# Patient Record
Sex: FEMALE | Race: WHITE | NOT HISPANIC OR LATINO | Employment: FULL TIME | URBAN - METROPOLITAN AREA
[De-identification: names, ages, dates, MRNs, and addresses within clinical notes are randomized per-mention and may not be internally consistent; named-entity substitution may affect disease eponyms.]

---

## 2017-01-12 ENCOUNTER — OFFICE VISIT (OUTPATIENT)
Dept: URGENT CARE | Age: 37
End: 2017-01-12
Payer: COMMERCIAL

## 2017-01-12 PROCEDURE — 99203 OFFICE O/P NEW LOW 30 MIN: CPT | Performed by: FAMILY MEDICINE

## 2017-02-11 ENCOUNTER — HOSPITAL ENCOUNTER (OUTPATIENT)
Dept: RADIOLOGY | Age: 37
Discharge: HOME | End: 2017-02-11
Attending: PHYSICIAN ASSISTANT | Admitting: FAMILY MEDICINE
Payer: COMMERCIAL

## 2017-02-11 ENCOUNTER — TRANSCRIBE ORDERS (OUTPATIENT)
Dept: URGENT CARE | Age: 37
End: 2017-02-11

## 2017-02-11 ENCOUNTER — OFFICE VISIT (OUTPATIENT)
Dept: URGENT CARE | Age: 37
End: 2017-02-11
Payer: COMMERCIAL

## 2017-02-11 DIAGNOSIS — M79.671 PAIN OF RIGHT FOOT: ICD-10-CM

## 2017-02-11 PROCEDURE — 99213 OFFICE O/P EST LOW 20 MIN: CPT | Performed by: FAMILY MEDICINE

## 2017-02-11 PROCEDURE — 73630 X-RAY EXAM OF FOOT: CPT

## 2017-06-09 ENCOUNTER — ALLSCRIPTS OFFICE VISIT (OUTPATIENT)
Dept: OTHER | Facility: OTHER | Age: 37
End: 2017-06-09

## 2017-06-09 ENCOUNTER — LAB REQUISITION (OUTPATIENT)
Dept: LAB | Facility: HOSPITAL | Age: 37
End: 2017-06-09
Payer: COMMERCIAL

## 2017-06-09 DIAGNOSIS — Z01.419 ENCOUNTER FOR GYNECOLOGICAL EXAMINATION WITHOUT ABNORMAL FINDING: ICD-10-CM

## 2017-06-09 PROCEDURE — 87624 HPV HI-RISK TYP POOLED RSLT: CPT | Performed by: OBSTETRICS & GYNECOLOGY

## 2017-06-09 PROCEDURE — G0145 SCR C/V CYTO,THINLAYER,RESCR: HCPCS | Performed by: OBSTETRICS & GYNECOLOGY

## 2017-06-10 ENCOUNTER — TRANSCRIBE ORDERS (OUTPATIENT)
Dept: LAB | Facility: HOSPITAL | Age: 37
End: 2017-06-10

## 2017-06-10 ENCOUNTER — APPOINTMENT (OUTPATIENT)
Dept: LAB | Facility: HOSPITAL | Age: 37
End: 2017-06-10
Payer: COMMERCIAL

## 2017-06-10 DIAGNOSIS — Z00.8 HEALTH EXAMINATION IN POPULATION SURVEY: ICD-10-CM

## 2017-06-10 DIAGNOSIS — Z00.8 HEALTH EXAMINATION IN POPULATION SURVEY: Primary | ICD-10-CM

## 2017-06-10 LAB
CHOLEST SERPL-MCNC: 226 MG/DL (ref 50–200)
EST. AVERAGE GLUCOSE BLD GHB EST-MCNC: 94 MG/DL
HBA1C MFR BLD: 4.9 % (ref 4.2–6.3)
HDLC SERPL-MCNC: 65 MG/DL (ref 40–60)
LDLC SERPL CALC-MCNC: 128 MG/DL (ref 0–100)
TRIGL SERPL-MCNC: 167 MG/DL

## 2017-06-10 PROCEDURE — 80061 LIPID PANEL: CPT

## 2017-06-10 PROCEDURE — 36415 COLL VENOUS BLD VENIPUNCTURE: CPT

## 2017-06-10 PROCEDURE — 83036 HEMOGLOBIN GLYCOSYLATED A1C: CPT

## 2017-06-15 LAB — HPV RRNA GENITAL QL NAA+PROBE: ABNORMAL

## 2017-06-19 LAB
LAB AP GYN PRIMARY INTERPRETATION: NORMAL
LAB AP LMP: NORMAL
Lab: NORMAL
PATH INTERP SPEC-IMP: NORMAL

## 2017-12-21 ENCOUNTER — OFFICE VISIT (OUTPATIENT)
Dept: URGENT CARE | Age: 37
End: 2017-12-21
Payer: COMMERCIAL

## 2017-12-21 PROCEDURE — 99203 OFFICE O/P NEW LOW 30 MIN: CPT | Performed by: FAMILY MEDICINE

## 2017-12-21 PROCEDURE — S9088 SERVICES PROVIDED IN URGENT: HCPCS | Performed by: FAMILY MEDICINE

## 2017-12-22 NOTE — PROGRESS NOTES
Assessment   1  Acute sinusitis (461 9) (J01 90)   2  Acute upper respiratory infection (465 9) (J06 9)    Plan   Acute sinusitis    · Amoxicillin-Pot Clavulanate 875-125 MG Oral Tablet (Augmentin); TAKE 1 TABLET    TWICE DAILY UNTIL FINISHED    Discussion/Summary   Discussion Summary:    Augmentin twice a day until finished ( please take probiotics)  / sinus medication as needed  or Advil / Motrin as needed  follow-up with family physician  Medication Side Effects Reviewed: Possible side effects of new medications were reviewed with the patient/guardian today  Understands and agrees with treatment plan: The treatment plan was reviewed with the patient/guardian  The patient/guardian understands and agrees with the treatment plan    Counseling Documentation With Imm: The patient was counseled regarding  Chief Complaint   1  Cold Symptoms  Chief Complaint Free Text Note Form: Since Tuesday- nasal congestion with rhinorrhea and PND, b/l ear pressure, congested cough and chills  No chest tightness/wheeze  Taking Tylenol Cold and Flu  Flu vaccine      History of Present Illness   HPI: Chills, nasal congestion, ear pain, cough    Hospital Based Practices Required Assessment:      Pain Assessment      the patient states they have pain  The pain is located in the cough and nasal congeston  (on a scale of 0 to 10, the patient rates the pain at 5 )      Abuse And Domestic Violence Screen       Yes, the patient is safe at home  -- The patient states no one is hurting them  Depression And Suicide Screen  No, the patient has not had thoughts of hurting themself  No, the patient has not felt depressed in the past 7 days  Prefered Language is  [place]  Primary Language is  English  Review of Systems   Focused-Female:      Constitutional: chills, but-- as noted in HPI       ENT: earache-- and-- nasal discharge, but-- as noted in HPI        Cardiovascular: no complaints of slow or fast heart rate, no chest pain, no palpitations, no leg claudication or lower extremity edema  Respiratory: cough, but-- as noted in HPI  Breasts: no complaints of breast pain, breast lump or nipple discharge  Gastrointestinal: no complaints of abdominal pain, no constipation, no nausea or diarrhea, no vomiting, no bloody stools  Genitourinary: no complaints of dysuria, no incontinence, no pelvic pain, no dysmenorrhea, no vaginal discharge or abnormal vaginal bleeding  Musculoskeletal: no complaints of arthralgia, no myalgia, no joint swelling or stiffness, no limb pain or swelling  Integumentary: no complaints of skin rash or lesion, no itching or dry skin, no skin wounds  Neurological: no complaints of headache, no confusion, no numbness or tingling, no dizziness or fainting  ROS Reviewed:    ROS reviewed  Active Problems   1  Abnormal findings on diagnostic imaging of other specified body structures (793 99)     (R93 8)   2  Acute foot pain, right (729 5) (M79 671)   3  Ankle pain (719 47) (M25 579)   4  Anxiety (300 00) (F41 9)   5  Arthralgia of right hand (719 44) (M25 541)   6  Asthmatic bronchitis (493 90) (J45 909)   7  Baker's cyst, left (727 51) (M71 22)   8  Bilateral knee pain (719 46) (M25 561,M25 562)   9  Bilateral patellofemoral syndrome (719 46) (M22 2X1,M22 2X2)   10  Contraception (V25 9) (Z30 9)   11  Encounter for annual routine gynecological examination (V72 31) (Z01 419)   12  Fibromyalgia (729 1) (M79 7)   13  Fracture of great toe, right, closed (826 0) (S92 401A)   14  Hamstring tightness (728 9) (M62 89)   15  Hypercholesteremia (272 0) (E78 00)   16  It band syndrome, left (728 89) (M76 32)   17  Knee pain, left (719 46) (M25 562)   18  Obstructive sleep apnea (327 23) (G47 33)   19  Right ankle sprain (845 00) (S93 401A)   20  Tobacco use (305 1) (Z72 0)    Past Medical History   1  History of Exercise-induced asthma (493 81) (S18 556)   2   History of Fibromyalgia (729 1) (M79 7)   3  History of hypercholesterolemia (V12 29) (Z86 39)  Active Problems And Past Medical History Reviewed: The active problems and past medical history were reviewed and updated today  Family History   Family History    1  Family history of Cancer   2  Family history of Osteoarthritis (V17 7)  Family History Reviewed: The family history was reviewed and updated today  Social History    · Denied: Alcohol Use (History)   · Caffeine Use   · Current Smoker (305 1)   · Denied: History of Drug use   · Tobacco use (305 1) (Z72 0)  Social History Reviewed: The social history was reviewed and updated today  The social history was reviewed and is unchanged  Surgical History   1  History of Breast Surgery Reduction Procedure   2  History of Tonsillectomy  Surgical History Reviewed: The surgical history was reviewed and updated today  Current Meds    1  Atorvastatin Calcium 20 MG Oral Tablet Recorded   2  Breo Ellipta 100-25 MCG/INH Inhalation Aerosol Powder Breath Activated Recorded   3  Coconut Oil CAPS; Therapy: (Alana Gonzalez) to Recorded   4  Cymbalta 60 MG Oral Capsule Delayed Release Particles; Take 2 Capsules at Bedtime     Recorded   5  Echinacea CAPS; Therapy: (Alana Gonzalez) to Recorded   6  Multiple Vitamin TABS; Therapy: (AlanaWeVorcedy) to Recorded   7  Norgestim-Eth Estrad Triphasic 0 18/0 215/0 25 MG-35 MCG Oral Tablet; Take 1 tablet     daily; Therapy: [id number] to (Last Rx:09Jun2017)  Requested for: 52GHX9542 Ordered   8  Phentermine HCl - 37 5 MG Oral Tablet; Therapy: (Recorded:18Oct2016) to Recorded   9  ProAir  (90 Base) MCG/ACT Inhalation Aerosol Solution; INHALE 2 PUFFS     EVERY 4 HOURS AS NEEDED; Therapy: 52CFF6114 to (Last Rx:12Jan2017)  Requested for: 12Jan2017 Ordered   10  Topiramate 25 MG Oral Tablet; Therapy: (Recorded:18Oct2016) to Recorded   11  Vitamin C CAPS;       Therapy: (Wileya Frame) to Recorded   12  Vitamin D 1000 UNIT Oral Tablet; Therapy: (Donalda Frame) to Recorded   13  Vitamin E 400 UNIT Oral Tablet; Therapy: (Wileya Frame) to Recorded  Medication List Reviewed: The medication list was reviewed and updated today  Allergies   1  No Known Drug Allergies    Vitals   Signs   Recorded: 11Uxg6656 04:32PM   Temperature: 98 2 F, Oral  Heart Rate: 103  Pulse Quality: Regular  Respiration: 18  Systolic: 194, RUE, Sitting  Diastolic: 68, RUE, Sitting  Height: 5 ft 2 in  Weight: 186 lb 9 6 oz  BMI Calculated: 34 13  BSA Calculated: 1 86  O2 Saturation: 98  LMP: 26PON7683  Pain Scale: 5    Physical Exam        Constitutional patient appears ill  Ears, Nose, Mouth, and Throat      Otoscopic examination: Tympanic membranes translucent with normal light reflex  Canals patent without erythema  -- nasal congestion, tenderness over the sinuses with palpation  -- injection of the oropharynx  Pulmonary      Respiratory effort: No increased work of breathing or signs of respiratory distress  Auscultation of lungs: Clear to auscultation  Cardiovascular      Auscultation of heart: Normal rate and rhythm, normal S1 and S2, without murmurs  Lymphatic      Palpation of lymph nodes in neck: No lymphadenopathy  Skin good color and turgor  Neurologic grossly intact, no nuchal rigidity  Psychiatric      Orientation to person, place, and time: Normal        Mood and affect: Normal        Message   Return to work or school:         No work 12/22/2017           Signatures    Electronically signed by : Mahnaz Gordon DO; Dec 21 2017  4:56PM EST                       (Author)

## 2018-01-04 ENCOUNTER — HOSPITAL ENCOUNTER (EMERGENCY)
Facility: HOSPITAL | Age: 38
Discharge: HOME | End: 2018-01-04
Attending: PSYCHIATRY & NEUROLOGY | Admitting: EMERGENCY MEDICINE
Payer: COMMERCIAL

## 2018-01-04 ENCOUNTER — APPOINTMENT (EMERGENCY)
Dept: RADIOLOGY | Facility: HOSPITAL | Age: 38
End: 2018-01-04
Payer: COMMERCIAL

## 2018-01-04 VITALS
RESPIRATION RATE: 14 BRPM | DIASTOLIC BLOOD PRESSURE: 70 MMHG | SYSTOLIC BLOOD PRESSURE: 118 MMHG | HEIGHT: 62 IN | HEART RATE: 76 BPM | BODY MASS INDEX: 33.13 KG/M2 | OXYGEN SATURATION: 98 % | WEIGHT: 180 LBS | TEMPERATURE: 98.1 F

## 2018-01-04 DIAGNOSIS — K52.9 COLITIS: ICD-10-CM

## 2018-01-04 DIAGNOSIS — R91.1 LUNG NODULE: Primary | ICD-10-CM

## 2018-01-04 LAB
ALBUMIN SERPL BCP-MCNC: 2.7 G/DL (ref 3.5–5)
ALP SERPL-CCNC: 76 U/L (ref 46–116)
ALT SERPL W P-5'-P-CCNC: 14 U/L (ref 12–78)
ANION GAP SERPL CALCULATED.3IONS-SCNC: 6 MMOL/L (ref 4–13)
AST SERPL W P-5'-P-CCNC: 8 U/L (ref 5–45)
BACTERIA UR QL AUTO: ABNORMAL /HPF
BASOPHILS # BLD AUTO: 0.04 THOUSANDS/ΜL (ref 0–0.1)
BASOPHILS NFR BLD AUTO: 0 % (ref 0–1)
BILIRUB SERPL-MCNC: 0.39 MG/DL (ref 0.2–1)
BILIRUB UR QL STRIP: NEGATIVE
BUN SERPL-MCNC: 9 MG/DL (ref 5–25)
C DIFF TOX GENS STL QL NAA+PROBE: ABNORMAL
CALCIUM SERPL-MCNC: 8.2 MG/DL (ref 8.3–10.1)
CHLORIDE SERPL-SCNC: 111 MMOL/L (ref 100–108)
CLARITY UR: ABNORMAL
CO2 SERPL-SCNC: 24 MMOL/L (ref 21–32)
COLOR UR: YELLOW
CREAT SERPL-MCNC: 0.54 MG/DL (ref 0.6–1.3)
EOSINOPHIL # BLD AUTO: 0.19 THOUSAND/ΜL (ref 0–0.61)
EOSINOPHIL NFR BLD AUTO: 2 % (ref 0–6)
ERYTHROCYTE [DISTWIDTH] IN BLOOD BY AUTOMATED COUNT: 12.3 % (ref 11.6–15.1)
EXT PREG TEST URINE: NEGATIVE
GFR SERPL CREATININE-BSD FRML MDRD: 122 ML/MIN/1.73SQ M
GLUCOSE SERPL-MCNC: 82 MG/DL (ref 65–140)
GLUCOSE UR STRIP-MCNC: NEGATIVE MG/DL
HCT VFR BLD AUTO: 37.1 % (ref 34.8–46.1)
HGB BLD-MCNC: 12.9 G/DL (ref 11.5–15.4)
HGB UR QL STRIP.AUTO: NEGATIVE
HYALINE CASTS #/AREA URNS LPF: ABNORMAL /LPF
KETONES UR STRIP-MCNC: NEGATIVE MG/DL
LEUKOCYTE ESTERASE UR QL STRIP: ABNORMAL
LYMPHOCYTES # BLD AUTO: 1.59 THOUSANDS/ΜL (ref 0.6–4.47)
LYMPHOCYTES NFR BLD AUTO: 16 % (ref 14–44)
MCH RBC QN AUTO: 29.5 PG (ref 26.8–34.3)
MCHC RBC AUTO-ENTMCNC: 34.8 G/DL (ref 31.4–37.4)
MCV RBC AUTO: 85 FL (ref 82–98)
MONOCYTES # BLD AUTO: 0.65 THOUSAND/ΜL (ref 0.17–1.22)
MONOCYTES NFR BLD AUTO: 7 % (ref 4–12)
NEUTROPHILS # BLD AUTO: 7.54 THOUSANDS/ΜL (ref 1.85–7.62)
NEUTS SEG NFR BLD AUTO: 75 % (ref 43–75)
NITRITE UR QL STRIP: NEGATIVE
NON-SQ EPI CELLS URNS QL MICRO: ABNORMAL /HPF
NRBC BLD AUTO-RTO: 0 /100 WBCS
PH UR STRIP.AUTO: 6.5 [PH] (ref 4.5–8)
PLATELET # BLD AUTO: 204 THOUSANDS/UL (ref 149–390)
PMV BLD AUTO: 9.1 FL (ref 8.9–12.7)
POTASSIUM SERPL-SCNC: 3.9 MMOL/L (ref 3.5–5.3)
PROT SERPL-MCNC: 6 G/DL (ref 6.4–8.2)
PROT UR STRIP-MCNC: NEGATIVE MG/DL
RBC # BLD AUTO: 4.38 MILLION/UL (ref 3.81–5.12)
RBC #/AREA URNS AUTO: ABNORMAL /HPF
SODIUM SERPL-SCNC: 141 MMOL/L (ref 136–145)
SP GR UR STRIP.AUTO: 1.02 (ref 1–1.03)
UROBILINOGEN UR QL STRIP.AUTO: 0.2 E.U./DL
WBC # BLD AUTO: 10.04 THOUSAND/UL (ref 4.31–10.16)
WBC #/AREA URNS AUTO: ABNORMAL /HPF

## 2018-01-04 PROCEDURE — 96360 HYDRATION IV INFUSION INIT: CPT

## 2018-01-04 PROCEDURE — 96361 HYDRATE IV INFUSION ADD-ON: CPT

## 2018-01-04 PROCEDURE — 80053 COMPREHEN METABOLIC PANEL: CPT | Performed by: PHYSICIAN ASSISTANT

## 2018-01-04 PROCEDURE — 81025 URINE PREGNANCY TEST: CPT | Performed by: PHYSICIAN ASSISTANT

## 2018-01-04 PROCEDURE — 87493 C DIFF AMPLIFIED PROBE: CPT | Performed by: PHYSICIAN ASSISTANT

## 2018-01-04 PROCEDURE — 36415 COLL VENOUS BLD VENIPUNCTURE: CPT | Performed by: PHYSICIAN ASSISTANT

## 2018-01-04 PROCEDURE — 81001 URINALYSIS AUTO W/SCOPE: CPT | Performed by: PHYSICIAN ASSISTANT

## 2018-01-04 PROCEDURE — 85025 COMPLETE CBC W/AUTO DIFF WBC: CPT | Performed by: PHYSICIAN ASSISTANT

## 2018-01-04 PROCEDURE — 99284 EMERGENCY DEPT VISIT MOD MDM: CPT

## 2018-01-04 PROCEDURE — 74177 CT ABD & PELVIS W/CONTRAST: CPT

## 2018-01-04 RX ORDER — ATORVASTATIN CALCIUM 20 MG/1
TABLET, FILM COATED ORAL DAILY
COMMUNITY
End: 2020-12-22 | Stop reason: ALTCHOICE

## 2018-01-04 RX ORDER — TOPIRAMATE 25 MG/1
TABLET ORAL 2 TIMES DAILY
COMMUNITY
End: 2019-01-09 | Stop reason: ALTCHOICE

## 2018-01-04 RX ORDER — DULOXETIN HYDROCHLORIDE 60 MG/1
60 CAPSULE, DELAYED RELEASE ORAL DAILY
COMMUNITY
End: 2022-08-03 | Stop reason: SDUPTHER

## 2018-01-04 RX ORDER — FLUTICASONE FUROATE AND VILANTEROL 100; 25 UG/1; UG/1
POWDER RESPIRATORY (INHALATION)
COMMUNITY
End: 2018-06-19

## 2018-01-04 RX ORDER — NORGESTIMATE AND ETHINYL ESTRADIOL 7DAYSX3 28
1 KIT ORAL DAILY
COMMUNITY
Start: 2017-06-09 | End: 2019-08-29 | Stop reason: ALTCHOICE

## 2018-01-04 RX ORDER — PHENTERMINE HYDROCHLORIDE 37.5 MG/1
TABLET ORAL DAILY
COMMUNITY
End: 2019-01-09 | Stop reason: ALTCHOICE

## 2018-01-04 RX ORDER — ACETAMINOPHEN 325 MG/1
650 TABLET ORAL ONCE
Status: COMPLETED | OUTPATIENT
Start: 2018-01-04 | End: 2018-01-04

## 2018-01-04 RX ORDER — ALBUTEROL SULFATE 90 UG/1
2 AEROSOL, METERED RESPIRATORY (INHALATION) EVERY 4 HOURS PRN
COMMUNITY
Start: 2017-01-12 | End: 2018-07-09 | Stop reason: HOSPADM

## 2018-01-04 RX ADMIN — SODIUM CHLORIDE 1000 ML: 0.9 INJECTION, SOLUTION INTRAVENOUS at 09:25

## 2018-01-04 RX ADMIN — ACETAMINOPHEN 650 MG: 325 TABLET, FILM COATED ORAL at 09:28

## 2018-01-04 RX ADMIN — IOHEXOL 100 ML: 350 INJECTION, SOLUTION INTRAVENOUS at 11:49

## 2018-01-04 NOTE — ED ATTENDING ATTESTATION
Patient was evaluated and treated by the Advanced practitioner  I was immediately available for questions and discussions regarding patient care          Critical Care Time  CritCare Time    Procedures

## 2018-01-04 NOTE — ED PROVIDER NOTES
History  Chief Complaint   Patient presents with    Abdominal Pain     Pt c/o lower abdominal pain with diarrhea that started on Monday  Subsided then picked up again in the middle of night  70-year-old female with no significant past medical history, presents to emergency department for lower abdominal cramping and diarrhea  Patient states that she finished a course of Augmentin 5 days ago for sinusitis, and developed 8 episodes of nonbloody watery yellow diarrhea with lower abdominal cramping the following day  Took a dose of Imodium 3 days ago, which helped resolve the diarrhea  Had reoccurrence of the watery diarrhea this morning, with one episode of blood in the toilet  Denies fevers, chills  Denies nausea, vomiting  Denies urinary pain, frequency, urgency, hematuria  Denies vaginal bleeding or discharge  Denies chest pain or shortness of breath  Denies hx of hemorrhoids  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 Taking?    DULoxetine (CYMBALTA) 60 mg delayed release capsule 1/3/2018 at Unknown time  Yes Yes   Sig: Take 2 capsules by mouth   albuterol (PROAIR HFA) 90 mcg/act inhaler More than a month at Unknown time  Yes No   Sig: Inhale 2 puffs every 4 (four) hours as needed   atorvastatin (LIPITOR) 20 mg tablet 1/3/2018 at Unknown time  Yes Yes   Sig: Take by mouth   fluticasone furoate-vilanterol (BREO ELLIPTA) More than a month at Unknown time  Yes No   Sig: Inhale   norgestimate-ethinyl estradiol (ORTHO TRI-CYCLEN,TRINESSA) 0 18/0 215/0 25 MG-35 MCG per tablet 1/3/2018 at Unknown time  Yes Yes   Sig: Take 1 tablet by mouth daily   phentermine (ADIPEX-P) 37 5 MG tablet 1/3/2018 at Unknown time  Yes Yes   Sig: Take by mouth   topiramate (TOPAMAX) 25 mg tablet 1/3/2018 at Unknown time  Yes Yes   Sig: Take by mouth      Facility-Administered Medications: None       History reviewed  No pertinent past medical history  Past Surgical History:   Procedure Laterality Date    BREAST SURGERY      TONSILLECTOMY         History reviewed  No pertinent family history  I have reviewed and agree with the history as documented  Social History   Substance Use Topics    Smoking status: Current Every Day Smoker    Smokeless tobacco: Never Used    Alcohol use No        Review of Systems   Constitutional: Negative  Negative for chills and fever  Respiratory: Negative  Cardiovascular: Negative  Gastrointestinal: Positive for abdominal pain, blood in stool and diarrhea  Negative for constipation, nausea and vomiting  Genitourinary: Negative  Musculoskeletal: Negative  Neurological: Negative  All other systems reviewed and are negative  Physical Exam  ED Triage Vitals [01/04/18 0814]   Temperature Pulse Respirations Blood Pressure SpO2   98 1 °F (36 7 °C) 94 16 126/84 97 %      Temp Source Heart Rate Source Patient Position - Orthostatic VS BP Location FiO2 (%)   Oral Monitor Sitting Left arm --      Pain Score       No Pain           Orthostatic Vital Signs  Vitals:    01/04/18 0924 01/04/18 1100 01/04/18 1151 01/04/18 1302   BP: 108/59 144/77 110/55 118/70   Pulse: 78 62 63 76   Patient Position - Orthostatic VS: Lying  Lying Lying       Physical Exam   Constitutional: She is oriented to person, place, and time  She appears well-developed and well-nourished  Eyes: Conjunctivae and EOM are normal  Pupils are equal, round, and reactive to light  Neck: Normal range of motion  Neck supple     Cardiovascular: Normal rate, regular rhythm and intact distal pulses  Pulmonary/Chest: Effort normal and breath sounds normal  She has no wheezes  She has no rales  Abdominal: Soft  Bowel sounds are normal  She exhibits no distension  There is no tenderness  There is no rebound and no guarding  Guaiac negative  No chele blood in stool  + internal hemorrhoid palpated at the 9 o'clock position  No external hemorrhoids or fissures  Musculoskeletal: Normal range of motion  She exhibits no edema or tenderness  Neurological: She is alert and oriented to person, place, and time  No cranial nerve deficit or sensory deficit  She exhibits normal muscle tone  Coordination normal    Skin: Skin is warm and dry  Capillary refill takes less than 2 seconds  Psychiatric: She has a normal mood and affect  Her behavior is normal    Nursing note and vitals reviewed  ED Medications  Medications   sodium chloride 0 9 % bolus 1,000 mL (0 mL Intravenous Stopped 1/4/18 1108)   acetaminophen (TYLENOL) tablet 650 mg (650 mg Oral Given 1/4/18 0928)   iohexol (OMNIPAQUE) 350 MG/ML injection (MULTI-DOSE) 100 mL (100 mL Intravenous Given 1/4/18 1149)       Diagnostic Studies  Results Reviewed     Procedure Component Value Units Date/Time    Clostridium difficile toxin by PCR [[id number]]  (Abnormal) Collected:  01/04/18 1123    Lab Status:  Final result Specimen:  Stool from Rectum Updated:  01/05/18 0831     C difficile toxin by PCR POSITIVE for C difficle toxin by PCR   (A)    Urine Microscopic [[id number]]  (Abnormal) Collected:  01/04/18 1127    Lab Status:  Final result Specimen:  Urine from Urine, Clean Catch Updated:  01/04/18 1155     RBC, UA 2-4 (A) /hpf      WBC, UA 4-10 (A) /hpf      Epithelial Cells Innumerable (A) /hpf      Bacteria, UA Occasional /hpf      Hyaline Casts, UA 3-5 (A) /lpf     UA w Reflex to Microscopic w Reflex to Culture [[id number]]  (Abnormal) Collected:  01/04/18 1127    Lab Status:  Final result Specimen:  Urine from Urine, Clean Catch Updated:  01/04/18 1145     Color, UA Yellow     Clarity, UA Cloudy     Specific [place], UA 1 019     pH, UA 6 5     Leukocytes, UA Small (A)     Nitrite, UA Negative     Protein, UA Negative mg/dl      Glucose, UA Negative mg/dl      Ketones, UA Negative mg/dl      Urobilinogen, UA 0 2 E U /dl      Bilirubin, UA Negative     Blood, UA Negative    POCT pregnancy, urine [[id number]]  (Normal) Resulted:  01/04/18 1126    Lab Status:  Final result Updated:  01/04/18 1126     EXT PREG TEST UR (Ref: Negative) negative    Comprehensive metabolic panel [[id number]]  (Abnormal) Collected:  01/04/18 0923    Lab Status:  Final result Specimen:  Blood from Arm, Left Updated:  01/04/18 1006     Sodium 141 mmol/L      Potassium 3 9 mmol/L      Chloride 111 (H) mmol/L      CO2 24 mmol/L      Anion Gap 6 mmol/L      BUN 9 mg/dL      Creatinine 0 54 (L) mg/dL      Glucose 82 mg/dL      Calcium 8 2 (L) mg/dL      AST 8 U/L      ALT 14 U/L      Alkaline Phosphatase 76 U/L      Total Protein 6 0 (L) g/dL      Albumin 2 7 (L) g/dL      Total Bilirubin 0 39 mg/dL      eGFR 122 ml/min/1 73sq m     Narrative:         National Kidney Disease Education Program recommendations are as follows:  GFR calculation is accurate only with a steady state creatinine  Chronic Kidney disease less than 60 ml/min/1 73 sq  meters  Kidney failure less than 15 ml/min/1 73 sq  meters      CBC and differential [[id number]]  (Normal) Collected:  01/04/18 0923    Lab Status:  Final result Specimen:  Blood from Arm, Left Updated:  01/04/18 0939     WBC 10 04 Thousand/uL      RBC 4 38 Million/uL      Hemoglobin 12 9 g/dL      Hematocrit 37 1 %      MCV 85 fL      MCH 29 5 pg      MCHC 34 8 g/dL      RDW 12 3 %      MPV 9 1 fL      Platelets 970 Thousands/uL      nRBC 0 /100 WBCs      Neutrophils Relative 75 %      Lymphocytes Relative 16 %      Monocytes Relative 7 %      Eosinophils Relative 2 %      Basophils Relative 0 %      Neutrophils Absolute 7 54 Thousands/µL      Lymphocytes Absolute 1 59 Thousands/µL      Monocytes Absolute 0 65 Thousand/µL      Eosinophils Absolute 0 19 Thousand/µL      Basophils Absolute 0 04 Thousands/µL     Stool Enteric Bacterial Panel by PCR [[id number]]     Lab Status:  No result Specimen:  Stool from Rectum                  CT abdomen pelvis with contrast   Final Result by Costa Richards DO (01/04 1215)      Mild wall thickening within a relatively long segment of large bowel involving the distal transverse colon to the distal left colon suggesting infectious/inflammatory colitis  3 cm left adnexal cyst appears simple  4 mm pulmonary nodule within the left lower lobe  Based on current Fleischner Society 2017 Guidelines on incidental pulmonary nodule, no routine follow-up is needed if the patient is considered low risk for lung cancer  If the patient is    considered high risk for lung cancer, 12 month follow-up non-contrast chest CT is recommended  Workstation performed: [id number]                    Procedures  Procedures       Phone Contacts  ED Phone Contact    ED Course  ED Course as of Jan 05 1651   Thu Jan 04, 2018   1243 Patient feeling mildly improved  Declining further analgesia  CT and labs reviewed with patient  Wishes to be discharged home  MDM  Number of Diagnoses or Management Options  Colitis:   Lung nodule:   Diagnosis management comments: 64-year-old female with no significant past medical history, presents to emergency department for lower abdominal cramping and diarrhea  Differential Diagnosis includes but is not limited to: gastroenteritis, infectious diarrhea, c  Diff, colitis, diverticulitis, hemorrhoids, unlikely renal stone  CT abdomen/pelvis suggests infectious/inflammatory colitis  Given no leukocytosis, afebrile, and exposure to patients with viral gastroenteritis, this is likely viral/inflammatory in nature  C diff stool culture was sent    Patient was unable to give stool PCR sample  Patient instructed to follow up with primary care doctor next week for re-evaluation  She was also notified about the lung nodule, and given that she is a smoker she should be followed up for that  Urine negative for urinary tract infection  Pain is mildly improved with use of acetaminophen and IV fluids in the emergency department  She will be discharged home  Amount and/or Complexity of Data Reviewed  Clinical lab tests: ordered and reviewed  Tests in the radiology section of CPT®: ordered and reviewed      CritCare Time    Disposition  Final diagnoses:   Lung nodule   Colitis     Time reflects when diagnosis was documented in both MDM as applicable and the Disposition within this note     Time User Action Codes Description Comment    1/4/2018 12:49 PM Nery Kaye Add [R91 1] Lung nodule     1/4/2018 12:50 PM Nery Kaye Add [K52 9] Colitis       ED Disposition     ED Disposition Condition Comment    Discharge  Chantell Estrada discharge to home/self care      Condition at discharge: stable        Follow-up Information     Follow up With Specialties Details Why Contact Info    Isabella Zhao MD Internal Medicine In 3 days  [address] [place] [address]  [phone]          Discharge Medication List as of 1/4/2018 12:52 PM      CONTINUE these medications which have NOT CHANGED    Details   atorvastatin (LIPITOR) 20 mg tablet Take by mouth, Historical Med      DULoxetine (CYMBALTA) 60 mg delayed release capsule Take 2 capsules by mouth, Historical Med      norgestimate-ethinyl estradiol (ORTHO TRI-CYCLEN,TRINESSA) 0 18/0 215/0 25 MG-35 MCG per tablet Take 1 tablet by mouth daily, Starting Fri 6/9/2017, Historical Med      phentermine (ADIPEX-P) 37 5 MG tablet Take by mouth, Historical Med      topiramate (TOPAMAX) 25 mg tablet Take by mouth, Historical Med      albuterol (PROAIR HFA) 90 mcg/act inhaler Inhale 2 puffs every 4 (four) hours as needed, Starting Thu 1/12/2017, Historical Med      fluticasone furoate-vilanterol (BREO ELLIPTA) Inhale, Historical Med           No discharge procedures on file      ED Provider  Electronically Signed by           Sweetie Sanchez PA-C  01/04/18 9601 Cyndi Arambula PA-C  01/05/18 5476

## 2018-01-04 NOTE — DISCHARGE INSTRUCTIONS
Ibuprofen 600mg by mouth every 6 hours as needed for mild to moderate pain or fever  Acetaminophen 650mg by mouth every 8 hours as needed for mild to moderate pain or fever  You can take Ibuprofen and Acetaminophen together safely  Follow up with PMD in 3-5 days for reevaluation  Please speak with him about the lung nodule that was found incidentally on CT scan  Return to the emergency department for fevers, chills, worsening abdominal pain, blood in diarrhea, she unable to tolerate oral intake, or any other concerns  Colitis   WHAT YOU NEED TO KNOW:   Colitis is swelling and irritation of your colon  Colitis may be caused by ulcers or a problem with your immune system  Bacteria, a virus, or a parasite may also cause colitis  The cause may not be known  You may have diarrhea, abdominal pain, fever, or blood or mucus in your bowel movement  DISCHARGE INSTRUCTIONS:   Return to the emergency department if:   · You have sudden trouble breathing  · Your bowel movements are black or have blood in them  · You have blood in your vomit  · You have severe abdominal pain or your abdomen is swollen and feels hard  · You have any of the following signs of dehydration:     ¨ Dizziness or weakness    ¨ Dry mouth, cracked lips, or severe thirst    ¨ Fast heartbeat or breathing    ¨ Urinating very little or not at all  Contact your healthcare provider if:   · Your symptoms get worse or do not go away  · You have a fever, chills, cough, or feel weak and achy  · You suddenly lose weight without trying  · You have questions or concerns about your condition or care  Medicines:   · Medicines  may be given to decrease inflammation in your colon and treat diarrhea  · Take your medicine as directed  Contact your healthcare provider if you think your medicine is not helping or if you have side effects  Tell him of her if you are allergic to any medicine   Keep a list of the medicines, vitamins, and herbs you take  Include the amounts, and when and why you take them  Bring the list or the pill bottles to follow-up visits  Carry your medicine list with you in case of an emergency  Manage your symptoms:   · Drink liquids as directed  to help prevent dehydration  Good liquids to drink include water, juice, and broth  Ask how much liquid to drink each day  You may need to drink an oral rehydration solution (ORS)  An ORS contains a balance of water, salt, and sugar to replace body fluids lost during diarrhea  · Eat a variety of healthy foods  Healthy foods include fruits, vegetables, whole-grain breads, beans, low-fat dairy products, lean meats, and fish  You may need to eat several small meals throughout the day instead of large meals  Avoid spicy foods, caffeine, chocolate, and foods high in fat  · Talk to your healthcare provider before you take NSAIDs  NSAIDs can cause worsen your symptoms if ulcers are causing your colitis  · Start to exercise when you feel better  Regular exercise helps your bowels work normally  Ask about the best exercise plan for you  Follow up with your healthcare provider as directed: You may need to return for a colonoscopy or other tests  Write down how often you have a bowel movements and what they look like  Bring this to your follow-up visits  Write down your questions so you remember to ask them during your visits  © 2017 [employer] Information is for End User's use only and may not be sold, redistributed or otherwise used for commercial purposes  All illustrations and images included in CareNotes® are the copyrighted property of A D [employer] M [employer]  or Jasbir Fowler  The above information is an  only  It is not intended as medical advice for individual conditions or treatments  Talk to your doctor, nurse or pharmacist before following any medical regimen to see if it is safe and effective for you

## 2018-01-04 NOTE — ED NOTES
Pt aware we need stool and urine sample  Pt given hat to obtain when needed  Pt has call vi Hightower RN  93/86/06 [id number]

## 2018-01-05 NOTE — PROGRESS NOTES
Spoke to pt, called in rx for metronidazole 500mg tab 1 tab PO TID x 10 days disp #30 NR to [facility] pharmacy

## 2018-01-13 VITALS
DIASTOLIC BLOOD PRESSURE: 86 MMHG | WEIGHT: 184.38 LBS | BODY MASS INDEX: 33.93 KG/M2 | SYSTOLIC BLOOD PRESSURE: 130 MMHG | HEIGHT: 62 IN

## 2018-01-18 NOTE — PROGRESS NOTES
Assessment    1  Fracture of great toe, right, closed (826 0) (S92 401A)    Plan  Acute foot pain, right    · * XR FOOT 3+ VIEW RIGHT; Status:Active; Requested for:[id number];   Fracture of great toe, right, closed    · Vicodin 5-300 MG Oral Tablet; TAKE 1 TABLET Bedtime   · Apply an ice pack as needed for pain twice a day for 20 minutes ; Status:Complete;    Done: 11OOK2941   · Avoid bearing weight on your leg by using crutches ; Status:Complete;   Done:  52WBT8498   · Do not resume your normal level of activity until you have been rechecked ;  Status:Complete;   Done: 44KYK2461   · Keep the affected body part above the level of your heart to avoid swelling ;  Status:Complete;   Done: 58GDE2833   · Wear splint at all times ; Status:Complete;   Done: 39PPN7401   · Seek Immediate Medical Attention if: You notice arm or shoulder pain, or numbness in  your hands and arms after using crutches ; Status:Complete;   Done: 02CIR0475   · Fracture splint; Status:Complete - Retrospective By Protocol Authorization;   Done:  19ATS9708  Health Maintenance, Fracture of great toe, right, closed    · Crutches; Status:Active - Retrospective By Protocol Authorization; Requested  for:92Tjl9483;     Discussion/Summary  Discussion Summary:   Follow-up with podiatry in 3-5 days  Call Monday to schedule appointment  Medication Side Effects Reviewed: Possible side effects of new medications were reviewed with the patient/guardian today  Understands and agrees with treatment plan: The treatment plan was reviewed with the patient/guardian  The patient/guardian understands and agrees with the treatment plan   Counseling Documentation With Imm: The patient was counseled regarding diagnostic results, instructions for management, prognosis, patient and family education, impressions, risks and benefits of treatment options, importance of compliance with treatment  Follow Up Instructions:    Follow Up with your Primary Care Provider in 3-5 days    If your symptoms worsen, go to the nearest [place] [address] Emergency Department  Chief Complaint    1  Foot Problem  Chief Complaint Free Text Note Form: pt reports she injured her right foot yesterday and heard a "pop" in her great toe      History of Present Illness  HPI: Patient jammed her right foot this morning and heard a pop in her great toe  She has pain in the great toe  She denies any ankle pain or foot pain, or prior injury   Hospital Based Practices Required Assessment:   Pain Assessment   the patient states they have pain  The pain is located in the right foot  The pain radiates to the right great toe  (on a scale of 0 to 10, the patient rates the pain at 7 )   Abuse And Domestic Violence Screen    Yes, the patient is safe at home  The patient states no one is hurting them  Depression And Suicide Screen  No, the patient has not had thoughts of hurting themself  No, the patient has not felt depressed in the past 7 days  Prefered Language is  [place]  Primary Language is  English  Foot Problem:   Associated symptoms include pain with shoes and abnormal gait, but no ankle swelling, no leg pain and no back pain  Review of Systems  Focused-Female:   Constitutional: as noted in HPI  Musculoskeletal: as noted in HPI  Integumentary: as noted in HPI  Neurological: as noted in HPI  Active Problems    1  Abnormal findings on diagnostic imaging of other specified body structures (793 99)   (R93 8)   2  Ankle pain (719 47) (M25 579)   3  Arthralgia of right hand (719 44) (M25 541)   4  Asthmatic bronchitis (493 90) (J45 909)   5  Baker's cyst, left (727 51) (M71 22)   6  Bilateral knee pain (719 46) (M25 561,M25 562)   7  Bilateral patellofemoral syndrome (719 46) (M22 2X1,M22 2X2)   8  Hamstring tightness (728 9) (M62 89)   9  IT band syndrome, left (728 89) (M76 32)   10  Knee pain, left (719 46) (M25 562)   11  Obstructive sleep apnea (327 23) (G47 33)   12   Right ankle sprain (845 00) (S93 401A)   13  Tobacco use (305 1) (Z72 0)    Past Medical History    1  Acute upper respiratory infection (465 9) (J06 9)   2  History of Exercise-induced asthma (493 81) (J45 990)   3  History of Fibromyalgia (729 1) (M79 7)   4  History of hypercholesterolemia (V12 29) (Z86 39)  Active Problems And Past Medical History Reviewed: The active problems and past medical history were reviewed and updated today  Family History  Family History    1  Family history of Cancer   2  Family history of Osteoarthritis (V17 7)  Family History Reviewed: The family history was reviewed and updated today  Social History    · Denied: Alcohol Use (History)   · Caffeine Use   · Current Smoker (305 1)   · Tobacco use (305 1) (Z72 0)  Social History Reviewed: The social history was reviewed and updated today  Surgical History    1  History of Breast Surgery Reduction Procedure  Surgical History Reviewed: The surgical history was reviewed and updated today  Current Meds   1  Atorvastatin Calcium 20 MG Oral Tablet Recorded   2  Breo Ellipta 100-25 MCG/INH Inhalation Aerosol Powder Breath Activated Recorded   3  Cymbalta 60 MG Oral Capsule Delayed Release Particles; Take 2 Capsules at Bedtime   Recorded   4  Medrol 4 MG Oral Tablet Therapy Pack; Medrol Dosepak as directed; Therapy: 11XLV1762 to (Last Rx:12Jan2017)  Requested for: 12Jan2017 Ordered   5  Meloxicam 15 MG Oral Tablet; TAKE 1 TABLET DAILY AS NEEDED; Therapy: 27OEI0619 to (Evaluate:77Fmx8005); Last Rx:04Sep2016 Ordered   6  Ortho Tri-Cyclen Lo TABS; Take 1 tablet daily Recorded   7  Phentermine HCl - 37 5 MG Oral Tablet; Therapy: (Recorded:18Oct2016) to Recorded   8  ProAir  (90 Base) MCG/ACT Inhalation Aerosol Solution; INHALE 2 PUFFS   EVERY 4 HOURS AS NEEDED; Therapy: 13CXE5194 to (Last Rx:12Jan2017)  Requested for: 12Jan2017 Ordered   9  Topiramate 25 MG Oral Tablet;    Therapy: (Recorded:18Oct2016) to Recorded  Medication List Reviewed: The medication list was reviewed and updated today  Allergies    1  No Known Drug Allergies    Vitals  Signs   Recorded: 70LCG4273 12:09PM   Temperature: 98 6 F, Tympanic  Heart Rate: 68, R Radial  Pulse Quality: Regular, R Radial  Respiration Quality: Normal  Respiration: 16  Systolic: 832, RUE, Sitting  Diastolic: 70, RUE, Sitting  Height: 5 ft 2 in  Weight: 182 lb   BMI Calculated: 33 29  BSA Calculated: 1 84  O2 Saturation: 98, RA  LMP: 36HZA7205  Pain Scale: 7/10    Physical Exam    Constitutional   General appearance: No acute distress, well appearing and well nourished  Musculoskeletal Patient with slight limp  Range of motion of the right ankle intact  Patient is tenderness and valgus deformity  Tenderness of the right great toe with focal soft tissue swelling  Neurologic   Sensation: No sensory loss  Psychiatric   Orientation to person, place, and time: Normal     Mood and affect: Normal        Results/Data  Diagnostic Studies Reviewed: I personally reviewed the films/images/results in the office today  My interpretation follows  X-ray Review Fracture of the right great toe distal aspect of the proximal phalange  Message  Return to work or school:   Yohan Castro is under my professional care  She was seen in my office on 2/11/2017     She is able to work with limitations (Sedentary Position until cleared by podiatry)  Weight Bearing Status: No Weight-Bearing           Signatures   Electronically signed by : TEOFILO Villalba; Feb 11 2017  1:12PM EST                       (Author)

## 2018-01-18 NOTE — MISCELLANEOUS
Message  Return to work or school:   Danilo Sin is under my professional care  She was seen in my office on 2/11/2017     She is able to work with limitations (Sedentary Position until cleared by podiatry)  Weight Bearing Status: No Weight-Bearing           Signatures   Electronically signed by : Tressa Zavaleta [facility]; Feb 11 2017  1:12PM EST                       (Author)    Electronically signed by : Tressa Zavaleta [facility]; Feb 11 2017  4:14PM EST

## 2018-01-23 VITALS
OXYGEN SATURATION: 98 % | HEIGHT: 62 IN | BODY MASS INDEX: 34.34 KG/M2 | WEIGHT: 186.6 LBS | DIASTOLIC BLOOD PRESSURE: 68 MMHG | TEMPERATURE: 98.2 F | SYSTOLIC BLOOD PRESSURE: 128 MMHG | HEART RATE: 103 BPM | RESPIRATION RATE: 18 BRPM

## 2018-01-24 NOTE — MISCELLANEOUS
Message  Return to work or school:        No work 12/22/2017          Signatures   Electronically signed by : Yonatan Hung DO; Dec 21 2017  4:56PM EST                       (Author)

## 2018-05-30 ENCOUNTER — ANNUAL EXAM (OUTPATIENT)
Dept: OBGYN CLINIC | Facility: CLINIC | Age: 38
End: 2018-05-30
Payer: COMMERCIAL

## 2018-05-30 VITALS
SYSTOLIC BLOOD PRESSURE: 126 MMHG | BODY MASS INDEX: 33.68 KG/M2 | WEIGHT: 183 LBS | HEIGHT: 62 IN | DIASTOLIC BLOOD PRESSURE: 78 MMHG

## 2018-05-30 DIAGNOSIS — Z01.419 ENCOUNTER FOR ANNUAL ROUTINE GYNECOLOGICAL EXAMINATION: Primary | ICD-10-CM

## 2018-05-30 DIAGNOSIS — Z87.42 HISTORY OF ABNORMAL CERVICAL PAP SMEAR: ICD-10-CM

## 2018-05-30 PROBLEM — E66.9 OBESITY: Status: ACTIVE | Noted: 2018-05-30

## 2018-05-30 PROBLEM — E78.00 HYPERCHOLESTEROLEMIA: Status: ACTIVE | Noted: 2018-05-30

## 2018-05-30 PROBLEM — F32.A DEPRESSION: Status: ACTIVE | Noted: 2018-05-30

## 2018-05-30 PROBLEM — Z72.0 TOBACCO USE: Status: ACTIVE | Noted: 2018-05-30

## 2018-05-30 PROBLEM — J45.909 ASTHMA: Status: ACTIVE | Noted: 2018-05-30

## 2018-05-30 PROCEDURE — 87624 HPV HI-RISK TYP POOLED RSLT: CPT | Performed by: OBSTETRICS & GYNECOLOGY

## 2018-05-30 PROCEDURE — G0145 SCR C/V CYTO,THINLAYER,RESCR: HCPCS | Performed by: OBSTETRICS & GYNECOLOGY

## 2018-05-30 PROCEDURE — 99395 PREV VISIT EST AGE 18-39: CPT | Performed by: OBSTETRICS & GYNECOLOGY

## 2018-05-30 RX ORDER — CYANOCOBALAMIN (VITAMIN B-12) 500 MCG
LOZENGE ORAL DAILY
COMMUNITY
End: 2020-12-22 | Stop reason: ALTCHOICE

## 2018-05-30 RX ORDER — PERPHENAZINE/AMITRIPTYLINE HCL 4MG-10MG
TABLET ORAL DAILY
COMMUNITY
End: 2020-12-22 | Stop reason: ALTCHOICE

## 2018-05-30 RX ORDER — MULTIVITAMIN
TABLET ORAL DAILY
COMMUNITY
End: 2020-12-22 | Stop reason: ALTCHOICE

## 2018-05-30 RX ORDER — METHYLDOPA/HYDROCHLOROTHIAZIDE 250MG-25MG
TABLET ORAL DAILY
COMMUNITY
End: 2020-12-22 | Stop reason: ALTCHOICE

## 2018-05-30 RX ORDER — MULTIVIT-MIN/IRON/FOLIC ACID/K 18-600-40
CAPSULE ORAL DAILY
COMMUNITY
End: 2020-12-22 | Stop reason: ALTCHOICE

## 2018-05-30 NOTE — PROGRESS NOTES
Assessment/Plan:    Pap smear done as well as annual   Patient will call for Pap results in 1 week  She is aware that if this is abnormal I would then recommend colposcopy  She did have a colposcopy in 2005 which had been positive for HPV  Encouraged self-breast examination as well as calcium supplementation  She will continue to follow-up with her weight loss doctor as well as her primary care physician  Return to office in 1 year provided the above is normal     No problem-specific Assessment & Plan notes found for this encounter  Diagnoses and all orders for this visit:    Encounter for annual routine gynecological examination  -     Liquid-based pap, screening    History of abnormal cervical Pap smear    Other orders  -     Coconut Oil 1000 MG CAPS; Take by mouth  -     Echinacea 125 MG CAPS; Take by mouth  -     Multiple Vitamin tablet; Take by mouth  -     Ascorbic Acid (VITAMIN C) 500 MG CAPS; Take by mouth  -     cholecalciferol (VITAMIN D3) 1,000 units tablet; Take by mouth  -     Vitamin E 400 units TABS; Take by mouth          Subjective:      Patient ID: Stephanie Magdaleno is a 40 y o  female  HPI     This is a 59-year-old female G0 who presents for annual well gyn exam   Her last gyn exam was approximately 1 year ago with Dr Himanshu Conley  She states that he would not refill her birth control pills because of her tobacco use, approximately half a pack per day  She understands the risk associated with weight, age, estrogen, and tobacco use increases thromboembolic event  He did discussed alternative forms of birth control including intrauterine device  Patient does prefer OCPs for cycle control  She has been getting her birth control pills through her primary care physician  She states her cycles are very regular every 4 weeks lasting 3-4 days with no breakthrough bleeding  She denies any changes in bowel or bladder function    She continues to follow-up with her weight lost physician for close to 2 years now where she has lost approximately 60 lb  She does follow up with him every 3 months  Patient is sexually active and has been in a monogamous relationship for the last 4 months  She does not use condoms  The following portions of the patient's history were reviewed and updated as appropriate: allergies, current medications, past family history, past medical history, past social history, past surgical history and problem list     Review of Systems   Constitutional: Negative for fatigue, fever and unexpected weight change  Respiratory: Negative for cough, chest tightness, shortness of breath and wheezing  Cardiovascular: Negative  Negative for chest pain and palpitations  Gastrointestinal: Negative  Negative for abdominal distention, abdominal pain, blood in stool, constipation, diarrhea, nausea and vomiting  Genitourinary: Negative  Negative for difficulty urinating, dyspareunia, dysuria, flank pain, frequency, genital sores, hematuria, pelvic pain, urgency, vaginal bleeding, vaginal discharge and vaginal pain  Skin: Negative for rash  Objective:      /78   Ht 5' 2" (1 575 m)   Wt 83 kg (183 lb)   LMP 05/19/2018 (Exact Date)   Breastfeeding? No   BMI 33 47 kg/m²          Physical Exam   Constitutional: She appears well-developed and well-nourished  Cardiovascular: Normal rate and regular rhythm  Pulmonary/Chest: Effort normal and breath sounds normal  Right breast exhibits no inverted nipple, no mass, no nipple discharge, no skin change and no tenderness  Left breast exhibits no inverted nipple, no mass, no nipple discharge, no skin change and no tenderness  Abdominal: Soft  Bowel sounds are normal  She exhibits no distension  There is no tenderness  There is no rebound and no guarding  Genitourinary: Vagina normal and uterus normal  Cervix exhibits no discharge and no friability  Right adnexum displays no mass, no tenderness and no fullness   Left adnexum displays no mass, no tenderness and no fullness  No vaginal discharge found

## 2018-06-01 LAB
HPV RRNA GENITAL QL NAA+PROBE: ABNORMAL
LAB AP GYN PRIMARY INTERPRETATION: NORMAL
LAB AP LMP: NORMAL
Lab: NORMAL

## 2018-06-04 ENCOUNTER — TELEPHONE (OUTPATIENT)
Dept: OBGYN CLINIC | Facility: CLINIC | Age: 38
End: 2018-06-04

## 2018-06-04 NOTE — TELEPHONE ENCOUNTER
----- Message from Markus Linder DO sent at 6/3/2018  2:53 PM EDT -----  Inform pt pap abnormal with +HPV, rec colpo, nsaids prior to visit, pt had colpo in 2005

## 2018-06-04 NOTE — TELEPHONE ENCOUNTER
Pt informed of pap results - wnl but (+) HPV type 18   recom colposcopy - pre-instr given - appt scheduled

## 2018-06-19 ENCOUNTER — HOSPITAL ENCOUNTER (EMERGENCY)
Facility: HOSPITAL | Age: 38
Discharge: HOME | End: 2018-06-19
Attending: EMERGENCY MEDICINE | Admitting: EMERGENCY MEDICINE
Payer: COMMERCIAL

## 2018-06-19 VITALS
HEART RATE: 89 BPM | DIASTOLIC BLOOD PRESSURE: 64 MMHG | HEIGHT: 62 IN | RESPIRATION RATE: 18 BRPM | TEMPERATURE: 99.9 F | OXYGEN SATURATION: 97 % | WEIGHT: 182.98 LBS | SYSTOLIC BLOOD PRESSURE: 113 MMHG | BODY MASS INDEX: 33.67 KG/M2

## 2018-06-19 DIAGNOSIS — R10.9 ABDOMINAL CRAMPING: ICD-10-CM

## 2018-06-19 DIAGNOSIS — R19.7 DIARRHEA: Primary | ICD-10-CM

## 2018-06-19 LAB
ALBUMIN SERPL BCP-MCNC: 3 G/DL (ref 3.5–5)
ALP SERPL-CCNC: 91 U/L (ref 46–116)
ALT SERPL W P-5'-P-CCNC: 16 U/L (ref 12–78)
ANION GAP SERPL CALCULATED.3IONS-SCNC: 9 MMOL/L (ref 4–13)
AST SERPL W P-5'-P-CCNC: 11 U/L (ref 5–45)
BASOPHILS # BLD AUTO: 0.03 THOUSANDS/ΜL (ref 0–0.1)
BASOPHILS NFR BLD AUTO: 0 % (ref 0–1)
BILIRUB SERPL-MCNC: 0.47 MG/DL (ref 0.2–1)
BUN SERPL-MCNC: 10 MG/DL (ref 5–25)
CALCIUM SERPL-MCNC: 8.3 MG/DL (ref 8.3–10.1)
CHLORIDE SERPL-SCNC: 109 MMOL/L (ref 100–108)
CO2 SERPL-SCNC: 20 MMOL/L (ref 21–32)
CREAT SERPL-MCNC: 0.72 MG/DL (ref 0.6–1.3)
EOSINOPHIL # BLD AUTO: 0.13 THOUSAND/ΜL (ref 0–0.61)
EOSINOPHIL NFR BLD AUTO: 1 % (ref 0–6)
ERYTHROCYTE [DISTWIDTH] IN BLOOD BY AUTOMATED COUNT: 12.1 % (ref 11.6–15.1)
GFR SERPL CREATININE-BSD FRML MDRD: 108 ML/MIN/1.73SQ M
GLUCOSE SERPL-MCNC: 83 MG/DL (ref 65–140)
HCT VFR BLD AUTO: 42.5 % (ref 34.8–46.1)
HGB BLD-MCNC: 14.4 G/DL (ref 11.5–15.4)
IMM GRANULOCYTES # BLD AUTO: 0.04 THOUSAND/UL (ref 0–0.2)
IMM GRANULOCYTES NFR BLD AUTO: 0 % (ref 0–2)
LIPASE SERPL-CCNC: 93 U/L (ref 73–393)
LYMPHOCYTES # BLD AUTO: 1.81 THOUSANDS/ΜL (ref 0.6–4.47)
LYMPHOCYTES NFR BLD AUTO: 19 % (ref 14–44)
MCH RBC QN AUTO: 28.9 PG (ref 26.8–34.3)
MCHC RBC AUTO-ENTMCNC: 33.9 G/DL (ref 31.4–37.4)
MCV RBC AUTO: 85 FL (ref 82–98)
MONOCYTES # BLD AUTO: 0.58 THOUSAND/ΜL (ref 0.17–1.22)
MONOCYTES NFR BLD AUTO: 6 % (ref 4–12)
NEUTROPHILS # BLD AUTO: 6.91 THOUSANDS/ΜL (ref 1.85–7.62)
NEUTS SEG NFR BLD AUTO: 74 % (ref 43–75)
NRBC BLD AUTO-RTO: 0 /100 WBCS
PLATELET # BLD AUTO: 245 THOUSANDS/UL (ref 149–390)
PMV BLD AUTO: 9 FL (ref 8.9–12.7)
POTASSIUM SERPL-SCNC: 3.6 MMOL/L (ref 3.5–5.3)
PROT SERPL-MCNC: 6.9 G/DL (ref 6.4–8.2)
RBC # BLD AUTO: 4.99 MILLION/UL (ref 3.81–5.12)
SODIUM SERPL-SCNC: 138 MMOL/L (ref 136–145)
WBC # BLD AUTO: 9.5 THOUSAND/UL (ref 4.31–10.16)

## 2018-06-19 PROCEDURE — 85025 COMPLETE CBC W/AUTO DIFF WBC: CPT | Performed by: EMERGENCY MEDICINE

## 2018-06-19 PROCEDURE — 36415 COLL VENOUS BLD VENIPUNCTURE: CPT | Performed by: EMERGENCY MEDICINE

## 2018-06-19 PROCEDURE — 96361 HYDRATE IV INFUSION ADD-ON: CPT

## 2018-06-19 PROCEDURE — 80053 COMPREHEN METABOLIC PANEL: CPT | Performed by: EMERGENCY MEDICINE

## 2018-06-19 PROCEDURE — 99284 EMERGENCY DEPT VISIT MOD MDM: CPT

## 2018-06-19 PROCEDURE — 83690 ASSAY OF LIPASE: CPT | Performed by: EMERGENCY MEDICINE

## 2018-06-19 PROCEDURE — 87505 NFCT AGENT DETECTION GI: CPT | Performed by: EMERGENCY MEDICINE

## 2018-06-19 PROCEDURE — 87493 C DIFF AMPLIFIED PROBE: CPT | Performed by: EMERGENCY MEDICINE

## 2018-06-19 PROCEDURE — 96360 HYDRATION IV INFUSION INIT: CPT

## 2018-06-19 RX ORDER — DICYCLOMINE HCL 20 MG
20 TABLET ORAL 2 TIMES DAILY
Qty: 20 TABLET | Refills: 0 | Status: SHIPPED | OUTPATIENT
Start: 2018-06-19 | End: 2018-07-09 | Stop reason: HOSPADM

## 2018-06-19 RX ORDER — METRONIDAZOLE 500 MG/1
500 TABLET ORAL EVERY 8 HOURS SCHEDULED
Qty: 42 TABLET | Refills: 0 | Status: SHIPPED | OUTPATIENT
Start: 2018-06-19 | End: 2018-07-03

## 2018-06-19 RX ORDER — METRONIDAZOLE 500 MG/1
500 TABLET ORAL ONCE
Status: COMPLETED | OUTPATIENT
Start: 2018-06-19 | End: 2018-06-19

## 2018-06-19 RX ADMIN — METRONIDAZOLE 500 MG: 500 TABLET ORAL at 14:28

## 2018-06-19 RX ADMIN — SODIUM CHLORIDE 1000 ML: 0.9 INJECTION, SOLUTION INTRAVENOUS at 13:51

## 2018-06-19 NOTE — DISCHARGE INSTRUCTIONS
Abdominal Pain   WHAT YOU NEED TO KNOW:   Abdominal pain can be dull, achy, or sharp  You may have pain in one area of your abdomen, or in your entire abdomen  Your pain may be caused by a condition such as constipation, food sensitivity or poisoning, infection, or a blockage  Abdominal pain can also be from a hernia, appendicitis, or an ulcer  Liver, gallbladder, or kidney conditions can also cause abdominal pain  The cause of your abdominal pain may be unknown  DISCHARGE INSTRUCTIONS:   Return to the emergency department if:   · You have new chest pain or shortness of breath  · You have pulsing pain in your upper abdomen or lower back that suddenly becomes constant  · Your pain is in the right lower abdominal area and worsens with movement  · You have a fever over 100 4°F (38°C) or shaking chills  · You are vomiting and cannot keep food or liquids down  · Your pain does not improve or gets worse over the next 8 to 12 hours  · You see blood in your vomit or bowel movements, or they look black and tarry  · Your skin or the whites of your eyes turn yellow  · You are a woman and have a large amount of vaginal bleeding that is not your monthly period  Contact your healthcare provider if:   · You have pain in your lower back  · You are a man and have pain in your testicles  · You have pain when you urinate  · You have questions or concerns about your condition or care  Follow up with your healthcare provider within 24 hours or as directed:  Write down your questions so you remember to ask them during your visits  Medicines:   · Medicines  may be given to calm your stomach and prevent vomiting or to decrease pain  Ask how to take pain medicine safely  · Take your medicine as directed  Contact your healthcare provider if you think your medicine is not helping or if you have side effects  Tell him of her if you are allergic to any medicine   Keep a list of the medicines, vitamins, and herbs you take  Include the amounts, and when and why you take them  Bring the list or the pill bottles to follow-up visits  Carry your medicine list with you in case of an emergency  © 2017 [employer]  Information is for End User's use only and may not be sold, redistributed or otherwise used for commercial purposes  All illustrations and images included in CareNotes® are the copyrighted property of [employer] D [employer] M [employer]  or [address]  The above information is an  only  It is not intended as medical advice for individual conditions or treatments  Talk to your doctor, nurse or pharmacist before following any medical regimen to see if it is safe and effective for you  Acute Diarrhea   WHAT YOU NEED TO KNOW:   Acute diarrhea starts quickly and lasts a short time, usually 1 to 3 days  It can last up to 2 weeks  You may not be able to control your diarrhea  Acute diarrhea usually stops on its own  DISCHARGE INSTRUCTIONS:   Return to the emergency department if:   · You feel confused  · Your heartbeat is faster than normal      · Your eyes look deeply sunken, or you have no tears when you cry  · You urinate less than usual, or your urine is dark yellow  · You have blood or mucus in your stools  · You have severe abdominal pain  · You are unable to drink any liquids  Contact your healthcare provider if:   · Your symptoms do not get better with treatment  · You have a fever higher than 101 3°F (38 5°C)  · You have trouble eating and drinking because you are vomiting  · You are thirsty or have a dry mouth  · Your diarrhea does not get better in 7 days  · You have questions or concerns about your condition or care  Follow up with your healthcare provider as directed:  Write down your questions so you remember to ask them during your visits     Medicines:  · Diarrhea medicine  is an over-the-counter medicine that helps slow or stop your diarrhea  If you take other medicines, talk to your healthcare provider before you take diarrhea medicine  · Antibiotics  may be given to help treat an infection caused by bacteria  · Antiparasitics  may be given to treat an infection caused by parasites  · Take your medicine as directed  Contact your healthcare provider if you think your medicine is not helping or if you have side effects  Tell him of her if you are allergic to any medicine  Keep a list of the medicines, vitamins, and herbs you take  Include the amounts, and when and why you take them  Bring the list or the pill bottles to follow-up visits  Carry your medicine list with you in case of an emergency  Self-care:   · Drink liquids as directed  Liquids will help prevent dehydration caused by diarrhea  Ask your healthcare provider how much liquid to drink each day and which liquids are best for you  You may need to drink an oral rehydration solution (ORS)  An ORS has the right amounts of water, salts, and sugar you need to replace body fluids  You can buy an ORS at most grocery stores and pharmacies  · Eat foods that are easy to digest   Examples include rice, lentils, cereal, bananas, potatoes, and bread  It also includes some fruits (bananas, melon), well-cooked vegetables, and lean meats  Avoid foods high in fiber, fat, and sugar  Also avoid caffeine, alcohol, dairy, and red meat until your diarrhea is gone  Prevent acute diarrhea:   · Wash your hands often  Use soap and water  Wash your hands before you eat or prepare food  Also wash your hands after you use the bathroom  Use an alcohol-based hand gel when soap and water are not available  · Keep bathroom surfaces clean  This helps prevent the spread of germs that cause acute diarrhea  · Wash fruits and vegetables well before you eat them  This can help remove germs that cause diarrhea   If possible, remove the skin from fruits and vegetables, or cook them well before you eat them  · Cook meat as directed  ¨ Cook ground meat  to 160°F      ¨ Cook ground poultry, whole poultry, or cuts of poultry  to at least 165°F  Remove the meat from heat  Let it stand for 3 minutes before you eat it  ¨ Cook whole cuts of meat other than poultry  to at least 145°F  Remove the meat from heat  Let it stand for 3 minutes before you eat it  · Wash dishes that have touched raw meat with hot water and soap  This includes cutting boards, utensils, dishes, and serving containers  · Place raw or cooked meat in the refrigerator as soon as possible  Bacteria can grow in meat that is left at room temperature too long  · Do not eat raw or undercooked oysters, clams, or mussels  These foods may be contaminated and cause infection  · Drink filtered or treated water only when you travel  Do not put ice in your drinks  Drink bottled water whenever possible  © 2017 [employer] [employer] Information is for End User's use only and may not be sold, redistributed or otherwise used for commercial purposes  All illustrations and images included in CareNotes® are the copyrighted property of A D A M , Inc  or J[employer]  The above information is an  only  It is not intended as medical advice for individual conditions or treatments  Talk to your doctor, nurse or pharmacist before following any medical regimen to see if it is safe and effective for you

## 2018-06-19 NOTE — ED ATTENDING ATTESTATION
Jane Dillon MD, saw and evaluated the patient  I have discussed the patient with the resident/non-physician practitioner and agree with the resident's/non-physician practitioner's findings, Plan of Care, and MDM as documented in the resident's/non-physician practitioner's note, except where noted  All available labs and Radiology studies were reviewed  At this point I agree with the current assessment done in the Emergency Department  I have conducted an independent evaluation of this patient a history and physical is as follows:    Patient reports that she has been on antibiotics for the last 6 days for sinus infection and yesterday developed abdominal cramps and diarrhea  The patient stop taking the antibiotic at that point time  The patient states she has continued to have moderate abdominal cramps associated with diarrhea  The patient reports she has had at least 10 loose bowel movements since yesterday  There has been no melena or hematochezia  The patient denies any fever, nausea, vomiting, or rash  The patient denies systemic symptoms  The patient continues to be able to drink normally  Patient reports her symptoms are similar to a prior episode of C diff enterocolitis that she was treated for in the past  Physical exam demonstrates a pleasant alert nontoxic female in no acute distress  HEENT exam is normal   There are mucosa was moist   Neck was supple nontender  Lungs are clear with equal breath sounds  The heart had a regular rate rhythm  Abdomen is soft with minimal diffuse tenderness  There is no rebound or guarding  No masses were appreciated  The back was nontender  Skin had no rash      Critical Care Time  CritCare Time    Procedures

## 2018-06-19 NOTE — ED PROVIDER NOTES
History  Chief Complaint   Patient presents with    Diarrhea     Diarrhea and abdominal cramping since yesterday  Reports she was on day 6 of antibiotics yesterday  States 1/2018 she took antibiotics and had same symptoms and was tested positive for C-Diff     This is a 81-year-old female with a history of hyperlipidemia who presents with diarrhea  Yesterday, the patient began to complain of diffuse abdominal cramping described as severe, intermittent, nonradiating, associated with at least 10 episodes of nonbloody, nonmelanotic bouts of foul-smelling/watery diarrhea  Abdominal pain is resolved with bowel movements  States that she just finished a course of antibiotics for a sinus infection  She states that in January, she was diagnosed with C diff in treated with Flagyl  That episode of C diff was incited by antibiotic use as well  States that this abdominal pain feels the same as her previous bout of C diff  Denies any exacerbating factors  No association with p o  intake  No history of abdominal surgeries  No history of gallstones or alcohol abuse  No history of kidney stones  The patient is currently on her period  Denies fever/chills, nausea/vomiting, lightheadedness/dizziness, numbness/weakness, headache, change in vision, URI symptoms, neck pain, chest pain, palpitations, shortness of breath, cough, back pain, flank pain, hematochezia, melena, dysuria, hematuria, abnormal vaginal discharge/bleeding  Prior to Admission Medications   Prescriptions Last Dose Informant Patient Reported? Taking?    Ascorbic Acid (VITAMIN C) 500 MG CAPS 6/18/2018 at Unknown time  Yes Yes   Sig: Take by mouth   Coconut Oil 1000 MG CAPS 6/18/2018 at Unknown time  Yes Yes   Sig: Take by mouth   DULoxetine (CYMBALTA) 60 mg delayed release capsule 6/19/2018 at Unknown time  Yes Yes   Sig: Take 60 mg by mouth daily     Echinacea 125 MG CAPS 6/18/2018 at Unknown time  Yes Yes   Sig: Take by mouth   Multiple Vitamin tablet 6/18/2018 at Unknown time  Yes Yes   Sig: Take by mouth   Vitamin E 400 units TABS 6/18/2018 at Unknown time  Yes Yes   Sig: Take by mouth   albuterol (PROAIR HFA) 90 mcg/act inhaler   Yes No   Sig: Inhale 2 puffs every 4 (four) hours as needed   atorvastatin (LIPITOR) 20 mg tablet 6/18/2018 at Unknown time  Yes Yes   Sig: Take by mouth   cholecalciferol (VITAMIN D3) 1,000 units tablet 6/18/2018 at Unknown time  Yes Yes   Sig: Take by mouth   norgestimate-ethinyl estradiol (ORTHO TRI-CYCLEN,TRINESSA) 0 18/0 215/0 25 MG-35 MCG per tablet Past Week at Unknown time  Yes Yes   Sig: Take 1 tablet by mouth daily   phentermine (ADIPEX-P) 37 5 MG tablet 6/18/2018 at Unknown time  Yes Yes   Sig: Take by mouth daily     topiramate (TOPAMAX) 25 mg tablet 6/18/2018 at Unknown time  Yes Yes   Sig: Take by mouth      Facility-Administered Medications: None       History reviewed  No pertinent past medical history  Past Surgical History:   Procedure Laterality Date    REDUCTION MAMMAPLASTY  1999    TONSILLECTOMY         Family History   Problem Relation Age of Onset    Lung cancer Father     Liver cancer Paternal Grandfather      I have reviewed and agree with the history as documented  Social History   Substance Use Topics    Smoking status: Current Every Day Smoker    Smokeless tobacco: Never Used    Alcohol use No        Review of Systems   Constitutional: Negative for chills and fever  HENT: Negative for rhinorrhea, sore throat and trouble swallowing  Eyes: Negative for photophobia and visual disturbance  Respiratory: Negative for cough, chest tightness and shortness of breath  Cardiovascular: Negative for chest pain, palpitations and leg swelling  Gastrointestinal: Positive for abdominal pain and diarrhea  Negative for blood in stool, nausea and vomiting  Endocrine: Negative for polyuria  Genitourinary: Negative for dysuria, flank pain, hematuria, vaginal bleeding and vaginal discharge  Musculoskeletal: Negative for back pain and neck pain  Skin: Negative for color change and rash  Allergic/Immunologic: Negative for immunocompromised state  Neurological: Negative for dizziness, weakness, light-headedness, numbness and headaches  All other systems reviewed and are negative  Physical Exam  ED Triage Vitals [06/19/18 1303]   Temperature Pulse Respirations Blood Pressure SpO2   99 9 °F (37 7 °C) 101 18 130/79 100 %      Temp Source Heart Rate Source Patient Position - Orthostatic VS BP Location FiO2 (%)   Tympanic Monitor Sitting Left arm --      Pain Score       9           Orthostatic Vital Signs  Vitals:    06/19/18 1303 06/19/18 1526   BP: 130/79 113/64   Pulse: 101 89   Patient Position - Orthostatic VS: Sitting Sitting       Physical Exam   Constitutional: Vital signs are normal  She appears well-developed  She is cooperative  No distress  HENT:   Mouth/Throat: Uvula is midline, oropharynx is clear and moist and mucous membranes are normal    Eyes: Conjunctivae and EOM are normal  Pupils are equal, round, and reactive to light  Neck: Trachea normal  No thyroid mass and no thyromegaly present  Cardiovascular: Normal rate, regular rhythm, normal heart sounds, intact distal pulses and normal pulses  No murmur heard  Pulmonary/Chest: Effort normal and breath sounds normal    Abdominal: Soft  Normal appearance and bowel sounds are normal  There is generalized tenderness  There is no rebound, no guarding and no CVA tenderness  Neurological: She is alert  Skin: Skin is warm, dry and intact  Psychiatric: She has a normal mood and affect   Her speech is normal and behavior is normal  Thought content normal        ED Medications  Medications   sodium chloride 0 9 % bolus 1,000 mL (0 mL Intravenous Stopped 6/19/18 4817)   metroNIDAZOLE (FLAGYL) tablet 500 mg (500 mg Oral Given 6/19/18 1428)       Diagnostic Studies  Results Reviewed     Procedure Component Value Units Date/Time Clostridium difficile toxin by PCR [[id number]] Collected:  06/19/18 1549    Lab Status: In process Specimen:  Stool from Per Rectum Updated:  06/19/18 1608    Stool Enteric Bacterial Panel by PCR [[id number]] Collected:  06/19/18 1549    Lab Status: In process Specimen:  Stool from Per Rectum Updated:  06/19/18 1608    Comprehensive metabolic panel [[id number]]  (Abnormal) Collected:  06/19/18 1350    Lab Status:  Final result Specimen:  Blood from Arm, Left Updated:  06/19/18 1427     Sodium 138 mmol/L      Potassium 3 6 mmol/L      Chloride 109 (H) mmol/L      CO2 20 (L) mmol/L      Anion Gap 9 mmol/L      BUN 10 mg/dL      Creatinine 0 72 mg/dL      Glucose 83 mg/dL      Calcium 8 3 mg/dL      AST 11 U/L      ALT 16 U/L      Alkaline Phosphatase 91 U/L      Total Protein 6 9 g/dL      Albumin 3 0 (L) g/dL      Total Bilirubin 0 47 mg/dL      eGFR 108 ml/min/1 73sq m     Narrative:         National Kidney Disease Education Program recommendations are as follows:  GFR calculation is accurate only with a steady state creatinine  Chronic Kidney disease less than 60 ml/min/1 73 sq  meters  Kidney failure less than 15 ml/min/1 73 sq  meters      Lipase [[id number]]  (Normal) Collected:  06/19/18 1350    Lab Status:  Final result Specimen:  Blood from Arm, Left Updated:  06/19/18 1427     Lipase 93 u/L     CBC and differential [[id number]] Collected:  06/19/18 1350    Lab Status:  Final result Specimen:  Blood from Arm, Left Updated:  06/19/18 1407     WBC 9 50 Thousand/uL      RBC 4 99 Million/uL      Hemoglobin 14 4 g/dL      Hematocrit 42 5 %      MCV 85 fL      MCH 28 9 pg      MCHC 33 9 g/dL      RDW 12 1 %      MPV 9 0 fL      Platelets 629 Thousands/uL      nRBC 0 /100 WBCs      Neutrophils Relative 74 %      Immat GRANS % 0 %      Lymphocytes Relative 19 %      Monocytes Relative 6 %      Eosinophils Relative 1 %      Basophils Relative 0 %      Neutrophils Absolute 6 91 Thousands/µL      Immature Grans Absolute 0 04 Thousand/uL      Lymphocytes Absolute 1 81 Thousands/µL      Monocytes Absolute 0 58 Thousand/µL      Eosinophils Absolute 0 13 Thousand/µL      Basophils Absolute 0 03 Thousands/µL                  No orders to display         Procedures  Procedures      Phone Consults  ED Phone Contact    ED Course  ED Course as of Jun 19 1620   Tue Jun 19, 2018   1413 WBC: 9 50   1413 Hemoglobin: 14 4   1413 Platelets: 567                               MDM  Number of Diagnoses or Management Options  Diagnosis management comments: Labs, IV fluids  Stool culture if patient is able to have a bowel movement  Will treat for C diff based on lab work  Will ultimately discharge the patient  CritCare Time    Disposition  Final diagnoses:   Diarrhea   Abdominal cramping     Time reflects when diagnosis was documented in both MDM as applicable and the Disposition within this note     Time User Action Codes Description Comment    6/19/2018  3:59 PM Moriah Opal Add [R19 7] Diarrhea     6/19/2018  3:59 PM Moriah Opal Add [R10 9] Abdominal cramping       ED Disposition     ED Disposition Condition Comment    Discharge  Debo Gillis discharge to home/self care      Condition at discharge: Stable        Follow-up Information     Follow up With Specialties Details Why Contact Info Additional Information    Yessi Tomlinson MD Internal Medicine Call in 1 day for follow up [address]  [phone]       [facility] [address] Emergency Department Emergency Medicine Go to If symptoms worsen [address]  [phone]  ED, [address] [place] [address], [id number]          Patient's Medications   Discharge Prescriptions    DICYCLOMINE (BENTYL) 20 MG TABLET    Take 1 tablet (20 mg total) by mouth 2 (two) times a day       Start Date: 6/19/2018 End Date: --       Order Dose: 20 mg       Quantity: 20 tablet    Refills: 0    METRONIDAZOLE (FLAGYL) 500 MG TABLET Take 1 tablet (500 mg total) by mouth every 8 (eight) hours for 14 days       Start Date: 6/19/2018 End Date: 7/3/2018       Order Dose: 500 mg       Quantity: 42 tablet    Refills: 0     No discharge procedures on file  ED Provider  Attending physically available and evaluated Debo Gillis I managed the patient along with the ED Attending      Electronically Signed by         Akilah Ba MD  06/19/18 2651

## 2018-06-20 LAB
C DIFF TOX GENS STL QL NAA+PROBE: ABNORMAL
CAMPYLOBACTER DNA SPEC NAA+PROBE: NORMAL
SALMONELLA DNA SPEC QL NAA+PROBE: NORMAL
SHIGA TOXIN STX GENE SPEC NAA+PROBE: NORMAL
SHIGELLA DNA SPEC QL NAA+PROBE: NORMAL

## 2018-06-20 NOTE — PROGRESS NOTES
Called and spoke with patient made her aware of positive stool culture again on flagyl   Gave number to infectious disease and told to follow up with pcp

## 2018-06-25 ENCOUNTER — PROCEDURE VISIT (OUTPATIENT)
Dept: OBGYN CLINIC | Facility: CLINIC | Age: 38
End: 2018-06-25
Payer: COMMERCIAL

## 2018-06-25 VITALS
SYSTOLIC BLOOD PRESSURE: 126 MMHG | HEIGHT: 62 IN | DIASTOLIC BLOOD PRESSURE: 88 MMHG | WEIGHT: 186 LBS | BODY MASS INDEX: 34.23 KG/M2

## 2018-06-25 DIAGNOSIS — B37.3 YEAST VAGINITIS: ICD-10-CM

## 2018-06-25 DIAGNOSIS — R87.618 ABNORMAL PAPANICOLAOU SMEAR OF CERVIX WITH POSITIVE HUMAN PAPILLOMA VIRUS (HPV) TEST: Primary | ICD-10-CM

## 2018-06-25 PROCEDURE — 57454 BX/CURETT OF CERVIX W/SCOPE: CPT | Performed by: OBSTETRICS & GYNECOLOGY

## 2018-06-25 PROCEDURE — 88344 IMHCHEM/IMCYTCHM EA MLT ANTB: CPT | Performed by: PATHOLOGY

## 2018-06-25 PROCEDURE — 88305 TISSUE EXAM BY PATHOLOGIST: CPT | Performed by: PATHOLOGY

## 2018-06-25 RX ORDER — FLUCONAZOLE 150 MG/1
150 TABLET ORAL ONCE
Qty: 1 TABLET | Refills: 0 | Status: SHIPPED | OUTPATIENT
Start: 2018-06-25 | End: 2018-06-25

## 2018-06-25 RX ORDER — ALPRAZOLAM 0.25 MG/1
TABLET ORAL
COMMUNITY

## 2018-06-25 NOTE — PROGRESS NOTES
Procedures    Pap WNL, HPV+18    Patient was consented for colposcopy  Cervix was then visualized cleansed with ascetic acid  The squamocolumnar junction was visualized  She had some aceto whitening changes at 6 o'clock  ECC was performed first followed by biopsy at 6 o'clock  Hemostasis was maintained using Monsel's solution  Patient tolerated the procedure well    Patient will call for results in 1 week  Provided these are stable she will return to office in 1 year for Pap smear repeat  She is also instructed use Diflucan x1 dose as she is complaining of yeast infection, mild evident on examination today    Of note patient is recovering from her second episode of C diff after using antibiotic  She does have a consultation with Infectious Disease next month

## 2018-07-02 ENCOUNTER — TELEPHONE (OUTPATIENT)
Dept: OBGYN CLINIC | Facility: CLINIC | Age: 38
End: 2018-07-02

## 2018-07-02 NOTE — TELEPHONE ENCOUNTER
Pt would like her biopsy results pt states she would like results left on voicemail due to not being able to answer while at work pt gives us permission to leave detailed  message

## 2018-07-02 NOTE — TELEPHONE ENCOUNTER
Lm on machine at 12:00 discussing path CIN1/2, offering monitor and re colpo 6 months vs LEEP, please call back for further discussion

## 2018-07-03 NOTE — TELEPHONE ENCOUNTER
Pt called back - options reviewed - pt would like to schedule appt to discuss with JOHNSON ORNELAS aware  Recalled pt & lm pt's as to schedule appt

## 2018-07-09 ENCOUNTER — OFFICE VISIT (OUTPATIENT)
Dept: OBGYN CLINIC | Facility: CLINIC | Age: 38
End: 2018-07-09
Payer: COMMERCIAL

## 2018-07-09 VITALS
SYSTOLIC BLOOD PRESSURE: 112 MMHG | HEIGHT: 62 IN | DIASTOLIC BLOOD PRESSURE: 78 MMHG | WEIGHT: 184.4 LBS | BODY MASS INDEX: 33.93 KG/M2

## 2018-07-09 DIAGNOSIS — N87.9 CERVICAL DYSPLASIA: Primary | ICD-10-CM

## 2018-07-09 DIAGNOSIS — R87.618 ABNORMAL PAPANICOLAOU SMEAR OF CERVIX WITH POSITIVE HUMAN PAPILLOMA VIRUS (HPV) TEST: ICD-10-CM

## 2018-07-09 PROCEDURE — 99214 OFFICE O/P EST MOD 30 MIN: CPT | Performed by: OBSTETRICS & GYNECOLOGY

## 2018-07-09 NOTE — PROGRESS NOTES
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  Discussed treatment options including excisional versus conservative  Risks and benefits reviewed  Patient would like to proceed with surgical excision  Patient is sexually active and has been in a monogamous relationship for 4 months  She does not use condoms  She is on birth control pills  Future pregnancies are uncertain  Review of Systems   Constitutional: Negative for fatigue, fever and unexpected weight change  Respiratory: Negative for cough, chest tightness, shortness of breath and wheezing  Cardiovascular: Negative  Negative for chest pain and palpitations  Gastrointestinal: Negative  Negative for abdominal distention, abdominal pain, blood in stool, constipation, diarrhea, nausea and vomiting  Genitourinary: Negative  Negative for difficulty urinating, dyspareunia, dysuria, flank pain, frequency, genital sores, hematuria, pelvic pain, urgency, vaginal bleeding, vaginal discharge and vaginal pain  Skin: Negative for rash           Objective:     Physical Exam

## 2018-07-11 DIAGNOSIS — A49.8 CLOSTRIDIUM DIFFICILE INFECTION: Primary | ICD-10-CM

## 2018-07-12 ENCOUNTER — TELEPHONE (OUTPATIENT)
Dept: OBGYN CLINIC | Facility: CLINIC | Age: 38
End: 2018-07-12

## 2018-07-12 NOTE — TELEPHONE ENCOUNTER
Pt would like to schedule procedure 9-27-18 in PM due to work schedule, confirm if date is ok with Dr Izzy Sauceda

## 2018-07-18 ENCOUNTER — TRANSCRIBE ORDERS (OUTPATIENT)
Dept: LAB | Facility: HOSPITAL | Age: 38
End: 2018-07-18

## 2018-07-18 ENCOUNTER — APPOINTMENT (OUTPATIENT)
Dept: LAB | Facility: HOSPITAL | Age: 38
End: 2018-07-18
Payer: COMMERCIAL

## 2018-07-18 DIAGNOSIS — Z00.8 HEALTH EXAMINATION IN POPULATION SURVEY: Primary | ICD-10-CM

## 2018-07-18 DIAGNOSIS — Z00.8 HEALTH EXAMINATION IN POPULATION SURVEY: ICD-10-CM

## 2018-07-18 LAB
CHOLEST SERPL-MCNC: 265 MG/DL (ref 50–200)
EST. AVERAGE GLUCOSE BLD GHB EST-MCNC: 91 MG/DL
HBA1C MFR BLD: 4.8 % (ref 4.2–6.3)
HDLC SERPL-MCNC: 60 MG/DL (ref 40–60)
LDLC SERPL CALC-MCNC: 176 MG/DL (ref 0–100)
NONHDLC SERPL-MCNC: 205 MG/DL
TRIGL SERPL-MCNC: 145 MG/DL

## 2018-07-18 PROCEDURE — 80061 LIPID PANEL: CPT

## 2018-07-18 PROCEDURE — 83036 HEMOGLOBIN GLYCOSYLATED A1C: CPT

## 2018-07-18 PROCEDURE — 36415 COLL VENOUS BLD VENIPUNCTURE: CPT

## 2018-07-19 ENCOUNTER — TELEPHONE (OUTPATIENT)
Dept: OBGYN CLINIC | Facility: CLINIC | Age: 38
End: 2018-07-19

## 2018-07-19 PROBLEM — N87.0 DYSPLASIA OF CERVIX, LOW GRADE (CIN 1): Status: ACTIVE | Noted: 2018-07-19

## 2018-07-24 ENCOUNTER — OFFICE VISIT (OUTPATIENT)
Dept: INFECTIOUS DISEASES | Facility: CLINIC | Age: 38
End: 2018-07-24
Payer: COMMERCIAL

## 2018-07-24 VITALS
WEIGHT: 187.2 LBS | BODY MASS INDEX: 34.45 KG/M2 | HEIGHT: 62 IN | TEMPERATURE: 97.8 F | HEART RATE: 80 BPM | RESPIRATION RATE: 16 BRPM | DIASTOLIC BLOOD PRESSURE: 78 MMHG | SYSTOLIC BLOOD PRESSURE: 132 MMHG

## 2018-07-24 DIAGNOSIS — A49.8 CLOSTRIDIUM DIFFICILE INFECTION: Primary | ICD-10-CM

## 2018-07-24 DIAGNOSIS — J32.9 RECURRENT SINUSITIS: ICD-10-CM

## 2018-07-24 DIAGNOSIS — Z72.0 TOBACCO USE: ICD-10-CM

## 2018-07-24 PROCEDURE — 99204 OFFICE O/P NEW MOD 45 MIN: CPT | Performed by: INTERNAL MEDICINE

## 2018-07-24 NOTE — PROGRESS NOTES
Consultation - Infectious Disease   Jamal Bliss 40 y o  female MRN: [id number]  Unit/Bed#:  Encounter: [id number]      IMPRESSION & RECOMMENDATIONS:   Impression/Recommendations:  1  Recurrent C diff colitis  Patient has had 2 episodes of C diff colitis, in January and June 2018  Both episodes were precipitated by antibiotic use  Patient currently with no symptoms to suggest ongoing colitis  Denies abdominal pain, diarrhea  No indication for additional treatment at this time  She is at risk for development of more episodes in the future, especially in the setting of antibiotic use  For this reason, I recommend giving C diff prophylaxis if she does require the use of other antibiotics in the future  Options include oral vancomycin 125 mg q 12 hours if and when she may require another antibiotic  Another option could be oral metronidazole 500 mg q 8 hours  This should be given for the duration of her other antibiotics and a few days thereafter  For now, would monitor off any more treatment  2   Recurrent sinusitis  Currently no clinical evidence to suggest acute sinusitis  3   Tobacco abuse  Recommend smoking cessation  This could be contributing to recurrent episodes of acute sinusitis  Discussed above plan in detail with patient  Thank you for this consultation  Patient can follow up with us on an as needed basis  Please call our office with any new questions or concerns  HISTORY OF PRESENT ILLNESS:  Reason for Consult:  Recurrent C diff colitis    HPI: Jamal Bliss is a 40 y o  female with history of recurrent episodes of sinusitis, with development of C diff colitis on 2 occasions  Patient states she was given oral Augmentin sometime in late December for treatment of acute sinusitis  She subsequently developed severe abdominal cramping and watery diarrhea for which she went to the ER on 01/04/2018  A CT abdomen was indicative of mild colitis    Stool for C diff PCR was sent which was ultimately positive  Patient completed a 14 day course of oral Flagyl and her symptoms improved  In June 2018, patient again developed an episode of sinusitis which did not improve with over-the-counter medications  She was prescribed oral Ceftin and after about 6 days of taking this, she again developed severe diffuse abdominal pain and watery diarrhea  She went to the ER on 06/19 and again a stool for C diff PCR was positive  She again completed a 14 day course of oral Flagyl with complete resolution of her symptoms  She currently denies any watery diarrhea, abdominal pain  No fevers or chills  REVIEW OF SYSTEMS:  A complete system-based review of systems is otherwise negative  PAST MEDICAL HISTORY:  No past medical history on file  Past Surgical History:   Procedure Laterality Date    REDUCTION MAMMAPLASTY  1999    TONSILLECTOMY         FAMILY HISTORY:  Non-contributory    SOCIAL HISTORY:  History   Alcohol Use No     History   Drug Use No     History   Smoking Status    Current Every Day Smoker   Smokeless Tobacco    Never Used       ALLERGIES:  No Known Allergies    MEDICATIONS:  All current active medications have been reviewed  PHYSICAL EXAM:  Vitals:  [unfilled]  Bushra[employer] com: Temperature: 97 8 °F (36 6 °C)     Physical Exam:  General:  Well-nourished, well-developed, in no acute distress  Eyes:  Conjunctive clear with no hemorrhages or effusions  Oropharynx:  No ulcers, no lesions  Neck:  Supple, no lymphadenopathy  Lungs:  Clear to auscultation bilaterally, no accessory muscle use  Cardiac:  Regular rate and rhythm, no murmurs  Abdomen:  Soft, non-tender, non-distended  Extremities:  No peripheral cyanosis, clubbing, or edema  Skin:  No rashes, no ulcers  Neurological:  Moves all four extremities spontaneously, sensation grossly intact      LABS, IMAGING, & OTHER STUDIES:  Lab Results:  I have personally reviewed pertinent labs      Stool for C diff PCR from 01/19 in 6/19 were positive    Imaging Studies:   I have personally reviewed pertinent imaging study reports and images in PACS  Prior CT abdomen from January 2018 showed mild colitis    EKG, Pathology, and Other Studies:   I have personally reviewed pertinent reports

## 2018-08-28 ENCOUNTER — TRANSCRIBE ORDERS (OUTPATIENT)
Dept: LAB | Facility: HOSPITAL | Age: 38
End: 2018-08-28

## 2018-08-28 ENCOUNTER — APPOINTMENT (OUTPATIENT)
Dept: LAB | Facility: HOSPITAL | Age: 38
End: 2018-08-28
Payer: COMMERCIAL

## 2018-08-28 DIAGNOSIS — Z79.899 NEED FOR PROPHYLACTIC CHEMOTHERAPY: ICD-10-CM

## 2018-08-28 DIAGNOSIS — E55.9 AVITAMINOSIS D: ICD-10-CM

## 2018-08-28 DIAGNOSIS — M72.8 INFILTRATIVE FASCIITIS: ICD-10-CM

## 2018-08-28 DIAGNOSIS — Q79.60 EHLERS-DANLOS SYNDROME: ICD-10-CM

## 2018-08-28 DIAGNOSIS — M79.7 SCAPULOHUMERAL FIBROSITIS: ICD-10-CM

## 2018-08-28 DIAGNOSIS — I73.00 RAYNAUD'S DISEASE WITHOUT GANGRENE: ICD-10-CM

## 2018-08-28 DIAGNOSIS — I73.00 RAYNAUD'S DISEASE WITHOUT GANGRENE: Primary | ICD-10-CM

## 2018-08-28 LAB
25(OH)D3 SERPL-MCNC: 20.6 NG/ML (ref 30–100)
ALBUMIN SERPL BCP-MCNC: 3 G/DL (ref 3.5–5)
ALP SERPL-CCNC: 90 U/L (ref 46–116)
ALT SERPL W P-5'-P-CCNC: 18 U/L (ref 12–78)
ANION GAP SERPL CALCULATED.3IONS-SCNC: 5 MMOL/L (ref 4–13)
ASO AB TITR SER LA: NORMAL {TITER}
AST SERPL W P-5'-P-CCNC: 15 U/L (ref 5–45)
BACTERIA UR QL AUTO: ABNORMAL /HPF
BASOPHILS # BLD AUTO: 0.07 THOUSANDS/ΜL (ref 0–0.1)
BASOPHILS NFR BLD AUTO: 1 % (ref 0–1)
BILIRUB SERPL-MCNC: 0.38 MG/DL (ref 0.2–1)
BILIRUB UR QL STRIP: NEGATIVE
BUN SERPL-MCNC: 13 MG/DL (ref 5–25)
C3 SERPL-MCNC: 119 MG/DL (ref 90–180)
C4 SERPL-MCNC: 20 MG/DL (ref 10–40)
CALCIUM SERPL-MCNC: 8.3 MG/DL (ref 8.3–10.1)
CHLORIDE SERPL-SCNC: 107 MMOL/L (ref 100–108)
CK SERPL-CCNC: 76 U/L (ref 26–192)
CLARITY UR: ABNORMAL
CO2 SERPL-SCNC: 24 MMOL/L (ref 21–32)
COLOR UR: ABNORMAL
CREAT SERPL-MCNC: 0.83 MG/DL (ref 0.6–1.3)
CRP SERPL QL: 7 MG/L
EOSINOPHIL # BLD AUTO: 0.23 THOUSAND/ΜL (ref 0–0.61)
EOSINOPHIL NFR BLD AUTO: 3 % (ref 0–6)
ERYTHROCYTE [DISTWIDTH] IN BLOOD BY AUTOMATED COUNT: 12.5 % (ref 11.6–15.1)
ERYTHROCYTE [SEDIMENTATION RATE] IN BLOOD: 4 MM/HOUR (ref 0–20)
GFR SERPL CREATININE-BSD FRML MDRD: 91 ML/MIN/1.73SQ M
GLUCOSE SERPL-MCNC: 83 MG/DL (ref 65–140)
GLUCOSE UR STRIP-MCNC: NEGATIVE MG/DL
HBV SURFACE AG SER QL: NORMAL
HCT VFR BLD AUTO: 40.6 % (ref 34.8–46.1)
HGB BLD-MCNC: 13.2 G/DL (ref 11.5–15.4)
HGB UR QL STRIP.AUTO: NEGATIVE
HYALINE CASTS #/AREA URNS LPF: ABNORMAL /LPF
IMM GRANULOCYTES # BLD AUTO: 0.03 THOUSAND/UL (ref 0–0.2)
IMM GRANULOCYTES NFR BLD AUTO: 0 % (ref 0–2)
KETONES UR STRIP-MCNC: NEGATIVE MG/DL
LEUKOCYTE ESTERASE UR QL STRIP: NEGATIVE
LYMPHOCYTES # BLD AUTO: 2.28 THOUSANDS/ΜL (ref 0.6–4.47)
LYMPHOCYTES NFR BLD AUTO: 29 % (ref 14–44)
MCH RBC QN AUTO: 29 PG (ref 26.8–34.3)
MCHC RBC AUTO-ENTMCNC: 32.5 G/DL (ref 31.4–37.4)
MCV RBC AUTO: 89 FL (ref 82–98)
MONOCYTES # BLD AUTO: 0.47 THOUSAND/ΜL (ref 0.17–1.22)
MONOCYTES NFR BLD AUTO: 6 % (ref 4–12)
NEUTROPHILS # BLD AUTO: 4.88 THOUSANDS/ΜL (ref 1.85–7.62)
NEUTS SEG NFR BLD AUTO: 61 % (ref 43–75)
NITRITE UR QL STRIP: NEGATIVE
NON-SQ EPI CELLS URNS QL MICRO: ABNORMAL /HPF
NRBC BLD AUTO-RTO: 0 /100 WBCS
PH UR STRIP.AUTO: 8 [PH] (ref 4.5–8)
PLATELET # BLD AUTO: 259 THOUSANDS/UL (ref 149–390)
PMV BLD AUTO: 9.5 FL (ref 8.9–12.7)
POTASSIUM SERPL-SCNC: 4.2 MMOL/L (ref 3.5–5.3)
PROT SERPL-MCNC: 6.8 G/DL (ref 6.4–8.2)
PROT UR STRIP-MCNC: NEGATIVE MG/DL
RBC # BLD AUTO: 4.55 MILLION/UL (ref 3.81–5.12)
RBC #/AREA URNS AUTO: ABNORMAL /HPF
SODIUM SERPL-SCNC: 136 MMOL/L (ref 136–145)
SP GR UR STRIP.AUTO: 1.02 (ref 1–1.03)
TSH SERPL DL<=0.05 MIU/L-ACNC: 1.49 UIU/ML (ref 0.36–3.74)
UROBILINOGEN UR QL STRIP.AUTO: 1 E.U./DL
WBC # BLD AUTO: 7.96 THOUSAND/UL (ref 4.31–10.16)
WBC #/AREA URNS AUTO: ABNORMAL /HPF

## 2018-08-28 PROCEDURE — 84165 PROTEIN E-PHORESIS SERUM: CPT

## 2018-08-28 PROCEDURE — 86235 NUCLEAR ANTIGEN ANTIBODY: CPT

## 2018-08-28 PROCEDURE — 86665 EPSTEIN-BARR CAPSID VCA: CPT

## 2018-08-28 PROCEDURE — 86618 LYME DISEASE ANTIBODY: CPT

## 2018-08-28 PROCEDURE — 80053 COMPREHEN METABOLIC PANEL: CPT

## 2018-08-28 PROCEDURE — 86160 COMPLEMENT ANTIGEN: CPT

## 2018-08-28 PROCEDURE — 81001 URINALYSIS AUTO W/SCOPE: CPT

## 2018-08-28 PROCEDURE — 86225 DNA ANTIBODY NATIVE: CPT

## 2018-08-28 PROCEDURE — 86664 EPSTEIN-BARR NUCLEAR ANTIGEN: CPT

## 2018-08-28 PROCEDURE — 86140 C-REACTIVE PROTEIN: CPT

## 2018-08-28 PROCEDURE — 36415 COLL VENOUS BLD VENIPUNCTURE: CPT

## 2018-08-28 PROCEDURE — 86200 CCP ANTIBODY: CPT

## 2018-08-28 PROCEDURE — 86430 RHEUMATOID FACTOR TEST QUAL: CPT

## 2018-08-28 PROCEDURE — 86663 EPSTEIN-BARR ANTIBODY: CPT

## 2018-08-28 PROCEDURE — 83520 IMMUNOASSAY QUANT NOS NONAB: CPT

## 2018-08-28 PROCEDURE — 87340 HEPATITIS B SURFACE AG IA: CPT

## 2018-08-28 PROCEDURE — 84165 PROTEIN E-PHORESIS SERUM: CPT | Performed by: PATHOLOGY

## 2018-08-28 PROCEDURE — 83516 IMMUNOASSAY NONANTIBODY: CPT

## 2018-08-28 PROCEDURE — 84443 ASSAY THYROID STIM HORMONE: CPT

## 2018-08-28 PROCEDURE — 86611 BARTONELLA ANTIBODY: CPT

## 2018-08-28 PROCEDURE — 86666 EHRLICHIA ANTIBODY: CPT

## 2018-08-28 PROCEDURE — 82595 ASSAY OF CRYOGLOBULIN: CPT

## 2018-08-28 PROCEDURE — 86753 PROTOZOA ANTIBODY NOS: CPT

## 2018-08-28 PROCEDURE — 86063 ANTISTREPTOLYSIN O SCREEN: CPT

## 2018-08-28 PROCEDURE — 86039 ANTINUCLEAR ANTIBODIES (ANA): CPT

## 2018-08-28 PROCEDURE — 85652 RBC SED RATE AUTOMATED: CPT

## 2018-08-28 PROCEDURE — 82550 ASSAY OF CK (CPK): CPT

## 2018-08-28 PROCEDURE — 85025 COMPLETE CBC W/AUTO DIFF WBC: CPT

## 2018-08-28 PROCEDURE — 86038 ANTINUCLEAR ANTIBODIES: CPT

## 2018-08-28 PROCEDURE — 82306 VITAMIN D 25 HYDROXY: CPT

## 2018-08-28 PROCEDURE — 86255 FLUORESCENT ANTIBODY SCREEN: CPT

## 2018-08-29 LAB
ACTIN IGG SERPL-ACNC: 13 UNITS (ref 0–19)
ALBUMIN SERPL ELPH-MCNC: 3.88 G/DL (ref 3.5–5)
ALBUMIN SERPL ELPH-MCNC: 57.9 % (ref 52–65)
ALPHA1 GLOB SERPL ELPH-MCNC: 0.36 G/DL (ref 0.1–0.4)
ALPHA1 GLOB SERPL ELPH-MCNC: 5.3 % (ref 2.5–5)
ALPHA2 GLOB SERPL ELPH-MCNC: 0.72 G/DL (ref 0.4–1.2)
ALPHA2 GLOB SERPL ELPH-MCNC: 10.7 % (ref 7–13)
B BURGDOR IGG SER IA-ACNC: 0.1
B BURGDOR IGM SER IA-ACNC: 0.27
BETA GLOB ABNORMAL SERPL ELPH-MCNC: 0.5 G/DL (ref 0.4–0.8)
BETA1 GLOB SERPL ELPH-MCNC: 7.5 % (ref 5–13)
BETA2 GLOB SERPL ELPH-MCNC: 5.1 % (ref 2–8)
BETA2+GAMMA GLOB SERPL ELPH-MCNC: 0.34 G/DL (ref 0.2–0.5)
CENTROMERE B AB SER-ACNC: <0.2 AI (ref 0–0.9)
DSDNA AB SER-ACNC: 2 IU/ML (ref 0–9)
EBV EA IGG SER-ACNC: >150 U/ML (ref 0–8.9)
EBV NA IGG SER IA-ACNC: 334 U/ML (ref 0–17.9)
EBV PATRN SPEC IB-IMP: ABNORMAL
EBV VCA IGG SER IA-ACNC: >600 U/ML (ref 0–17.9)
EBV VCA IGM SER IA-ACNC: <36 U/ML (ref 0–35.9)
ENA RNP AB SER-ACNC: <0.2 AI (ref 0–0.9)
ENA SCL70 AB SER-ACNC: <0.2 AI (ref 0–0.9)
ENA SM AB SER-ACNC: <0.2 AI (ref 0–0.9)
ENA SS-A AB SER-ACNC: <0.2 AI (ref 0–0.9)
ENA SS-B AB SER-ACNC: <0.2 AI (ref 0–0.9)
GAMMA GLOB ABNORMAL SERPL ELPH-MCNC: 0.9 G/DL (ref 0.5–1.6)
GAMMA GLOB SERPL ELPH-MCNC: 13.5 % (ref 12–22)
IGG/ALB SER: 1.38 {RATIO} (ref 1.1–1.8)
PROT PATTERN SERPL ELPH-IMP: ABNORMAL
PROT SERPL-MCNC: 6.7 G/DL (ref 6.4–8.2)
RHEUMATOID FACT SER QL LA: NEGATIVE
TTG IGA SER-ACNC: <2 U/ML (ref 0–3)
TTG IGG SER-ACNC: <2 U/ML (ref 0–5)

## 2018-08-29 NOTE — TELEPHONE ENCOUNTER
Pt is scheduled for pre-op appt, had recent blood work done through primary care MD and questions if she needs any additional PAT blood work or if this is sufficient for her surgery

## 2018-08-30 LAB
A PHAGOCYTOPH IGG TITR SER IF: NEGATIVE {TITER}
A PHAGOCYTOPH IGM TITR SER IF: NEGATIVE {TITER}
B HENSELAE IGG TITR SER IF: NEGATIVE TITER
B HENSELAE IGM TITR SER IF: NEGATIVE TITER
B MICROTI IGG TITR SER: NORMAL {TITER}
B MICROTI IGM TITR SER: NORMAL {TITER}
B QUINTANA IGG TITR SER IF: NEGATIVE TITER
B QUINTANA IGM TITR SER IF: NEGATIVE TITER
C-ANCA TITR SER IF: NORMAL TITER
CCP IGA+IGG SERPL IA-ACNC: 11 UNITS (ref 0–19)
E CHAFFEENSIS IGG TITR SER IF: NEGATIVE {TITER}
E CHAFFEENSIS IGM TITR SER IF: NEGATIVE {TITER}
MYELOPEROXIDASE AB SER IA-ACNC: <9 U/ML (ref 0–9)
P-ANCA ATYPICAL TITR SER IF: NORMAL TITER
P-ANCA TITR SER IF: NORMAL TITER
PROTEINASE3 AB SER IA-ACNC: <3.5 U/ML (ref 0–3.5)
PROTEINASE3 AB SER IA-ACNC: <3.5 U/ML (ref 0–3.5)

## 2018-08-31 LAB
ANA HOMOGEN SER QL IF: NORMAL
ANA HOMOGEN TITR SER: NORMAL {TITER}
RYE IGE QN: POSITIVE

## 2018-09-01 LAB — CRYOGLOB SER QL 1D COLD INC: NORMAL

## 2018-09-19 ENCOUNTER — APPOINTMENT (OUTPATIENT)
Dept: LAB | Facility: HOSPITAL | Age: 38
End: 2018-09-19
Payer: COMMERCIAL

## 2018-09-19 ENCOUNTER — OFFICE VISIT (OUTPATIENT)
Dept: OBGYN CLINIC | Facility: CLINIC | Age: 38
End: 2018-09-19

## 2018-09-19 ENCOUNTER — TRANSCRIBE ORDERS (OUTPATIENT)
Dept: LAB | Facility: HOSPITAL | Age: 38
End: 2018-09-19

## 2018-09-19 VITALS
DIASTOLIC BLOOD PRESSURE: 82 MMHG | SYSTOLIC BLOOD PRESSURE: 124 MMHG | HEIGHT: 62 IN | WEIGHT: 193.4 LBS | BODY MASS INDEX: 35.59 KG/M2

## 2018-09-19 DIAGNOSIS — E55.9 AVITAMINOSIS D: ICD-10-CM

## 2018-09-19 DIAGNOSIS — Z79.899 NEED FOR PROPHYLACTIC CHEMOTHERAPY: ICD-10-CM

## 2018-09-19 DIAGNOSIS — I73.00 RAYNAUD'S DISEASE WITHOUT GANGRENE: Primary | ICD-10-CM

## 2018-09-19 DIAGNOSIS — Q79.60 EHLERS-DANLOS SYNDROME: ICD-10-CM

## 2018-09-19 DIAGNOSIS — I73.00 RAYNAUD'S DISEASE WITHOUT GANGRENE: ICD-10-CM

## 2018-09-19 DIAGNOSIS — M79.7 SCAPULOHUMERAL FIBROSITIS: ICD-10-CM

## 2018-09-19 DIAGNOSIS — Z01.818 PREOP EXAMINATION: Primary | ICD-10-CM

## 2018-09-19 DIAGNOSIS — M72.8 INFILTRATIVE FASCIITIS: ICD-10-CM

## 2018-09-19 PROCEDURE — PREOP: Performed by: OBSTETRICS & GYNECOLOGY

## 2018-09-19 PROCEDURE — 36415 COLL VENOUS BLD VENIPUNCTURE: CPT

## 2018-09-19 RX ORDER — ERGOCALCIFEROL 1.25 MG/1
CAPSULE ORAL WEEKLY
COMMUNITY
Start: 2018-09-14 | End: 2019-08-29 | Stop reason: ALTCHOICE

## 2018-09-19 NOTE — PROGRESS NOTES
Assessment/Plan:    Patient was consented for exam under anesthesia, LEEP procedure  She understands the risks and benefits and alternative treatment options and agrees to proceed with surgery  Risks including but not limited to infection, deep venous thrombosis, pulmonary embolism, wound healing problems, complications including blood loss, damage to other organs, leaving residual disease behind requiring further surgery, cervical incompetence  We will obtain pregnancy test the day of the procedure  She will also schedule her 2 to three-week postop check  No problem-specific Assessment & Plan notes found for this encounter  Diagnoses and all orders for this visit:    Preop examination    Other orders  -     ergocalciferol (VITAMIN D2) 50,000 units; Subjective:      Patient ID: Marita Ambriz is a 40 y o  female  HPI     This is a 60-year-old female G0 who presents for preoperative counseling  Patient has had a persistent history of abnormal Pap smears with positive HPV  More recently her Pap smear was positive HPV 18 with normal cytology  Colposcopic findings were consistent 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  Patient was counseled on options including proceeding with excisional treatment versus conservative monitoring with serial colposcopies  This point she would like to proceed with definitive surgery  Patient has a significant past medical history of hypercholesterolemia, asthma, sleep apnea, tobacco use, history of C diff on 2 separate occasions this year after antibiotic exposure  She has been evaluated by her primary care physician 06/2018 for routine checkup  More recently she is being evaluated by Rheumatology and infectious disease  She has also been referred to Dermatology due to multiple skin lesions, etiology unknown  Patient is sexually active and has been on birth control pills  She does get her OCPs through her primary care physician    She has been counseled in the past with increased risk of thromboembolic event in the face of tobacco use and estrogen products  All questions answered  The following portions of the patient's history were reviewed and updated as appropriate: allergies, current medications, past family history, past medical history, past social history, past surgical history and problem list     Review of Systems   Constitutional: Negative for fatigue, fever and unexpected weight change  Respiratory: Negative for cough, chest tightness, shortness of breath and wheezing  Cardiovascular: Negative  Negative for chest pain and palpitations  Gastrointestinal: Negative  Negative for abdominal distention, abdominal pain, blood in stool, constipation, diarrhea, nausea and vomiting  Genitourinary: Negative  Negative for difficulty urinating, dyspareunia, dysuria, flank pain, frequency, genital sores, hematuria, pelvic pain, urgency, vaginal bleeding, vaginal discharge and vaginal pain  Skin: Negative for rash  Objective:      /82   Ht 5' 2" (1 575 m)   Wt 87 7 kg (193 lb 6 4 oz)   LMP 09/16/2018 (Exact Date)   Breastfeeding? No   BMI 35 37 kg/m²          Physical Exam   Constitutional: She appears well-developed and well-nourished  Cardiovascular: Normal rate and regular rhythm  Pulmonary/Chest: Effort normal and breath sounds normal  Right breast exhibits no inverted nipple, no mass, no nipple discharge, no skin change and no tenderness  Left breast exhibits no inverted nipple, no mass, no nipple discharge, no skin change and no tenderness  Abdominal: Soft  Bowel sounds are normal  She exhibits no distension  There is no tenderness  There is no rebound and no guarding  Genitourinary: Vagina normal and uterus normal  There is no lesion on the right labia  There is no lesion on the left labia  Cervix exhibits no discharge and no friability  Right adnexum displays no mass, no tenderness and no fullness   Left adnexum displays no mass, no tenderness and no fullness  No vaginal discharge found

## 2018-09-25 NOTE — PRE-PROCEDURE INSTRUCTIONS
Pre-Surgery Instructions:   Medication Instructions    ALPRAZolam (XANAX) 0 25 mg tablet Patient was instructed by Physician and understands   Ascorbic Acid (VITAMIN C) 500 MG CAPS Patient was instructed by Physician and understands   atorvastatin (LIPITOR) 20 mg tablet Patient was instructed by Physician and understands   cholecalciferol (VITAMIN D3) 1,000 units tablet Patient was instructed by Physician and understands   Coconut Oil 1000 MG CAPS Patient was instructed by Physician and understands   DULoxetine (CYMBALTA) 60 mg delayed release capsule Patient was instructed by Physician and understands   Echinacea 125 MG CAPS Patient was instructed by Physician and understands   ergocalciferol (VITAMIN D2) 50,000 units Patient was instructed by Physician and understands   Iron Combinations (IRON COMPLEX PO) Patient was instructed by Physician and understands   Multiple Vitamin tablet Patient was instructed by Physician and understands   norgestimate-ethinyl estradiol (ORTHO TRI-CYCLEN,TRINESSA) 0 18/0 215/0 25 MG-35 MCG per tablet Patient was instructed by Physician and understands   phentermine (ADIPEX-P) 37 5 MG tablet Patient was instructed by Physician and understands   topiramate (TOPAMAX) 25 mg tablet Patient was instructed by Physician and understands   Vitamin E 400 units TABS Patient was instructed by Physician and understands

## 2018-09-25 NOTE — PRE-PROCEDURE INSTRUCTIONS
Pre-Surgery Instructions:   Medication Instructions    ALPRAZolam (XANAX) 0 25 mg tablet Patient was instructed by Physician and understands   Ascorbic Acid (VITAMIN C) 500 MG CAPS Patient was instructed by Physician and understands   atorvastatin (LIPITOR) 20 mg tablet Patient was instructed by Physician and understands   cholecalciferol (VITAMIN D3) 1,000 units tablet Patient was instructed by Physician and understands   Coconut Oil 1000 MG CAPS Patient was instructed by Physician and understands   DULoxetine (CYMBALTA) 60 mg delayed release capsule Patient was instructed by Physician and understands   Echinacea 125 MG CAPS Patient was instructed by Physician and understands   ergocalciferol (VITAMIN D2) 50,000 units Patient was instructed by Physician and understands   Iron Combinations (IRON COMPLEX PO) Patient was instructed by Physician and understands   Multiple Vitamin tablet Patient was instructed by Physician and understands   norgestimate-ethinyl estradiol (ORTHO TRI-CYCLEN,TRINESSA) 0 18/0 215/0 25 MG-35 MCG per tablet Patient was instructed by Physician and understands   phentermine (ADIPEX-P) 37 5 MG tablet Patient was instructed by Physician and understands   topiramate (TOPAMAX) 25 mg tablet Patient was instructed by Physician and understands   Vitamin E 400 units TABS Patient was instructed by Physician and understands  Per pt she was told by surgeon to NOT take any meds on day of surgery

## 2018-09-26 ENCOUNTER — ANESTHESIA EVENT (OUTPATIENT)
Dept: PERIOP | Facility: HOSPITAL | Age: 38
End: 2018-09-26
Payer: COMMERCIAL

## 2018-09-27 ENCOUNTER — ANESTHESIA (OUTPATIENT)
Dept: PERIOP | Facility: HOSPITAL | Age: 38
End: 2018-09-27
Payer: COMMERCIAL

## 2018-09-27 ENCOUNTER — HOSPITAL ENCOUNTER (OUTPATIENT)
Facility: HOSPITAL | Age: 38
Setting detail: OUTPATIENT SURGERY
Discharge: HOME | End: 2018-09-27
Attending: OBSTETRICS & GYNECOLOGY | Admitting: OBSTETRICS & GYNECOLOGY
Payer: COMMERCIAL

## 2018-09-27 VITALS
DIASTOLIC BLOOD PRESSURE: 77 MMHG | RESPIRATION RATE: 16 BRPM | OXYGEN SATURATION: 100 % | WEIGHT: 193 LBS | HEART RATE: 80 BPM | HEIGHT: 62 IN | TEMPERATURE: 99.2 F | BODY MASS INDEX: 35.51 KG/M2 | SYSTOLIC BLOOD PRESSURE: 136 MMHG

## 2018-09-27 DIAGNOSIS — N87.0 DYSPLASIA OF CERVIX, LOW GRADE (CIN 1): ICD-10-CM

## 2018-09-27 PROBLEM — Z98.890 STATUS POST LEEP (LOOP ELECTROSURGICAL EXCISION PROCEDURE) OF CERVIX: Status: ACTIVE | Noted: 2018-09-27

## 2018-09-27 LAB — EXT PREGNANCY TEST URINE: NEGATIVE

## 2018-09-27 PROCEDURE — 88344 IMHCHEM/IMCYTCHM EA MLT ANTB: CPT | Performed by: PATHOLOGY

## 2018-09-27 PROCEDURE — 81025 URINE PREGNANCY TEST: CPT | Performed by: OBSTETRICS & GYNECOLOGY

## 2018-09-27 PROCEDURE — 88307 TISSUE EXAM BY PATHOLOGIST: CPT | Performed by: PATHOLOGY

## 2018-09-27 PROCEDURE — 57522 CONIZATION OF CERVIX: CPT | Performed by: OBSTETRICS & GYNECOLOGY

## 2018-09-27 PROCEDURE — 88305 TISSUE EXAM BY PATHOLOGIST: CPT | Performed by: PATHOLOGY

## 2018-09-27 RX ORDER — FERRIC SUBSULFATE 0.21 G/G
LIQUID TOPICAL AS NEEDED
Status: DISCONTINUED | OUTPATIENT
Start: 2018-09-27 | End: 2018-09-27 | Stop reason: HOSPADM

## 2018-09-27 RX ORDER — SODIUM CHLORIDE, SODIUM LACTATE, POTASSIUM CHLORIDE, CALCIUM CHLORIDE 600; 310; 30; 20 MG/100ML; MG/100ML; MG/100ML; MG/100ML
125 INJECTION, SOLUTION INTRAVENOUS CONTINUOUS
Status: DISCONTINUED | OUTPATIENT
Start: 2018-09-27 | End: 2018-09-27 | Stop reason: HOSPADM

## 2018-09-27 RX ORDER — OXYCODONE HYDROCHLORIDE AND ACETAMINOPHEN 5; 325 MG/1; MG/1
1 TABLET ORAL EVERY 4 HOURS PRN
Status: DISCONTINUED | OUTPATIENT
Start: 2018-09-27 | End: 2018-09-27 | Stop reason: HOSPADM

## 2018-09-27 RX ORDER — FENTANYL CITRATE/PF 50 MCG/ML
25 SYRINGE (ML) INJECTION
Status: DISCONTINUED | OUTPATIENT
Start: 2018-09-27 | End: 2018-09-27 | Stop reason: HOSPADM

## 2018-09-27 RX ORDER — ACETAMINOPHEN 325 MG/1
650 TABLET ORAL EVERY 6 HOURS PRN
Status: DISCONTINUED | OUTPATIENT
Start: 2018-09-27 | End: 2018-09-27 | Stop reason: HOSPADM

## 2018-09-27 RX ORDER — ONDANSETRON 2 MG/ML
4 INJECTION INTRAMUSCULAR; INTRAVENOUS ONCE AS NEEDED
Status: DISCONTINUED | OUTPATIENT
Start: 2018-09-27 | End: 2018-09-27 | Stop reason: HOSPADM

## 2018-09-27 RX ORDER — FENTANYL CITRATE 50 UG/ML
INJECTION, SOLUTION INTRAMUSCULAR; INTRAVENOUS AS NEEDED
Status: DISCONTINUED | OUTPATIENT
Start: 2018-09-27 | End: 2018-09-27 | Stop reason: SURG

## 2018-09-27 RX ORDER — OXYCODONE HYDROCHLORIDE AND ACETAMINOPHEN 5; 325 MG/1; MG/1
2 TABLET ORAL EVERY 4 HOURS PRN
Status: DISCONTINUED | OUTPATIENT
Start: 2018-09-27 | End: 2018-09-27 | Stop reason: HOSPADM

## 2018-09-27 RX ORDER — SODIUM CHLORIDE, SODIUM LACTATE, POTASSIUM CHLORIDE, CALCIUM CHLORIDE 600; 310; 30; 20 MG/100ML; MG/100ML; MG/100ML; MG/100ML
100 INJECTION, SOLUTION INTRAVENOUS CONTINUOUS
Status: DISCONTINUED | OUTPATIENT
Start: 2018-09-27 | End: 2018-09-27 | Stop reason: HOSPADM

## 2018-09-27 RX ORDER — ONDANSETRON 2 MG/ML
INJECTION INTRAMUSCULAR; INTRAVENOUS AS NEEDED
Status: DISCONTINUED | OUTPATIENT
Start: 2018-09-27 | End: 2018-09-27 | Stop reason: SURG

## 2018-09-27 RX ORDER — IODINE SOLUTION STRONG 5% (LUGOL'S) 5 %
SOLUTION ORAL AS NEEDED
Status: DISCONTINUED | OUTPATIENT
Start: 2018-09-27 | End: 2018-09-27 | Stop reason: HOSPADM

## 2018-09-27 RX ORDER — ACETIC ACID 5 %
LIQUID (ML) MISCELLANEOUS AS NEEDED
Status: DISCONTINUED | OUTPATIENT
Start: 2018-09-27 | End: 2018-09-27 | Stop reason: HOSPADM

## 2018-09-27 RX ORDER — PROPOFOL 10 MG/ML
INJECTION, EMULSION INTRAVENOUS AS NEEDED
Status: DISCONTINUED | OUTPATIENT
Start: 2018-09-27 | End: 2018-09-27 | Stop reason: SURG

## 2018-09-27 RX ORDER — LIDOCAINE HYDROCHLORIDE 10 MG/ML
INJECTION, SOLUTION INFILTRATION; PERINEURAL AS NEEDED
Status: DISCONTINUED | OUTPATIENT
Start: 2018-09-27 | End: 2018-09-27 | Stop reason: SURG

## 2018-09-27 RX ORDER — KETOROLAC TROMETHAMINE 30 MG/ML
INJECTION, SOLUTION INTRAMUSCULAR; INTRAVENOUS AS NEEDED
Status: DISCONTINUED | OUTPATIENT
Start: 2018-09-27 | End: 2018-09-27 | Stop reason: SURG

## 2018-09-27 RX ADMIN — PROPOFOL 200 MG: 10 INJECTION, EMULSION INTRAVENOUS at 14:11

## 2018-09-27 RX ADMIN — ONDANSETRON 4 MG: 2 INJECTION INTRAMUSCULAR; INTRAVENOUS at 14:15

## 2018-09-27 RX ADMIN — SODIUM CHLORIDE, SODIUM LACTATE, POTASSIUM CHLORIDE, AND CALCIUM CHLORIDE 125 ML/HR: .6; .31; .03; .02 INJECTION, SOLUTION INTRAVENOUS at 11:41

## 2018-09-27 RX ADMIN — FENTANYL CITRATE 50 MCG: 50 INJECTION, SOLUTION INTRAMUSCULAR; INTRAVENOUS at 14:21

## 2018-09-27 RX ADMIN — DEXAMETHASONE SODIUM PHOSPHATE 10 MG: 10 INJECTION INTRAMUSCULAR; INTRAVENOUS at 14:15

## 2018-09-27 RX ADMIN — KETOROLAC TROMETHAMINE 30 MG: 30 INJECTION, SOLUTION INTRAMUSCULAR at 14:21

## 2018-09-27 RX ADMIN — LIDOCAINE HYDROCHLORIDE 50 MG: 10 INJECTION, SOLUTION INFILTRATION; PERINEURAL at 14:10

## 2018-09-27 NOTE — ANESTHESIA POSTPROCEDURE EVALUATION
Post-Op Assessment Note      CV Status:  Stable    Mental Status:  Alert and awake    Hydration Status:  Euvolemic    PONV Controlled:  Controlled    Airway Patency:  Patent    Post Op Vitals Reviewed: Yes          Staff: Anesthesiologist, CRNA           BP   127/78   Temp   97 0   Pulse  70   Resp   14   SpO2   100

## 2018-09-27 NOTE — ANESTHESIA PREPROCEDURE EVALUATION
Review of Systems/Medical History  Patient summary reviewed  Chart reviewed  No history of anesthetic complications     Cardiovascular  Exercise tolerance (METS): >4,  Hyperlipidemia,    Pulmonary  Asthma , well controlled/ stable Last rescue: > 1 year ago , Sleep apnea (Non compliant with CPAP) ,        GI/Hepatic  Negative GI/hepatic ROS          Negative  ROS        Endo/Other  Negative endo/other ROS      GYN      Comment: Abnormal PAP smear     Hematology  Negative hematology ROS      Musculoskeletal  Negative musculoskeletal ROS        Neurology  Negative neurology ROS      Psychology   Anxiety, Depression , being treated for depression,              Physical Exam    Airway    Mallampati score: II  TM Distance: >3 FB  Neck ROM: full     Dental   No notable dental hx     Cardiovascular  Rhythm: regular, Rate: normal,     Pulmonary  Pulmonary exam normal Breath sounds clear to auscultation,     Other Findings        Anesthesia Plan  ASA Score- 2     Anesthesia Type- general with ASA Monitors  Additional Monitors:   Airway Plan: LMA  Plan Factors-    Induction- intravenous  Postoperative Plan-     Informed Consent- Anesthetic plan and risks discussed with patient  I personally reviewed this patient with the CRNA  Discussed and agreed on the Anesthesia Plan with the CRNA  Francisco Raygoza

## 2018-09-27 NOTE — DISCHARGE INSTRUCTIONS
Resume regular diet    Loop Electrosurgical Excision Procedure   WHAT YOU NEED TO KNOW:   A loop electrosurgical excision procedure (LEEP) is used to remove abnormal tissue from your cervix or vagina  Your cervix is the opening of your uterus  Your healthcare provider will use a small wire loop that is heated by an electrical current to remove the tissue  DISCHARGE INSTRUCTIONS:   Medicines: You may need any of the following:  · Acetaminophen  decreases pain  It is available without a doctor's order  Ask how much to take and how often to take it  Follow directions  Acetaminophen can cause liver damage if not taken correctly  · NSAIDs  decrease pain and swelling  This medicine is available without a doctor's order  This medicine can cause stomach bleeding or kidney problems  If you take blood thinner medicine, always ask your healthcare provider if NSAIDs are safe for you  Always read the medicine label and follow the directions on it before you use this medicine  · Take your medicine as directed  Contact your healthcare provider if you think your medicine is not helping or if you have side effects  Tell him if you are allergic to any medicine  Keep a list of the medicines, vitamins, and herbs you take  Include the amounts, and when and why you take them  Bring the list or the pill bottles to follow-up visits  Carry your medicine list with you in case of an emergency  Follow up with your healthcare provider or gynecologist as directed:  Write down your questions so you remember to ask them during your visits  Rest:  Rest when you feel it is needed  Slowly start to do more each day  Return to your daily activities as directed  Vaginal care: It is normal to have mild cramping, spotting, or discharge for several days after your procedure  You may also have a thin, watery discharge for up to 4 weeks after your procedure  Use a clean sanitary pad as needed   Do not  use tampons, douche, or have sex until your healthcare provider or gynecologist says that it is okay  Bathing:  Do not take a bath or use a hot tub for 2 weeks after your procedure, or as directed by your healthcare provider or gynecologist  Pedro Pablo Dumont may shower during this time  Contact your healthcare provider or gynecologist if:   · You have a fever or chills  · You have nausea or are vomiting  · You have blood in your urine  · Your pad becomes soaked with blood  · You have foul-smelling drainage from your vagina  · You have pain when you urinate or have sex  · You have questions or concerns about your condition or care  Seek care immediately or call 911 if:   · You have heavy bleeding from your vagina  · You have severe abdominal or vaginal pain that does not go away, even after you take pain medicine  · You are urinating less than before your procedure  © 2016 [employer] is for End User's use only and may not be sold, redistributed or otherwise used for commercial purposes  All illustrations and images included in CareNotes® are the copyrighted property of [employer]  or Jasbir Fowler  The above information is an  only  It is not intended as medical advice for individual conditions or treatments  Talk to your doctor, nurse or pharmacist before following any medical regimen to see if it is safe and effective for you

## 2018-09-27 NOTE — H&P (VIEW-ONLY)
H&P Exam - Gynecology   Bruce Silverio 40 y o  female MRN: [id number]  Unit/Bed#:  Encounter: [id number]    Assessment/Plan     Assessment:    Moderate cervical dysplasia    Plan:    Patient was consented for exam under anesthesia, LEEP procedure  She understands the risks and benefits and alternative treatment options and agrees to proceed with surgery  Risks including but not limited to infection, deep venous thrombosis, pulmonary embolism, wound healing problems, complications including blood loss, damage to other organs, leaving residual disease behind requiring further surgery, cervical incompetence  We will obtain pregnancy test the day of the procedure  She will also schedule her 2 to three-week postop check  History of Present Illness     HPI:  Bruce Silverio is a 40 y o  female G0 who presents for LEEP procedure  Patient has had a persistent history of abnormal Pap smears with positive HPV  More recently her Pap smear was positive HPV 18 with normal cytology  Colposcopic findings were consistent 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  Patient was counseled on options including proceeding with excisional treatment versus conservative monitoring with serial colposcopies  This point she would like to proceed with definitive surgery  Patient has a significant past medical history of hypercholesterolemia, asthma, sleep apnea, tobacco use, history of C diff on 2 separate occasions this year after antibiotic exposure  She has been evaluated by her primary care physician 06/2018 for routine checkup  More recently she is being evaluated by Rheumatology and infectious disease  She has also been referred to Dermatology due to multiple skin lesions, etiology unknown  Patient is sexually active and has been on birth control pills  She does get her OCPs through her primary care physician  She has been counseled in the past with increased risk of thromboembolic event in the face of tobacco use and estrogen products    All questions answered  Review of Systems   Constitutional: Negative for fatigue, fever and unexpected weight change  Respiratory: Negative for cough, chest tightness, shortness of breath and wheezing  Cardiovascular: Negative  Negative for chest pain and palpitations  Gastrointestinal: Negative  Negative for abdominal distention, abdominal pain, blood in stool, constipation, diarrhea, nausea and vomiting  Genitourinary: Negative  Negative for difficulty urinating, dyspareunia, dysuria, flank pain, frequency, genital sores, hematuria, pelvic pain, urgency, vaginal bleeding, vaginal discharge and vaginal pain  Skin: Negative for rash  Historical Information   Past Medical History:   Diagnosis Date    Anxiety     History of Clostridium difficile colitis 2018    x 2 episodes, after antibiotics    Hypercholesterolemia      Past Surgical History:   Procedure Laterality Date    REDUCTION MAMMAPLASTY  1999    TONSILLECTOMY       OB/GYN History:   As above  Family History   Problem Relation Age of Onset    Lung cancer Father     Liver cancer Paternal Grandfather      Social History   History   Alcohol Use No     History   Drug Use No     History   Smoking Status    Current Every Day Smoker   Smokeless Tobacco    Never Used       Meds/Allergies   all current active meds have been reviewed  No Known Allergies    Objective   Vitals: Last menstrual period 09/16/2018, not currently breastfeeding  [unfilled]    Invasive Devices: Invasive Devices          No matching active lines, drains, or airways          Physical Exam   Constitutional: She appears well-developed and well-nourished  Cardiovascular: Normal rate and regular rhythm  Pulmonary/Chest: Effort normal and breath sounds normal  Right breast exhibits no inverted nipple, no mass, no nipple discharge, no skin change and no tenderness  Left breast exhibits no inverted nipple, no mass, no nipple discharge, no skin change and no tenderness  Abdominal: Soft  Bowel sounds are normal  She exhibits no distension  There is no tenderness  There is no rebound and no guarding  Genitourinary: Vagina normal and uterus normal  There is no lesion on the right labia  There is no lesion on the left labia  Cervix exhibits no discharge and no friability  Right adnexum displays no mass, no tenderness and no fullness  Left adnexum displays no mass, no tenderness and no fullness  No vaginal discharge found  Lab Results: I have personally reviewed pertinent reports  Imaging: I have personally reviewed pertinent reports  EKG, Pathology, and Other Studies: I have personally reviewed pertinent reports  Code Status: [unfilled]  Advance Directive and Living Will:      Power of :    POLST:      Counseling / Coordination of Care  Total floor / unit time spent today 30 minutes  Greater than 50% of total time was spent with the patient and / or family counseling and / or coordination of care

## 2018-09-27 NOTE — OP NOTE
OPERATIVE REPORT  PATIENT NAME: Katherine Woods    :  1980  MRN: [id number]  Pt Location:  OR ROOM 06    SURGERY DATE: 2018    Surgeon(s) and Role:     * Danilo Mcnair DO - Primary     * Miguelina Arguello MD - Assisting    Preop Diagnosis:  Dysplasia of cervix, low grade (CORNELIUS 1) [N87 0]    Post-Op Diagnosis Codes: * Dysplasia of cervix, low grade (CORNELIUS 1) [N87 0]    Procedure(s) (LRB):  BIOPSY LEEP CERVIX (N/A)    Specimen(s):  ID Type Source Tests Collected by Time Destination   1 : Cervix tissue Tissue Cervix TISSUE EXAM Danilo Mcnair,  2018 1426    2 : endocervical currettings Tissue Cervix, Endocervical TISSUE EXAM Danilo Mcnair,  2018 1428        Estimated Blood Loss:   Minimal     Anesthesia Type:   General    Operative Indications:  Dysplasia of cervix, low grade (CORNELIUS 1) [N87 0]    Operative Findings:  Decreased lugol uptake at the 6oclock position on the posterior lip  Complications:   None    Procedure and Technique:  Brief History    Patient has had persistent CIN1/CIN2 dysplasia and wanted definitive treatment  Patient decided to proceed with LEEP  All risks, benefits, and alternatives to the procedure were discussed with the patient and she had the opportunity to ask questions  Informed consent was obtained  Description of Procedure    Patient was taken to the operating room were a time out was performed to confirm correct patient and correct procedure  General LMA anesthesia (LMA) was administered and the patient was positioned on the OR table in the dorsal lithotomy position  The patient was draped in the usual sterile fashion  Operative Technique     A coated speculum was inserted into the vagina and used to visualize the cervix  Cervix was grasped with a coated single toothed tenaculum  The transformation zone was identified  Acetic acid was applied initially to the cervix followed by Lugol's solution   A lesion was identified on the posterior lip at the 6'oclock position  A medium loop electrode was selected and used to excise the lesion using 70/40 cut/cautery setting  Cervical specimen was tagged at the 12'oclock position and sent for pathology  Sharp curetting was performed in the endocervical canal, starting at the 12'oclock position and rotating a total of 360 degrees to cover all surfaces  Endocervical curettage was obtained and sent for pathology  The cervical bed was cauterized using a ball tip Bovie electrocautery, taking care to avoid the cervical canal  Monsel's solution was applied  Good hemostasis was confirmed at the conclusion of the procedure  Single toothed tenaculum was removed from the anterior lip of the cervix  Good hemostasis was confirmed at the tenaculum puncture sites  Coated speculum was removed from vagina  At the conclusion of the procedure, all needle, sponge, and instrument counts were noted to be correct x2  Patient tolerated the procedure well and was transferred to PACU in stable condition prior to discharge with follow up in 1-2 weeks  Dr Leonor Ruelas was present and participated in all key portions of the case      Patient Disposition:  PACU     SIGNATURE: Aline Perez MD  DATE: September 27, 2018  TIME: 2:42 PM

## 2018-10-08 ENCOUNTER — LAB REQUISITION (OUTPATIENT)
Dept: LAB | Facility: HOSPITAL | Age: 38
End: 2018-10-08
Payer: COMMERCIAL

## 2018-10-08 DIAGNOSIS — B95.8 UNSPECIFIED STAPHYLOCOCCUS AS THE CAUSE OF DISEASES CLASSIFIED ELSEWHERE: ICD-10-CM

## 2018-10-08 PROCEDURE — 87147 CULTURE TYPE IMMUNOLOGIC: CPT | Performed by: PHYSICIAN ASSISTANT

## 2018-10-08 PROCEDURE — 87070 CULTURE OTHR SPECIMN AEROBIC: CPT | Performed by: PHYSICIAN ASSISTANT

## 2018-10-08 PROCEDURE — 87186 SC STD MICRODIL/AGAR DIL: CPT | Performed by: PHYSICIAN ASSISTANT

## 2018-10-08 PROCEDURE — 87205 SMEAR GRAM STAIN: CPT | Performed by: PHYSICIAN ASSISTANT

## 2018-10-10 LAB
BACTERIA WND AEROBE CULT: ABNORMAL
GRAM STN SPEC: ABNORMAL
GRAM STN SPEC: ABNORMAL

## 2018-10-11 ENCOUNTER — OFFICE VISIT (OUTPATIENT)
Dept: OBGYN CLINIC | Facility: CLINIC | Age: 38
End: 2018-10-11

## 2018-10-11 VITALS
HEIGHT: 62 IN | WEIGHT: 195 LBS | DIASTOLIC BLOOD PRESSURE: 100 MMHG | SYSTOLIC BLOOD PRESSURE: 160 MMHG | BODY MASS INDEX: 35.88 KG/M2

## 2018-10-11 DIAGNOSIS — Z09 POSTOP CHECK: Primary | ICD-10-CM

## 2018-10-11 PROCEDURE — 99024 POSTOP FOLLOW-UP VISIT: CPT | Performed by: OBSTETRICS & GYNECOLOGY

## 2018-10-11 RX ORDER — CLOBETASOL PROPIONATE 0.5 MG/G
OINTMENT TOPICAL
COMMUNITY
Start: 2018-10-08 | End: 2020-12-22 | Stop reason: ALTCHOICE

## 2018-10-11 NOTE — PROGRESS NOTES
Assessment/Plan:     Reviewed pathology consistent with CIN1 negative margins with negative ECC  All restrictions lifted  Patient will return to office in May 2018 for annual well visit and repeat Pap smear  Diagnoses and all orders for this visit:    Postop check    Other orders  -     clobetasol (TEMOVATE) 0 05 % ointment;   -     mupirocin (BACTROBAN) 2 % ointment;           Subjective:     Patient ID: Sue Rausch is a 40 y o  female  HPI     This is a 81-year-old female G0 status post LEEP procedure now 2 weeks post op  She denies any vaginal discharge or pelvic pain  She has had a long history of abnormal Pap smears with positive HPV, CORNELIUS 1/2  LEEP specimen had revealed CORNELIUS 1 with negative margins  Review of Systems   Constitutional: Negative for fatigue, fever and unexpected weight change  Respiratory: Negative for cough, chest tightness, shortness of breath and wheezing  Cardiovascular: Negative  Negative for chest pain and palpitations  Gastrointestinal: Negative  Negative for abdominal distention, abdominal pain, blood in stool, constipation, diarrhea, nausea and vomiting  Genitourinary: Negative  Negative for difficulty urinating, dyspareunia, dysuria, flank pain, frequency, genital sores, hematuria, pelvic pain, urgency, vaginal bleeding, vaginal discharge and vaginal pain  Skin: Negative for rash  Objective:     Physical Exam      External genitalia is within normal limits  The vagina is well estrogenized  Cervix is small well-healed  No signs of infection

## 2018-11-13 ENCOUNTER — LAB REQUISITION (OUTPATIENT)
Dept: LAB | Facility: HOSPITAL | Age: 38
End: 2018-11-13
Payer: COMMERCIAL

## 2018-11-13 DIAGNOSIS — L30.9 DERMATITIS: ICD-10-CM

## 2018-11-13 DIAGNOSIS — A41.01 SEPSIS DUE TO METHICILLIN SUSCEPTIBLE STAPHYLOCOCCUS AUREUS (HCC): ICD-10-CM

## 2018-11-13 PROCEDURE — 87205 SMEAR GRAM STAIN: CPT | Performed by: PHYSICIAN ASSISTANT

## 2018-11-13 PROCEDURE — 87070 CULTURE OTHR SPECIMN AEROBIC: CPT | Performed by: PHYSICIAN ASSISTANT

## 2018-11-13 PROCEDURE — 87147 CULTURE TYPE IMMUNOLOGIC: CPT | Performed by: PHYSICIAN ASSISTANT

## 2018-11-13 PROCEDURE — 87186 SC STD MICRODIL/AGAR DIL: CPT | Performed by: PHYSICIAN ASSISTANT

## 2018-11-13 PROCEDURE — 88305 TISSUE EXAM BY PATHOLOGIST: CPT | Performed by: PATHOLOGY

## 2018-11-13 PROCEDURE — 88312 SPECIAL STAINS GROUP 1: CPT | Performed by: PATHOLOGY

## 2018-11-15 LAB
BACTERIA WND AEROBE CULT: ABNORMAL
GRAM STN SPEC: ABNORMAL

## 2018-12-06 ENCOUNTER — OFFICE VISIT (OUTPATIENT)
Dept: INFECTIOUS DISEASES | Facility: CLINIC | Age: 38
End: 2018-12-06
Payer: COMMERCIAL

## 2018-12-06 VITALS
DIASTOLIC BLOOD PRESSURE: 78 MMHG | HEART RATE: 104 BPM | SYSTOLIC BLOOD PRESSURE: 138 MMHG | TEMPERATURE: 97.2 F | HEIGHT: 62 IN | BODY MASS INDEX: 38.35 KG/M2 | WEIGHT: 208.4 LBS | RESPIRATION RATE: 20 BRPM

## 2018-12-06 DIAGNOSIS — A04.71 RECURRENT CLOSTRIDIUM DIFFICILE DIARRHEA: ICD-10-CM

## 2018-12-06 DIAGNOSIS — L02.92 FURUNCULOSIS: Primary | ICD-10-CM

## 2018-12-06 PROCEDURE — 99214 OFFICE O/P EST MOD 30 MIN: CPT | Performed by: INTERNAL MEDICINE

## 2018-12-06 NOTE — PROGRESS NOTES
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  Fortunately these lesions have been relatively small but continued to be quite and nuisance  She has not been systemically ill and there have not been lesions that have required significant incision and drainage   -continue nasal mupirocin  -chlorhexidine washes with shower daily for 2 weeks  -if this is ineffective, recommend dilute bleach bath 3 times weekly  -instructed patient to make sure she is using a malignant cream to avoid over drying her skin  -will attempt to avoid systemic antibiotics with her recurrent C difficile colitis, however if need to use an antibiotic will choose doxycycline    2  Recurrent C difficile colitis-no recurrence since her last visit   -avoid systemic antibiotics if able    Patient will follow up with me if the above measures are not effective  Antibiotics:  None    Subjective:  Patient here for follow-up in the office with a new problem  She has a history of recurrent C difficile colitis in the past for which she had seen Dr Alex Luna  The patient more recently has developed recurrent small boils and skin lesions that have been most prominent on the posterior scalp region  She has also developed some lesions on her extremities which are painful and tender scab over time  She was seen by Dermatology who did a biopsy and sent for culture which revealed methicillin sensitive Staph aureus  She has been receiving topical mupirocin to the lesions as well as mupirocin to the nares  She does continue to have some small lesions developing on her extremities  Patient has no fever, chills, sweats; no nausea, vomiting, diarrhea; no cough, shortness of breath; no pain  No new symptoms      ROS: A complete 12 point ROS is negative other than that noted in the HPI    Followup portions patient history reviewed and updated as: Allergies, current medications, past medical history, past social history, past surgical history, and the problem list    Objective:  Vitals:  Vitals:    12/06/18 1447   BP: 138/78   Pulse: 104   Resp: 20   Temp: (!) 97 2 °F (36 2 °C)   TempSrc: Tympanic   Weight: 94 5 kg (208 lb 6 4 oz)   Height: 5' 2" (1 575 m)       Physical Exam:   General Appearance:  Alert, interactive, appearing well, nontoxic, no acute distress  Throat: Oropharynx moist without lesions  Lungs:   Clear to auscultation bilaterally; no audible wheezes, rhonchi or rales; respirations unlabored   Heart:  RRR; no murmur, rub or gallop   Abdomen:   Soft, non-tender, non-distended, positive bowel sounds  Extremities: No clubbing, cyanosis or edema   Skin: No new rashes or lesions  No new draining wounds         Labs, Imaging, & Other studies:   All pertinent labs and imaging studies were personally reviewed    Lab Results   Component Value Date     09/15/2015    K 4 2 08/28/2018     08/28/2018    CO2 24 08/28/2018    ANIONGAP 7 09/15/2015    BUN 13 08/28/2018    CREATININE 0 83 08/28/2018    GLUCOSE 90 09/15/2015    GLUF 90 08/13/2014    CALCIUM 8 3 08/28/2018    AST 15 08/28/2018    ALT 18 08/28/2018    ALKPHOS 90 08/28/2018    PROT 6 4 09/15/2015    BILITOT 0 18 (L) 09/15/2015    EGFR 91 08/28/2018     Lab Results   Component Value Date    WBC 7 96 08/28/2018    HGB 13 2 08/28/2018    HCT 40 6 08/28/2018    MCV 89 08/28/2018     08/28/2018

## 2018-12-29 ENCOUNTER — APPOINTMENT (EMERGENCY)
Dept: RADIOLOGY | Facility: HOSPITAL | Age: 38
DRG: 202 | End: 2018-12-29
Payer: COMMERCIAL

## 2018-12-29 ENCOUNTER — HOSPITAL ENCOUNTER (INPATIENT)
Facility: HOSPITAL | Age: 38
LOS: 6 days | Discharge: HOME | DRG: 202 | End: 2019-01-04
Attending: EMERGENCY MEDICINE | Admitting: INTERNAL MEDICINE
Payer: COMMERCIAL

## 2018-12-29 DIAGNOSIS — J45.22 MILD INTERMITTENT ASTHMA WITH STATUS ASTHMATICUS: ICD-10-CM

## 2018-12-29 DIAGNOSIS — J45.902 STATUS ASTHMATICUS: Primary | ICD-10-CM

## 2018-12-29 PROBLEM — Z86.19 HISTORY OF CLOSTRIDIUM DIFFICILE COLITIS: Status: ACTIVE | Noted: 2018-01-01

## 2018-12-29 PROBLEM — J96.02 ACUTE RESPIRATORY FAILURE WITH HYPOXIA AND HYPERCAPNIA (HCC): Status: ACTIVE | Noted: 2018-12-29

## 2018-12-29 PROBLEM — J96.91 RESPIRATORY FAILURE WITH HYPOXIA AND HYPERCAPNIA (HCC): Status: ACTIVE | Noted: 2018-12-29

## 2018-12-29 PROBLEM — D72.829 LEUKOCYTOSIS: Status: ACTIVE | Noted: 2018-12-29

## 2018-12-29 PROBLEM — J96.92 RESPIRATORY FAILURE WITH HYPOXIA AND HYPERCAPNIA (HCC): Status: ACTIVE | Noted: 2018-12-29

## 2018-12-29 PROBLEM — J96.01 ACUTE RESPIRATORY FAILURE WITH HYPOXIA AND HYPERCAPNIA (HCC): Status: ACTIVE | Noted: 2018-12-29

## 2018-12-29 PROBLEM — F41.8 DEPRESSION WITH ANXIETY: Status: ACTIVE | Noted: 2018-05-30

## 2018-12-29 LAB
ANION GAP BLD CALC-SCNC: 15 MMOL/L (ref 4–13)
ANION GAP SERPL CALCULATED.3IONS-SCNC: 8 MMOL/L (ref 4–13)
ARTERIAL PATENCY WRIST A: YES
ATRIAL RATE: 132 BPM
BASE EXCESS BLDA CALC-SCNC: -3.6 MMOL/L
BASE EXCESS BLDA CALC-SCNC: -4 MMOL/L (ref -2–3)
BASOPHILS # BLD AUTO: 0.04 THOUSANDS/ΜL (ref 0–0.1)
BASOPHILS NFR BLD AUTO: 0 % (ref 0–1)
BUN BLD-MCNC: 8 MG/DL (ref 5–25)
BUN SERPL-MCNC: 10 MG/DL (ref 5–25)
CA-I BLD-SCNC: 1.1 MMOL/L (ref 1.12–1.32)
CA-I BLD-SCNC: 1.19 MMOL/L (ref 1.12–1.32)
CALCIUM SERPL-MCNC: 8.1 MG/DL (ref 8.3–10.1)
CHLORIDE BLD-SCNC: 104 MMOL/L (ref 100–108)
CHLORIDE SERPL-SCNC: 107 MMOL/L (ref 100–108)
CO2 SERPL-SCNC: 24 MMOL/L (ref 21–32)
CREAT BLD-MCNC: 0.5 MG/DL (ref 0.6–1.3)
CREAT SERPL-MCNC: 0.62 MG/DL (ref 0.6–1.3)
DEPRECATED D DIMER PPP: 281 NG/ML (FEU)
EOSINOPHIL # BLD AUTO: 0.06 THOUSAND/ΜL (ref 0–0.61)
EOSINOPHIL NFR BLD AUTO: 0 % (ref 0–6)
ERYTHROCYTE [DISTWIDTH] IN BLOOD BY AUTOMATED COUNT: 12.3 % (ref 11.6–15.1)
FIO2 GAS DIL.REBREATH: 40 L
GFR SERPL CREATININE-BSD FRML MDRD: 116 ML/MIN/1.73SQ M
GFR SERPL CREATININE-BSD FRML MDRD: 124 ML/MIN/1.73SQ M
GLUCOSE SERPL-MCNC: 148 MG/DL (ref 65–140)
GLUCOSE SERPL-MCNC: 159 MG/DL (ref 65–140)
GLUCOSE SERPL-MCNC: 164 MG/DL (ref 65–140)
GLUCOSE SERPL-MCNC: 88 MG/DL (ref 65–140)
HCO3 BLDA-SCNC: 20.3 MMOL/L (ref 22–28)
HCO3 BLDA-SCNC: 22 MMOL/L (ref 22–28)
HCT VFR BLD AUTO: 41.8 % (ref 34.8–46.1)
HCT VFR BLD CALC: 40 % (ref 34.8–46.1)
HCT VFR BLD CALC: 43 % (ref 34.8–46.1)
HGB BLD-MCNC: 14 G/DL (ref 11.5–15.4)
HGB BLDA-MCNC: 13.6 G/DL (ref 11.5–15.4)
HGB BLDA-MCNC: 14.6 G/DL (ref 11.5–15.4)
IMM GRANULOCYTES # BLD AUTO: 0.05 THOUSAND/UL (ref 0–0.2)
IMM GRANULOCYTES NFR BLD AUTO: 0 % (ref 0–2)
IPAP: 14
LYMPHOCYTES # BLD AUTO: 1.02 THOUSANDS/ΜL (ref 0.6–4.47)
LYMPHOCYTES NFR BLD AUTO: 7 % (ref 14–44)
MCH RBC QN AUTO: 29.4 PG (ref 26.8–34.3)
MCHC RBC AUTO-ENTMCNC: 33.5 G/DL (ref 31.4–37.4)
MCV RBC AUTO: 88 FL (ref 82–98)
MONOCYTES # BLD AUTO: 0.62 THOUSAND/ΜL (ref 0.17–1.22)
MONOCYTES NFR BLD AUTO: 4 % (ref 4–12)
NEUTROPHILS # BLD AUTO: 13.78 THOUSANDS/ΜL (ref 1.85–7.62)
NEUTS SEG NFR BLD AUTO: 89 % (ref 43–75)
NON VENT- BIPAP: ABNORMAL
NRBC BLD AUTO-RTO: 0 /100 WBCS
O2 CT BLDA-SCNC: 18.8 ML/DL (ref 16–23)
OXYHGB MFR BLDA: 93.3 % (ref 94–97)
P AXIS: 63 DEGREES
PCO2 BLD: 21 MMOL/L (ref 21–32)
PCO2 BLD: 23 MMOL/L (ref 21–32)
PCO2 BLD: 33.3 MM HG (ref 36–44)
PCO2 BLDA: 41.5 MM HG (ref 36–44)
PEEP MAX SETTING VENT: 4 CM[H2O]
PH BLD: 7.39 [PH] (ref 7.35–7.45)
PH BLDA: 7.34 [PH] (ref 7.35–7.45)
PLATELET # BLD AUTO: 221 THOUSANDS/UL (ref 149–390)
PMV BLD AUTO: 9.2 FL (ref 8.9–12.7)
PO2 BLD: 70 MM HG (ref 75–129)
PO2 BLDA: 72.3 MM HG (ref 75–129)
POTASSIUM BLD-SCNC: 3.6 MMOL/L (ref 3.5–5.3)
POTASSIUM BLD-SCNC: 3.7 MMOL/L (ref 3.5–5.3)
POTASSIUM SERPL-SCNC: 3.8 MMOL/L (ref 3.5–5.3)
PR INTERVAL: 142 MS
PROCALCITONIN SERPL-MCNC: <0.05 NG/ML
QRS AXIS: 18 DEGREES
QRSD INTERVAL: 96 MS
QT INTERVAL: 279 MS
QTC INTERVAL: 414 MS
RBC # BLD AUTO: 4.77 MILLION/UL (ref 3.81–5.12)
SAO2 % BLD FROM PO2: 94 % (ref 95–98)
SODIUM BLD-SCNC: 135 MMOL/L (ref 136–145)
SODIUM BLD-SCNC: 138 MMOL/L (ref 136–145)
SODIUM SERPL-SCNC: 139 MMOL/L (ref 136–145)
SPECIMEN SOURCE: ABNORMAL
T WAVE AXIS: 31 DEGREES
VENT BIPAP FIO2: 40 %
VENTRICULAR RATE: 132 BPM
WBC # BLD AUTO: 15.57 THOUSAND/UL (ref 4.31–10.16)

## 2018-12-29 PROCEDURE — 93005 ELECTROCARDIOGRAM TRACING: CPT

## 2018-12-29 PROCEDURE — 85025 COMPLETE CBC W/AUTO DIFF WBC: CPT | Performed by: EMERGENCY MEDICINE

## 2018-12-29 PROCEDURE — 82948 REAGENT STRIP/BLOOD GLUCOSE: CPT

## 2018-12-29 PROCEDURE — 96372 THER/PROPH/DIAG INJ SC/IM: CPT

## 2018-12-29 PROCEDURE — 36600 WITHDRAWAL OF ARTERIAL BLOOD: CPT

## 2018-12-29 PROCEDURE — 84295 ASSAY OF SERUM SODIUM: CPT

## 2018-12-29 PROCEDURE — 82330 ASSAY OF CALCIUM: CPT

## 2018-12-29 PROCEDURE — 85379 FIBRIN DEGRADATION QUANT: CPT | Performed by: EMERGENCY MEDICINE

## 2018-12-29 PROCEDURE — 87040 BLOOD CULTURE FOR BACTERIA: CPT | Performed by: NURSE PRACTITIONER

## 2018-12-29 PROCEDURE — 82803 BLOOD GASES ANY COMBINATION: CPT

## 2018-12-29 PROCEDURE — 93010 ELECTROCARDIOGRAM REPORT: CPT | Performed by: INTERNAL MEDICINE

## 2018-12-29 PROCEDURE — 85014 HEMATOCRIT: CPT

## 2018-12-29 PROCEDURE — 36415 COLL VENOUS BLD VENIPUNCTURE: CPT | Performed by: EMERGENCY MEDICINE

## 2018-12-29 PROCEDURE — 82947 ASSAY GLUCOSE BLOOD QUANT: CPT

## 2018-12-29 PROCEDURE — 80047 BASIC METABLC PNL IONIZED CA: CPT

## 2018-12-29 PROCEDURE — 71045 X-RAY EXAM CHEST 1 VIEW: CPT

## 2018-12-29 PROCEDURE — 84132 ASSAY OF SERUM POTASSIUM: CPT

## 2018-12-29 PROCEDURE — 94660 CPAP INITIATION&MGMT: CPT

## 2018-12-29 PROCEDURE — 94760 N-INVAS EAR/PLS OXIMETRY 1: CPT

## 2018-12-29 PROCEDURE — 96365 THER/PROPH/DIAG IV INF INIT: CPT

## 2018-12-29 PROCEDURE — 99285 EMERGENCY DEPT VISIT HI MDM: CPT

## 2018-12-29 PROCEDURE — 94640 AIRWAY INHALATION TREATMENT: CPT

## 2018-12-29 PROCEDURE — 96375 TX/PRO/DX INJ NEW DRUG ADDON: CPT

## 2018-12-29 PROCEDURE — 94644 CONT INHLJ TX 1ST HOUR: CPT

## 2018-12-29 PROCEDURE — 82805 BLOOD GASES W/O2 SATURATION: CPT | Performed by: EMERGENCY MEDICINE

## 2018-12-29 PROCEDURE — 87631 RESP VIRUS 3-5 TARGETS: CPT | Performed by: EMERGENCY MEDICINE

## 2018-12-29 PROCEDURE — 80048 BASIC METABOLIC PNL TOTAL CA: CPT | Performed by: EMERGENCY MEDICINE

## 2018-12-29 PROCEDURE — 99291 CRITICAL CARE FIRST HOUR: CPT | Performed by: INTERNAL MEDICINE

## 2018-12-29 PROCEDURE — 84145 PROCALCITONIN (PCT): CPT | Performed by: NURSE PRACTITIONER

## 2018-12-29 RX ORDER — ASCORBIC ACID 500 MG
500 TABLET ORAL DAILY
Status: DISCONTINUED | OUTPATIENT
Start: 2018-12-30 | End: 2019-01-04 | Stop reason: HOSPADM

## 2018-12-29 RX ORDER — METHYLPREDNISOLONE SODIUM SUCCINATE 125 MG/2ML
125 INJECTION, POWDER, LYOPHILIZED, FOR SOLUTION INTRAMUSCULAR; INTRAVENOUS ONCE
Status: COMPLETED | OUTPATIENT
Start: 2018-12-29 | End: 2018-12-29

## 2018-12-29 RX ORDER — MELATONIN
1000 DAILY
Status: DISCONTINUED | OUTPATIENT
Start: 2018-12-30 | End: 2019-01-04 | Stop reason: HOSPADM

## 2018-12-29 RX ORDER — ATORVASTATIN CALCIUM 20 MG/1
20 TABLET, FILM COATED ORAL
Status: DISCONTINUED | OUTPATIENT
Start: 2018-12-30 | End: 2019-01-04 | Stop reason: HOSPADM

## 2018-12-29 RX ORDER — METHYLPREDNISOLONE SODIUM SUCCINATE 40 MG/ML
40 INJECTION, POWDER, LYOPHILIZED, FOR SOLUTION INTRAMUSCULAR; INTRAVENOUS EVERY 6 HOURS SCHEDULED
Status: DISCONTINUED | OUTPATIENT
Start: 2018-12-29 | End: 2018-12-31

## 2018-12-29 RX ORDER — LORAZEPAM 2 MG/ML
0.5 INJECTION INTRAMUSCULAR ONCE
Status: COMPLETED | OUTPATIENT
Start: 2018-12-29 | End: 2018-12-29

## 2018-12-29 RX ORDER — LEVALBUTEROL 1.25 MG/.5ML
1.25 SOLUTION, CONCENTRATE RESPIRATORY (INHALATION) EVERY 6 HOURS
Status: DISCONTINUED | OUTPATIENT
Start: 2018-12-30 | End: 2019-01-04 | Stop reason: HOSPADM

## 2018-12-29 RX ORDER — ATORVASTATIN CALCIUM 20 MG/1
20 TABLET, FILM COATED ORAL
Status: DISCONTINUED | OUTPATIENT
Start: 2018-12-29 | End: 2018-12-29

## 2018-12-29 RX ORDER — ONDANSETRON 2 MG/ML
4 INJECTION INTRAMUSCULAR; INTRAVENOUS EVERY 4 HOURS PRN
Status: DISCONTINUED | OUTPATIENT
Start: 2018-12-29 | End: 2019-01-04 | Stop reason: HOSPADM

## 2018-12-29 RX ORDER — DOXYCYCLINE HYCLATE 100 MG/1
100 CAPSULE ORAL EVERY 12 HOURS SCHEDULED
Status: DISCONTINUED | OUTPATIENT
Start: 2018-12-29 | End: 2018-12-29

## 2018-12-29 RX ORDER — KETAMINE HCL IN NACL, ISO-OSM 100MG/10ML
0.5 SYRINGE (ML) INJECTION ONCE
Status: COMPLETED | OUTPATIENT
Start: 2018-12-29 | End: 2018-12-29

## 2018-12-29 RX ORDER — LEVALBUTEROL 1.25 MG/.5ML
1.25 SOLUTION, CONCENTRATE RESPIRATORY (INHALATION) EVERY 6 HOURS
Status: DISCONTINUED | OUTPATIENT
Start: 2018-12-29 | End: 2018-12-29

## 2018-12-29 RX ORDER — FUROSEMIDE 10 MG/ML
20 INJECTION INTRAMUSCULAR; INTRAVENOUS ONCE
Status: COMPLETED | OUTPATIENT
Start: 2018-12-29 | End: 2018-12-29

## 2018-12-29 RX ORDER — EPINEPHRINE 1 MG/ML
0.3 INJECTION, SOLUTION, CONCENTRATE INTRAVENOUS ONCE
Status: COMPLETED | OUTPATIENT
Start: 2018-12-29 | End: 2018-12-29

## 2018-12-29 RX ORDER — ASCORBIC ACID 500 MG
500 TABLET ORAL DAILY
Status: DISCONTINUED | OUTPATIENT
Start: 2018-12-29 | End: 2018-12-29

## 2018-12-29 RX ORDER — MELATONIN
1000 DAILY
Status: DISCONTINUED | OUTPATIENT
Start: 2018-12-29 | End: 2018-12-29

## 2018-12-29 RX ORDER — MAGNESIUM SULFATE HEPTAHYDRATE 40 MG/ML
2 INJECTION, SOLUTION INTRAVENOUS ONCE
Status: COMPLETED | OUTPATIENT
Start: 2018-12-29 | End: 2018-12-29

## 2018-12-29 RX ORDER — SODIUM CHLORIDE, SODIUM GLUCONATE, SODIUM ACETATE, POTASSIUM CHLORIDE, MAGNESIUM CHLORIDE, SODIUM PHOSPHATE, DIBASIC, AND POTASSIUM PHOSPHATE .53; .5; .37; .037; .03; .012; .00082 G/100ML; G/100ML; G/100ML; G/100ML; G/100ML; G/100ML; G/100ML
500 INJECTION, SOLUTION INTRAVENOUS ONCE
Status: COMPLETED | OUTPATIENT
Start: 2018-12-29 | End: 2018-12-29

## 2018-12-29 RX ORDER — METHYLPREDNISOLONE SODIUM SUCCINATE 40 MG/ML
40 INJECTION, POWDER, LYOPHILIZED, FOR SOLUTION INTRAMUSCULAR; INTRAVENOUS EVERY 8 HOURS SCHEDULED
Status: DISCONTINUED | OUTPATIENT
Start: 2018-12-29 | End: 2018-12-29

## 2018-12-29 RX ORDER — TOPIRAMATE 25 MG/1
25 TABLET ORAL 2 TIMES DAILY
Status: DISCONTINUED | OUTPATIENT
Start: 2018-12-29 | End: 2019-01-04 | Stop reason: HOSPADM

## 2018-12-29 RX ORDER — AMOXICILLIN 250 MG
1 CAPSULE ORAL 2 TIMES DAILY
Status: DISCONTINUED | OUTPATIENT
Start: 2018-12-30 | End: 2019-01-04 | Stop reason: HOSPADM

## 2018-12-29 RX ORDER — SODIUM CHLORIDE, SODIUM GLUCONATE, SODIUM ACETATE, POTASSIUM CHLORIDE, MAGNESIUM CHLORIDE, SODIUM PHOSPHATE, DIBASIC, AND POTASSIUM PHOSPHATE .53; .5; .37; .037; .03; .012; .00082 G/100ML; G/100ML; G/100ML; G/100ML; G/100ML; G/100ML; G/100ML
100 INJECTION, SOLUTION INTRAVENOUS CONTINUOUS
Status: DISCONTINUED | OUTPATIENT
Start: 2018-12-29 | End: 2018-12-30

## 2018-12-29 RX ORDER — SODIUM CHLORIDE FOR INHALATION 0.9 %
3 VIAL, NEBULIZER (ML) INHALATION ONCE
Status: COMPLETED | OUTPATIENT
Start: 2018-12-29 | End: 2018-12-29

## 2018-12-29 RX ORDER — DULOXETIN HYDROCHLORIDE 60 MG/1
60 CAPSULE, DELAYED RELEASE ORAL DAILY
Status: DISCONTINUED | OUTPATIENT
Start: 2018-12-30 | End: 2019-01-04 | Stop reason: HOSPADM

## 2018-12-29 RX ORDER — ACETAMINOPHEN 325 MG/1
650 TABLET ORAL EVERY 6 HOURS PRN
Status: DISCONTINUED | OUTPATIENT
Start: 2018-12-29 | End: 2019-01-04 | Stop reason: HOSPADM

## 2018-12-29 RX ADMIN — MAGNESIUM SULFATE IN WATER 2 G: 40 INJECTION, SOLUTION INTRAVENOUS at 15:47

## 2018-12-29 RX ADMIN — Medication 48 MG: at 15:47

## 2018-12-29 RX ADMIN — SODIUM CHLORIDE 0.5 MG/KG/HR: 0.9 INJECTION, SOLUTION INTRAVENOUS at 15:47

## 2018-12-29 RX ADMIN — MAGNESIUM SULFATE IN WATER 2 G: 40 INJECTION, SOLUTION INTRAVENOUS at 12:13

## 2018-12-29 RX ADMIN — ALBUTEROL SULFATE 10 MG: 2.5 SOLUTION RESPIRATORY (INHALATION) at 12:08

## 2018-12-29 RX ADMIN — LORAZEPAM 0.5 MG: 2 INJECTION, SOLUTION INTRAMUSCULAR; INTRAVENOUS at 13:20

## 2018-12-29 RX ADMIN — IPRATROPIUM BROMIDE 1 MG: 0.5 SOLUTION RESPIRATORY (INHALATION) at 12:09

## 2018-12-29 RX ADMIN — SODIUM CHLORIDE, SODIUM GLUCONATE, SODIUM ACETATE, POTASSIUM CHLORIDE, MAGNESIUM CHLORIDE, SODIUM PHOSPHATE, DIBASIC, AND POTASSIUM PHOSPHATE 100 ML/HR: .53; .5; .37; .037; .03; .012; .00082 INJECTION, SOLUTION INTRAVENOUS at 20:53

## 2018-12-29 RX ADMIN — SODIUM CHLORIDE, SODIUM GLUCONATE, SODIUM ACETATE, POTASSIUM CHLORIDE, MAGNESIUM CHLORIDE, SODIUM PHOSPHATE, DIBASIC, AND POTASSIUM PHOSPHATE 100 ML/HR: .53; .5; .37; .037; .03; .012; .00082 INJECTION, SOLUTION INTRAVENOUS at 15:47

## 2018-12-29 RX ADMIN — INSULIN LISPRO 1 UNITS: 100 INJECTION, SOLUTION INTRAVENOUS; SUBCUTANEOUS at 17:43

## 2018-12-29 RX ADMIN — ISODIUM CHLORIDE 3 ML: 0.03 SOLUTION RESPIRATORY (INHALATION) at 12:09

## 2018-12-29 RX ADMIN — FUROSEMIDE 20 MG: 10 INJECTION, SOLUTION INTRAMUSCULAR; INTRAVENOUS at 23:48

## 2018-12-29 RX ADMIN — ALBUTEROL SULFATE 10 MG: 2.5 SOLUTION RESPIRATORY (INHALATION) at 15:16

## 2018-12-29 RX ADMIN — METHYLPREDNISOLONE SODIUM SUCCINATE 40 MG: 40 INJECTION, POWDER, FOR SOLUTION INTRAMUSCULAR; INTRAVENOUS at 17:43

## 2018-12-29 RX ADMIN — SODIUM CHLORIDE 0.5 MG/KG/HR: 0.9 INJECTION, SOLUTION INTRAVENOUS at 20:43

## 2018-12-29 RX ADMIN — IPRATROPIUM BROMIDE 0.5 MG: 0.5 SOLUTION RESPIRATORY (INHALATION) at 20:22

## 2018-12-29 RX ADMIN — LORAZEPAM 0.5 MG: 2 INJECTION, SOLUTION INTRAMUSCULAR; INTRAVENOUS at 16:39

## 2018-12-29 RX ADMIN — NICOTINE 7 MG: 7 PATCH TRANSDERMAL at 15:46

## 2018-12-29 RX ADMIN — METHYLPREDNISOLONE SODIUM SUCCINATE 125 MG: 125 INJECTION, POWDER, FOR SOLUTION INTRAMUSCULAR; INTRAVENOUS at 12:13

## 2018-12-29 RX ADMIN — ENOXAPARIN SODIUM 40 MG: 40 INJECTION SUBCUTANEOUS at 15:46

## 2018-12-29 RX ADMIN — LEVALBUTEROL 1.25 MG: 1.25 SOLUTION, CONCENTRATE RESPIRATORY (INHALATION) at 20:22

## 2018-12-29 RX ADMIN — EPINEPHRINE 0.3 MG: 1 INJECTION, SOLUTION, CONCENTRATE INTRAVENOUS at 13:04

## 2018-12-29 RX ADMIN — SODIUM CHLORIDE, SODIUM GLUCONATE, SODIUM ACETATE, POTASSIUM CHLORIDE, MAGNESIUM CHLORIDE, SODIUM PHOSPHATE, DIBASIC, AND POTASSIUM PHOSPHATE 500 ML: .53; .5; .37; .037; .03; .012; .00082 INJECTION, SOLUTION INTRAVENOUS at 15:17

## 2018-12-29 NOTE — ED ATTENDING ATTESTATION
Bo Hayward MD, saw and evaluated the patient  I have discussed the patient with the resident/non-physician practitioner and agree with the resident's/non-physician practitioner's findings, Plan of Care, and MDM as documented in the resident's/non-physician practitioner's note, except where noted  All available labs and Radiology studies were reviewed  At this point I agree with the current assessment done in the Emergency Department  I have conducted an independent evaluation of this patient a history and physical is as follows: This is a 27-year-old woman who presents to the emergency department with shortness of breath  Patient has a history of asthma, but has never been intubated  Has had URI symptoms which are her typical trigger  Has had progressive worsening dyspnea unresponsive to her inhaled therapies  Patient was last admitted to the hospital for her asthma 1/2 years ago  She is an ongoing smoker  The patient has not had fevers  She does not have chest pain  She has not had lateralizing limb edema  Her review of systems is somewhat limited by dyspnea, but 12 systems were reviewed and negative  On exam the patient is ill-appearing  She is tachycardic with a heart rate of 115  She is tachypneic with a respiratory rate of 25  She has an oxygen saturation of 90% on room air  The patient has retractions and 2 word dyspnea  The patient's pupils are round reactive to light  Her conjunctivae are pink  Oropharynx is clear with moist mucous membranes  Her neck is supple and nontender with no JVD  The patient's heart is tachycardic without murmurs, rubs, or gallops  Her lungs are decreased with poor air movement throughout prolonged exhalation all phase  Abdomen is soft and nontender with no masses, rebound, or guarding  Her extremities are warm and well perfused with good pulses and no lateralizing edema  She has no CVA tenderness    She is neurologically intact with normal back in skin exams  Impression:  Hypoxic respiratory failure, likely asthma exacerbation  IV access established, patient placed on heart neb, magnesium given, steroids, patient feeling slightly better, still with increased work of breathing    Will plan to admit to the hospital     [address] [place] [address] Time    Procedures

## 2018-12-29 NOTE — RESPIRATORY THERAPY NOTE
RT Protocol Note  Cathleen Gonzalez 41 y o  female MRN: [id number]  Unit/Bed#: MICU 02 Encounter: [id number]    Assessment    Principal Problem:    Acute respiratory failure with hypoxia and hypercapnia (HCC)  Active Problems:    Hypercholesterolemia    Mild intermittent asthma with status asthmaticus    Obesity    Depression with anxiety    Tobacco use    Leukocytosis    History of Clostridium difficile colitis      Home Pulmonary Medications:  none       Past Medical History:   Diagnosis Date    Anxiety     Asthma     History of Clostridium difficile colitis 2018    x 2 episodes, after antibiotics    Hypercholesterolemia     Sleep apnea      Social History     Social History    Marital status: Single     Spouse name: N/A    Number of children: N/A    Years of education: N/A     Occupational History    radiology, [employer]      Social History Main Topics    Smoking status: Current Every Day Smoker     Packs/day: 0 50    Smokeless tobacco: Never Used    Alcohol use Yes      Comment: Rarely    Drug use: No    Sexual activity: Not Asked     Other Topics Concern    None     Social History Narrative    None       Subjective         Objective    Physical Exam:   Assessment Type: Assess only  General Appearance: Awake  Respiratory Pattern: Dyspnea at rest, Accessory muscle use, Symmetrical, Tachypneic, Shallow  Chest Assessment: Chest expansion symmetrical  Bilateral Breath Sounds: Expiratory wheezes, Crackles, Coarse    Vitals:  Blood pressure 141/75, pulse (!) 120, temperature 100 2 °F (37 9 °C), temperature source Axillary, resp  rate (!) 39, height 5' 3" (1 6 m), weight 96 kg (211 lb 10 3 oz), SpO2 95 %, not currently breastfeeding  Imaging and other studies: I have personally reviewed pertinent reports  Plan    Respiratory Plan: Vent/NIV/HFNC        Resp Comments: (P) 41 y o  hx of asthma presents to the ED for SOB and cough since yesterday   Pt states she has hx of asthma but does not currently take any medications for it  Pt was placed on bipap and started on heliox in ED  Pt than transfered to MICU where she remains on BIPAP and heliox at 6lpm through the bipap machine  Pt currently sating 94%  Will continue to monitor  Pt also ordered on Q6 breathing txs of atrovent and xopenex

## 2018-12-29 NOTE — LETTER
[address] [facility] [address]  [address] [facility] [address]  Dept: [phone]    January 4, 2019     Patient: Salma Edwards   YOB: 1980   Date of Visit: 12/29/2018       To Whom it May Concern:    Salma Edwards is under my professional care  She was seen in the hospital from 12/29/2018   to 01/04/19  She may return to work on 1/10/2019  If you have any questions or concerns, please don't hesitate to call    [phone]         Thank you      Tiny Redo, DO

## 2018-12-29 NOTE — H&P
H&P Exam - Critical Care   Sofía Arrieta 41 y o  female MRN: [id number]  Unit/Bed#: PABLO Encounter: [id number]      Chief Complaint: "All the sudden, I couldn't breathe"    History of Present Illness   HX and PE limited by: Patient short of breath      Sofía Arrieta is a 41 y o  female with past medical history significant for mild intermittent asthma, tobacco abuse (0 5 -1 pack per day), obesity, and depression with anxiety  She presents today to the emergency department for acute shortness of breath  Patient states that she was otherwise in her normal state of health yesterday  She works at [employer], and a co-worker who is by her side confirms that yesterday she was active, well, and without any complaints  She states that she began feeling somewhat unwell yesterday evening with a cough, which progressed to significant shortness of breath overnight  She states that she has a cough with sputum production, however she does not know which her sputum looks like  She believes she had a fever at home, however she did not take her temperature  She denies sick contacts, illicit drug use, inhalation of any strong chemicals or dust, or allergies  She denies receiving the flu shot this year  She states that she does not have any problems with her asthma on a daily basis  She does not use any daily medications and does not even own a PRN rescue inhaler  She states that she has only been hospitalized once for her asthma  At that time, she said that she had a URI which, "set her off " Given her ongoing BiPAP requirements, she will be admitted to the MICU       History obtained from chart review, the patient and friend   -------------------------------------------------------------------------------------------------------------  Assessment and Plan  Principal Problem:    Acute respiratory failure with hypoxia and hypercapnia (HCC)  Active Problems:    Mild intermittent asthma with status asthmaticus    Tobacco use    Leukocytosis    Hypercholesterolemia    Obesity    Depression with anxiety    History of Clostridium difficile colitis  Resolved Problems:    * No resolved hospital problems  *      Neuro:   · History anxiety and depression - will resume patient's home Cymbalta 60 mg daily, Topamax 25 mg BID as able baseline respiratory status to take PO  Routine neuro checks  Sleep/wake cycle regulation  CAM-ICU daily  This time, will give patient 0 5 mg/kg IV ketamine followed by a ketamine drip at 0 5 mg/kg hour to help with anxiety as well as bronchodilation  CV:   · Sinus tachycardia - multifactorial secondary to anxiety, hypoxia, beta adrenergic agents  Check baseline EKG, continue close telemetry monitoring  Maintain goal MAP > 65  · Hyperlipidemia - atorvastatin 20mg daily        Pulm:  · Acute hypoxic hypercapnic respiratory failure secondary to asthma with acute exacerbation - continue BiPAP at this time  Decrease EPAP an inspiratory time  Will initiate Heliox 70/30  Patient received albuterol 10 mg, will repeat 10 mg now, then continue Xopenex/Atrovent q6hr  Re-dose magnesium 2 mg now  Patient received methylprednisolone 125 mg x1, she will follow with 40 mg q6hr  Maintain SpO2 > 88%  · Tobacco abuse - initiate nicotine replacement patch  Provide cessation education      GI:   · No acute issues  Will start gentle bowel regimen  · Stress ulcer prophylaxis - no indication for GI prophylaxis at this time       :   · No acute issues  No villar  Strict q4h I/O monitoring  Continue to follow renal function tests  · Continue patient's home oral contraceptive daily      F/E/N:   · Given the patient is NPO we will place patient on isolate at 100 mL/hr  Bolus patient with 500 mL at this time  · Replete electrolytes with as needed to maintain K >4 0, Mag >2 0, Phos >3 0  · Diet:  NPO secondary to respiratory distress at this time    Heme/Onc:   · No acute issues    Patient's baseline hemoglobin is 12 9-14 4  Current hemoglobin 14 0  Continue to trend hemoglobin and platelets  · VTE prophylaxis -  Lovenox 40 mg Q 24 hours, SCDs to bilateral lower extremities    Endo:   · No history of diabetes  Last hemoglobin A1c 4 8% on 7/18/18  Given the patient will be on steroids will start low-dose sliding scale insulin to be adjusted as needed to maintain goal -180  ID:   · Leukocytosis - the commode reaction vs infection  Patient states that she did not have her flu shot this year, will check flu PCR and positive initiate Tamiflu  Given sputum production, will start patient on doxycycline as this is lower risk of causing C diff which she has had twice in the past  Continue to monitor fever and WBC curve       MSK/Skin:   · Reposition q2h, eliminate pressure points while in bed  · Close skin surveillance       Dispo: Critical Care      Code Status: Level 1 - Full Code  --------------------------------------------------------------------------------------------------------------    Historical Information   Past Medical History:   Diagnosis Date    Anxiety     Asthma     History of Clostridium difficile colitis 2018    x 2 episodes, after antibiotics    Hypercholesterolemia     Sleep apnea      Past Surgical History:   Procedure Laterality Date    VT CONIZATION CERVIX,LOOP ELECTRD N/A 9/27/2018    Procedure: BIOPSY LEEP CERVIX;  Surgeon: Laron Cihsholm DO;  Location: [facility];  Service: Gynecology    REDUCTION MAMMAPLASTY  1999    TONSILLECTOMY       Social History   History   Alcohol Use    Yes     Comment: Rarely     History   Drug Use No     History   Smoking Status    Current Every Day Smoker    Packs/day: 0 50   Smokeless Tobacco    Never Used     Exercise History: Independent ADL's    Family History:   Family History   Problem Relation Age of Onset    Lung cancer Father     Liver cancer Paternal Grandfather        Vitals:   Vitals:    12/29/18 1259 12/29/18 1300 12/29/18 1330 12/29/18 1400   BP:  140/74 133/60 137/64   BP Location:       Pulse:  (!) 128 (!) 118 (!) 118   Resp:  (!) 58 (!) 46 (!) 43   Temp:       TempSrc:       SpO2: 94% 93% 100% 100%   Weight:       Height:         Temp  Min: 98 1 °F (36 7 °C)  Max: 98 1 °F (36 7 °C)  IBW: 52 4 kg  Height: 5' 3" (160 cm)  Body mass index is 37 2 kg/m²  Physical Exam   Constitutional: She is oriented to person, place, and time  She appears well-developed and well-nourished  She is cooperative  She appears ill  She appears distressed  Face mask in place  HENT:   Head: Normocephalic and atraumatic  Mouth/Throat: Mucous membranes are dry  Eyes: Pupils are equal, round, and reactive to light  No scleral icterus  Neck: Neck supple  No JVD present  Cardiovascular: Regular rhythm and normal heart sounds  No extrasystoles are present  Tachycardia present  Pulses:       Radial pulses are 2+ on the right side, and 2+ on the left side  Dorsalis pedis pulses are 2+ on the right side, and 2+ on the left side  No LE edema    Pulmonary/Chest: Accessory muscle usage present  Tachypnea noted  She is in respiratory distress  She has decreased breath sounds in the right lower field and the left lower field  She has wheezes  She has rhonchi    + cough   Abdominal: Soft  She exhibits no distension  Bowel sounds are decreased  There is no tenderness  Neurological: She is alert and oriented to person, place, and time  GCS eye subscore is 4  GCS verbal subscore is 5  GCS motor subscore is 6  Skin: Skin is intact  Capillary refill takes less than 2 seconds  She is diaphoretic  Medications:  No current facility-administered medications for this encounter  Home medications:  Prior to Admission Medications   Prescriptions Last Dose Informant Patient Reported? Taking?    ALPRAZolam (XANAX) 0 25 mg tablet  Self Yes No   Sig: as needed   Ascorbic Acid (VITAMIN C) 500 MG CAPS   Yes No   Sig: Take by mouth daily     Coconut Oil 1000 MG CAPS   Yes No   Sig: Take by mouth daily     DULoxetine (CYMBALTA) 60 mg delayed release capsule   Yes No   Sig: Take 60 mg by mouth daily     Echinacea 125 MG CAPS   Yes No   Sig: Take by mouth daily     Iron Combinations (IRON COMPLEX PO)   Yes No   Sig: iron,carbonyl 65 mg-vitamin C 125 mg tablet,delayed release   Multiple Vitamin tablet   Yes No   Sig: Take by mouth daily     Vitamin E 400 units TABS   Yes No   Sig: Take by mouth daily     atorvastatin (LIPITOR) 20 mg tablet   Yes No   Sig: Take by mouth daily     cholecalciferol (VITAMIN D3) 1,000 units tablet   Yes No   Sig: Take by mouth daily     clobetasol (TEMOVATE) 0 05 % ointment   Yes No   ergocalciferol (VITAMIN D2) 50,000 units   Yes No   Sig: once a week     mupirocin (BACTROBAN) 2 % ointment   Yes No   norgestimate-ethinyl estradiol (ORTHO TRI-CYCLEN,TRINESSA) 0 18/0 215/0 25 MG-35 MCG per tablet   Yes No   Sig: Take 1 tablet by mouth daily   phentermine (ADIPEX-P) 37 5 MG tablet   Yes No   Sig: Take by mouth daily     topiramate (TOPAMAX) 25 mg tablet   Yes No   Sig: Take by mouth 2 (two) times a day        Facility-Administered Medications: None     Allergies:  No Known Allergies    Review of Systems   Constitutional: Positive for fatigue and fever  Feels febrile  Did not check temperature at home   HENT: Positive for sore throat  Negative for postnasal drip, rhinorrhea, sinus pain and voice change  Eyes: Negative  Respiratory: Positive for cough, chest tightness, shortness of breath and wheezing  Cardiovascular: Negative  Negative for chest pain, palpitations and leg swelling  Gastrointestinal: Negative  Negative for diarrhea, nausea and vomiting  Endocrine: Negative  Genitourinary: Negative  Musculoskeletal: Negative  Skin: Negative  Neurological: Positive for headaches  Negative for light-headedness  Hematological: Negative  Psychiatric/Behavioral: The patient is nervous/anxious          Laboratory and Diagnostics:    Results from last 7 days  Lab Units 18  1459 18  1356 18  1219   WBC Thousand/uL  --   --  15 57*   HEMOGLOBIN g/dL  --   --  14 0   I STAT HEMOGLOBIN g/dl 13 6 14 6  --    HEMATOCRIT %  --   --  41 8   HEMATOCRIT, ISTAT % 40 43  --    PLATELETS Thousands/uL  --   --  221   NEUTROS PCT %  --   --  89*   MONOS PCT %  --   --  4       Results from last 7 days  Lab Units 18  1459 18  1356 18  1219   SODIUM mmol/L  --   --  139   POTASSIUM mmol/L  --   --  3 8   CHLORIDE mmol/L  --   --  107   CO2 mmol/L  --   --  24   CO2, I-STAT mmol/L 21 23  --    AGAP mmol/L  --  15*  --    ANION GAP mmol/L  --   --  8   BUN mg/dL  --   --  10   CREATININE mg/dL  --   --  0 62   CALCIUM mg/dL  --   --  8 1*         /33 3/70 0/20 3/-4 0    Micro:   Flu PCR: PENDING   BCXx2: PENDING      EKG: Sinus tachycardia   Imaging: I have personally reviewed pertinent reports  and I have personally reviewed pertinent films in PACS   18 CXR: No acute cardiopulmonary disease     ------------------------------------------------------------------------------------------------------------    Anticipated Length of Stay is > 2 midnights    Counseling / Coordination of Care  Total time spent today 50 minutes  Greater than 50% of total time was spent with the patient and / or family counseling and / or coordination of care  A description of the counseling / coordination of care: Raul Gresham

## 2018-12-29 NOTE — ED PROVIDER NOTES
History  Chief Complaint   Patient presents with    Shortness of Breath     SOB for about 12 hrs, "being treated for a staph infection currently"  associated URI sx's     40 y/o female, hx of asthma, presents to the ED for shortness of breath and cough since yesterday  Patient states she developed a productive cough yesterday and progressively worsening shortness of breath  She does states that she has a history of asthma but is not currently on any medications for it  She states that she has been hospitalized multiple times in the past for it and that the most recent hospitalization was about a year and half ago  She states that she has needed epinephrine and BiPAP in the past but has not needed any intubations  She denies any fevers / chills  Does states that she has had congestion over the last few days  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  She states that she has not had her flu shot  She denies any chest pain, abdominal pain, leg swelling, or urinary symptoms  She states that she has never had a history of PE/DVT, no recent prolonged immobilization, no recent surgeries, no family history of blood clotting disorders  However, patient is on birth control pills  She also reports that she has an ongoing smoker  History provided by:  Patient  Shortness of Breath   Severity:  Severe  Onset quality:  Sudden  Duration:  1 day  Timing:  Constant  Progression:  Worsening  Chronicity:  New  Context: smoke exposure and URI    Relieved by:  None tried  Worsened by:  Nothing  Ineffective treatments:  None tried  Associated symptoms: cough, sputum production and wheezing    Associated symptoms: no abdominal pain, no chest pain, no ear pain, no fever, no headaches, no neck pain, no rash, no sore throat and no vomiting    Risk factors: tobacco use            Prior to Admission Medications   Prescriptions Last Dose Informant Patient Reported? Taking?    ALPRAZolam Astrid Shields) 0 25 mg tablet  Self Yes No   Sig: as needed   Ascorbic Acid (VITAMIN C) 500 MG CAPS   Yes No   Sig: Take by mouth daily     Coconut Oil 1000 MG CAPS   Yes No   Sig: Take by mouth daily     DULoxetine (CYMBALTA) 60 mg delayed release capsule   Yes No   Sig: Take 60 mg by mouth daily     Echinacea 125 MG CAPS   Yes No   Sig: Take by mouth daily     Iron Combinations (IRON COMPLEX PO)   Yes No   Sig: iron,carbonyl 65 mg-vitamin C 125 mg tablet,delayed release   Multiple Vitamin tablet   Yes No   Sig: Take by mouth daily     Vitamin E 400 units TABS   Yes No   Sig: Take by mouth daily     atorvastatin (LIPITOR) 20 mg tablet   Yes No   Sig: Take by mouth daily     cholecalciferol (VITAMIN D3) 1,000 units tablet   Yes No   Sig: Take by mouth daily     clobetasol (TEMOVATE) 0 05 % ointment   Yes No   ergocalciferol (VITAMIN D2) 50,000 units   Yes No   Sig: once a week     mupirocin (BACTROBAN) 2 % ointment   Yes No   norgestimate-ethinyl estradiol (ORTHO TRI-CYCLEN,TRINESSA) 0 18/0 215/0 25 MG-35 MCG per tablet   Yes No   Sig: Take 1 tablet by mouth daily   phentermine (ADIPEX-P) 37 5 MG tablet   Yes No   Sig: Take by mouth daily     topiramate (TOPAMAX) 25 mg tablet   Yes No   Sig: Take by mouth 2 (two) times a day        Facility-Administered Medications: None       Past Medical History:   Diagnosis Date    Anxiety     Asthma     History of Clostridium difficile colitis 2018    x 2 episodes, after antibiotics    Hypercholesterolemia     Sleep apnea        Past Surgical History:   Procedure Laterality Date    [place] CONIZATION CERVIX,LOOP ELECTRD N/A 9/27/2018    Procedure: BIOPSY LEEP CERVIX;  Surgeon: Natalya Thomason DO;  Location: [facility];  Service: Gynecology    REDUCTION MAMMAPLASTY  1999    TONSILLECTOMY         Family History   Problem Relation Age of Onset    Lung cancer Father     Liver cancer Paternal Grandfather      I have reviewed and agree with the history as documented      Social History Substance Use Topics    Smoking status: Current Every Day Smoker     Packs/day: 0 50    Smokeless tobacco: Never Used    Alcohol use Yes      Comment: Rarely        Review of Systems   Constitutional: Negative for chills and fever  HENT: Negative for congestion, ear pain and sore throat  Eyes: Negative for pain and visual disturbance  Respiratory: Positive for cough, sputum production, shortness of breath and wheezing  Cardiovascular: Negative for chest pain and leg swelling  Gastrointestinal: Negative for abdominal pain, diarrhea, nausea and vomiting  Genitourinary: Negative for dysuria, frequency, hematuria and urgency  Musculoskeletal: Negative for neck pain and neck stiffness  Skin: Negative for rash and wound  Neurological: Negative for weakness, numbness and headaches  Psychiatric/Behavioral: Negative for agitation and confusion  All other systems reviewed and are negative  Physical Exam  ED Triage Vitals   Temperature Pulse Respirations Blood Pressure SpO2   12/29/18 1147 12/29/18 1147 12/29/18 1147 12/29/18 1147 12/29/18 1147   98 1 °F (36 7 °C) (!) 114 22 138/75 91 %      Temp Source Heart Rate Source Patient Position - Orthostatic VS BP Location FiO2 (%)   12/29/18 1147 12/29/18 1147 12/29/18 1147 12/29/18 1147 --   Oral Monitor Sitting Left arm       Pain Score       12/29/18 1145       8           Orthostatic Vital Signs  Vitals:    12/29/18 1245 12/29/18 1300 12/29/18 1330 12/29/18 1400   BP:  140/74 133/60 137/64   Pulse: (!) 120 (!) 128 (!) 118 (!) 118   Patient Position - Orthostatic VS:           Physical Exam   Constitutional: She is oriented to person, place, and time  She appears well-developed and well-nourished  HENT:   Head: Normocephalic and atraumatic  Mouth/Throat: Oropharynx is clear and moist    Eyes: Pupils are equal, round, and reactive to light  EOM are normal    Neck: Normal range of motion  Neck supple  Cardiovascular: Regular rhythm  Tachycardia present  Pulmonary/Chest: Accessory muscle usage present  Tachypnea noted  She is in respiratory distress  She has decreased breath sounds in the right upper field, the right lower field, the left upper field and the left lower field  She has wheezes in the right upper field, the right lower field, the left upper field and the left lower field  Abdominal: Soft  Bowel sounds are normal  She exhibits no distension  There is no tenderness  Musculoskeletal: Normal range of motion  She exhibits no edema  Neurological: She is alert and oriented to person, place, and time  No focal deficits   Skin: Skin is warm and dry  Nursing note and vitals reviewed        ED Medications  Medications   DULoxetine (CYMBALTA) delayed release capsule 60 mg (not administered)   topiramate (TOPAMAX) tablet 25 mg (not administered)   multi-electrolyte (ISOLYTE-S PH 7 4 equivalent) IV solution (100 mL/hr Intravenous New Bag 12/29/18 1547)   enoxaparin (LOVENOX) subcutaneous injection 40 mg (40 mg Subcutaneous Given 12/29/18 1546)   ketamine 250 mg (STANDARD CONCENTRATION) IV in sodium chloride 0 9% 250 mL (0 5 mg/kg/hr × 95 3 kg Intravenous New Bag 12/29/18 1547)   levalbuterol (XOPENEX) inhalation solution 1 25 mg ( Nebulization Canceled Entry 12/29/18 1605)   acetaminophen (TYLENOL) tablet 650 mg (not administered)   ipratropium (ATROVENT) 0 02 % inhalation solution 0 5 mg ( Nebulization Canceled Entry 12/29/18 1600)   ondansetron (ZOFRAN) injection 4 mg (not administered)   magnesium sulfate 2 g/50 mL IVPB (premix) 2 g (2 g Intravenous New Bag 12/29/18 1547)   nicotine (NICODERM CQ) 7 mg/24hr TD 24 hr patch 7 mg (7 mg Transdermal Medication Applied 12/29/18 1546)   methylPREDNISolone sodium succinate (Solu-MEDROL) injection 40 mg (not administered)   doxycycline hyclate (VIBRAMYCIN) capsule 100 mg (not administered)   multi-electrolyte (ISOLYTE-S PH 7 4) bolus 500 mL (500 mL Intravenous New Bag 12/29/18 4467) ascorbic acid (VITAMIN C) tablet 500 mg (not administered)   atorvastatin (LIPITOR) tablet 20 mg (not administered)   cholecalciferol (VITAMIN D3) tablet 1,000 Units (not administered)   senna-docusate sodium (SENOKOT S) 8 6-50 mg per tablet 1 tablet (not administered)   norgestrel-ethinyl estradiol (LO/OVRAL) 0 3 mg-30 mcg per tablet 1 tablet (not administered)   insulin lispro (HumaLOG) 100 units/mL subcutaneous injection 1-5 Units (not administered)   albuterol inhalation solution 10 mg (10 mg Nebulization Given 12/29/18 1208)     And   ipratropium (ATROVENT) 0 02 % inhalation solution 1 mg (1 mg Nebulization Given 12/29/18 1209)     And   sodium chloride 0 9 % inhalation solution 3 mL (3 mL Nebulization Given 12/29/18 1209)   methylPREDNISolone sodium succinate (Solu-MEDROL) injection 125 mg (125 mg Intravenous Given 12/29/18 1213)   magnesium sulfate 2 g/50 mL IVPB (premix) 2 g (0 g Intravenous Stopped 12/29/18 1403)   EPINEPHrine PF (ADRENALIN) 1 mg/mL injection 0 3 mg (0 3 mg Subcutaneous Given 12/29/18 1304)   LORazepam (ATIVAN) 2 mg/mL injection 0 5 mg (0 5 mg Intravenous Given 12/29/18 1320)   Ketamine HCl 48 mg (48 mg Intravenous Given 12/29/18 1547)   albuterol inhalation solution 10 mg (10 mg Nebulization Given 12/29/18 1516)       Diagnostic Studies  Results Reviewed     Procedure Component Value Units Date/Time    Influenza A/B and RSV by PCR [[id number]] Collected:  12/29/18 1403    Lab Status:   In process Specimen:  Nasopharyngeal from Nasopharyngeal Swab Updated:  12/29/18 1406    POCT Chem 8+ [[id number]]  (Abnormal) Collected:  12/29/18 1356    Lab Status:  Final result Updated:  12/29/18 1400     SODIUM, I-STAT 138 mmol/l      Potassium, i-STAT 3 6 mmol/L      Chloride, istat 104 mmol/L      CO2, i-STAT 23 mmol/L      Anion Gap, i-STAT 15 (H) mmol/L      Calcium, Ionized i-STAT 1 10 (L) mmol/L      BUN, I-STAT 8 mg/dl      Creatinine, i-STAT 0 5 (L) mg/dl      eGFR 124 ml/min/1 73sq m Glucose, i-STAT 148 (H) mg/dl      Hct, i-STAT 43 %      Hgb, i-STAT 14 6 g/dl      Specimen Type VENOUS    Blood gas, arterial [[id number]]     Lab Status:  No result Specimen:  Blood, Arterial     D-dimer, quantitative [[id number]]  (Normal) Collected:  12/29/18 1219    Lab Status:  Final result Specimen:  Blood from Arm, Left Updated:  12/29/18 1244     D-Dimer, Quant 281 ng/ml (FEU)     Basic metabolic panel [[id number]]  (Abnormal) Collected:  12/29/18 1219    Lab Status:  Final result Specimen:  Blood from Arm, Left Updated:  12/29/18 1237     Sodium 139 mmol/L      Potassium 3 8 mmol/L      Chloride 107 mmol/L      CO2 24 mmol/L      ANION GAP 8 mmol/L      BUN 10 mg/dL      Creatinine 0 62 mg/dL      Glucose 88 mg/dL      Calcium 8 1 (L) mg/dL      eGFR 116 ml/min/1 73sq m     Narrative:         National Kidney Disease Education Program recommendations are as follows:  GFR calculation is accurate only with a steady state creatinine  Chronic Kidney disease less than 60 ml/min/1 73 sq  meters  Kidney failure less than 15 ml/min/1 73 sq  meters      CBC and differential [[id number]]  (Abnormal) Collected:  12/29/18 1219    Lab Status:  Final result Specimen:  Blood from Arm, Left Updated:  12/29/18 1228     WBC 15 57 (H) Thousand/uL      RBC 4 77 Million/uL      Hemoglobin 14 0 g/dL      Hematocrit 41 8 %      MCV 88 fL      MCH 29 4 pg      MCHC 33 5 g/dL      RDW 12 3 %      MPV 9 2 fL      Platelets 785 Thousands/uL      nRBC 0 /100 WBCs      Neutrophils Relative 89 (H) %      Immat GRANS % 0 %      Lymphocytes Relative 7 (L) %      Monocytes Relative 4 %      Eosinophils Relative 0 %      Basophils Relative 0 %      Neutrophils Absolute 13 78 (H) Thousands/µL      Immature Grans Absolute 0 05 Thousand/uL      Lymphocytes Absolute 1 02 Thousands/µL      Monocytes Absolute 0 62 Thousand/µL      Eosinophils Absolute 0 06 Thousand/µL      Basophils Absolute 0 04 Thousands/µL                  XR chest portable   Final Result by Ayan Rg MD (12/29 1223)      No acute cardiopulmonary disease  Workstation performed: [id number]               Procedures  Procedures      Phone Consults  ED Phone Contact    ED Course  ED Course as of Dec 29 1615   Sat Dec 29, 2018   1232 WBC: (!) 15 57   1310 D-DIMER QUANTITATIVE: 281   1311 Patient now with worsening respiratory status- will place on BiPAP and give epi- will reassess                                MDM  Number of Diagnoses or Management Options  Status asthmaticus: new and requires workup  Diagnosis management comments: Patient with sob, cough, tachypnea- likely 2/2 asthma exacerbation- will get cxr to r/o pneumonia  Will also get ddimer to r/o PE  Will give mag, garcia neb, steroids, and reassess  Patient reevaluated and continues to be tachypnic- placed on bipap and given epi- will continue to assess  Will also admit to the ICU  Patient updated on results of tests and plan of care including admission to hospital for further evaluation of presenting complaint  Patient demonstrates verbal understanding and agrees with plan  Report to Dr Lalo Rubi with ICU for continuation of patient care          Amount and/or Complexity of Data Reviewed  Clinical lab tests: ordered and reviewed  Tests in the radiology section of CPT®: ordered and reviewed  Tests in the medicine section of CPT®: ordered and reviewed  Discussion of test results with the performing providers: yes  Decide to obtain previous medical records or to obtain history from someone other than the patient: yes  Obtain history from someone other than the patient: yes  Review and summarize past medical records: yes  Discuss the patient with other providers: yes  Independent visualization of images, tracings, or specimens: yes    Patient Progress  Patient progress: stable    The patient presented with a condition in which there was a high probability of imminent or life-threatening deterioration, and critical care services (excluding separately billable procedures) totalled 30-74 minutes  Disposition  Final diagnoses:   Status asthmaticus     Time reflects when diagnosis was documented in both MDM as applicable and the Disposition within this note     Time User Action Codes Description Comment    12/29/2018  1:37 PM Neva SANDOVAL Add [J45 901] Asthma exacerbation     12/29/2018  2:03 PM Mina Karimi Remove [J45 [id number]] Asthma exacerbation     12/29/2018  2:03 PM Mina Presley Add [J45 902] Status asthmaticus       ED Disposition     ED Disposition Condition Comment    Admit  Case was discussed with Critical Care and the patient's admission status was agreed to be Admission Status: inpatient status to the service of Dr Gwen Willis           Follow-up Information    None         Current Discharge Medication List      CONTINUE these medications which have NOT CHANGED    Details   ALPRAZolam (XANAX) 0 25 mg tablet as needed      Ascorbic Acid (VITAMIN C) 500 MG CAPS Take by mouth daily        atorvastatin (LIPITOR) 20 mg tablet Take by mouth daily        cholecalciferol (VITAMIN D3) 1,000 units tablet Take by mouth daily        clobetasol (TEMOVATE) 0 05 % ointment       Coconut Oil 1000 MG CAPS Take by mouth daily        DULoxetine (CYMBALTA) 60 mg delayed release capsule Take 60 mg by mouth daily        Echinacea 125 MG CAPS Take by mouth daily        ergocalciferol (VITAMIN D2) 50,000 units once a week        Iron Combinations (IRON COMPLEX PO) iron,carbonyl 65 mg-vitamin C 125 mg tablet,delayed release      Multiple Vitamin tablet Take by mouth daily        mupirocin (BACTROBAN) 2 % ointment       norgestimate-ethinyl estradiol (ORTHO TRI-CYCLEN,TRINESSA) 0 18/0 215/0 25 MG-35 MCG per tablet Take 1 tablet by mouth daily      phentermine (ADIPEX-P) 37 5 MG tablet Take by mouth daily        topiramate (TOPAMAX) 25 mg tablet Take by mouth 2 (two) times a day        Vitamin E 400 units TABS Take by mouth daily             No discharge procedures on file  ED Provider  Attending physically available and evaluated Bedford Lombard I managed the patient along with the ED Attending      Electronically Signed by         Geraldo Borden DO  12/29/18 0655

## 2018-12-30 LAB
ALBUMIN SERPL BCP-MCNC: 2.6 G/DL (ref 3.5–5)
ALP SERPL-CCNC: 69 U/L (ref 46–116)
ALT SERPL W P-5'-P-CCNC: 15 U/L (ref 12–78)
AMPHETAMINES SERPL QL SCN: NEGATIVE
ANION GAP SERPL CALCULATED.3IONS-SCNC: 5 MMOL/L (ref 4–13)
ARTERIAL PATENCY WRIST A: YES
AST SERPL W P-5'-P-CCNC: 10 U/L (ref 5–45)
BARBITURATES UR QL: NEGATIVE
BASE EXCESS BLDA CALC-SCNC: -0.6 MMOL/L
BASOPHILS # BLD AUTO: 0.01 THOUSANDS/ΜL (ref 0–0.1)
BASOPHILS NFR BLD AUTO: 0 % (ref 0–1)
BENZODIAZ UR QL: NEGATIVE
BILIRUB SERPL-MCNC: 0.26 MG/DL (ref 0.2–1)
BUN SERPL-MCNC: 11 MG/DL (ref 5–25)
CA-I BLD-SCNC: 1.04 MMOL/L (ref 1.12–1.32)
CALCIUM SERPL-MCNC: 7.5 MG/DL (ref 8.3–10.1)
CHLORIDE SERPL-SCNC: 107 MMOL/L (ref 100–108)
CO2 SERPL-SCNC: 25 MMOL/L (ref 21–32)
COCAINE UR QL: NEGATIVE
CREAT SERPL-MCNC: 0.6 MG/DL (ref 0.6–1.3)
EOSINOPHIL # BLD AUTO: 0 THOUSAND/ΜL (ref 0–0.61)
EOSINOPHIL NFR BLD AUTO: 0 % (ref 0–6)
ERYTHROCYTE [DISTWIDTH] IN BLOOD BY AUTOMATED COUNT: 12.5 % (ref 11.6–15.1)
FLUAV AG SPEC QL: NORMAL
FLUBV AG SPEC QL: NORMAL
GFR SERPL CREATININE-BSD FRML MDRD: 117 ML/MIN/1.73SQ M
GLUCOSE SERPL-MCNC: 102 MG/DL (ref 65–140)
GLUCOSE SERPL-MCNC: 119 MG/DL (ref 65–140)
GLUCOSE SERPL-MCNC: 122 MG/DL (ref 65–140)
GLUCOSE SERPL-MCNC: 128 MG/DL (ref 65–140)
GLUCOSE SERPL-MCNC: 130 MG/DL (ref 65–140)
GLUCOSE SERPL-MCNC: 136 MG/DL (ref 65–140)
HCO3 BLDA-SCNC: 24.7 MMOL/L (ref 22–28)
HCT VFR BLD AUTO: 36.1 % (ref 34.8–46.1)
HGB BLD-MCNC: 12.2 G/DL (ref 11.5–15.4)
IMM GRANULOCYTES # BLD AUTO: 0.09 THOUSAND/UL (ref 0–0.2)
IMM GRANULOCYTES NFR BLD AUTO: 1 % (ref 0–2)
IPAP: 14
L PNEUMO1 AG UR QL IA.RAPID: NEGATIVE
LYMPHOCYTES # BLD AUTO: 0.59 THOUSANDS/ΜL (ref 0.6–4.47)
LYMPHOCYTES NFR BLD AUTO: 4 % (ref 14–44)
MAGNESIUM SERPL-MCNC: 2.9 MG/DL (ref 1.6–2.6)
MCH RBC QN AUTO: 29.9 PG (ref 26.8–34.3)
MCHC RBC AUTO-ENTMCNC: 33.8 G/DL (ref 31.4–37.4)
MCV RBC AUTO: 89 FL (ref 82–98)
METHADONE UR QL: NEGATIVE
MONOCYTES # BLD AUTO: 0.27 THOUSAND/ΜL (ref 0.17–1.22)
MONOCYTES NFR BLD AUTO: 2 % (ref 4–12)
NEUTROPHILS # BLD AUTO: 13.8 THOUSANDS/ΜL (ref 1.85–7.62)
NEUTS SEG NFR BLD AUTO: 93 % (ref 43–75)
NON VENT- BIPAP: NORMAL
NRBC BLD AUTO-RTO: 0 /100 WBCS
O2 CT BLDA-SCNC: 17.7 ML/DL (ref 16–23)
OPIATES UR QL SCN: NEGATIVE
OXYHGB MFR BLDA: 95.5 % (ref 94–97)
PCO2 BLDA: 43.1 MM HG (ref 36–44)
PCP UR QL: NEGATIVE
PEEP MAX SETTING VENT: 5 CM[H2O]
PH BLDA: 7.38 [PH] (ref 7.35–7.45)
PHOSPHATE SERPL-MCNC: 3.1 MG/DL (ref 2.7–4.5)
PLATELET # BLD AUTO: 221 THOUSANDS/UL (ref 149–390)
PMV BLD AUTO: 9.4 FL (ref 8.9–12.7)
PO2 BLDA: 84.2 MM HG (ref 75–129)
POTASSIUM SERPL-SCNC: 3.9 MMOL/L (ref 3.5–5.3)
PROCALCITONIN SERPL-MCNC: <0.05 NG/ML
PROT SERPL-MCNC: 6.3 G/DL (ref 6.4–8.2)
RBC # BLD AUTO: 4.08 MILLION/UL (ref 3.81–5.12)
RSV B RNA SPEC QL NAA+PROBE: NORMAL
S PNEUM AG UR QL: NEGATIVE
SODIUM SERPL-SCNC: 137 MMOL/L (ref 136–145)
SPECIMEN SOURCE: NORMAL
THC UR QL: NEGATIVE
VENT BIPAP FIO2: 40 %
WBC # BLD AUTO: 14.76 THOUSAND/UL (ref 4.31–10.16)

## 2018-12-30 PROCEDURE — 82330 ASSAY OF CALCIUM: CPT | Performed by: NURSE PRACTITIONER

## 2018-12-30 PROCEDURE — 87449 NOS EACH ORGANISM AG IA: CPT | Performed by: NURSE PRACTITIONER

## 2018-12-30 PROCEDURE — 84145 PROCALCITONIN (PCT): CPT | Performed by: NURSE PRACTITIONER

## 2018-12-30 PROCEDURE — 36600 WITHDRAWAL OF ARTERIAL BLOOD: CPT

## 2018-12-30 PROCEDURE — 94640 AIRWAY INHALATION TREATMENT: CPT

## 2018-12-30 PROCEDURE — 82948 REAGENT STRIP/BLOOD GLUCOSE: CPT

## 2018-12-30 PROCEDURE — 85025 COMPLETE CBC W/AUTO DIFF WBC: CPT | Performed by: NURSE PRACTITIONER

## 2018-12-30 PROCEDURE — 80307 DRUG TEST PRSMV CHEM ANLYZR: CPT | Performed by: NURSE PRACTITIONER

## 2018-12-30 PROCEDURE — 84100 ASSAY OF PHOSPHORUS: CPT | Performed by: NURSE PRACTITIONER

## 2018-12-30 PROCEDURE — 83735 ASSAY OF MAGNESIUM: CPT | Performed by: NURSE PRACTITIONER

## 2018-12-30 PROCEDURE — 94760 N-INVAS EAR/PLS OXIMETRY 1: CPT

## 2018-12-30 PROCEDURE — 94660 CPAP INITIATION&MGMT: CPT

## 2018-12-30 PROCEDURE — 99233 SBSQ HOSP IP/OBS HIGH 50: CPT | Performed by: INTERNAL MEDICINE

## 2018-12-30 PROCEDURE — 87633 RESP VIRUS 12-25 TARGETS: CPT | Performed by: INTERNAL MEDICINE

## 2018-12-30 PROCEDURE — 82805 BLOOD GASES W/O2 SATURATION: CPT | Performed by: EMERGENCY MEDICINE

## 2018-12-30 PROCEDURE — 80053 COMPREHEN METABOLIC PANEL: CPT | Performed by: NURSE PRACTITIONER

## 2018-12-30 RX ORDER — POTASSIUM CHLORIDE 20 MEQ/1
20 TABLET, EXTENDED RELEASE ORAL ONCE
Status: COMPLETED | OUTPATIENT
Start: 2018-12-30 | End: 2018-12-30

## 2018-12-30 RX ORDER — ALPRAZOLAM 0.25 MG/1
0.25 TABLET ORAL 2 TIMES DAILY PRN
Status: DISCONTINUED | OUTPATIENT
Start: 2018-12-30 | End: 2019-01-04 | Stop reason: HOSPADM

## 2018-12-30 RX ADMIN — OXYCODONE HYDROCHLORIDE AND ACETAMINOPHEN 500 MG: 500 TABLET ORAL at 10:00

## 2018-12-30 RX ADMIN — SODIUM CHLORIDE 0.5 MG/KG/HR: 0.9 INJECTION, SOLUTION INTRAVENOUS at 02:06

## 2018-12-30 RX ADMIN — NORGESTREL AND ETHINYL ESTRADIOL 1 TABLET: KIT at 10:00

## 2018-12-30 RX ADMIN — METHYLPREDNISOLONE SODIUM SUCCINATE 40 MG: 40 INJECTION, POWDER, FOR SOLUTION INTRAMUSCULAR; INTRAVENOUS at 12:02

## 2018-12-30 RX ADMIN — LEVALBUTEROL 1.25 MG: 1.25 SOLUTION, CONCENTRATE RESPIRATORY (INHALATION) at 13:33

## 2018-12-30 RX ADMIN — DULOXETINE HYDROCHLORIDE 60 MG: 60 CAPSULE, DELAYED RELEASE ORAL at 09:59

## 2018-12-30 RX ADMIN — METHYLPREDNISOLONE SODIUM SUCCINATE 40 MG: 40 INJECTION, POWDER, FOR SOLUTION INTRAMUSCULAR; INTRAVENOUS at 00:01

## 2018-12-30 RX ADMIN — IPRATROPIUM BROMIDE 0.5 MG: 0.5 SOLUTION RESPIRATORY (INHALATION) at 07:03

## 2018-12-30 RX ADMIN — NICOTINE 7 MG: 7 PATCH TRANSDERMAL at 09:59

## 2018-12-30 RX ADMIN — IPRATROPIUM BROMIDE 0.5 MG: 0.5 SOLUTION RESPIRATORY (INHALATION) at 13:33

## 2018-12-30 RX ADMIN — ATORVASTATIN CALCIUM 20 MG: 20 TABLET, FILM COATED ORAL at 16:53

## 2018-12-30 RX ADMIN — VITAMIN D, TAB 1000IU (100/BT) 1000 UNITS: 25 TAB at 09:59

## 2018-12-30 RX ADMIN — IPRATROPIUM BROMIDE 0.5 MG: 0.5 SOLUTION RESPIRATORY (INHALATION) at 19:18

## 2018-12-30 RX ADMIN — IPRATROPIUM BROMIDE 0.5 MG: 0.5 SOLUTION RESPIRATORY (INHALATION) at 01:05

## 2018-12-30 RX ADMIN — LEVALBUTEROL 1.25 MG: 1.25 SOLUTION, CONCENTRATE RESPIRATORY (INHALATION) at 19:18

## 2018-12-30 RX ADMIN — SENNOSIDES AND DOCUSATE SODIUM 1 TABLET: 8.6; 5 TABLET ORAL at 09:59

## 2018-12-30 RX ADMIN — TOPIRAMATE 25 MG: 25 TABLET, FILM COATED ORAL at 10:00

## 2018-12-30 RX ADMIN — LEVALBUTEROL 1.25 MG: 1.25 SOLUTION, CONCENTRATE RESPIRATORY (INHALATION) at 07:03

## 2018-12-30 RX ADMIN — POTASSIUM CHLORIDE 20 MEQ: 1500 TABLET, EXTENDED RELEASE ORAL at 07:58

## 2018-12-30 RX ADMIN — LEVALBUTEROL 1.25 MG: 1.25 SOLUTION, CONCENTRATE RESPIRATORY (INHALATION) at 01:05

## 2018-12-30 RX ADMIN — ENOXAPARIN SODIUM 40 MG: 40 INJECTION SUBCUTANEOUS at 16:53

## 2018-12-30 RX ADMIN — METHYLPREDNISOLONE SODIUM SUCCINATE 40 MG: 40 INJECTION, POWDER, FOR SOLUTION INTRAMUSCULAR; INTRAVENOUS at 06:12

## 2018-12-30 RX ADMIN — DOXYCYCLINE 100 MG: 100 INJECTION, POWDER, LYOPHILIZED, FOR SOLUTION INTRAVENOUS at 00:31

## 2018-12-30 RX ADMIN — SENNOSIDES AND DOCUSATE SODIUM 1 TABLET: 8.6; 5 TABLET ORAL at 16:54

## 2018-12-30 RX ADMIN — CALCIUM GLUCONATE 1 G: 98 INJECTION, SOLUTION INTRAVENOUS at 07:53

## 2018-12-30 RX ADMIN — TOPIRAMATE 25 MG: 25 TABLET, FILM COATED ORAL at 16:54

## 2018-12-30 RX ADMIN — METHYLPREDNISOLONE SODIUM SUCCINATE 40 MG: 40 INJECTION, POWDER, FOR SOLUTION INTRAMUSCULAR; INTRAVENOUS at 16:54

## 2018-12-30 NOTE — UTILIZATION REVIEW
[address]  Utilization Review Department  Phone: [phone]; Fax [phone]  [email or web address]  ATTENTION: Please call with any questions or concerns to [phone]  and carefully listen to the prompts so that you are directed to the right person  Send all requests for admission clinical reviews, approved or denied determinations and any other requests to fax [phone]  All voicemails are confidential       Initial Clinical Review    Admission: Date/Time/Statement: 12/29/18 @ 1338     Orders Placed This Encounter   Procedures    Inpatient Admission     Standing Status:   Standing     Number of Occurrences:   1     Order Specific Question:   Admitting Physician     Answer:   Britt Navarrete     Order Specific Question:   Level of Care     Answer:   Critical Care [15]     Order Specific Question:   Estimated length of stay     Answer:   More than 2 Midnights     Order Specific Question:   Certification     Answer:   I certify that inpatient services are medically necessary for this patient for a duration of greater than two midnights  See H&P and MD Progress Notes for additional information about the patient's course of treatment  ED: Date/Time/Mode of Arrival:   ED Arrival Information     Expected Arrival Acuity Means of Arrival Escorted By Service Admission Type    - 12/29/2018 11:41 Emergent Walk-In Self Critical Care/ICU Emergency    Arrival Complaint    Shortness of Breath          Chief Complaint:   Chief Complaint   Patient presents with    Shortness of Breath     SOB for about 12 hrs, "being treated for a staph infection currently"  associated URI sx's       History of Illness: 41 y o  female with past medical history significant for mild intermittent asthma, tobacco abuse (0 5 -1 pack per day), obesity, and depression with anxiety  She presents today to the emergency department for acute shortness of breath    Patient states that she was otherwise in her normal state of health yesterday  She works at [employer], and a co-worker who is by her side confirms that yesterday she was active, well, and without any complaints  She states that she began feeling somewhat unwell yesterday evening with a cough, which progressed to significant shortness of breath overnight  She states that she has a cough with sputum production, however she does not know what her sputum looks like  She believes she had a fever at home, however she did not take her temperature  She states that she does not have any problems with her asthma on a daily basis  She does not use any daily medications and does not even own a PRN rescue inhaler  She states that she has only been hospitalized once for her asthma  At that time, she said that she had a URI which, "set her off " Given her ongoing BiPAP requirements, she will be admitted to the MICU       ED Vital Signs:   ED Triage Vitals   Temperature Pulse Respirations Blood Pressure SpO2   12/29/18 1147 12/29/18 1147 12/29/18 1147 12/29/18 1147 12/29/18 1147   98 1 °F (36 7 °C) (!) 114 22 138/75 91 %      Temp Source Heart Rate Source Patient Position - Orthostatic VS BP Location FiO2 (%)   12/29/18 1147 12/29/18 1147 12/29/18 1147 12/29/18 1147 --   Oral Monitor Sitting Left arm       Pain Score       12/29/18 1145       8        Wt Readings from Last 1 Encounters:   12/29/18 96 kg (211 lb 10 3 oz)       Vital Signs:   12/29 0701  12/30 0700 12/30 0701  12/30 1040    Most Recent    Temperature (°F) 98 1-100 2 98 4 98 4 (36 9)    Pulse    78    Respirations 22-58   28    Blood Pressure 105//75   112/63    SpO2 (%)  97 97        Abnormal Labs/Diagnostic Test Results: WBC 15 57, Ca 8 1    , Arterial 7 350 - 7 450 7 342     pCO2, Arterial 36 0 - 44 0 mm Hg 41 5    pO2, Arterial 75 0 - 129 0 mm Hg 72 3     HCO3, Arterial 22 0 - 28 0 mmol/L 22 0    Base Excess, Arterial mmol/L -3 6    O2 Content, Arterial 16 0 - 23 0 mL/dL 18 8    O2 HGB,Arterial  94 0 - 97 0 % 93 3     SOURCE  Radial, Right    ALICIA TEST  Yes    Non Vent type BIPAP  BIPAP        EKG -- Sinus tach  CXR -- no acute abnl    ED Treatment:   Medication Administration from 12/29/2018 1140 to 12/29/2018 1444       Date/Time Order Dose Route Action     12/29/2018 1208 albuterol inhalation solution 10 mg 10 mg Nebulization Given     12/29/2018 1209 ipratropium (ATROVENT) 0 02 % inhalation solution 1 mg 1 mg Nebulization Given     12/29/2018 1209 sodium chloride 0 9 % inhalation solution 3 mL 3 mL Nebulization Given     12/29/2018 1213 methylPREDNISolone sodium succinate (Solu-MEDROL) injection 125 mg 125 mg Intravenous Given     12/29/2018 1213 magnesium sulfate 2 g/50 mL IVPB (premix) 2 g 2 g Intravenous New Bag     12/29/2018 1304 EPINEPHrine PF (ADRENALIN) 1 mg/mL injection 0 3 mg 0 3 mg Subcutaneous Given     12/29/2018 1320 LORazepam (ATIVAN) 2 mg/mL injection 0 5 mg 0 5 mg Intravenous Given       Past Medical/Surgical History:    Active Ambulatory Problems     Diagnosis Date Noted    Hypercholesterolemia 05/30/2018    Mild intermittent asthma with status asthmaticus 05/30/2018    Obesity 05/30/2018    Depression with anxiety 05/30/2018    Tobacco use 05/30/2018    Dysplasia of cervix, low grade (CORNELIUS 1) 07/19/2018    Status post LEEP (loop electrosurgical excision procedure) of cervix 09/27/2018    Furunculosis 12/06/2018    Recurrent Clostridium difficile diarrhea 12/06/2018     Past Medical History:   Diagnosis Date    Anxiety     Asthma     History of Clostridium difficile colitis 2018    Hypercholesterolemia     Sleep apnea        Admitting Diagnosis: Shortness of breath [R06 02]  Status asthmaticus [J45 902]    Age/Sex: 41 y o  female    Assessment/Plan:   Principal Problem:    Acute respiratory failure with hypoxia and hypercapnia (HCC)  Active Problems:    Mild intermittent asthma with status asthmaticus    Tobacco use Leukocytosis    Plam:  · Acute hypoxic hypercapnic respiratory failure secondary to asthma with acute exacerbation - continue BiPAP at this time  Decrease EPAP an inspiratory time  Will initiate Heliox 70/30  Patient received albuterol 10 mg, will repeat 10 mg now, then continue Xopenex/Atrovent q6hr  Re-dose magnesium 2 mg now  Patient received methylprednisolone 125 mg x1, she will follow with 40 mg q6hr  Maintain SpO2 > 88%  · Tobacco abuse - initiate nicotine replacement patch  Provide cessation education   · Given the patient is NPO we will place patient on isolate at 100 mL/hr  Bolus patient with 500 mL at this time    · Replete electrolytes with as needed to maintain K >4 0, Mag >2 0, Phos >3 0  · Diet:  NPO secondary to respiratory distress at this time       Admission Orders:  Scheduled Meds:   Current Facility-Administered Medications:  acetaminophen 650 mg Oral Q6H PRN   ALPRAZolam 0 25 mg Oral BID PRN   ascorbic acid 500 mg Oral Daily   atorvastatin 20 mg Oral Daily With Dinner   cholecalciferol 1,000 Units Oral Daily   DULoxetine 60 mg Oral Daily   enoxaparin 40 mg Subcutaneous Daily   insulin lispro 1-5 Units Subcutaneous Q6H [address]   ipratropium 0 5 mg Nebulization Q6H   levalbuterol 1 25 mg Nebulization Q6H   methylPREDNISolone sodium succinate 40 mg Intravenous Q6H [address]   multi-electrolyte 100 mL/hr Intravenous Continuous   nicotine 7 mg Transdermal Daily   norgestrel-ethinyl estradiol 1 tablet Oral Daily   ondansetron 4 mg Intravenous Q4H PRN   senna-docusate sodium 1 tablet Oral BID   topiramate 25 mg Oral BID     Admit to MICU  Telem  Bipap 14/5, FiO2 40%   Cont pox  Neuro checks q 4h  IS q 1h  Up with assist  Daily wts  Nursing dysphagia assessment  Reg diet  I/O  Fingerstick glucose q 6h

## 2018-12-30 NOTE — PROGRESS NOTES
Pt arrived to unit from MICU, in bed resting comfortably  Bed alarm on, bed locked  Will continue to monitor

## 2018-12-30 NOTE — ASSESSMENT & PLAN NOTE
· Continue Xopenex/Atrovent q 6 hours  · Continue methylprednisolone 40 mg q 6 hours for today with possible wean tomorrow if clinically appropriate  · Needs improved outpatient follow up

## 2018-12-30 NOTE — PROGRESS NOTES
Transfer Note - Jeannette Ferro 1980, 41 y o  female MRN: [id number]    Unit/Bed#: MICU 02 Encounter: [id number]    Primary Care Provider: Nick Lang MD   Date and time admitted to hospital: 12/29/2018 11:49 AM        * Acute respiratory failure with hypoxia and hypercapnia (HCC)   Assessment & Plan    · Weaned off BiPAP and now on NC at 4L     · Continue pulmonary hygiene with incentive spirometry and deep breathing today  · Maintain SpO2 > 88%  Mild intermittent asthma with status asthmaticus   Assessment & Plan    · Continue Xopenex/Atrovent q 6 hours  · Continue methylprednisolone 40 mg q 6 hours for today with possible wean tomorrow if clinically appropriate  · Needs improved outpatient follow up     Tobacco use   Assessment & Plan    · Continue nicotine replacement patch  ·  Provide cessation education     Leukocytosis   Assessment & Plan    · Leukemoid reaction vs infection  · WBC slightly down today  · Flu/RSV negative  · Procalcitonin was <0 05 x2, and will therefore stop antibiotics  · Will give flu shot before discharge      Hypercholesterolemia   Assessment & Plan    · Atorvastatin 20mg daily     Depression with anxiety   Assessment & Plan    ·  Continue patient's home Cymbalta 60 mg daily, Topamax 25 mg BID  · Resume home xanax PRN     History of Clostridium difficile colitis   Assessment & Plan    · Avoid antibiotics as able  · If needed, prefer doxycycline   If other antibiotics cannot be avoided, start PO vancomycin 125mg q12 as prophylaxis         Code Status: Level 1 - Full Code    Reason for ICU admission:   Status asthmaticus     Active problems:   Principal Problem:    Acute respiratory failure with hypoxia and hypercapnia (HCC)  Active Problems:    Mild intermittent asthma with status asthmaticus    Tobacco use    Leukocytosis    Hypercholesterolemia    Depression with anxiety    Obesity    History of Clostridium difficile colitis  Resolved Problems:    * No resolved hospital problems  *      Consultants:   None     History of Present Illness:   Bhavani Werner is a 41 y o  female with past medical history significant for mild intermittent asthma, tobacco abuse (0 5 -1 pack per day), obesity, and depression with anxiety  She presents today to the emergency department for acute shortness of breath  Patient states that she was otherwise in her normal state of health yesterday  She works at [employer], and a co-worker who is by her side confirms that yesterday she was active, well, and without any complaints  She states that she began feeling somewhat unwell yesterday evening with a cough, which progressed to significant shortness of breath overnight  She states that she has a cough with sputum production, however she does not know which her sputum looks like  She believes she had a fever at home, however she did not take her temperature  She denies sick contacts, illicit drug use, inhalation of any strong chemicals or dust, or allergies  She denies receiving the flu shot this year  She states that she does not have any problems with her asthma on a daily basis  She does not use any daily medications and does not even own a PRN rescue inhaler  She states that she has only been hospitalized once for her asthma  At that time, she said that she had a URI which, "set her off " Given her ongoing BiPAP requirements, she will be admitted to the MICU  Summary of clinical course:   Patient was admitted to the MICU on BiPAP  She was given a total of 20mg albuterol, 4g mag, and started on heliox  She was still tachypnic and anxious, and was then started on ketamine IV  Overnight, her BiPAP requirements decreased  Her respiratory rate improved  She was taken off BiPAP this AM to high flow nasal cannula, and the ketamine was discontinued  She was then able to be weaned down to regular nasal cannula  Her HR and BP are WNL  She remains short of breath, but has not been hypoxic   She tolerated a PO diet and was placed back on her home medications  Due to some persistent wheezing and ronchi, she was left on IV steroids  Antibiotics were stopped for two consecutive procalcitonins  Her Flu PCR was negative  Recent or scheduled procedures:   None     Outstanding/pending diagnostics:   12/29/18 CXR: No acute cardiopulmonary disease    Cultures:   12/29 BCXx2: PENDING   12/29/18 Flu PCR: Negative        Mobilization Plan: Activity as tolerated     Nutrition Plan:   Regular diet     VTE Pharmacologic Prophylaxis: Enoxaparin (Lovenox)  VTE Mechanical Prophylaxis: sequential compression device    Discharge Plan:   Patient should be ready for discharge to home after patient is down to baseline oxygen and able to tolerate PO steroids     Home medications that are not reordered and reason why:   Coconut Oil - non formulary  Echinacea - non formulary   Adipex - stimulant diet med       Spoke with Dr Thierry Lopez  regarding transfer  Please call [phone] with any questions or concerns  Portions of the record may have been created with voice recognition software  Occasional wrong word or "sound a like" substitutions may have occurred due to the inherent limitations of voice recognition software    Read the chart carefully and

## 2018-12-30 NOTE — PROGRESS NOTES
Progress Note - Critical Care   Bruce Silverio 41 y o  female MRN: [id number]  Unit/Bed#: MICU 02 Encounter: [id number]    ------------------------------------------------------------------------------------------------  Chief Complaint: "I feel a little bit better"     HPI/24hr events: No acute overnight events  Patient's tachypnea and tachycardia slowly improved throughout the course of the night  Displayed maintenance IV fluids and he 500 mL fluid bolus, patient had no urine output is approximately 2300  She was given 20 mg IV Lasix with 900 mL urine output   ----------------------------------------------------------------------------------------------  Assessment and Plan  Principal Problem:    Acute respiratory failure with hypoxia and hypercapnia (HCC)  Active Problems:    Mild intermittent asthma with status asthmaticus    Tobacco use    Leukocytosis    Hypercholesterolemia    Obesity    Depression with anxiety    History of Clostridium difficile colitis  Resolved Problems:    * No resolved hospital problems  *      Neuro:   · History anxiety and depression - Continue patient's home Cymbalta 60 mg daily, Topamax 25 mg BID as able based on respiratory status to take PO  Routine neuro checks  Sleep/wake cycle regulation  CAM-ICU daily  Decrease patient's ketamine to 0 25mg/kg/hr and then wean off today  CV:   · Sinus tachycardia - resolved  Was likely multifactorial secondary to anxiety, hypoxia, beta adrenergic agents  Continue close telemetry monitoring  Maintain goal MAP > 65  · Hyperlipidemia - atorvastatin 20mg daily         Pulm:  · Acute hypoxic hypercapnic respiratory failure secondary to asthma with acute exacerbation - patient remains on BiPAP at this time  Heliox 70/30 discontinue overnight  Can attempt to wean patient from BiPAP to high-flow nasal cannula verses music this morning  Continue Xopenex/Atrovent q 6 hours  Continue methylprednisolone 40 mg q 6 hours for today    Continue pulmonary hygiene with incentive spirometry and deep breathing today  Maintain SpO2 > 80%  · Tobacco abuse - continue nicotine replacement patch  Provide cessation education      GI:   · Continue bowel regimen  · Stress ulcer prophylaxis - no indication for GI prophylaxis at this time       :   · Low urine output noted overnight  Possibly secondary to some retention from anticholinergic agents  He is to continue to closely monitor  No villar  Strict q4h I/O monitoring  Patient's baseline creatinine 0 72 - 0 83, down to 0 60 this AM  Continue to follow renal function tests  · Continue patient's home oral contraceptive daily      F/E/N:   · Continue Isolyte at 100ml/hr while patient is unable to take PO  · Replete electrolytes with as needed to maintain K >4 0, Mag >2 0, Phos >3 0  · Diet:  NPO at this time  Will start regular diet as tolerated     Heme/Onc:   · No acute issues  Patient's baseline hemoglobin is 12 9-14 4  Current hemoglobin 12 2  Continue to trend hemoglobin and platelets  · VTE prophylaxis -  Lovenox 40 mg Q 24 hours, SCDs to bilateral lower extremities     Endo:   · No history of diabetes  Last hemoglobin A1c 4 8% on 7/18/18  Continue low-dose sliding sclae regimen while on steroids to maintain goal -180  ID:   · Leukocytosis - leukemoid reaction vs infection  WBC slightly down today  Patient states that she did not have her flu shot this year, follow up Flu PCR and start Tamiflu if positive  Patient was given one dose of doxycycline for possible bacterial pneumonia, but procalcitonin was <0 05 x2, and will therefore stop antibiotics  · History of c diff x2 - avoid antibiotics as able  If needed, prefer doxycycline   If other antibiotics cannot be avoided, start PO vancomycin 125mg q12 as prophylaxis       MSK/Skin:   · Reposition q2h, eliminate pressure points while in bed  · Close skin surveillance   · OOB today      Dispo: Downgrade to step down level 2    Code Status: Level 1 - Full Code  ---------------------------------------------------------------------------------------  Physical Exam   Constitutional: She appears well-developed and well-nourished  She appears lethargic  She is sleeping  No distress  Face mask in place  HENT:   Head: Normocephalic and atraumatic  Mouth/Throat: Mucous membranes are dry  Eyes: Pupils are equal, round, and reactive to light  No scleral icterus  Neck: Neck supple  No JVD present  Cardiovascular: Normal rate, regular rhythm and normal heart sounds  Pulses:       Radial pulses are 2+ on the right side, and 2+ on the left side  Dorsalis pedis pulses are 2+ on the right side, and 2+ on the left side  Pulmonary/Chest: Tachypnea noted  She has decreased breath sounds in the right lower field and the left lower field  She has rhonchi  She has rales  Abdominal: Soft  Bowel sounds are decreased  There is no tenderness  Neurological: She has normal strength  She appears lethargic  GCS eye subscore is 3  GCS verbal subscore is 5  GCS motor subscore is 6  Skin: Skin is warm and dry  Capillary refill takes less than 2 seconds  She is not diaphoretic      ------------------------------------------------------------------------------------------  Review of Systems   Constitutional: Positive for fatigue  HENT: Positive for sore throat  Respiratory: Positive for cough and shortness of breath  Gastrointestinal: Negative for nausea     Psychiatric/Behavioral: The patient is not nervous/anxious       ------------------------------------------------------------------------------------------    Vitals   Vitals:    18 2310 18 2346 18 0325 18 0452   BP: 114/71   113/66   BP Location:       Pulse: 92   84   Resp: (!) 36   (!) 37   Temp:       TempSrc:       SpO2: 95% 95% 96% 95%   Weight:       Height:         Temp (24hrs), Av 3 °F (37 4 °C), Min:98 1 °F (36 7 °C), Max:100 2 °F (37 9 °C)  Current: Temperature: 99 °F (37 2 °C)  HR: 78 - 128  BP: 105/64 - 141/75  MAP: 72 - 99  RR: 22 - 58  SpO2: 91 - 100%    BiPAP Settings  Non-Invasive Ventilation Mode: BiPAP  IPAP (cm): 14 cm  EPAP (cm): 5 cm  FiO2 (%): 40  Rate (Set): 10  Leak (lpm): 1  Total Rate: 27  Spontaneous Vt (mL): 461      Height and Weights   Height: 5' 3" (160 cm)  IBW: 52 4 kg  Body mass index is 37 49 kg/m²  Weight (last 2 days)     Date/Time   Weight    12/29/18 1450  96 (211 64)    12/29/18 1145  95 3 (210)              Intake and Output  I/O       12/28 0701 - 12/29 0700 12/29 0701 - 12/30 0700    I V  (mL/kg)  1799 (18 7)    IV Piggyback  200    Total Intake(mL/kg)  1999 (20 8)    Urine (mL/kg/hr)  900    Total Output   900    Net   +1099                 Nutrition       Diet Orders            Start     Ordered    12/29/18 1450  Diet NPO; Sips with meds  Diet effective now     Question Answer Comment   Diet Type NPO    NPO Except: Sips with meds    RD to adjust diet per protocol?  Yes        12/29/18 1449          Laboratory and Diagnostics:    Results from last 7 days  Lab Units 12/30/18  0450 12/29/18  1459 12/29/18  1356 12/29/18  1219   WBC Thousand/uL 14 76*  --   --  15 57*   HEMOGLOBIN g/dL 12 2  --   --  14 0   I STAT HEMOGLOBIN g/dl  --  13 6 14 6  --    HEMATOCRIT % 36 1  --   --  41 8   HEMATOCRIT, ISTAT %  --  40 43  --    PLATELETS Thousands/uL 221  --   --  221   NEUTROS PCT %  --   --   --  89*   MONOS PCT %  --   --   --  4       Results from last 7 days  Lab Units 12/30/18  0450 12/29/18  1459 12/29/18  1356 12/29/18  1219   SODIUM mmol/L 137  --   --  139   POTASSIUM mmol/L 3 9  --   --  3 8   CHLORIDE mmol/L 107  --   --  107   CO2 mmol/L 25  --   --  24   CO2, I-STAT mmol/L  --  21 23  --    AGAP mmol/L  --   --  15*  --    ANION GAP mmol/L 5  --   --  8   BUN mg/dL 11  --   --  10   CREATININE mg/dL 0 60  --   --  0 62   CALCIUM mg/dL 7 5*  --   --  8 1*   ALT U/L 15  --   --   --    AST U/L 10  --   --   --    ALK PHOS U/L 69  --   --   --    ALBUMIN g/dL 2 6*  --   --   --    TOTAL BILIRUBIN mg/dL 0 26  --   --   --        Results from last 7 days  Lab Units 12/30/18  0450   MAGNESIUM mg/dL 2 9*   PHOSPHORUS mg/dL 3 1          ABG:  Lab Results   Component Value Date    PHART 7 376 12/30/2018    CRP7PYE 43 1 12/30/2018    PO2ART 84 2 12/30/2018    [id number] 24 7 12/30/2018    BEART -0 6 12/30/2018    SOURCE Radial, Right 12/30/2018       Results from last 7 days  Lab Units 12/30/18  0450 12/29/18  1531   PROCALCITONIN ng/ml <0 05 <0 05          EKG: Sinus tachycardia   Imaging: I have personally reviewed pertinent reports     and I have personally reviewed pertinent films in PACS    Micro  12/29 Flu PCR: PENDING   12/29 BCXx2: PENDING     Allergies   No Known Allergies    Active Medications  Scheduled Meds:  Current Facility-Administered Medications:  acetaminophen 650 mg Oral Q6H PRN MABEL Kee    ascorbic acid 500 mg Oral Daily MABEL Bradshaw    atorvastatin 20 mg Oral Daily With [employer], MABEL    cholecalciferol 1,000 Units Oral Daily MABEL Bradshaw    DULoxetine 60 mg Oral Daily MABEL Nichols    enoxaparin 40 mg Subcutaneous Daily MABEL Nichols    insulin lispro 1-5 Units Subcutaneous Q6H [address] MABEL Best    ipratropium 0 5 mg Nebulization Q6H Janie Cage DO    ketamine 0 5 mg/kg/hr Intravenous Continuous MABEL Kee Last Rate: 0 5 mg/kg/hr (12/30/18 0206)   levalbuterol 1 25 mg Nebulization Q6H Janie DO Niles    methylPREDNISolone sodium succinate 40 mg Intravenous Q6H [address] MABEL Best    multi-electrolyte 100 mL/hr Intravenous Continuous MABEL Kee Last Rate: 100 mL/hr (12/29/18 2053)   nicotine 7 mg Transdermal Daily MABEL Bradshaw    norgestrel-ethinyl estradiol 1 tablet Oral Daily MABEL Bradshaw    ondansetron 4 mg Intravenous Q4H PRN Neeru Ruffin CRNP    senna-docusate sodium 1 tablet Oral BID MABEL De León    topiramate 25 mg Oral BID MABEL Ramos      Continuous Infusions:    ketamine 0 5 mg/kg/hr Last Rate: 0 5 mg/kg/hr (12/30/18 0206)   multi-electrolyte 100 mL/hr Last Rate: 100 mL/hr (12/29/18 2053)     PRN Meds:     acetaminophen 650 mg Q6H PRN   ondansetron 4 mg Q4H PRN       VTE Pharmacologic Prophylaxis: Enoxaparin (Lovenox)  VTE Mechanical Prophylaxis: sequential compression device    Invasive  Devices  Invasive Devices     Peripheral Intravenous Line            Peripheral IV 12/29/18 Left Antecubital 1 day    Peripheral IV 12/29/18 Left Forearm less than 1 day              Counseling / Coordination of Care  Total time spent today 40 minutes  Greater than 50% of total time was spent with the patient and / or family counseling and / or coordination of care  A description of the counseling / coordination of care: MABEL Baird      Portions of the record may have been created with voice recognition software  Occasional wrong word or "sound a like" substitutions may have occurred due to the inherent limitations of voice recognition software    Read the chart carefully and recognize, using context, where substitutions have occurred

## 2018-12-30 NOTE — RESPIRATORY THERAPY NOTE
resp care      12/29/18 1900   Respiratory Assessment   Resp Comments Per Dr Catalina Almonte, decrease FiO2 to 21% via bipap and increase flow to 10 lpm

## 2018-12-30 NOTE — ASSESSMENT & PLAN NOTE
· Avoid antibiotics as able  · If needed, prefer doxycycline   If other antibiotics cannot be avoided, start PO vancomycin 125mg q12 as prophylaxis

## 2018-12-30 NOTE — ASSESSMENT & PLAN NOTE
· Weaned off BiPAP and now on NC at 4L     · Continue pulmonary hygiene with incentive spirometry and deep breathing today  · Maintain SpO2 > 88%

## 2018-12-30 NOTE — PLAN OF CARE
DISCHARGE PLANNING     Discharge to home or other facility with appropriate resources Progressing        INFECTION - ADULT     Absence or prevention of progression during hospitalization Progressing     Absence of fever/infection during neutropenic period Progressing        PAIN - ADULT     Verbalizes/displays adequate comfort level or baseline comfort level Progressing        Potential for Falls     Patient will remain free of falls Progressing        Prexisting or High Potential for Compromised Skin Integrity     Skin integrity is maintained or improved Progressing        RESPIRATORY - ADULT     Achieves optimal ventilation and oxygenation Progressing        SAFETY ADULT     Maintain or return to baseline ADL function Progressing     Maintain or return mobility status to optimal level Progressing

## 2018-12-30 NOTE — ASSESSMENT & PLAN NOTE
· Leukemoid reaction vs infection  · WBC slightly down today  · Flu/RSV negative  · Procalcitonin was <0 05 x2, and will therefore stop antibiotics     · Will give flu shot before discharge

## 2018-12-31 DIAGNOSIS — Z71.89 COMPLEX CARE COORDINATION: Primary | ICD-10-CM

## 2018-12-31 LAB
ADENOVIRUS: NOT DETECTED
ANION GAP SERPL CALCULATED.3IONS-SCNC: 6 MMOL/L (ref 4–13)
BASOPHILS # BLD AUTO: 0.01 THOUSANDS/ΜL (ref 0–0.1)
BASOPHILS NFR BLD AUTO: 0 % (ref 0–1)
BUN SERPL-MCNC: 14 MG/DL (ref 5–25)
C PNEUM DNA SPEC QL NAA+PROBE: NOT DETECTED
CALCIUM SERPL-MCNC: 8.1 MG/DL (ref 8.3–10.1)
CHLORIDE SERPL-SCNC: 110 MMOL/L (ref 100–108)
CO2 SERPL-SCNC: 23 MMOL/L (ref 21–32)
CREAT SERPL-MCNC: 0.66 MG/DL (ref 0.6–1.3)
EOSINOPHIL # BLD AUTO: 0 THOUSAND/ΜL (ref 0–0.61)
EOSINOPHIL NFR BLD AUTO: 0 % (ref 0–6)
ERYTHROCYTE [DISTWIDTH] IN BLOOD BY AUTOMATED COUNT: 12.7 % (ref 11.6–15.1)
FLUAV H1 RNA SPEC QL NAA+PROBE: NOT DETECTED
FLUAV H3 RNA SPEC QL NAA+PROBE: NOT DETECTED
FLUAV RNA SPEC QL NAA+PROBE: NOT DETECTED
FLUBV RNA SPEC QL NAA+PROBE: NOT DETECTED
GFR SERPL CREATININE-BSD FRML MDRD: 113 ML/MIN/1.73SQ M
GLUCOSE SERPL-MCNC: 111 MG/DL (ref 65–140)
GLUCOSE SERPL-MCNC: 119 MG/DL (ref 65–140)
GLUCOSE SERPL-MCNC: 120 MG/DL (ref 65–140)
GLUCOSE SERPL-MCNC: 123 MG/DL (ref 65–140)
GLUCOSE SERPL-MCNC: 131 MG/DL (ref 65–140)
HBOV DNA SPEC QL NAA+PROBE: NOT DETECTED
HCOV 229E RNA SPEC QL NAA+PROBE: NOT DETECTED
HCOV HKU1 RNA SPEC QL NAA+PROBE: NOT DETECTED
HCOV NL63 RNA SPEC QL NAA+PROBE: NOT DETECTED
HCOV OC43 RNA SPEC QL NAA+PROBE: NOT DETECTED
HCT VFR BLD AUTO: 34.7 % (ref 34.8–46.1)
HGB BLD-MCNC: 11.3 G/DL (ref 11.5–15.4)
HPIV1 RNA SPEC QL NAA+PROBE: NOT DETECTED
HPIV2 RNA SPEC QL NAA+PROBE: NOT DETECTED
HPIV3 RNA SPEC QL NAA+PROBE: NOT DETECTED
HPIV4 RNA SPEC QL NAA+PROBE: NOT DETECTED
IMM GRANULOCYTES # BLD AUTO: 0.08 THOUSAND/UL (ref 0–0.2)
IMM GRANULOCYTES NFR BLD AUTO: 1 % (ref 0–2)
LYMPHOCYTES # BLD AUTO: 0.85 THOUSANDS/ΜL (ref 0.6–4.47)
LYMPHOCYTES NFR BLD AUTO: 6 % (ref 14–44)
M PNEUMO DNA SPEC QL NAA+PROBE: NOT DETECTED
MCH RBC QN AUTO: 29.5 PG (ref 26.8–34.3)
MCHC RBC AUTO-ENTMCNC: 32.6 G/DL (ref 31.4–37.4)
MCV RBC AUTO: 91 FL (ref 82–98)
METAPNEUMOVIRUS: NOT DETECTED
MONOCYTES # BLD AUTO: 0.52 THOUSAND/ΜL (ref 0.17–1.22)
MONOCYTES NFR BLD AUTO: 4 % (ref 4–12)
NEUTROPHILS # BLD AUTO: 12.74 THOUSANDS/ΜL (ref 1.85–7.62)
NEUTS SEG NFR BLD AUTO: 89 % (ref 43–75)
NRBC BLD AUTO-RTO: 0 /100 WBCS
PLATELET # BLD AUTO: 180 THOUSANDS/UL (ref 149–390)
PMV BLD AUTO: 9.4 FL (ref 8.9–12.7)
POTASSIUM SERPL-SCNC: 4.1 MMOL/L (ref 3.5–5.3)
RBC # BLD AUTO: 3.83 MILLION/UL (ref 3.81–5.12)
RHINOVIRUS RNA SPEC QL NAA+PROBE: DETECTED
RSV A RNA SPEC QL NAA+PROBE: NOT DETECTED
RSV B RNA SPEC QL NAA+PROBE: NOT DETECTED
SODIUM SERPL-SCNC: 139 MMOL/L (ref 136–145)
WBC # BLD AUTO: 14.2 THOUSAND/UL (ref 4.31–10.16)

## 2018-12-31 PROCEDURE — 94760 N-INVAS EAR/PLS OXIMETRY 1: CPT

## 2018-12-31 PROCEDURE — 99232 SBSQ HOSP IP/OBS MODERATE 35: CPT | Performed by: PHYSICIAN ASSISTANT

## 2018-12-31 PROCEDURE — 80048 BASIC METABOLIC PNL TOTAL CA: CPT | Performed by: NURSE PRACTITIONER

## 2018-12-31 PROCEDURE — 82948 REAGENT STRIP/BLOOD GLUCOSE: CPT

## 2018-12-31 PROCEDURE — 94640 AIRWAY INHALATION TREATMENT: CPT

## 2018-12-31 PROCEDURE — 85025 COMPLETE CBC W/AUTO DIFF WBC: CPT | Performed by: NURSE PRACTITIONER

## 2018-12-31 RX ORDER — METHYLPREDNISOLONE SODIUM SUCCINATE 40 MG/ML
40 INJECTION, POWDER, LYOPHILIZED, FOR SOLUTION INTRAMUSCULAR; INTRAVENOUS EVERY 8 HOURS SCHEDULED
Status: DISCONTINUED | OUTPATIENT
Start: 2018-12-31 | End: 2019-01-03

## 2018-12-31 RX ADMIN — NICOTINE 7 MG: 7 PATCH TRANSDERMAL at 08:41

## 2018-12-31 RX ADMIN — METHYLPREDNISOLONE SODIUM SUCCINATE 40 MG: 40 INJECTION, POWDER, FOR SOLUTION INTRAMUSCULAR; INTRAVENOUS at 21:56

## 2018-12-31 RX ADMIN — ATORVASTATIN CALCIUM 20 MG: 20 TABLET, FILM COATED ORAL at 17:22

## 2018-12-31 RX ADMIN — METHYLPREDNISOLONE SODIUM SUCCINATE 40 MG: 40 INJECTION, POWDER, FOR SOLUTION INTRAMUSCULAR; INTRAVENOUS at 13:16

## 2018-12-31 RX ADMIN — LEVALBUTEROL 1.25 MG: 1.25 SOLUTION, CONCENTRATE RESPIRATORY (INHALATION) at 01:34

## 2018-12-31 RX ADMIN — SENNOSIDES AND DOCUSATE SODIUM 1 TABLET: 8.6; 5 TABLET ORAL at 17:22

## 2018-12-31 RX ADMIN — LEVALBUTEROL 1.25 MG: 1.25 SOLUTION, CONCENTRATE RESPIRATORY (INHALATION) at 19:12

## 2018-12-31 RX ADMIN — SENNOSIDES AND DOCUSATE SODIUM 1 TABLET: 8.6; 5 TABLET ORAL at 08:40

## 2018-12-31 RX ADMIN — ENOXAPARIN SODIUM 40 MG: 40 INJECTION SUBCUTANEOUS at 17:22

## 2018-12-31 RX ADMIN — DULOXETINE HYDROCHLORIDE 60 MG: 60 CAPSULE, DELAYED RELEASE ORAL at 08:40

## 2018-12-31 RX ADMIN — TOPIRAMATE 25 MG: 25 TABLET, FILM COATED ORAL at 17:22

## 2018-12-31 RX ADMIN — IPRATROPIUM BROMIDE 0.5 MG: 0.5 SOLUTION RESPIRATORY (INHALATION) at 07:29

## 2018-12-31 RX ADMIN — IPRATROPIUM BROMIDE 0.5 MG: 0.5 SOLUTION RESPIRATORY (INHALATION) at 13:47

## 2018-12-31 RX ADMIN — LEVALBUTEROL 1.25 MG: 1.25 SOLUTION, CONCENTRATE RESPIRATORY (INHALATION) at 13:47

## 2018-12-31 RX ADMIN — LEVALBUTEROL 1.25 MG: 1.25 SOLUTION, CONCENTRATE RESPIRATORY (INHALATION) at 07:29

## 2018-12-31 RX ADMIN — TOPIRAMATE 25 MG: 25 TABLET, FILM COATED ORAL at 08:40

## 2018-12-31 RX ADMIN — METHYLPREDNISOLONE SODIUM SUCCINATE 40 MG: 40 INJECTION, POWDER, FOR SOLUTION INTRAMUSCULAR; INTRAVENOUS at 06:02

## 2018-12-31 RX ADMIN — IPRATROPIUM BROMIDE 0.5 MG: 0.5 SOLUTION RESPIRATORY (INHALATION) at 01:34

## 2018-12-31 RX ADMIN — IPRATROPIUM BROMIDE 0.5 MG: 0.5 SOLUTION RESPIRATORY (INHALATION) at 19:12

## 2018-12-31 RX ADMIN — OXYCODONE HYDROCHLORIDE AND ACETAMINOPHEN 500 MG: 500 TABLET ORAL at 08:40

## 2018-12-31 RX ADMIN — METHYLPREDNISOLONE SODIUM SUCCINATE 40 MG: 40 INJECTION, POWDER, FOR SOLUTION INTRAMUSCULAR; INTRAVENOUS at 00:13

## 2018-12-31 RX ADMIN — NORGESTREL AND ETHINYL ESTRADIOL 1 TABLET: KIT at 11:09

## 2018-12-31 RX ADMIN — VITAMIN D, TAB 1000IU (100/BT) 1000 UNITS: 25 TAB at 08:40

## 2018-12-31 NOTE — ASSESSMENT & PLAN NOTE
· Mild improvement today, WBCs 14 20   · Consider elevation in response to high dose steroids   · Will continue to trend   · Respiratory pathogen profile in progress, negative influenza, negative blood cultures, negative strep pneumo and legionella antigen

## 2018-12-31 NOTE — RESTORATIVE TECHNICIAN NOTE
Restorative Specialist Mobility Note       Activity: Ambulate in hill, Ambulate in room, Bathroom privileges, Dangle, Stand at bedside (Educated/encouraged pt to ambulate with assistance 3-4 x's/day   Pt callbell, phone/tray within reach )     Assistive Device: None (NC O2 on 3L)       Anton STERLING, Restorative Technician, [employer] [facility]

## 2018-12-31 NOTE — SOCIAL WORK
Met with pt and explained Cm role  Pt lives alone in a 3 story house with 8 CHRISTINE  Pt was independent PTA and was able to drive  Pt's PCP is Dr Parker Second  Pt has a nebulizer at home  No hx of HHC or rehab  No hx of mental health issues or drug or alcohol use  Pt has a prescription plan and uses [address] for her prescriptions  Primary contact is pt's brother Brooke Archer, contact # [phone]  Pt's friends or family will provide pt with transportation  Home upon discharge  Pt does not have a POA  CM reviewed d/c planning process including the following: identifying help at home, patient preference for d/c planning needs, Discharge Lounge, [facility] Meds to Bed program, availability of treatment team to discuss questions or concerns patient and/or family may have regarding understanding medications and recognizing signs and symptoms once discharged  CM also encouraged patient to follow up with all recommended appointments after discharge  Patient advised of importance for patient and family to participate in managing patients medical well being  Patient/caregiver received discharge checklist  Content reviewed  Patient/caregiver encouraged to participate in discharge plan of care prior to discharge home

## 2018-12-31 NOTE — PROGRESS NOTES
Progress Note - Pulmonary   Xi Waddell 41 y o  female MRN: [id number]  Unit/Bed#: [facility] 706-01 Encounter: [id number]      Assessment/Plan:  1  Mild intermittent asthma with status asthmaticus        *  Slowly improving        *  Decrease solumedrol to 40mg Q8hrs        *  Continue Xopenex/Atrovent Nebs Q6hrs        *  Respiratory pathogen profile pending  2  Acute hypoxic respiratory failure        *  Slowly improving --> now on 3LNC        *  Titrate as able to keep saturations greater than or equal to 88%        *  Incentive spirometry Q1hr, OOB as able, increase activity as able  3  Ongoing tobacco abuse        *  Smoking cessation        *  Continue nicotine patch    -Outpatient pulmonary follow up as per D/C instructions    Subjective: We have been asked to follow the patient out of the medical intensive care unit  She was originally admitted to their service on December 29th with acute shortness of breath and status asthmaticus  She was initially placed on BiPAP in the emergency room and was given ketamine, Humberto ox, heart neb x2, steroids, magnesium and subcu epi  She did not require intubation and began to slowly improve  She was transferred out of the intensive care after 24 hr of monitoring  She has a history of mild intermittent asthma and persistent tobacco abuse as well as depression and anxiety  She does not routinely follow with a pulmonologist and does not even have a rescue inhaler at home  Currently, she remains on 3 L of supplemental oxygen and is feeling much better from a pulmonary standpoint  She was actually out the halls ambulating but became winded toward the end of her walk  Of note, she has only been hospitalized once for her asthma and never required intubation  She has dry nonproductive cough  She has been afebrile  She denies chest pain, resting shortness of breath or fevers  She is agreeable to outpatient pulmonary follow-up in our [place] office      Objective: Vitals: Blood pressure 122/73, pulse 78, temperature 98 3 °F (36 8 °C), temperature source Oral, resp  rate 20, height 5' 3" (1 6 m), weight 96 3 kg (212 lb 4 9 oz), SpO2 98 %, not currently breastfeeding  , 3LNC, Body mass index is 37 61 kg/m²  Intake/Output Summary (Last 24 hours) at 12/31/18 0945  Last data filed at 12/31/18 0800   Gross per 24 hour   Intake            896 1 ml   Output             1825 ml   Net           -928 9 ml         Physical Exam  Gen: Awake, alert, oriented x 3, no acute distress  HEENT: Mucous membranes moist, no oral lesions, no thrush  NECK: No accessory muscle use, JVP not elevated  Cardiac: Regular, single S1, single S2, no murmurs, no rubs, no gallops  Lungs:  Decreased breath sounds at the bases bilaterally otherwise clear to auscultation without wheezes, rhonchi or rales  Abdomen:  Obese, normoactive bowel sounds, soft nontender, nondistended, no rebound or rigidity, no guarding  Extremities: no cyanosis, no clubbing, no edema    Labs: I have personally reviewed pertinent lab results  , ABG: No results found for: PHART, SCS9IYO, PO2ART, QSU8UFM, [id number], BEART, SOURCE, BNP: No results found for: BNP, CBC:   Lab Results   Component Value Date    WBC 14 20 (H) 12/31/2018    HGB 11 3 (L) 12/31/2018    HCT 34 7 (L) 12/31/2018    MCV 91 12/31/2018     12/31/2018    MCH 29 5 12/31/2018    MCHC 32 6 12/31/2018    RDW 12 7 12/31/2018    MPV 9 4 12/31/2018    NRBC 0 12/31/2018   , CMP:   Lab Results   Component Value Date    SODIUM 139 12/31/2018    K 4 1 12/31/2018     (H) 12/31/2018    CO2 23 12/31/2018    BUN 14 12/31/2018    CREATININE 0 66 12/31/2018    CALCIUM 8 1 (L) 12/31/2018    EGFR 113 12/31/2018   , PT/INR: No results found for: PT, INR, Troponin: No results found for: TROPONINI     Respiratory pathogen profile pending    Influenza A/B, RSV - NEGATIVE    Imaging and other studies: I have personally reviewed pertinent films in PACS    [facility] 12/29/18  No acute cardiopulmonary disease  No consolidations or effusions noted      Lorri Barros PA-C

## 2018-12-31 NOTE — ASSESSMENT & PLAN NOTE
· Patient admitted for status asthmaticus and placed on continuous BiPap in MICU, transfer to floor last evening   · Weaned off bipap and now on NC 3 L, will continue to monitor and increase as needed for SpO2>88%   · Continue incentive spirometry and deep breathing   · Continue Xopenex/Atrovent Q6H

## 2018-12-31 NOTE — ASSESSMENT & PLAN NOTE
· Continue Xopenex/Atrovent Q6H   · Decrease Methylprednisolone to 40 mg q8h  · Needs outpatient follow up   · Up and out of bed as tolerated

## 2018-12-31 NOTE — PROGRESS NOTES
[address] Internal Medicine  Progress Note - Leobardo Suarez 1980, 41 y o  female MRN: [id number]  [facility] Encounter: [id number]  Primary Care Provider: Parris Ornelas MD   Date and time admitted to hospital: 12/29/2018 11:49 AM    * Acute respiratory failure with hypoxia and hypercapnia ([address] )   Assessment & Plan    · Patient admitted for status asthmaticus and placed on continuous BiPap in MICU, transfer to floor last evening   · Weaned off bipap and now on NC 3 L, will continue to monitor and increase as needed for SpO2>88%   · Continue incentive spirometry and deep breathing   · Continue Xopenex/Atrovent Q6H     Leukocytosis   Assessment & Plan    · Mild improvement today, WBCs 14 20   · Consider elevation in response to high dose steroids   · Will continue to trend   · Respiratory pathogen profile in progress, negative influenza, negative blood cultures, negative strep pneumo and legionella antigen      Mild intermittent asthma with status asthmaticus   Assessment & Plan    · Continue Xopenex/Atrovent Q6H   · Decrease Methylprednisolone to 40 mg q8h  · Needs outpatient follow up   · Up and out of bed as tolerated      History of Clostridium difficile colitis   Assessment & Plan    · Avoid antibiotics if possible   · Consider prophylaxis with PO vanco if antibiotics are needed      Tobacco use   Assessment & Plan    · Nicotine replacement patch ordered   · Encourage cessation      Depression with anxiety   Assessment & Plan    · Continue Cymbalta, Topamax and PRN Xanax      Obesity   Assessment & Plan    · Lifestyle and dietary changes   · Follow up with PCP      Hypercholesterolemia   Assessment & Plan    · Continue statin        VTE Pharmacologic Prophylaxis:   Pharmacologic: Enoxaparin (Lovenox)  Mechanical VTE Prophylaxis in Place: Yes    Patient Centered Rounds: I have performed bedside rounds with nursing staff today      Discussions with Specialists or Other Care Team Provider: none    Education and Discussions with Family / Patient: patient    Time Spent for Care: 20 minutes  More than 50% of total time spent on counseling and coordination of care as described above  Current Length of Stay: 2 day(s)    Current Patient Status: Inpatient   Certification Statement: The patient will continue to require additional inpatient hospital stay due to continued respiratory monitoring     Discharge Plan: not medically stable    Code Status: Level 1 - Full Code      Subjective:   Patient reports she continues to feel tired and short of breath this morning  Patient denies any worsening shortness of breath and reports she is able to take a deep breath  Patient does report an occasional cough, but denies fever or chills  Patient reports she still feels like she is wheezing but reports it is helped by her nebulizers  Objective:     Vitals:   Temp (24hrs), Av 4 °F (36 9 °C), Min:98 3 °F (36 8 °C), Max:98 4 °F (36 9 °C)    Temp:  [98 3 °F (36 8 °C)-98 4 °F (36 9 °C)] 98 3 °F (36 8 °C)  HR:  [68-90] 78  Resp:  [20-33] 20  BP: (100-122)/(44-73) 122/73  SpO2:  [96 %-99 %] 98 %  Body mass index is 37 61 kg/m²  Input and Output Summary (last 24 hours): Intake/Output Summary (Last 24 hours) at 18 1102  Last data filed at 18 0800   Gross per 24 hour   Intake              500 ml   Output              475 ml   Net               25 ml       Physical Exam:     Physical Exam   Constitutional: She is oriented to person, place, and time  She appears well-developed and well-nourished  No distress  HENT:   Head: Normocephalic and atraumatic  Mouth/Throat: Oropharynx is clear and moist    Eyes: Conjunctivae and EOM are normal  No scleral icterus  Neck: Normal range of motion  Neck supple  No JVD present  Cardiovascular: Normal rate, regular rhythm and normal heart sounds  No murmur heard  Pulmonary/Chest: Effort normal  No tachypnea  No respiratory distress   She has wheezes (wheezing in all lung fields )  Abdominal: Soft  Bowel sounds are normal  She exhibits no distension  There is no tenderness  There is no rebound  Musculoskeletal: Normal range of motion  She exhibits no edema  Neurological: She is alert and oriented to person, place, and time  Skin: Skin is warm and dry  Psychiatric: She has a normal mood and affect  Her behavior is normal  Thought content normal    Vitals reviewed  Additional Data:     Labs:      Results from last 7 days  Lab Units 12/31/18  0623   WBC Thousand/uL 14 20*   HEMOGLOBIN g/dL 11 3*   HEMATOCRIT % 34 7*   PLATELETS Thousands/uL 180   NEUTROS PCT % 89*   LYMPHS PCT % 6*   MONOS PCT % 4   EOS PCT % 0       Results from last 7 days  Lab Units 12/31/18  0623 12/30/18  0450   SODIUM mmol/L 139 137   POTASSIUM mmol/L 4 1 3 9   CHLORIDE mmol/L 110* 107   CO2 mmol/L 23 25   BUN mg/dL 14 11   CREATININE mg/dL 0 66 0 60   ANION GAP mmol/L 6 5   CALCIUM mg/dL 8 1* 7 5*   ALBUMIN g/dL  --  2 6*   TOTAL BILIRUBIN mg/dL  --  0 26   ALK PHOS U/L  --  69   ALT U/L  --  15   AST U/L  --  10   GLUCOSE RANDOM mg/dL 111 122           Results from last 7 days  Lab Units 12/31/18  1042 12/31/18  0626 12/30/18  2126 12/30/18  1610 12/30/18  1201 12/30/18  0522 12/29/18  2340 12/29/18  1735   POC GLUCOSE mg/dl 131 120 136 128 130 102 119 159*           Results from last 7 days  Lab Units 12/30/18  0450 12/29/18  1531   PROCALCITONIN ng/ml <0 05 <0 05           * I Have Reviewed All Lab Data Listed Above  * Additional Pertinent Lab Tests Reviewed: All Labs Within Last 24 Hours Reviewed    Imaging:    Imaging Reports Reviewed Today Include: chest XR  Imaging Personally Reviewed by Myself Includes:  none    Recent Cultures (last 7 days):       Results from last 7 days  Lab Units 12/30/18  0043 12/29/18  1532 12/29/18  1403   BLOOD CULTURE   --  No Growth at 24 hrs    No Growth at 24 hrs   --    INFLUENZA B PCR   --   --  None Detected   RSV PCR   --   --  None Detected   LEGIONELLA URINARY ANTIGEN  Negative  --   --        Last 24 Hours Medication List:     Current Facility-Administered Medications:  acetaminophen 650 mg Oral Q6H PRN MABEL Tovar   ALPRAZolam 0 25 mg Oral BID PRN , CRNP   ascorbic acid 500 mg Oral Daily , CRNP   atorvastatin 20 mg Oral Daily With Dinner , CRNP   cholecalciferol 1,000 Units Oral Daily , CRNP   DULoxetine 60 mg Oral Daily Christinamario Raphael, MABEL   enoxaparin 40 mg Subcutaneous Daily MABEL Tovar   influenza vaccine 0 5 mL Intramuscular Prior to discharge , CRNP   insulin lispro 1-5 Units Subcutaneous TID AC Elizabeth Middleton, MABEL   insulin lispro 1-5 Units Subcutaneous HS MABEL Best   ipratropium 0 5 mg Nebulization Q6H Janie Bratis, DO   levalbuterol 1 25 mg Nebulization Q6H Janie Bratis, DO   methylPREDNISolone sodium succinate 40 mg Intravenous Q8H [address] Mariahge ERASMO Sotelo   nicotine 7 mg Transdermal Daily , CRNP   norgestrel-ethinyl estradiol 1 tablet Oral Daily , MABEL   ondansetron 4 mg Intravenous Q4H PRN , MABEL   senna-docusate sodium 1 tablet Oral BID , MABEL   topiramate 25 mg Oral BID MABEL Tovar        Today, Patient Was Seen By: Dasia Morales PA-C    ** Please Note: Dictation voice to text software may have been used in the creation of this document   **

## 2019-01-01 LAB
GLUCOSE SERPL-MCNC: 106 MG/DL (ref 65–140)
GLUCOSE SERPL-MCNC: 111 MG/DL (ref 65–140)
GLUCOSE SERPL-MCNC: 115 MG/DL (ref 65–140)
GLUCOSE SERPL-MCNC: 93 MG/DL (ref 65–140)

## 2019-01-01 PROCEDURE — 99232 SBSQ HOSP IP/OBS MODERATE 35: CPT | Performed by: INTERNAL MEDICINE

## 2019-01-01 PROCEDURE — 99232 SBSQ HOSP IP/OBS MODERATE 35: CPT | Performed by: NURSE PRACTITIONER

## 2019-01-01 PROCEDURE — 82948 REAGENT STRIP/BLOOD GLUCOSE: CPT

## 2019-01-01 PROCEDURE — 94760 N-INVAS EAR/PLS OXIMETRY 1: CPT

## 2019-01-01 PROCEDURE — 94640 AIRWAY INHALATION TREATMENT: CPT

## 2019-01-01 RX ORDER — GUAIFENESIN 600 MG
1200 TABLET, EXTENDED RELEASE 12 HR ORAL EVERY 12 HOURS SCHEDULED
Status: DISCONTINUED | OUTPATIENT
Start: 2019-01-01 | End: 2019-01-04 | Stop reason: HOSPADM

## 2019-01-01 RX ADMIN — LEVALBUTEROL 1.25 MG: 1.25 SOLUTION, CONCENTRATE RESPIRATORY (INHALATION) at 19:07

## 2019-01-01 RX ADMIN — METHYLPREDNISOLONE SODIUM SUCCINATE 40 MG: 40 INJECTION, POWDER, FOR SOLUTION INTRAMUSCULAR; INTRAVENOUS at 14:48

## 2019-01-01 RX ADMIN — IPRATROPIUM BROMIDE 0.5 MG: 0.5 SOLUTION RESPIRATORY (INHALATION) at 07:27

## 2019-01-01 RX ADMIN — TOPIRAMATE 25 MG: 25 TABLET, FILM COATED ORAL at 09:44

## 2019-01-01 RX ADMIN — IPRATROPIUM BROMIDE 0.5 MG: 0.5 SOLUTION RESPIRATORY (INHALATION) at 19:08

## 2019-01-01 RX ADMIN — SENNOSIDES AND DOCUSATE SODIUM 1 TABLET: 8.6; 5 TABLET ORAL at 09:44

## 2019-01-01 RX ADMIN — VITAMIN D, TAB 1000IU (100/BT) 1000 UNITS: 25 TAB at 09:44

## 2019-01-01 RX ADMIN — GUAIFENESIN 1200 MG: 600 TABLET, EXTENDED RELEASE ORAL at 09:44

## 2019-01-01 RX ADMIN — LEVALBUTEROL 1.25 MG: 1.25 SOLUTION, CONCENTRATE RESPIRATORY (INHALATION) at 07:27

## 2019-01-01 RX ADMIN — METHYLPREDNISOLONE SODIUM SUCCINATE 40 MG: 40 INJECTION, POWDER, FOR SOLUTION INTRAMUSCULAR; INTRAVENOUS at 21:13

## 2019-01-01 RX ADMIN — GUAIFENESIN 1200 MG: 600 TABLET, EXTENDED RELEASE ORAL at 21:12

## 2019-01-01 RX ADMIN — METHYLPREDNISOLONE SODIUM SUCCINATE 40 MG: 40 INJECTION, POWDER, FOR SOLUTION INTRAMUSCULAR; INTRAVENOUS at 06:31

## 2019-01-01 RX ADMIN — ATORVASTATIN CALCIUM 20 MG: 20 TABLET, FILM COATED ORAL at 17:06

## 2019-01-01 RX ADMIN — NICOTINE 7 MG: 7 PATCH TRANSDERMAL at 09:44

## 2019-01-01 RX ADMIN — NORGESTREL AND ETHINYL ESTRADIOL 1 TABLET: KIT at 09:45

## 2019-01-01 RX ADMIN — LEVALBUTEROL 1.25 MG: 1.25 SOLUTION, CONCENTRATE RESPIRATORY (INHALATION) at 13:33

## 2019-01-01 RX ADMIN — DULOXETINE HYDROCHLORIDE 60 MG: 60 CAPSULE, DELAYED RELEASE ORAL at 09:44

## 2019-01-01 RX ADMIN — TOPIRAMATE 25 MG: 25 TABLET, FILM COATED ORAL at 17:06

## 2019-01-01 RX ADMIN — IPRATROPIUM BROMIDE 0.5 MG: 0.5 SOLUTION RESPIRATORY (INHALATION) at 01:23

## 2019-01-01 RX ADMIN — SENNOSIDES AND DOCUSATE SODIUM 1 TABLET: 8.6; 5 TABLET ORAL at 17:07

## 2019-01-01 RX ADMIN — LEVALBUTEROL 1.25 MG: 1.25 SOLUTION, CONCENTRATE RESPIRATORY (INHALATION) at 01:23

## 2019-01-01 RX ADMIN — OXYCODONE HYDROCHLORIDE AND ACETAMINOPHEN 500 MG: 500 TABLET ORAL at 09:44

## 2019-01-01 RX ADMIN — IPRATROPIUM BROMIDE 0.5 MG: 0.5 SOLUTION RESPIRATORY (INHALATION) at 13:33

## 2019-01-01 RX ADMIN — ENOXAPARIN SODIUM 40 MG: 40 INJECTION SUBCUTANEOUS at 17:00

## 2019-01-01 NOTE — PROGRESS NOTES
Progress Note - Pulmonary   Corinne Paci 41 y o  female MRN: [id number]  Unit/Bed#: [facility] 706-01 Encounter: [id number]      Assessment/Plan:  1  Mild intermittent asthma with status asthmaticus  1  Personal improvement today from day prior  2  Continue Solu-Medrol 40 mg q 8 hours  3  Continue Xopenex/Atrovent q 6 hours  4  Add budesonide b i d    5  Flu negative, a respiratory pathogen profile positive rhinovirus-supportive care  2  Acute hypoxic respiratory failure  1  Continue titrate oxygen maintain SpO2 greater than or equal to 90%  2  Pulmonary toilet:  Septum tree, out of bed with increasing ambulation as tolerated  3  Tobacco abuse  1  Nicotine replacement therapy in complete cessation encouraged      * outpatient Pulmonary follow-up per discharge instructions  Subjective:     Manuel Dawson was seen lying flat in bed upon entering  Denies acute overnight events, but does report reports her symptoms have not improved since day prior  She continues to experience significant chest tightness, wheezing and shortness of Breath  She has not ambulated in the halls today, although she was able to ambulate yesterday  Denies:  Fevers, chills, night sweats, chest pain, or hemoptysis    Objective:         Vitals: Blood pressure 140/71, pulse 76, temperature (!) 33 8 °F (1 °C), resp  rate 20, height 5' 3" (1 6 m), weight 96 kg (211 lb 10 3 oz), SpO2 95 %, not currently breastfeeding  , 2LNC, Body mass index is 37 49 kg/m²        Intake/Output Summary (Last 24 hours) at 01/01/19 1336  Last data filed at 01/01/19 1200   Gross per 24 hour   Intake              840 ml   Output             2225 ml   Net            -1385 ml         Physical Exam  Gen: Awake, alert, oriented x 3, no acute distress  HEENT: Mucous membranes moist, no oral lesions, no thrush  NECK: No accessory muscle use, JVP not elevated  Cardiac: Regular, single S1, single S2, no murmurs, no rubs, no gallops  Lungs:  Diffuse expiratory wheezes with decreased aeration noted, no rhonchi or rales  Abdomen:  Obesity, normoactive bowel sounds, soft nontender, nondistended, no rebound or rigidity, no guarding  Extremities: no cyanosis, no clubbing, no edema    Labs: None  Imaging and other studies: None      MABEL Ramirez

## 2019-01-01 NOTE — PROGRESS NOTES
Rounded with Dr Julianne Armas from [facility]  See progress notes and provider orders for updates  Will continue to monitor

## 2019-01-01 NOTE — PLAN OF CARE
DISCHARGE PLANNING     Discharge to home or other facility with appropriate resources Progressing        DISCHARGE PLANNING - CARE MANAGEMENT     Discharge to post-acute care or home with appropriate resources Progressing        INFECTION - ADULT     Absence or prevention of progression during hospitalization Progressing     Absence of fever/infection during neutropenic period Progressing        PAIN - ADULT     Verbalizes/displays adequate comfort level or baseline comfort level Progressing        Potential for Falls     Patient will remain free of falls Progressing        Prexisting or High Potential for Compromised Skin Integrity     Skin integrity is maintained or improved Progressing        RESPIRATORY - ADULT     Achieves optimal ventilation and oxygenation Progressing        SAFETY ADULT     Maintain or return to baseline ADL function Progressing     Maintain or return mobility status to optimal level Progressing

## 2019-01-02 LAB
GLUCOSE SERPL-MCNC: 103 MG/DL (ref 65–140)
GLUCOSE SERPL-MCNC: 105 MG/DL (ref 65–140)
GLUCOSE SERPL-MCNC: 121 MG/DL (ref 65–140)
GLUCOSE SERPL-MCNC: 94 MG/DL (ref 65–140)

## 2019-01-02 PROCEDURE — 99232 SBSQ HOSP IP/OBS MODERATE 35: CPT | Performed by: INTERNAL MEDICINE

## 2019-01-02 PROCEDURE — 94760 N-INVAS EAR/PLS OXIMETRY 1: CPT

## 2019-01-02 PROCEDURE — 94640 AIRWAY INHALATION TREATMENT: CPT

## 2019-01-02 PROCEDURE — 82948 REAGENT STRIP/BLOOD GLUCOSE: CPT

## 2019-01-02 RX ORDER — BUDESONIDE 0.5 MG/2ML
0.5 INHALANT ORAL
Status: DISCONTINUED | OUTPATIENT
Start: 2019-01-02 | End: 2019-01-04 | Stop reason: HOSPADM

## 2019-01-02 RX ADMIN — METHYLPREDNISOLONE SODIUM SUCCINATE 40 MG: 40 INJECTION, POWDER, FOR SOLUTION INTRAMUSCULAR; INTRAVENOUS at 05:34

## 2019-01-02 RX ADMIN — GUAIFENESIN 1200 MG: 600 TABLET, EXTENDED RELEASE ORAL at 21:06

## 2019-01-02 RX ADMIN — LEVALBUTEROL 1.25 MG: 1.25 SOLUTION, CONCENTRATE RESPIRATORY (INHALATION) at 07:46

## 2019-01-02 RX ADMIN — IPRATROPIUM BROMIDE 0.5 MG: 0.5 SOLUTION RESPIRATORY (INHALATION) at 00:59

## 2019-01-02 RX ADMIN — SENNOSIDES AND DOCUSATE SODIUM 1 TABLET: 8.6; 5 TABLET ORAL at 08:34

## 2019-01-02 RX ADMIN — SENNOSIDES AND DOCUSATE SODIUM 1 TABLET: 8.6; 5 TABLET ORAL at 17:24

## 2019-01-02 RX ADMIN — LEVALBUTEROL 1.25 MG: 1.25 SOLUTION, CONCENTRATE RESPIRATORY (INHALATION) at 13:17

## 2019-01-02 RX ADMIN — ENOXAPARIN SODIUM 40 MG: 40 INJECTION SUBCUTANEOUS at 17:24

## 2019-01-02 RX ADMIN — LEVALBUTEROL 1.25 MG: 1.25 SOLUTION, CONCENTRATE RESPIRATORY (INHALATION) at 00:59

## 2019-01-02 RX ADMIN — BUDESONIDE 0.5 MG: 0.5 INHALANT RESPIRATORY (INHALATION) at 19:34

## 2019-01-02 RX ADMIN — NORGESTREL AND ETHINYL ESTRADIOL 1 TABLET: KIT at 08:37

## 2019-01-02 RX ADMIN — GUAIFENESIN 1200 MG: 600 TABLET, EXTENDED RELEASE ORAL at 08:35

## 2019-01-02 RX ADMIN — TOPIRAMATE 25 MG: 25 TABLET, FILM COATED ORAL at 08:35

## 2019-01-02 RX ADMIN — NICOTINE 7 MG: 7 PATCH TRANSDERMAL at 08:35

## 2019-01-02 RX ADMIN — METHYLPREDNISOLONE SODIUM SUCCINATE 40 MG: 40 INJECTION, POWDER, FOR SOLUTION INTRAMUSCULAR; INTRAVENOUS at 13:47

## 2019-01-02 RX ADMIN — DULOXETINE HYDROCHLORIDE 60 MG: 60 CAPSULE, DELAYED RELEASE ORAL at 08:34

## 2019-01-02 RX ADMIN — IPRATROPIUM BROMIDE 0.5 MG: 0.5 SOLUTION RESPIRATORY (INHALATION) at 13:17

## 2019-01-02 RX ADMIN — OXYCODONE HYDROCHLORIDE AND ACETAMINOPHEN 500 MG: 500 TABLET ORAL at 08:34

## 2019-01-02 RX ADMIN — LEVALBUTEROL 1.25 MG: 1.25 SOLUTION, CONCENTRATE RESPIRATORY (INHALATION) at 19:34

## 2019-01-02 RX ADMIN — VITAMIN D, TAB 1000IU (100/BT) 1000 UNITS: 25 TAB at 08:34

## 2019-01-02 RX ADMIN — TOPIRAMATE 25 MG: 25 TABLET, FILM COATED ORAL at 17:24

## 2019-01-02 RX ADMIN — IPRATROPIUM BROMIDE 0.5 MG: 0.5 SOLUTION RESPIRATORY (INHALATION) at 19:34

## 2019-01-02 RX ADMIN — METHYLPREDNISOLONE SODIUM SUCCINATE 40 MG: 40 INJECTION, POWDER, FOR SOLUTION INTRAMUSCULAR; INTRAVENOUS at 21:06

## 2019-01-02 RX ADMIN — ATORVASTATIN CALCIUM 20 MG: 20 TABLET, FILM COATED ORAL at 17:23

## 2019-01-02 RX ADMIN — IPRATROPIUM BROMIDE 0.5 MG: 0.5 SOLUTION RESPIRATORY (INHALATION) at 07:46

## 2019-01-02 NOTE — RESTORATIVE TECHNICIAN NOTE
Restorative Specialist Mobility Note       Activity: Ambulate in hill, Ambulate in room, Bathroom privileges, Dangle, Stand at bedside (Educated/encouraged pt to ambulate with assistance 3-4 x's/day   Pt callbell, phone/tray within reach )     Assistive Device: None (NC O2 on 3L with face mask on)    Luis Alberto STERLING, Restorative Technician, [employer]

## 2019-01-02 NOTE — PROGRESS NOTES
Progress Note - Pulmonary   Shannon Tishomingo 41 y o  female MRN: [id number]  Unit/Bed#: [facility] 706-01 Encounter: [id number]      Assessment/Plan:  1  Mild intermittent asthma with status asthmaticus likely secondary to rhinovirus        *  Slowly improving        *  Continue solumedrol 40mg Q8hrs & hope to decrease in next 24hrs        *  Continue Xopenex/Atrovent Nebs Q6hrs  2  Acute hypoxic respiratory failure        *  Slowly improving --> currently on 3LNC        *  Titrate as able to keep saturations greater than or equal to 88%        *  Incentive spirometry Q1hr, OOB as able, increase activity as able  3  Ongoing tobacco abuse        *  Smoking cessation        *  Continue nicotine patch     -Outpatient pulmonary follow up as per D/C instructions     Subjective:   Ms Devin Melgoza is seen lying in bed this morning  Overall, she is feeling a bit better today and feels that her breathing is slowly improving  She remains on 3 L of supplemental oxygen which she was not using at home  Chest tightness is also better as well as bronchospasm  She is able to ambulate more in her room in his been out of bed more  She denies chest pain, resting shortness of breath, or fever  She has occasional cough without significant sputum production  Objective:     Vitals: Blood pressure 153/86, pulse 63, temperature 98 2 °F (36 8 °C), temperature source Oral, resp  rate 18, height 5' 3" (1 6 m), weight 94 1 kg (207 lb 7 3 oz), SpO2 98 %, not currently breastfeeding  , 3LNC, Body mass index is 36 75 kg/m²        Intake/Output Summary (Last 24 hours) at 01/02/19 1031  Last data filed at 01/02/19 0749   Gross per 24 hour   Intake              960 ml   Output             3050 ml   Net            -2090 ml         Physical Exam  Gen: Awake, alert, oriented x 3, no acute distress  HEENT: Mucous membranes moist, no oral lesions, no thrush, wearing O2 via nasal cannula  NECK: No accessory muscle use, JVP not elevated  Cardiac: Regular, single S1, single S2, no murmurs, no rubs, no gallops  Lungs:  Decreased breath sounds throughout bilaterally with scattered inspiratory and expiratory wheezes bilaterally  No rales or rhonchi noted  Abdomen:  Obese, normoactive bowel sounds, soft nontender, nondistended, no rebound or rigidity, no guarding  Extremities: no cyanosis, no clubbing, no edema    Labs: I have personally reviewed pertinent lab results  , ABG: No results found for: PHART, IPO9GRW, PO2ART, EBI5GSQ, L0RCUZDA, BEART, SOURCE, BNP: No results found for: BNP, CBC: No results found for: WBC, HGB, HCT, MCV, PLT, ADJUSTEDWBC, MCH, MCHC, RDW, MPV, NRBC, CMP: No results found for: SODIUM, K, CL, CO2, ANIONGAP, BUN, CREATININE, GLUCOSE, CALCIUM, AST, ALT, ALKPHOS, PROT, BILITOT, EGFR, PT/INR: No results found for: PT, INR, Troponin: No results found for: TROPONINI     Respiratory pathogen profile positive for rhino virus    Imaging and other studies: I have personally reviewed pertinent films in PACS    No new pulmonary imaging since December 29, 2018    Geena Keys, [place]

## 2019-01-02 NOTE — PROGRESS NOTES
[address] Internal Medicine Progress Note  Patient: Chetna Campbell 41 y o  female   MRN: [id number]  PCP: Shy Wooten MD  Unit/Bed#: [facility] 152-72 Encounter: [id number]  Date Of Visit: 01/02/19    Assessment:    Principal Problem:    Acute respiratory failure with hypoxia and hypercapnia (HCC)  Active Problems:    Hypercholesterolemia    Mild intermittent asthma with status asthmaticus    Obesity    Depression with anxiety    Tobacco use    Leukocytosis    History of Clostridium difficile colitis      Plan:    1  Mild intermittent asthma with status asthmaticus, likely secondary to viral tracheobronchitis, slowly improving on IV Solu-Medrol and bronchodilators, negative chest x-ray,  respiratory pathogen profile positive for rhinovirus, continue Mucinex and incentive spirometry, pulmonology is following, out of bed and ambulation  2  Acute hypoxic respiratory failure, secondary to above, improving, continue to wean oxygen  3  Tobacco smoking, continue nicotine patch   4  Anxiety and depression, continue Cymbalta and Topamax  5  Hyperlipidemia, continue statin  6  Leukocytosis, likely steroid effect, monitor  7  History of C diff colitis       VTE Pharmacologic Prophylaxis:   Pharmacologic: Enoxaparin (Lovenox)  Mechanical VTE Prophylaxis in Place: Yes    Patient Centered Rounds: I have performed bedside rounds with nursing staff today  Discussions with Specialists or Other Care Team Provider:     Education and Discussions with Family / Patient:  Patient    Time Spent for Care: 30 minutes  More than 50% of total time spent on counseling and coordination of care as described above      Current Length of Stay: 4 day(s)    Current Patient Status: Inpatient   Certification Statement: The patient will continue to require additional inpatient hospital stay due to Management of asthma exacerbation    Discharge Plan / Estimated Discharge Date: not ready yet    Code Status: Level 1 - Full Code      Subjective:     Patient seen and examined  Continue to have cough, chest congestion and dyspnea on exertion  Clinically with mild improvement  No nausea vomiting or diarrhea      Objective:     Vitals:   Temp (24hrs), Av 4 °F (36 9 °C), Min:98 2 °F (36 8 °C), Max:98 6 °F (37 °C)    Temp:  [98 2 °F (36 8 °C)-98 6 °F (37 °C)] 98 2 °F (36 8 °C)  HR:  [63-79] 63  Resp:  [18] 18  BP: (117-153)/(60-86) 153/86  SpO2:  [95 %-100 %] 98 %  Body mass index is 36 75 kg/m²  Input and Output Summary (last 24 hours): Intake/Output Summary (Last 24 hours) at 19 0834  Last data filed at 19 0749   Gross per 24 hour   Intake              960 ml   Output             3050 ml   Net            -2090 ml       Physical Exam:     Physical Exam  Patient is awake alert oriented no acute distress  Lungs with improving diffuse expiratory wheezing and chest rhonchi  Heart positive S1-S2 no murmur  Abdomen soft nontender positive bowel sounds  Lower extremities no edema    Additional Data:     Labs:      Results from last 7 days  Lab Units 18  0623   WBC Thousand/uL 14 20*   HEMOGLOBIN g/dL 11 3*   HEMATOCRIT % 34 7*   PLATELETS Thousands/uL 180   NEUTROS PCT % 89*   LYMPHS PCT % 6*   MONOS PCT % 4   EOS PCT % 0       Results from last 7 days  Lab Units 18  0623 18  0450 18  1459   POTASSIUM mmol/L 4 1 3 9  --    CHLORIDE mmol/L 110* 107  --    CO2 mmol/L 23 25  --    CO2, I-STAT mmol/L  --   --  21   BUN mg/dL 14 11  --    CREATININE mg/dL 0 66 0 60  --    CALCIUM mg/dL 8 1* 7 5*  --    ALK PHOS U/L  --  69  --    ALT U/L  --  15  --    AST U/L  --  10  --    GLUCOSE, ISTAT mg/dl  --   --  164*           * I Have Reviewed All Lab Data Listed Above  * Additional Pertinent Lab Tests Reviewed:  [address] Admission Reviewed    Imaging:    Imaging Reports Reviewed Today Include:   Imaging Personally Reviewed by Myself Includes:      Recent Cultures (last 7 days):       Results from last 7 days  Lab Units 12/30/18  0043 12/29/18  1532 12/29/18  1403   BLOOD CULTURE   --  No Growth at 72 hrs  No Growth at 72 hrs   --    INFLUENZA B PCR   --   --  None Detected   RSV PCR   --   --  None Detected   LEGIONELLA URINARY ANTIGEN  Negative  --   --        Last 24 Hours Medication List:     Current Facility-Administered Medications:  acetaminophen 650 mg Oral Q6H PRN MABEL Kee   ALPRAZolam 0 25 mg Oral BID PRN MABEL Bradshaw   ascorbic acid 500 mg Oral Daily MABEL Bradshaw   atorvastatin 20 mg Oral Daily With [facility], MABEL   cholecalciferol 1,000 Units Oral Daily MABEL Bradshaw   DULoxetine 60 mg Oral Daily Christina Raphael, MABEL   enoxaparin 40 mg Subcutaneous Daily Christina Raphael, MABEL   guaiFENesin 1,200 mg Oral Q12H [facility] Wisam Puentes DO   influenza vaccine 0 5 mL Intramuscular Prior to discharge MABEL Bradshaw   insulin lispro 1-5 Units Subcutaneous TID AC MABEL Best   ipratropium 0 5 mg Nebulization Q6H Janie Niles,    levalbuterol 1 25 mg Nebulization Q6H Janie Cage,    methylPREDNISolone sodium succinate 40 mg Intravenous Q8H [facility] Alber Torre PA-C   nicotine 7 mg Transdermal Daily MABEL Bradshaw   norgestrel-ethinyl estradiol 1 tablet Oral Daily MABEL Best   ondansetron 4 mg Intravenous Q4H PRN MABEL Bradshaw   senna-docusate sodium 1 tablet Oral BID MABEL Bradshaw   topiramate 25 mg Oral BID MABEL Kee        Today, Patient Was Seen By: Wisam Puentes DO    ** Please Note: This note has been constructed using a voice recognition system   **

## 2019-01-03 LAB
ANION GAP SERPL CALCULATED.3IONS-SCNC: 8 MMOL/L (ref 4–13)
BACTERIA BLD CULT: NORMAL
BACTERIA BLD CULT: NORMAL
BASOPHILS # BLD AUTO: 0.02 THOUSANDS/ΜL (ref 0–0.1)
BASOPHILS NFR BLD AUTO: 0 % (ref 0–1)
BUN SERPL-MCNC: 15 MG/DL (ref 5–25)
CALCIUM SERPL-MCNC: 8.6 MG/DL (ref 8.3–10.1)
CHLORIDE SERPL-SCNC: 105 MMOL/L (ref 100–108)
CO2 SERPL-SCNC: 24 MMOL/L (ref 21–32)
CREAT SERPL-MCNC: 0.69 MG/DL (ref 0.6–1.3)
EOSINOPHIL # BLD AUTO: 0 THOUSAND/ΜL (ref 0–0.61)
EOSINOPHIL NFR BLD AUTO: 0 % (ref 0–6)
ERYTHROCYTE [DISTWIDTH] IN BLOOD BY AUTOMATED COUNT: 12 % (ref 11.6–15.1)
GFR SERPL CREATININE-BSD FRML MDRD: 112 ML/MIN/1.73SQ M
GLUCOSE SERPL-MCNC: 100 MG/DL (ref 65–140)
GLUCOSE SERPL-MCNC: 103 MG/DL (ref 65–140)
GLUCOSE SERPL-MCNC: 117 MG/DL (ref 65–140)
GLUCOSE SERPL-MCNC: 126 MG/DL (ref 65–140)
GLUCOSE SERPL-MCNC: 90 MG/DL (ref 65–140)
HCT VFR BLD AUTO: 41.3 % (ref 34.8–46.1)
HGB BLD-MCNC: 13.3 G/DL (ref 11.5–15.4)
IMM GRANULOCYTES # BLD AUTO: 0.21 THOUSAND/UL (ref 0–0.2)
IMM GRANULOCYTES NFR BLD AUTO: 2 % (ref 0–2)
LYMPHOCYTES # BLD AUTO: 1.33 THOUSANDS/ΜL (ref 0.6–4.47)
LYMPHOCYTES NFR BLD AUTO: 12 % (ref 14–44)
MAGNESIUM SERPL-MCNC: 2.4 MG/DL (ref 1.6–2.6)
MCH RBC QN AUTO: 29 PG (ref 26.8–34.3)
MCHC RBC AUTO-ENTMCNC: 32.2 G/DL (ref 31.4–37.4)
MCV RBC AUTO: 90 FL (ref 82–98)
MONOCYTES # BLD AUTO: 0.49 THOUSAND/ΜL (ref 0.17–1.22)
MONOCYTES NFR BLD AUTO: 4 % (ref 4–12)
NEUTROPHILS # BLD AUTO: 9.29 THOUSANDS/ΜL (ref 1.85–7.62)
NEUTS SEG NFR BLD AUTO: 82 % (ref 43–75)
NRBC BLD AUTO-RTO: 0 /100 WBCS
PHOSPHATE SERPL-MCNC: 4.1 MG/DL (ref 2.7–4.5)
PLATELET # BLD AUTO: 182 THOUSANDS/UL (ref 149–390)
PMV BLD AUTO: 9.9 FL (ref 8.9–12.7)
POTASSIUM SERPL-SCNC: 4.3 MMOL/L (ref 3.5–5.3)
RBC # BLD AUTO: 4.59 MILLION/UL (ref 3.81–5.12)
SODIUM SERPL-SCNC: 137 MMOL/L (ref 136–145)
WBC # BLD AUTO: 11.34 THOUSAND/UL (ref 4.31–10.16)

## 2019-01-03 PROCEDURE — 94760 N-INVAS EAR/PLS OXIMETRY 1: CPT

## 2019-01-03 PROCEDURE — 99232 SBSQ HOSP IP/OBS MODERATE 35: CPT | Performed by: INTERNAL MEDICINE

## 2019-01-03 PROCEDURE — 94640 AIRWAY INHALATION TREATMENT: CPT

## 2019-01-03 PROCEDURE — 85025 COMPLETE CBC W/AUTO DIFF WBC: CPT | Performed by: INTERNAL MEDICINE

## 2019-01-03 PROCEDURE — 83735 ASSAY OF MAGNESIUM: CPT | Performed by: INTERNAL MEDICINE

## 2019-01-03 PROCEDURE — 80048 BASIC METABOLIC PNL TOTAL CA: CPT | Performed by: INTERNAL MEDICINE

## 2019-01-03 PROCEDURE — 82948 REAGENT STRIP/BLOOD GLUCOSE: CPT

## 2019-01-03 PROCEDURE — 84100 ASSAY OF PHOSPHORUS: CPT | Performed by: INTERNAL MEDICINE

## 2019-01-03 RX ORDER — METHYLPREDNISOLONE SODIUM SUCCINATE 40 MG/ML
40 INJECTION, POWDER, LYOPHILIZED, FOR SOLUTION INTRAMUSCULAR; INTRAVENOUS EVERY 12 HOURS SCHEDULED
Status: DISCONTINUED | OUTPATIENT
Start: 2019-01-03 | End: 2019-01-04

## 2019-01-03 RX ADMIN — TOPIRAMATE 25 MG: 25 TABLET, FILM COATED ORAL at 09:12

## 2019-01-03 RX ADMIN — GUAIFENESIN 1200 MG: 600 TABLET, EXTENDED RELEASE ORAL at 22:13

## 2019-01-03 RX ADMIN — IPRATROPIUM BROMIDE 0.5 MG: 0.5 SOLUTION RESPIRATORY (INHALATION) at 13:15

## 2019-01-03 RX ADMIN — SENNOSIDES AND DOCUSATE SODIUM 1 TABLET: 8.6; 5 TABLET ORAL at 18:01

## 2019-01-03 RX ADMIN — NICOTINE 7 MG: 7 PATCH TRANSDERMAL at 09:13

## 2019-01-03 RX ADMIN — ENOXAPARIN SODIUM 40 MG: 40 INJECTION SUBCUTANEOUS at 18:01

## 2019-01-03 RX ADMIN — VITAMIN D, TAB 1000IU (100/BT) 1000 UNITS: 25 TAB at 09:11

## 2019-01-03 RX ADMIN — IPRATROPIUM BROMIDE 0.5 MG: 0.5 SOLUTION RESPIRATORY (INHALATION) at 19:36

## 2019-01-03 RX ADMIN — IPRATROPIUM BROMIDE 0.5 MG: 0.5 SOLUTION RESPIRATORY (INHALATION) at 01:04

## 2019-01-03 RX ADMIN — IPRATROPIUM BROMIDE 0.5 MG: 0.5 SOLUTION RESPIRATORY (INHALATION) at 07:04

## 2019-01-03 RX ADMIN — METHYLPREDNISOLONE SODIUM SUCCINATE 40 MG: 40 INJECTION, POWDER, FOR SOLUTION INTRAMUSCULAR; INTRAVENOUS at 06:04

## 2019-01-03 RX ADMIN — TOPIRAMATE 25 MG: 25 TABLET, FILM COATED ORAL at 18:01

## 2019-01-03 RX ADMIN — DULOXETINE HYDROCHLORIDE 60 MG: 60 CAPSULE, DELAYED RELEASE ORAL at 09:12

## 2019-01-03 RX ADMIN — BUDESONIDE 0.5 MG: 0.5 INHALANT RESPIRATORY (INHALATION) at 19:36

## 2019-01-03 RX ADMIN — LEVALBUTEROL 1.25 MG: 1.25 SOLUTION, CONCENTRATE RESPIRATORY (INHALATION) at 01:04

## 2019-01-03 RX ADMIN — LEVALBUTEROL 1.25 MG: 1.25 SOLUTION, CONCENTRATE RESPIRATORY (INHALATION) at 19:36

## 2019-01-03 RX ADMIN — LEVALBUTEROL 1.25 MG: 1.25 SOLUTION, CONCENTRATE RESPIRATORY (INHALATION) at 07:04

## 2019-01-03 RX ADMIN — NORGESTREL AND ETHINYL ESTRADIOL 1 TABLET: KIT at 09:13

## 2019-01-03 RX ADMIN — SENNOSIDES AND DOCUSATE SODIUM 1 TABLET: 8.6; 5 TABLET ORAL at 09:12

## 2019-01-03 RX ADMIN — METHYLPREDNISOLONE SODIUM SUCCINATE 40 MG: 40 INJECTION, POWDER, FOR SOLUTION INTRAMUSCULAR; INTRAVENOUS at 18:02

## 2019-01-03 RX ADMIN — OXYCODONE HYDROCHLORIDE AND ACETAMINOPHEN 500 MG: 500 TABLET ORAL at 09:12

## 2019-01-03 RX ADMIN — ATORVASTATIN CALCIUM 20 MG: 20 TABLET, FILM COATED ORAL at 18:01

## 2019-01-03 RX ADMIN — LEVALBUTEROL 1.25 MG: 1.25 SOLUTION, CONCENTRATE RESPIRATORY (INHALATION) at 13:15

## 2019-01-03 RX ADMIN — GUAIFENESIN 1200 MG: 600 TABLET, EXTENDED RELEASE ORAL at 09:12

## 2019-01-03 RX ADMIN — BUDESONIDE 0.5 MG: 0.5 INHALANT RESPIRATORY (INHALATION) at 07:04

## 2019-01-03 NOTE — PROGRESS NOTES
[facility] Progress Note  Patient: Corinne Paci 41 y o  female   MRN: [id number]  PCP: Ming Bravo MD  Unit/Bed#: [facility] 911-36 Encounter: [id number]  Date Of Visit: 01/03/19    Assessment:    Principal Problem:    Acute respiratory failure with hypoxia and hypercapnia (HCC)  Active Problems:    Hypercholesterolemia    Mild intermittent asthma with status asthmaticus    Obesity    Depression with anxiety    Tobacco use    Leukocytosis    History of Clostridium difficile colitis      Plan:    1  Mild intermittent asthma with status asthmaticus, likely secondary to viral tracheobronchitis /rhinovirus, slowly improving on IV Solu-Medrol and bronchodilators, decrease Solu-Medrol to b i d  continue Mucinex and incentive spirometry, pulmonology is following, out of bed and ambulation  2  Acute hypoxic respiratory failure, secondary to above, improving, continue to wean oxygen  3  Tobacco smoking, continue nicotine patch   4  Anxiety and depression, continue Cymbalta and Topamax  5  Hyperlipidemia, continue statin  6  Leukocytosis, likely steroid effect, improving,  monitor  7  History of C diff colitis       VTE Pharmacologic Prophylaxis:   Pharmacologic: Enoxaparin (Lovenox)  Mechanical VTE Prophylaxis in Place: Yes    Patient Centered Rounds: I have performed bedside rounds with nursing staff today  Discussions with Specialists or Other Care Team Provider:     Education and Discussions with Family / Patient:  Patient    Time Spent for Care: 30 minutes  More than 50% of total time spent on counseling and coordination of care as described above      Current Length of Stay: 5 day(s)    Current Patient Status: Inpatient   Certification Statement: The patient will continue to require additional inpatient hospital stay due to Management of asthma exacerbation    Discharge Plan / Estimated Discharge Date: not ready yet    Code Status: Level 1 - Full Code      Subjective:     Patient seen and examined  Clinically improving  She feels better today    Objective:     Vitals:   Temp (24hrs), Av 3 °F (36 8 °C), Min:98 2 °F (36 8 °C), Max:98 4 °F (36 9 °C)    Temp:  [98 2 °F (36 8 °C)-98 4 °F (36 9 °C)] 98 4 °F (36 9 °C)  HR:  [70-82] 77  Resp:  [18] 18  BP: (135-149)/(65-77) 135/65  SpO2:  [94 %-100 %] 100 %  Body mass index is 36 2 kg/m²  Input and Output Summary (last 24 hours): Intake/Output Summary (Last 24 hours) at 19 1153  Last data filed at 19 0748   Gross per 24 hour   Intake              840 ml   Output             1800 ml   Net             -960 ml       Physical Exam:     Physical Exam  Patient is awake alert oriented no acute distress  Lungs intermittent rhonchi no wheezing  Heart positive S1-S2 no murmur  Abdomen soft nontender positive bowel sounds  Lower extremities no edema    Additional Data:     Labs:      Results from last 7 days  Lab Units 19  0525   WBC Thousand/uL 11 34*   HEMOGLOBIN g/dL 13 3   HEMATOCRIT % 41 3   PLATELETS Thousands/uL 182   NEUTROS PCT % 82*   LYMPHS PCT % 12*   MONOS PCT % 4   EOS PCT % 0       Results from last 7 days  Lab Units 19  0525  18  0450 18  1459   POTASSIUM mmol/L 4 3  < > 3 9  --    CHLORIDE mmol/L 105  < > 107  --    CO2 mmol/L 24  < > 25  --    CO2, I-STAT mmol/L  --   --   --  21   BUN mg/dL 15  < > 11  --    CREATININE mg/dL 0 69  < > 0 60  --    CALCIUM mg/dL 8 6  < > 7 5*  --    ALK PHOS U/L  --   --  69  --    ALT U/L  --   --  15  --    AST U/L  --   --  10  --    GLUCOSE, ISTAT mg/dl  --   --   --  164*   < > = values in this interval not displayed  * I Have Reviewed All Lab Data Listed Above  * Additional Pertinent Lab Tests Reviewed:  [facility] Admission Reviewed    Imaging:    Imaging Reports Reviewed Today Include:   Imaging Personally Reviewed by Myself Includes:      Recent Cultures (last 7 days):       Results from last 7 days  Lab Units 18  0044 12/29/18  1532 12/29/18  1403   BLOOD CULTURE   --  No Growth After 4 Days  No Growth After 4 Days  --    INFLUENZA B PCR   --   --  None Detected   RSV PCR   --   --  None Detected   LEGIONELLA URINARY ANTIGEN  Negative  --   --        Last 24 Hours Medication List:     Current Facility-Administered Medications:  acetaminophen 650 mg Oral Q6H PRN Willsiam Krishan, CRNP   ALPRAZolam 0 25 mg Oral BID PRN Ronna Ready, CRNP   ascorbic acid 500 mg Oral Daily Ronna Ready, CRNP   atorvastatin 20 mg Oral Daily With [facility], CRNP   budesonide 0 5 mg Nebulization Q12H Keiko Ruiz MD   cholecalciferol 1,000 Units Oral Daily Ronna Ready, CRNP   DULoxetine 60 mg Oral Daily Willaim Blasia, CRNP   enoxaparin 40 mg Subcutaneous Daily Willsiam Blasia, CRNP   guaiFENesin 1,200 mg Oral Q12H [address] Tana Gomez DO   influenza vaccine 0 5 mL Intramuscular Prior to discharge Ronna Ready, MABEL   insulin lispro 1-5 Units Subcutaneous TID AC Elizabeth Middleton, MABEL   ipratropium 0 5 mg Nebulization Q6H Janie Brafely, DO   levalbuterol 1 25 mg Nebulization Q6H Janie Brafely, DO   methylPREDNISolone sodium succinate 40 mg Intravenous Q8H [address] Luis A Andrew PA-C   nicotine 7 mg Transdermal Daily Ronna Ready, CRDELL   norgestrel-ethinyl estradiol 1 tablet Oral Daily Elizabeth Middleton, MABEL   ondansetron 4 mg Intravenous Q4H PRN Ronna Ready, MABEL   senna-docusate sodium 1 tablet Oral BID Ronna Ready, CRNP   topiramate 25 mg Oral BID Dale Nickerson, MABEL        Today, Patient Was Seen By: Tana Gomez DO    ** Please Note: This note has been constructed using a voice recognition system   **

## 2019-01-03 NOTE — PROGRESS NOTES
Pulmonary follow up   Sandra White 41 y o  female MRN: [id number]  Unit/Bed#: [facility] 706-01 Encounter: [id number]      Subjective  The patient improved from the breathing standpoint compared to yesterday  She took a shower today without oxygen and felt okay  Objective  Vitals: Blood pressure 135/65, pulse 77, temperature 98 4 °F (36 9 °C), temperature source Oral, resp  rate 18, height 5' 3" (1 6 m), weight 92 7 kg (204 lb 5 9 oz), SpO2 100 %, not currently breastfeeding , Body mass index is 36 2 kg/m²  Physical Exam   Constitutional: She is oriented to person, place, and time  She appears well-developed and well-nourished  No distress  HENT:   Head: Normocephalic  Eyes: Pupils are equal, round, and reactive to light  Neck: Normal range of motion  No JVD present  Cardiovascular: Normal rate, regular rhythm and normal heart sounds  Exam reveals no friction rub  No murmur heard  Pulmonary/Chest: Effort normal  No respiratory distress  She has wheezes  She has no rales  Abdominal: Soft  Musculoskeletal: Normal range of motion  She exhibits no edema  Neurological: She is alert and oriented to person, place, and time  Skin: Skin is warm  She is not diaphoretic  Psychiatric: She has a normal mood and affect  Assessment  1  Asthma flare  Improving  Still needs oxygen  Mild wheezing compared to yesterday  2   Morbid obesity  Plan  1  Continue current treatment with high-dose steroids and nebulizers  2   Plan to decrease dose of steroids to prednisone 40 once daily tomorrow  3   If the patient does not need oxygen by tomorrow she can be discharged home  Will see her again tomorrow for evaluation and for follow-up management  I have personally seen and examined the patient       Aleah Loera MD/PhD  [facility]

## 2019-01-04 VITALS
RESPIRATION RATE: 16 BRPM | WEIGHT: 203.5 LBS | TEMPERATURE: 98 F | SYSTOLIC BLOOD PRESSURE: 119 MMHG | HEIGHT: 63 IN | HEART RATE: 112 BPM | OXYGEN SATURATION: 95 % | DIASTOLIC BLOOD PRESSURE: 71 MMHG | BODY MASS INDEX: 36.06 KG/M2

## 2019-01-04 PROBLEM — J96.02 ACUTE RESPIRATORY FAILURE WITH HYPOXIA AND HYPERCAPNIA (HCC): Status: RESOLVED | Noted: 2018-12-29 | Resolved: 2019-01-04

## 2019-01-04 PROBLEM — J96.01 ACUTE RESPIRATORY FAILURE WITH HYPOXIA AND HYPERCAPNIA (HCC): Status: RESOLVED | Noted: 2018-12-29 | Resolved: 2019-01-04

## 2019-01-04 LAB
GLUCOSE SERPL-MCNC: 101 MG/DL (ref 65–140)
GLUCOSE SERPL-MCNC: 72 MG/DL (ref 65–140)

## 2019-01-04 PROCEDURE — 82948 REAGENT STRIP/BLOOD GLUCOSE: CPT

## 2019-01-04 PROCEDURE — 94640 AIRWAY INHALATION TREATMENT: CPT

## 2019-01-04 PROCEDURE — 99239 HOSP IP/OBS DSCHRG MGMT >30: CPT | Performed by: INTERNAL MEDICINE

## 2019-01-04 PROCEDURE — 99232 SBSQ HOSP IP/OBS MODERATE 35: CPT | Performed by: INTERNAL MEDICINE

## 2019-01-04 PROCEDURE — 94760 N-INVAS EAR/PLS OXIMETRY 1: CPT

## 2019-01-04 RX ORDER — PREDNISONE 20 MG/1
40 TABLET ORAL DAILY
Status: DISCONTINUED | OUTPATIENT
Start: 2019-01-04 | End: 2019-01-04 | Stop reason: HOSPADM

## 2019-01-04 RX ORDER — BUDESONIDE AND FORMOTEROL FUMARATE DIHYDRATE 160; 4.5 UG/1; UG/1
2 AEROSOL RESPIRATORY (INHALATION) 2 TIMES DAILY
Qty: 1 INHALER | Refills: 0 | Status: SHIPPED | OUTPATIENT
Start: 2019-01-04 | End: 2019-01-04 | Stop reason: HOSPADM

## 2019-01-04 RX ORDER — LEVALBUTEROL 1.25 MG/.5ML
1.25 SOLUTION, CONCENTRATE RESPIRATORY (INHALATION) EVERY 6 HOURS PRN
Qty: 30 VIAL | Refills: 0 | Status: SHIPPED | OUTPATIENT
Start: 2019-01-04 | End: 2021-07-22

## 2019-01-04 RX ORDER — PREDNISONE 20 MG/1
40 TABLET ORAL DAILY
Qty: 4 TABLET | Refills: 0 | Status: SHIPPED | OUTPATIENT
Start: 2019-01-05 | End: 2019-01-09 | Stop reason: ALTCHOICE

## 2019-01-04 RX ORDER — GUAIFENESIN 1200 MG/1
1200 TABLET, EXTENDED RELEASE ORAL EVERY 12 HOURS SCHEDULED
Qty: 20 TABLET | Refills: 0 | Status: SHIPPED | OUTPATIENT
Start: 2019-01-04 | End: 2019-01-09 | Stop reason: ALTCHOICE

## 2019-01-04 RX ADMIN — GUAIFENESIN 1200 MG: 600 TABLET, EXTENDED RELEASE ORAL at 08:49

## 2019-01-04 RX ADMIN — TOPIRAMATE 25 MG: 25 TABLET, FILM COATED ORAL at 08:49

## 2019-01-04 RX ADMIN — PREDNISONE 40 MG: 20 TABLET ORAL at 08:49

## 2019-01-04 RX ADMIN — OXYCODONE HYDROCHLORIDE AND ACETAMINOPHEN 500 MG: 500 TABLET ORAL at 08:49

## 2019-01-04 RX ADMIN — DULOXETINE HYDROCHLORIDE 60 MG: 60 CAPSULE, DELAYED RELEASE ORAL at 08:49

## 2019-01-04 RX ADMIN — METHYLPREDNISOLONE SODIUM SUCCINATE 40 MG: 40 INJECTION, POWDER, FOR SOLUTION INTRAMUSCULAR; INTRAVENOUS at 06:17

## 2019-01-04 RX ADMIN — SENNOSIDES AND DOCUSATE SODIUM 1 TABLET: 8.6; 5 TABLET ORAL at 08:49

## 2019-01-04 RX ADMIN — LEVALBUTEROL 1.25 MG: 1.25 SOLUTION, CONCENTRATE RESPIRATORY (INHALATION) at 01:37

## 2019-01-04 RX ADMIN — IPRATROPIUM BROMIDE 0.5 MG: 0.5 SOLUTION RESPIRATORY (INHALATION) at 01:37

## 2019-01-04 RX ADMIN — LEVALBUTEROL 1.25 MG: 1.25 SOLUTION, CONCENTRATE RESPIRATORY (INHALATION) at 06:55

## 2019-01-04 RX ADMIN — NORGESTREL AND ETHINYL ESTRADIOL 1 TABLET: KIT at 08:50

## 2019-01-04 RX ADMIN — BUDESONIDE 0.5 MG: 0.5 INHALANT RESPIRATORY (INHALATION) at 06:54

## 2019-01-04 RX ADMIN — VITAMIN D, TAB 1000IU (100/BT) 1000 UNITS: 25 TAB at 08:48

## 2019-01-04 RX ADMIN — IPRATROPIUM BROMIDE 0.5 MG: 0.5 SOLUTION RESPIRATORY (INHALATION) at 06:55

## 2019-01-04 NOTE — RESTORATIVE TECHNICIAN NOTE
Restorative Specialist Mobility Note       Activity: Ambulate in hill, Ambulate in room, Bathroom privileges, Stand at bedside, Dangle (Educated/encouraged pt to ambulate with assistance 3-4 x's/day   Pt callbell, phone/tray within reach )     Assistive Device: None (Face mask on for ambulation)       Arlyn STERLING, Restorative Technician, [employer]

## 2019-01-04 NOTE — PROGRESS NOTES
Pulmonary follow up   Bruce Silverio 41 y o  female MRN: [id number]  Unit/Bed#: [facility] 706-01 Encounter: [id number]      Subjective  Improved significantly  Does not use oxygen  Ambulating on room air  Objective  Vitals: Blood pressure 119/71, pulse (!) 112, temperature 98 °F (36 7 °C), temperature source Oral, resp  rate 16, height 5' 3" (1 6 m), weight 92 3 kg (203 lb 8 oz), SpO2 95 %, not currently breastfeeding , Body mass index is 36 05 kg/m²  Physical Exam   Constitutional: She is oriented to person, place, and time  She appears well-developed and well-nourished  No distress  HENT:   Head: Normocephalic  Eyes: Pupils are equal, round, and reactive to light  Neck: Normal range of motion  Neck supple  No JVD present  Cardiovascular: Normal rate, regular rhythm and normal heart sounds  Exam reveals no gallop and no friction rub  No murmur heard  Pulmonary/Chest: Effort normal and breath sounds normal  No respiratory distress  She has no wheezes  She has no rales  Abdominal: Soft  Musculoskeletal: Normal range of motion  She exhibits no edema or deformity  Neurological: She is alert and oriented to person, place, and time  Skin: Skin is warm  She is not diaphoretic  Psychiatric: She has a normal mood and affect  Assessment  Asthma flare resolved  Secondary to rhinovirus most probably  The patient was still heavy smoker before she was hospitalized  Plan  1  The patient can be discharged home with on steroids for another 5 days 40 mg per day and then stop  2   I will see here in the clinic on January 9   3   Please prescribed ics/Laba such as Breo or Symbicort to use on a daily basis  I have personally seen and examined the patient       Shereen Davila MD/PhD  [facility]

## 2019-01-04 NOTE — DISCHARGE SUMMARY
Discharge Summary - [facility] Internal Medicine    Patient Information: Shannon Ibanez 41 y o  female MRN: [id number]  [facility] Encounter: [id number]    Discharging Physician / Practitioner: Carmen Sky DO  PCP: Kandis Naidu MD  Admission Date: 12/29/2018  Discharge Date: 01/04/19    Disposition:     Home    Reason for Admission:   Status asthmaticus secondary to viral tracheobronchitis    Discharge Diagnoses:     Principal Problem (Resolved):    Acute respiratory failure with hypoxia and hypercapnia (HCC)  Active Problems:    Hypercholesterolemia    Mild intermittent asthma with status asthmaticus    Obesity    Depression with anxiety    Tobacco use    Leukocytosis    History of Clostridium difficile colitis      Consultations During Hospital Stay:  · Pulmonology    Procedures Performed:   Chest x-ray no acute abnormality  ·     Significant Findings / Test Results:     · As above    Incidental Findings:   · none    Test Results Pending at Discharge (will require follow up):   · none     Outpatient Tests Requested:  · Outpatient pulmonology follow-up    Complications:  none    Hospital Course:     Shannon Ibanez is a 41 y o  female patient who originally presented to the hospital on 12/29/2018 due to acute shortness of breath and productive cough   Patient with underlying mild intermittent asthma and tobacco abuse  She  was found to have status asthmaticus with acute hypoxic respiratory failure,  she was admitted to ICU and treated with BiPAP, IV steroids, antibiotic and bronchodilators, then she was weaned off BiPAP and transferred to general medical floor  She was tested negative for the flu and positive for rhinovirus  Pulmonology was following, she responded to IV steroids and bronchodilators, she completed 5 days Zithromax   Currently she is in stable condition, cleared by pulmonology to be discharged home on 4 more days of prednisone, Pulmicort and nebulizer treatment    Patient instructed to quit tobacco smoking and to follow with family doctor in 1 week  She will follow with pulmonology on 1/9/2019    Condition at Discharge: stable     Discharge Day Visit / Exam:     Subjective:    Patient seen and examined  She feels better  Clinically improving  With less cough and shortness of breath  Anxious to go home  Vitals: Blood Pressure: 119/71 (01/04/19 0743)  Pulse: (!) 112 (01/04/19 1039)  Temperature: 98 °F (36 7 °C) (01/04/19 0743)  Temp Source: Oral (01/04/19 0743)  Respirations: 16 (01/04/19 0743)  Height: 5' 3" (160 cm) (12/29/18 1145)  Weight - Scale: 92 3 kg (203 lb 8 oz) (01/04/19 0600)  SpO2: 95 % (01/04/19 1039)  Exam:   Physical Exam  Patient is awake alert oriented no acute distress  Lungs  clear to auscultation bilateral  Heart positive S1-S2 no murmur  Abdomen soft nontender positive bowel sounds  Lower extremities no edema  Discussion with Family: no    Discharge instructions/Information to patient and family:   See after visit summary for information provided to patient and family  Provisions for Follow-Up Care:  See after visit summary for information related to follow-up care and any pertinent home health orders  Planned Readmission: no     Discharge Statement:  I spent 38  minutes discharging the patient  This time was spent on the day of discharge  I had direct contact with the patient on the day of discharge  Greater than 50% of the total time was spent examining patient, answering all patient questions, arranging and discussing plan of care with patient as well as directly providing post-discharge instructions  Additional time then spent on discharge activities  Discharge Medications:  See after visit summary for reconciled discharge medications provided to patient and family        ** Please Note: This note has been constructed using a voice recognition system **

## 2019-01-07 RX ORDER — MUPIROCIN CALCIUM 20 MG/G
CREAM TOPICAL
COMMUNITY
End: 2020-12-22 | Stop reason: ALTCHOICE

## 2019-01-09 ENCOUNTER — OFFICE VISIT (OUTPATIENT)
Dept: PULMONOLOGY | Facility: CLINIC | Age: 39
End: 2019-01-09
Payer: COMMERCIAL

## 2019-01-09 VITALS
RESPIRATION RATE: 14 BRPM | HEART RATE: 96 BPM | WEIGHT: 200 LBS | OXYGEN SATURATION: 95 % | TEMPERATURE: 99 F | HEIGHT: 62 IN | DIASTOLIC BLOOD PRESSURE: 70 MMHG | SYSTOLIC BLOOD PRESSURE: 118 MMHG | BODY MASS INDEX: 36.8 KG/M2

## 2019-01-09 DIAGNOSIS — J45.40 MODERATE PERSISTENT ASTHMA, UNSPECIFIED WHETHER COMPLICATED: Primary | ICD-10-CM

## 2019-01-09 PROCEDURE — 99214 OFFICE O/P EST MOD 30 MIN: CPT | Performed by: INTERNAL MEDICINE

## 2019-01-09 RX ORDER — ALBUTEROL SULFATE 90 UG/1
2 AEROSOL, METERED RESPIRATORY (INHALATION) EVERY 6 HOURS PRN
Qty: 18 G | Refills: 0 | Status: SHIPPED | OUTPATIENT
Start: 2019-01-09 | End: 2022-08-03

## 2019-01-09 RX ORDER — FLUTICASONE FUROATE AND VILANTEROL 100; 25 UG/1; UG/1
1 POWDER RESPIRATORY (INHALATION) DAILY
Qty: 2 INHALER | Refills: 5 | Status: SHIPPED | OUTPATIENT
Start: 2019-01-09 | End: 2019-10-08 | Stop reason: SDUPTHER

## 2019-01-09 NOTE — PROGRESS NOTES
Pulmonary outpatient note   Abbey Metz 41 y o  female MRN: [id number]  1/9/2019      Assessment and plan:  Abbey Metz has asthma with recent hospitalization for 6 days for acute severe flare requiring oxygen  Not requiring ICU  She was not taking any controller for this flare  She does not have known allergies  I gave her Symbicort, showed and reviewed her technique which was good  Referred her to spirometry pre and post   Also referred to [facility] allergy blood test and CBC  Follow-up in 3 months  Saul Brown MD/PhD,  [facility]      Return in about 3 months (around 4/9/2019)  History of Present Illness  This is 41y o  year old female with previous medical history of asthma which is not active in the last few years  She was hospitalized recently in [facility] due to severe asthma flare that required hospitalization for 6 days with oxygen requirements  She was not taking any medications before this failure  She does not have any known allergies  She has cats  She was smoking half pack to 1 pack a day for the last 18 years  With the intravenous steroid she felt better and was discharged home  Today she is doing well, still has some symptoms, and taking Pulmicort twice daily without systemic steroids  Social history:  Smoked for 18 years half pack to 1 pack a day, 15 pack years  Drinks alcohol socially P    Occupational history:  Worked as a technician in [employer]  Review of Systems   Constitutional: Negative for appetite change and fever  HENT: Positive for sneezing  Negative for ear pain, postnasal drip, rhinorrhea, sore throat and trouble swallowing  Respiratory: Positive for cough and shortness of breath  Cardiovascular: Negative for chest pain  Musculoskeletal: Negative for myalgias  Neurological: Positive for headaches         Historical Information   Past Medical History:   Diagnosis Date    Anxiety     Asthma  History of Clostridium difficile colitis 2018    x 2 episodes, after antibiotics    Hypercholesterolemia     Sleep apnea      Past Surgical History:   Procedure Laterality Date    TN CONIZATION CERVIX,LOOP ELECTRD N/A 9/27/2018    Procedure: BIOPSY LEEP CERVIX;  Surgeon: Jeri Mcginnis DO;  [facility]: [facility];  Service: Gynecology    REDUCTION MAMMAPLASTY  1999    TONSILLECTOMY       Family History   Problem Relation Age of Onset    Lung cancer Father     Liver cancer Paternal Grandfather        Meds/Allergies     Current Outpatient Prescriptions:     ALPRAZolam (XANAX) 0 25 mg tablet, as needed, Disp: , Rfl:     Ascorbic Acid (VITAMIN C) 500 MG CAPS, Take by mouth daily  , Disp: , Rfl:     atorvastatin (LIPITOR) 20 mg tablet, Take by mouth daily  , Disp: , Rfl:     budesonide (PULMICORT) 180 MCG/ACT inhaler, Inhale 2 puffs 2 (two) times a day, Disp: , Rfl: 0    cholecalciferol (VITAMIN D3) 1,000 units tablet, Take by mouth daily  , Disp: , Rfl:     clobetasol (TEMOVATE) 0 05 % ointment, , Disp: , Rfl:     Coconut Oil 1000 MG CAPS, Take by mouth daily  , Disp: , Rfl:     DULoxetine (CYMBALTA) 60 mg delayed release capsule, Take 60 mg by mouth daily  , Disp: , Rfl:     Echinacea 125 MG CAPS, Take by mouth daily  , Disp: , Rfl:     ergocalciferol (VITAMIN D2) 50,000 units, once a week  , Disp: , Rfl:     influenza vaccine, quadrivalent (FLULAVAL) 0 5 ML ELIO, Inject 0 5 mL into a muscle prior to discharge (Vaccination) for up to 1 dose, Disp: , Rfl: 0    Iron Combinations (IRON COMPLEX PO), iron,carbonyl 65 mg-vitamin C 125 mg tablet,delayed release, Disp: , Rfl:     levalbuterol (XOPENEX) 1 25 mg/0 5 mL nebulizer solution, Take 0 5 mL (1 25 mg total) by nebulization every 6 (six) hours as needed for wheezing, Disp: 30 vial, Rfl: 0    Multiple Vitamin tablet, Take by mouth daily  , Disp: , Rfl:     mupirocin (BACTROBAN) 2 % cream, mupirocin 2 % topical cream, Disp: , Rfl:    norgestimate-ethinyl estradiol (ORTHO TRI-CYCLEN,TRINESSA) 0 18/0 215/0 25 MG-35 MCG per tablet, Take 1 tablet by mouth daily, Disp: , Rfl:     Vitamin E 400 units TABS, Take by mouth daily  , Disp: , Rfl:     albuterol (VENTOLIN HFA) 90 mcg/act inhaler, Inhale 2 puffs every 6 (six) hours as needed for wheezing, Disp: 18 g, Rfl: 0  No Known Allergies    Vitals: Blood pressure 118/70, pulse 96, temperature 99 °F (37 2 °C), resp  rate 14, height 5' 2" (1 575 m), weight 90 7 kg (200 lb), SpO2 95 %, not currently breastfeeding  Body mass index is 36 58 kg/m²  Oxygen Therapy  SpO2: 95 %    Physical Exam  Physical Exam   Constitutional: She is oriented to person, place, and time  She appears well-developed and well-nourished  No distress  Obese   HENT:   Head: Normocephalic and atraumatic  Eyes: Pupils are equal, round, and reactive to light  EOM are normal    Neck: Normal range of motion  Neck supple  No JVD present  Cardiovascular: Normal rate, regular rhythm and normal heart sounds  Exam reveals no friction rub  No murmur heard  Pulmonary/Chest: Effort normal and breath sounds normal  No respiratory distress  She has no wheezes  She has no rales  Abdominal: Soft  She exhibits no distension  Musculoskeletal: Normal range of motion  She exhibits no edema or deformity  Neurological: She is alert and oriented to person, place, and time  Skin: Skin is warm  Psychiatric: She has a normal mood and affect  Labs: I have personally reviewed pertinent lab results    Lab Results   Component Value Date    WBC 11 34 (H) 01/03/2019    HGB 13 3 01/03/2019    HCT 41 3 01/03/2019    MCV 90 01/03/2019     01/03/2019     Lab Results   Component Value Date    GLUCOSE 164 (H) 12/29/2018    CALCIUM 8 6 01/03/2019     09/15/2015    K 4 3 01/03/2019    CO2 24 01/03/2019     01/03/2019    BUN 15 01/03/2019    CREATININE 0 69 01/03/2019     No results found for: IGE  Lab Results   Component Value Date    ALT 15 12/30/2018    AST 10 12/30/2018    ALKPHOS 69 12/30/2018    BILITOT 0 18 (L) 09/15/2015       Imaging and other studies:   I have personally reviewed pertinent imaging studies in PACS  The patient had chest x-ray on 12/29/2018 that was normal     Pulmonary function testing:   Not available      Susan Bowers MD/PhD,  [facility]

## 2019-01-18 ENCOUNTER — HOSPITAL ENCOUNTER (OUTPATIENT)
Dept: PULMONOLOGY | Facility: HOSPITAL | Age: 39
Discharge: HOME | End: 2019-01-18
Attending: INTERNAL MEDICINE
Payer: COMMERCIAL

## 2019-01-18 ENCOUNTER — APPOINTMENT (OUTPATIENT)
Dept: LAB | Facility: HOSPITAL | Age: 39
End: 2019-01-18
Attending: INTERNAL MEDICINE
Payer: COMMERCIAL

## 2019-01-18 DIAGNOSIS — J45.40 MODERATE PERSISTENT ASTHMA, UNSPECIFIED WHETHER COMPLICATED: ICD-10-CM

## 2019-01-18 LAB
BASOPHILS # BLD AUTO: 0.04 THOUSANDS/ΜL (ref 0–0.1)
BASOPHILS NFR BLD AUTO: 1 % (ref 0–1)
EOSINOPHIL # BLD AUTO: 0.23 THOUSAND/ΜL (ref 0–0.61)
EOSINOPHIL NFR BLD AUTO: 3 % (ref 0–6)
ERYTHROCYTE [DISTWIDTH] IN BLOOD BY AUTOMATED COUNT: 12.3 % (ref 11.6–15.1)
HCT VFR BLD AUTO: 41.3 % (ref 34.8–46.1)
HGB BLD-MCNC: 13.6 G/DL (ref 11.5–15.4)
IMM GRANULOCYTES # BLD AUTO: 0.03 THOUSAND/UL (ref 0–0.2)
IMM GRANULOCYTES NFR BLD AUTO: 0 % (ref 0–2)
LYMPHOCYTES # BLD AUTO: 2.23 THOUSANDS/ΜL (ref 0.6–4.47)
LYMPHOCYTES NFR BLD AUTO: 31 % (ref 14–44)
MCH RBC QN AUTO: 29.1 PG (ref 26.8–34.3)
MCHC RBC AUTO-ENTMCNC: 32.9 G/DL (ref 31.4–37.4)
MCV RBC AUTO: 88 FL (ref 82–98)
MONOCYTES # BLD AUTO: 0.39 THOUSAND/ΜL (ref 0.17–1.22)
MONOCYTES NFR BLD AUTO: 5 % (ref 4–12)
NEUTROPHILS # BLD AUTO: 4.3 THOUSANDS/ΜL (ref 1.85–7.62)
NEUTS SEG NFR BLD AUTO: 60 % (ref 43–75)
NRBC BLD AUTO-RTO: 0 /100 WBCS
PLATELET # BLD AUTO: 203 THOUSANDS/UL (ref 149–390)
PMV BLD AUTO: 8.9 FL (ref 8.9–12.7)
RBC # BLD AUTO: 4.67 MILLION/UL (ref 3.81–5.12)
WBC # BLD AUTO: 7.22 THOUSAND/UL (ref 4.31–10.16)

## 2019-01-18 PROCEDURE — 94760 N-INVAS EAR/PLS OXIMETRY 1: CPT

## 2019-01-18 PROCEDURE — 82785 ASSAY OF IGE: CPT

## 2019-01-18 PROCEDURE — 94060 EVALUATION OF WHEEZING: CPT

## 2019-01-18 PROCEDURE — 36415 COLL VENOUS BLD VENIPUNCTURE: CPT

## 2019-01-18 PROCEDURE — 94060 EVALUATION OF WHEEZING: CPT | Performed by: INTERNAL MEDICINE

## 2019-01-18 PROCEDURE — 86003 ALLG SPEC IGE CRUDE XTRC EA: CPT

## 2019-01-18 PROCEDURE — 85025 COMPLETE CBC W/AUTO DIFF WBC: CPT

## 2019-01-18 RX ORDER — ALBUTEROL SULFATE 2.5 MG/3ML
2.5 SOLUTION RESPIRATORY (INHALATION) ONCE
Status: COMPLETED | OUTPATIENT
Start: 2019-01-18 | End: 2019-01-18

## 2019-01-18 RX ADMIN — ALBUTEROL SULFATE 2.5 MG: 2.5 SOLUTION RESPIRATORY (INHALATION) at 09:04

## 2019-01-23 LAB
A ALTERNATA IGE QN: <0.1 KUA/I
A FUMIGATUS IGE QN: <0.1 KUA/I
ALLERGEN COMMENT: ABNORMAL
BERMUDA GRASS IGE QN: <0.1 KUA/I
BOXELDER IGE QN: <0.1 KUA/I
C HERBARUM IGE QN: <0.1 KUA/I
CAT DANDER IGE QN: 2.5 KUA/I
CMN PIGWEED IGE QN: <0.1 KUA/I
COMMON RAGWEED IGE QN: <0.1 KUA/I
COTTONWOOD IGE QN: <0.1 KUA/I
D FARINAE IGE QN: <0.1 KUA/I
D PTERONYSS IGE QN: <0.1 KUA/I
DOG DANDER IGE QN: 0.66 KUA/I
LONDON PLANE IGE QN: <0.1 KUA/I
MOUSE URINE PROT IGE QN: <0.1 KUA/I
MT JUNIPER IGE QN: <0.1 KUA/I
MUGWORT IGE QN: <0.1 KUA/I
P NOTATUM IGE QN: <0.1 KUA/I
ROACH IGE QN: <0.1 KUA/I
SHEEP SORREL IGE QN: <0.1 KUA/I
SILVER BIRCH IGE QN: <0.1 KUA/I
TIMOTHY IGE QN: <0.1 KUA/I
TOTAL IGE SMQN RAST: 56.9 KU/L (ref 0–113)
WALNUT IGE QN: <0.1 KUA/I
WHITE ASH IGE QN: <0.1 KUA/I
WHITE ELM IGE QN: <0.1 KUA/I
WHITE MULBERRY IGE QN: <0.1 KUA/I
WHITE OAK IGE QN: <0.1 KUA/I

## 2019-03-18 ENCOUNTER — LAB REQUISITION (OUTPATIENT)
Dept: LAB | Facility: HOSPITAL | Age: 39
End: 2019-03-18
Payer: COMMERCIAL

## 2019-03-18 DIAGNOSIS — B95.8 UNSPECIFIED STAPHYLOCOCCUS AS THE CAUSE OF DISEASES CLASSIFIED ELSEWHERE: ICD-10-CM

## 2019-03-18 PROCEDURE — 87205 SMEAR GRAM STAIN: CPT | Performed by: PHYSICIAN ASSISTANT

## 2019-03-18 PROCEDURE — 87070 CULTURE OTHR SPECIMN AEROBIC: CPT | Performed by: PHYSICIAN ASSISTANT

## 2019-03-20 LAB
BACTERIA WND AEROBE CULT: ABNORMAL
GRAM STN SPEC: ABNORMAL
GRAM STN SPEC: ABNORMAL

## 2019-04-01 ENCOUNTER — TRANSCRIBE ORDERS (OUTPATIENT)
Dept: LAB | Facility: CLINIC | Age: 39
End: 2019-04-01

## 2019-04-01 ENCOUNTER — APPOINTMENT (OUTPATIENT)
Dept: LAB | Facility: CLINIC | Age: 39
End: 2019-04-01
Payer: COMMERCIAL

## 2019-04-01 DIAGNOSIS — R63.5 WEIGHT GAIN: Primary | ICD-10-CM

## 2019-04-01 DIAGNOSIS — R63.5 WEIGHT GAIN: ICD-10-CM

## 2019-04-01 LAB
T3 SERPL-MCNC: 1.1 NG/ML (ref 0.6–1.8)
T3FREE SERPL-MCNC: 2.3 PG/ML (ref 2.3–4.2)
TSH SERPL DL<=0.05 MIU/L-ACNC: 1.25 UIU/ML (ref 0.36–3.74)

## 2019-04-01 PROCEDURE — 84432 ASSAY OF THYROGLOBULIN: CPT

## 2019-04-01 PROCEDURE — 36415 COLL VENOUS BLD VENIPUNCTURE: CPT

## 2019-04-01 PROCEDURE — 84439 ASSAY OF FREE THYROXINE: CPT

## 2019-04-01 PROCEDURE — 86376 MICROSOMAL ANTIBODY EACH: CPT

## 2019-04-01 PROCEDURE — 86800 THYROGLOBULIN ANTIBODY: CPT

## 2019-04-01 PROCEDURE — 84481 FREE ASSAY (FT-3): CPT

## 2019-04-01 PROCEDURE — 84480 ASSAY TRIIODOTHYRONINE (T3): CPT

## 2019-04-01 PROCEDURE — 84443 ASSAY THYROID STIM HORMONE: CPT

## 2019-04-02 LAB
THYROGLOB AB SERPL-ACNC: <1 IU/ML (ref 0–0.9)
THYROGLOB SERPL-MCNC: 3.4 NG/ML (ref 1.5–38.5)
THYROPEROXIDASE AB SERPL-ACNC: 13 IU/ML (ref 0–34)

## 2019-04-05 LAB — T4 FREE SERPL-MCNC: 0.79 NG/DL (ref 0.76–1.46)

## 2019-04-24 ENCOUNTER — OFFICE VISIT (OUTPATIENT)
Dept: PULMONOLOGY | Facility: CLINIC | Age: 39
End: 2019-04-24
Payer: COMMERCIAL

## 2019-04-24 VITALS
OXYGEN SATURATION: 100 % | HEART RATE: 94 BPM | DIASTOLIC BLOOD PRESSURE: 92 MMHG | HEIGHT: 62 IN | SYSTOLIC BLOOD PRESSURE: 158 MMHG | WEIGHT: 230.8 LBS | TEMPERATURE: 98.6 F | BODY MASS INDEX: 42.47 KG/M2

## 2019-04-24 DIAGNOSIS — G47.33 OBSTRUCTIVE SLEEP APNEA SYNDROME: Primary | ICD-10-CM

## 2019-04-24 PROCEDURE — 99215 OFFICE O/P EST HI 40 MIN: CPT | Performed by: INTERNAL MEDICINE

## 2019-05-09 ENCOUNTER — TELEPHONE (OUTPATIENT)
Dept: SLEEP CENTER | Facility: CLINIC | Age: 39
End: 2019-05-09

## 2019-06-12 ENCOUNTER — APPOINTMENT (OUTPATIENT)
Dept: LAB | Facility: HOSPITAL | Age: 39
End: 2019-06-12
Payer: COMMERCIAL

## 2019-06-12 ENCOUNTER — TRANSCRIBE ORDERS (OUTPATIENT)
Dept: LAB | Facility: HOSPITAL | Age: 39
End: 2019-06-12

## 2019-06-12 DIAGNOSIS — Z79.899 ENCOUNTER FOR LONG-TERM (CURRENT) USE OF OTHER MEDICATIONS: ICD-10-CM

## 2019-06-12 DIAGNOSIS — E55.9 AVITAMINOSIS D: ICD-10-CM

## 2019-06-12 DIAGNOSIS — E78.5 HYPERLIPIDEMIA, UNSPECIFIED HYPERLIPIDEMIA TYPE: ICD-10-CM

## 2019-06-12 DIAGNOSIS — E78.5 HYPERLIPIDEMIA, UNSPECIFIED HYPERLIPIDEMIA TYPE: Primary | ICD-10-CM

## 2019-06-12 LAB
25(OH)D3 SERPL-MCNC: 18.2 NG/ML (ref 30–100)
ALBUMIN SERPL BCP-MCNC: 3.1 G/DL (ref 3.5–5)
ALP SERPL-CCNC: 91 U/L (ref 46–116)
ALT SERPL W P-5'-P-CCNC: 17 U/L (ref 12–78)
ANION GAP SERPL CALCULATED.3IONS-SCNC: 3 MMOL/L (ref 4–13)
AST SERPL W P-5'-P-CCNC: 12 U/L (ref 5–45)
BACTERIA UR QL AUTO: ABNORMAL /HPF
BASOPHILS # BLD AUTO: 0.06 THOUSANDS/ΜL (ref 0–0.1)
BASOPHILS NFR BLD AUTO: 1 % (ref 0–1)
BILIRUB SERPL-MCNC: 0.31 MG/DL (ref 0.2–1)
BILIRUB UR QL STRIP: NEGATIVE
BUN SERPL-MCNC: 14 MG/DL (ref 5–25)
CALCIUM SERPL-MCNC: 8.4 MG/DL (ref 8.3–10.1)
CHLORIDE SERPL-SCNC: 106 MMOL/L (ref 100–108)
CHOLEST SERPL-MCNC: 242 MG/DL (ref 50–200)
CLARITY UR: ABNORMAL
CO2 SERPL-SCNC: 28 MMOL/L (ref 21–32)
COLOR UR: YELLOW
CREAT SERPL-MCNC: 0.73 MG/DL (ref 0.6–1.3)
EOSINOPHIL # BLD AUTO: 0.37 THOUSAND/ΜL (ref 0–0.61)
EOSINOPHIL NFR BLD AUTO: 4 % (ref 0–6)
ERYTHROCYTE [DISTWIDTH] IN BLOOD BY AUTOMATED COUNT: 12.6 % (ref 11.6–15.1)
GFR SERPL CREATININE-BSD FRML MDRD: 106 ML/MIN/1.73SQ M
GLUCOSE P FAST SERPL-MCNC: 76 MG/DL (ref 65–99)
GLUCOSE UR STRIP-MCNC: NEGATIVE MG/DL
HCT VFR BLD AUTO: 41.6 % (ref 34.8–46.1)
HDLC SERPL-MCNC: 52 MG/DL (ref 40–60)
HGB BLD-MCNC: 14.1 G/DL (ref 11.5–15.4)
HGB UR QL STRIP.AUTO: NEGATIVE
IMM GRANULOCYTES # BLD AUTO: 0.04 THOUSAND/UL (ref 0–0.2)
IMM GRANULOCYTES NFR BLD AUTO: 0 % (ref 0–2)
KETONES UR STRIP-MCNC: NEGATIVE MG/DL
LDLC SERPL CALC-MCNC: 142 MG/DL (ref 0–100)
LDLC SERPL DIRECT ASSAY-MCNC: 153 MG/DL (ref 0–100)
LEUKOCYTE ESTERASE UR QL STRIP: NEGATIVE
LYMPHOCYTES # BLD AUTO: 2.77 THOUSANDS/ΜL (ref 0.6–4.47)
LYMPHOCYTES NFR BLD AUTO: 29 % (ref 14–44)
MCH RBC QN AUTO: 29 PG (ref 26.8–34.3)
MCHC RBC AUTO-ENTMCNC: 33.9 G/DL (ref 31.4–37.4)
MCV RBC AUTO: 85 FL (ref 82–98)
MONOCYTES # BLD AUTO: 0.59 THOUSAND/ΜL (ref 0.17–1.22)
MONOCYTES NFR BLD AUTO: 6 % (ref 4–12)
NEUTROPHILS # BLD AUTO: 5.77 THOUSANDS/ΜL (ref 1.85–7.62)
NEUTS SEG NFR BLD AUTO: 60 % (ref 43–75)
NITRITE UR QL STRIP: NEGATIVE
NON-SQ EPI CELLS URNS QL MICRO: ABNORMAL /HPF
NONHDLC SERPL-MCNC: 190 MG/DL
NRBC BLD AUTO-RTO: 0 /100 WBCS
PH UR STRIP.AUTO: 7.5 [PH]
PLATELET # BLD AUTO: 238 THOUSANDS/UL (ref 149–390)
PMV BLD AUTO: 9.4 FL (ref 8.9–12.7)
POTASSIUM SERPL-SCNC: 4.2 MMOL/L (ref 3.5–5.3)
PROT SERPL-MCNC: 6.6 G/DL (ref 6.4–8.2)
PROT UR STRIP-MCNC: NEGATIVE MG/DL
RBC # BLD AUTO: 4.87 MILLION/UL (ref 3.81–5.12)
RBC #/AREA URNS AUTO: ABNORMAL /HPF
SODIUM SERPL-SCNC: 137 MMOL/L (ref 136–145)
SP GR UR STRIP.AUTO: 1.02 (ref 1–1.03)
TRIGL SERPL-MCNC: 242 MG/DL
UROBILINOGEN UR QL STRIP.AUTO: 0.2 E.U./DL
WBC # BLD AUTO: 9.6 THOUSAND/UL (ref 4.31–10.16)
WBC #/AREA URNS AUTO: ABNORMAL /HPF

## 2019-06-12 PROCEDURE — 36415 COLL VENOUS BLD VENIPUNCTURE: CPT

## 2019-06-12 PROCEDURE — 80061 LIPID PANEL: CPT

## 2019-06-12 PROCEDURE — 83721 ASSAY OF BLOOD LIPOPROTEIN: CPT

## 2019-06-12 PROCEDURE — 85025 COMPLETE CBC W/AUTO DIFF WBC: CPT

## 2019-06-12 PROCEDURE — 81001 URINALYSIS AUTO W/SCOPE: CPT

## 2019-06-12 PROCEDURE — 80053 COMPREHEN METABOLIC PANEL: CPT

## 2019-06-12 PROCEDURE — 82306 VITAMIN D 25 HYDROXY: CPT

## 2019-06-13 ENCOUNTER — ANNUAL EXAM (OUTPATIENT)
Dept: OBGYN CLINIC | Facility: CLINIC | Age: 39
End: 2019-06-13
Payer: COMMERCIAL

## 2019-06-13 VITALS
WEIGHT: 242 LBS | SYSTOLIC BLOOD PRESSURE: 122 MMHG | HEIGHT: 62 IN | DIASTOLIC BLOOD PRESSURE: 84 MMHG | BODY MASS INDEX: 44.53 KG/M2

## 2019-06-13 DIAGNOSIS — Z11.3 SCREEN FOR STD (SEXUALLY TRANSMITTED DISEASE): ICD-10-CM

## 2019-06-13 DIAGNOSIS — N87.9 CERVICAL DYSPLASIA: ICD-10-CM

## 2019-06-13 DIAGNOSIS — Z01.419 ENCOUNTER FOR ANNUAL ROUTINE GYNECOLOGICAL EXAMINATION: Primary | ICD-10-CM

## 2019-06-13 PROCEDURE — 87591 N.GONORRHOEAE DNA AMP PROB: CPT | Performed by: OBSTETRICS & GYNECOLOGY

## 2019-06-13 PROCEDURE — 87491 CHLMYD TRACH DNA AMP PROBE: CPT | Performed by: OBSTETRICS & GYNECOLOGY

## 2019-06-13 PROCEDURE — 87624 HPV HI-RISK TYP POOLED RSLT: CPT | Performed by: OBSTETRICS & GYNECOLOGY

## 2019-06-13 PROCEDURE — 99395 PREV VISIT EST AGE 18-39: CPT | Performed by: OBSTETRICS & GYNECOLOGY

## 2019-06-13 PROCEDURE — G0145 SCR C/V CYTO,THINLAYER,RESCR: HCPCS | Performed by: OBSTETRICS & GYNECOLOGY

## 2019-06-15 LAB
C TRACH DNA SPEC QL NAA+PROBE: NEGATIVE
N GONORRHOEA DNA SPEC QL NAA+PROBE: NEGATIVE

## 2019-06-20 LAB
LAB AP GYN PRIMARY INTERPRETATION: NORMAL
LAB AP LMP: NORMAL
Lab: NORMAL

## 2019-06-25 ENCOUNTER — CLINICAL SUPPORT (OUTPATIENT)
Dept: BARIATRICS | Facility: CLINIC | Age: 39
End: 2019-06-25

## 2019-06-25 VITALS
HEART RATE: 87 BPM | SYSTOLIC BLOOD PRESSURE: 118 MMHG | HEIGHT: 63 IN | TEMPERATURE: 98.9 F | DIASTOLIC BLOOD PRESSURE: 70 MMHG | WEIGHT: 238 LBS | RESPIRATION RATE: 16 BRPM | BODY MASS INDEX: 42.17 KG/M2

## 2019-06-25 DIAGNOSIS — E66.01 CLASS 3 SEVERE OBESITY DUE TO EXCESS CALORIES WITH SERIOUS COMORBIDITY AND BODY MASS INDEX (BMI) OF 40.0 TO 44.9 IN ADULT (HCC): Primary | ICD-10-CM

## 2019-06-25 DIAGNOSIS — E66.01 MORBID OBESITY (HCC): Primary | ICD-10-CM

## 2019-06-25 PROCEDURE — RECHECK: Performed by: DIETITIAN, REGISTERED

## 2019-06-26 ENCOUNTER — TRANSCRIBE ORDERS (OUTPATIENT)
Dept: RADIOLOGY | Facility: HOSPITAL | Age: 39
End: 2019-06-26

## 2019-06-26 ENCOUNTER — HOSPITAL ENCOUNTER (OUTPATIENT)
Dept: RADIOLOGY | Facility: HOSPITAL | Age: 39
Discharge: HOME | End: 2019-06-26
Attending: PODIATRIST
Payer: COMMERCIAL

## 2019-06-26 DIAGNOSIS — M79.674 PAIN IN TOE OF RIGHT FOOT: ICD-10-CM

## 2019-06-26 DIAGNOSIS — M79.674 PAIN IN TOE OF RIGHT FOOT: Primary | ICD-10-CM

## 2019-06-26 PROCEDURE — 73630 X-RAY EXAM OF FOOT: CPT

## 2019-07-03 LAB
HPV HR 12 DNA CVX QL NAA+PROBE: NEGATIVE
HPV16 DNA CVX QL NAA+PROBE: NEGATIVE
HPV18 DNA CVX QL NAA+PROBE: NEGATIVE

## 2019-07-08 ENCOUNTER — HOSPITAL ENCOUNTER (OUTPATIENT)
Dept: SLEEP CENTER | Facility: CLINIC | Age: 39
Discharge: HOME | End: 2019-07-08
Payer: COMMERCIAL

## 2019-07-08 DIAGNOSIS — G47.33 OBSTRUCTIVE SLEEP APNEA SYNDROME: ICD-10-CM

## 2019-07-08 PROCEDURE — 95810 POLYSOM 6/> YRS 4/> PARAM: CPT | Performed by: INTERNAL MEDICINE

## 2019-07-08 PROCEDURE — 95810 POLYSOM 6/> YRS 4/> PARAM: CPT

## 2019-07-09 NOTE — PROGRESS NOTES
Sleep Study Documentation    Pre-Sleep Study       Sleep testing procedure explained to patient:YES    Patient napped prior to study:NO    Caffeine:Dayshift worker after 12PM   Caffeine use:YES- coffee  6 to 18 ounces and chocolate milk  8 ounces    Alcohol:Dayshift workers after 5PM: Alcohol use:NO    Typical day for patient:YES       Study Documentation    Sleep Study Indications:     Sleep Study: Diagnostic   Snore: Moderate  Supplemental O2: no    O2 flow rate (L/min) range 0  O2 flow rate (L/min) final 0  Minimum SaO2 86  Baseline SaO2 96            EKG abnormalities: no     EEG abnormalities: no    Sleep Study Recorded < 2 hours: N/A    Sleep Study Recorded > 2 hours but incomplete study: N/A    Sleep Study Recorded 6 hours but no sleep obtained: NO    Patient classification: employed       Post-Sleep Study    Medication used at bedtime or during sleep study:NO    Patient reports time it took to fall asleep:less than 20 minutes    Patient reports waking up during study:Denied    Patient reports sleeping 2 to 4 hours without dreaming  Patient reports sleep during study:better than usual    Patient rated sleepiness: Not sleepy or tired    PAP treatment:no

## 2019-07-12 ENCOUNTER — OFFICE VISIT (OUTPATIENT)
Dept: BARIATRICS | Facility: CLINIC | Age: 39
End: 2019-07-12
Payer: COMMERCIAL

## 2019-07-12 VITALS
WEIGHT: 236.5 LBS | BODY MASS INDEX: 41.9 KG/M2 | DIASTOLIC BLOOD PRESSURE: 80 MMHG | HEART RATE: 100 BPM | TEMPERATURE: 98.7 F | SYSTOLIC BLOOD PRESSURE: 122 MMHG | HEIGHT: 63 IN

## 2019-07-12 DIAGNOSIS — E78.5 HYPERLIPIDEMIA, UNSPECIFIED HYPERLIPIDEMIA TYPE: ICD-10-CM

## 2019-07-12 DIAGNOSIS — E66.01 OBESITY, CLASS III, BMI 40-49.9 (MORBID OBESITY) (HCC): Primary | ICD-10-CM

## 2019-07-12 PROCEDURE — 99203 OFFICE O/P NEW LOW 30 MIN: CPT | Performed by: SURGERY

## 2019-07-12 NOTE — PROGRESS NOTES
BARIATRIC CONSULT-INITIAL - BARIATRIC SURGERY  Marita Ambriz 41 y o  female MRN: [id number]  Unit/Bed#:  Encounter: [id number]      HPI:  Marita Ambriz is a 41 y o  female who presents with morbid obesity to discuss weight loss options  Review of Systems   Constitutional: Negative  HENT: Negative  Eyes: Negative  Respiratory: Negative  Cardiovascular: Negative  Gastrointestinal: Negative  Endocrine: Negative  Genitourinary: Negative  Musculoskeletal: Negative  Skin: Negative  Neurological: Negative  Hematological: Negative  Psychiatric/Behavioral: Negative          Historical Information   Past Medical History:   Diagnosis Date    Anxiety     Asthma     Depression     History of Clostridium difficile colitis 2018    x 2 episodes, after antibiotics    Hypercholesterolemia     Hyperlipemia     Morbid obesity ([address] )     Sleep apnea     c pap     Past Surgical History:   Procedure Laterality Date    SD CONIZATION CERVIX,LOOP ELECTRD N/A 9/27/2018    Procedure: BIOPSY LEEP CERVIX;  Surgeon: Mu Cook DO;  Location: [facility];  Service: Gynecology   [address] [place] [address]    TONSILLECTOMY       Social History   Social History     Substance and Sexual Activity   Alcohol Use Yes    Comment: rare     Social History     Substance and Sexual Activity   Drug Use No     Social History     Tobacco Use   Smoking Status Current Every Day Smoker    Packs/day: 0 50    Years: 18 00    Pack years: 9 00    Types: Cigarettes   Smokeless Tobacco Never Used     Family History: non-contributory    Meds/Allergies   all medications and allergies reviewed  No Known Allergies    Objective       Current Vitals:   Blood Pressure: 122/80 (07/12/19 1039)  Pulse: 100 (07/12/19 1039)  Temperature: 98 7 °F (37 1 °C) (07/12/19 1039)  Temp Source: Tympanic (07/12/19 1039)  Height: 5' 2 5" (158 8 cm) (07/12/19 1039)  Weight - Scale: 107 kg (236 lb 8 oz) (07/12/19 1039)  Body mass index is 42 57 kg/m²  Invasive Devices     None                 Physical Exam   Constitutional: She appears well-developed and well-nourished  Lab Results: I have personally reviewed pertinent lab results  Imaging: I have personally reviewed pertinent reports  EKG, Pathology, and Other Studies: I have personally reviewed pertinent reports  Code Status: [unfilled]  Advance Directive and Living Will:      Power of :    POLST:      Assessment/PLAN:            Patient has a long history of morbid obesity and is presenting to discuss the surgical weight loss options  Despite the patient best efforts patient was unable to lose any meaningful or sustainable weight using nonsurgical means  We had a long discussion regarding all the surgical weight-loss options at our disposal at this point and reviewed the risks and benefits of each procedure in details as it relates to her age, BMI and medical conditions  Patient elected to undergo a gastric bypass    Risks and benefits were explained to the patient  We also discussed the importance and need of a preoperative workup to make sure that the patient can undergo the procedure safely  Preoperative workup includes sleep apnea screening, cardiac evaluation, nutrition/psych and preoperative EGD  Risks and benefits of all the preoperative diagnostic tests were discussed with the patient including but not limited to the upper endoscopy  Alternatives to surgery and alternative forms of surgery were also explained  Postsurgical commitment and aftercare programs were discussed and explained to the patient in details       In terms of comorbidities patient suffers mostly of   Past Medical History:   Diagnosis Date    Anxiety     Asthma     Depression     History of Clostridium difficile colitis 2018    x 2 episodes, after antibiotics    Hypercholesterolemia     Hyperlipemia     Morbid obesity ([address] )     Sleep apnea     c pap       I informed the patient that the rate of resolution of comorbid conditions following weight loss surgery is between 60 and 90% depending on the severity of the specific medical condition  I discussed and educated the patient regarding the different components of our multidisciplinary program and the importance of compliance and follow-up in the postoperative period  All questions answered  Patient understands risks and benefits  A videotape of the procedure was also shown to the patient  After showing the video we discussed all the technical aspects of the procedure and also the potential complications including but not limited to gastrointestinal perforation, leak, obstruction, stricture and hemorrhage  I spent 30 min with the patient more than 50% of the time was spent educating the patient and coordinating care

## 2019-07-16 ENCOUNTER — TELEPHONE (OUTPATIENT)
Dept: BARIATRICS | Facility: CLINIC | Age: 39
End: 2019-07-16

## 2019-07-16 ENCOUNTER — TELEPHONE (OUTPATIENT)
Dept: PULMONOLOGY | Facility: CLINIC | Age: 39
End: 2019-07-16

## 2019-07-16 DIAGNOSIS — G47.33 OBSTRUCTIVE SLEEP APNEA SYNDROME: Primary | ICD-10-CM

## 2019-07-16 NOTE — PROGRESS NOTES
Patient would like to try Auto CPAP again  Order has been placed and faxed to [address]  on 7/16/19 at 1:30pm to fax number [phone]

## 2019-07-16 NOTE — TELEPHONE ENCOUNTER
I called patient and discussed the results of her sleep study with her  I informed her that she has Mild PRECIOUS and that there  are a few recommendations for treatment including retrying CPAP, weight loss, and a oral appliance  Patient would like to try CPAP again  I have explained that as soon as the script is placed I will fax everything to [facility] and they will contact her to get her set up with a CPAP  Patient will call me if she does not hear anything from [facility] within the next two weeks  Patient is scheduled for a follow up in October with Dr Vaibhav Youssef and compliance will be addressed at that time  All of patient's questions were answered, and she has our office number should she have any further questions or concerns

## 2019-07-22 DIAGNOSIS — Z72.0 TOBACCO USE: Primary | ICD-10-CM

## 2019-08-13 ENCOUNTER — TELEPHONE (OUTPATIENT)
Dept: BARIATRICS | Facility: CLINIC | Age: 39
End: 2019-08-13

## 2019-08-21 ENCOUNTER — HOSPITAL ENCOUNTER (OUTPATIENT)
Dept: GASTROENTEROLOGY | Facility: HOSPITAL | Age: 39
Setting detail: OUTPATIENT SURGERY
Discharge: HOME | End: 2019-08-21
Attending: SURGERY
Payer: COMMERCIAL

## 2019-08-21 ENCOUNTER — ANESTHESIA (OUTPATIENT)
Dept: GASTROENTEROLOGY | Facility: HOSPITAL | Age: 39
End: 2019-08-21

## 2019-08-21 ENCOUNTER — ANESTHESIA EVENT (OUTPATIENT)
Dept: GASTROENTEROLOGY | Facility: HOSPITAL | Age: 39
End: 2019-08-21

## 2019-08-21 VITALS
SYSTOLIC BLOOD PRESSURE: 116 MMHG | OXYGEN SATURATION: 99 % | DIASTOLIC BLOOD PRESSURE: 75 MMHG | HEIGHT: 63 IN | HEART RATE: 70 BPM | TEMPERATURE: 97.5 F | WEIGHT: 235.89 LBS | BODY MASS INDEX: 41.8 KG/M2 | RESPIRATION RATE: 20 BRPM

## 2019-08-21 DIAGNOSIS — E66.01 MORBID OBESITY (HCC): ICD-10-CM

## 2019-08-21 LAB
EXT PREGNANCY TEST URINE: NEGATIVE
EXT. CONTROL: NORMAL

## 2019-08-21 PROCEDURE — 81025 URINE PREGNANCY TEST: CPT | Performed by: ANESTHESIOLOGY

## 2019-08-21 PROCEDURE — 88305 TISSUE EXAM BY PATHOLOGIST: CPT | Performed by: PATHOLOGY

## 2019-08-21 PROCEDURE — 43239 EGD BIOPSY SINGLE/MULTIPLE: CPT | Performed by: SURGERY

## 2019-08-21 RX ORDER — PROPOFOL 10 MG/ML
INJECTION, EMULSION INTRAVENOUS AS NEEDED
Status: DISCONTINUED | OUTPATIENT
Start: 2019-08-21 | End: 2019-08-21 | Stop reason: SURG

## 2019-08-21 RX ORDER — SODIUM CHLORIDE 9 MG/ML
125 INJECTION, SOLUTION INTRAVENOUS CONTINUOUS
Status: DISCONTINUED | OUTPATIENT
Start: 2019-08-21 | End: 2019-08-25 | Stop reason: HOSPADM

## 2019-08-21 RX ADMIN — SODIUM CHLORIDE 125 ML/HR: 0.9 INJECTION, SOLUTION INTRAVENOUS at 07:40

## 2019-08-21 RX ADMIN — PROPOFOL 200 MG: 10 INJECTION, EMULSION INTRAVENOUS at 08:04

## 2019-08-21 NOTE — H&P
H&P EXAM - Outpatient Endoscopy  [facility] [address] [place] [address] GI LAB INTRA   Savannah Dao 41 y o  female MRN: [id number]  Unit/Bed#:  Encounter: [id number]        Impression: Morbid obesity    Plan:Upper endoscopy and a biopsy to rule out H  Pylori    Chief Complaint: Morbid obesity and preoperative endoscopy    Physical Exam: Normal not in acute distress   Chest: Clear to auscultation   Heart: Normal S1 and S2

## 2019-08-21 NOTE — DISCHARGE INSTRUCTIONS
Upper Endoscopy   WHAT YOU NEED TO KNOW:   An upper endoscopy is also called an upper gastrointestinal (GI) endoscopy, or an esophagogastroduodenoscopy (EGD)  You may feel bloated, gassy, or have some abdominal discomfort after your procedure  Your throat may be sore for 24 to 36 hours  You may burp or pass gas from air that is still inside your body  DISCHARGE INSTRUCTIONS:   Call 911 for any of the following:   · You have sudden chest pain or trouble breathing  Seek care immediately if:   · You feel dizzy or faint  · You have trouble swallowing  · Your bowel movements are very dark or black  · Your abdomen is hard and firm and you have severe pain  · You vomit blood  Contact your healthcare provider if:   · You feel full or bloated and cannot burp or pass gas  · You have not had a bowel movement for 3 days after your procedure  · You have neck pain  · You have a fever or chills  · You have nausea or are vomiting  · You have a rash or hives  · You have questions or concerns about your endoscopy  Relieve a sore throat:  Suck on throat lozenges or crushed ice  Gargle with a small amount of warm salt water  Mix 1 teaspoon of salt and 1 cup of warm water to make salt water  Relieve gas and discomfort from bloating:  Lie on your right side with a heating pad on your abdomen  Take short walks to help pass gas  Eat small meals until bloating is relieved  Rest after your procedure: You have been given medicine to relax you  Do not  drive or make important decisions until the day after your procedure  Return to your normal activity as directed  You can usually return to work the day after your procedure  Follow up with your healthcare provider as directed:  Write down your questions so you remember to ask them during your visits     © 2017 [address] is for End User's use only and may not be sold, redistributed or otherwise used for commercial purposes  All illustrations and images included in CareNotes® are the copyrighted property of [employer]  or Jasbir Fowler  The above information is an  only  It is not intended as medical advice for individual conditions or treatments  Talk to your doctor, nurse or pharmacist before following any medical regimen to see if it is safe and effective for you  Cigarette Smoking and Your Health   WHAT YOU NEED TO KNOW:   What are the risks to my health if I smoke tobacco?  Nicotine and other chemicals found in tobacco damage every cell in your body  Even if you are a light smoker, you have an increased risk for cancer, heart disease, and lung disease  If you are pregnant or have diabetes, smoking increases your risk for complications  What are the benefits to my health if I stop smoking? · You decrease respiratory symptoms such as coughing, wheezing, and shortness of breath  · You reduce your risk for cancers of the lung, mouth, throat, kidney, bladder, pancreas, stomach, and cervix  If you already have cancer, you increase the benefits of chemotherapy  You also reduce your risk for cancer returning or a second cancer from developing  · You reduce your risk for heart disease, blood clots, heart attack, and stroke  · You reduce your risk for lung infections, and diseases such as pneumonia, asthma, chronic bronchitis, and emphysema  · Your circulation improves  More oxygen can be delivered to your body  If you have diabetes, you lower your risk for complications, such as kidney, artery, and eye diseases  You also lower your risk for nerve damage  Nerve damage can lead to amputations, poor vision, and blindness  · You improve your body's ability to heal and to fight infections  What are the health benefits to others if I stop smoking? Tobacco is harmful to nonsmokers who breathe in your secondhand smoke   The following are ways the health of others around you may improve when you stop smoking:  · You lower the risks for lung cancer and heart disease in nonsmoking adults  · If you are pregnant, you lower the risk for miscarriage, early delivery, low birth weight, and stillbirth  You also lower your baby's risk for SIDS, obesity, developmental delay, and neurobehavioral problems, such as ADHD  · If you have children, you lower their risk for ear infections, colds, pneumonia, bronchitis, and asthma  Where can I find more information and support to stop smoking? · [employer]  Phone: 7- 537 - 788-4809  Web Address: www [employer] (vitals.com)  CARE AGREEMENT:   You have the right to help plan your care  Learn about your health condition and how it may be treated  Discuss treatment options with your caregivers to decide what care you want to receive  You always have the right to refuse treatment  The above information is an  only  It is not intended as medical advice for individual conditions or treatments  Talk to your doctor, nurse or pharmacist before following any medical regimen to see if it is safe and effective for you  © 2017 [address]  Information is for End User's use only and may not be sold, redistributed or otherwise used for commercial purposes  All illustrations and images included in CareNotes® are the copyrighted property of [employer]  or Jasbir Fowler  How to Stop Smoking   AMBULATORY CARE:   You will improve your health and the health of others around you  if you stop smoking  Your risk for heart and lung disease, cancer, stroke, heart attack, and vision problems will also decrease  You can benefit from quitting no matter how long you have smoked  Prepare to stop smoking:  Nicotine is a highly addictive drug found in cigarettes  Withdrawal symptoms can happen when you stop smoking and make it hard to quit   These include anxiety, depression, irritability, trouble sleeping, and increased appetite  You increase your chances of success if you prepare to quit  · Set a quit date  [place] a date that is within the next 2 weeks  Do not pick a day that you think may be stressful or busy  Write down the day or [place] it on your calender  · Tell friends and family that you plan to quit  Explain that you may have withdrawal symptoms when you try to quit  Ask them to support you  They may be able to encourage you and help reduce your stress to make it easier for you to quit  · Make a list of your reasons for quitting  Put the list somewhere you will see it every day, such as your refrigerator  You can look at the list when you have a craving  · Remove all tobacco and nicotine products from your home, car, and workplace  Also, remove anything else that will tempt you to smoke, such as lighters, matches, or ashtrays  Clean your car, home, and places at work that smell like smoke  The smell of smoke can trigger a craving  · Identify triggers that make you want to smoke  This may include activities, feelings, or people  Also write down 1 way you can deal with each of your triggers  For example, if you want to smoke as soon as you wake up, plan another activity during this time, such as exercise  · Make a plan for how you will quit  Learn about the tools that can help you quit, such as medicine, counseling, or nicotine replacement therapy  Choose at least 2 options to help you quit  Tools to help you stop smoking:   · Counseling  from a trained healthcare provider can provide you with support and skills to quit smoking  The provider will also teach you to manage your withdrawal symptoms and cravings  You may receive counseling from one counselor, in group therapy, or through phone therapy called a quit line  · Nicotine replacement therapy (NRT)  such as nicotine patches, gum, or lozenges may help reduce your nicotine cravings  You may get these without a doctor's order   Do not use e-cigarettes or smokeless tobacco in place of cigarettes or to help you quit  They still contain nicotine  · Prescription medicines  such as nasal sprays or nicotine inhalers may help reduce your withdrawal symptoms  Other medicines may also be used to reduce your urge to smoke  Ask your healthcare provider about these medicines  You may need to start certain medicines 2 weeks before your quit date for them to work well  · Hypnosis  is a practice that helps guide you through thoughts and feelings  Hypnosis may help decrease your cravings and make you more willing to quit  · Acupuncture therapy  uses very thin needles to balance energy channels in the body  This is thought to help decrease cravings and symptoms of nicotine withdrawal      · Support groups  let you talk to others who are trying to quit or have already quit  It may be helpful to speak with others about how they quit  Manage your cravings:   · Avoid situations, people, and places that tempt you to smoke  Go to nonsmoking places, such as libraries or restaurants  Understand what tempts you and try to avoid these things  · Keep your hands busy  Hold things such as a stress ball or pen  · Put candy or toothpicks in your mouth  Keep lollipops, sugarless gum, or toothpicks with you at all times  · Do not have alcohol or caffeine  These drinks may tempt you to smoke  Drink healthy liquids such as water or juice instead  · Reward yourself when you resist your cravings  Rewards will motivate you and help you stay positive  · Do an activity that distracts you from your craving  Examples include going for a walk, exercising, or cleaning  Prevent weight gain after you quit:  You may gain a few pounds after you quit smoking  It is healthier for you to gain a few pounds than to continue to smoke  The following can help you prevent weight gain:  · Eat healthy foods    These include fruits, vegetables, whole-grain breads, low-fat dairy products, beans, lean meats, and fish  Eat healthy snacks, such as low-fat yogurt, if you get hungry between meals  · Drink water before, during, and between meals  This will make your stomach feel full and help prevent you from overeating  Ask your healthcare provider how much liquid to drink each day and which liquids are best for you  · Exercise  Take a walk or do some kind of exercise every day  Ask your healthcare provider what exercise is right for you  This may help reduce your cravings and reduce stress  For more support and information:   · [employer]  Phone: 8- 830 - 004-6564  Web Address: www smokefree  gov  © 2017 [address]  Information is for End User's use only and may not be sold, redistributed or otherwise used for commercial purposes  All illustrations and images included in CareNotes® are the copyrighted property of A D A M , Inc  or Jasbir Fowler  The above information is an  only  It is not intended as medical advice for individual conditions or treatments  Talk to your doctor, nurse or pharmacist before following any medical regimen to see if it is safe and effective for you  How to Stop Smoking   WHAT YOU NEED TO KNOW:   Why should I stop smoking? You will improve your health and the health of others around you if you stop smoking  Your risk for heart and lung disease, cancer, stroke, heart attack, and vision problems will also decrease  You can benefit from quitting no matter how long you have smoked  How can I prepare to stop smoking? Nicotine is a highly addictive drug found in cigarettes  Withdrawal symptoms can happen when you stop smoking and make it hard to quit  These include anxiety, depression, irritability, trouble sleeping, and increased appetite  You increase your chances of success if you prepare to quit  · Set a quit date  [place] a date that is within the next 2 weeks   Do not pick a day that you think may be stressful or busy  Write down the day or [employer] it on your calender  · Tell friends and family that you plan to quit  Explain that you may have withdrawal symptoms when you try to quit  Ask them to support you  They may be able to encourage you and help reduce your stress to make it easier for you to quit  · Make a list of your reasons for quitting  Put the list somewhere you will see it every day, such as your refrigerator  You can look at the list when you have a craving  · Remove all tobacco and nicotine products from your home, car, and workplace  Also, remove anything else that will tempt you to smoke, such as lighters, matches, or ashtrays  Clean your car, home, and places at work that smell like smoke  The smell of smoke can trigger a craving  · Identify triggers that make you want to smoke  This may include activities, feelings, or people  Also write down 1 way you can deal with each of your triggers  For example, if you want to smoke as soon as you wake up, plan another activity during this time, such as exercise  · Make a plan for how you will quit  Learn about the tools that can help you quit, such as medicine, counseling, or nicotine replacement therapy  Choose at least 2 options to help you quit  What are some tools to help me stop smoking? · Counseling  from a trained healthcare provider can provide you with support and skills to quit smoking  The provider will also teach you to manage your withdrawal symptoms and cravings  You may receive counseling from one counselor, in group therapy, or through phone therapy called a quit line  · Nicotine replacement therapy (NRT)  such as nicotine patches, gum, or lozenges may help reduce your nicotine cravings  You may get these without a doctor's order  Do not use e-cigarettes or smokeless tobacco in place of cigarettes or to help you quit  They still contain nicotine      · Prescription medicines  such as nasal sprays or nicotine inhalers may help reduce your withdrawal symptoms  Other medicines may also be used to reduce your urge to smoke  Ask your healthcare provider about these medicines  You may need to start certain medicines 2 weeks before your quit date for them to work well  · Hypnosis  is a practice that helps guide you through thoughts and feelings  Hypnosis may help decrease your cravings and make you more willing to quit  · Acupuncture therapy  uses very thin needles to balance energy channels in the body  This is thought to help decrease cravings and symptoms of nicotine withdrawal      · Support groups  let you talk to others who are trying to quit or have already quit  It may be helpful to speak with others about how they quit  How can I manage my cravings? · Avoid situations, people, and places that tempt you to smoke  Go to nonsmoking places, such as libraries or restaurants  Understand what tempts you and try to avoid these things  · Keep your hands busy  Hold things such as a stress ball or pen  · Put candy or toothpicks in your mouth  Keep lollipops, sugarless gum, or toothpicks with you at all times  · Do not have alcohol or caffeine  These drinks may tempt you to smoke  Drink healthy liquids such as water or juice instead  · Reward yourself when you resist your cravings  Rewards will motivate you and help you stay positive  · Do an activity that distracts you from your craving  Examples include going for a walk, exercising, or cleaning  What should I know about weight gain after I quit? You may gain a few pounds after you quit smoking  It is healthier for you to gain a few pounds than to continue to smoke  The following can help you prevent weight gain:  · Eat healthy foods  These include fruits, vegetables, whole-grain breads, low-fat dairy products, beans, lean meats, and fish  Eat healthy snacks, such as low-fat yogurt, if you get hungry between meals       · Drink water before, during, and between meals  This will make your stomach feel full and help prevent you from overeating  Ask your healthcare provider how much liquid to drink each day and which liquids are best for you  · Exercise  Take a walk or do some kind of exercise every day  Ask your healthcare provider what exercise is right for you  This may help reduce your cravings and reduce stress  Where can I find support and more information? · [employer]  Phone: 3- 796 - [phone]  Web Address: www [employer]  CARE AGREEMENT:   You have the right to help plan your care  Learn about your health condition and how it may be treated  Discuss treatment options with your caregivers to decide what care you want to receive  You always have the right to refuse treatment  The above information is an  only  It is not intended as medical advice for individual conditions or treatments  Talk to your doctor, nurse or pharmacist before following any medical regimen to see if it is safe and effective for you  © 2017 [address] Information is for End User's use only and may not be sold, redistributed or otherwise used for commercial purposes  All illustrations and images included in CareNotes® are the copyrighted property of [employer]  or Jasbir Fowler  How to Stop Smoking, Ambulatory Care   GENERAL INFORMATION:   Why you should stop smoking: You will improve your health and the health of others around you if you stop smoking  Your risk of heart and lung disease, cancer, stroke, heart attack, and vision problems will also decrease  You can benefit from quitting no matter how long you have smoked  Quitting may even prolong your life  Prepare to stop smoking:  Nicotine is a highly addictive drug found in cigarettes  Withdrawal symptoms can happen when you stop smoking and make it hard to quit   These include anxiety, depression, irritability, trouble sleeping, and increased appetite  You increase your chances of success if you prepare to quit  · Set a quit date  This will help confirm your decision to stop smoking  · Tell friends and family that you plan to quit  Explain that you may have withdrawal symptoms when you try to quit  Ask them to support you  They may be able to encourage you and help reduce your stress to make it easier for you to quit  · Expect it to be hard to quit, but know you can do it  Smoking is a daily habit that becomes part of your life  Know the triggers that tempt you to smoke, so you can break this habit  Write down a list of these challenges and have a plan to avoid them  · Remove all tobacco and nicotine products from your home, car, and workplace  Also, remove anything else that will tempt you to smoke, such as lighters, matches, or bernice trays  Tools to help you stop smoking: You may be able to quit on your own, or you may need to try one or more of the following:  · Counseling  from trained caregivers will help teach you skills to quit smoking  They will also teach you to manage your withdrawal symptoms and cravings  You may receive counseling from one counselor, in group therapy, or through phone therapy called a quit line  · Nicotine replacement therapy (NRT)  such as nicotine patches, gum, or lozenges may help reduce your nicotine cravings and other withdrawal symptoms  You may get these without a doctor's order  · Prescription medicines  such as nasal sprays or nicotine inhalers may help reduce your withdrawal symptoms  Other medicines may also be used to reduce your urge to smoke  Ask your primary healthcare provider about these medicines  You may need to start certain medicines 2 weeks before your quit date for them to work well  Manage your cravings:   · Avoid situations, people, and places that tempt you to smoke  Go to nonsmoking places, such as libraries or restaurants   Understand what tempts you and try to avoid these things  · Keep your hands busy  Hold things such as a stress ball or pen  Keep lollipops, gum, or toothpicks in your mouth to distract you from your cravings  · Avoid alcohol and caffeine  These drinks may tempt you to smoke  Drink healthy liquids such as water or juice instead  · Reward yourself when you resist your cravings  Rewards will motivate you and help you stay positive  For more support and information:   · [employer]  Phone: 8- 150 - 327-6215  Web Address: www [employer]  CARE AGREEMENT:   You have the right to help plan your care  Learn about your health condition and how it may be treated  Discuss treatment options with your caregivers to decide what care you want to receive  You always have the right to refuse treatment  The above information is an  only  It is not intended as medical advice for individual conditions or treatments  Talk to your doctor, nurse or pharmacist before following any medical regimen to see if it is safe and effective for you  © 2014 [address] is for End User's use only and may not be sold, redistributed or otherwise used for commercial purposes  All illustrations and images included in CareNotes® are the copyrighted property of [employer]  or Jasbir Fowler

## 2019-08-21 NOTE — ANESTHESIA PREPROCEDURE EVALUATION
Review of Systems/Medical History  Patient summary reviewed  Chart reviewed  No history of anesthetic complications     Cardiovascular  Hyperlipidemia,    Pulmonary  Smoker cigarette smoker  , Tobacco cessation counseling given , Asthma Asthma type of rescue: inhaled steroids, Sleep apnea CPAP,        GI/Hepatic      Comment: Hx of recurrent C  Diff     Negative  ROS        Endo/Other    Obesity  morbid obesity   GYN  Negative gynecology ROS          Hematology  Negative hematology ROS      Musculoskeletal  Negative musculoskeletal ROS        Neurology  Negative neurology ROS      Psychology   Anxiety, Depression , being treated for depression,              Physical Exam    Airway    Mallampati score: II  TM Distance: >3 FB  Neck ROM: full     Dental   No notable dental hx     Cardiovascular  Rhythm: regular, Rate: normal, Cardiovascular exam normal    Pulmonary  Pulmonary exam normal Breath sounds clear to auscultation,     Other Findings        Anesthesia Plan  ASA Score- 3     Anesthesia Type- IV sedation with anesthesia with ASA Monitors  Additional Monitors:   Airway Plan:         Plan Factors-Patient not instructed to abstain from smoking on day of procedure  Patient smoked on day of surgery  Induction- intravenous  Postoperative Plan-     Informed Consent- Anesthetic plan and risks discussed with patient  I personally reviewed this patient with the CRNA  Discussed and agreed on the Anesthesia Plan with the CRNA  Yanira Chew

## 2019-08-29 ENCOUNTER — OFFICE VISIT (OUTPATIENT)
Dept: CARDIOLOGY CLINIC | Facility: CLINIC | Age: 39
End: 2019-08-29
Payer: COMMERCIAL

## 2019-08-29 VITALS
BODY MASS INDEX: 42.81 KG/M2 | HEIGHT: 63 IN | DIASTOLIC BLOOD PRESSURE: 78 MMHG | HEART RATE: 89 BPM | SYSTOLIC BLOOD PRESSURE: 114 MMHG | WEIGHT: 241.6 LBS

## 2019-08-29 DIAGNOSIS — E66.01 MORBID OBESITY (HCC): ICD-10-CM

## 2019-08-29 DIAGNOSIS — Z72.0 TOBACCO USE: ICD-10-CM

## 2019-08-29 DIAGNOSIS — Z01.810 PRE-OPERATIVE CARDIOVASCULAR EXAMINATION: Primary | ICD-10-CM

## 2019-08-29 PROCEDURE — 93000 ELECTROCARDIOGRAM COMPLETE: CPT | Performed by: INTERNAL MEDICINE

## 2019-08-29 PROCEDURE — 99244 OFF/OP CNSLTJ NEW/EST MOD 40: CPT | Performed by: INTERNAL MEDICINE

## 2019-08-29 RX ORDER — NICOTINE 21 MG/24HR
1 PATCH, TRANSDERMAL 24 HOURS TRANSDERMAL EVERY 24 HOURS
Qty: 14 PATCH | Refills: 0 | Status: SHIPPED | OUTPATIENT
Start: 2019-10-10 | End: 2020-12-22 | Stop reason: ALTCHOICE

## 2019-08-29 RX ORDER — NICOTINE 21 MG/24HR
1 PATCH, TRANSDERMAL 24 HOURS TRANSDERMAL EVERY 24 HOURS
Qty: 42 PATCH | Refills: 0 | Status: SHIPPED | OUTPATIENT
Start: 2019-08-29 | End: 2019-10-10

## 2019-08-29 NOTE — PROGRESS NOTES
Consultation - Cardiology     Edgar Flanagan 41 y o  female MRN: [id number]    Assessment/Plan:    1  Preop CV  EKG is within normal limits  No current exertional CP or SOB to suggest underlying CAD  I do not believe she needs any further cardiac testing at this time, she may proceed with bariatric surgery in near future once she quits smoking  She is at low to moderate risk given comorbidities  2  PRECIOUS  Using CPAP since 7/19, has been diagnosed since around 2002  Had initially used CPAP, but then stopped after 4 years, not back to using  3  HL  Lipid panel 6/19 showed total cholesterol 242, , HDL 52,   On Atorvastatin 20, which she reports she has been taking when lipid panel done in 6/19  Likely will improve with weight loss, but if not, consider switching Atorvastatin to Rosuvastatin 40      4  Tobacco use  Smoking 1/2 - 1 ppd  Has never tried quitting  Smoking since age 17 or so  Having difficulty quitting on her own without smoking cessation aids  Will prescribe nicotine patch with taper (starting at 21 mg/day for 6 weeks, then 14 mg/day for 2 weeks, then 7 mg/day for 2 weeks), to help her quit/wean off cigarettes  If this does not help, consider Wellbutrin or Chantix instead  1  Pre-operative cardiovascular examination  POCT ECG   2  Morbid obesity ([address] )  Ambulatory referral to Cardiology   3  Tobacco use  nicotine (NICODERM CQ) 21 mg/24 hr TD 24 hr patch    nicotine (NICODERM CQ) 14 mg/24hr TD 24 hr patch    nicotine (NICODERM CQ) 7 mg/24hr TD 24 hr patch       HPI: 41 y o  female with a history of tobacco use, PRECIOUS, HL, obesity with BMI 43, who is referred by Dr Carmen Asencio for preoperative cardiac evaluation prior to bariatric surgery  She denies any CP or SOB  Walks up to 30 minutes without exertional CP or SOB  She has chronic LE edema, wears compression stockings at work to help with this  Works in [employer] department at [employer]  No orthopnea, uses 1 pillow to sleep  No PND  Using CPAP currently  Gets Raynaud's in her feet when exposed to cold  No dizziness, lightheadedness, palpitations  No family history of CAD  Father has HL  Review of Systems:    Review of Systems   Constitution: Negative for chills, decreased appetite, diaphoresis, fever, malaise/fatigue, night sweats, weight gain and weight loss  HENT: Negative for ear pain, hearing loss, hoarse voice, nosebleeds, sore throat and tinnitus  Eyes: Negative for blurred vision and pain  Cardiovascular: Positive for leg swelling  Negative for chest pain, claudication, cyanosis, dyspnea on exertion, irregular heartbeat, near-syncope, orthopnea, palpitations, paroxysmal nocturnal dyspnea and syncope  Respiratory: Positive for snoring  Negative for cough, hemoptysis, shortness of breath, sleep disturbances due to breathing, sputum production and wheezing  Hematologic/Lymphatic: Negative for adenopathy and bleeding problem  Does not bruise/bleed easily  Skin: Negative for color change, dry skin, flushing, itching, poor wound healing and rash  Musculoskeletal: Negative for arthritis, back pain, falls, joint pain, muscle cramps, muscle weakness, myalgias and neck pain  Gastrointestinal: Negative for abdominal pain, constipation, diarrhea, dysphagia, heartburn, hematemesis, hematochezia, melena, nausea and vomiting  Genitourinary: Negative for dysuria, frequency, hematuria, hesitancy, non-menstrual bleeding and urgency  Neurological: Negative for excessive daytime sleepiness, dizziness, focal weakness, headaches, light-headedness, loss of balance, numbness, paresthesias, tremors, vertigo and weakness  Psychiatric/Behavioral: Positive for depression  Negative for altered mental status and memory loss  The patient is nervous/anxious  The patient does not have insomnia  Allergic/Immunologic: Positive for environmental allergies  Negative for persistent infections         Active Problems:     Patient Active Problem List   Diagnosis    Hypercholesterolemia    Mild intermittent asthma with status asthmaticus    Obesity    Depression with anxiety    Tobacco use    Dysplasia of cervix, low grade (CORNELIUS 1)    Status post LEEP (loop electrosurgical excision procedure) of cervix    Furunculosis    Recurrent Clostridium difficile diarrhea    Leukocytosis    History of Clostridium difficile colitis    Obstructive sleep apnea syndrome       Historical Information   Past Medical History:   Diagnosis Date    Anxiety     Asthma     Depression     History of Clostridium difficile colitis 2018    x 2 episodes, after antibiotics    Hypercholesterolemia     Hyperlipemia     Morbid obesity ([address] )     Sleep apnea     c pap     Past Surgical History:   Procedure Laterality Date    LA CONIZATION CERVIX,LOOP ELECTRD N/A 2018    Procedure: BIOPSY LEEP CERVIX;  Surgeon: Beka Colunga DO;  [facility];  Service: Gynecology    REDUCTION MAMMAPLASTY      TONSILLECTOMY       Social History     Substance and Sexual Activity   Alcohol Use Yes    Comment: rare     Social History     Substance and Sexual Activity   Drug Use No     Social History     Tobacco Use   Smoking Status Current Every Day Smoker    Packs/day: 0 50    Years: 18 00    Pack years: 9 00    Types: Cigarettes   Smokeless Tobacco Never Used       Family History:   Family History   Problem Relation Age of Onset    Lung cancer Father     Lung disease Father     Cancer Father         Lung/bone ca-     Hyperlipidemia Father     Liver cancer Paternal Grandfather     Cancer Paternal Grandfather         Liver ca-    Asthma Mother     Obesity Mother     Arthritis Maternal Grandmother     COPD Maternal Grandmother     Cancer Maternal Grandfather         Lung ca-       No Known Allergies      Current Outpatient Medications:     albuterol (VENTOLIN HFA) 90 mcg/act inhaler, Inhale 2 puffs every 6 (six) hours as needed for wheezing, Disp: 18 g, Rfl: 0    ALPRAZolam (XANAX) 0 25 mg tablet, as needed, Disp: , Rfl:     Ascorbic Acid (VITAMIN C) 500 MG CAPS, Take by mouth daily  , Disp: , Rfl:     atorvastatin (LIPITOR) 20 mg tablet, Take by mouth daily  , Disp: , Rfl:     cholecalciferol (VITAMIN D3) 1,000 units tablet, Take by mouth daily  , Disp: , Rfl:     clobetasol (TEMOVATE) 0 05 % ointment, , Disp: , Rfl:     Coconut Oil 1000 MG CAPS, Take by mouth daily  , Disp: , Rfl:     DULoxetine (CYMBALTA) 60 mg delayed release capsule, Take 60 mg by mouth daily  , Disp: , Rfl:     Echinacea 125 MG CAPS, Take by mouth daily  , Disp: , Rfl:     fluticasone-vilanterol (BREO ELLIPTA) 100-25 mcg/inh inhaler, Inhale 1 puff daily Rinse mouth after use , Disp: 2 Inhaler, Rfl: 5    Iron Combinations (IRON COMPLEX PO), iron,carbonyl 65 mg-vitamin C 125 mg tablet,delayed release, Disp: , Rfl:     levalbuterol (XOPENEX) 1 25 mg/0 5 mL nebulizer solution, Take 0 5 mL (1 25 mg total) by nebulization every 6 (six) hours as needed for wheezing, Disp: 30 vial, Rfl: 0    Multiple Vitamin tablet, Take by mouth daily  , Disp: , Rfl:     mupirocin (BACTROBAN) 2 % cream, mupirocin 2 % topical cream, Disp: , Rfl:     Vitamin E 400 units TABS, Take by mouth daily  , Disp: , Rfl:     budesonide (PULMICORT) 180 MCG/ACT inhaler, Inhale 2 puffs 2 (two) times a day (Patient not taking: Reported on 4/24/2019), Disp: , Rfl: 0    [START ON 10/10/2019] nicotine (NICODERM CQ) 14 mg/24hr TD 24 hr patch, Place 1 patch on the skin every 24 hours, Disp: 14 patch, Rfl: 0    nicotine (NICODERM CQ) 21 mg/24 hr TD 24 hr patch, Place 1 patch on the skin every 24 hours for 42 days, Disp: 42 patch, Rfl: 0    [START ON 10/11/2019] nicotine (NICODERM CQ) 7 mg/24hr TD 24 hr patch, Place 1 patch on the skin every 24 hours, Disp: 14 patch, Rfl: 0    Objective   Vitals:   Vitals:    08/29/19 0836   BP: 114/78   BP Location: Right arm   Patient Position: Sitting Cuff Size: Large   Pulse: 89   Weight: 110 kg (241 lb 9 6 oz)   Height: 5' 2 5" (1 588 m)          Physical Exam:     GEN: Alert and oriented x 3, in no acute distress  Well appearing and well nourished  HEENT: Sclera anicteric, conjunctivae pink, mucous membranes moist  Oropharynx clear  NECK: Supple, no carotid bruits, no significant JVD  Trachea midline, no thyromegaly  HEART: Regular rhythm, normal S1 and S2, no murmurs, clicks, gallops or rubs  PMI nondisplaced, no thrills  LUNGS: Clear to auscultation bilaterally; no wheezes, rales, or rhonchi  No increased work of breathing or signs of respiratory distress  ABDOMEN: Obese, soft, nontender, nondistended, normoactive bowel sounds  EXTREMITIES: Skin warm and well perfused, no clubbing, cyanosis, trace to 1+ pretibial edema  NEURO: No focal findings  Normal gait  Normal speech  Mood and affect normal    SKIN: Normal without suspicious lesions on exposed skin      Lab Results:     Lab Results   Component Value Date    HGBA1C 4 8 07/18/2018    HGBA1C 4 9 06/10/2017    HGBA1C 5 0 10/15/2016     Lab Results   Component Value Date    CHOL 220 06/27/2015    CHOL 214 03/03/2015    CHOL 250 08/13/2014     Lab Results   Component Value Date    HDL 52 06/12/2019    HDL 60 07/18/2018    HDL 65 (H) 06/10/2017     Lab Results   Component Value Date    LDLCALC 142 (H) 06/12/2019    LDLCALC 176 (H) 07/18/2018    LDLCALC 128 (H) 06/10/2017     Lab Results   Component Value Date    TRIG 242 (H) 06/12/2019    TRIG 145 07/18/2018    TRIG 167 (H) 06/10/2017     No results found for: CHOLHDL

## 2019-08-29 NOTE — LETTER
Cardiology Pre Operative Clearance      PRE OPERATIVE CARDIAC RISK ASSESSMENT    08/29/19    Isaac Quevedo  1980  [id number]    Date of Surgery: TBD    Type of Surgery: Bariatric surgery    Surgeon: Jacinto Oneill    No Cardiac Contraindication for Planned Surgical Procedures    Anticoagulation: no    Physician Comment: Low to moderate risk of perioperative cardiac complications    Electronically Signed: Michoacano Mckinley

## 2019-09-30 ENCOUNTER — TELEPHONE (OUTPATIENT)
Dept: BARIATRICS | Facility: CLINIC | Age: 39
End: 2019-09-30

## 2019-09-30 NOTE — TELEPHONE ENCOUNTER
PC to patient; follow up to see how she is doing with quitting smoking  VM left asking her to please call me to provide update

## 2019-09-30 NOTE — TELEPHONE ENCOUNTER
PC from patient  Patient is using nicotine patches to quit smoking; completed first step; has two more steps that are two weeks each, so quit by end of October and nicotine free by end of Nov to have nicotine test done  Patient down from pack a day to pack lasting 3-4 days  Encouraged patient to continue to work towards no nicotine  Will follow up again  no

## 2019-10-08 ENCOUNTER — OFFICE VISIT (OUTPATIENT)
Dept: PULMONOLOGY | Facility: CLINIC | Age: 39
End: 2019-10-08
Payer: COMMERCIAL

## 2019-10-08 VITALS
BODY MASS INDEX: 42.88 KG/M2 | SYSTOLIC BLOOD PRESSURE: 110 MMHG | OXYGEN SATURATION: 98 % | DIASTOLIC BLOOD PRESSURE: 60 MMHG | WEIGHT: 242 LBS | TEMPERATURE: 97.9 F | HEIGHT: 63 IN | HEART RATE: 95 BPM | RESPIRATION RATE: 14 BRPM

## 2019-10-08 DIAGNOSIS — J45.40 MODERATE PERSISTENT ASTHMA, UNSPECIFIED WHETHER COMPLICATED: ICD-10-CM

## 2019-10-08 DIAGNOSIS — Z23 NEED FOR INFLUENZA VACCINATION: Primary | ICD-10-CM

## 2019-10-08 PROCEDURE — 90471 IMMUNIZATION ADMIN: CPT

## 2019-10-08 PROCEDURE — 90682 RIV4 VACC RECOMBINANT DNA IM: CPT

## 2019-10-08 PROCEDURE — 99214 OFFICE O/P EST MOD 30 MIN: CPT | Performed by: INTERNAL MEDICINE

## 2019-10-08 RX ORDER — FLUTICASONE FUROATE AND VILANTEROL 100; 25 UG/1; UG/1
1 POWDER RESPIRATORY (INHALATION) DAILY
Qty: 2 INHALER | Refills: 11 | Status: SHIPPED | OUTPATIENT
Start: 2019-10-08 | End: 2022-08-03

## 2019-10-08 NOTE — PROGRESS NOTES
Pulmonary outpatient note   Shirin Alfred 41 y o  female MRN: [id number]  10/8/2019      Assessment and plan:  Shirin Alfred has the following medical problems:  1  Asthma  Well controlled on Breo  Not using albuterol  I told her to continue taking the [facility] on a daily basis  She is mildly allergic to cats  She owns 3 cats which is not intending to give away  2   Sleep apnea  Sleep study in April 2019 showed AHI 5 4  She is using CPAP every night  I reviewed her compliance report that showed use 87% of the night for an average of 7 hours with an AHI down to 0 3  Highly effective and very high compliance  3   Smoking  Heavy smoking history  Now trying to stop smoking for a bariatric surgery  Currently smoking 4 cigarettes per day  4   Obesity  Morbid obesity with BMI 43 5  She is planning bariatric surgery  No schedule yet  I told the patient that once she know what the date let me know and I can approve her after reviewing her sleep CPAP compliance data  We gave the patient flu shot today  Follow-up in 6 months or earlier if needed  Kenneth Deluca MD/PhD,  [facility]      No follow-ups on file  History of Present Illness  This is 41y o  year old female with previous medical history of asthma which was not active in the last few years  She was hospitalized in December 2018 in [place] [address] due to severe asthma flare that required hospitalization for 6 days with oxygen requirements  She was not taking any medications before this failure  She has cats  She was smoking half pack to 1 pack a day for the last 18 years  She is taking Breo on a daily basis and feeling very good  No acute flares  No shortness of breath  No need to take albuterol  She is currently smoking 4 cigarettes per day  She is trying to stop smoking for a parotid surgery for which she has to be nicotine free     She did a sleep study in April 2019 that showed mild obstructive sleep apnea  Currently she is using CPAP every night                          Social history:  Smoked for 18 years half pack to 1 pack a day, 15 pack years  Currently smokes 4 cigarettes per day  Drinks alcohol socially      Occupational history:  Works as a technician in [employer]  Review of Systems   Constitutional: Negative  HENT: Negative  Eyes: Negative  Respiratory: Negative  Cardiovascular: Negative  Gastrointestinal: Negative  Endocrine: Negative  Genitourinary: Negative  Musculoskeletal: Negative  Skin: Negative  Allergic/Immunologic: Negative  Neurological: Negative  Hematological: Negative  Psychiatric/Behavioral: Negative          Historical Information   Past Medical History:   Diagnosis Date    Anxiety     Asthma     Depression     History of Clostridium difficile colitis 2018    x 2 episodes, after antibiotics    Hypercholesterolemia     Hyperlipemia     Morbid obesity ([address] )     Sleep apnea     c pap     Past Surgical History:   Procedure Laterality Date    [place] CONIZATION CERVIX,LOOP ELECTRD N/A 2018    Procedure: BIOPSY LEEP CERVIX;  Surgeon: Carlos Bender DO;  [facility];  Service: Gynecology    REDUCTION MAMMAPLASTY      TONSILLECTOMY       Family History   Problem Relation Age of Onset    Lung cancer Father     Lung disease Father     Cancer Father         Lung/bone ca-     Hyperlipidemia Father     Liver cancer Paternal Grandfather     Cancer Paternal Grandfather         Liver ca-    Asthma Mother     Obesity Mother     Arthritis Maternal Grandmother     COPD Maternal Grandmother     Cancer Maternal Grandfather         Lung ca-       Meds/Allergies     Current Outpatient Medications:     albuterol (VENTOLIN HFA) 90 mcg/act inhaler, Inhale 2 puffs every 6 (six) hours as needed for wheezing, Disp: 18 g, Rfl: 0    ALPRAZolam (XANAX) 0 25 mg tablet, as needed, Disp: , Rfl:     Ascorbic Acid (VITAMIN C) 500 MG CAPS, Take by mouth daily  , Disp: , Rfl:     atorvastatin (LIPITOR) 20 mg tablet, Take by mouth daily  , Disp: , Rfl:     cholecalciferol (VITAMIN D3) 1,000 units tablet, Take by mouth daily  , Disp: , Rfl:     clobetasol (TEMOVATE) 0 05 % ointment, , Disp: , Rfl:     Coconut Oil 1000 MG CAPS, Take by mouth daily  , Disp: , Rfl:     DULoxetine (CYMBALTA) 60 mg delayed release capsule, Take 60 mg by mouth daily  , Disp: , Rfl:     Echinacea 125 MG CAPS, Take by mouth daily  , Disp: , Rfl:     fluticasone-vilanterol (BREO ELLIPTA) 100-25 mcg/inh inhaler, Inhale 1 puff daily Rinse mouth after use , Disp: 2 Inhaler, Rfl: 5    Iron Combinations (IRON COMPLEX PO), iron,carbonyl 65 mg-vitamin C 125 mg tablet,delayed release, Disp: , Rfl:     levalbuterol (XOPENEX) 1 25 mg/0 5 mL nebulizer solution, Take 0 5 mL (1 25 mg total) by nebulization every 6 (six) hours as needed for wheezing, Disp: 30 vial, Rfl: 0    Multiple Vitamin tablet, Take by mouth daily  , Disp: , Rfl:     mupirocin (BACTROBAN) 2 % cream, mupirocin 2 % topical cream, Disp: , Rfl:     nicotine (NICODERM CQ) 21 mg/24 hr TD 24 hr patch, Place 1 patch on the skin every 24 hours for 42 days, Disp: 42 patch, Rfl: 0    [START ON 10/11/2019] nicotine (NICODERM CQ) 7 mg/24hr TD 24 hr patch, Place 1 patch on the skin every 24 hours, Disp: 14 patch, Rfl: 0    PREVIDENT 5000 SENSITIVE 1 1-5 % PSTE, , Disp: , Rfl: 3    Vitamin E 400 units TABS, Take by mouth daily  , Disp: , Rfl:     [START ON 10/10/2019] nicotine (NICODERM CQ) 14 mg/24hr TD 24 hr patch, Place 1 patch on the skin every 24 hours (Patient not taking: Reported on 10/8/2019), Disp: 14 patch, Rfl: 0  No Known Allergies    Vitals: Blood pressure 110/60, pulse 95, temperature 97 9 °F (36 6 °C), resp  rate 14, height 5' 2 5" (1 588 m), weight 110 kg (242 lb), SpO2 98 %, not currently breastfeeding  Body mass index is 43 56 kg/m²   Oxygen Therapy  SpO2: 98 %    Physical Exam  Physical Exam   Constitutional: She is oriented to person, place, and time  She appears well-developed and well-nourished  No distress  Morbid obesity   HENT:   Head: Normocephalic and atraumatic  Eyes: Pupils are equal, round, and reactive to light  EOM are normal    Neck: Normal range of motion  Neck supple  No JVD present  Cardiovascular: Normal rate, regular rhythm and normal heart sounds  Exam reveals no friction rub  No murmur heard  Pulmonary/Chest: Effort normal and breath sounds normal  No stridor  No respiratory distress  She has no wheezes  She has no rales  Abdominal: Soft  She exhibits no distension  Musculoskeletal: Normal range of motion  She exhibits no edema or deformity  Neurological: She is alert and oriented to person, place, and time  Skin: Skin is warm  She is not diaphoretic  Psychiatric: She has a normal mood and affect  Labs: I have personally reviewed pertinent lab results  Lab Results   Component Value Date    WBC 9 60 06/12/2019    HGB 14 1 06/12/2019    HCT 41 6 06/12/2019    MCV 85 06/12/2019     06/12/2019     Lab Results   Component Value Date    GLUCOSE 164 (H) 12/29/2018    CALCIUM 8 4 06/12/2019     09/15/2015    K 4 2 06/12/2019    CO2 28 06/12/2019     06/12/2019    BUN 14 06/12/2019    CREATININE 0 73 06/12/2019     Lab Results   Component Value Date    IGE 56 9 01/18/2019     Lab Results   Component Value Date    ALT 17 06/12/2019    AST 12 06/12/2019    ALKPHOS 91 06/12/2019    BILITOT 0 18 (L) 09/15/2015     [facility], Adult   Order: [id number]   Status:  Final result   Visible to patient:  Yes (MyChart) Next appt:  06/04/2019 at 08:00 AM in Obstetrics and Gynecology [facility] Dx: Moderate persistent asthma, unspecifi    Ref Range & Units 1/18/19  9:22 AM   A  ALTERNATA 0 00 - 0 09 kUA/I <0 10    A  FUMIGATUS 0 00 - 0 09 kUA/I <0 10    [place] Grass 0 00 - 0 09 kUA/I <0 10    Box Elder  0 00 - 0 09 kUA/I <0 10    Cat Epithellium-Dander 0 00 - 0 09 kUA/I 2  50High     C HERBARUM 0 00 - 0 09 kUA/I <0 10    Cockroach 0 00 - 0 09 kUA/I <0 10    Common Silver Birch 0 00 - 0 09 kUA/I <0 10    Amelia 0 00 - 0 09 kUA/I <0 10    D  farinae 0 00 - 0 09 kUA/I <0 10    D  pteronyssinus 0 00 - 0 09 kUA/I <0 10    Dog Dander 0 00 - 0 09 kUA/I 0 66High     Elm IgE 0 00 - 0 09 kUA/I <0 10    [place] Tree 0 00 - 0 09 kUA/I <0 10    Mugwort 0 00 - 0 09 kUA/I <0 10    [place] Tree 0 00 - 0 09 kUA/I <0 10    Oak 0 00 - 0 09 kUA/I <0 10    P CHRYSOGENUM 0 00 - 0 09 kUA/I <0 10    Rough Pigweed  IgE 0 00 - 0 09 kUA/I <0 10    Common Ragweed 0 00 - 0 09 kUA/I <0 10    Sheep [place] IgE 0 00 - 0 09 kUA/I <0 10    [place] Tree 0 00 - 0 09 kUA/I <0 10    [place] Grass 0 00 - 0 09 kUA/I <0 10    [place] Tree 0 00 - 0 09 kUA/I <0 10    [place] Tree 0 00 - 0 09 kUA/I <0 10    IgE 0 - 113 kU/l 56 9              Imaging and other studies:   I have personally reviewed pertinent imaging studies in PACS  The patient had chest x-ray on December 2018 which was normal     Pulmonary function testing:   PFTs 1/2019  Spirometry:  FEV1/FVC Ratio is 79 %  FEV1 is 1 87 L/64 % of predicted  FVC is 2 35 L/67 % of predicted  There is significant bronchodilator response      Hany Adame MD/PhD,  [facility]

## 2019-11-05 ENCOUNTER — APPOINTMENT (OUTPATIENT)
Dept: LAB | Facility: HOSPITAL | Age: 39
End: 2019-11-05
Payer: COMMERCIAL

## 2019-11-05 ENCOUNTER — TRANSCRIBE ORDERS (OUTPATIENT)
Dept: LAB | Facility: HOSPITAL | Age: 39
End: 2019-11-05

## 2019-11-05 DIAGNOSIS — J45.50 SEVERE PERSISTENT ASTHMA WITHOUT COMPLICATION: Primary | ICD-10-CM

## 2019-11-05 DIAGNOSIS — J45.50 SEVERE PERSISTENT ASTHMA WITHOUT COMPLICATION: ICD-10-CM

## 2019-11-05 LAB
BASOPHILS # BLD AUTO: 0.06 THOUSANDS/ΜL (ref 0–0.1)
BASOPHILS NFR BLD AUTO: 1 % (ref 0–1)
EOSINOPHIL # BLD AUTO: 0.28 THOUSAND/ΜL (ref 0–0.61)
EOSINOPHIL NFR BLD AUTO: 4 % (ref 0–6)
ERYTHROCYTE [DISTWIDTH] IN BLOOD BY AUTOMATED COUNT: 12.3 % (ref 11.6–15.1)
HCT VFR BLD AUTO: 40.1 % (ref 34.8–46.1)
HGB BLD-MCNC: 13.4 G/DL (ref 11.5–15.4)
IMM GRANULOCYTES # BLD AUTO: 0.02 THOUSAND/UL (ref 0–0.2)
IMM GRANULOCYTES NFR BLD AUTO: 0 % (ref 0–2)
LYMPHOCYTES # BLD AUTO: 2.26 THOUSANDS/ΜL (ref 0.6–4.47)
LYMPHOCYTES NFR BLD AUTO: 28 % (ref 14–44)
MCH RBC QN AUTO: 28.8 PG (ref 26.8–34.3)
MCHC RBC AUTO-ENTMCNC: 33.4 G/DL (ref 31.4–37.4)
MCV RBC AUTO: 86 FL (ref 82–98)
MONOCYTES # BLD AUTO: 0.39 THOUSAND/ΜL (ref 0.17–1.22)
MONOCYTES NFR BLD AUTO: 5 % (ref 4–12)
NEUTROPHILS # BLD AUTO: 5.01 THOUSANDS/ΜL (ref 1.85–7.62)
NEUTS SEG NFR BLD AUTO: 62 % (ref 43–75)
NRBC BLD AUTO-RTO: 0 /100 WBCS
PLATELET # BLD AUTO: 251 THOUSANDS/UL (ref 149–390)
PMV BLD AUTO: 9.4 FL (ref 8.9–12.7)
RBC # BLD AUTO: 4.65 MILLION/UL (ref 3.81–5.12)
WBC # BLD AUTO: 8.02 THOUSAND/UL (ref 4.31–10.16)

## 2019-11-05 PROCEDURE — 82103 ALPHA-1-ANTITRYPSIN TOTAL: CPT

## 2019-11-05 PROCEDURE — 85025 COMPLETE CBC W/AUTO DIFF WBC: CPT

## 2019-11-05 PROCEDURE — 36415 COLL VENOUS BLD VENIPUNCTURE: CPT

## 2019-11-05 PROCEDURE — 82785 ASSAY OF IGE: CPT

## 2019-11-05 PROCEDURE — 86255 FLUORESCENT ANTIBODY SCREEN: CPT

## 2019-11-06 LAB
A1AT SERPL-MCNC: 134 MG/DL (ref 100–188)
TOTAL IGE SMQN RAST: 58.4 KU/L (ref 0–113)

## 2019-11-07 LAB
C-ANCA TITR SER IF: NORMAL TITER
MYELOPEROXIDASE AB SER IA-ACNC: <9 U/ML (ref 0–9)
P-ANCA ATYPICAL TITR SER IF: NORMAL TITER
P-ANCA TITR SER IF: NORMAL TITER
PROTEINASE3 AB SER IA-ACNC: <3.5 U/ML (ref 0–3.5)

## 2019-11-11 ENCOUNTER — TELEPHONE (OUTPATIENT)
Dept: BARIATRICS | Facility: CLINIC | Age: 39
End: 2019-11-11

## 2019-11-11 NOTE — TELEPHONE ENCOUNTER
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

## 2019-11-12 ENCOUNTER — TELEPHONE (OUTPATIENT)
Dept: BARIATRICS | Facility: CLINIC | Age: 39
End: 2019-11-12

## 2019-11-12 NOTE — TELEPHONE ENCOUNTER
Left message returning patients call again specifying what is still remaining on her workflow since [address] insurance is changing as of 1/1/20 I will need her new information to submit for surgery next year

## 2019-12-02 ENCOUNTER — TELEPHONE (OUTPATIENT)
Dept: BARIATRICS | Facility: CLINIC | Age: 39
End: 2019-12-02

## 2019-12-02 NOTE — TELEPHONE ENCOUNTER
Phone call to patient  Checking in about nicotine testing  Patient was to be off nicotine by end of October so testing could be done end of Nov  Order is in the chart for patient to get testing  Also, asked that she schedule appointment to come in for weight check  Contact info left as well

## 2019-12-06 ENCOUNTER — TELEPHONE (OUTPATIENT)
Dept: BARIATRICS | Facility: CLINIC | Age: 39
End: 2019-12-06

## 2019-12-06 NOTE — TELEPHONE ENCOUNTER
Phone call from patient  Continues to work on quitting smoking which is a lot harder than patient thought it would be  Patient reports she is back on the patch and smoking about 4 cigarettes a day  Reports it's the habit of it, and how it's a part of her morning routine  Encouraged patient to keep working at it; she is making progress down to 4 a day from a pack a day  Patient also spoke with PCP  He will put in referral after the New Year due to change in insurance  Patient scheduled for weight check at end of the month

## 2019-12-27 ENCOUNTER — OFFICE VISIT (OUTPATIENT)
Dept: BARIATRICS | Facility: CLINIC | Age: 39
End: 2019-12-27

## 2019-12-27 VITALS — WEIGHT: 245.9 LBS | BODY MASS INDEX: 44.26 KG/M2

## 2019-12-27 DIAGNOSIS — E66.01 CLASS 3 SEVERE OBESITY DUE TO EXCESS CALORIES WITH SERIOUS COMORBIDITY AND BODY MASS INDEX (BMI) OF 40.0 TO 44.9 IN ADULT (HCC): Primary | ICD-10-CM

## 2019-12-27 PROCEDURE — RECHECK: Performed by: DIETITIAN, REGISTERED

## 2019-12-27 NOTE — PROGRESS NOTES
Bariatric Follow Up Nutrition Note    Preop: No monthly weight checks  Preop Program    Type of surgery  Preop  Surgery Date: TBD- Tentative February 2020  Surgeon: Dr Braden Roblero  46 y o   female  Wt 112 kg (245 lb 14 4 oz)   BMI 44 26 kg/m²    8lb wt gain since starting pre-op program 6 months ago  Pt now needs at least 8lb wt loss to schedule surgery and 19 8lb by surgery  [facility] Equation:    2071 kcal/day=weight maintenance, 1071 kcal/day= 2 lb/wk wt loss  Weight on Day of Weight Loss Surgery: 5% wt loss=11 9#= day of surgery goal of 226 1#   Under initial weight of 238lbs to schedule surgery    Wt with BMI of 25: 139 3#  Pre-Op Excess Wt: 98 7#    Review of History and Medications   Past Medical History:   Diagnosis Date    Anxiety     Asthma     Depression     History of Clostridium difficile colitis 2018    x 2 episodes, after antibiotics    Hypercholesterolemia     Hyperlipemia     Morbid obesity ([address] )     Sleep apnea     c pap     Past Surgical History:   Procedure Laterality Date    IN CONIZATION CERVIX,LOOP ELECTRD N/A 9/27/2018    Procedure: BIOPSY LEEP CERVIX;  Surgeon: Ar Melendez DO;  Location:  MAIN OR;  Service: Gynecology    REDUCTION MAMMAPLASTY  1999    TONSILLECTOMY       Social History     Socioeconomic History    Marital status: Single     Spouse name: Not on file    Number of children: Not on file    Years of education: Not on file    Highest education level: Not on file   Occupational History    Occupation: radiology, IR tech   Social Needs    Financial resource strain: Not on file    Food insecurity:     Worry: Not on file     Inability: Not on file    Transportation needs:     Medical: Not on file     Non-medical: Not on file   Tobacco Use    Smoking status: Current Every Day Smoker     Packs/day: 0 25     Years: 18 00     Pack years: 4 50     Types: Cigarettes    Smokeless tobacco: Never Used    Tobacco comment: is on the patch    Substance and Sexual Activity    Alcohol use: Yes     Frequency: Monthly or less     Drinks per session: 1 or 2     Comment: rare    Drug use: No    Sexual activity: Not Currently     Partners: Male     Birth control/protection: Abstinence, OCP   Lifestyle    Physical activity:     Days per week: Not on file     Minutes per session: Not on file    Stress: Not on file   Relationships    Social connections:     Talks on phone: Not on file     Gets together: Not on file     Attends Orthodoxy service: Not on file     Active member of club or organization: Not on file     Attends meetings of clubs or organizations: Not on file     Relationship status: Not on file    Intimate partner violence:     Fear of current or ex partner: Not on file     Emotionally abused: Not on file     Physically abused: Not on file     Forced sexual activity: Not on file   Other Topics Concern    Not on file   Social History Narrative    Not on file       Current Outpatient Medications:     albuterol (VENTOLIN HFA) 90 mcg/act inhaler, Inhale 2 puffs every 6 (six) hours as needed for wheezing, Disp: 18 g, Rfl: 0    ALPRAZolam (XANAX) 0 25 mg tablet, as needed, Disp: , Rfl:     Ascorbic Acid (VITAMIN C) 500 MG CAPS, Take by mouth daily  , Disp: , Rfl:     atorvastatin (LIPITOR) 20 mg tablet, Take by mouth daily  , Disp: , Rfl:     cholecalciferol (VITAMIN D3) 1,000 units tablet, Take by mouth daily  , Disp: , Rfl:     clobetasol (TEMOVATE) 0 05 % ointment, , Disp: , Rfl:     Coconut Oil 1000 MG CAPS, Take by mouth daily  , Disp: , Rfl:     DULoxetine (CYMBALTA) 60 mg delayed release capsule, Take 60 mg by mouth daily  , Disp: , Rfl:     Echinacea 125 MG CAPS, Take by mouth daily  , Disp: , Rfl:     fluticasone-vilanterol (BREO ELLIPTA) 100-25 mcg/inh inhaler, Inhale 1 puff daily Rinse mouth after use , Disp: 2 Inhaler, Rfl: 11    Iron Combinations (IRON COMPLEX PO), iron,carbonyl 65 mg-vitamin C 125 mg tablet,delayed release, Disp: , Rfl:     levalbuterol (XOPENEX) 1 25 mg/0 5 mL nebulizer solution, Take 0 5 mL (1 25 mg total) by nebulization every 6 (six) hours as needed for wheezing, Disp: 30 vial, Rfl: 0    Multiple Vitamin tablet, Take by mouth daily  , Disp: , Rfl:     mupirocin (BACTROBAN) 2 % cream, mupirocin 2 % topical cream, Disp: , Rfl:     nicotine (NICODERM CQ) 14 mg/24hr TD 24 hr patch, Place 1 patch on the skin every 24 hours (Patient not taking: Reported on 10/8/2019), Disp: 14 patch, Rfl: 0    nicotine (NICODERM CQ) 21 mg/24 hr TD 24 hr patch, Place 1 patch on the skin every 24 hours for 42 days, Disp: 42 patch, Rfl: 0    nicotine (NICODERM CQ) 7 mg/24hr TD 24 hr patch, Place 1 patch on the skin every 24 hours, Disp: 14 patch, Rfl: 0    PREVIDENT 5000 SENSITIVE 1 1-5 % PSTE, , Disp: , Rfl: 3    Vitamin E 400 units TABS, Take by mouth daily  , Disp: , Rfl:     Food Intake and Lifestyle Assessment   Food Intake Assessment completed via food log brought by patient:  Pt has been using [employer] orlin, but not 100% consistently  Some days are approx 7853-0485 kcals, other days 600-700kcals    Breakfast: Premier Protein drink, sometimes two hardboiled eggs, one cup coffee  Snack: none   Lunch: from cafeteria at work:  [employer] with grilled chicken, 2-3 hardboiled eggs, shredded cheddar cheese and ranch dressing  Snack: none  Dinner: [employer] cheese tortellini soup OR three chicken tenders  Snack: none  Beverage intake: water and coffee  Diet texture/stage: regular  Protein supplement: Premier Protein once daily for breakfast  Estimated protein intake per day: 60-100g/day  Estimated fluid intake per day: 64oz water, 8-12oz coffee  Meals eaten away from home: lunches in work cafeteria  Typical meal pattern: 3 meals per day and 0 snacks per day  Eating Behaviors: Appropriate diet advancement, Appropriate portion sizes and Consumption of high calorie/ high fat foods    Food allergies or intolerances: NKFA  Pt reports she is a very picky eater and does not eat a large variety of foods  Dislikes seafood, shellfish, turkey, beans, peas, red meats, pork, wild game, organ meats, etc  Cultural or Mandaeism considerations: none noted    Physical Assessment  Nutrition Related Findings    Physical Activity  Types of exercise: Walking:  Pt walks 3-4 blocks (5 minutes) to and from work daily and averages over 10,000 steps per day on work days, but no planned/structed exercise yet  Current physical limitations: none    Psychosocial Assessment   Support systems: sibling(s) friend(s)  Socioeconomic factors: lives alone    Nutrition Diagnosis  Diagnosis: Overweight / Obesity (NC-3 3) and Excessive energy intake (NI-1 5)  Related to: Physical inactivity and Excessive energy intake  As Evidenced by: BMI >25 and Excessive energy intake     Interventions and Teaching   Patient educated on post-op nutrition guidelines  Patient educated and handouts provided  Provided pt with updated bariatric manual   Capacity of post-surgery stomach  Diet progression  Adequate hydration  Sugar and fat restriction to decrease "dumping syndrome"  Expected weight loss  Weight loss plateaus/ possibility of weight regain  Exercise  Suggestions for pre-op diet  Nutrition considerations after surgery  Protein supplements  Meal planning and preparation  Appropriate carbohydrate, protein, and fat intake, and food/fluid choices to maximize safe weight loss, nutrient intake, and tolerance:  Follow 1200 calorie macronutrient goals as outlined:  135g CHO, 75g pro, 40 g fats  Dietary and lifestyle changes  Possible problems with poor eating habits  Techniques for self monitoring and keeping daily food journal  Potential for food intolerance after surgery, and ways to deal with them including: lactose intolerance, nausea, reflux, vomiting, diarrhea, food intolerance, appetite changes, gas    Education provided to: patient    Barriers to learning: No barriers identified  Pt was tearful/frustrated during session over difficulties/struggle losing weight  Readiness to change: action    Comprehension: needs reinforcement and verbalizes understanding     Expected Compliance: fair    Evaluation/Monitoring   Eating pattern as discussed Tolerance of nutrition prescription Body weight Lab values Physical activity    Goals  Food journal, Exercise 30 minutes 5 times per week, Complete lession plans 1-6 and Eat 3 meals per day    Session goals:  1)  Follow 1200 calorie macronutrient goals as outlined:  135g CHO, 75g pro, 40 g fats  2)  Continue to food log in [employer]  3)  Measure portions  4)  Read new manual    Remaining Workflow:   PCP Letter: Pt states her PCP will put letter in after the first of the year   Bloodwork: TSH drawn 4/1/19  CBC, CMP, Lipids drawn 6/12/19  Needs updated   Cardiac Risk Assessment: Cardiac clearance good till 2/29 then has to redo   Nicotine:  Pt reports she has been nicotine-free for two days now and will get nicotine test done late January   Weight Loss: 8lb wt gain since starting pre-op program 6 months ago  Pt now needs at least 8lb wt loss to schedule surgery and 19 8lb by surgery  Time Spent:   45 Minutes  Follow-up scheduled for 1/15/20

## 2020-01-09 ENCOUNTER — TELEPHONE (OUTPATIENT)
Dept: BARIATRICS | Facility: CLINIC | Age: 40
End: 2020-01-09

## 2020-01-09 NOTE — TELEPHONE ENCOUNTER
Out reach phone call  VM left for patient to check and see how she is doing with quitting smoking  Patient also reminded to get referral from PCP  Contact info left for patient to return call

## 2020-01-14 NOTE — PROGRESS NOTES
Bariatric Follow Up Nutrition Note    Preop: No monthly weight checks  Preop Program    Type of surgery  Preop  Surgery Date: TBD- Tentative February 2020  Surgeon: Dr Amol Vidales  46 y o   female  Wt 108 kg (239 lb)   BMI 43 02 kg/m²    6 9lb wt loss from last office visit  Net 1 1lb wt gain since starting pre-op program 6 months ago  Pt now needs at least 1 1lb wt loss to schedule surgery and 12 9lb by surgery  [facility] Equation:    2071 kcal/day=weight maintenance, 1071 kcal/day= 2 lb/wk wt loss  Weight on Day of Weight Loss Surgery: 5% wt loss=11 9#= day of surgery goal of 226 1#   Under initial weight of 238lbs to schedule surgery    Wt with BMI of 25: 139 3#  Pre-Op Excess Wt: 98 7#    Review of History and Medications   Past Medical History:   Diagnosis Date    Anxiety     Asthma     Depression     History of Clostridium difficile colitis 2018    x 2 episodes, after antibiotics    Hypercholesterolemia     Hyperlipemia     Morbid obesity ([address] )     Sleep apnea     c pap     Past Surgical History:   Procedure Laterality Date    NE CONIZATION CERVIX,LOOP ELECTRD N/A 9/27/2018    Procedure: BIOPSY LEEP CERVIX;  Surgeon: Leah Luong DO;  Location: [facility];  Service: Gynecology    REDUCTION MAMMAPLASTY  1999    TONSILLECTOMY       Social History     Socioeconomic History    Marital status: Single     Spouse name: Not on file    Number of children: Not on file    Years of education: Not on file    Highest education level: Not on file   Occupational History    Occupation: radiology, IR tech   Social Needs    Financial resource strain: Not on file    Food insecurity:     Worry: Not on file     Inability: Not on file    Transportation needs:     Medical: Not on file     Non-medical: Not on file   Tobacco Use    Smoking status: Current Every Day Smoker     Packs/day: 0 25     Years: 18 00     Pack years: 4 50     Types: Cigarettes  Smokeless tobacco: Never Used    Tobacco comment: is on the patch    Substance and Sexual Activity    Alcohol use: Yes     Frequency: Monthly or less     Drinks per session: 1 or 2     Comment: rare    Drug use: No    Sexual activity: Not Currently     Partners: Male     Birth control/protection: Abstinence, OCP   Lifestyle    Physical activity:     Days per week: Not on file     Minutes per session: Not on file    Stress: Not on file   Relationships    Social connections:     Talks on phone: Not on file     Gets together: Not on file     Attends Protestant service: Not on file     Active member of club or organization: Not on file     Attends meetings of clubs or organizations: Not on file     Relationship status: Not on file    Intimate partner violence:     Fear of current or ex partner: Not on file     Emotionally abused: Not on file     Physically abused: Not on file     Forced sexual activity: Not on file   Other Topics Concern    Not on file   Social History Narrative    Not on file       Current Outpatient Medications:     albuterol (VENTOLIN HFA) 90 mcg/act inhaler, Inhale 2 puffs every 6 (six) hours as needed for wheezing, Disp: 18 g, Rfl: 0    ALPRAZolam (XANAX) 0 25 mg tablet, as needed, Disp: , Rfl:     Ascorbic Acid (VITAMIN C) 500 MG CAPS, Take by mouth daily  , Disp: , Rfl:     atorvastatin (LIPITOR) 20 mg tablet, Take by mouth daily  , Disp: , Rfl:     cholecalciferol (VITAMIN D3) 1,000 units tablet, Take by mouth daily  , Disp: , Rfl:     clobetasol (TEMOVATE) 0 05 % ointment, , Disp: , Rfl:     Coconut Oil 1000 MG CAPS, Take by mouth daily  , Disp: , Rfl:     DULoxetine (CYMBALTA) 60 mg delayed release capsule, Take 60 mg by mouth daily  , Disp: , Rfl:     Echinacea 125 MG CAPS, Take by mouth daily  , Disp: , Rfl:     fluticasone-vilanterol (BREO ELLIPTA) 100-25 mcg/inh inhaler, Inhale 1 puff daily Rinse mouth after use , Disp: 2 Inhaler, Rfl: 11    Iron Combinations (IRON COMPLEX PO), iron,carbonyl 65 mg-vitamin C 125 mg tablet,delayed release, Disp: , Rfl:     levalbuterol (XOPENEX) 1 25 mg/0 5 mL nebulizer solution, Take 0 5 mL (1 25 mg total) by nebulization every 6 (six) hours as needed for wheezing, Disp: 30 vial, Rfl: 0    Multiple Vitamin tablet, Take by mouth daily  , Disp: , Rfl:     mupirocin (BACTROBAN) 2 % cream, mupirocin 2 % topical cream, Disp: , Rfl:     nicotine (NICODERM CQ) 14 mg/24hr TD 24 hr patch, Place 1 patch on the skin every 24 hours (Patient not taking: Reported on 10/8/2019), Disp: 14 patch, Rfl: 0    nicotine (NICODERM CQ) 21 mg/24 hr TD 24 hr patch, Place 1 patch on the skin every 24 hours for 42 days, Disp: 42 patch, Rfl: 0    nicotine (NICODERM CQ) 7 mg/24hr TD 24 hr patch, Place 1 patch on the skin every 24 hours, Disp: 14 patch, Rfl: 0    PREVIDENT 5000 SENSITIVE 1 1-5 % PSTE, , Disp: , Rfl: 3    Vitamin E 400 units TABS, Take by mouth daily  , Disp: , Rfl:     Food Intake and Lifestyle Assessment   Food Intake Assessment completed via food log brought by patient:  Pt has been using [employer] orlin, but not 100% consistently  Some days are approx 3079-9879 kcals, other days 600-700kcals    Breakfast: Premier Protein drink, sometimes two hardboiled eggs, one cup coffee  Snack: none   Lunch: from cafeteria at work:  [employer] with grilled chicken, 2-3 hardboiled eggs, shredded cheddar cheese and ranch dressing  Snack: none  Dinner: [employer] cheese tortellini soup OR three chicken tenders  Snack: none  Beverage intake: water and coffee  Diet texture/stage: regular  Protein supplement: Premier Protein once daily for breakfast  Estimated protein intake per day: 60-100g/day  Estimated fluid intake per day: 64oz water, 8-12oz coffee  Meals eaten away from home: lunches in work cafeteria  Typical meal pattern: 3 meals per day and 0 snacks per day  Eating Behaviors: Appropriate diet advancement, Appropriate portion sizes and Consumption of high calorie/ high fat foods    Food allergies or intolerances: NKFA  Pt reports she is a very picky eater and does not eat a large variety of foods  Dislikes seafood, shellfish, turkey, beans, peas, red meats, pork, wild game, organ meats, etc  Cultural or Taoism considerations: none noted    Physical Assessment  Nutrition Related Findings    Physical Activity  Types of exercise: Walking:  Pt walks 3-4 blocks (5 minutes) to and from work daily and averages over 10,000 steps per day on work days, but no planned/structed exercise yet  Current physical limitations: none    Psychosocial Assessment   Support systems: sibling(s) friend(s)  Socioeconomic factors: lives alone    Nutrition Diagnosis  Diagnosis: Overweight / Obesity (NC-3 3) and Excessive energy intake (NI-1 5)  Related to: Physical inactivity and Excessive energy intake  As Evidenced by: BMI >25 and Excessive energy intake     Interventions and Teaching   Patient educated on post-op nutrition guidelines  Patient educated and handouts provided  Previously povided pt with updated bariatric manual   Pt did not bring it with her today and pt admits she has not opened it yet  Capacity of post-surgery stomach  Diet progression  Adequate hydration  Sugar and fat restriction to decrease "dumping syndrome"  Expected weight loss  Weight loss plateaus/ possibility of weight regain  Exercise:  Emphasized importance of exercise and exercise goals for weight maintenance long-term  Suggestions for pre-op diet  Nutrition considerations after surgery  Protein supplements  Meal planning and preparation  Appropriate carbohydrate, protein, and fat intake, and food/fluid choices to maximize safe weight loss, nutrient intake, and tolerance: Previously recommended pt follow 1200 calorie macronutrient goals as outlined:  135g CHO, 75g pro, 40 g fats  Reviewed again with pt    Dietary and lifestyle changes  Possible problems with poor eating habits  Techniques for self monitoring and keeping daily food journal:  Pt will continue [employer]  Potential for food intolerance after surgery, and ways to deal with them including: lactose intolerance, nausea, reflux, vomiting, diarrhea, food intolerance, appetite changes, gas    Education provided to: patient    Barriers to learning: No barriers identified  Readiness to change: action    Comprehension: needs reinforcement and verbalizes understanding     Expected Compliance: fair    Evaluation/Monitoring   Eating pattern as discussed Tolerance of nutrition prescription Body weight Lab values Physical activity    Goals  Food journal, Exercise 30 minutes 5 times per week, Complete lession plans 1-6 and Eat 3 meals per day    Session goals:  1)  Follow 1200 calorie macronutrient goals as outlined:  135g CHO, 75g pro, 40 g fats: No  2)  Continue to food log in [employer]: yes  3)  Measure portions: no  4)  Read new manual: no    Remaining Workflow:   PCP Letter: Pt states her PCP will put letter in after the first of the year   Bloodwork: TSH drawn 4/1/19  CBC, CMP, Lipids drawn 6/12/19  Needs updated   Cardiac Risk Assessment: Cardiac clearance good till 2/29 then has to redo   Nicotine:  Pt reports she has started smoking again and is currently smoking a half a pack per day  Encouraged pt to reach out to PCP to discuss options for help   Weight Loss:     Time Spent:   45 Minutes  Follow-up scheduled for 1/30/2020 with BRUNO Demarco to provide support with smoking cessation  Recommended pt come every two weeks for added support

## 2020-01-15 ENCOUNTER — OFFICE VISIT (OUTPATIENT)
Dept: BARIATRICS | Facility: CLINIC | Age: 40
End: 2020-01-15

## 2020-01-15 VITALS — BODY MASS INDEX: 43.02 KG/M2 | WEIGHT: 239 LBS

## 2020-01-15 DIAGNOSIS — E66.01 MORBID (SEVERE) OBESITY DUE TO EXCESS CALORIES (HCC): Primary | ICD-10-CM

## 2020-01-15 PROCEDURE — RECHECK: Performed by: DIETITIAN, REGISTERED

## 2020-01-30 ENCOUNTER — OFFICE VISIT (OUTPATIENT)
Dept: BARIATRICS | Facility: CLINIC | Age: 40
End: 2020-01-30

## 2020-01-30 DIAGNOSIS — E66.01 CLASS 3 SEVERE OBESITY DUE TO EXCESS CALORIES WITH SERIOUS COMORBIDITY AND BODY MASS INDEX (BMI) OF 40.0 TO 44.9 IN ADULT (HCC): Primary | ICD-10-CM

## 2020-01-30 PROCEDURE — RECHECK

## 2020-01-31 VITALS — WEIGHT: 239.9 LBS | BODY MASS INDEX: 43.18 KG/M2

## 2020-01-31 NOTE — PROGRESS NOTES
[facility]/Support visit  Patient is very frustrated with lack of weight loss; Felt defeated in Dec and started smoking again  Patient reports going to gym 5 x's a week and most days is getting 10K with work and gym  Patient encouraged to add strength training to cardio  Patient expressed concerns about weight gain with strength training; explained it will increase calorie burn  Reviewed food log with patient  Patient is not consistent with getting her calories and fluids in every day  Emphasized importance of getting enough calories so body doesn't think it's in "starvation mode" and hanging on to calories  Discussed smoking  Patient recommended to talk with PCP about Chantix  Patient was using patches  Patient concerned about quiting and eating more; explained patient must quit smoking to get ready for surgery  Patient also describes herself as a "picky" eater which limits her to what she is willing to eat  Patient to follow guidelines of program with choices she makes  Goals for this month: 1  Talk to PCP about Chantix to help quit smoking    2  Be consistent with calories and fluids  3  Add strength training  4  Get PCP referral  Patient scheduled to see RD in 3 weeks

## 2020-02-21 ENCOUNTER — OFFICE VISIT (OUTPATIENT)
Dept: BARIATRICS | Facility: CLINIC | Age: 40
End: 2020-02-21

## 2020-02-21 VITALS — WEIGHT: 234.9 LBS | BODY MASS INDEX: 42.28 KG/M2

## 2020-02-21 DIAGNOSIS — E66.01 MORBID (SEVERE) OBESITY DUE TO EXCESS CALORIES (HCC): Primary | ICD-10-CM

## 2020-02-21 PROCEDURE — RECHECK: Performed by: DIETITIAN, REGISTERED

## 2020-02-21 NOTE — PROGRESS NOTES
Bariatric Follow Up Nutrition Note    Preop: No monthly weight checks  Preop Program    Type of surgery  Preop  Surgery Date: TBD- Tentative March 2020  Surgeon: Dr Miles Cagle  46 y o   female  Wt 107 kg (234 lb 14 4 oz)   BMI 42 28 kg/m²    5lb wt loss from last office visit  Net 3 1lb wt loss since starting pre-op program  Pt needs additional 8 8lb wt loss by surgery  [facility] Equation:    2071 kcal/day=weight maintenance, 1071 kcal/day= 2 lb/wk wt loss  Weight on Day of Weight Loss Surgery: 5% wt loss=11 9#= day of surgery goal of 226 1#   Under initial weight of 238lbs to schedule surgery    Wt with BMI of 25: 139 3#  Pre-Op Excess Wt: 98 7#    Review of History and Medications   Past Medical History:   Diagnosis Date    Anxiety     Asthma     Depression     History of Clostridium difficile colitis 2018    x 2 episodes, after antibiotics    Hypercholesterolemia     Hyperlipemia     Morbid obesity ([address] )     Sleep apnea     c pap     Past Surgical History:   Procedure Laterality Date    MA CONIZATION CERVIX,LOOP ELECTRD N/A 9/27/2018    Procedure: BIOPSY LEEP CERVIX;  Surgeon: Jamie Etienne DO;  Location: [facility];  Service: Gynecology    REDUCTION MAMMAPLASTY  1999    TONSILLECTOMY       Social History     Socioeconomic History    Marital status: Single     Spouse name: Not on file    Number of children: Not on file    Years of education: Not on file    Highest education level: Not on file   Occupational History    Occupation: radiology, IR tech   Social Needs    Financial resource strain: Not on file    Food insecurity:     Worry: Not on file     Inability: Not on file    Transportation needs:     Medical: Not on file     Non-medical: Not on file   Tobacco Use    Smoking status: Current Every Day Smoker     Packs/day: 0 25     Years: 18 00     Pack years: 4 50     Types: Cigarettes    Smokeless tobacco: Never Used    Tobacco comment: is on the patch    Substance and Sexual Activity    Alcohol use: Yes     Frequency: Monthly or less     Drinks per session: 1 or 2     Comment: rare    Drug use: No    Sexual activity: Not Currently     Partners: Male     Birth control/protection: Abstinence, OCP   Lifestyle    Physical activity:     Days per week: Not on file     Minutes per session: Not on file    Stress: Not on file   Relationships    Social connections:     Talks on phone: Not on file     Gets together: Not on file     Attends Nondenominational service: Not on file     Active member of club or organization: Not on file     Attends meetings of clubs or organizations: Not on file     Relationship status: Not on file    Intimate partner violence:     Fear of current or ex partner: Not on file     Emotionally abused: Not on file     Physically abused: Not on file     Forced sexual activity: Not on file   Other Topics Concern    Not on file   Social History Narrative    Not on file       Current Outpatient Medications:     albuterol (VENTOLIN HFA) 90 mcg/act inhaler, Inhale 2 puffs every 6 (six) hours as needed for wheezing, Disp: 18 g, Rfl: 0    ALPRAZolam (XANAX) 0 25 mg tablet, as needed, Disp: , Rfl:     Ascorbic Acid (VITAMIN C) 500 MG CAPS, Take by mouth daily  , Disp: , Rfl:     atorvastatin (LIPITOR) 20 mg tablet, Take by mouth daily  , Disp: , Rfl:     cholecalciferol (VITAMIN D3) 1,000 units tablet, Take by mouth daily  , Disp: , Rfl:     clobetasol (TEMOVATE) 0 05 % ointment, , Disp: , Rfl:     Coconut Oil 1000 MG CAPS, Take by mouth daily  , Disp: , Rfl:     DULoxetine (CYMBALTA) 60 mg delayed release capsule, Take 60 mg by mouth daily  , Disp: , Rfl:     Echinacea 125 MG CAPS, Take by mouth daily  , Disp: , Rfl:     fluticasone-vilanterol (BREO ELLIPTA) 100-25 mcg/inh inhaler, Inhale 1 puff daily Rinse mouth after use , Disp: 2 Inhaler, Rfl: 11    Iron Combinations (IRON COMPLEX PO), iron,carbonyl 65 mg-vitamin C 125 mg tablet,delayed release, Disp: , Rfl:     levalbuterol (XOPENEX) 1 25 mg/0 5 mL nebulizer solution, Take 0 5 mL (1 25 mg total) by nebulization every 6 (six) hours as needed for wheezing, Disp: 30 vial, Rfl: 0    Multiple Vitamin tablet, Take by mouth daily  , Disp: , Rfl:     mupirocin (BACTROBAN) 2 % cream, mupirocin 2 % topical cream, Disp: , Rfl:     nicotine (NICODERM CQ) 14 mg/24hr TD 24 hr patch, Place 1 patch on the skin every 24 hours (Patient not taking: Reported on 10/8/2019), Disp: 14 patch, Rfl: 0    nicotine (NICODERM CQ) 21 mg/24 hr TD 24 hr patch, Place 1 patch on the skin every 24 hours for 42 days, Disp: 42 patch, Rfl: 0    nicotine (NICODERM CQ) 7 mg/24hr TD 24 hr patch, Place 1 patch on the skin every 24 hours, Disp: 14 patch, Rfl: 0    PREVIDENT 5000 SENSITIVE 1 1-5 % PSTE, , Disp: , Rfl: 3    Vitamin E 400 units TABS, Take by mouth daily  , Disp: , Rfl:     Food Intake and Lifestyle Assessment   Food Intake Assessment completed via food log brought by patient:  Pt has been consistently using Tealium orlin  Pt has increased her caloric intake to between 7828-1379 kcal/day  Breakfast:two scrambled eggs, 1/2 to 1 cup oatmeal   Snack: none   Lunch: One cheese steak wrap, 6 potato wedges OR 2 slice pizza  Snack: none  Dinner: chicken caesar salad OR 2 cup vegetable soup  Snack: none  Beverage intake: water and coffee  Diet texture/stage: regular  Protein supplement: Premier Protein once daily for breakfast  Estimated protein intake per day: 60-100g/day  Estimated fluid intake per day: 64oz water, 8-12oz coffee  Meals eaten away from home: lunches in work cafeteria  Typical meal pattern: 3 meals per day and 0 snacks per day  Eating Behaviors: Appropriate diet advancement, Appropriate portion sizes and Consumption of high calorie/ high fat foods    Food allergies or intolerances: NKFA  Pt reports she is a very picky eater and does not eat a large variety of foods    Dislikes seafood, shellfish, turkey, beans, peas, red meats, pork, wild game, organ meats, etc  Cultural or Scientologist considerations: none noted    Physical Assessment  Nutrition Related Findings    Physical Activity  Types of exercise: Walking:  Pt walks 3-4 blocks (5 minutes) to and from work daily and averages over 10,000 steps per day on work days  Pt has now been going to the gym several days per week and doing treadmill walking and weight machines  Current physical limitations: none    Psychosocial Assessment   Support systems: sibling(s) friend(s)  Socioeconomic factors: lives alone    Nutrition Diagnosis-Improving  Diagnosis: Overweight / Obesity (NC-3 3) and Excessive energy intake (NI-1 5)  Related to: Physical inactivity and Excessive energy intake  As Evidenced by: BMI >25 and Excessive energy intake     Interventions and Teaching   Patient educated on post-op nutrition guidelines  Patient educated and handouts provided  Previously povided pt with updated bariatric manual   Pt reports she has now read through at least the firs three chapters  Capacity of post-surgery stomach  Diet progression  Adequate hydration  Sugar and fat restriction to decrease "dumping syndrome"  Expected weight loss  Weight loss plateaus/ possibility of weight regain  Exercise:  Emphasized importance of exercise and exercise goals for weight maintenance long-term  Suggestions for pre-op diet  Nutrition considerations after surgery  Protein supplements  Meal planning and preparation  Appropriate carbohydrate, protein, and fat intake, and food/fluid choices to maximize safe weight loss, nutrient intake, and tolerance: Previously recommended pt follow 1200 calorie macronutrient goals as outlined:  135g CHO, 75g pro, 40 g fats  Reviewed how to decrease fats and increase healthy carbohydrates    Dietary and lifestyle changes  Possible problems with poor eating habits  Techniques for self monitoring and keeping daily food journal:  Pt will continue Baritastic  Potential for food intolerance after surgery, and ways to deal with them including: lactose intolerance, nausea, reflux, vomiting, diarrhea, food intolerance, appetite changes, gas    Education provided to: patient    Barriers to learning: No barriers identified  Readiness to change: action    Comprehension: needs reinforcement and verbalizes understanding     Expected Compliance: fair    Evaluation/Monitoring   Eating pattern as discussed Tolerance of nutrition prescription Body weight Lab values Physical activity    Goals  Food journal, Exercise 30 minutes 5 times per week, Complete lession plans 1-6 and Eat 3 meals per day    Session goals:  1)  Follow 1200 calorie macronutrient goals as outlined:  135g CHO, 75g pro, 40 g fats: in progress  2)  Continue to food log in 4th aspect 80: yes  3)  Measure portions: no  4)  Read new manual: Yes    Remaining Workflow:   PCP Letter: referral in from 2/12/2020   Bloodwork: TSH drawn 4/1/19  CBC, CMP, Lipids drawn 6/12/19  Needs updated  Will order for pt once pt is ready to also get nicotine test    Cardiac Risk Assessment: Cardiac clearance good till 2/29 then has to redo   Nicotine:  Pt got Rx for Chantix and plans to start this weekend   Weight Loss: Pt is down enough in weight to schedule surgery and needs additional 8 8# wt loss by day of surgery  Time Spent:   30 Minutes  Follow-up scheduled for 3/11/2020 with BRUNO Demarco to provide support with smoking cessation  Pt will schedule next visit with PAR[facility]

## 2020-03-11 ENCOUNTER — OFFICE VISIT (OUTPATIENT)
Dept: BARIATRICS | Facility: CLINIC | Age: 40
End: 2020-03-11

## 2020-03-11 DIAGNOSIS — E66.01 CLASS 3 SEVERE OBESITY DUE TO EXCESS CALORIES WITH SERIOUS COMORBIDITY AND BODY MASS INDEX (BMI) OF 40.0 TO 44.9 IN ADULT (HCC): Primary | ICD-10-CM

## 2020-03-11 PROCEDURE — RECHECK

## 2020-03-12 VITALS — BODY MASS INDEX: 42.26 KG/M2 | WEIGHT: 234.8 LBS

## 2020-03-12 NOTE — PROGRESS NOTES
[facility]/Support visit  Patient maintained her weight loss from last month  Patient continues to be frustrated by lack of weight loss as she reports she is following the plan  1200 calorie program, however, having difficulty getting all her carbs in  Patient active at work getting 10K steps a day; working out at gym on weekends; 1 1/2 hours, strength training; hour on treadmill  Patient reports measuring her food and/or using pre portioned, pre measured foods  Discussed at length patient quitting smoking  Patient was still holding off on using the chantix as she is afraid she will be overwhelmed working to do both, lose weight and quit smoking, afraid she will be eating more with trying to quit  Encouraged patient to start chantix to quit smoking for the next month and see how it goes  Patient insists she wants to have the surgery; validated her feelings and provided emotional support  Patient scheduled for next month  Followed up with RD after visit  Plan for patient to work on quitting smoking and maintain weight loss she has already achieved  Will follow up with patient and reinforce plan to quit smoking

## 2020-03-18 ENCOUNTER — TRANSCRIBE ORDERS (OUTPATIENT)
Dept: LAB | Facility: CLINIC | Age: 40
End: 2020-03-18

## 2020-03-18 ENCOUNTER — APPOINTMENT (OUTPATIENT)
Dept: LAB | Facility: CLINIC | Age: 40
End: 2020-03-18

## 2020-03-18 DIAGNOSIS — Z00.8 HEALTH EXAMINATION IN POPULATION SURVEY: Primary | ICD-10-CM

## 2020-03-18 DIAGNOSIS — Z00.8 HEALTH EXAMINATION IN POPULATION SURVEY: ICD-10-CM

## 2020-03-18 LAB
CHOLEST SERPL-MCNC: 205 MG/DL (ref 50–200)
EST. AVERAGE GLUCOSE BLD GHB EST-MCNC: 91 MG/DL
HBA1C MFR BLD: 4.8 %
HDLC SERPL-MCNC: 37 MG/DL
NONHDLC SERPL-MCNC: 168 MG/DL
TRIGL SERPL-MCNC: 435 MG/DL

## 2020-03-18 PROCEDURE — 80061 LIPID PANEL: CPT

## 2020-03-18 PROCEDURE — 36415 COLL VENOUS BLD VENIPUNCTURE: CPT

## 2020-03-18 PROCEDURE — 83036 HEMOGLOBIN GLYCOSYLATED A1C: CPT

## 2020-04-06 ENCOUNTER — TELEPHONE (OUTPATIENT)
Dept: BARIATRICS | Facility: CLINIC | Age: 40
End: 2020-04-06

## 2020-04-15 ENCOUNTER — OFFICE VISIT (OUTPATIENT)
Dept: BARIATRICS | Facility: CLINIC | Age: 40
End: 2020-04-15

## 2020-04-15 DIAGNOSIS — E66.01 CLASS 3 SEVERE OBESITY DUE TO EXCESS CALORIES WITH SERIOUS COMORBIDITY AND BODY MASS INDEX (BMI) OF 40.0 TO 44.9 IN ADULT (HCC): Primary | ICD-10-CM

## 2020-04-15 PROCEDURE — RECHECK: Performed by: DIETITIAN, REGISTERED

## 2020-04-22 LAB — MISCELLANEOUS LAB TEST RESULT: NORMAL

## 2020-04-28 ENCOUNTER — TELEPHONE (OUTPATIENT)
Dept: BARIATRICS | Facility: CLINIC | Age: 40
End: 2020-04-28

## 2020-05-01 ENCOUNTER — OFFICE VISIT (OUTPATIENT)
Dept: BARIATRICS | Facility: CLINIC | Age: 40
End: 2020-05-01

## 2020-05-01 DIAGNOSIS — E66.01 CLASS 3 SEVERE OBESITY DUE TO EXCESS CALORIES WITH SERIOUS COMORBIDITY AND BODY MASS INDEX (BMI) OF 40.0 TO 44.9 IN ADULT (HCC): Primary | ICD-10-CM

## 2020-05-01 PROCEDURE — RECHECK

## 2020-06-16 ENCOUNTER — ANNUAL EXAM (OUTPATIENT)
Dept: OBGYN CLINIC | Facility: CLINIC | Age: 40
End: 2020-06-16
Payer: COMMERCIAL

## 2020-06-16 VITALS
WEIGHT: 237 LBS | SYSTOLIC BLOOD PRESSURE: 122 MMHG | BODY MASS INDEX: 43.61 KG/M2 | DIASTOLIC BLOOD PRESSURE: 76 MMHG | TEMPERATURE: 99.3 F | HEIGHT: 62 IN

## 2020-06-16 DIAGNOSIS — Z01.419 ENCOUNTER FOR ANNUAL ROUTINE GYNECOLOGICAL EXAMINATION: Primary | ICD-10-CM

## 2020-06-16 DIAGNOSIS — Z12.39 BREAST CANCER SCREENING: ICD-10-CM

## 2020-06-16 PROCEDURE — 99395 PREV VISIT EST AGE 18-39: CPT | Performed by: OBSTETRICS & GYNECOLOGY

## 2020-06-16 PROCEDURE — G0145 SCR C/V CYTO,THINLAYER,RESCR: HCPCS | Performed by: OBSTETRICS & GYNECOLOGY

## 2020-06-16 RX ORDER — TIOTROPIUM BROMIDE INHALATION SPRAY 1.56 UG/1
SPRAY, METERED RESPIRATORY (INHALATION)
COMMUNITY
Start: 2020-04-21 | End: 2022-08-03

## 2020-06-16 RX ORDER — ROSUVASTATIN CALCIUM 20 MG/1
TABLET, COATED ORAL
COMMUNITY
Start: 2020-04-28 | End: 2022-08-03 | Stop reason: SDUPTHER

## 2020-06-24 LAB
LAB AP GYN PRIMARY INTERPRETATION: NORMAL
LAB AP LMP: NORMAL
Lab: NORMAL

## 2020-07-07 ENCOUNTER — EVALUATION (OUTPATIENT)
Dept: PHYSICAL THERAPY | Facility: REHABILITATION | Age: 40
End: 2020-07-07
Payer: COMMERCIAL

## 2020-07-07 DIAGNOSIS — M72.2 BILATERAL PLANTAR FASCIITIS: Primary | ICD-10-CM

## 2020-07-07 PROCEDURE — 97110 THERAPEUTIC EXERCISES: CPT | Performed by: PHYSICAL THERAPIST

## 2020-07-07 PROCEDURE — 97161 PT EVAL LOW COMPLEX 20 MIN: CPT | Performed by: PHYSICAL THERAPIST

## 2020-07-07 NOTE — PROGRESS NOTES
PT Evaluation     Today's date: 2020  Patient name: Valerie Wilkinson  : 1980  MRN: [id number]  Referring provider: Mayito Kahn DPM  Dx:   Encounter Diagnosis     ICD-10-CM    1  Bilateral plantar fasciitis M72 2                   Assessment  Assessment details: Patient presents complaining of B/L foot pain, which has been ongoing for about a year, when she began having pain in her R foot, then a few months later she began having pain on her L foot  She had a L CSI completed a week or two ago, which has taken away all of her pain  She reports  She has most of her pain on her medial arch on her L more so than R, she does also report some pain into her heel  She reports no pain in her R side prior to the injection  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, she was recommended to get new sneakers, which she has been wearing  Patient educated in using proper footwear, as well as use of ice at home  No increase in activity or trauma prior to the pain beginning  Patient presents with some calf and plantar fascia tightness, with myofascial restrictions, consistent with PF (L>R)  Patient would benefit from skilled physical therapy and was given a HEP  Patient made aware of condition as well as the proposed treatment plan, including risks, benefits and alternatives  Impairments: abnormal or restricted ROM, activity intolerance, impaired physical strength, lacks appropriate home exercise program and pain with function     Prognosis: good    Goals  ST- demonstrate compliancy with HEP in 1 week     Continue to report no increase in pain, to improve QoL, within 3 weeks     LT- Improve FOTO score to specified value to improve patients perceived benefit of therapy, in 8 weeks   Demonstrate decreased limitations in restrictions on plantar fascia, within 8 weeks     Plan  Plan details: Physical therapy with focus on there ex and manual therapy to improve ability to complete tasks around the house and complete functional activities, use of modalities as needed  Patient would benefit from: skilled physical therapy  Referral necessary: No  Planned modality interventions: cryotherapy, TENS and thermotherapy: hydrocollator packs  Planned therapy interventions: ADL training, balance, balance/weight bearing training, gait training, manual therapy, joint mobilization, neuromuscular re-education, strengthening, stretching, therapeutic activities and therapeutic exercise  Frequency: 1x week  Duration in weeks: 8  Plan of Care beginning date: 2020  Plan of Care expiration date: 2020  Treatment plan discussed with: patient        Subjective Evaluation    History of Present Illness  Date of onset: 2019  Mechanism of injury: Chronic onset   Pain  Current pain ratin  At best pain ratin  At worst pain rating: 10  Location: L foot   Quality: burning, dull ache, sharp and knife-like  Relieving factors: ice  Aggravating factors: standing, walking and stair climbing    Treatments  Current treatment: injection treatment  Patient Goals  Patient goals for therapy: decreased pain and independence with ADLs/IADLs          Objective     General Comments:       Ankle/Foot Comments   No tenderness noted today s/p CSI on L, but reports previously having pain on medial arch and medial heel, no tenderness noted in achilles or lateral arch     rom  Ankle DF L=5 R=8  Ankle PF L=45 R=47  Ankle inversion L=25 R=38   Ankle eversion L=25 R=22    mmt  Ankle DF L=5 R=5  Ankle PF L=5 R=5  Ankle inversion L=5 R=5  Ankle eversion L=5 R=5     Joint play   Anterior unremarkable    Posterior unremarkable   Midfoot normal mobility with no complaints              Precautions: asthma, depression, anxiety       Manuals             IASTM L medial arch, heel  CW 8'             L ankle DF stretch                                        Neuro Re-Ed Ther Ex             Bike              Standing prostretch              Standing gastroc/soleus stretch              Towel squeezes/high arches              Towel PF stretch                                                     Ther Activity                                       Gait Training                                       Modalities

## 2020-07-14 ENCOUNTER — OFFICE VISIT (OUTPATIENT)
Dept: PHYSICAL THERAPY | Facility: REHABILITATION | Age: 40
End: 2020-07-14
Payer: COMMERCIAL

## 2020-07-14 DIAGNOSIS — M72.2 PLANTAR FASCIITIS, LEFT: Primary | ICD-10-CM

## 2020-07-14 PROCEDURE — 97140 MANUAL THERAPY 1/> REGIONS: CPT | Performed by: PHYSICAL THERAPIST

## 2020-07-14 PROCEDURE — 97110 THERAPEUTIC EXERCISES: CPT | Performed by: PHYSICAL THERAPIST

## 2020-07-14 NOTE — PROGRESS NOTES
Daily Note     Today's date: 2020  Patient name: Chante Stoner  : 1980  MRN: [id number]  Referring provider: Saeid Gregory DPM  Dx:   Encounter Diagnosis     ICD-10-CM    1  Bilateral plantar fasciitis M72 2                   Subjective: Reported feeling increased soreness following IASTM last visit, which faded away after a couple days       Objective: See treatment diary below      Assessment: Tolerated treatment well  Patient would benefit from continued PT, demonstrated good form with HEP  Less restrictions noted with IASTM compared to IE         Plan: Continue per POC     Precautions: asthma, depression, anxiety       Manuals             IASTM L medial arch, heel  CW 8'  CW 8'            L ankle DF stretch   CW 4'                                      Neuro Re-Ed                                                                                                        Ther Ex             Bike   8'            Standing prostretch   5x30" L           Standing gastroc/soleus stretch   3x30" ea L            Towel squeezes/high arches   nv           Towel PF stretch   5x30"                                                   Ther Activity                                       Gait Training                                       Modalities

## 2020-07-27 ENCOUNTER — OFFICE VISIT (OUTPATIENT)
Dept: PHYSICAL THERAPY | Facility: REHABILITATION | Age: 40
End: 2020-07-27
Payer: COMMERCIAL

## 2020-07-27 DIAGNOSIS — M72.2 BILATERAL PLANTAR FASCIITIS: ICD-10-CM

## 2020-07-27 DIAGNOSIS — M72.2 PLANTAR FASCIITIS, LEFT: Primary | ICD-10-CM

## 2020-07-27 PROCEDURE — 97140 MANUAL THERAPY 1/> REGIONS: CPT | Performed by: PHYSICAL THERAPIST

## 2020-07-27 PROCEDURE — 97110 THERAPEUTIC EXERCISES: CPT | Performed by: PHYSICAL THERAPIST

## 2020-07-27 NOTE — PROGRESS NOTES
Daily Note     Today's date: 2020  Patient name: Yung Rodney  : 1980  MRN: [id number]  Referring provider: Alisson Cutler DPM  Dx:   Encounter Diagnosis     ICD-10-CM    1  Plantar fasciitis, left M72 2    2  Bilateral plantar fasciitis M72 2                   Subjective: Reports feeling some increased pain from when       Objective: See treatment diary below      Assessment: Tolerated treatment well  Patient would benefit from continued PT, reports relief following session today  Plan: Continue per plan of care        Precautions: asthma, depression, anxiety       Manuals            IASTM L medial arch, heel  CW 8'  CW 8'  CW 8'           L ankle DF stretch   CW 4'  CW 4'                                     Neuro Re-Ed                                                                                                        Ther Ex             Bike   8'  10'           Standing prostretch   5x30" L np          Standing gastroc/soleus stretch   3x30" ea L  np          Towel squeezes/high arches   nv 2' ea           Towel PF stretch   5x30"  5x30"                                                  Ther Activity                                       Gait Training                                       Modalities

## 2020-08-03 ENCOUNTER — OFFICE VISIT (OUTPATIENT)
Dept: PHYSICAL THERAPY | Facility: REHABILITATION | Age: 40
End: 2020-08-03
Payer: COMMERCIAL

## 2020-08-03 DIAGNOSIS — M72.2 PLANTAR FASCIITIS, LEFT: Primary | ICD-10-CM

## 2020-08-03 PROCEDURE — 97140 MANUAL THERAPY 1/> REGIONS: CPT | Performed by: PHYSICAL THERAPIST

## 2020-08-03 PROCEDURE — 97110 THERAPEUTIC EXERCISES: CPT | Performed by: PHYSICAL THERAPIST

## 2020-08-03 NOTE — PROGRESS NOTES
Daily Note     Today's date: 8/3/2020  Patient name: Candida Mcdowell  : 1980  MRN: [id number]  Referring provider: Dorothy Jones DPM  Dx:   Encounter Diagnosis     ICD-10-CM    1  Plantar fasciitis, left  M72 2                   Subjective: Reports feeling like she has a constant bruise on her foot  Objective: See treatment diary below      Assessment: Tolerated treatment well  Patient would benefit from continued PT, patient will be ordered orthotic this week, and then will be seeing her referring physician later this week  Plan: Continue per plan of care        Precautions: asthma, depression, anxiety       Manuals 7/7 7/14  7/27  8/3          IASTM L medial arch, heel  CW 8'  CW 8'  CW 8'  CW 8'          L ankle DF stretch   CW 4'  CW 4'  CW 4'                                    Neuro Re-Ed                                                                                                        Ther Ex             Bike   8'  10'  10'          Standing prostretch   5x30" L np np         Standing gastroc/soleus stretch   3x30" ea L  np np          Towel squeezes/high arches   nv 2' ea  2' ea          Towel PF stretch   5x30"  5x30"  5x30"                                                 Ther Activity                                       Gait Training                                       Modalities

## 2020-08-07 ENCOUNTER — TRANSCRIBE ORDERS (OUTPATIENT)
Dept: LAB | Facility: HOSPITAL | Age: 40
End: 2020-08-07

## 2020-08-07 ENCOUNTER — APPOINTMENT (OUTPATIENT)
Dept: LAB | Facility: HOSPITAL | Age: 40
End: 2020-08-07
Payer: COMMERCIAL

## 2020-08-07 DIAGNOSIS — Z79.899 ENCOUNTER FOR LONG-TERM (CURRENT) USE OF OTHER MEDICATIONS: ICD-10-CM

## 2020-08-07 DIAGNOSIS — I10 ESSENTIAL HYPERTENSION, MALIGNANT: ICD-10-CM

## 2020-08-07 DIAGNOSIS — E78.5 HYPERLIPIDEMIA, UNSPECIFIED HYPERLIPIDEMIA TYPE: Primary | ICD-10-CM

## 2020-08-07 DIAGNOSIS — E55.9 AVITAMINOSIS D: ICD-10-CM

## 2020-08-07 DIAGNOSIS — E78.5 HYPERLIPIDEMIA, UNSPECIFIED HYPERLIPIDEMIA TYPE: ICD-10-CM

## 2020-08-07 LAB
25(OH)D3 SERPL-MCNC: 19.8 NG/ML (ref 30–100)
ALBUMIN SERPL BCP-MCNC: 3.5 G/DL (ref 3.5–5)
ALP SERPL-CCNC: 108 U/L (ref 46–116)
ALT SERPL W P-5'-P-CCNC: 29 U/L (ref 12–78)
ANION GAP SERPL CALCULATED.3IONS-SCNC: 5 MMOL/L (ref 4–13)
AST SERPL W P-5'-P-CCNC: 13 U/L (ref 5–45)
BACTERIA UR QL AUTO: ABNORMAL /HPF
BASOPHILS # BLD AUTO: 0.06 THOUSANDS/ΜL (ref 0–0.1)
BASOPHILS NFR BLD AUTO: 1 % (ref 0–1)
BILIRUB SERPL-MCNC: 0.34 MG/DL (ref 0.2–1)
BILIRUB UR QL STRIP: ABNORMAL
BUN SERPL-MCNC: 10 MG/DL (ref 5–25)
CALCIUM SERPL-MCNC: 9.3 MG/DL (ref 8.3–10.1)
CHLORIDE SERPL-SCNC: 109 MMOL/L (ref 100–108)
CHOLEST SERPL-MCNC: 206 MG/DL (ref 50–200)
CLARITY UR: ABNORMAL
CO2 SERPL-SCNC: 24 MMOL/L (ref 21–32)
COARSE GRAN CASTS URNS QL MICRO: ABNORMAL /LPF
COLOR UR: ABNORMAL
CREAT SERPL-MCNC: 0.67 MG/DL (ref 0.6–1.3)
EOSINOPHIL # BLD AUTO: 0.04 THOUSAND/ΜL (ref 0–0.61)
EOSINOPHIL NFR BLD AUTO: 0 % (ref 0–6)
ERYTHROCYTE [DISTWIDTH] IN BLOOD BY AUTOMATED COUNT: 12.4 % (ref 11.6–15.1)
FINE GRAN CASTS URNS QL MICRO: ABNORMAL /LPF
GFR SERPL CREATININE-BSD FRML MDRD: 112 ML/MIN/1.73SQ M
GLUCOSE P FAST SERPL-MCNC: 85 MG/DL (ref 65–99)
GLUCOSE UR STRIP-MCNC: NEGATIVE MG/DL
HCT VFR BLD AUTO: 42.7 % (ref 34.8–46.1)
HDLC SERPL-MCNC: 57 MG/DL
HGB BLD-MCNC: 14.6 G/DL (ref 11.5–15.4)
HGB UR QL STRIP.AUTO: NEGATIVE
IMM GRANULOCYTES # BLD AUTO: 0.06 THOUSAND/UL (ref 0–0.2)
IMM GRANULOCYTES NFR BLD AUTO: 1 % (ref 0–2)
KETONES UR STRIP-MCNC: NEGATIVE MG/DL
LDLC SERPL CALC-MCNC: 112 MG/DL (ref 0–100)
LDLC SERPL DIRECT ASSAY-MCNC: 114 MG/DL (ref 0–100)
LEUKOCYTE ESTERASE UR QL STRIP: NEGATIVE
LYMPHOCYTES # BLD AUTO: 2.12 THOUSANDS/ΜL (ref 0.6–4.47)
LYMPHOCYTES NFR BLD AUTO: 17 % (ref 14–44)
MCH RBC QN AUTO: 28.9 PG (ref 26.8–34.3)
MCHC RBC AUTO-ENTMCNC: 34.2 G/DL (ref 31.4–37.4)
MCV RBC AUTO: 84 FL (ref 82–98)
MONOCYTES # BLD AUTO: 0.56 THOUSAND/ΜL (ref 0.17–1.22)
MONOCYTES NFR BLD AUTO: 5 % (ref 4–12)
NEUTROPHILS # BLD AUTO: 9.74 THOUSANDS/ΜL (ref 1.85–7.62)
NEUTS SEG NFR BLD AUTO: 76 % (ref 43–75)
NITRITE UR QL STRIP: NEGATIVE
NON-SQ EPI CELLS URNS QL MICRO: ABNORMAL /HPF
NONHDLC SERPL-MCNC: 149 MG/DL
NRBC BLD AUTO-RTO: 0 /100 WBCS
PH UR STRIP.AUTO: 5 [PH]
PLATELET # BLD AUTO: 290 THOUSANDS/UL (ref 149–390)
PMV BLD AUTO: 9.3 FL (ref 8.9–12.7)
POTASSIUM SERPL-SCNC: 4 MMOL/L (ref 3.5–5.3)
PROT SERPL-MCNC: 7.2 G/DL (ref 6.4–8.2)
PROT UR STRIP-MCNC: NEGATIVE MG/DL
RBC # BLD AUTO: 5.06 MILLION/UL (ref 3.81–5.12)
RBC #/AREA URNS AUTO: ABNORMAL /HPF
SODIUM SERPL-SCNC: 138 MMOL/L (ref 136–145)
SP GR UR STRIP.AUTO: 1.03 (ref 1–1.03)
TRIGL SERPL-MCNC: 183 MG/DL
UROBILINOGEN UR QL STRIP.AUTO: 0.2 E.U./DL
WBC # BLD AUTO: 12.58 THOUSAND/UL (ref 4.31–10.16)
WBC #/AREA URNS AUTO: ABNORMAL /HPF

## 2020-08-07 PROCEDURE — 83721 ASSAY OF BLOOD LIPOPROTEIN: CPT

## 2020-08-07 PROCEDURE — 36415 COLL VENOUS BLD VENIPUNCTURE: CPT

## 2020-08-07 PROCEDURE — 81001 URINALYSIS AUTO W/SCOPE: CPT

## 2020-08-07 PROCEDURE — 85025 COMPLETE CBC W/AUTO DIFF WBC: CPT

## 2020-08-07 PROCEDURE — 82306 VITAMIN D 25 HYDROXY: CPT

## 2020-08-07 PROCEDURE — 80061 LIPID PANEL: CPT

## 2020-08-07 PROCEDURE — 80053 COMPREHEN METABOLIC PANEL: CPT

## 2020-08-11 ENCOUNTER — OFFICE VISIT (OUTPATIENT)
Dept: PHYSICAL THERAPY | Facility: REHABILITATION | Age: 40
End: 2020-08-11
Payer: COMMERCIAL

## 2020-08-11 DIAGNOSIS — M72.2 PLANTAR FASCIITIS, LEFT: Primary | ICD-10-CM

## 2020-08-11 DIAGNOSIS — M72.2 BILATERAL PLANTAR FASCIITIS: ICD-10-CM

## 2020-08-11 PROCEDURE — 97140 MANUAL THERAPY 1/> REGIONS: CPT | Performed by: PHYSICAL THERAPIST

## 2020-08-11 PROCEDURE — 97110 THERAPEUTIC EXERCISES: CPT | Performed by: PHYSICAL THERAPIST

## 2020-08-11 NOTE — PROGRESS NOTES
Daily Note     Today's date: 2020  Patient name: Leobardo Suarez  : 1980  MRN: [id number]  Referring provider: Niall Jon DPM  Dx:   Encounter Diagnosis     ICD-10-CM    1  Plantar fasciitis, left  M72 2    2  Bilateral plantar fasciitis  M72 2                   Subjective: Reports continued discomfort in her L heel, had an injection done with no relief  Objective: See treatment diary below      Assessment: Tolerated treatment well  Patient will contact if she needs further physical therapy  Plan: Continue per plan of care        Precautions: asthma, depression, anxiety       Manuals 7/7 7/14  7/27  8/3  8/11         IASTM L medial arch, heel  CW 8'  CW 8'  CW 8'  CW 8'  CW 12'         L ankle DF stretch   CW 4'  CW 4'  CW 4'  CW 6'                                   Neuro Re-Ed                                                                                                        Ther Ex             Bike   8'  10'  10'  10'         Standing prostretch   5x30" L np np np        Standing gastroc/soleus stretch   3x30" ea L  np np  np        Towel squeezes/high arches   nv 2' ea  2' ea  np        Towel PF stretch   5x30"  5x30"  5x30"  5x30"                                                Ther Activity                                       Gait Training                                       Modalities

## 2020-09-18 ENCOUNTER — TRANSCRIBE ORDERS (OUTPATIENT)
Dept: ADMINISTRATIVE | Facility: HOSPITAL | Age: 40
End: 2020-09-18

## 2020-09-18 DIAGNOSIS — M79.672 PAIN IN LEFT FOOT: Primary | ICD-10-CM

## 2020-09-21 ENCOUNTER — HOSPITAL ENCOUNTER (OUTPATIENT)
Dept: RADIOLOGY | Facility: HOSPITAL | Age: 40
Discharge: HOME | End: 2020-09-21
Attending: PODIATRIST
Payer: COMMERCIAL

## 2020-09-21 DIAGNOSIS — M79.672 PAIN IN LEFT FOOT: ICD-10-CM

## 2020-09-21 PROCEDURE — 73718 MRI LOWER EXTREMITY W/O DYE: CPT

## 2020-09-21 PROCEDURE — G1004 CDSM NDSC: HCPCS

## 2020-11-19 ENCOUNTER — OFFICE VISIT (OUTPATIENT)
Dept: OBGYN CLINIC | Facility: CLINIC | Age: 40
End: 2020-11-19
Payer: COMMERCIAL

## 2020-11-19 ENCOUNTER — APPOINTMENT (OUTPATIENT)
Dept: RADIOLOGY | Facility: CLINIC | Age: 40
End: 2020-11-19
Payer: COMMERCIAL

## 2020-11-19 VITALS
DIASTOLIC BLOOD PRESSURE: 83 MMHG | HEIGHT: 63 IN | TEMPERATURE: 99.3 F | SYSTOLIC BLOOD PRESSURE: 120 MMHG | BODY MASS INDEX: 41.98 KG/M2

## 2020-11-19 DIAGNOSIS — M79.672 PAIN IN LEFT FOOT: ICD-10-CM

## 2020-11-19 DIAGNOSIS — M72.2 PLANTAR FASCIITIS OF LEFT FOOT: Primary | ICD-10-CM

## 2020-11-19 PROCEDURE — 99243 OFF/OP CNSLTJ NEW/EST LOW 30: CPT | Performed by: ORTHOPAEDIC SURGERY

## 2020-11-19 PROCEDURE — 73630 X-RAY EXAM OF FOOT: CPT

## 2020-12-22 ENCOUNTER — OFFICE VISIT (OUTPATIENT)
Dept: INTERNAL MEDICINE CLINIC | Facility: CLINIC | Age: 40
End: 2020-12-22
Payer: COMMERCIAL

## 2020-12-22 VITALS
TEMPERATURE: 98.2 F | WEIGHT: 246.2 LBS | DIASTOLIC BLOOD PRESSURE: 80 MMHG | SYSTOLIC BLOOD PRESSURE: 130 MMHG | HEART RATE: 93 BPM | OXYGEN SATURATION: 99 % | BODY MASS INDEX: 43.62 KG/M2 | HEIGHT: 63 IN

## 2020-12-22 DIAGNOSIS — R60.0 EDEMA OF EXTREMITIES: Primary | ICD-10-CM

## 2020-12-22 DIAGNOSIS — E78.00 HYPERCHOLESTEROLEMIA: ICD-10-CM

## 2020-12-22 DIAGNOSIS — E55.9 VITAMIN D DEFICIENCY: ICD-10-CM

## 2020-12-22 DIAGNOSIS — G47.33 OBSTRUCTIVE SLEEP APNEA SYNDROME: ICD-10-CM

## 2020-12-22 DIAGNOSIS — J45.22 MILD INTERMITTENT ASTHMA WITH STATUS ASTHMATICUS: ICD-10-CM

## 2020-12-22 PROCEDURE — 99204 OFFICE O/P NEW MOD 45 MIN: CPT | Performed by: INTERNAL MEDICINE

## 2021-01-24 ENCOUNTER — LAB (OUTPATIENT)
Dept: LAB | Facility: HOSPITAL | Age: 41
End: 2021-01-24
Payer: COMMERCIAL

## 2021-01-24 DIAGNOSIS — E55.9 VITAMIN D DEFICIENCY: ICD-10-CM

## 2021-01-24 DIAGNOSIS — R60.0 EDEMA OF EXTREMITIES: ICD-10-CM

## 2021-01-24 LAB
25(OH)D3 SERPL-MCNC: 41.5 NG/ML (ref 30–100)
ALBUMIN SERPL BCP-MCNC: 3.4 G/DL (ref 3.5–5)
ALP SERPL-CCNC: 116 U/L (ref 46–116)
ALT SERPL W P-5'-P-CCNC: 19 U/L (ref 12–78)
ANION GAP SERPL CALCULATED.3IONS-SCNC: 6 MMOL/L (ref 4–13)
AST SERPL W P-5'-P-CCNC: 11 U/L (ref 5–45)
BASOPHILS # BLD AUTO: 0.06 THOUSANDS/ΜL (ref 0–0.1)
BASOPHILS NFR BLD AUTO: 1 % (ref 0–1)
BILIRUB SERPL-MCNC: 0.38 MG/DL (ref 0.2–1)
BILIRUB UR QL STRIP: NEGATIVE
BUN SERPL-MCNC: 10 MG/DL (ref 5–25)
CALCIUM ALBUM COR SERPL-MCNC: 9.3 MG/DL (ref 8.3–10.1)
CALCIUM SERPL-MCNC: 8.8 MG/DL (ref 8.3–10.1)
CHLORIDE SERPL-SCNC: 109 MMOL/L (ref 100–108)
CHOLEST SERPL-MCNC: 160 MG/DL (ref 50–200)
CLARITY UR: CLEAR
CO2 SERPL-SCNC: 25 MMOL/L (ref 21–32)
COLOR UR: YELLOW
CREAT SERPL-MCNC: 0.65 MG/DL (ref 0.6–1.3)
EOSINOPHIL # BLD AUTO: 0.31 THOUSAND/ΜL (ref 0–0.61)
EOSINOPHIL NFR BLD AUTO: 4 % (ref 0–6)
ERYTHROCYTE [DISTWIDTH] IN BLOOD BY AUTOMATED COUNT: 13.4 % (ref 11.6–15.1)
GFR SERPL CREATININE-BSD FRML MDRD: 112 ML/MIN/1.73SQ M
GLUCOSE P FAST SERPL-MCNC: 82 MG/DL (ref 65–99)
GLUCOSE UR STRIP-MCNC: NEGATIVE MG/DL
HCT VFR BLD AUTO: 36.8 % (ref 34.8–46.1)
HDLC SERPL-MCNC: 50 MG/DL
HGB BLD-MCNC: 11.7 G/DL (ref 11.5–15.4)
HGB UR QL STRIP.AUTO: NEGATIVE
IMM GRANULOCYTES # BLD AUTO: 0.03 THOUSAND/UL (ref 0–0.2)
IMM GRANULOCYTES NFR BLD AUTO: 0 % (ref 0–2)
KETONES UR STRIP-MCNC: NEGATIVE MG/DL
LDLC SERPL CALC-MCNC: 70 MG/DL (ref 0–100)
LEUKOCYTE ESTERASE UR QL STRIP: NEGATIVE
LYMPHOCYTES # BLD AUTO: 2.55 THOUSANDS/ΜL (ref 0.6–4.47)
LYMPHOCYTES NFR BLD AUTO: 31 % (ref 14–44)
MCH RBC QN AUTO: 26.1 PG (ref 26.8–34.3)
MCHC RBC AUTO-ENTMCNC: 31.8 G/DL (ref 31.4–37.4)
MCV RBC AUTO: 82 FL (ref 82–98)
MONOCYTES # BLD AUTO: 0.51 THOUSAND/ΜL (ref 0.17–1.22)
MONOCYTES NFR BLD AUTO: 6 % (ref 4–12)
NEUTROPHILS # BLD AUTO: 4.82 THOUSANDS/ΜL (ref 1.85–7.62)
NEUTS SEG NFR BLD AUTO: 58 % (ref 43–75)
NITRITE UR QL STRIP: NEGATIVE
NRBC BLD AUTO-RTO: 0 /100 WBCS
PH UR STRIP.AUTO: 6.5 [PH]
PLATELET # BLD AUTO: 285 THOUSANDS/UL (ref 149–390)
PMV BLD AUTO: 9.7 FL (ref 8.9–12.7)
POTASSIUM SERPL-SCNC: 4.1 MMOL/L (ref 3.5–5.3)
PROT SERPL-MCNC: 6.8 G/DL (ref 6.4–8.2)
PROT UR STRIP-MCNC: NEGATIVE MG/DL
RBC # BLD AUTO: 4.49 MILLION/UL (ref 3.81–5.12)
SODIUM SERPL-SCNC: 140 MMOL/L (ref 136–145)
SP GR UR STRIP.AUTO: 1 (ref 1–1.03)
TRIGL SERPL-MCNC: 200 MG/DL
TSH SERPL DL<=0.05 MIU/L-ACNC: 1.4 UIU/ML (ref 0.36–3.74)
UROBILINOGEN UR QL STRIP.AUTO: 0.2 E.U./DL
WBC # BLD AUTO: 8.28 THOUSAND/UL (ref 4.31–10.16)

## 2021-01-24 PROCEDURE — 82306 VITAMIN D 25 HYDROXY: CPT

## 2021-01-24 PROCEDURE — 80053 COMPREHEN METABOLIC PANEL: CPT

## 2021-01-24 PROCEDURE — 81003 URINALYSIS AUTO W/O SCOPE: CPT | Performed by: INTERNAL MEDICINE

## 2021-01-24 PROCEDURE — 84443 ASSAY THYROID STIM HORMONE: CPT

## 2021-01-24 PROCEDURE — 80061 LIPID PANEL: CPT

## 2021-01-24 PROCEDURE — 36415 COLL VENOUS BLD VENIPUNCTURE: CPT

## 2021-01-24 PROCEDURE — 85025 COMPLETE CBC W/AUTO DIFF WBC: CPT

## 2021-03-25 ENCOUNTER — IMMUNIZATIONS (OUTPATIENT)
Dept: FAMILY MEDICINE CLINIC | Facility: HOSPITAL | Age: 41
End: 2021-03-25

## 2021-03-25 DIAGNOSIS — Z23 ENCOUNTER FOR IMMUNIZATION: Primary | ICD-10-CM

## 2021-03-25 PROCEDURE — 0001A SARS-COV-2 / COVID-19 MRNA VACCINE (PFIZER-BIONTECH) 30 MCG: CPT

## 2021-03-25 PROCEDURE — 91300 SARS-COV-2 / COVID-19 MRNA VACCINE (PFIZER-BIONTECH) 30 MCG: CPT

## 2021-04-16 ENCOUNTER — IMMUNIZATIONS (OUTPATIENT)
Dept: FAMILY MEDICINE CLINIC | Facility: HOSPITAL | Age: 41
End: 2021-04-16

## 2021-04-16 DIAGNOSIS — Z23 ENCOUNTER FOR IMMUNIZATION: Primary | ICD-10-CM

## 2021-04-16 PROCEDURE — 0002A SARS-COV-2 / COVID-19 MRNA VACCINE (PFIZER-BIONTECH) 30 MCG: CPT

## 2021-04-16 PROCEDURE — 91300 SARS-COV-2 / COVID-19 MRNA VACCINE (PFIZER-BIONTECH) 30 MCG: CPT

## 2021-04-27 ENCOUNTER — OFFICE VISIT (OUTPATIENT)
Dept: INTERNAL MEDICINE CLINIC | Facility: CLINIC | Age: 41
End: 2021-04-27
Payer: COMMERCIAL

## 2021-04-27 VITALS
TEMPERATURE: 98 F | HEART RATE: 113 BPM | OXYGEN SATURATION: 98 % | SYSTOLIC BLOOD PRESSURE: 122 MMHG | DIASTOLIC BLOOD PRESSURE: 76 MMHG | HEIGHT: 63 IN | BODY MASS INDEX: 44.3 KG/M2 | WEIGHT: 250 LBS

## 2021-04-27 DIAGNOSIS — I10 ESSENTIAL HYPERTENSION: ICD-10-CM

## 2021-04-27 DIAGNOSIS — E78.00 HYPERCHOLESTEROLEMIA: ICD-10-CM

## 2021-04-27 DIAGNOSIS — Z11.4 SCREENING FOR HIV (HUMAN IMMUNODEFICIENCY VIRUS): ICD-10-CM

## 2021-04-27 DIAGNOSIS — R60.0 EDEMA OF EXTREMITIES: Primary | ICD-10-CM

## 2021-04-27 DIAGNOSIS — Z23 ENCOUNTER FOR IMMUNIZATION: ICD-10-CM

## 2021-04-27 PROCEDURE — 99214 OFFICE O/P EST MOD 30 MIN: CPT | Performed by: INTERNAL MEDICINE

## 2021-04-27 RX ORDER — VALSARTAN AND HYDROCHLOROTHIAZIDE 80; 12.5 MG/1; MG/1
TABLET, FILM COATED ORAL
COMMUNITY
End: 2022-08-03 | Stop reason: SDUPTHER

## 2021-04-27 NOTE — PATIENT INSTRUCTIONS
Problem List Items Addressed This Visit        Cardiovascular and Mediastinum    Essential hypertension      Controlled, continue current medication along with healthy diet and exercise         Relevant Medications    valsartan-hydrochlorothiazide (DIOVAN-HCT) 80-12 5 MG per tablet       Other    Hypercholesterolemia      Continue rosuvastatin along with healthy diet and exercise         Edema of extremities - Primary     Reviewed with patient that her renal function was good, her thyroid function test was normal, there is no proteinuria, meaning no protein in the urine that could cause swelling  The swelling has not been asymmetric to suggest blood clot  Patient has denied significant salt intake  Patient has no cardiopulmonary complaints  Discussed with patient's could be from some chronic venous insufficiency, and the best treatment would be elevating 3 times a day for 30 minutes, she could try compression stockings, and continue the low-dose diuretic she is on with the hydrochlorothiazide portion of blood pressure pill     Discussed that further workup could include cardiac echo, but she had no cardiopulmonary complaints, and I do not see indication to order that at this time           Other Visit Diagnoses     Encounter for immunization        Screening for HIV (human immunodeficiency virus)

## 2021-04-27 NOTE — ASSESSMENT & PLAN NOTE
Reviewed with patient that her renal function was good, her thyroid function test was normal, there is no proteinuria, meaning no protein in the urine that could cause swelling  The swelling has not been asymmetric to suggest blood clot  Patient has denied significant salt intake  Patient has no cardiopulmonary complaints  Discussed with patient's could be from some chronic venous insufficiency, and the best treatment would be elevating 3 times a day for 30 minutes, she could try compression stockings, and continue the low-dose diuretic she is on with the hydrochlorothiazide portion of blood pressure pill     Discussed that further workup could include cardiac echo, but she had no cardiopulmonary complaints, and I do not see indication to order that at this time

## 2021-04-27 NOTE — PROGRESS NOTES
Assessment/Plan:    Edema of extremities  Reviewed with patient that her renal function was good, her thyroid function test was normal, there is no proteinuria, meaning no protein in the urine that could cause swelling  The swelling has not been asymmetric to suggest blood clot  Patient has denied significant salt intake  Patient has no cardiopulmonary complaints  Discussed with patient's could be from some chronic venous insufficiency, and the best treatment would be elevating 3 times a day for 30 minutes, she could try compression stockings, and continue the low-dose diuretic she is on with the hydrochlorothiazide portion of blood pressure pill  Discussed that further workup could include cardiac echo, but she had no cardiopulmonary complaints, and I do not see indication to order that at this time    Essential hypertension   Controlled, continue current medication along with healthy diet and exercise    Hypercholesterolemia   Continue rosuvastatin along with healthy diet and exercise       Diagnoses and all orders for this visit:    Edema of extremities    Encounter for immunization    Screening for HIV (human immunodeficiency virus)    Essential hypertension    Hypercholesterolemia    Other orders  -     Cancel: PNEUMOCOCCAL POLYSACCHARIDE VACCINE 23-VALENT =>1YO SQ IM  -     Cancel: HIV 1/2 Antigen/Antibody (4th Generation) w Reflex [facility]; Future  -     Cancel: TDAP VACCINE GREATER THAN OR EQUAL TO 8YO IM  -     valsartan-hydrochlorothiazide (DIOVAN-HCT) 80-12 5 MG per tablet; valsartan 80 mg-hydrochlorothiazide 12 5 mg tablet   Take 1 tablet every day by oral route in the morning  BMI Counseling: Body mass index is 44 29 kg/m²  The BMI is above normal  Nutrition recommendations include encouraging healthy choices of fruits and vegetables and moderation in carbohydrate intake  Exercise recommendations include exercising 3-5 times per week           Subjective:      Patient ID: Candida martin 45 y o  female  Pt made appt with me for follow up of edema in lower legs  The following portions of the patient's history were reviewed and updated as appropriate: allergies, current medications, past family history, past medical history, past social history, past surgical history and problem list     Review of Systems   Constitutional: Negative for chills, fatigue and fever  HENT: Negative for congestion, nosebleeds, postnasal drip, sore throat and trouble swallowing  Eyes: Negative for pain  Respiratory: Negative for cough, chest tightness, shortness of breath and wheezing  Cardiovascular: Positive for leg swelling  Negative for chest pain and palpitations  Gastrointestinal: Negative for abdominal pain, constipation, diarrhea, nausea and vomiting  Endocrine: Negative for polydipsia and polyuria  Genitourinary: Negative for dysuria, flank pain and hematuria  Musculoskeletal: Negative for arthralgias  Skin: Negative for rash  Neurological: Negative for dizziness, tremors, light-headedness and headaches  Hematological: Does not bruise/bleed easily  Psychiatric/Behavioral: Negative for confusion and dysphoric mood  The patient is not nervous/anxious  Objective:      /76   Pulse (!) 113   Temp 98 °F (36 7 °C) (Temporal)   Ht 5' 3" (1 6 m)   Wt 113 kg (250 lb)   SpO2 98%   BMI 44 29 kg/m²          Physical Exam  Vitals signs reviewed  Constitutional:       General: She is not in acute distress  Appearance: Normal appearance  She is well-developed  HENT:      Head: Normocephalic and atraumatic  Right Ear: External ear normal       Left Ear: External ear normal    Eyes:      General: No scleral icterus  Conjunctiva/sclera: Conjunctivae normal    Neck:      Musculoskeletal: Normal range of motion and neck supple  Thyroid: No thyromegaly  Trachea: No tracheal deviation  Cardiovascular:      Rate and Rhythm: Normal rate and regular rhythm  Heart sounds: Normal heart sounds  No murmur  Pulmonary:      Effort: Pulmonary effort is normal  No respiratory distress  Breath sounds: Normal breath sounds  No wheezing or rales  Abdominal:      General: Bowel sounds are normal       Palpations: Abdomen is soft  Tenderness: There is no abdominal tenderness  There is no guarding or rebound  Musculoskeletal:      Right lower leg: No edema  Left lower leg: No edema  Lymphadenopathy:      Cervical: No cervical adenopathy  Neurological:      General: No focal deficit present  Mental Status: She is alert and oriented to person, place, and time  Psychiatric:         Mood and Affect: Mood normal          Behavior: Behavior normal          Thought Content:  Thought content normal          Judgment: Judgment normal

## 2021-04-29 ENCOUNTER — TRANSCRIBE ORDERS (OUTPATIENT)
Dept: LAB | Facility: HOSPITAL | Age: 41
End: 2021-04-29

## 2021-04-29 ENCOUNTER — APPOINTMENT (OUTPATIENT)
Dept: LAB | Facility: HOSPITAL | Age: 41
End: 2021-04-29

## 2021-04-29 DIAGNOSIS — Z00.8 ENCOUNTER FOR OTHER GENERAL EXAMINATION: Primary | ICD-10-CM

## 2021-04-29 DIAGNOSIS — Z00.8 ENCOUNTER FOR OTHER GENERAL EXAMINATION: ICD-10-CM

## 2021-04-29 LAB
CHOLEST SERPL-MCNC: 175 MG/DL (ref 50–200)
EST. AVERAGE GLUCOSE BLD GHB EST-MCNC: 91 MG/DL
HBA1C MFR BLD: 4.8 %
HDLC SERPL-MCNC: 55 MG/DL
LDLC SERPL CALC-MCNC: 59 MG/DL (ref 0–100)
NONHDLC SERPL-MCNC: 120 MG/DL
TRIGL SERPL-MCNC: 307 MG/DL

## 2021-04-29 PROCEDURE — 80061 LIPID PANEL: CPT

## 2021-04-29 PROCEDURE — 36415 COLL VENOUS BLD VENIPUNCTURE: CPT

## 2021-04-29 PROCEDURE — 83036 HEMOGLOBIN GLYCOSYLATED A1C: CPT

## 2021-07-22 ENCOUNTER — ANNUAL EXAM (OUTPATIENT)
Dept: OBGYN CLINIC | Facility: CLINIC | Age: 41
End: 2021-07-22
Payer: COMMERCIAL

## 2021-07-22 VITALS
BODY MASS INDEX: 45.18 KG/M2 | SYSTOLIC BLOOD PRESSURE: 124 MMHG | HEIGHT: 63 IN | DIASTOLIC BLOOD PRESSURE: 76 MMHG | WEIGHT: 255 LBS

## 2021-07-22 DIAGNOSIS — Z01.419 ENCOUNTER FOR ANNUAL ROUTINE GYNECOLOGICAL EXAMINATION: Primary | ICD-10-CM

## 2021-07-22 DIAGNOSIS — Z12.31 ENCOUNTER FOR MAMMOGRAM TO ESTABLISH BASELINE MAMMOGRAM: ICD-10-CM

## 2021-07-22 DIAGNOSIS — Z11.3 SCREENING EXAMINATION FOR STD (SEXUALLY TRANSMITTED DISEASE): ICD-10-CM

## 2021-07-22 PROCEDURE — G0145 SCR C/V CYTO,THINLAYER,RESCR: HCPCS | Performed by: OBSTETRICS & GYNECOLOGY

## 2021-07-22 PROCEDURE — 99395 PREV VISIT EST AGE 18-39: CPT | Performed by: OBSTETRICS & GYNECOLOGY

## 2021-07-22 PROCEDURE — 87491 CHLMYD TRACH DNA AMP PROBE: CPT | Performed by: OBSTETRICS & GYNECOLOGY

## 2021-07-22 PROCEDURE — 87591 N.GONORRHOEAE DNA AMP PROB: CPT | Performed by: OBSTETRICS & GYNECOLOGY

## 2021-07-22 RX ORDER — VITAMIN E 268 MG
CAPSULE ORAL
COMMUNITY
End: 2022-08-03

## 2021-07-22 RX ORDER — SODIUM FLUORIDE 5 MG/G
GEL, DENTIFRICE DENTAL
COMMUNITY

## 2021-07-22 NOTE — PROGRESS NOTES
Assessment/Plan:  Pap smear done as well as annual  Cultures obtained for GC, chlamydia, Trichomonas  Encouraged self-breast examination as well as calcium supplementation  Reviewed breast cancer screening starting with mammographies E age 37  Reviewed menstrual diary  She will continue to monitor her cycles call if less than 21 day cycle, greater than 36 day cycle  She will keep her appointment with endocrinology as scheduled  Return to office in 1 year or p r n  No problem-specific Assessment & Plan notes found for this encounter  Diagnoses and all orders for this visit:    Encounter for annual routine gynecological examination  -     Liquid-based pap, screening    Encounter for mammogram to establish baseline mammogram  -     Mammo screening bilateral w 3d & cad; Future    Screening examination for STD (sexually transmitted disease)  -     Chlamydia/GC amplified DNA by PCR    Other orders  -     vitamin E, tocopherol, 400 units capsule; vitamin E 400 unit capsule  -     SODIUM FLUORIDE, DENTAL GEL, 1 1 % GEL; SF 5000 Plus 1 1 % dental cream          Subjective:      Patient ID: Rubi Woodard is a 45 y o  female  HPI      this is a pleasant 71-year-old female G0 who presents for annual gyn exam   Her menstrual cycles are regular up until 01/2021 and then subsequently 1/21, 2/26, 3/21, 5/8, 6/2, 6/26, cycles usually last 3-4 days  She denies any pelvic pain  There has been no changes in bowel or bladder function  She has had 1 sexual partner in the course of the year  She uses condoms intermittently  She has noticed significant weight gain in the course of the year  Her family physician has referred her to endocrinology for further workup  She continues to smoke approximately pack of cigarettes per day  She continues to follow-up with her family physician for hypercholesterolemia hypertriglyceridemia  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[place],  Radiology, 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      The following portions of the patient's history were reviewed and updated as appropriate: allergies, current medications, past family history, past medical history, past social history, past surgical history and problem list     Review of Systems   Constitutional: Negative for fatigue, fever and unexpected weight change  Respiratory: Negative for cough, chest tightness, shortness of breath and wheezing  Cardiovascular: Negative  Negative for chest pain and palpitations  Gastrointestinal: Negative  Negative for abdominal distention, abdominal pain, blood in stool, constipation, diarrhea, nausea and vomiting  Genitourinary: Negative  Negative for difficulty urinating, dyspareunia, dysuria, flank pain, frequency, genital sores, hematuria, pelvic pain, urgency, vaginal bleeding, vaginal discharge and vaginal pain  Skin: Negative for rash  Objective:      /76   Ht 5' 3" (1 6 m)   Wt 116 kg (255 lb)   LMP 06/26/2021   BMI 45 17 kg/m²          Physical Exam  Constitutional:       Appearance: Normal appearance  She is well-developed  Cardiovascular:      Rate and Rhythm: Normal rate and regular rhythm  Pulmonary:      Effort: Pulmonary effort is normal       Breath sounds: Normal breath sounds  Chest:      Breasts:         Right: No inverted nipple, mass, nipple discharge, skin change or tenderness  Left: No inverted nipple, mass, nipple discharge, skin change or tenderness  Abdominal:      General: Bowel sounds are normal  There is no distension  Palpations: Abdomen is soft  Tenderness: There is no abdominal tenderness  There is no guarding or rebound  Genitourinary:     Labia:         Right: No rash, tenderness or lesion  Left: No rash, tenderness or lesion  Vagina: Normal  No signs of injury  No vaginal discharge or tenderness  Cervix: No cervical motion tenderness, discharge, friability, lesion, erythema or cervical bleeding  Uterus: Not enlarged and not tender  Adnexa:         Right: No mass, tenderness or fullness  Left: No mass, tenderness or fullness  Skin:     General: Skin is dry  Neurological:      Mental Status: She is alert and oriented to person, place, and time     Psychiatric:         Behavior: Behavior normal

## 2021-07-24 LAB
C TRACH DNA SPEC QL NAA+PROBE: NEGATIVE
N GONORRHOEA DNA SPEC QL NAA+PROBE: NEGATIVE

## 2021-07-28 LAB
LAB AP GYN PRIMARY INTERPRETATION: NORMAL
LAB AP LMP: NORMAL
Lab: NORMAL

## 2021-08-02 ENCOUNTER — OFFICE VISIT (OUTPATIENT)
Dept: ENDOCRINOLOGY | Facility: CLINIC | Age: 41
End: 2021-08-02
Payer: COMMERCIAL

## 2021-08-02 VITALS
BODY MASS INDEX: 44.72 KG/M2 | WEIGHT: 252.38 LBS | HEART RATE: 86 BPM | SYSTOLIC BLOOD PRESSURE: 104 MMHG | HEIGHT: 63 IN | DIASTOLIC BLOOD PRESSURE: 82 MMHG

## 2021-08-02 DIAGNOSIS — Z78.9 ADVISED ABOUT MANAGEMENT OF WEIGHT: Primary | ICD-10-CM

## 2021-08-02 PROCEDURE — 99203 OFFICE O/P NEW LOW 30 MIN: CPT | Performed by: INTERNAL MEDICINE

## 2021-08-02 NOTE — LETTER
August 2, 2021     [address]  [address] [facility] [address]    Patient: Henrique Olivares   YOB: 1980   Date of Visit: 8/2/2021       Dear Dr Jaycee Meek:    Thank you for referring Henrique Olivares to me for evaluation  Below are my notes for this consultation  If you have questions, please do not hesitate to call me  I look forward to following your patient along with you  Sincerely,        Lino Mullins MD        CC: James Cortes MD  8/2/2021  5:14 PM  Sign when Signing Visit   Henrique Olivares 45 y o  female MRN: [id number]    Encounter: [id number]      Impression:  -Obesity  -Sleep Apnea  Assessment: This is a 45y o -year-old female with pmhx of Obesity, Sleep apnea, asthma , Depression who presents to the clinic for weight management  Plan:    -Explained to the pt that we are not certified in obesity medicine  - Will refer patient to Dr Katie Vargas ( weight loss specialist)   -Recommended her to continue eating healthy and to exercise    -Encouraged pt to use her Cpap machine at home for sleep apnea  CC: Weight gain, obesity  referral provider: Dr Jaycee Meek       HPI:    This is a 44 yo F with pmhx of Obesity, Sleep apnea, Asthma, Depression who presents to the clinic for a consult of weight management  pt reports that She has been trying to lose weight and despite all the efforts including a healthy diet and exercise, has been unable to loose weight, pt reports that has been gaining weight compared to previous years  Pt denies any symptoms related with DM, Hypothyroidism or Cushing disease, such as polydipsia, polyuria, Polyphagia, bruising in skin,   darkening skin, abnormal pad of fat between shoulders, muscle weakness, cold intolerance or any other related symptoms       As per documentation pt was following last year with Bariatric/dietitian , pt reports that had a discussion about possibility of bariatric surgery however pt was not a candidate that time because She needed to loose more weight  Pt states that 3 years ago She was following with a weight loss specialist who prescribed her Phentamine and Topiramate and that helped her and lost 60 pounds  Review of Systems   Constitutional: Negative for activity change, appetite change, diaphoresis and fatigue  HENT: Negative for congestion and facial swelling  Eyes: Negative for photophobia and visual disturbance  Respiratory: Negative for cough, choking and shortness of breath  Cardiovascular: Negative for chest pain and palpitations  Gastrointestinal: Negative for abdominal distention and abdominal pain  Endocrine: Negative for cold intolerance, heat intolerance, polydipsia, polyphagia and polyuria  Skin: Negative for color change and rash  Neurological: Negative for dizziness, tremors and weakness  Psychiatric/Behavioral: Negative for agitation and behavioral problems  All other systems reviewed and are negative        Historical Information   Past Medical History:   Diagnosis Date    Anxiety     Asthma     Depression     History of Clostridium difficile colitis 2018    x 2 episodes, after antibiotics    Hypercholesterolemia     Hyperlipemia     Morbid obesity ([facility] [address] )     Sleep apnea     c pap     Past Surgical History:   Procedure Laterality Date    AK CONIZATION CERVIX,LOOP ELECTRD N/A 9/27/2018    Procedure: BIOPSY LEEP CERVIX;  Surgeon: Johnetta Gowers, DO;  [facility];  Service: Gynecology   [address] [place] [address]    TONSILLECTOMY       Social History   Social History     Substance and Sexual Activity   Alcohol Use Yes    Alcohol/week: 0 0 standard drinks    Comment: occasional drink here and there     Social History     Substance and Sexual Activity   Drug Use No     Social History     Tobacco Use   Smoking Status Current Every Day Smoker    Packs/day: 0 50    Years: 18 00    Pack years: 9 00    Types: Cigarettes   Smokeless Tobacco Never Used   Tobacco Comment    is on the patch      Family History:   Family History   Problem Relation Age of Onset    Asthma Mother     Obesity Mother     Lung cancer Father     Hyperlipidemia Father     Bone cancer Father     Arthritis Maternal Grandmother     COPD Maternal Grandmother     Cancer Maternal Grandfather         Lung ca-    Liver cancer Paternal Grandfather     Cancer Paternal Grandfather         Liver ca-       Meds/Allergies   Current Outpatient Medications   Medication Sig Dispense Refill    albuterol (VENTOLIN HFA) 90 mcg/act inhaler Inhale 2 puffs every 6 (six) hours as needed for wheezing 18 g 0    ALPRAZolam (XANAX) 0 25 mg tablet as needed      cholecalciferol (VITAMIN D3) 1,000 units tablet Take by mouth daily        DULoxetine (CYMBALTA) 60 mg delayed release capsule Take 60 mg by mouth daily        fluticasone-vilanterol (BREO ELLIPTA) 100-25 mcg/inh inhaler Inhale 1 puff daily Rinse mouth after use  2 Inhaler 11    PREVIDENT 5000 SENSITIVE 1 1-5 % PSTE   3    rosuvastatin (CRESTOR) 20 MG tablet       SODIUM FLUORIDE, DENTAL GEL, 1 1 % GEL SF 5000 Plus 1 1 % dental cream      SPIRIVA RESPIMAT 1 25 MCG/ACT AERS inhaler       valsartan-hydrochlorothiazide (DIOVAN-HCT) 80-12 5 MG per tablet valsartan 80 mg-hydrochlorothiazide 12 5 mg tablet   Take 1 tablet every day by oral route in the morning   vitamin E, tocopherol, 400 units capsule vitamin E 400 unit capsule       No current facility-administered medications for this visit  No Known Allergies    Objective   Vitals: Blood pressure 104/82, pulse 86, height 5' 3" (1 6 m), weight 114 kg (252 lb 6 oz), not currently breastfeeding  Physical Exam  Constitutional:       General: She is not in acute distress  Appearance: She is obese  She is not ill-appearing  HENT:      Head: Normocephalic and atraumatic        Mouth/Throat:      Mouth: Mucous membranes are moist       Pharynx: No oropharyngeal exudate or posterior oropharyngeal erythema  Eyes:      General: No scleral icterus  Extraocular Movements: Extraocular movements intact  Conjunctiva/sclera: Conjunctivae normal       Pupils: Pupils are equal, round, and reactive to light  Cardiovascular:      Rate and Rhythm: Normal rate  Pulses: Normal pulses  Heart sounds: No murmur heard  Pulmonary:      Breath sounds: No wheezing, rhonchi or rales  Abdominal:      General: There is no distension  Tenderness: There is no abdominal tenderness  Musculoskeletal:         General: No swelling or tenderness  Normal range of motion  Cervical back: No rigidity or tenderness  Skin:     General: Skin is warm  Coloration: Skin is not jaundiced  Findings: No bruising or erythema  Neurological:      Mental Status: She is alert and oriented to person, place, and time  Mental status is at baseline  Cranial Nerves: No cranial nerve deficit  Motor: No weakness  Psychiatric:         Mood and Affect: Mood normal  Mood is not depressed  Affect is not labile           Behavior: Behavior normal            Lab Results:   Lab Results   Component Value Date/Time    Hemoglobin A1C 4 8 04/29/2021 02:06 PM    WBC 8 28 01/24/2021 10:55 AM    WBC 12 58 (H) 08/07/2020 09:11 AM    Hemoglobin 11 7 01/24/2021 10:55 AM    Hemoglobin 14 6 08/07/2020 09:11 AM    Hematocrit 36 8 01/24/2021 10:55 AM    Hematocrit 42 7 08/07/2020 09:11 AM    MCV 82 01/24/2021 10:55 AM    MCV 84 08/07/2020 09:11 AM    Platelets 815 65/96/1450 10:55 AM    Platelets 969 22/22/4715 09:11 AM    BUN 10 01/24/2021 10:55 AM    BUN 10 08/07/2020 09:11 AM    Potassium 4 1 01/24/2021 10:55 AM    Potassium 4 0 08/07/2020 09:11 AM    Chloride 109 (H) 01/24/2021 10:55 AM    Chloride 109 (H) 08/07/2020 09:11 AM    CO2 25 01/24/2021 10:55 AM    CO2 24 08/07/2020 09:11 AM    Creatinine 0 65 01/24/2021 10:55 AM    Creatinine 0 67 08/07/2020 09:11 AM    AST 11 01/24/2021 10:55 AM    AST 13 08/07/2020 09:11 AM    ALT 19 01/24/2021 10:55 AM    ALT 29 08/07/2020 09:11 AM    Albumin 3 4 (L) 01/24/2021 10:55 AM    Albumin 3 5 08/07/2020 09:11 AM    HDL, Direct 55 04/29/2021 02:06 PM    HDL, Direct 50 01/24/2021 10:55 AM    HDL, Direct 57 08/07/2020 09:11 AM    Triglycerides 307 (H) 04/29/2021 02:06 PM    Triglycerides 200 (H) 01/24/2021 10:55 AM    Triglycerides 183 (H) 08/07/2020 09:11 AM           Portions of the record may have been created with voice recognition software  Occasional wrong word or "sound a like" substitutions may have occurred due to the inherent limitations of voice recognition software  Read the chart carefully and recognize, using context, where substitutions have occurred

## 2021-08-02 NOTE — PROGRESS NOTES
Jie Vega 45 y o  female MRN: [id number]    Encounter: [id number]      Impression:  -Obesity  -Sleep Apnea  Assessment: This is a 45y o -year-old female with pmhx of Obesity, Sleep apnea, asthma , Depression who presents to the clinic for weight management  Plan:    -Explained to the pt that we are not certified in obesity medicine  - Will refer patient to Dr Brianna Martin ( weight loss specialist)   -Recommended her to continue eating healthy and to exercise    -Encouraged pt to use her Cpap machine at home for sleep apnea  CC: Weight gain, obesity  referral provider: Dr Natalia Lantigua       HPI:    This is a 44 yo F with pmhx of Obesity, Sleep apnea, Asthma, Depression who presents to the clinic for a consult of weight management  pt reports that She has been trying to lose weight and despite all the efforts including a healthy diet and exercise, has been unable to loose weight, pt reports that has been gaining weight compared to previous years  Pt denies any symptoms related with DM, Hypothyroidism or Cushing disease, such as polydipsia, polyuria, Polyphagia, bruising in skin,   darkening skin, abnormal pad of fat between shoulders, muscle weakness, cold intolerance or any other related symptoms       As per documentation pt was following last year with Bariatric/dietitian , pt reports that had a discussion about possibility of bariatric surgery however pt was not a candidate that time because She needed to loose more weight  Pt states that 3 years ago She was following with a weight loss specialist who prescribed her Phentamine and Topiramate and that helped her and lost 60 pounds  Review of Systems   Constitutional: Negative for activity change, appetite change, diaphoresis and fatigue  HENT: Negative for congestion and facial swelling  Eyes: Negative for photophobia and visual disturbance  Respiratory: Negative for cough, choking and shortness of breath      Cardiovascular: Negative for chest pain and palpitations  Gastrointestinal: Negative for abdominal distention and abdominal pain  Endocrine: Negative for cold intolerance, heat intolerance, polydipsia, polyphagia and polyuria  Skin: Negative for color change and rash  Neurological: Negative for dizziness, tremors and weakness  Psychiatric/Behavioral: Negative for agitation and behavioral problems  All other systems reviewed and are negative        Historical Information   Past Medical History:   Diagnosis Date    Anxiety     Asthma     Depression     History of Clostridium difficile colitis 2018    x 2 episodes, after antibiotics    Hypercholesterolemia     Hyperlipemia     Morbid obesity ([address] )     Sleep apnea     c pap     Past Surgical History:   Procedure Laterality Date    SC CONIZATION CERVIX,LOOP ELECTRD N/A 2018    Procedure: BIOPSY LEEP CERVIX;  Surgeon: Moriah Machado, ;  [facility];  Service: Gynecology    REDUCTION MAMMAPLASTY      TONSILLECTOMY       Social History   Social History     Substance and Sexual Activity   Alcohol Use Yes    Alcohol/week: 0 0 standard drinks    Comment: occasional drink here and there     Social History     Substance and Sexual Activity   Drug Use No     Social History     Tobacco Use   Smoking Status Current Every Day Smoker    Packs/day: 0 50    Years: 18 00    Pack years: 9 00    Types: Cigarettes   Smokeless Tobacco Never Used   Tobacco Comment    is on the patch      Family History:   Family History   Problem Relation Age of Onset    Asthma Mother     Obesity Mother     Lung cancer Father     Hyperlipidemia Father     Bone cancer Father     Arthritis Maternal Grandmother     COPD Maternal Grandmother     Cancer Maternal Grandfather         Lung ca-    Liver cancer Paternal Grandfather     Cancer Paternal Grandfather         Liver ca-       Meds/Allergies   Current Outpatient Medications   Medication Sig Dispense Refill  albuterol (VENTOLIN HFA) 90 mcg/act inhaler Inhale 2 puffs every 6 (six) hours as needed for wheezing 18 g 0    ALPRAZolam (XANAX) 0 25 mg tablet as needed      cholecalciferol (VITAMIN D3) 1,000 units tablet Take by mouth daily        DULoxetine (CYMBALTA) 60 mg delayed release capsule Take 60 mg by mouth daily        fluticasone-vilanterol (BREO ELLIPTA) 100-25 mcg/inh inhaler Inhale 1 puff daily Rinse mouth after use  2 Inhaler 11    PREVIDENT 5000 SENSITIVE 1 1-5 % PSTE   3    rosuvastatin (CRESTOR) 20 MG tablet       SODIUM FLUORIDE, DENTAL GEL, 1 1 % GEL SF 5000 Plus 1 1 % dental cream      SPIRIVA RESPIMAT 1 25 MCG/ACT AERS inhaler       valsartan-hydrochlorothiazide (DIOVAN-HCT) 80-12 5 MG per tablet valsartan 80 mg-hydrochlorothiazide 12 5 mg tablet   Take 1 tablet every day by oral route in the morning   vitamin E, tocopherol, 400 units capsule vitamin E 400 unit capsule       No current facility-administered medications for this visit  No Known Allergies    Objective   Vitals: Blood pressure 104/82, pulse 86, height 5' 3" (1 6 m), weight 114 kg (252 lb 6 oz), not currently breastfeeding  Physical Exam  Constitutional:       General: She is not in acute distress  Appearance: She is obese  She is not ill-appearing  HENT:      Head: Normocephalic and atraumatic  Mouth/Throat:      Mouth: Mucous membranes are moist       Pharynx: No oropharyngeal exudate or posterior oropharyngeal erythema  Eyes:      General: No scleral icterus  Extraocular Movements: Extraocular movements intact  Conjunctiva/sclera: Conjunctivae normal       Pupils: Pupils are equal, round, and reactive to light  Cardiovascular:      Rate and Rhythm: Normal rate  Pulses: Normal pulses  Heart sounds: No murmur heard  Pulmonary:      Breath sounds: No wheezing, rhonchi or rales  Abdominal:      General: There is no distension  Tenderness: There is no abdominal tenderness  Musculoskeletal:         General: No swelling or tenderness  Normal range of motion  Cervical back: No rigidity or tenderness  Skin:     General: Skin is warm  Coloration: Skin is not jaundiced  Findings: No bruising or erythema  Neurological:      Mental Status: She is alert and oriented to person, place, and time  Mental status is at baseline  Cranial Nerves: No cranial nerve deficit  Motor: No weakness  Psychiatric:         Mood and Affect: Mood normal  Mood is not depressed  Affect is not labile  Behavior: Behavior normal            Lab Results:   Lab Results   Component Value Date/Time    Hemoglobin A1C 4 8 04/29/2021 02:06 PM    WBC 8 28 01/24/2021 10:55 AM    WBC 12 58 (H) 08/07/2020 09:11 AM    Hemoglobin 11 7 01/24/2021 10:55 AM    Hemoglobin 14 6 08/07/2020 09:11 AM    Hematocrit 36 8 01/24/2021 10:55 AM    Hematocrit 42 7 08/07/2020 09:11 AM    MCV 82 01/24/2021 10:55 AM    MCV 84 08/07/2020 09:11 AM    Platelets 746 28/02/2453 10:55 AM    Platelets 935 54/14/4034 09:11 AM    BUN 10 01/24/2021 10:55 AM    BUN 10 08/07/2020 09:11 AM    Potassium 4 1 01/24/2021 10:55 AM    Potassium 4 0 08/07/2020 09:11 AM    Chloride 109 (H) 01/24/2021 10:55 AM    Chloride 109 (H) 08/07/2020 09:11 AM    CO2 25 01/24/2021 10:55 AM    CO2 24 08/07/2020 09:11 AM    Creatinine 0 65 01/24/2021 10:55 AM    Creatinine 0 67 08/07/2020 09:11 AM    AST 11 01/24/2021 10:55 AM    AST 13 08/07/2020 09:11 AM    ALT 19 01/24/2021 10:55 AM    ALT 29 08/07/2020 09:11 AM    Albumin 3 4 (L) 01/24/2021 10:55 AM    Albumin 3 5 08/07/2020 09:11 AM    HDL, Direct 55 04/29/2021 02:06 PM    HDL, Direct 50 01/24/2021 10:55 AM    HDL, Direct 57 08/07/2020 09:11 AM    Triglycerides 307 (H) 04/29/2021 02:06 PM    Triglycerides 200 (H) 01/24/2021 10:55 AM    Triglycerides 183 (H) 08/07/2020 09:11 AM           Portions of the record may have been created with voice recognition software   Occasional wrong word or "sound a like" substitutions may have occurred due to the inherent limitations of voice recognition software  Read the chart carefully and recognize, using context, where substitutions have occurred

## 2021-08-03 ENCOUNTER — HOSPITAL ENCOUNTER (OUTPATIENT)
Dept: RADIOLOGY | Facility: HOSPITAL | Age: 41
Discharge: HOME | End: 2021-08-03
Payer: COMMERCIAL

## 2021-08-03 ENCOUNTER — OFFICE VISIT (OUTPATIENT)
Dept: OBGYN CLINIC | Facility: HOSPITAL | Age: 41
End: 2021-08-03
Payer: COMMERCIAL

## 2021-08-03 VITALS
HEART RATE: 93 BPM | BODY MASS INDEX: 46.26 KG/M2 | HEIGHT: 62 IN | SYSTOLIC BLOOD PRESSURE: 128 MMHG | DIASTOLIC BLOOD PRESSURE: 93 MMHG | WEIGHT: 251.4 LBS

## 2021-08-03 DIAGNOSIS — M25.572 PAIN, JOINT, ANKLE AND FOOT, LEFT: Primary | ICD-10-CM

## 2021-08-03 DIAGNOSIS — M25.472 LEFT ANKLE SWELLING: ICD-10-CM

## 2021-08-03 DIAGNOSIS — M25.572 PAIN, JOINT, ANKLE AND FOOT, LEFT: ICD-10-CM

## 2021-08-03 PROCEDURE — 99213 OFFICE O/P EST LOW 20 MIN: CPT | Performed by: PHYSICIAN ASSISTANT

## 2021-08-03 PROCEDURE — 73610 X-RAY EXAM OF ANKLE: CPT

## 2021-08-03 NOTE — PROGRESS NOTES
Assessment/Plan   Diagnoses and all orders for this visit:    Pain, joint, ankle and foot, left    Left ankle swelling  - MRI ankle/heel left  wo contrast; attn peroneal tendons  - Ice as needed  - Will hold off on DME until after MRI  - Follow up with Dr Bryce Gaston or [facility] after MRI          Subjective   Patient ID: Magaly Peres is a 45 y o  female  Vitals:    08/03/21 1325   BP: 128/93   Pulse: 80     39yo female comes in for an evaluation of her left ankle  She is a patient of Dr Bryce Gaston for left plantar fasciitis, but had a subsequent injury since seeing him last   She was injured in January when she tripped, fell, and heard her ankle pop  It is now 8 months later and she is still having pain with lateral ankle swelling  She has tried ice, compression stockings, NSAIDs, and PT  The pain is dull in character, mild in severity, pain does not radiate and is not associated with numbness          The following portions of the patient's history were reviewed and updated as appropriate: allergies, current medications, past family history, past medical history, past social history, past surgical history and problem list     Review of Systems  Ortho Exam  Past Medical History:   Diagnosis Date    Anxiety     Asthma     Depression     History of Clostridium difficile colitis 2018    x 2 episodes, after antibiotics    Hypercholesterolemia     Hyperlipemia     Morbid obesity ([address] )     Sleep apnea     c pap     Past Surgical History:   Procedure Laterality Date    HI CONIZATION CERVIX,LOOP ELECTRD N/A 9/27/2018    Procedure: BIOPSY LEEP CERVIX;  Surgeon: Amber Camarena DO;  [facility];  Service: Gynecology    REDUCTION MAMMAPLASTY  1999    TONSILLECTOMY       Family History   Problem Relation Age of Onset    Asthma Mother     Obesity Mother     Lung cancer Father     Hyperlipidemia Father     Bone cancer Father     Arthritis Maternal Grandmother     COPD Maternal Grandmother     Cancer Maternal Grandfather         Lung ca-    Liver cancer Paternal Grandfather     Cancer Paternal Grandfather         Liver ca-     Social History     Occupational History    Occupation: [employer], IR tech   Tobacco Use    Smoking status: Current Every Day Smoker     Packs/day: 0 50     Years: 18 00     Pack years: 9 00     Types: Cigarettes    Smokeless tobacco: Never Used    Tobacco comment: is on the patch    Vaping Use    Vaping Use: Never used   Substance and Sexual Activity    Alcohol use: Yes     Alcohol/week: 0 0 standard drinks     Comment: occasional drink here and there    Drug use: No    Sexual activity: Not Currently     Partners: Male     Birth control/protection: Abstinence       Review of Systems   Constitutional: Negative  HENT: Negative  Eyes: Negative  Respiratory: Negative  Cardiovascular: Negative  Gastrointestinal: Negative  Endocrine: Negative  Genitourinary: Negative  Musculoskeletal: As below      Allergic/Immunologic: Negative  Neurological: Negative  Hematological: Negative  Psychiatric/Behavioral: Negative  Objective   Physical Exam        I have personally reviewed pertinent films in PACS and my interpretation is no acute displaced fractue        · Constitutional: Awake, Alert, Oriented  · Eyes: EOMI  · Psych: Mood and affect appropriate  · Heart: regular rate and rhythm  · Lungs: No audible wheezing  · Abdomen: soft  · Lymph: no lymphedema             left ankle:  - Appearance   Swelling: moderate laterally, no discoloration, no deformity, no ecchymosis and no erythema  - Palpation   + tenderness over the peroneal tendons and Otherwise, no tenderness about the foot or ankle   - ROM   Full, pain-free, active ROM  - Motor   normal 5/5 in all planes  - NVI distally

## 2021-08-12 ENCOUNTER — HOSPITAL ENCOUNTER (OUTPATIENT)
Dept: RADIOLOGY | Facility: HOSPITAL | Age: 41
Discharge: HOME | End: 2021-08-12
Payer: COMMERCIAL

## 2021-08-12 DIAGNOSIS — M25.472 LEFT ANKLE SWELLING: ICD-10-CM

## 2021-08-12 DIAGNOSIS — M25.572 PAIN, JOINT, ANKLE AND FOOT, LEFT: ICD-10-CM

## 2021-08-12 PROCEDURE — G1004 CDSM NDSC: HCPCS

## 2021-08-12 PROCEDURE — 73721 MRI JNT OF LWR EXTRE W/O DYE: CPT

## 2021-08-19 ENCOUNTER — OFFICE VISIT (OUTPATIENT)
Dept: OBGYN CLINIC | Facility: CLINIC | Age: 41
End: 2021-08-19
Payer: COMMERCIAL

## 2021-08-19 VITALS
WEIGHT: 251 LBS | DIASTOLIC BLOOD PRESSURE: 80 MMHG | SYSTOLIC BLOOD PRESSURE: 125 MMHG | HEIGHT: 63 IN | BODY MASS INDEX: 44.47 KG/M2

## 2021-08-19 DIAGNOSIS — M76.72 PERONEAL TENDINITIS OF LEFT LOWER EXTREMITY: Primary | ICD-10-CM

## 2021-08-19 PROCEDURE — 99213 OFFICE O/P EST LOW 20 MIN: CPT | Performed by: ORTHOPAEDIC SURGERY

## 2021-08-19 NOTE — PROGRESS NOTES
BRANDI Mercado  Attending, Orthopaedic Surgery  [facility] [address] [facility]      ORTHOPAEDIC FOOT AND ANKLE CLINIC VISIT     Assessment:     Encounter Diagnosis   Name Primary?  Peroneal tendinitis of left lower extremity Yes            Plan:   · The patient verbalized understanding of exam findings and treatment plan  We engaged in the shared decision-making process and treatment options were discussed at length with the patient  Surgical and conservative management discussed today along with risks and benefits  · PT prescribed for peroneal tendonitis  · Normal MRI  · Compression stocking for swelling control  · Discussed importance of supportive shoe wear  · Follow-up PRN      History of Present Illness:   Chief Complaint:   Chief Complaint   Patient presents with    Left Ankle - Follow-up     Ginny Eng is a 45 y o  female who is being seen in follow-up for left ankle pain  Patient reports she had an inversion injury in January 2021 which was untreated  She has had persistent intermittent pain along the peroneal tendons with associated swelling  We previously saw her for plantar fasciitis, which she reports has improved  Pain/symptom timing:  Worse during the day when active  Pain/symptom context:  Worse with activites and work  Pain/symptom modifying factors:  Rest makes better, activities make worse  Pain/symptom associated signs/symptoms: none    Prior treatment   · NSAIDsYes   · Injections No   · Bracing/Orthotics No    · Physical Therapy No     Orthopedic Surgical History:   See below    Past Medical, Surgical and Social History:  Past Medical History:  has a past medical history of Anxiety, Asthma, Depression, History of Clostridium difficile colitis (2018), Hypercholesterolemia, Hyperlipemia, Morbid obesity ([address] ), and Sleep apnea  Problem List: does not have any pertinent problems on file    Past Surgical History:  has a past surgical history that includes Tonsillectomy; Reduction mammaplasty (1999); and pr conization cervix,loop electrd (N/A, 9/27/2018)  Family History: family history includes Arthritis in her maternal grandmother; Asthma in her mother; Bone cancer in her father; COPD in her maternal grandmother; Cancer in her maternal grandfather and paternal grandfather; Hyperlipidemia in her father; Liver cancer in her paternal grandfather; Lung cancer in her father; Obesity in her mother  Social History:  reports that she has been smoking cigarettes  She has a 9 00 pack-year smoking history  She has never used smokeless tobacco  She reports current alcohol use  She reports that she does not use drugs  Current Medications: has a current medication list which includes the following prescription(s): albuterol, alprazolam, cholecalciferol, duloxetine, fluticasone-vilanterol, prevident 5000 sensitive, rosuvastatin, sodium fluoride (dental gel), spiriva respimat, valsartan-hydrochlorothiazide, and vitamin e (tocopherol)  Allergies: has No Known Allergies  Review of Systems:  General- denies fever/chills  HEENT- denies hearing loss or sore throat  Eyes- denies eye pain or visual disturbances, denies red eyes  Respiratory- denies cough or SOB  Cardio- denies chest pain or palpitations  GI- denies abdominal pain  Endocrine- denies urinary frequency  Urinary- denies pain with urination  Musculoskeletal- Negative except noted above  Skin- denies rashes or wounds  Neurological- denies dizziness or headache  Psychiatric- denies anxiety or difficulty concentrating    Physical Exam:   /80   Ht 5' 3" (1 6 m)   Wt 114 kg (251 lb)   BMI 44 46 kg/m²   General/Constitutional: No apparent distress: well-nourished and well developed    Eyes: normal ocular motion  Lymphatic: No appreciable lymphadenopathy  Respiratory: Non-labored breathing  Vascular: No edema, swelling or tenderness, except as noted in detailed exam   Integumentary: No impressive skin lesions present, except as noted in detailed exam   Neuro: No ataxia or tremors noted  Psych: Normal mood and affect, oriented to person, place and time  Appropriate affect  Musculoskeletal: Normal, except as noted in detailed exam and in HPI  Examination    Left    Gait Normal   Musculoskeletal Tender to palpation at peroneal tendons    Skin Normal       Nails Normal    Range of Motion  20 degrees dorsiflexion, 40 degrees plantarflexion  Subtalar motion: normal    Stability Stable    Muscle Strength 5/5 tibialis anterior  5/5 gastrocnemius-soleus  5/5 posterior tibialis  5/5 peroneal/eversion strength  5/5 EHL  5/5 FHL    Neurologic Normal    Sensation  Intact to light touch throughout sural, saphenous, superficial peroneal, deep peroneal and medial/lateral plantar nerve distributions  [place] 5 07 filament (10g) testing  deferred  Cardiovascular Brisk capillary refill < 2 seconds,intact DP and PT pulses    Special Tests None      Imaging Studies:   MRI available for review of left ankle which demonstrates fluid in the peroneal sheath, consistent with peroneal tendinitis  No evidence of peroneal tendon tear noted Reviewed by me personally  Scribe Attestation    I,:  Misty Mahoney PA-C am acting as a scribe while in the presence of the attending physician :       I,:  Mark Craig MD personally performed the services described in this documentation    as scribed in my presence :             Laverda Curb Lachman, MD  Foot & Ankle Surgery   Department [address]      I personally performed the service  Laverda Curb Lachman, MD

## 2021-08-19 NOTE — PATIENT INSTRUCTIONS
Begin PT at this time    Rest, ice, elevation, and compression stocking    [employer] [facility], [employer], [employer] are good brands but I recommend going to a dedicate shoe store (not [employer] or [employer] ) At these types of stores, they have experts that can fit you for shoes appropriate for your foot problem  [employer]  [address], RAC[facility]LL[facility] Center[facility]andra Morton [address], [address] [facility] [facility],[address] [place], [address] [facility]    [employer]   [address]   [facility] [address], [address] [facility] [place]  [address] [facility], [place], [facility]     [employer]  [address], Nannette Rizvi, [address]    [place] [address]    [employer]  [address], [place]

## 2021-09-07 ENCOUNTER — EVALUATION (OUTPATIENT)
Dept: PHYSICAL THERAPY | Facility: REHABILITATION | Age: 41
End: 2021-09-07
Payer: COMMERCIAL

## 2021-09-07 DIAGNOSIS — M25.572 ACUTE LEFT ANKLE PAIN: Primary | ICD-10-CM

## 2021-09-07 DIAGNOSIS — M76.72 PERONEAL TENDINITIS OF LEFT LOWER EXTREMITY: ICD-10-CM

## 2021-09-07 PROCEDURE — 97112 NEUROMUSCULAR REEDUCATION: CPT | Performed by: PHYSICAL THERAPIST

## 2021-09-07 PROCEDURE — 97161 PT EVAL LOW COMPLEX 20 MIN: CPT | Performed by: PHYSICAL THERAPIST

## 2021-09-07 NOTE — PROGRESS NOTES
PT Evaluation     Today's date: 2021  Patient name: Jamal Bliss  : 1980  MRN: [id number]  Referring provider: Stephenie Niño  Dx:   Encounter Diagnosis     ICD-10-CM    1  Acute left ankle pain  M25 572    2  Peroneal tendinitis of left lower extremity  M76 72 Ambulatory referral to Physical Therapy       Start Time: 1700  Stop Time: 1737  Total time in clinic (min): 37 minutes    Assessment  Assessment details: Jamal Bliss is a 45 y o  female presenting with chronic L ankle and heel pain consistent with lateral ankle sprain and chronic insertional plantar fasciopathy   Primary impairments include edema, weakness, and intermittent pain  Will benefit from skilled PT interventions for improved ROM, MMT, and balance  Impairments: abnormal coordination, abnormal muscle firing, abnormal or restricted ROM, abnormal movement, activity intolerance, impaired balance, impaired physical strength, lacks appropriate home exercise program, pain with function, poor posture  and poor body mechanics  Functional limitations: walking, standing  Symptom irritability: lowUnderstanding of Dx/Px/POC: excellent  Goals    Short Term Goals: In 2 weeks, the patient will:  1  5/5 ankle mmt  2  Supervision with HEP for self care    Long Term Goals: In 4 weeks, the patient will:  1  SLS to 30"  (B)  2  FOTO to greater than predicted value  3   Independent with HEP for selfcare      Plan  Patient would benefit from: skilled physical therapy  Planned therapy interventions: joint mobilization, manual therapy, massage, [place] taping, neuromuscular re-education, patient education, postural training, self care, strengthening, stretching, therapeutic activities, therapeutic exercise, therapeutic training, graded exercise, graded activity, home exercise program, flexibility, functional ROM exercises, balance and activity modification  Frequency: 1x week  Duration in weeks: 4  Treatment plan discussed with: patient        Subjective  Pain Location: lateral ankle  Pain Intensity: 0-5/10  DOI/SYDNEY: Jan 2021, twisted ankle, heard pop/tear,   Provoked by: work, standing, walking, lifting  Eased Positions: RICE  Constitutional S/S: denies   Goals: reduce swelling, improve function  PLOF: intermittent plantar fasciitis    Objective    Standing Posture & Lower Extremity Alignment:  Structure/Joint         Rearfoot x Valgus  Neutral  Varus   Forefoot x Abducted  Neutral     Arch x Pes Planus  Neutral  Pes Cavus     Range of Motion: Goniometric revealed the following findings (in degrees)  Joint Motion Right: 9/7/2021 Left: 9/7/2021   Ankle EV WNL WNl   Ankle IV WNL WNL   Ankle Dorsiflexion WNL WNL   Ankle Plantarflexion WNL WNL     Strength: MMT revealed the following findings    Joint Motion Right: 9/7/2021 Left: 9/7/2021   Ankle EV 4/5 5/5   Ankle IV 4/5 5/5   Ankle Plantarflexion 4/5 5/5   Ankle Dorsiflexion 4/5 5/5     Additional Assessments:  Palpation: TTP calc tub  Observation: pleasant overweight female  Gait Pattern: WFL  Balance: SLS on R   Joint Mobility: hypermobile midfoot    Special Tests:  Test (Structure evaluated) Date: 9/7/2021  ( P / N )   Anteior Drawer P   Talar tilt N          Precautions: none      Pertinent Findings and Outcome Measures:                                                                                                                                                                         Test / Measure  9/7/2021      FOTO 84      Ankle mmt  4/5      SLS on L 5"          Manuals 9/7                                                                Neuro Re-Ed             Nose lean 5" x15            HR  2x15            Navicular strap 5'            Deficit HR 2x15            4 way ankle                                       Ther Ex                                                                                                                     Ther Activity Gait Training                                       Modalities

## 2021-09-16 ENCOUNTER — OFFICE VISIT (OUTPATIENT)
Dept: PHYSICAL THERAPY | Facility: REHABILITATION | Age: 41
End: 2021-09-16
Payer: COMMERCIAL

## 2021-09-16 DIAGNOSIS — M76.72 PERONEAL TENDINITIS OF LEFT LOWER EXTREMITY: Primary | ICD-10-CM

## 2021-09-16 DIAGNOSIS — M25.572 ACUTE LEFT ANKLE PAIN: ICD-10-CM

## 2021-09-16 PROCEDURE — 97112 NEUROMUSCULAR REEDUCATION: CPT

## 2021-09-16 PROCEDURE — 97110 THERAPEUTIC EXERCISES: CPT

## 2021-09-16 NOTE — PROGRESS NOTES
Daily Note     Today's date: 2021  Patient name: Shea Pena  : 1980  MRN: [id number]  Referring provider: Gloria Zhong  Dx:   Encounter Diagnosis     ICD-10-CM    1  Peroneal tendinitis of left lower extremity  M76 72    2  Acute left ankle pain  M25 572                   Subjective: Pt reports no pain, but states she gets random and notable swelling on L side  Objective: See treatment diary below      Assessment: Tolerated treatment well  Patient would benefit from continued PT   Pt  able to complete all exercises with no increase in pain during or after session  Pt able to progress exercises this session without complaint  Pt notes some relief with taping  Pt  1:1 with PTA for entirety  Plan: Continue per plan of care        Precautions: none      Pertinent Findings and Outcome Measures:                                                                                                                                                                         Test / Measure  2021      FOTO 84      Ankle mmt  4/5      SLS on L 5"          Manuals                                                                Neuro Re-Ed             Nose lean 5" x15            HR  2x15            Navicular strap 5'            Deficit HR 2x15            4 way ankle  20x ea gtb           Ecc HR  20x 4" step           Rocker board  20x ea a/p s/s           Tandem Airex  5 laps           Sidestepping Airex  5 laps           Ther Ex             RB/NS  10' RB                                                                                                      Ther Activity                                       Gait Training                                       Modalities

## 2021-09-30 ENCOUNTER — OFFICE VISIT (OUTPATIENT)
Dept: PHYSICAL THERAPY | Facility: REHABILITATION | Age: 41
End: 2021-09-30
Payer: COMMERCIAL

## 2021-09-30 DIAGNOSIS — M25.572 ACUTE LEFT ANKLE PAIN: ICD-10-CM

## 2021-09-30 DIAGNOSIS — M76.72 PERONEAL TENDINITIS OF LEFT LOWER EXTREMITY: Primary | ICD-10-CM

## 2021-09-30 PROCEDURE — 97112 NEUROMUSCULAR REEDUCATION: CPT | Performed by: PHYSICAL THERAPIST

## 2021-09-30 NOTE — PROGRESS NOTES
Daily Note     Today's date: 2021  Patient name: Sis Baron  : 1980  MRN: [id number]  Referring provider: Steve Barksdale PA-C  Dx: No diagnosis found  Subjective: Pt reports decreased pain, improved fucntion, and reduced swelling  Objective: See treatment diary below      Assessment: Tolerated treatment well  Patient would benefit from continued PT No swelling post standing ~5 hours without any swelling  Pt has met all goals and will d/c to HEP  Plan: D/C to Hep        Precautions: none      Pertinent Findings and Outcome Measures:                                                                                                                                                                         Test / Measure  2021   FOTO 84 99   Ankle mmt  4/5 5/5   SLS on L 5" 30"       Manuals                        Neuro Re-Ed             Nose lean 5" x15  5" x15          HR  2x15            Navicular strap 5'            Deficit HR 2x15  2x15          4 way ankle  20x ea gtb 20x ea gtb          Ecc HR  20x 4" step 20x 4" step          Rocker board  20x ea a/p s/s           Tandem Airex  5 laps 5 laps          Sidestepping Airex  5 laps 5 laps          SLS Foam   5x30"          SLS Foam EC   5x30"                       Ther Ex             RB/NS  10' RB                                                                                                      Ther Activity                                       Gait Training                                       Modalities

## 2021-10-12 ENCOUNTER — HOSPITAL ENCOUNTER (EMERGENCY)
Facility: HOSPITAL | Age: 41
Discharge: HOME | End: 2021-10-12
Attending: EMERGENCY MEDICINE
Payer: OTHER MISCELLANEOUS

## 2021-10-12 VITALS
HEART RATE: 77 BPM | TEMPERATURE: 97.6 F | OXYGEN SATURATION: 99 % | RESPIRATION RATE: 18 BRPM | SYSTOLIC BLOOD PRESSURE: 146 MMHG | DIASTOLIC BLOOD PRESSURE: 96 MMHG

## 2021-10-12 DIAGNOSIS — Z77.21 EXPOSURE TO BLOOD OR BODY FLUID: Primary | ICD-10-CM

## 2021-10-12 LAB
ALT SERPL W P-5'-P-CCNC: 19 U/L (ref 12–78)
HBV SURFACE AB SER-ACNC: >1000 MIU/ML
HBV SURFACE AG SER QL: NORMAL
HCV AB SER QL: NORMAL

## 2021-10-12 PROCEDURE — 87389 HIV-1 AG W/HIV-1&-2 AB AG IA: CPT | Performed by: PHYSICIAN ASSISTANT

## 2021-10-12 PROCEDURE — 99282 EMERGENCY DEPT VISIT SF MDM: CPT | Performed by: PHYSICIAN ASSISTANT

## 2021-10-12 PROCEDURE — 84460 ALANINE AMINO (ALT) (SGPT): CPT | Performed by: PHYSICIAN ASSISTANT

## 2021-10-12 PROCEDURE — 87340 HEPATITIS B SURFACE AG IA: CPT | Performed by: PHYSICIAN ASSISTANT

## 2021-10-12 PROCEDURE — 86706 HEP B SURFACE ANTIBODY: CPT | Performed by: PHYSICIAN ASSISTANT

## 2021-10-12 PROCEDURE — 86803 HEPATITIS C AB TEST: CPT | Performed by: PHYSICIAN ASSISTANT

## 2021-10-12 PROCEDURE — 99283 EMERGENCY DEPT VISIT LOW MDM: CPT

## 2021-10-12 PROCEDURE — 36415 COLL VENOUS BLD VENIPUNCTURE: CPT | Performed by: PHYSICIAN ASSISTANT

## 2021-10-13 LAB — HIV 1+2 AB+HIV1 P24 AG SERPL QL IA: NORMAL

## 2021-12-16 ENCOUNTER — IMMUNIZATIONS (OUTPATIENT)
Dept: FAMILY MEDICINE CLINIC | Facility: HOSPITAL | Age: 41
End: 2021-12-16

## 2021-12-16 DIAGNOSIS — Z23 ENCOUNTER FOR IMMUNIZATION: Primary | ICD-10-CM

## 2021-12-16 PROCEDURE — 0001A COVID-19 PFIZER VACC 0.3 ML: CPT

## 2021-12-16 PROCEDURE — 91300 COVID-19 PFIZER VACC 0.3 ML: CPT

## 2022-01-28 ENCOUNTER — TELEPHONE (OUTPATIENT)
Dept: DERMATOLOGY | Facility: CLINIC | Age: 42
End: 2022-01-28

## 2022-01-28 NOTE — TELEPHONE ENCOUNTER
Pt called in regards of scheduling a np appt  She did request to be placed on our wait list, advised pt to give us a call back in 2 weeks or so to schedule

## 2022-02-01 ENCOUNTER — HOSPITAL ENCOUNTER (OUTPATIENT)
Dept: RADIOLOGY | Facility: HOSPITAL | Age: 42
Discharge: HOME | End: 2022-02-01
Attending: PODIATRIST
Payer: COMMERCIAL

## 2022-02-01 ENCOUNTER — APPOINTMENT (OUTPATIENT)
Dept: LAB | Facility: HOSPITAL | Age: 42
End: 2022-02-01
Payer: COMMERCIAL

## 2022-02-01 DIAGNOSIS — E55.9 VITAMIN D DEFICIENCY: ICD-10-CM

## 2022-02-01 DIAGNOSIS — Z00.00 ENCOUNTER FOR GENERAL ADULT MEDICAL EXAMINATION WITHOUT ABNORMAL FINDINGS: ICD-10-CM

## 2022-02-01 DIAGNOSIS — M25.571 PAIN AND SWELLING OF RIGHT ANKLE: ICD-10-CM

## 2022-02-01 DIAGNOSIS — Z79.899 HISTORY OF ONGOING TREATMENT WITH HIGH-RISK MEDICATION: ICD-10-CM

## 2022-02-01 DIAGNOSIS — M25.571 PAIN AND SWELLING OF RIGHT ANKLE: Primary | ICD-10-CM

## 2022-02-01 DIAGNOSIS — E78.5 HYPERLIPIDEMIA, UNSPECIFIED HYPERLIPIDEMIA TYPE: ICD-10-CM

## 2022-02-01 DIAGNOSIS — M25.471 PAIN AND SWELLING OF RIGHT ANKLE: ICD-10-CM

## 2022-02-01 DIAGNOSIS — M25.471 PAIN AND SWELLING OF RIGHT ANKLE: Primary | ICD-10-CM

## 2022-02-01 LAB
25(OH)D3 SERPL-MCNC: 60.2 NG/ML (ref 30–100)
ALBUMIN SERPL BCP-MCNC: 3.4 G/DL (ref 3.5–5)
ALP SERPL-CCNC: 101 U/L (ref 46–116)
ALT SERPL W P-5'-P-CCNC: 19 U/L (ref 12–78)
ANION GAP SERPL CALCULATED.3IONS-SCNC: 4 MMOL/L (ref 4–13)
AST SERPL W P-5'-P-CCNC: 13 U/L (ref 5–45)
BASOPHILS # BLD AUTO: 0.05 THOUSANDS/ΜL (ref 0–0.1)
BASOPHILS NFR BLD AUTO: 1 % (ref 0–1)
BILIRUB SERPL-MCNC: 0.36 MG/DL (ref 0.2–1)
BUN SERPL-MCNC: 13 MG/DL (ref 5–25)
CALCIUM ALBUM COR SERPL-MCNC: 9.6 MG/DL (ref 8.3–10.1)
CALCIUM SERPL-MCNC: 9.1 MG/DL (ref 8.3–10.1)
CHLORIDE SERPL-SCNC: 106 MMOL/L (ref 100–108)
CHOLEST SERPL-MCNC: 172 MG/DL
CO2 SERPL-SCNC: 26 MMOL/L (ref 21–32)
CREAT SERPL-MCNC: 0.77 MG/DL (ref 0.6–1.3)
EOSINOPHIL # BLD AUTO: 0.26 THOUSAND/ΜL (ref 0–0.61)
EOSINOPHIL NFR BLD AUTO: 4 % (ref 0–6)
ERYTHROCYTE [DISTWIDTH] IN BLOOD BY AUTOMATED COUNT: 15.5 % (ref 11.6–15.1)
GFR SERPL CREATININE-BSD FRML MDRD: 96 ML/MIN/1.73SQ M
GLUCOSE P FAST SERPL-MCNC: 94 MG/DL (ref 65–99)
HCT VFR BLD AUTO: 40.3 % (ref 34.8–46.1)
HDLC SERPL-MCNC: 43 MG/DL
HGB BLD-MCNC: 12.9 G/DL (ref 11.5–15.4)
IMM GRANULOCYTES # BLD AUTO: 0.02 THOUSAND/UL (ref 0–0.2)
IMM GRANULOCYTES NFR BLD AUTO: 0 % (ref 0–2)
LDLC SERPL CALC-MCNC: 86 MG/DL (ref 0–100)
LDLC SERPL DIRECT ASSAY-MCNC: 81 MG/DL (ref 0–100)
LYMPHOCYTES # BLD AUTO: 2.12 THOUSANDS/ΜL (ref 0.6–4.47)
LYMPHOCYTES NFR BLD AUTO: 31 % (ref 14–44)
MCH RBC QN AUTO: 24.8 PG (ref 26.8–34.3)
MCHC RBC AUTO-ENTMCNC: 32 G/DL (ref 31.4–37.4)
MCV RBC AUTO: 77 FL (ref 82–98)
MONOCYTES # BLD AUTO: 0.48 THOUSAND/ΜL (ref 0.17–1.22)
MONOCYTES NFR BLD AUTO: 7 % (ref 4–12)
NEUTROPHILS # BLD AUTO: 3.96 THOUSANDS/ΜL (ref 1.85–7.62)
NEUTS SEG NFR BLD AUTO: 57 % (ref 43–75)
NONHDLC SERPL-MCNC: 129 MG/DL
NRBC BLD AUTO-RTO: 0 /100 WBCS
PLATELET # BLD AUTO: 263 THOUSANDS/UL (ref 149–390)
PMV BLD AUTO: 9.6 FL (ref 8.9–12.7)
POTASSIUM SERPL-SCNC: 3.8 MMOL/L (ref 3.5–5.3)
PROT SERPL-MCNC: 7.1 G/DL (ref 6.4–8.2)
RBC # BLD AUTO: 5.21 MILLION/UL (ref 3.81–5.12)
SODIUM SERPL-SCNC: 136 MMOL/L (ref 136–145)
TRIGL SERPL-MCNC: 217 MG/DL
WBC # BLD AUTO: 6.89 THOUSAND/UL (ref 4.31–10.16)

## 2022-02-01 PROCEDURE — 73610 X-RAY EXAM OF ANKLE: CPT

## 2022-02-01 PROCEDURE — 80061 LIPID PANEL: CPT

## 2022-02-01 PROCEDURE — 83721 ASSAY OF BLOOD LIPOPROTEIN: CPT

## 2022-02-01 PROCEDURE — 85025 COMPLETE CBC W/AUTO DIFF WBC: CPT

## 2022-02-01 PROCEDURE — 82306 VITAMIN D 25 HYDROXY: CPT

## 2022-02-01 PROCEDURE — 36415 COLL VENOUS BLD VENIPUNCTURE: CPT

## 2022-02-01 PROCEDURE — 80053 COMPREHEN METABOLIC PANEL: CPT

## 2022-02-05 ENCOUNTER — HOSPITAL ENCOUNTER (OUTPATIENT)
Dept: RADIOLOGY | Facility: HOSPITAL | Age: 42
Discharge: HOME | End: 2022-02-05
Attending: PODIATRIST
Payer: COMMERCIAL

## 2022-02-05 DIAGNOSIS — M25.571 PAIN AND SWELLING OF RIGHT ANKLE: ICD-10-CM

## 2022-02-05 DIAGNOSIS — M25.471 PAIN AND SWELLING OF RIGHT ANKLE: ICD-10-CM

## 2022-02-05 PROCEDURE — A9585 GADOBUTROL INJECTION: HCPCS | Performed by: PODIATRIST

## 2022-02-05 PROCEDURE — G1004 CDSM NDSC: HCPCS

## 2022-02-05 PROCEDURE — 73723 MRI JOINT LWR EXTR W/O&W/DYE: CPT

## 2022-02-05 RX ADMIN — GADOBUTROL 10 ML: 604.72 INJECTION INTRAVENOUS at 11:32

## 2022-02-08 ENCOUNTER — HOSPITAL ENCOUNTER (OUTPATIENT)
Dept: RADIOLOGY | Age: 42
Discharge: HOME | End: 2022-02-08
Payer: COMMERCIAL

## 2022-02-08 VITALS — HEIGHT: 63 IN | BODY MASS INDEX: 40.75 KG/M2 | WEIGHT: 230 LBS

## 2022-02-08 DIAGNOSIS — Z12.31 ENCOUNTER FOR MAMMOGRAM TO ESTABLISH BASELINE MAMMOGRAM: ICD-10-CM

## 2022-02-08 PROCEDURE — 77063 BREAST TOMOSYNTHESIS BI: CPT

## 2022-02-08 PROCEDURE — 77067 SCR MAMMO BI INCL CAD: CPT

## 2022-02-24 ENCOUNTER — HOSPITAL ENCOUNTER (EMERGENCY)
Facility: HOSPITAL | Age: 42
Discharge: HOME | End: 2022-02-24
Attending: EMERGENCY MEDICINE | Admitting: EMERGENCY MEDICINE
Payer: OTHER MISCELLANEOUS

## 2022-02-24 VITALS
TEMPERATURE: 97.5 F | RESPIRATION RATE: 16 BRPM | DIASTOLIC BLOOD PRESSURE: 75 MMHG | HEART RATE: 80 BPM | SYSTOLIC BLOOD PRESSURE: 140 MMHG | OXYGEN SATURATION: 98 %

## 2022-02-24 DIAGNOSIS — Z77.21 EXPOSURE TO BLOOD OR BODY FLUID: Primary | ICD-10-CM

## 2022-02-24 LAB
ALT SERPL W P-5'-P-CCNC: 18 U/L (ref 12–78)
HBV SURFACE AB SER-ACNC: >1000 MIU/ML
HBV SURFACE AG SER QL: NORMAL
HCV AB SER QL: NORMAL
HIV 1+2 AB+HIV1 P24 AG SERPL QL IA: NORMAL

## 2022-02-24 PROCEDURE — 86803 HEPATITIS C AB TEST: CPT | Performed by: EMERGENCY MEDICINE

## 2022-02-24 PROCEDURE — 87389 HIV-1 AG W/HIV-1&-2 AB AG IA: CPT | Performed by: EMERGENCY MEDICINE

## 2022-02-24 PROCEDURE — 87340 HEPATITIS B SURFACE AG IA: CPT | Performed by: EMERGENCY MEDICINE

## 2022-02-24 PROCEDURE — 86706 HEP B SURFACE ANTIBODY: CPT | Performed by: EMERGENCY MEDICINE

## 2022-02-24 PROCEDURE — 84460 ALANINE AMINO (ALT) (SGPT): CPT | Performed by: EMERGENCY MEDICINE

## 2022-02-24 PROCEDURE — 99282 EMERGENCY DEPT VISIT SF MDM: CPT | Performed by: EMERGENCY MEDICINE

## 2022-02-24 PROCEDURE — 36415 COLL VENOUS BLD VENIPUNCTURE: CPT | Performed by: EMERGENCY MEDICINE

## 2022-02-24 PROCEDURE — 99282 EMERGENCY DEPT VISIT SF MDM: CPT

## 2022-02-24 NOTE — ED PROVIDER NOTES
History  Chief Complaint   Patient presents with    Body Fluid Exposure     23-year-old female, presenting to the emergency department for evaluation of body fluid exposure  Patient was in the operating room with neuro-Interventional Radiology  She was wearing glasses and a mask but not goggles  Patient was struck with bloody material from a used catheter to the left eye  Patient immediately used eye wash station  Unknown history on source patient  Source patient MR number [id number]  Prior to Admission Medications   Prescriptions Last Dose Informant Patient Reported? Taking? ALPRAZolam (XANAX) 0 25 mg tablet  Self Yes No   Sig: as needed   DULoxetine (CYMBALTA) 60 mg delayed release capsule  Self Yes No   Sig: Take 60 mg by mouth daily     PREVIDENT 5000 SENSITIVE 1 1-5 % PSTE  Self Yes No   SODIUM FLUORIDE, DENTAL GEL, 1 1 % GEL  Self Yes No   Sig: SF 5000 Plus 1 1 % dental cream   SPIRIVA RESPIMAT 1 25 MCG/ACT AERS inhaler  Self Yes No   albuterol (VENTOLIN HFA) 90 mcg/act inhaler  Self No No   Sig: Inhale 2 puffs every 6 (six) hours as needed for wheezing   cholecalciferol (VITAMIN D3) 1,000 units tablet  Self Yes No   Sig: Take by mouth daily     fluticasone-vilanterol (BREO ELLIPTA) 100-25 mcg/inh inhaler  Self No No   Sig: Inhale 1 puff daily Rinse mouth after use  rosuvastatin (CRESTOR) 20 MG tablet  Self Yes No   valsartan-hydrochlorothiazide (DIOVAN-HCT) 80-12 5 MG per tablet  Self Yes No   Sig: valsartan 80 mg-hydrochlorothiazide 12 5 mg tablet   Take 1 tablet every day by oral route in the morning     vitamin E, tocopherol, 400 units capsule  Self Yes No   Sig: vitamin E 400 unit capsule      Facility-Administered Medications: None       Past Medical History:   Diagnosis Date    Anxiety     Asthma     Depression     History of Clostridium difficile colitis 2018    x 2 episodes, after antibiotics    Hypercholesterolemia     Hyperlipemia     Morbid obesity ([address] )     Sleep apnea c pap       Past Surgical History:   Procedure Laterality Date    CT CONIZATION CERVIX,LOOP ELECTRD N/A 2018    Procedure: BIOPSY LEEP CERVIX;  Surgeon: Laron Chisholm DO;  [facility]: [facility];  Service: Gynecology    REDUCTION MAMMAPLASTY      TONSILLECTOMY         Family History   Problem Relation Age of Onset    Asthma Mother     Obesity Mother     Lung cancer Father 50    Hyperlipidemia Father     Bone cancer Father         mets from lung cancer    No Known Problems Sister     No Known Problems Brother     Arthritis Maternal Grandmother     COPD Maternal Grandmother     Cancer Maternal Grandfather         Lung ca-    Lung cancer Maternal Grandfather [de-identified]    Liver cancer Paternal Grandfather [de-identified]    Cancer Paternal Grandfather         Liver ca-    No Known Problems Paternal Grandmother     No Known Problems Maternal Aunt     No Known Problems Maternal Aunt     No Known Problems Maternal Aunt     No Known Problems Paternal Aunt      I have reviewed and agree with the history as documented  E-Cigarette/Vaping    E-Cigarette Use Never User      E-Cigarette/Vaping Substances    Nicotine No     THC No     CBD No     Flavoring No     Other No     Unknown No      Social History     Tobacco Use    Smoking status: Current Every Day Smoker     Packs/day: 0 50     Years: 18 00     Pack years: 9 00     Types: Cigarettes    Smokeless tobacco: Never Used    Tobacco comment: is on the patch    Vaping Use    Vaping Use: Never used   Substance Use Topics    Alcohol use: Yes     Alcohol/week: 0 0 standard drinks     Comment: occasional drink here and there    Drug use: No       Review of Systems   Eyes: Negative for photophobia, pain and redness  Skin: Negative for wound  Physical Exam  Physical Exam  Vitals reviewed  Constitutional:       Appearance: Normal appearance  HENT:      Head: Normocephalic and atraumatic        Right Ear: External ear normal  Left Ear: External ear normal       Nose: Nose normal    Eyes:      General:         Right eye: No discharge  Left eye: No discharge  Extraocular Movements: Extraocular movements intact  Conjunctiva/sclera: Conjunctivae normal       Pupils: Pupils are equal, round, and reactive to light  Pulmonary:      Effort: Pulmonary effort is normal    Musculoskeletal:      Cervical back: Normal range of motion and neck supple  Neurological:      Mental Status: She is alert and oriented to person, place, and time  Mental status is at baseline  Vital Signs  ED Triage Vitals   Temperature Pulse Respirations Blood Pressure SpO2   02/24/22 0125 02/24/22 0126 02/24/22 0126 02/24/22 0126 02/24/22 0126   97 5 °F (36 4 °C) 80 16 140/75 98 %      Temp src Heart Rate Source Patient Position - Orthostatic VS BP Location FiO2 (%)   -- 02/24/22 0126 -- -- --    Monitor         Pain Score       --                  Vitals:    02/24/22 0126   BP: 140/75   Pulse: 80         Visual Acuity      ED Medications  Medications - No data to display    Diagnostic Studies  Results Reviewed     Procedure Component Value Units Date/Time    [facility][id number]] Collected: 02/24/22 0137    Lab Status: In process Specimen: Blood from Arm, Left Updated: 02/24/22 0150    Hepatitis B surface antigen [[id number]] Collected: 02/24/22 0136    Lab Status: In process Specimen: Blood from Arm, Left Updated: 02/24/22 0150    Hepatitis C antibody [[id number]] Collected: 02/24/22 0136    Lab Status: In process Specimen: Blood from Arm, Left Updated: 02/24/22 0150    Hepatitis B surface antibody [[id number]] Collected: 02/24/22 0136    Lab Status: In process Specimen: Blood from Arm, Left Updated: 02/24/22 0150    ALT [[id number]] Collected: 02/24/22 0136    Lab Status:  In process Specimen: Blood from Arm, Left Updated: 02/24/22 0150                 No orders to display Procedures  Procedures         ED Course                                             MDM  Number of Diagnoses or Management Options  Exposure to blood or body fluid  Diagnosis management comments: Low risk body fluid exposure with bloody fluid to left eye  ICU was contacted to order exposure panel for source patient  Disposition  Final diagnoses:   Exposure to blood or body fluid     Time reflects when diagnosis was documented in both MDM as applicable and the Disposition within this note     Time User Action Codes Description Comment    2/24/2022  1:56 AM Charmayne Perches Add [Z77 21] Exposure to blood or body fluid       ED Disposition     ED Disposition Condition Date/Time Comment    Discharge Stable u Feb 24, 2022  1:56 AM Cathleen Gonzalez discharge to home/self care  Follow-up Information     Follow up With Specialties Details [place] [address] [facility] [place]  [address]  [phone]          Patient's Medications   Discharge Prescriptions    No medications on file       No discharge procedures on file      PDMP Review     None          ED Provider  Electronically Signed by           Davin Smart MD  03/03/22 2041

## 2022-04-19 ENCOUNTER — APPOINTMENT (OUTPATIENT)
Dept: LAB | Facility: HOSPITAL | Age: 42
End: 2022-04-19

## 2022-04-19 DIAGNOSIS — Z00.8 HEALTH EXAMINATION IN POPULATION SURVEYS: ICD-10-CM

## 2022-04-19 LAB
CHOLEST SERPL-MCNC: 187 MG/DL
EST. AVERAGE GLUCOSE BLD GHB EST-MCNC: 100 MG/DL
HBA1C MFR BLD: 5.1 %
HDLC SERPL-MCNC: 43 MG/DL
LDLC SERPL CALC-MCNC: 72 MG/DL (ref 0–100)
NONHDLC SERPL-MCNC: 144 MG/DL
TRIGL SERPL-MCNC: 359 MG/DL

## 2022-04-19 PROCEDURE — 36415 COLL VENOUS BLD VENIPUNCTURE: CPT

## 2022-04-19 PROCEDURE — 80061 LIPID PANEL: CPT

## 2022-04-19 PROCEDURE — 83036 HEMOGLOBIN GLYCOSYLATED A1C: CPT

## 2022-04-28 ENCOUNTER — OFFICE VISIT (OUTPATIENT)
Dept: DERMATOLOGY | Age: 42
End: 2022-04-28
Payer: COMMERCIAL

## 2022-04-28 VITALS — TEMPERATURE: 99 F | WEIGHT: 241 LBS | BODY MASS INDEX: 42.7 KG/M2 | HEIGHT: 63 IN

## 2022-04-28 DIAGNOSIS — L40.8 SEBOPSORIASIS: Primary | ICD-10-CM

## 2022-04-28 PROCEDURE — 99203 OFFICE O/P NEW LOW 30 MIN: CPT | Performed by: DERMATOLOGY

## 2022-04-28 RX ORDER — KETOCONAZOLE 20 MG/ML
1 SHAMPOO TOPICAL DAILY
Qty: 120 ML | Refills: 2 | Status: SHIPPED | OUTPATIENT
Start: 2022-04-28 | End: 2022-07-11

## 2022-04-28 RX ORDER — CLOBETASOL PROPIONATE 0.46 MG/ML
SOLUTION TOPICAL 2 TIMES DAILY
Qty: 50 ML | Refills: 1 | Status: SHIPPED | OUTPATIENT
Start: 2022-04-28 | End: 2022-08-03

## 2022-04-28 NOTE — PROGRESS NOTES
[facility] Note     Patient Name: Km Saha  Encounter Date: 04/28/2022     Have you been cared for by a [facility] Dermatologist in the last 3 years and, if so, which one? No    · Have you traveled outside of the [address] [place] [address] in the past 3 months or outside of the [facility] area in the last 2 weeks? No     May we call your Preferred Phone number to discuss your specific medical information? Yes     May we leave a detailed message that includes your specific medical information? Yes      Today's Chief Concerns:   Concern #1:  Scaling and dryness    Concern #2:      Past Medical History:  Have you personally ever had or currently have any of the following? · Skin cancer (such as Melanoma, Basal Cell Carcinoma, Squamous Cell Carcinoma? (If Yes, please provide more detail)- No  · Eczema: No  · Psoriasis: YES  · HIV/AIDS: No  · Hepatitis B or C: No  · Tuberculosis: No  · Systemic Immunosuppression such as Diabetes, Biologic or Immunotherapy, Chemotherapy, Organ Transplantation, Bone Marrow Transplantation (If YES, please provide more detail): No  · Radiation Treatment (If YES, please provide more detail): No  · Any other major medical conditions/concerns? 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 N/a     Family History:  Have any of your "first degree relatives" (parent, brother, sister, or child) had any of the following       · Skin cancer such as Melanoma or Merkel Cell Carcinoma or Pancreatic Cancer? No  · Eczema, Asthma, Hay Fever or Seasonal Allergies: No  · Psoriasis or Psoriatic Arthritis: No  · Do any other medical conditions seem to run in your family? If Yes, what condition and which relatives?   No    Current Medications:   (please update all dermatological medications before printing patient's AVS!)      Current Outpatient Medications:    albuterol (VENTOLIN HFA) 90 mcg/act inhaler, Inhale 2 puffs every 6 (six) hours as needed for wheezing, Disp: 18 g, Rfl: 0    ALPRAZolam (XANAX) 0 25 mg tablet, as needed, Disp: , Rfl:     cholecalciferol (VITAMIN D3) 1,000 units tablet, Take by mouth daily  , Disp: , Rfl:     DULoxetine (CYMBALTA) 60 mg delayed release capsule, Take 60 mg by mouth daily  , Disp: , Rfl:     fluticasone-vilanterol (BREO ELLIPTA) 100-25 mcg/inh inhaler, Inhale 1 puff daily Rinse mouth after use , Disp: 2 Inhaler, Rfl: 11    PREVIDENT 5000 SENSITIVE 1 1-5 % PSTE, , Disp: , Rfl: 3    rosuvastatin (CRESTOR) 20 MG tablet, , Disp: , Rfl:     SODIUM FLUORIDE, DENTAL GEL, 1 1 % GEL, SF 5000 Plus 1 1 % dental cream, Disp: , Rfl:     SPIRIVA RESPIMAT 1 25 MCG/ACT AERS inhaler, , Disp: , Rfl:     valsartan-hydrochlorothiazide (DIOVAN-HCT) 80-12 5 MG per tablet, valsartan 80 mg-hydrochlorothiazide 12 5 mg tablet  Take 1 tablet every day by oral route in the morning , Disp: , Rfl:     vitamin E, tocopherol, 400 units capsule, vitamin E 400 unit capsule, Disp: , Rfl:       Review of Systems:  Have you recently had or currently have any of the following? If YES, what are you doing for the problem? · Fever, chills or unintended weight loss: No  · Sudden loss or change in your vision: No  · Nausea, vomiting or blood in your stool: No  · Painful or swollen joints: No  · Wheezing or cough: No  · Changing mole or non-healing wound: No  · Nosebleeds: No  · Excessive sweating: No  · Easy or prolonged bleeding? No  · Over the last 2 weeks, how often have you been bothered by the following problems? · Taking little interest or pleasure in doing things: 1 - Not at All  · Feeling down, depressed, or hopeless: 1 - Not at All  · Rapid heartbeat with epinephrine:  No    · FEMALES ONLY:    · Are you pregnant or planning to become pregnant? No  · Are you currently or planning to be nursing or breast feeding? No    · Any known allergies?       No Known Allergies      Physical Exam:     Was a chaperone (Derm Clinical Assistant) present throughout the entire Physical Exam? Yes  o     CONSTITUTIONAL:   Vitals:    04/28/22 0845   Temp: 99 °F (37 2 °C)   TempSrc: Temporal   Weight: 109 kg (241 lb)   Height: 5' 3" (1 6 m)       PSYCH: Normal mood and affect  EYES: Normal conjunctiva  ENT: Normal lips and oral mucosa  CARDIOVASCULAR: No edema  RESPIRATORY: Normal respirations  HEME/LYMPH/IMMUNO:  No regional lymphadenopathy except as noted below in "ASSESSMENT AND PLAN BY DIAGNOSIS"    SKIN:  FULL ORGAN SYSTEM EXAM   Hair, Scalp, Ears, Face Normal except as noted below in Assessment   Neck, Cervical Chain Nodes Normal except as noted below in Assessment   Right Arm/Hand/Fingers    Left Arm/Hand/Fingers    Chest/Breasts/Axillae    Abdomen, Umbilicus    Back/Spine    Groin/Genitalia/Buttocks    Right Leg, Foot, Toes    Left Leg, Foot, Toes         Assessment and Plan by Diagnosis:    History of Present Condition:     Duration:  How long has this been an issue for you? o  6-7 months    Location Affected:  Where on the body is this affecting you? o  scalp and eyebrows    Quality:  Is there any bleeding, pain, itch, burning/irritation, or redness associated with the skin lesion? o   scaly and red   Severity:  Describe any bleeding, pain, itch, burning/irritation, or redness on a scale of 1 to 10 (with 10 being the worst)  o     Timing:  Does this condition seem to be there pretty constantly or do you notice it more at specific times throughout the day?     o  constant    Context:  Have you ever noticed that this condition seems to be associated with specific activities you do?    o  denies    Modifying Factors:    o Anything that seems to make the condition worse?    -  denies   o What have you tried to do to make the condition better?    -  vinegar and water dilution, head and shoulders, selsun    Associated Signs and Symptoms:  Does this skin lesion seem to be associated with any of the following:  o  SL AMB DERM SIGNS AND SYMPTOMS: Redness     SEBOPSORIASIS    Physical Exam:   Anatomic Location Affected:  Frontal scalp and eyebrows    Morphological Description:  Pink Patches with scaling    Severity: mild   Pertinent Positives:   Pertinent Negatives: Additional History of Present Condition:  Tried applying selsun blue, vinegar soaks and head and shoulders  No improvement     Assessment and Plan:  Based on a thorough discussion of this condition and the management approach to it (including a comprehensive discussion of the known risks, side effects and potential benefits of treatment), the patient (family) agrees to implement the following specific plan:   Start Ketoconazole shampoo Daily for 2 weeks straight and then on "Mondays, Wednesdays and Fridays":  Lather into scalp and skin on face, neck, chest, and back; leave on for 5 minutes and then rinse off completely   Start clobetasol 0 05% solution once a day topically to scalp as needed   Start hydrocortisone 2 5% twice a day for 7 days topically to eyebrows    Mediterranean or Gluten free diet discussed    Tumeric supplements discussed   Follow up in 3 months ( if doing well okay to cancel)      SEBOPSORIASIS  is an overlap between seborrhea and psoriasis  Seborrhea is probably a reaction to a yeast that lives on the skin and psoriasis in an autoimmune condition where the immune system is sending signals to the skin to grow very rapidly and not to form properly  Sebopsoriasis affects primarily the scalp and the face  It causes a lot of redness and scaling and can be itchy  TREATMENT is aimed at reducing the yeast and treating the inflammation and scale with:   1  Antifungal agents - like antifungal shampoos, creams or washes   2  Topical steroids - solutions, creams, foams or shampoos   3   Keratolytics - creams or shampoos that help the skin to shed better   They contain salicylic acid, lactic acid or urea  Sometimes more aggressive treatment is needed and these will be discussed if there is a lack of response to traditional treatment      Scribe Attestation    I,:  Kirill Howard am acting as a scribe while in the presence of the attending physician :       I,:  Redd Tesfaye MD personally performed the services described in this documentation    as scribed in my presence :

## 2022-04-28 NOTE — PATIENT INSTRUCTIONS
SEBOPSORIASIS  Assessment and Plan:  Based on a thorough discussion of this condition and the management approach to it (including a comprehensive discussion of the known risks, side effects and potential benefits of treatment), the patient (family) agrees to implement the following specific plan:   Start Ketoconazole shampoo Daily for 2 weeks straight and then on "Mondays, Wednesdays and Fridays":  Lather into scalp and skin on face, neck, chest, and back; leave on for 5 minutes and then rinse off completely   Start clobetasol 0 05% solution once a day topically to scalp as needed   Start hydrocortisone 2 5% twice a day for 7 days topically to eyebrows    Mediterranean or Gluten free diet discussed    Tumeric supplements discussed   Follow up in 3 months ( if doing well okay to cancel)      SEBOPSORIASIS  is an overlap between seborrhea and psoriasis  Seborrhea is probably a reaction to a yeast that lives on the skin and psoriasis in an autoimmune condition where the immune system is sending signals to the skin to grow very rapidly and not to form properly  Sebopsoriasis affects primarily the scalp and the face  It causes a lot of redness and scaling and can be itchy  TREATMENT is aimed at reducing the yeast and treating the inflammation and scale with:   1  Antifungal agents - like antifungal shampoos, creams or washes   2  Topical steroids - solutions, creams, foams or shampoos   3  Keratolytics - creams or shampoos that help the skin to shed better   They contain salicylic acid, lactic acid or urea  Sometimes more aggressive treatment is needed and these will be discussed if there is a lack of response to traditional treatment

## 2022-07-11 ENCOUNTER — ANNUAL EXAM (OUTPATIENT)
Dept: OBGYN CLINIC | Facility: CLINIC | Age: 42
End: 2022-07-11
Payer: COMMERCIAL

## 2022-07-11 VITALS
DIASTOLIC BLOOD PRESSURE: 76 MMHG | BODY MASS INDEX: 40.93 KG/M2 | WEIGHT: 231 LBS | HEIGHT: 63 IN | SYSTOLIC BLOOD PRESSURE: 134 MMHG

## 2022-07-11 DIAGNOSIS — Z11.3 SCREEN FOR STD (SEXUALLY TRANSMITTED DISEASE): ICD-10-CM

## 2022-07-11 DIAGNOSIS — Z12.31 ENCOUNTER FOR SCREENING MAMMOGRAM FOR MALIGNANT NEOPLASM OF BREAST: ICD-10-CM

## 2022-07-11 DIAGNOSIS — Z01.419 ENCOUNTER FOR ANNUAL ROUTINE GYNECOLOGICAL EXAMINATION: Primary | ICD-10-CM

## 2022-07-11 PROCEDURE — 87491 CHLMYD TRACH DNA AMP PROBE: CPT | Performed by: OBSTETRICS & GYNECOLOGY

## 2022-07-11 PROCEDURE — S0612 ANNUAL GYNECOLOGICAL EXAMINA: HCPCS | Performed by: OBSTETRICS & GYNECOLOGY

## 2022-07-11 PROCEDURE — 87591 N.GONORRHOEAE DNA AMP PROB: CPT | Performed by: OBSTETRICS & GYNECOLOGY

## 2022-07-11 PROCEDURE — G0145 SCR C/V CYTO,THINLAYER,RESCR: HCPCS | Performed by: OBSTETRICS & GYNECOLOGY

## 2022-07-11 RX ORDER — FENOFIBRATE 160 MG/1
TABLET ORAL
COMMUNITY
End: 2022-08-03 | Stop reason: SDUPTHER

## 2022-07-11 RX ORDER — 1.1% SODIUM FLUORIDE PRESCRIPTION DENTAL CREAM 5 MG/G
CREAM DENTAL
COMMUNITY
Start: 2022-05-05

## 2022-07-11 NOTE — PROGRESS NOTES
Assessment/Plan:  Pap smear done for cytology reflex HPV  Encouraged self-breast examination as well as calcium supplementation  Cultures were obtained for GC and chlamydia  Continue annual mammogram   Reviewed safe sex practices  Recommend decreased tobacco use  Reviewed precautions regarding pregnancy  Discussed use of folic acid  Should she decide not to use any form of contraception, recommend discussing with primary care regarding medications and possible future pregnancies  All questions answered  Return to office in 1 year or p r n  No problem-specific Assessment & Plan notes found for this encounter  Diagnoses and all orders for this visit:    Encounter for annual routine gynecological examination    Encounter for screening mammogram for malignant neoplasm of breast  -     Mammo screening bilateral w 3d & cad; Future    Other orders  -     fenofibrate 160 MG tablet; fenofibrate 160 mg tablet  -     SF 5000 Plus 1 1 % CREA          Subjective:      Patient ID: Corinne Paci is a 37 y o  female  HPI     This is a pleasant 52-year-old female G0 who presents for gyn exam   She states her menstrual cycles are approximately every 23-25 days lasting 4-5 days with no breakthrough bleeding  She denies any changes in bowel or bladder function  She is sexually active  She has been in a monogamous relationship for approximately 1 month  They are not using any form of contraception  Discussed pregnancy prevention  All questions answered  LEE 9/2018-CORNELIUS 1    She follows up with her family physician for hypertriglyceridemia, hypertension  She continues to smoke half a pack of cigarettes per day  She has been dieting and exercising, 20 lb weight loss in the course of the year        The following portions of the patient's history were reviewed and updated as appropriate: allergies, current medications, past family history, past medical history, past social history, past surgical history and problem list     Review of Systems   Constitutional: Negative for fatigue, fever and unexpected weight change  Respiratory: Negative for cough, chest tightness, shortness of breath and wheezing  Cardiovascular: Negative  Negative for chest pain and palpitations  Gastrointestinal: Negative  Negative for abdominal distention, abdominal pain, blood in stool, constipation, diarrhea, nausea and vomiting  Genitourinary: Negative  Negative for difficulty urinating, dyspareunia, dysuria, flank pain, frequency, genital sores, hematuria, pelvic pain, urgency, vaginal bleeding, vaginal discharge and vaginal pain  Skin: Negative for rash  Objective:      /76   Ht 5' 3" (1 6 m)   Wt 105 kg (231 lb)   LMP 07/01/2022   BMI 40 92 kg/m²          Physical Exam  Constitutional:       Appearance: Normal appearance  She is well-developed  Cardiovascular:      Rate and Rhythm: Normal rate and regular rhythm  Pulmonary:      Effort: Pulmonary effort is normal       Breath sounds: Normal breath sounds  Chest:   Breasts:      Right: No inverted nipple, mass, nipple discharge, skin change or tenderness  Left: No inverted nipple, mass, nipple discharge, skin change or tenderness  Abdominal:      General: Bowel sounds are normal  There is no distension  Palpations: Abdomen is soft  Tenderness: There is no abdominal tenderness  There is no guarding or rebound  Genitourinary:     Labia:         Right: No rash, tenderness or lesion  Left: No rash, tenderness or lesion  Vagina: Normal  No signs of injury  No vaginal discharge or tenderness  Cervix: No cervical motion tenderness, discharge, friability, lesion, erythema or cervical bleeding  Uterus: Not enlarged and not tender  Adnexa:         Right: No mass, tenderness or fullness  Left: No mass, tenderness or fullness       Neurological:      Mental Status: She is alert and oriented to person, place, and time     Psychiatric:         Behavior: Behavior normal

## 2022-07-12 LAB
C TRACH DNA SPEC QL NAA+PROBE: NEGATIVE
N GONORRHOEA DNA SPEC QL NAA+PROBE: NEGATIVE

## 2022-07-15 LAB
LAB AP GYN PRIMARY INTERPRETATION: NORMAL
LAB AP LMP: NORMAL
Lab: NORMAL

## 2022-07-21 ENCOUNTER — HOSPITAL ENCOUNTER (OUTPATIENT)
Dept: RADIOLOGY | Facility: HOSPITAL | Age: 42
Discharge: HOME | End: 2022-07-21
Attending: PODIATRIST
Payer: COMMERCIAL

## 2022-07-21 ENCOUNTER — TRANSCRIBE ORDERS (OUTPATIENT)
Dept: RADIOLOGY | Facility: HOSPITAL | Age: 42
End: 2022-07-21

## 2022-07-21 DIAGNOSIS — M25.571 PAIN AND SWELLING OF RIGHT ANKLE: ICD-10-CM

## 2022-07-21 DIAGNOSIS — Z79.899 HIGH RISK MEDICATION USE: Primary | ICD-10-CM

## 2022-07-21 DIAGNOSIS — E78.5 HYPERLIPIDEMIA, UNSPECIFIED HYPERLIPIDEMIA TYPE: ICD-10-CM

## 2022-07-21 DIAGNOSIS — M25.571 PAIN AND SWELLING OF RIGHT ANKLE: Primary | ICD-10-CM

## 2022-07-21 DIAGNOSIS — M25.471 PAIN AND SWELLING OF RIGHT ANKLE: ICD-10-CM

## 2022-07-21 DIAGNOSIS — M25.471 PAIN AND SWELLING OF RIGHT ANKLE: Primary | ICD-10-CM

## 2022-07-21 PROCEDURE — 73600 X-RAY EXAM OF ANKLE: CPT

## 2022-08-02 PROBLEM — F17.200 TOBACCO DEPENDENCE SYNDROME: Status: ACTIVE | Noted: 2022-08-02

## 2022-08-02 PROBLEM — A04.71 RECURRENT CLOSTRIDIUM DIFFICILE DIARRHEA: Status: RESOLVED | Noted: 2018-12-06 | Resolved: 2022-08-02

## 2022-08-02 PROBLEM — Z86.19 HISTORY OF CLOSTRIDIUM DIFFICILE COLITIS: Status: RESOLVED | Noted: 2018-01-01 | Resolved: 2022-08-02

## 2022-08-02 PROBLEM — Z72.0 TOBACCO USE: Status: RESOLVED | Noted: 2018-05-30 | Resolved: 2022-08-02

## 2022-08-02 PROBLEM — E55.9 VITAMIN D DEFICIENCY: Status: ACTIVE | Noted: 2022-08-02

## 2022-08-02 PROBLEM — M79.7 FIBROMYALGIA: Status: ACTIVE | Noted: 2017-12-21

## 2022-08-03 ENCOUNTER — OFFICE VISIT (OUTPATIENT)
Dept: FAMILY MEDICINE CLINIC | Facility: CLINIC | Age: 42
End: 2022-08-03
Payer: COMMERCIAL

## 2022-08-03 VITALS
BODY MASS INDEX: 42.29 KG/M2 | SYSTOLIC BLOOD PRESSURE: 118 MMHG | WEIGHT: 229.8 LBS | RESPIRATION RATE: 18 BRPM | HEIGHT: 62 IN | TEMPERATURE: 98.6 F | DIASTOLIC BLOOD PRESSURE: 83 MMHG | HEART RATE: 89 BPM | OXYGEN SATURATION: 98 %

## 2022-08-03 DIAGNOSIS — F17.200 TOBACCO DEPENDENCE SYNDROME: ICD-10-CM

## 2022-08-03 DIAGNOSIS — G47.33 OBSTRUCTIVE SLEEP APNEA SYNDROME: ICD-10-CM

## 2022-08-03 DIAGNOSIS — I10 ESSENTIAL HYPERTENSION: Primary | ICD-10-CM

## 2022-08-03 DIAGNOSIS — F32.A DEPRESSION, UNSPECIFIED DEPRESSION TYPE: ICD-10-CM

## 2022-08-03 DIAGNOSIS — E78.00 HYPERCHOLESTEROLEMIA: ICD-10-CM

## 2022-08-03 DIAGNOSIS — J45.22 MILD INTERMITTENT ASTHMA WITH STATUS ASTHMATICUS: ICD-10-CM

## 2022-08-03 PROBLEM — L02.92 FURUNCULOSIS: Status: RESOLVED | Noted: 2018-12-06 | Resolved: 2022-08-03

## 2022-08-03 PROCEDURE — 99213 OFFICE O/P EST LOW 20 MIN: CPT

## 2022-08-03 RX ORDER — VALSARTAN AND HYDROCHLOROTHIAZIDE 80; 12.5 MG/1; MG/1
1 TABLET, FILM COATED ORAL DAILY
Qty: 90 TABLET | Refills: 0 | Status: SHIPPED | OUTPATIENT
Start: 2022-08-03

## 2022-08-03 RX ORDER — FENOFIBRATE 160 MG/1
160 TABLET ORAL DAILY
Qty: 90 TABLET | Refills: 0 | Status: SHIPPED | OUTPATIENT
Start: 2022-08-03

## 2022-08-03 RX ORDER — ROSUVASTATIN CALCIUM 20 MG/1
10 TABLET, COATED ORAL DAILY
Qty: 90 TABLET | Refills: 0 | Status: SHIPPED | OUTPATIENT
Start: 2022-08-03

## 2022-08-03 RX ORDER — DULOXETIN HYDROCHLORIDE 60 MG/1
60 CAPSULE, DELAYED RELEASE ORAL DAILY
Qty: 90 CAPSULE | Refills: 0 | Status: SHIPPED | OUTPATIENT
Start: 2022-08-03

## 2022-08-03 NOTE — ASSESSMENT & PLAN NOTE
Currently stable    Patient did not tolerate CPAP and not currently using it and she is doing fine without it

## 2022-08-03 NOTE — PROGRESS NOTES
Assessment/Plan:         Problem List Items Addressed This Visit    None           Subjective:      Patient ID: Toshia Baptiste is a 37 y o  female  Patient here for her chronic medical problems and review of the labs and imaging if it is applicable  Currently has no specific complaints other than mentioned in the review of systems  Denies chest pain, SOB, cough, abdominal pain, nausea, vomiting, fever, chills, lightheadedness, dizziness,headache, tingling or numbness  No bowel or bladder problem  The following portions of the patient's history were reviewed and updated as appropriate:   Past Medical History:  She has a past medical history of Anxiety, Asthma, Depression, History of Clostridium difficile colitis (2018), Hypercholesterolemia, Hyperlipemia, Morbid obesity ([address] ), Pulmonary nodule, and Sleep apnea ,  _______________________________________________________________________  Medical Problems:  does not have any pertinent problems on file ,  _______________________________________________________________________  Past Surgical History:   has a past surgical history that includes Tonsillectomy; Reduction mammaplasty (1999); and pr conization cervix,loop electrd (N/A, 9/27/2018)  ,  _______________________________________________________________________  Family History:  family history includes Arthritis in her maternal grandmother; Asthma in her mother; Bone cancer in her father; COPD in her maternal grandmother; Cancer in her maternal grandfather and paternal grandfather; Hyperlipidemia in her father; Liver cancer (age of onset: [de-identified]) in her paternal grandfather; Lung cancer (age of onset: 50) in her father; Lung cancer (age of onset: [de-identified]) in her maternal grandfather; No Known Problems in her brother, maternal aunt, maternal aunt, maternal aunt, paternal aunt, paternal grandmother, and sister; Obesity in her mother ,  _______________________________________________________________________  Social History:   reports that she has been smoking cigarettes  She has a 9 00 pack-year smoking history  She has never used smokeless tobacco  She reports current alcohol use  She reports that she does not use drugs  ,  _______________________________________________________________________  Allergies:  has No Known Allergies     _______________________________________________________________________  Current Outpatient Medications   Medication Sig Dispense Refill    ALPRAZolam (XANAX) 0 25 mg tablet       cholecalciferol (VITAMIN D3) 1,000 units tablet Take by mouth daily        DULoxetine (CYMBALTA) 60 mg delayed release capsule Take 60 mg by mouth daily        fenofibrate 160 MG tablet fenofibrate 160 mg tablet      PREVIDENT 5000 SENSITIVE 1 1-5 % PSTE   3    rosuvastatin (CRESTOR) 20 MG tablet       SF 5000 Plus 1 1 % CREA       SODIUM FLUORIDE, DENTAL GEL, 1 1 % GEL SF 5000 Plus 1 1 % dental cream      valsartan-hydrochlorothiazide (DIOVAN-HCT) 80-12 5 MG per tablet valsartan 80 mg-hydrochlorothiazide 12 5 mg tablet   Take 1 tablet every day by oral route in the morning   albuterol (VENTOLIN HFA) 90 mcg/act inhaler Inhale 2 puffs every 6 (six) hours as needed for wheezing 18 g 0    clobetasol (TEMOVATE) 0 05 % external solution Apply topically 2 (two) times a day Topically twice a day to scalp for 14 days then as needed 50 mL 1    fluticasone-vilanterol (BREO ELLIPTA) 100-25 mcg/inh inhaler Inhale 1 puff daily Rinse mouth after use  (Patient not taking: Reported on 7/11/2022) 2 Inhaler 11    hydrocortisone 2 5 % cream Apply topically 2 (two) times a day for 14 days Apply 1-2x a day topically to eyebrows for 14 days 30 g 0    ketoconazole (NIZORAL) 2 % shampoo Apply 1 application topically in the morning for 30 doses Daily for 2 weeks straight and then on "Mondays, Wednesdays and Fridays":  Lather into scalp and skin on face, neck, chest, and back; leave on for 5 minutes and then rinse off completely  120 mL 2    SPIRIVA RESPIMAT 1 25 MCG/ACT AERS inhaler  (Patient not taking: Reported on 7/11/2022)      vitamin E, tocopherol, 400 units capsule vitamin E 400 unit capsule       No current facility-administered medications for this visit      _______________________________________________________________________  Review of Systems   Constitutional: Negative for fatigue  HENT: Negative for congestion, ear pain and sneezing  Eyes: Negative for pain and redness  Respiratory: Negative for cough, chest tightness and shortness of breath  Cardiovascular: Negative for chest pain, palpitations and leg swelling  Gastrointestinal: Negative for abdominal pain, diarrhea, nausea and vomiting  Genitourinary: Negative for dysuria, frequency and hematuria  Musculoskeletal: Negative for arthralgias and back pain  Skin: Negative for color change and rash  Neurological: Negative for dizziness, weakness and headaches  Psychiatric/Behavioral: Negative for behavioral problems  Objective:  Vitals:    08/03/22 0902   BP: 118/83   BP Location: Left arm   Patient Position: Sitting   Cuff Size: Standard   Pulse: 89   Resp: 18   Temp: 98 6 °F (37 °C)   TempSrc: Tympanic   SpO2: 98%   Weight: 104 kg (229 lb 12 8 oz)   Height: 5' 2" (1 575 m)     Body mass index is 42 03 kg/m²  Physical Exam  Constitutional:       General: She is not in acute distress  Appearance: Normal appearance  She is obese  She is not ill-appearing  HENT:      Head: Normocephalic and atraumatic  Right Ear: Tympanic membrane normal       Left Ear: Tympanic membrane normal       Nose: Nose normal       Mouth/Throat:      Mouth: Mucous membranes are moist       Pharynx: Oropharynx is clear  Eyes:      Extraocular Movements: Extraocular movements intact  Conjunctiva/sclera: Conjunctivae normal       Pupils: Pupils are equal, round, and reactive to light     Cardiovascular:      Rate and Rhythm: Normal rate and regular rhythm  Pulses: Normal pulses  Heart sounds: Normal heart sounds  No murmur heard  No friction rub  No gallop  Pulmonary:      Effort: Pulmonary effort is normal  No respiratory distress  Breath sounds: Normal breath sounds  Abdominal:      General: Abdomen is flat  Bowel sounds are normal  There is no distension  Palpations: Abdomen is soft  Tenderness: There is no abdominal tenderness  Musculoskeletal:         General: Normal range of motion  Cervical back: Normal range of motion and neck supple  Skin:     General: Skin is warm  Capillary Refill: Capillary refill takes 2 to 3 seconds  Coloration: Skin is not jaundiced or pale  Neurological:      General: No focal deficit present  Mental Status: She is alert and oriented to person, place, and time  Mental status is at baseline  Psychiatric:         Mood and Affect: Mood normal        BMI Counseling: Body mass index is 42 03 kg/m²  The BMI is above normal  Nutrition recommendations include decreasing portion sizes, decreasing fast food intake, limiting drinks that contain sugar, moderation in carbohydrate intake, increasing intake of lean protein and reducing intake of saturated and trans fat  Exercise recommendations include vigorous physical activity 75 minutes/week  No pharmacotherapy was ordered  Rationale for BMI follow-up plan is due to patient being overweight or obese  Tobacco Cessation Counseling: Tobacco cessation counseling was provided  The patient is sincerely urged to quit consumption of tobacco  She is not ready to quit tobacco  Medication options and side effects of medication discussed  Patient refused medication

## 2022-08-06 ENCOUNTER — APPOINTMENT (OUTPATIENT)
Dept: LAB | Facility: HOSPITAL | Age: 42
End: 2022-08-06
Payer: COMMERCIAL

## 2022-08-06 DIAGNOSIS — Z79.899 HIGH RISK MEDICATION USE: ICD-10-CM

## 2022-08-06 DIAGNOSIS — E78.5 HYPERLIPIDEMIA, UNSPECIFIED HYPERLIPIDEMIA TYPE: ICD-10-CM

## 2022-08-06 LAB
ALT SERPL W P-5'-P-CCNC: 24 U/L (ref 12–78)
AST SERPL W P-5'-P-CCNC: 18 U/L (ref 5–45)
CHOLEST SERPL-MCNC: 153 MG/DL
HDLC SERPL-MCNC: 58 MG/DL
LDLC SERPL CALC-MCNC: 72 MG/DL (ref 0–100)
TRIGL SERPL-MCNC: 115 MG/DL

## 2022-08-06 PROCEDURE — 36415 COLL VENOUS BLD VENIPUNCTURE: CPT

## 2022-08-06 PROCEDURE — 80061 LIPID PANEL: CPT

## 2022-08-06 PROCEDURE — 84450 TRANSFERASE (AST) (SGOT): CPT

## 2022-08-06 PROCEDURE — 84460 ALANINE AMINO (ALT) (SGPT): CPT

## 2022-08-08 ENCOUNTER — TELEPHONE (OUTPATIENT)
Dept: FAMILY MEDICINE CLINIC | Facility: CLINIC | Age: 42
End: 2022-08-08

## 2022-10-24 ENCOUNTER — OFFICE VISIT (OUTPATIENT)
Dept: OBGYN CLINIC | Facility: CLINIC | Age: 42
End: 2022-10-24
Payer: COMMERCIAL

## 2022-10-24 ENCOUNTER — APPOINTMENT (OUTPATIENT)
Dept: RADIOLOGY | Facility: CLINIC | Age: 42
End: 2022-10-24
Payer: COMMERCIAL

## 2022-10-24 VITALS
WEIGHT: 228 LBS | BODY MASS INDEX: 41.96 KG/M2 | DIASTOLIC BLOOD PRESSURE: 62 MMHG | SYSTOLIC BLOOD PRESSURE: 108 MMHG | HEIGHT: 62 IN

## 2022-10-24 DIAGNOSIS — M79.642 LEFT HAND PAIN: ICD-10-CM

## 2022-10-24 DIAGNOSIS — M65.322 TRIGGER FINGER, LEFT INDEX FINGER: ICD-10-CM

## 2022-10-24 DIAGNOSIS — M79.642 LEFT HAND PAIN: Primary | ICD-10-CM

## 2022-10-24 PROCEDURE — 73130 X-RAY EXAM OF HAND: CPT

## 2022-10-24 PROCEDURE — 20550 NJX 1 TENDON SHEATH/LIGAMENT: CPT | Performed by: ORTHOPAEDIC SURGERY

## 2022-10-24 PROCEDURE — 99204 OFFICE O/P NEW MOD 45 MIN: CPT | Performed by: ORTHOPAEDIC SURGERY

## 2022-10-24 RX ORDER — LIDOCAINE HYDROCHLORIDE 10 MG/ML
1 INJECTION, SOLUTION INFILTRATION; PERINEURAL
Status: COMPLETED | OUTPATIENT
Start: 2022-10-24 | End: 2022-10-24

## 2022-10-24 RX ORDER — BETAMETHASONE SODIUM PHOSPHATE AND BETAMETHASONE ACETATE 3; 3 MG/ML; MG/ML
6 INJECTION, SUSPENSION INTRA-ARTICULAR; INTRALESIONAL; INTRAMUSCULAR; SOFT TISSUE
Status: COMPLETED | OUTPATIENT
Start: 2022-10-24 | End: 2022-10-24

## 2022-10-24 RX ADMIN — LIDOCAINE HYDROCHLORIDE 1 ML: 10 INJECTION, SOLUTION INFILTRATION; PERINEURAL at 16:39

## 2022-10-24 RX ADMIN — BETAMETHASONE SODIUM PHOSPHATE AND BETAMETHASONE ACETATE 6 MG: 3; 3 INJECTION, SUSPENSION INTRA-ARTICULAR; INTRALESIONAL; INTRAMUSCULAR; SOFT TISSUE at 16:39

## 2022-10-24 NOTE — PROGRESS NOTES
ASSESSMENT/PLAN:    Assessment:   Left index trigger finger without clicking and locking    Plan:   Discussed cortisone injection (#1) of which she accepted and tolerated well  Discussed heat and massage which was demonstrated in the office  We discussed we can provide another injection if it were to return  We only do two injections for this issue  She will follow up in 6-8 weeks for re-evaluation    Follow Up:  6-8 weeks    To Do Next Visit:  Re-evaluation    General Discussions:     Trigger FInger: The anatomy and physiology of trigger finger was discussed with the patient today in the office  Edema and increased contact pressure within the flexor tendons at the A1 pulley can cause pain, crepitation, and limitation of function  Treatment options include resting MP blocking splints to decrease edema, oral anti-inflammatory medications, home or formal therapy exercises, up to 2 steroid injections within the tendon sheath, or surgical release  While majority of patients do respond to conservative treatment, up to 20% may require surgical release        _____________________________________________________  CHIEF COMPLAINT:  Chief Complaint   Patient presents with   • Left Index Finger - Pain         SUBJECTIVE:  Mira Maguire is a 37 y o  female who presents left index finger pain  She denies any injuries or trauma to the area  She states it has been going on for several weeks  She states most the pain is over the palmar aspect of her hand and extends to the PIP joint  She denies any clicking or locking  She states that she has taken multiple over-the-counter medicines without relief of her symptoms  She denies any numbness or tingling        PAST MEDICAL HISTORY:  Past Medical History:   Diagnosis Date   • Anxiety    • Asthma    • Depression    • History of Clostridium difficile colitis 2018    x 2 episodes, after antibiotics   • Hypercholesterolemia    • Hyperlipemia    • Morbid obesity ([facility] [address] )    • Pulmonary nodule    • Sleep apnea     c pap       PAST SURGICAL HISTORY:  Past Surgical History:   Procedure Laterality Date   • HI CONIZATION CERVIX,LOOP ELECTRD N/A 2018    Procedure: BIOPSY LEEP CERVIX;  Surgeon: Haydee Delvalle DO;  Location: [facility];  Service: Gynecology   • REDUCTION MAMMAPLASTY     • TONSILLECTOMY         FAMILY HISTORY:  Family History   Problem Relation Age of Onset   • Asthma Mother    • Obesity Mother    • Lung cancer Father 50   • Hyperlipidemia Father    • Bone cancer Father         mets from lung cancer   • No Known Problems Sister    • No Known Problems Brother    • Arthritis Maternal Grandmother    • COPD Maternal Grandmother    • Cancer Maternal Grandfather         Lung ca-   • Lung cancer Maternal Grandfather [de-identified]   • Liver cancer Paternal Grandfather [de-identified]   • Cancer Paternal Grandfather         Liver ca-   • No Known Problems Paternal Grandmother    • No Known Problems Maternal Aunt    • No Known Problems Maternal Aunt    • No Known Problems Maternal Aunt    • No Known Problems Paternal Aunt        SOCIAL HISTORY:  Social History     Tobacco Use   • Smoking status: Current Every Day Smoker     Packs/day: 0 50     Years: 18 00     Pack years: 9 00     Types: Cigarettes   • Smokeless tobacco: Never Used   • Tobacco comment: is on the patch    Vaping Use   • Vaping Use: Never used   Substance Use Topics   • Alcohol use:  Yes     Alcohol/week: 0 0 standard drinks     Comment: occasional drink here and there   • Drug use: No       MEDICATIONS:    Current Outpatient Medications:   •  ALPRAZolam (XANAX) 0 25 mg tablet, , Disp: , Rfl:   •  cholecalciferol (VITAMIN D3) 1,000 units tablet, Take by mouth daily  , Disp: , Rfl:   •  DULoxetine (CYMBALTA) 60 mg delayed release capsule, Take 1 capsule (60 mg total) by mouth daily, Disp: 90 capsule, Rfl: 0  •  fenofibrate 160 MG tablet, Take 1 tablet (160 mg total) by mouth daily, Disp: 90 tablet, Rfl: 0  • hydrocortisone 2 5 % cream, Apply topically 2 (two) times a day for 14 days Apply 1-2x a day topically to eyebrows for 14 days, Disp: 30 g, Rfl: 0  •  ketoconazole (NIZORAL) 2 % shampoo, Apply 1 application topically in the morning for 30 doses Daily for 2 weeks straight and then on "Mondays, Wednesdays and Fridays":  Lather into scalp and skin on face, neck, chest, and back; leave on for 5 minutes and then rinse off completely  , Disp: 120 mL, Rfl: 2  •  PREVIDENT 5000 SENSITIVE 1 1-5 % PSTE, , Disp: , Rfl: 3  •  rosuvastatin (CRESTOR) 20 MG tablet, Take 0 5 tablets (10 mg total) by mouth daily, Disp: 90 tablet, Rfl: 0  •  SF 5000 Plus 1 1 % CREA, , Disp: , Rfl:   •  SODIUM FLUORIDE, DENTAL GEL, 1 1 % GEL, SF 5000 Plus 1 1 % dental cream, Disp: , Rfl:   •  valsartan-hydrochlorothiazide (DIOVAN-HCT) 80-12 5 MG per tablet, Take 1 tablet by mouth daily, Disp: 90 tablet, Rfl: 0    ALLERGIES:  No Known Allergies    REVIEW OF SYSTEMS:  Pertinent items are noted in HPI  A comprehensive review of systems was negative      LABS:  HgA1c:   Lab Results   Component Value Date    HGBA1C 5 1 04/19/2022     BMP:   Lab Results   Component Value Date    GLUCOSE 164 (H) 12/29/2018    CALCIUM 9 1 02/01/2022     09/15/2015    K 3 8 02/01/2022    CO2 26 02/01/2022     02/01/2022    BUN 13 02/01/2022    CREATININE 0 77 02/01/2022       _____________________________________________________  PHYSICAL EXAMINATION:  Vital signs: /62   Ht 5' 2" (1 575 m)   Wt 103 kg (228 lb)   BMI 41 70 kg/m²   General: well developed and well nourished, alert, oriented times 3 and appears comfortable  Psychiatric: Normal  HEENT: Trachea Midline, No torticollis  Cardiovascular: No discernable arrhythmia  Pulmonary: No wheezing or stridor  Abdomen: No rebound or guarding  Extremities: No peripheral edema  Skin: No masses, erythema, lacerations, fluctation, ulcerations  Neurovascular: Sensation Intact to the Median, Ulnar, Radial Nerve, Motor Intact to the Median, Ulnar, Radial Nerve and Pulses Intact    MUSCULOSKELETAL EXAMINATION:  LEFT SIDE:  Finger:  Triggering  index finger and Nodules  index finger tenderness to palpation over the A1 pulley  No clicking or locking appreciated on exam    _____________________________________________________  STUDIES REVIEWED:  Images were reviewed in PACS by Dr Kade Wolf and demonstrate:  No acute fractures or dislocations noted  PROCEDURES PERFORMED:  Hand/upper extremity injection: L index A1  Universal Protocol:  Consent: Verbal consent obtained  Risks and benefits: risks, benefits and alternatives were discussed  Consent given by: patient  Time out: Immediately prior to procedure a "time out" was called to verify the correct patient, procedure, equipment, support staff and site/side marked as required    Patient understanding: patient states understanding of the procedure being performed  Patient consent: the patient's understanding of the procedure matches consent given  Site marked: the operative site was marked  Patient identity confirmed: verbally with patient    Supporting Documentation  Indications: pain   Procedure Details  Condition:trigger finger Location: index finger - L index A1   Preparation: Patient was prepped and draped in the usual sterile fashion  Needle size: 25 G  Approach: volar  Medications administered: 1 mL lidocaine 1 %; 6 mg betamethasone acetate-betamethasone sodium phosphate 6 (3-3) mg/mL    Patient tolerance: patient tolerated the procedure well with no immediate complications  Dressing:  Sterile dressing applied               Scribe Attestation    I,:  Mihai Ramirez PA-C am acting as a scribe while in the presence of the attending physician :       I,:  Britt Jordan MD personally performed the services described in this documentation    as scribed in my presence :             Scribe Attestation    I,:  Mihai Ramirez PA-C am acting as a scribe while in the presence of the attending physician :       I,:  Isaias Pretty MD personally performed the services described in this documentation    as scribed in my presence :

## 2022-11-02 ENCOUNTER — OFFICE VISIT (OUTPATIENT)
Dept: FAMILY MEDICINE CLINIC | Facility: CLINIC | Age: 42
End: 2022-11-02

## 2022-11-02 VITALS
BODY MASS INDEX: 41.77 KG/M2 | HEART RATE: 84 BPM | TEMPERATURE: 98.6 F | RESPIRATION RATE: 18 BRPM | OXYGEN SATURATION: 98 % | DIASTOLIC BLOOD PRESSURE: 80 MMHG | WEIGHT: 227 LBS | HEIGHT: 62 IN | SYSTOLIC BLOOD PRESSURE: 130 MMHG

## 2022-11-02 DIAGNOSIS — F32.A DEPRESSION, UNSPECIFIED DEPRESSION TYPE: ICD-10-CM

## 2022-11-02 DIAGNOSIS — J45.22 MILD INTERMITTENT ASTHMA WITH STATUS ASTHMATICUS: ICD-10-CM

## 2022-11-02 DIAGNOSIS — E78.2 MIXED HYPERLIPIDEMIA: ICD-10-CM

## 2022-11-02 DIAGNOSIS — G47.33 OBSTRUCTIVE SLEEP APNEA SYNDROME: ICD-10-CM

## 2022-11-02 DIAGNOSIS — I10 ESSENTIAL HYPERTENSION: Primary | ICD-10-CM

## 2022-11-02 DIAGNOSIS — F17.200 TOBACCO DEPENDENCE SYNDROME: ICD-10-CM

## 2022-11-02 PROBLEM — E78.00 HYPERCHOLESTEROLEMIA: Status: RESOLVED | Noted: 2018-05-30 | Resolved: 2022-11-02

## 2022-11-02 PROBLEM — Z23 IMMUNIZATION DUE: Status: ACTIVE | Noted: 2022-11-02

## 2022-11-02 RX ORDER — VALSARTAN AND HYDROCHLOROTHIAZIDE 80; 12.5 MG/1; MG/1
1 TABLET, FILM COATED ORAL DAILY
Qty: 90 TABLET | Refills: 0 | Status: SHIPPED | OUTPATIENT
Start: 2022-11-02

## 2022-11-02 RX ORDER — ROSUVASTATIN CALCIUM 20 MG/1
20 TABLET, COATED ORAL DAILY
Qty: 90 TABLET | Refills: 0 | Status: SHIPPED | OUTPATIENT
Start: 2022-11-02

## 2022-11-02 RX ORDER — DULOXETIN HYDROCHLORIDE 60 MG/1
60 CAPSULE, DELAYED RELEASE ORAL DAILY
Qty: 90 CAPSULE | Refills: 0 | Status: SHIPPED | OUTPATIENT
Start: 2022-11-02

## 2022-11-02 RX ORDER — FENOFIBRATE 160 MG/1
160 TABLET ORAL DAILY
Qty: 90 TABLET | Refills: 0 | Status: SHIPPED | OUTPATIENT
Start: 2022-11-02

## 2022-11-02 RX ORDER — ROSUVASTATIN CALCIUM 20 MG/1
20 TABLET, COATED ORAL DAILY
Qty: 90 TABLET | Refills: 0 | Status: SHIPPED | OUTPATIENT
Start: 2022-11-02 | End: 2022-11-02

## 2022-11-02 NOTE — PROGRESS NOTES
Assessment/Plan:         Problem List Items Addressed This Visit        Respiratory    Mild intermittent asthma with status asthmaticus     Under control  Continue current medication  We will re-evaluate at next office visit  Obstructive sleep apnea syndrome     Under control with CPAP which patient uses every night and patient wakes up refreshed  Patient does not feel daytime sleepiness or fatigue  He will continue evaluate every office visit  Cardiovascular and Mediastinum    Essential hypertension - Primary     Under control  Continue current medication  We will re-evaluate at next office visit  Relevant Medications    valsartan-hydrochlorothiazide (DIOVAN-HCT) 80-12 5 MG per tablet       Other    Tobacco dependence syndrome     Quit smoking  Potential consequences of smoking discussed with patient           Mixed hyperlipidemia     Under control  Continue current medication  We will re-evaluate at next office visit  Relevant Medications    rosuvastatin (CRESTOR) 20 MG tablet    fenofibrate 160 MG tablet      Other Visit Diagnoses     Depression, unspecified depression type        Relevant Medications    DULoxetine (CYMBALTA) 60 mg delayed release capsule            Subjective:      Patient ID: Daniel Rivera is a 40 y o  female  Patient here for review of chronic medical problems and review of the labs and imaging if it is applicable  Currently has no specific complaints other than mentioned in the review of systems  Denies chest pain, SOB, cough, abdominal pain, nausea, vomiting, fever, chills, lightheadedness, dizziness,headache, tingling or numbness  No bowel or bladder problem          The following portions of the patient's history were reviewed and updated as appropriate:   Past Medical History:  She has a past medical history of Anxiety, Asthma, Depression, History of Clostridium difficile colitis (2018), Hypercholesterolemia, Hyperlipemia, Morbid obesity ([address] ), Pulmonary nodule, and Sleep apnea ,  _______________________________________________________________________  Medical Problems:  does not have any pertinent problems on file ,  _______________________________________________________________________  Past Surgical History:   has a past surgical history that includes Tonsillectomy; Reduction mammaplasty (1999); and pr conization cervix,loop electrd (N/A, 9/27/2018)  ,  _______________________________________________________________________  Family History:  family history includes Arthritis in her maternal grandmother; Asthma in her mother; Bone cancer in her father; COPD in her maternal grandmother; Cancer in her maternal grandfather and paternal grandfather; Hyperlipidemia in her father; Liver cancer (age of onset: [de-identified]) in her paternal grandfather; Lung cancer (age of onset: 50) in her father; Lung cancer (age of onset: [de-identified]) in her maternal grandfather; No Known Problems in her brother, maternal aunt, maternal aunt, maternal aunt, paternal aunt, paternal grandmother, and sister; Obesity in her mother ,  _______________________________________________________________________  Social History:   reports that she has been smoking cigarettes  She has a 9 00 pack-year smoking history  She has never used smokeless tobacco  She reports current alcohol use  She reports that she does not use drugs  ,  _______________________________________________________________________  Allergies:  has No Known Allergies     _______________________________________________________________________  Current Outpatient Medications   Medication Sig Dispense Refill   • ALPRAZolam (XANAX) 0 25 mg tablet      • cholecalciferol (VITAMIN D3) 1,000 units tablet Take by mouth daily       • DULoxetine (CYMBALTA) 60 mg delayed release capsule Take 1 capsule (60 mg total) by mouth daily 90 capsule 0   • fenofibrate 160 MG tablet Take 1 tablet (160 mg total) by mouth daily 90 tablet 0   • PREVIDENT 5000 SENSITIVE 1 1-5 % PSTE   3   • rosuvastatin (CRESTOR) 20 MG tablet Take 1 tablet (20 mg total) by mouth daily 90 tablet 0   • SF 5000 Plus 1 1 % CREA      • SODIUM FLUORIDE, DENTAL GEL, 1 1 % GEL SF 5000 Plus 1 1 % dental cream     • valsartan-hydrochlorothiazide (DIOVAN-HCT) 80-12 5 MG per tablet Take 1 tablet by mouth daily 90 tablet 0   • hydrocortisone 2 5 % cream Apply topically 2 (two) times a day for 14 days Apply 1-2x a day topically to eyebrows for 14 days 30 g 0   • ketoconazole (NIZORAL) 2 % shampoo Apply 1 application topically in the morning for 30 doses Daily for 2 weeks straight and then on "Mondays, Wednesdays and Fridays":  Lather into scalp and skin on face, neck, chest, and back; leave on for 5 minutes and then rinse off completely  120 mL 2     No current facility-administered medications for this visit      _______________________________________________________________________  Review of Systems   Constitutional: Negative for chills, fatigue and fever  HENT: Negative for congestion, ear pain, rhinorrhea, sneezing and sore throat  Eyes: Negative for redness, itching and visual disturbance  Respiratory: Negative for cough, chest tightness and shortness of breath  Cardiovascular: Negative for chest pain, palpitations and leg swelling  Gastrointestinal: Negative for abdominal pain, blood in stool, diarrhea, nausea and vomiting  Endocrine: Negative for cold intolerance and heat intolerance  Genitourinary: Negative for dysuria, frequency and urgency  Musculoskeletal: Negative for arthralgias, back pain and myalgias  Skin: Negative for color change and rash  Neurological: Negative for dizziness, weakness, light-headedness, numbness and headaches  Hematological: Does not bruise/bleed easily  Psychiatric/Behavioral: Negative for agitation, behavioral problems and confusion           Objective:  Vitals:    11/02/22 0943   BP: 130/80   BP Location: Left arm Patient Position: Sitting   Cuff Size: Large   Pulse: 84   Resp: 18   Temp: 98 6 °F (37 °C)   TempSrc: Tympanic   SpO2: 98%   Weight: 103 kg (227 lb)   Height: 5' 2" (1 575 m)     Body mass index is 41 52 kg/m²  Physical Exam  Vitals and nursing note reviewed  Constitutional:       General: She is not in acute distress  Appearance: Normal appearance  She is obese  She is not ill-appearing, toxic-appearing or diaphoretic  HENT:      Head: Normocephalic and atraumatic  Right Ear: Tympanic membrane normal       Left Ear: Tympanic membrane normal       Nose: Nose normal  No congestion  Mouth/Throat:      Mouth: Mucous membranes are moist    Eyes:      General: No scleral icterus  Right eye: No discharge  Left eye: No discharge  Extraocular Movements: Extraocular movements intact  Conjunctiva/sclera: Conjunctivae normal       Pupils: Pupils are equal, round, and reactive to light  Cardiovascular:      Rate and Rhythm: Normal rate and regular rhythm  Pulses: Normal pulses  Heart sounds: Normal heart sounds  No murmur heard  No gallop  Pulmonary:      Effort: Pulmonary effort is normal  No respiratory distress  Breath sounds: Normal breath sounds  No wheezing, rhonchi or rales  Abdominal:      General: Abdomen is flat  Bowel sounds are normal  There is no distension  Palpations: Abdomen is soft  Tenderness: There is no abdominal tenderness  There is no guarding  Musculoskeletal:         General: No swelling or tenderness  Normal range of motion  Cervical back: Normal range of motion and neck supple  No rigidity  Lymphadenopathy:      Cervical: No cervical adenopathy  Skin:     General: Skin is warm  Capillary Refill: Capillary refill takes 2 to 3 seconds  Coloration: Skin is not jaundiced  Findings: No bruising or rash  Neurological:      General: No focal deficit present        Mental Status: She is alert and oriented to person, place, and time  Mental status is at baseline        Gait: Gait normal    Psychiatric:         Mood and Affect: Mood normal

## 2022-11-02 NOTE — ASSESSMENT & PLAN NOTE
Under control with CPAP which patient uses every night and patient wakes up refreshed  Patient does not feel daytime sleepiness or fatigue  He will continue evaluate every office visit

## 2022-11-02 NOTE — PATIENT INSTRUCTIONS
Frequent home monitor of blood pressure  Diet high in fruit and vegetables and low in salt and cholesterol  Regular cardiovascular exercise  Take all medications regularly as prescribed    Quit smoking   Potential consequences of  smoking explained to patient

## 2022-12-06 ENCOUNTER — TELEPHONE (OUTPATIENT)
Dept: DERMATOLOGY | Facility: CLINIC | Age: 42
End: 2022-12-06

## 2022-12-19 ENCOUNTER — OFFICE VISIT (OUTPATIENT)
Dept: OBGYN CLINIC | Facility: CLINIC | Age: 42
End: 2022-12-19

## 2022-12-19 VITALS
DIASTOLIC BLOOD PRESSURE: 70 MMHG | SYSTOLIC BLOOD PRESSURE: 108 MMHG | BODY MASS INDEX: 43.98 KG/M2 | WEIGHT: 239 LBS | HEIGHT: 62 IN

## 2022-12-19 DIAGNOSIS — M65.322 TRIGGER FINGER, LEFT INDEX FINGER: Primary | ICD-10-CM

## 2022-12-19 NOTE — PROGRESS NOTES
ASSESSMENT/PLAN:    Assessment:   Left index finger trigger finger     Plan:   Patient was advised that she can use heat as well as massage over the area to help prevent the trigger finger from returning  She was advised that we can repeat the injection if it were to return  She will follow-up with us as needed    Follow Up:  PRN    To Do Next Visit:  Reevaluation    General Discussions:  Trigger FInger: The anatomy and physiology of trigger finger was discussed with the patient today in the office  Edema and increased contact pressure within the flexor tendons at the A1 pulley can cause pain, crepitation, and limitation of function  Treatment options include resting MP blocking splints to decrease edema, oral anti-inflammatory medications, home or formal therapy exercises, up to 2 steroid injections within the tendon sheath, or surgical release  While majority of patients do respond to conservative treatment, up to 20% may require surgical release        _____________________________________________________  CHIEF COMPLAINT:  Chief Complaint   Patient presents with   • Left Index Finger - Follow-up, Triggering     #1 injecton 10/24/22         SUBJECTIVE:  Alia Omalley is a 40 y o  female who presents for follow up regarding left index trigger finger  She has tried a cortisone injection of which she states provided significant relief  She has no clicking, no locking, no pain  She denies any numbness or tingling      PAST MEDICAL HISTORY:  Past Medical History:   Diagnosis Date   • Anxiety    • Asthma    • Depression    • History of Clostridium difficile colitis 2018    x 2 episodes, after antibiotics   • Hypercholesterolemia    • Hyperlipemia    • Morbid obesity ([address] )    • Pulmonary nodule    • Sleep apnea     c pap       PAST SURGICAL HISTORY:  Past Surgical History:   Procedure Laterality Date   • [place] CONIZATION CERVIX,LOOP ELECTRD N/A 9/27/2018    Procedure: BIOPSY LEEP CERVIX;  Surgeon: Mery Braun DO;  Location: [facility];  Service: Gynecology   • REDUCTION MAMMAPLASTY     • TONSILLECTOMY         FAMILY HISTORY:  Family History   Problem Relation Age of Onset   • Asthma Mother    • Obesity Mother    • Lung cancer Father 50   • Hyperlipidemia Father    • Bone cancer Father         mets from lung cancer   • No Known Problems Sister    • No Known Problems Brother    • Arthritis Maternal Grandmother    • COPD Maternal Grandmother    • Cancer Maternal Grandfather         Lung ca-   • Lung cancer Maternal Grandfather [de-identified]   • Liver cancer Paternal Grandfather [de-identified]   • Cancer Paternal Grandfather         Liver ca-   • No Known Problems Paternal Grandmother    • No Known Problems Maternal Aunt    • No Known Problems Maternal Aunt    • No Known Problems Maternal Aunt    • No Known Problems Paternal Aunt        SOCIAL HISTORY:  Social History     Tobacco Use   • Smoking status: Every Day     Packs/day: 0 50     Years: 18 00     Pack years: 9 00     Types: Cigarettes   • Smokeless tobacco: Never   • Tobacco comments:     is on the patch    Vaping Use   • Vaping Use: Never used   Substance Use Topics   • Alcohol use:  Yes     Alcohol/week: 0 0 standard drinks     Comment: occasional drink here and there   • Drug use: No       MEDICATIONS:    Current Outpatient Medications:   •  ALPRAZolam (XANAX) 0 25 mg tablet, , Disp: , Rfl:   •  cholecalciferol (VITAMIN D3) 1,000 units tablet, Take by mouth daily  , Disp: , Rfl:   •  DULoxetine (CYMBALTA) 60 mg delayed release capsule, Take 1 capsule (60 mg total) by mouth daily, Disp: 90 capsule, Rfl: 0  •  fenofibrate 160 MG tablet, Take 1 tablet (160 mg total) by mouth daily, Disp: 90 tablet, Rfl: 0  •  hydrocortisone 2 5 % cream, Apply topically 2 (two) times a day for 14 days Apply 1-2x a day topically to eyebrows for 14 days, Disp: 30 g, Rfl: 0  •  ketoconazole (NIZORAL) 2 % shampoo, Apply 1 application topically in the morning for 30 doses Daily for 2 weeks straight and then on "Mondays, Wednesdays and Fridays":  Lather into scalp and skin on face, neck, chest, and back; leave on for 5 minutes and then rinse off completely  , Disp: 120 mL, Rfl: 2  •  PREVIDENT 5000 SENSITIVE 1 1-5 % PSTE, , Disp: , Rfl: 3  •  rosuvastatin (CRESTOR) 20 MG tablet, Take 1 tablet (20 mg total) by mouth daily, Disp: 90 tablet, Rfl: 0  •  SF 5000 Plus 1 1 % CREA, , Disp: , Rfl:   •  SODIUM FLUORIDE, DENTAL GEL, 1 1 % GEL, SF 5000 Plus 1 1 % dental cream, Disp: , Rfl:   •  valsartan-hydrochlorothiazide (DIOVAN-HCT) 80-12 5 MG per tablet, Take 1 tablet by mouth daily, Disp: 90 tablet, Rfl: 0    ALLERGIES:  No Known Allergies    REVIEW OF SYSTEMS:  Pertinent items are noted in HPI  A comprehensive review of systems was negative  LABS:  HgA1c:   Lab Results   Component Value Date    HGBA1C 5 1 04/19/2022     BMP:   Lab Results   Component Value Date    GLUCOSE 164 (H) 12/29/2018    CALCIUM 9 1 02/01/2022     09/15/2015    K 3 8 02/01/2022    CO2 26 02/01/2022     02/01/2022    BUN 13 02/01/2022    CREATININE 0 77 02/01/2022           _____________________________________________________  PHYSICAL EXAMINATION:  Vital signs: /70   Ht 5' 2" (1 575 m)   Wt 108 kg (239 lb)   BMI 43 71 kg/m²   General: well developed and well nourished, alert, oriented times 3 and appears comfortable  Psychiatric: Normal  HEENT: Trachea Midline, No torticollis  Cardiovascular: No discernable arrhythmia  Pulmonary: No wheezing or stridor  Abdomen: No rebound or guarding  Extremities: No peripheral edema  Skin: No masses, erythema, lacerations, fluctation, ulcerations  Neurovascular: Sensation Intact to the Median, Ulnar, Radial Nerve, Motor Intact to the Median, Ulnar, Radial Nerve and Pulses Intact    MUSCULOSKELETAL EXAMINATION:  left index finger:  Positive palpable nodule over the A1 pulley  Negative tenderness to palpation over A1 pulley  Negative clicking  Negative catching  _____________________________________________________  STUDIES REVIEWED:  No Studies to review      PROCEDURES PERFORMED:  Procedures  No Procedures performed today

## 2023-01-06 ENCOUNTER — OFFICE VISIT (OUTPATIENT)
Dept: FAMILY MEDICINE CLINIC | Facility: CLINIC | Age: 43
End: 2023-01-06

## 2023-01-06 VITALS
HEIGHT: 62 IN | WEIGHT: 236.4 LBS | RESPIRATION RATE: 16 BRPM | BODY MASS INDEX: 43.5 KG/M2 | HEART RATE: 95 BPM | TEMPERATURE: 97.6 F | SYSTOLIC BLOOD PRESSURE: 110 MMHG | OXYGEN SATURATION: 99 % | DIASTOLIC BLOOD PRESSURE: 60 MMHG

## 2023-01-06 DIAGNOSIS — I88.9 AXILLARY LYMPHADENITIS: Primary | ICD-10-CM

## 2023-01-06 NOTE — PROGRESS NOTES
Assessment/Plan:         Problem List Items Addressed This Visit        Immune and Lymphatic    Axillary lymphadenitis - Primary     Moist heat packs 3-4 times a day  Patient deferred to take antibiotic as she had a history of C  difficile colitis in the past   May take Aleve 2 tablets twice a day with food for 1 week  If not improving or getting worse get back to us or go to the emergency room otherwise if it drains then she would potentially get better              Subjective:      Patient ID: Hector Martinez is a 40 y o  female  Patient for sick visit complaining of lump in the right armpit which is painful no drainage no fever or chills      The following portions of the patient's history were reviewed and updated as appropriate:   Past Medical History:  She has a past medical history of Anxiety, Asthma, Depression, History of Clostridium difficile colitis (2018), Hypercholesterolemia, Hyperlipemia, Morbid obesity ([address] ), Pulmonary nodule, and Sleep apnea ,  _______________________________________________________________________  Medical Problems:  does not have any pertinent problems on file ,  _______________________________________________________________________  Past Surgical History:   has a past surgical history that includes Tonsillectomy; Reduction mammaplasty (1999); and pr conization cervix w/wo d&c rpr eltrd exc (N/A, 9/27/2018)  ,  _______________________________________________________________________  Family History:  family history includes Arthritis in her maternal grandmother; Asthma in her mother; Bone cancer in her father; COPD in her maternal grandmother; Cancer in her maternal grandfather and paternal grandfather; Hyperlipidemia in her father; Liver cancer (age of onset: [de-identified]) in her paternal grandfather; Lung cancer (age of onset: 50) in her father; Lung cancer (age of onset: [de-identified]) in her maternal grandfather; No Known Problems in her brother, maternal aunt, maternal aunt, maternal aunt, paternal aunt, paternal grandmother, and sister; Obesity in her mother ,  _______________________________________________________________________  Social History:   reports that she has been smoking cigarettes  She has a 9 00 pack-year smoking history  She has been exposed to tobacco smoke  She has never used smokeless tobacco  She reports current alcohol use  She reports that she does not use drugs  ,  _______________________________________________________________________  Allergies:  has No Known Allergies     _______________________________________________________________________  Current Outpatient Medications   Medication Sig Dispense Refill   • ALPRAZolam (XANAX) 0 25 mg tablet      • cholecalciferol (VITAMIN D3) 1,000 units tablet Take by mouth daily       • DULoxetine (CYMBALTA) 60 mg delayed release capsule Take 1 capsule (60 mg total) by mouth daily 90 capsule 0   • fenofibrate 160 MG tablet Take 1 tablet (160 mg total) by mouth daily 90 tablet 0   • PREVIDENT 5000 SENSITIVE 1 1-5 % PSTE   3   • rosuvastatin (CRESTOR) 20 MG tablet Take 1 tablet (20 mg total) by mouth daily 90 tablet 0   • SF 5000 Plus 1 1 % CREA      • SODIUM FLUORIDE, DENTAL GEL, 1 1 % GEL SF 5000 Plus 1 1 % dental cream     • valsartan-hydrochlorothiazide (DIOVAN-HCT) 80-12 5 MG per tablet Take 1 tablet by mouth daily 90 tablet 0   • hydrocortisone 2 5 % cream Apply topically 2 (two) times a day for 14 days Apply 1-2x a day topically to eyebrows for 14 days 30 g 0   • ketoconazole (NIZORAL) 2 % shampoo Apply 1 application topically in the morning for 30 doses Daily for 2 weeks straight and then on "Mondays, Wednesdays and Fridays":  Lather into scalp and skin on face, neck, chest, and back; leave on for 5 minutes and then rinse off completely   120 mL 2     No current facility-administered medications for this visit      _______________________________________________________________________  Review of Systems   Constitutional: Negative for chills, fatigue and fever  HENT: Negative for congestion, ear pain, rhinorrhea, sneezing and sore throat  Eyes: Negative for redness, itching and visual disturbance  Respiratory: Negative for cough, chest tightness and shortness of breath  Cardiovascular: Negative for chest pain, palpitations and leg swelling  Gastrointestinal: Negative for abdominal pain, blood in stool, diarrhea, nausea and vomiting  Endocrine: Negative for cold intolerance and heat intolerance  Genitourinary: Negative for dysuria, frequency and urgency  Musculoskeletal: Negative for arthralgias, back pain and myalgias  Skin: Negative for color change and rash  Neurological: Negative for dizziness, weakness, light-headedness, numbness and headaches  Hematological: Does not bruise/bleed easily  Psychiatric/Behavioral: Negative for agitation, behavioral problems and confusion  Objective:  Vitals:    01/06/23 1211   BP: 110/60   BP Location: Left arm   Patient Position: Sitting   Cuff Size: Standard   Pulse: 95   Resp: 16   Temp: 97 6 °F (36 4 °C)   TempSrc: Tympanic   SpO2: 99%   Weight: 107 kg (236 lb 6 4 oz)   Height: 5' 2" (1 575 m)     Body mass index is 43 24 kg/m²  Physical Exam  Vitals and nursing note reviewed  Constitutional:       General: She is not in acute distress  Appearance: Normal appearance  She is obese  She is not ill-appearing, toxic-appearing or diaphoretic  HENT:      Head: Normocephalic and atraumatic  Right Ear: Tympanic membrane normal       Left Ear: Tympanic membrane normal       Nose: Nose normal  No congestion  Mouth/Throat:      Mouth: Mucous membranes are moist    Eyes:      General: No scleral icterus  Right eye: No discharge  Left eye: No discharge  Extraocular Movements: Extraocular movements intact  Conjunctiva/sclera: Conjunctivae normal       Pupils: Pupils are equal, round, and reactive to light  Neck:      Comments:  There is a small inflamed lymph node right axilla with slight erythema  Cardiovascular:      Rate and Rhythm: Normal rate and regular rhythm  Pulses: Normal pulses  Heart sounds: Normal heart sounds  No murmur heard  No gallop  Pulmonary:      Effort: Pulmonary effort is normal  No respiratory distress  Breath sounds: Normal breath sounds  No wheezing, rhonchi or rales  Abdominal:      General: Abdomen is flat  Bowel sounds are normal  There is no distension  Palpations: Abdomen is soft  Tenderness: There is no abdominal tenderness  There is no guarding  Musculoskeletal:         General: No swelling or tenderness  Normal range of motion  Cervical back: Normal range of motion and neck supple  No rigidity  Lymphadenopathy:      Cervical: No cervical adenopathy  Skin:     General: Skin is warm  Capillary Refill: Capillary refill takes 2 to 3 seconds  Coloration: Skin is not jaundiced  Findings: No bruising or rash  Neurological:      General: No focal deficit present  Mental Status: She is alert and oriented to person, place, and time  Mental status is at baseline        Gait: Gait normal    Psychiatric:         Mood and Affect: Mood normal

## 2023-01-06 NOTE — ASSESSMENT & PLAN NOTE
Moist heat packs 3-4 times a day  Patient deferred to take antibiotic as she had a history of C  difficile colitis in the past   May take Aleve 2 tablets twice a day with food for 1 week    If not improving or getting worse get back to us or go to the emergency room otherwise if it drains then she would potentially get better

## 2023-01-09 ENCOUNTER — HOSPITAL ENCOUNTER (OUTPATIENT)
Dept: RADIOLOGY | Facility: HOSPITAL | Age: 43
Discharge: HOME | End: 2023-01-09
Attending: RADIOLOGY

## 2023-01-09 ENCOUNTER — TELEPHONE (OUTPATIENT)
Dept: FAMILY MEDICINE CLINIC | Facility: CLINIC | Age: 43
End: 2023-01-09

## 2023-01-09 DIAGNOSIS — I88.9 LYMPHADENITIS: ICD-10-CM

## 2023-01-09 DIAGNOSIS — I88.9 AXILLARY LYMPHADENITIS: Primary | ICD-10-CM

## 2023-01-09 DIAGNOSIS — I88.9 LYMPHADENITIS: Primary | ICD-10-CM

## 2023-01-09 DIAGNOSIS — L02.419 AXILLARY ABSCESS: Primary | ICD-10-CM

## 2023-01-09 RX ORDER — CEFADROXIL 500 MG/1
500 CAPSULE ORAL EVERY 12 HOURS SCHEDULED
Qty: 14 CAPSULE | Refills: 0 | Status: SHIPPED | OUTPATIENT
Start: 2023-01-09 | End: 2023-01-12

## 2023-01-09 RX ORDER — LIDOCAINE HYDROCHLORIDE 10 MG/ML
INJECTION, SOLUTION EPIDURAL; INFILTRATION; INTRACAUDAL; PERINEURAL AS NEEDED
Status: COMPLETED | OUTPATIENT
Start: 2023-01-09 | End: 2023-01-09

## 2023-01-09 RX ADMIN — LIDOCAINE HYDROCHLORIDE 10 ML: 10 INJECTION, SOLUTION EPIDURAL; INFILTRATION; INTRACAUDAL; PERINEURAL at 17:02

## 2023-01-09 NOTE — SEDATION DOCUMENTATION
Right axillary abscess aspirated by Dr Rob Nassar without complications  Procedure tolerated well by patient

## 2023-01-09 NOTE — TELEPHONE ENCOUNTER
----- Message from Colt Yeung RN sent at 1/9/2023  9:23 AM EST -----  Regarding: Lump in armpit getting bigger  Contact: [phone]  Came in from [facility]      ----- Message -----  From: Malika Dolan  Sent: 1/9/2023   9:20 AM EST  To: [id number]  Subject: Lump in armpit getting bigger                    Hello! My lump is getting bigger and hurts more  Can you call in the antibiotics, or what should i do?  Thanks  Soraya Velarde

## 2023-01-09 NOTE — TELEPHONE ENCOUNTER
----- Message from Emily French RN sent at 1/9/2023  9:23 AM EST -----  Regarding: Lump in armpit getting bigger  Contact: [phone]  Came in from [facility]      ----- Message -----  From: Holley Hightower  Sent: 1/9/2023   9:20 AM EST  To: [id number]  Subject: Lump in armpit getting bigger                    Hello! My lump is getting bigger and hurts more  Can you call in the antibiotics, or what should i do?  Thanks  Soraya Velarde

## 2023-01-09 NOTE — TELEPHONE ENCOUNTER
The nurse called the pt and informed her that an antibiotic was sent to the pharmacy  Also if pt gets diarrhea she needs to be tested for C diff   Pt understood

## 2023-01-09 NOTE — BRIEF OP NOTE (RAD/CATH)
INTERVENTIONAL RADIOLOGY PROCEDURE NOTE    Date: 1/9/2023    Procedure:   Procedure Summary     Date: 01/09/23 Room / Location: [address] [place] [address] Interventional Radiology    Anesthesia Start:  Anesthesia Stop:     Procedure: IR ASPIRATION ONLY Diagnosis:       Lymphadenitis      (lymphadenitis)    Scheduled Providers: Brandon Holloway MD Responsible Provider:     Anesthesia Type: Not recorded ASA Status: Not recorded          Preoperative diagnosis:   1  Lymphadenitis         Postoperative diagnosis: Same  Surgeon: Brandon Holloway MD     Assistant: None  No qualified resident was available  Blood loss: 0    Specimens: culture     Findings: R axillary node  3cc thick gelatinous pus aspirated    Complications: None immediate      Anesthesia: local

## 2023-01-10 ENCOUNTER — HOSPITAL ENCOUNTER (INPATIENT)
Facility: HOSPITAL | Age: 43
LOS: 2 days | Discharge: HOME | End: 2023-01-12
Attending: EMERGENCY MEDICINE | Admitting: INTERNAL MEDICINE

## 2023-01-10 ENCOUNTER — APPOINTMENT (INPATIENT)
Dept: RADIOLOGY | Facility: HOSPITAL | Age: 43
End: 2023-01-10

## 2023-01-10 DIAGNOSIS — L03.119 CELLULITIS OF AXILLARY REGION: ICD-10-CM

## 2023-01-10 DIAGNOSIS — L02.91 ABSCESS: Primary | ICD-10-CM

## 2023-01-10 DIAGNOSIS — L03.90 CELLULITIS: ICD-10-CM

## 2023-01-10 DIAGNOSIS — I88.9 AXILLARY LYMPHADENITIS: ICD-10-CM

## 2023-01-10 PROBLEM — R65.10 SIRS (SYSTEMIC INFLAMMATORY RESPONSE SYNDROME) (HCC): Status: ACTIVE | Noted: 2023-01-10

## 2023-01-10 PROBLEM — A41.9 SEPSIS (HCC): Status: ACTIVE | Noted: 2023-01-10

## 2023-01-10 LAB
ALBUMIN SERPL BCP-MCNC: 3.6 G/DL (ref 3.5–5)
ALP SERPL-CCNC: 62 U/L (ref 46–116)
ALT SERPL W P-5'-P-CCNC: 27 U/L (ref 12–78)
ANION GAP SERPL CALCULATED.3IONS-SCNC: 6 MMOL/L (ref 4–13)
APTT PPP: 29 SECONDS (ref 23–37)
AST SERPL W P-5'-P-CCNC: 13 U/L (ref 5–45)
ATRIAL RATE: 97 BPM
BASOPHILS # BLD AUTO: 0.07 THOUSANDS/ÂΜL (ref 0–0.1)
BASOPHILS NFR BLD AUTO: 1 % (ref 0–1)
BILIRUB SERPL-MCNC: 0.38 MG/DL (ref 0.2–1)
BUN SERPL-MCNC: 15 MG/DL (ref 5–25)
CALCIUM SERPL-MCNC: 8.8 MG/DL (ref 8.3–10.1)
CHLORIDE SERPL-SCNC: 107 MMOL/L (ref 96–108)
CO2 SERPL-SCNC: 24 MMOL/L (ref 21–32)
CREAT SERPL-MCNC: 0.96 MG/DL (ref 0.6–1.3)
EOSINOPHIL # BLD AUTO: 0.29 THOUSAND/ÂΜL (ref 0–0.61)
EOSINOPHIL NFR BLD AUTO: 2 % (ref 0–6)
ERYTHROCYTE [DISTWIDTH] IN BLOOD BY AUTOMATED COUNT: 13.3 % (ref 11.6–15.1)
GFR SERPL CREATININE-BSD FRML MDRD: 73 ML/MIN/1.73SQ M
GLUCOSE SERPL-MCNC: 96 MG/DL (ref 65–140)
HCG SERPL QL: NEGATIVE
HCT VFR BLD AUTO: 37.6 % (ref 34.8–46.1)
HGB BLD-MCNC: 12 G/DL (ref 11.5–15.4)
IMM GRANULOCYTES # BLD AUTO: 0.06 THOUSAND/UL (ref 0–0.2)
IMM GRANULOCYTES NFR BLD AUTO: 1 % (ref 0–2)
INR PPP: 0.93 (ref 0.84–1.19)
LACTATE SERPL-SCNC: 1.2 MMOL/L (ref 0.5–2)
LYMPHOCYTES # BLD AUTO: 1.8 THOUSANDS/ÂΜL (ref 0.6–4.47)
LYMPHOCYTES NFR BLD AUTO: 14 % (ref 14–44)
MCH RBC QN AUTO: 25.3 PG (ref 26.8–34.3)
MCHC RBC AUTO-ENTMCNC: 31.9 G/DL (ref 31.4–37.4)
MCV RBC AUTO: 79 FL (ref 82–98)
MONOCYTES # BLD AUTO: 0.92 THOUSAND/ÂΜL (ref 0.17–1.22)
MONOCYTES NFR BLD AUTO: 7 % (ref 4–12)
NEUTROPHILS # BLD AUTO: 9.78 THOUSANDS/ÂΜL (ref 1.85–7.62)
NEUTS SEG NFR BLD AUTO: 75 % (ref 43–75)
NRBC BLD AUTO-RTO: 0 /100 WBCS
P AXIS: 44 DEGREES
PLATELET # BLD AUTO: 347 THOUSANDS/UL (ref 149–390)
PMV BLD AUTO: 9.1 FL (ref 8.9–12.7)
POTASSIUM SERPL-SCNC: 4.1 MMOL/L (ref 3.5–5.3)
PR INTERVAL: 142 MS
PROT SERPL-MCNC: 6.7 G/DL (ref 6.4–8.4)
PROTHROMBIN TIME: 12.7 SECONDS (ref 11.6–14.5)
QRS AXIS: 23 DEGREES
QRSD INTERVAL: 90 MS
QT INTERVAL: 316 MS
QTC INTERVAL: 401 MS
RBC # BLD AUTO: 4.74 MILLION/UL (ref 3.81–5.12)
SODIUM SERPL-SCNC: 137 MMOL/L (ref 135–147)
T WAVE AXIS: 37 DEGREES
VENTRICULAR RATE: 97 BPM
WBC # BLD AUTO: 12.92 THOUSAND/UL (ref 4.31–10.16)

## 2023-01-10 RX ORDER — DULOXETIN HYDROCHLORIDE 60 MG/1
60 CAPSULE, DELAYED RELEASE ORAL DAILY
Status: DISCONTINUED | OUTPATIENT
Start: 2023-01-10 | End: 2023-01-12 | Stop reason: HOSPADM

## 2023-01-10 RX ORDER — FENOFIBRATE 48 MG/1
160 TABLET, COATED ORAL DAILY
Status: DISCONTINUED | OUTPATIENT
Start: 2023-01-10 | End: 2023-01-12 | Stop reason: HOSPADM

## 2023-01-10 RX ORDER — ACETAMINOPHEN 325 MG/1
650 TABLET ORAL EVERY 6 HOURS PRN
Status: CANCELLED | OUTPATIENT
Start: 2023-01-10

## 2023-01-10 RX ORDER — MELATONIN
1000 DAILY
Status: DISCONTINUED | OUTPATIENT
Start: 2023-01-10 | End: 2023-01-12 | Stop reason: HOSPADM

## 2023-01-10 RX ORDER — KETOROLAC TROMETHAMINE 30 MG/ML
15 INJECTION, SOLUTION INTRAMUSCULAR; INTRAVENOUS EVERY 6 HOURS PRN
Status: CANCELLED | OUTPATIENT
Start: 2023-01-10 | End: 2023-01-13

## 2023-01-10 RX ORDER — KETOROLAC TROMETHAMINE 30 MG/ML
15 INJECTION, SOLUTION INTRAMUSCULAR; INTRAVENOUS EVERY 6 HOURS PRN
Status: DISCONTINUED | OUTPATIENT
Start: 2023-01-10 | End: 2023-01-12 | Stop reason: HOSPADM

## 2023-01-10 RX ORDER — ACETAMINOPHEN 325 MG/1
650 TABLET ORAL EVERY 6 HOURS PRN
Status: DISCONTINUED | OUTPATIENT
Start: 2023-01-10 | End: 2023-01-12 | Stop reason: HOSPADM

## 2023-01-10 RX ORDER — PRAVASTATIN SODIUM 40 MG
40 TABLET ORAL
Status: DISCONTINUED | OUTPATIENT
Start: 2023-01-10 | End: 2023-01-10

## 2023-01-10 RX ORDER — PRAVASTATIN SODIUM 80 MG/1
80 TABLET ORAL
Status: DISCONTINUED | OUTPATIENT
Start: 2023-01-10 | End: 2023-01-12 | Stop reason: HOSPADM

## 2023-01-10 RX ORDER — ENOXAPARIN SODIUM 100 MG/ML
40 INJECTION SUBCUTANEOUS DAILY
Status: DISCONTINUED | OUTPATIENT
Start: 2023-01-10 | End: 2023-01-12 | Stop reason: HOSPADM

## 2023-01-10 RX ADMIN — KETOROLAC TROMETHAMINE 15 MG: 30 INJECTION, SOLUTION INTRAMUSCULAR; INTRAVENOUS at 08:56

## 2023-01-10 RX ADMIN — PRAVASTATIN SODIUM 80 MG: 80 TABLET ORAL at 16:51

## 2023-01-10 RX ADMIN — ENOXAPARIN SODIUM 40 MG: 40 INJECTION SUBCUTANEOUS at 08:56

## 2023-01-10 RX ADMIN — SODIUM CHLORIDE 1000 ML: 0.9 INJECTION, SOLUTION INTRAVENOUS at 02:48

## 2023-01-10 RX ADMIN — VANCOMYCIN HYDROCHLORIDE 750 MG: 750 INJECTION, SOLUTION INTRAVENOUS at 16:57

## 2023-01-10 RX ADMIN — KETOROLAC TROMETHAMINE 15 MG: 30 INJECTION, SOLUTION INTRAMUSCULAR; INTRAVENOUS at 16:53

## 2023-01-10 RX ADMIN — DULOXETINE HYDROCHLORIDE 60 MG: 60 CAPSULE, DELAYED RELEASE ORAL at 08:56

## 2023-01-10 RX ADMIN — FENOFIBRATE 168 MG: 48 TABLET ORAL at 16:51

## 2023-01-10 RX ADMIN — VANCOMYCIN HYDROCHLORIDE 1750 MG: 10 INJECTION, POWDER, LYOPHILIZED, FOR SOLUTION INTRAVENOUS at 05:11

## 2023-01-10 RX ADMIN — CHOLECALCIFEROL TAB 25 MCG (1000 UNIT) 1000 UNITS: 25 TAB at 08:56

## 2023-01-10 NOTE — ED PROVIDER NOTES
History  Chief Complaint   Patient presents with   • Cyst     Pt had cyst under R armpit drained with cultures from drained fluid collected in IR around 1700 earlier today  Developed fever, pain, chills and lump at drainage site  Started on abx today      31-year-old female with history of abscess versus lymphadenitis that was drained by interventional radiology yesterday presenting to the emergency department due to sudden onset fevers, chills, malaise, myalgias, and nausea  Patient notes that in IR she had a small amount of fluid draining from it  They told her that there was also a deeper collection which they attempted to access  When she started to feel ill she took her temperature and it was only 101 5  She has taken 1 dose of cefadroxil postoperatively  She also endorses tenderness at the site with erythema around the area  She denies any other complaints at this time  Prior to Admission Medications   Prescriptions Last Dose Informant Patient Reported? Taking?    ALPRAZolam (XANAX) 0 25 mg tablet Not Taking  Yes No   Patient not taking: Reported on 1/10/2023   DULoxetine (CYMBALTA) 60 mg delayed release capsule 1/9/2023  No Yes   Sig: Take 1 capsule (60 mg total) by mouth daily   PREVIDENT 5000 SENSITIVE 1 1-5 % PSTE 1/9/2023  Yes Yes   SF 5000 Plus 1 1 % CREA Not Taking  Yes No   Patient not taking: Reported on 1/10/2023   SODIUM FLUORIDE, DENTAL GEL, 1 1 % GEL 1/9/2023  Yes Yes   Sig: SF 5000 Plus 1 1 % dental cream   cefadroxil (DURICEF) 500 mg capsule 1/9/2023  No Yes   Sig: Take 1 capsule (500 mg total) by mouth every 12 (twelve) hours for 7 days   cholecalciferol (VITAMIN D3) 1,000 units tablet 1/9/2023  Yes Yes   Sig: Take by mouth daily     fenofibrate 160 MG tablet 1/9/2023  No Yes   Sig: Take 1 tablet (160 mg total) by mouth daily   hydrocortisone 2 5 % cream   No No   Sig: Apply topically 2 (two) times a day for 14 days Apply 1-2x a day topically to eyebrows for 14 days   ketoconazole (NIZORAL) 2 % shampoo   No No   Sig: Apply 1 application topically in the morning for 30 doses Daily for 2 weeks straight and then on ",  and ":  Lather into scalp and skin on face, neck, chest, and back; leave on for 5 minutes and then rinse off completely     rosuvastatin (CRESTOR) 20 MG tablet 2023  No Yes   Sig: Take 1 tablet (20 mg total) by mouth daily   valsartan-hydrochlorothiazide (DIOVAN-HCT) 80-12 5 MG per tablet 2023  No Yes   Sig: Take 1 tablet by mouth daily      Facility-Administered Medications: None       Past Medical History:   Diagnosis Date   • Anxiety    • Asthma    • Depression    • History of Clostridium difficile colitis 2018    x 2 episodes, after antibiotics   • Hypercholesterolemia    • Hyperlipemia    • Morbid obesity ([address] )    • Pulmonary nodule    • Sleep apnea     c pap       Past Surgical History:   Procedure Laterality Date   • IR ASPIRATION ONLY  2023   • MT CONIZATION CERVIX W/WO D&C RPR ELTRD EXC N/A 2018    Procedure: BIOPSY LEEP CERVIX;  Surgeon: Freeman Pires DO;  Location: [facility];  Service: Gynecology   • REDUCTION MAMMAPLASTY     • TONSILLECTOMY         Family History   Problem Relation Age of Onset   • Asthma Mother    • Obesity Mother    • Lung cancer Father 50   • Hyperlipidemia Father    • Bone cancer Father         mets from lung cancer   • No Known Problems Sister    • No Known Problems Brother    • Arthritis Maternal Grandmother    • COPD Maternal Grandmother    • Cancer Maternal Grandfather         Lung ca-   • Lung cancer Maternal Grandfather [de-identified]   • Liver cancer Paternal Grandfather [de-identified]   • Cancer Paternal Grandfather         Liver ca-   • No Known Problems Paternal Grandmother    • No Known Problems Maternal Aunt    • No Known Problems Maternal Aunt    • No Known Problems Maternal Aunt    • No Known Problems Paternal Aunt      I have reviewed and agree with the history as documented  E-Cigarette/Vaping   • E-Cigarette Use Never User      E-Cigarette/Vaping Substances   • Nicotine No    • THC No    • CBD No    • Flavoring No    • Other No    • Unknown No      Social History     Tobacco Use   • Smoking status: Every Day     Packs/day: 0 50     Years: 18 00     Pack years: 9 00     Types: Cigarettes     Passive exposure: Past   • Smokeless tobacco: Never   • Tobacco comments:     is on the patch    Vaping Use   • Vaping Use: Never used   Substance Use Topics   • Alcohol use: Yes     Alcohol/week: 0 0 standard drinks     Comment: occasional drink here and there   • Drug use: No        Review of Systems   Constitutional: Positive for chills and fever  HENT: Negative for ear pain and sore throat  Eyes: Negative for pain and visual disturbance  Respiratory: Negative for cough and shortness of breath  Cardiovascular: Negative for chest pain and palpitations  Gastrointestinal: Positive for nausea  Negative for abdominal pain and vomiting  Genitourinary: Negative for dysuria and hematuria  Musculoskeletal: Negative for arthralgias and back pain  Skin: Negative for color change and rash  Neurological: Negative for seizures and syncope  All other systems reviewed and are negative  Physical Exam  ED Triage Vitals [01/10/23 0031]   Temperature Pulse Respirations Blood Pressure SpO2   99 8 °F (37 7 °C) (!) 121 18 139/84 99 %      Temp Source Heart Rate Source Patient Position - Orthostatic VS BP Location FiO2 (%)   Tympanic Monitor Sitting Right arm --      Pain Score       8             Orthostatic Vital Signs  Vitals:    01/10/23 2229 01/11/23 0751 01/11/23 1521 01/12/23 1354   BP: 115/64 113/65 130/76 124/74   Pulse:    68   Patient Position - Orthostatic VS:    Lying       Physical Exam  Vitals and nursing note reviewed  Constitutional:       General: She is not in acute distress  Appearance: She is well-developed     HENT:      Head: Normocephalic and atraumatic  Eyes:      Conjunctiva/sclera: Conjunctivae normal    Cardiovascular:      Rate and Rhythm: Normal rate and regular rhythm  Heart sounds: No murmur heard  Pulmonary:      Effort: Pulmonary effort is normal  No respiratory distress  Breath sounds: Normal breath sounds  Abdominal:      Palpations: Abdomen is soft  Tenderness: There is no abdominal tenderness  Musculoskeletal:         General: Swelling ( Right axilla) and tenderness present  Cervical back: Neck supple  Skin:     General: Skin is warm and dry  Capillary Refill: Capillary refill takes less than 2 seconds  Comments: 2 x 2 centimeter area of erythema in right axilla   Neurological:      Mental Status: She is alert  Psychiatric:         Mood and Affect: Mood normal          ED Medications  Medications   sodium chloride 0 9 % bolus 1,000 mL (0 mL Intravenous Stopped 1/10/23 0455)   vancomycin (VANCOCIN) 1,750 mg in sodium chloride 0 9 % 500 mL IVPB (0 mg Intravenous Stopped 1/10/23 0720)   lidocaine (PF) (XYLOCAINE-MPF) 1 % injection 5 mL (5 mL Infiltration Given 1/11/23 1025)   vancomycin (VANCOCIN) IVPB (premix in dextrose) 750 mg 150 mL (750 mg Intravenous New Bag 1/11/23 1746)       Diagnostic Studies  Results Reviewed     Procedure Component Value Units Date/Time    Blood culture #1 [[id number]] Collected: 01/10/23 0246    Lab Status: Preliminary result Specimen: Blood from Arm, Left Updated: 01/13/23 0701     Blood Culture No Growth at 72 hrs  Blood culture #2 [[id number]] Collected: 01/10/23 0246    Lab Status: Preliminary result Specimen: Blood from Arm, Right Updated: 01/13/23 0701     Blood Culture No Growth at 72 hrs      CBC and differential [[id number]]  (Abnormal) Collected: 01/11/23 0445    Lab Status: Final result Specimen: Blood from Arm, Left Updated: 01/11/23 0701     WBC 8 62 Thousand/uL      RBC 4 03 Million/uL      Hemoglobin 10 1 g/dL      Hematocrit 32 8 %      MCV 81 fL      MCH 25 1 pg      MCHC 30 8 g/dL      RDW 13 5 %      MPV 9 2 fL      Platelets 417 Thousands/uL      nRBC 0 /100 WBCs      Neutrophils Relative 60 %      Immat GRANS % 1 %      Lymphocytes Relative 25 %      Monocytes Relative 9 %      Eosinophils Relative 4 %      Basophils Relative 1 %      Neutrophils Absolute 5 30 Thousands/µL      Immature Grans Absolute 0 04 Thousand/uL      Lymphocytes Absolute 2 12 Thousands/µL      Monocytes Absolute 0 74 Thousand/µL      Eosinophils Absolute 0 36 Thousand/µL      Basophils Absolute 0 06 Thousands/µL     Basic metabolic panel [[id number]]  (Abnormal) Collected: 01/11/23 0445    Lab Status: Final result Specimen: Blood from Arm, Left Updated: 01/11/23 2441     Sodium 141 mmol/L      Potassium 4 0 mmol/L      Chloride 112 mmol/L      CO2 25 mmol/L      ANION GAP 4 mmol/L      BUN 14 mg/dL      Creatinine 0 96 mg/dL      Glucose 82 mg/dL      Calcium 8 3 mg/dL      eGFR 73 ml/min/1 73sq m     Narrative:      [place] guidelines for Chronic Kidney Disease (CKD):   •  Stage 1 with normal or high GFR (GFR > 90 mL/min/1 73 square meters)  •  Stage 2 Mild CKD (GFR = 60-89 mL/min/1 73 square meters)  •  Stage 3A Moderate CKD (GFR = 45-59 mL/min/1 73 square meters)  •  Stage 3B Moderate CKD (GFR = 30-44 mL/min/1 73 square meters)  •  Stage 4 Severe CKD (GFR = 15-29 mL/min/1 73 square meters)  •  Stage 5 End Stage CKD (GFR <15 mL/min/1 73 square meters)  Note: GFR calculation is accurate only with a steady state creatinine    hCG, serum, qualitative [[id number]]  (Normal) Collected: 01/10/23 0246    Lab Status: Final result Specimen: Blood from Arm, Left Updated: 01/10/23 1044     Preg, Serum Negative    Protime-INR [[id number]]  (Normal) Collected: 01/10/23 0246    Lab Status: Final result Specimen: Blood from Arm, Left Updated: 01/10/23 0347     Protime 12 7 seconds      INR 0 93    APTT [[id number]]  (Normal) Collected: 01/10/23 0246    Lab Status: Final result Specimen: Blood from Arm, Left Updated: 01/10/23 0347     PTT 29 seconds     Lactic acid [[id number]]  (Normal) Collected: 01/10/23 0246    Lab Status: Final result Specimen: Blood from Arm, Left Updated: 01/10/23 0335     LACTIC ACID 1 2 mmol/L     Narrative:      Result may be elevated if tourniquet was used during collection      Comprehensive metabolic panel [[id number]] Collected: 01/10/23 0246    Lab Status: Final result Specimen: Blood from Arm, Left Updated: 01/10/23 0334     Sodium 137 mmol/L      Potassium 4 1 mmol/L      Chloride 107 mmol/L      CO2 24 mmol/L      ANION GAP 6 mmol/L      BUN 15 mg/dL      Creatinine 0 96 mg/dL      Glucose 96 mg/dL      Calcium 8 8 mg/dL      AST 13 U/L      ALT 27 U/L      Alkaline Phosphatase 62 U/L      Total Protein 6 7 g/dL      Albumin 3 6 g/dL      Total Bilirubin 0 38 mg/dL      eGFR 73 ml/min/1 73sq m     Narrative:      National Kid[employer] Foundation guidelines for Chronic Kidney Disease (CKD):   •  Stage 1 with normal or high GFR (GFR > 90 mL/min/1 73 square meters)  •  Stage 2 Mild CKD (GFR = 60-89 mL/min/1 73 square meters)  •  Stage 3A Moderate CKD (GFR = 45-59 mL/min/1 73 square meters)  •  Stage 3B Moderate CKD (GFR = 30-44 mL/min/1 73 square meters)  •  Stage 4 Severe CKD (GFR = 15-29 mL/min/1 73 square meters)  •  Stage 5 End Stage CKD (GFR <15 mL/min/1 73 square meters)  Note: GFR calculation is accurate only with a steady state creatinine    CBC and differential [[id number]]  (Abnormal) Collected: 01/10/23 0246    Lab Status: Final result Specimen: Blood from Arm, Left Updated: 01/10/23 0259     WBC 12 92 Thousand/uL      RBC 4 74 Million/uL      Hemoglobin 12 0 g/dL      Hematocrit 37 6 %      MCV 79 fL      MCH 25 3 pg      MCHC 31 9 g/dL      RDW 13 3 %      MPV 9 1 fL      Platelets 988 Thousands/uL      nRBC 0 /100 WBCs      Neutrophils Relative 75 %      Immat GRANS % 1 %      Lymphocytes Relative 14 %      Monocytes Relative 7 %      Eosinophils Relative 2 %      Basophils Relative 1 %      Neutrophils Absolute 9 78 Thousands/µL      Immature Grans Absolute 0 06 Thousand/uL      Lymphocytes Absolute 1 80 Thousands/µL      Monocytes Absolute 0 92 Thousand/µL      Eosinophils Absolute 0 29 Thousand/µL      Basophils Absolute 0 07 Thousands/µL                  US axilla   Final Result by Lyubov Bravo MD (01/11 0815)      Complex lobulated abscess collection increased from earlier study given the purulent aspiration  Edema-like fluid seen coursing inferior to the superficial collection  Workstation performed: [id number]               Procedures  POC MSK/Soft Tissue US    Date/Time: 1/10/2023 2:00 AM  Performed by: Ken Sandhu MD  Authorized by: Ken Sandhu MD     Patient location:  ED  Performed by:  Resident  Other Assisting Provider: No    Procedure:     Performed: soft tissue ultrasound    Procedure details:     Exam Type:  Diagnostic    Longitudinal view:  Obtained    Transverse view:  Obtained    Image quality: diagnostic      Image availability:  Images available in PACS  Soft tissue ultrasound:     Soft tissue indications: suspected abscess      Anatomic location:  Axilla (Right)    Soft tissue findings: cobblestoning and subcutaneous collection (comment)      Soft tissue findings comment:  2 collections noted with tract connecting them measuring roughly 0 7x0 7cm each  Interpretation:     Soft tissue impressions: consistent with cellulitis and consistent with abscess            ED Course                             SBIRT 22yo+    Flowsheet Row Most Recent Value   SBIRT (25 yo +)    In order to provide better care to our patients, we are screening all of our patients for alcohol and drug use  Would it be okay to ask you these screening questions?  Unable to answer at this time Filed at: 01/10/2023 0145                Medical Decision Making  41-year-old female presenting to the emergency department due to likely abscess that was aspirated earlier today potentially leading to sepsis  Patient has afebrile on evaluation, but had measured temperature at home of 101 5 and is significantly tachycardic  Will obtain basic labs and start IV fluid plus IV antibiotics  Work-up is concerning for advancement of the infection  Discussed with patient possibilities including outpatient management with antibiotics versus inpatient IV antibiotics  Patient notes that with how rapidly her symptoms progressed today she would feel safer with staying for IV antibiotics  Patient was admitted for further treatment and management  Abscess: acute illness or injury  Cellulitis: acute illness or injury  Amount and/or Complexity of Data Reviewed  Labs: ordered  Risk  Decision regarding hospitalization  Disposition  Final diagnoses:   Abscess   Cellulitis     Time reflects when diagnosis was documented in both MDM as applicable and the Disposition within this note     Time User Action Codes Description Comment    1/10/2023  4:28 AM Ishantonino Flores Add [L02 91] Abscess     1/10/2023  4:28 AM Key David Prince Mason Add [L03 90] Cellulitis     1/10/2023  4:47 AM Iggy Lyons Add [I88 9] Axillary lymphadenitis     1/11/2023 10:33 AM Vernell Napoleon Lui Add [O72 790] Cellulitis of axillary region       ED Disposition     ED Disposition   Admit    Condition   Stable    Date/Time   Tue Ambrosio 10, 2023  4:28 AM    Comment   Case was discussed with Dr Cher Conley and the patient's admission status was agreed to be Admission Status: inpatient status to the service of Dr Wilman Tsai              Follow-up Information     Follow up With Specialties Details Why Contact Info Additional Information    Kennedy Hunter MD Internal Medicine Schedule an appointment as soon as possible for a visit in 1 week(s)  [address] [address] [facility] Schedule an appointment as soon as possible for a visit in 1 week(s) Please call for an appointment in the [address] [place] [address] in 1 week for re-evaluation of axillary wound  May call with questions or concerns  [facility] [facility], [address] [facility] [place], [address] [place] [address], [address] [facility]          Discharge Medication List as of 1/12/2023  5:45 PM      START taking these medications    Details   doxycycline hyclate (VIBRAMYCIN) 100 mg capsule Take 1 capsule (100 mg total) by mouth every 12 (twelve) hours for 9 doses, Starting Thu 1/12/2023, Until Tue 1/17/2023, Normal      ibuprofen (MOTRIN) 600 mg tablet Take 1 tablet (600 mg total) by mouth every 6 (six) hours as needed for mild pain for up to 10 doses, Starting Thu 1/12/2023, Normal      mupirocin (BACTROBAN) 2 % nasal ointment into each nostril 2 (two) times a day for 5 days, Starting Thu 1/12/2023, Until Tue 1/17/2023, Normal         CONTINUE these medications which have NOT CHANGED    Details   cholecalciferol (VITAMIN D3) 1,000 units tablet Take by mouth daily  , Historical Med      DULoxetine (CYMBALTA) 60 mg delayed release capsule Take 1 capsule (60 mg total) by mouth daily, Starting Wed 11/2/2022, Normal      fenofibrate 160 MG tablet Take 1 tablet (160 mg total) by mouth daily, Starting Wed 11/2/2022, Normal      hydrocortisone 2 5 % cream Apply topically 2 (two) times a day for 14 days Apply 1-2x a day topically to eyebrows for 14 days, Starting Thu 4/28/2022, Until Mon 12/19/2022, Normal      ketoconazole (NIZORAL) 2 % shampoo Apply 1 application topically in the morning for 30 doses Daily for 2 weeks straight and then on "Mondays, Wednesdays and Fridays":  Lather into scalp and skin on face, neck, chest, and back; leave on for 5 minutes and then rinse off completely  , Startin g Thu 4/28/2022, Until Mon 12/19/2022, Normal      PREVIDENT 5000 SENSITIVE 1 1-5 % PSTE Starting Th 9/12/2019, Historical Med      rosuvastatin (CRESTOR) 20 MG tablet Take 1 tablet (20 mg total) by mouth daily, Starting Wed 11/2/2022, Normal      SF 5000 Plus 1 1 % CREA Starting Thu 5/5/2022, Historical Med      SODIUM FLUORIDE, DENTAL GEL, 1 1 % GEL SF 5000 Plus 1 1 % dental cream, Historical Med      valsartan-hydrochlorothiazide (DIOVAN-HCT) 80-12 5 MG per tablet Take 1 tablet by mouth daily, Starting Wed 11/2/2022, Normal         STOP taking these medications       ALPRAZolam (XANAX) 0 25 mg tablet Comments:   Reason for Stopping:         cefadroxil (DURICEF) 500 mg capsule Comments:   Reason for Stopping:             No discharge procedures on file  PDMP Review     None           ED Provider  Attending physically available and evaluated Sharif Search  I managed the patient along with the ED Attending      Electronically Signed by         Bud Arevalo MD  01/13/23 4375

## 2023-01-10 NOTE — LETTER
[address] [facility] MED SURG 7  [address] [address]  Dept: [phone]    January 12, 2023     Patient: Charmaine Bray   YOB: 1980   Date of Visit: 1/10/2023       To Whom it May Concern:    Charmaine Bray is under my professional care  She was seen in the hospital from 1/10/2023 to 01/12/23  She may return to work on 1/19/2023 without limitations  If you have any questions or concerns, please don't hesitate to call           Sincerely,          Amber Redding MD

## 2023-01-10 NOTE — ASSESSMENT & PLAN NOTE
History of hypertension, on valsartan-hydrochlorothiazide combination pill  Currently on hold due to soft blood pressure  Restart when able

## 2023-01-10 NOTE — ASSESSMENT & PLAN NOTE
History of asthma currently not on any inhalers  Can start Atrovent as needed if worsening shortness of breath noted

## 2023-01-10 NOTE — PLAN OF CARE
Problem: PAIN - ADULT  Goal: Verbalizes/displays adequate comfort level or baseline comfort level  Description: Interventions:  - Encourage patient to monitor pain and request assistance  - Assess pain using appropriate pain scale  - Administer analgesics based on type and severity of pain and evaluate response  - Implement non-pharmacological measures as appropriate and evaluate response  - Consider cultural and social influences on pain and pain management  - Notify physician/advanced practitioner if interventions unsuccessful or patient reports new pain  Outcome: Not Progressing     Problem: INFECTION - ADULT  Goal: Absence or prevention of progression during hospitalization  Description: INTERVENTIONS:  - Assess and monitor for signs and symptoms of infection  - Monitor lab/diagnostic results  - Monitor all insertion sites, i e  indwelling lines, tubes, and drains  - Monitor endotracheal if appropriate and nasal secretions for changes in amount and color  - [place] appropriate cooling/warming therapies per order  - Administer medications as ordered  - Instruct and encourage patient and family to use good hand hygiene technique  - Identify and instruct in appropriate isolation precautions for identified infection/condition  Outcome: Not Progressing  Goal: Absence of fever/infection during neutropenic period  Description: INTERVENTIONS:  - Monitor WBC    Outcome: Not Progressing     Problem: SAFETY ADULT  Goal: Patient will remain free of falls  Description: INTERVENTIONS:  - Educate patient/family on patient safety including physical limitations  - Instruct patient to call for assistance with activity   - Consult OT/PT to assist with strengthening/mobility   - Keep Call bell within reach  - Keep bed low and locked with side rails adjusted as appropriate  - Keep care items and personal belongings within reach  - Initiate and maintain comfort rounds  - Make Fall Risk Sign visible to staff  - Offer Toileting every  Hours, in advance of need  - Initiate/Maintain alarm  - Obtain necessary fall risk management equipment:   - Apply yellow socks and bracelet for high fall risk patients  - Consider moving patient to room near nurses station  Outcome: Not Progressing  Goal: Maintain or return to baseline ADL function  Description: INTERVENTIONS:  -  Assess patient's ability to carry out ADLs; assess patient's baseline for ADL function and identify physical deficits which impact ability to perform ADLs (bathing, care of mouth/teeth, toileting, grooming, dressing, etc )  - Assess/evaluate cause of self-care deficits   - Assess range of motion  - Assess patient's mobility; develop plan if impaired  - Assess patient's need for assistive devices and provide as appropriate  - Encourage maximum independence but intervene and supervise when necessary  - Involve family in performance of ADLs  - Assess for home care needs following discharge   - Consider OT consult to assist with ADL evaluation and planning for discharge  - Provide patient education as appropriate  Outcome: Not Progressing  Goal: Maintains/Returns to pre admission functional level  Description: INTERVENTIONS:  - Perform BMAT or MOVE assessment daily    - Set and communicate daily mobility goal to care team and patient/family/caregiver  - Collaborate with rehabilitation services on mobility goals if consulted  - Perform Range of Motion  times a day  - Reposition patient every  hours    - Dangle patient  times a day  - Stand patient  times a day  - Ambulate patient  times a day  - Out of bed to chair  times a day   - Out of bed for meals  times a day  - Out of bed for toileting  - Record patient progress and toleration of activity level   Outcome: Not Progressing     Problem: DISCHARGE PLANNING  Goal: Discharge to home or other facility with appropriate resources  Description: INTERVENTIONS:  - Identify barriers to discharge w/patient and caregiver  - Arrange for needed discharge resources and transportation as appropriate  - Identify discharge learning needs (meds, wound care, etc )  - Arrange for interpretive services to assist at discharge as needed  - Refer to Case Management Department for coordinating discharge planning if the patient needs post-hospital services based on physician/advanced practitioner order or complex needs related to functional status, cognitive ability, or social support system  Outcome: Not Progressing     Problem: Knowledge Deficit  Goal: Patient/family/caregiver demonstrates understanding of disease process, treatment plan, medications, and discharge instructions  Description: Complete learning assessment and assess knowledge base    Interventions:  - Provide teaching at level of understanding  - Provide teaching via preferred learning methods  Outcome: Not Progressing     Problem: METABOLIC, FLUID AND ELECTROLYTES - ADULT  Goal: Electrolytes maintained within normal limits  Description: INTERVENTIONS:  - Monitor labs and assess patient for signs and symptoms of electrolyte imbalances  - Administer electrolyte replacement as ordered  - Monitor response to electrolyte replacements, including repeat lab results as appropriate  - Instruct patient on fluid and nutrition as appropriate  Outcome: Not Progressing     Problem: SKIN/TISSUE INTEGRITY - ADULT  Goal: Skin Integrity remains intact(Skin Breakdown Prevention)  Description: Assess:  -Perform Chandana assessment every   -Clean and moisturize skin every   -Inspect skin when repositioning, toileting, and assisting with ADLS  -Assess under medical devices such as  every   -Assess extremities for adequate circulation and sensation     Bed Management:  -Have minimal linens on bed & keep smooth, unwrinkled  -Change linens as needed when moist or perspiring  -Avoid sitting or lying in one position for more than  hours while in bed  -Keep HOB at degrees     Toileting:  -Offer bedside commode  -Assess for incontinence every   -Use incontinent care products after each incontinent episode such as     Activity:  -Mobilize patient  times a day  -Encourage activity and walks on unit  -Encourage or provide ROM exercises   -Turn and reposition patient every  Hours  -Use appropriate equipment to lift or move patient in bed  -Instruct/ Assist with weight shifting every  when out of bed in chair  -Consider limitation of chair time  hour intervals    Skin Care:  -Avoid use of baby powder, tape, friction and shearing, hot water or constrictive clothing  -Relieve pressure over bony prominences using  -Do not massage red bony areas    Next Steps:  -Teach patient strategies to minimize risks such as    -Consider consults to  interdisciplinary teams such as   Outcome: Not Progressing

## 2023-01-10 NOTE — LETTER
[address] [facility] MED SURG 7  [address] [address]  Dept: [phone]    January 12, 2023     Patient: Bobby Hernandez   YOB: 1980   Date of Visit: 1/10/2023       To Whom it May Concern:    Bobby Hernandez is under my professional care  She was seen in the hospital from 1/10/2023 to 01/12/23  She may return to work on 1/16/2023 without limitations  If you have any questions or concerns, please don't hesitate to call           Sincerely,          Abbe Nicolas MD

## 2023-01-10 NOTE — PROGRESS NOTES
Marcel Duverney is a 40 y o  female who is currently ordered Vancomycin IV with management by the Pharmacy Consult service  Relevant clinical data and objective / subjective history reviewed  Vancomycin Assessment:  Indication and Goal AUC/Trough: Soft tissue (goal -600, trough >10), mrsa, -600, trough >10  Clinical Status: stable  Micro:   Labs pending  Renal Function:  SCr: 0 96 mg/dL  CrCl: 88 5 mL/min  Renal replacement: Not on dialysis  Days of Therapy: 1  Current Dose: 1750mg loading dose followed by 750mg iv q12  Vancomycin Plan:  New Dosing:    Estimated AUC: 446 mcg*hr/mL  Estimated Trough: 13 6 mcg/mL  Next Level: 1630 01/11/2023  Renal Function Monitoring: Daily BMP and [place] will continue to follow closely for s/sx of nephrotoxicity, infusion reactions and appropriateness of therapy  BMP and CBC will be ordered per protocol  We will continue to follow the patient’s culture results and clinical progress daily      Lou Curiel, Pharmacist

## 2023-01-10 NOTE — ASSESSMENT & PLAN NOTE
Sepsis with tachycardia, leukocytosis, right axillary abscess treated with IV fluid  Currently resolved  Afebrile since last 24 hours

## 2023-01-10 NOTE — ASSESSMENT & PLAN NOTE
Patient with history of hypercholesterolemia, on Crestor and fenofibrate   -Will start fenofibrate and Crestor while inpatient

## 2023-01-10 NOTE — PROGRESS NOTES
Progress Note -Interventional Radiology NP  Kirstin Jordan 40 y o  female MRN: [id number]  Unit/Bed#: -01 Encounter: [id number]    Assessment/Plan:    40year old female with right axillary node aspiration on 1/9/2023 in IR at approximately 1700 under local anesthesia  Approximately 3 mL of thick gelatinous pus was aspirated from 2 separate sites in the axilla (please see post procedure note for picture)  Patient tolerated the procedure well and was discharged home  Approximately 3 hours later the patient become very flushed in the face and hands were very cold  Went to bed and woke up around midnight with fever, chills, and increasing lump in right axilla and went to the ER and was admitted for cellulitis  Patient is having pain on arm movements, improved with pain medication  Redness outlined in pain on axilla, under arm  Discussed plan with patient and SOD team bedside     - follow up on cultures   - pending formal ultrasound  - continue abx  - will continue to follow case  - please reach out to IR for any questions or concerns      Subjective: Kirstin Jordan is a 40 y o  female who presented with cellulitis post right axillary aspiration on 1/9/2023  Subjectively she feels better with IV abx and pain mediation   Sleeping when first assessed bur later       Patient Active Problem List   Diagnosis   • Hypercholesterolemia   • Mild intermittent asthma with status asthmaticus   • Obesity   • Depression with anxiety   • Dysplasia of cervix, low grade (CORNELIUS 1)   • Status post LEEP (loop electrosurgical excision procedure) of cervix   • Leukocytosis   • History of Clostridium difficile colitis   • Obstructive sleep apnea syndrome   • Edema of extremities   • Essential hypertension   • Peroneal tendinitis of left lower extremity   • Fibromyalgia   • Tobacco dependence syndrome   • Vitamin D deficiency   • Mixed hyperlipidemia   • Immunization due   • Axillary lymphadenitis   • Cellulitis of axillary region   • SIRS (systemic inflammatory response syndrome) ([facility])          Objective:    Vitals:  /65 (BP Location: Left arm)   Pulse 86   Temp 98 4 °F (36 9 °C) (Oral)   Resp 17   Ht 5' 2" (1 575 m)   Wt 107 kg (235 lb)   SpO2 98%   BMI 42 98 kg/m²   Body mass index is 42 98 kg/m²  Weight (last 2 days)     Date/Time Weight    01/10/23 0031 107 (235)          I/Os:    Intake/Output Summary (Last 24 hours) at 1/10/2023 1051  Last data filed at 1/10/2023 0720  Gross per 24 hour   Intake 1500 ml   Output --   Net 1500 ml       Invasive Devices     Peripheral Intravenous Line  Duration           Peripheral IV 01/10/23 Left Antecubital <1 day    Peripheral IV 01/10/23 Right Antecubital <1 day                Physical Exam:  Physical Exam  Vitals and nursing note reviewed  Constitutional:       General: She is awake  She is not in acute distress  Appearance: She is well-developed  HENT:      Head: Normocephalic and atraumatic  Eyes:      Conjunctiva/sclera: Conjunctivae normal    Cardiovascular:      Rate and Rhythm: Normal rate and regular rhythm  Pulses: Normal pulses  Heart sounds: Normal heart sounds  No murmur heard  Pulmonary:      Effort: Pulmonary effort is normal  No respiratory distress  Breath sounds: Normal breath sounds  Abdominal:      General: Bowel sounds are normal       Palpations: Abdomen is soft  Tenderness: There is no abdominal tenderness  Musculoskeletal:         General: No swelling  Cervical back: Neck supple  Skin:     General: Skin is warm and dry  Capillary Refill: Capillary refill takes less than 2 seconds  Findings: Erythema (right axilla) present  Comments: Right axilla, palpable lump, non fluctuant outlined    Neurological:      Mental Status: She is alert and oriented to person, place, and time  Psychiatric:         Mood and Affect: Mood normal          Behavior: Behavior is cooperative                        Lab Results and Cultures:   CBC with diff:   Lab Results   Component Value Date    WBC 12 92 (H) 01/10/2023    HGB 12 0 01/10/2023    HCT 37 6 01/10/2023    MCV 79 (L) 01/10/2023     01/10/2023    MCH 25 3 (L) 01/10/2023    MCHC 31 9 01/10/2023    RDW 13 3 01/10/2023    MPV 9 1 01/10/2023    NRBC 0 01/10/2023      BMP/CMP:  Lab Results   Component Value Date     09/15/2015    K 4 1 01/10/2023    K 3 9 09/15/2015     01/10/2023     (H) 09/15/2015    CO2 24 01/10/2023    CO2 21 12/29/2018    ANIONGAP 7 09/15/2015    BUN 15 01/10/2023    BUN 10 09/15/2015    CREATININE 0 96 01/10/2023    CREATININE 0 73 09/15/2015    GLUCOSE 164 (H) 12/29/2018    GLUCOSE 90 09/15/2015    CALCIUM 8 8 01/10/2023    CALCIUM 8 3 09/15/2015    AST 13 01/10/2023    AST 11 09/15/2015    ALT 27 01/10/2023    ALT 18 09/15/2015    ALKPHOS 62 01/10/2023    ALKPHOS 106 09/15/2015    PROT 6 4 09/15/2015    BILITOT 0 18 (L) 09/15/2015    EGFR 73 01/10/2023    EGFR 124 12/29/2018   ,     Coags:   Lab Results   Component Value Date    PTT 29 01/10/2023    PTT 29 01/22/2014    INR 0 93 01/10/2023    INR 0 97 01/22/2014   ,   Results from last 7 days   Lab Units 01/10/23  0246   PTT seconds 29   INR  0 93        Lipid Panel:   Lab Results   Component Value Date    CHOL 220 06/27/2015     Lab Results   Component Value Date    HDL 58 08/06/2022     Lab Results   Component Value Date    HDL 58 08/06/2022     Lab Results   Component Value Date    LDLCALC 72 08/06/2022     Lab Results   Component Value Date    TRIG 115 08/06/2022       HgbA1c:   Lab Results   Component Value Date    HGBA1C 5 1 04/19/2022    HGBA1C 4 8 04/29/2021    HGBA1C 4 8 03/18/2020       Blood Culture:   Lab Results   Component Value Date    BLOODCX Received in Microbiology Lab  Culture in Progress  01/10/2023    BLOODCX Received in Microbiology Lab  Culture in Progress   01/10/2023   ,   Urinalysis:   Lab Results   Component Value Date    COLORU Yellow 02/01/2022    CLARITYU Clear 02/01/2022    SPECGRAV 1 025 02/01/2022    PHUR 6 0 02/01/2022    PHUR 8 0 08/28/2018    LEUKOCYTESUR Negative 02/01/2022    NITRITE Negative 02/01/2022    GLUCOSEU Negative 02/01/2022    KETONESU Negative 02/01/2022    BILIRUBINUR Negative 02/01/2022    BLOODU Moderate 02/01/2022   ,   Urine Culture: No results found for: URINECX,   Wound Culure:    Lab Results   Component Value Date    WOUNDCULT 4+ Growth of 03/18/2019       VTE Pharmacologic Prophylaxis: Sequential compression device (Venodyne)       Thank you for allowing me to participate in the care of Rafy Barnett  Please don't hesitate to call, text, email, or TigerText with any questions  This text is generated with voice recognition software  There may be translation, syntax,  or grammatical errors  If you have any questions, please contact the dictating provider

## 2023-01-10 NOTE — ED ATTENDING ATTESTATION
1/10/2023  IZaki MD, saw and evaluated the patient  I have discussed the patient with the resident/non-physician practitioner and agree with the resident's/non-physician practitioner's findings, Plan of Care, and MDM as documented in the resident's/non-physician practitioner's note, except where noted  All available labs and Radiology studies were reviewed  I was present for key portions of any procedure(s) performed by the resident/non-physician practitioner and I was immediately available to provide assistance  At this point I agree with the current assessment done in the Emergency Department  I have conducted an independent evaluation of this patient a history and physical is as follows:    ED Course  ED Course as of 01/10/23 0448   Tue Ambrosio 10, 2023   [address] [facility] [address] Per resident h&p 41 YO F presents with rash R axillae; tender O: female in no distress I/P s/p abscess drainage with fevers       Emergency Department Note- Candelario Delatorre 40 y o  female MRN: [id number]    Unit/Bed#: QCE Encounter: [id number]    Candelario Delatorre is a 40 y o  female who presents with   Chief Complaint   Patient presents with   • Cyst     Pt had cyst under R armpit drained with cultures from drained fluid collected in IR around 1700 earlier today  Developed fever, pain, chills and lump at drainage site  Started on abx today          History of Present Illness   HPI:  Candelario Delatorre is a 40 y o  female who presents for evaluation of:  Fevers status post incision and drainage in interventional radiology of right axillary abscess  The patient developed worsening pain, fevers, chills, and a lump at the site of the incision and drainage  Patient had started on antibiotics earlier today  Patient denies history of hidradenitis suppurativa  She has had episodes of abscess in the past     Review of Systems   Constitutional: Positive for fever  Negative for fatigue  HENT: Negative for congestion and sore throat      Respiratory: Negative for cough and shortness of breath  Cardiovascular: Negative for chest pain and palpitations  Gastrointestinal: Negative for abdominal pain and nausea  Genitourinary: Negative for flank pain and frequency  Skin: Positive for color change and wound  Neurological: Negative for light-headedness and headaches  Psychiatric/Behavioral: Negative for dysphoric mood and hallucinations  All other systems reviewed and are negative        Historical Information   Past Medical History:   Diagnosis Date   • Anxiety    • Asthma    • Depression    • History of Clostridium difficile colitis 2018    x 2 episodes, after antibiotics   • Hypercholesterolemia    • Hyperlipemia    • Morbid obesity ([address] )    • Pulmonary nodule    • Sleep apnea     c pap     Past Surgical History:   Procedure Laterality Date   • AR CONIZATION CERVIX W/WO D&C RPR ELTRD EXC N/A 2018    Procedure: BIOPSY LEEP CERVIX;  Surgeon: Glen Nguyen DO;  Location: [facility];  Service: Gynecology   • REDUCTION MAMMAPLASTY     • TONSILLECTOMY       Social History   Social History     Substance and Sexual Activity   Alcohol Use Yes   • Alcohol/week: 0 0 standard drinks    Comment: occasional drink here and there     Social History     Substance and Sexual Activity   Drug Use No     Social History     Tobacco Use   Smoking Status Every Day   • Packs/day: 0 50   • Years: 18 00   • Pack years: 9 00   • Types: Cigarettes   • Passive exposure: Past   Smokeless Tobacco Never   Tobacco Comments    is on the patch      Family History:   Family History   Problem Relation Age of Onset   • Asthma Mother    • Obesity Mother    • Lung cancer Father 50   • Hyperlipidemia Father    • Bone cancer Father         mets from lung cancer   • No Known Problems Sister    • No Known Problems Brother    • Arthritis Maternal Grandmother    • COPD Maternal Grandmother    • Cancer Maternal Grandfather         Lung ca-   • Lung cancer Maternal Grandfather [de-identified] • Liver cancer Paternal Grandfather [de-identified]   • Cancer Paternal Grandfather         Liver ca-   • No Known Problems Paternal Grandmother    • No Known Problems Maternal Aunt    • No Known Problems Maternal Aunt    • No Known Problems Maternal Aunt    • No Known Problems Paternal Aunt        Meds/Allergies   PTA meds:   Prior to Admission Medications   Prescriptions Last Dose Informant Patient Reported? Taking? ALPRAZolam (XANAX) 0 25 mg tablet   Yes No   DULoxetine (CYMBALTA) 60 mg delayed release capsule   No No   Sig: Take 1 capsule (60 mg total) by mouth daily   PREVIDENT 5000 SENSITIVE 1 1-5 % PSTE   Yes No   SF 5000 Plus 1 1 % CREA   Yes No   SODIUM FLUORIDE, DENTAL GEL, 1 1 % GEL   Yes No   Sig: SF 5000 Plus 1 1 % dental cream   cefadroxil (DURICEF) 500 mg capsule   No No   Sig: Take 1 capsule (500 mg total) by mouth every 12 (twelve) hours for 7 days   cholecalciferol (VITAMIN D3) 1,000 units tablet   Yes No   Sig: Take by mouth daily     fenofibrate 160 MG tablet   No No   Sig: Take 1 tablet (160 mg total) by mouth daily   hydrocortisone 2 5 % cream   No No   Sig: Apply topically 2 (two) times a day for 14 days Apply 1-2x a day topically to eyebrows for 14 days   ketoconazole (NIZORAL) 2 % shampoo   No No   Sig: Apply 1 application topically in the morning for 30 doses Daily for 2 weeks straight and then on ",  and ":  Lather into scalp and skin on face, neck, chest, and back; leave on for 5 minutes and then rinse off completely     rosuvastatin (CRESTOR) 20 MG tablet   No No   Sig: Take 1 tablet (20 mg total) by mouth daily   valsartan-hydrochlorothiazide (DIOVAN-HCT) 80-12 5 MG per tablet   No No   Sig: Take 1 tablet by mouth daily      Facility-Administered Medications: None     No Known Allergies    Objective   First Vitals:   Blood Pressure: 139/84 (01/10/23 0031)  Pulse: (!) 121 (01/10/23 0031)  Temperature: 99 8 °F (37 7 °C) (01/10/23 0031)  Temp Source: Tympanic (01/10/23 0031)  Respirations: 18 (01/10/23 0031)  Height: 5' 2" (157 5 cm) (01/10/23 0031)  Weight - Scale: 107 kg (235 lb) (01/10/23 0031)  SpO2: 99 % (01/10/23 0031)    Current Vitals:   Blood Pressure: 139/84 (01/10/23 0031)  Pulse: (!) 121 (01/10/23 0031)  Temperature: 99 8 °F (37 7 °C) (01/10/23 0031)  Temp Source: Tympanic (01/10/23 0031)  Respirations: 18 (01/10/23 0031)  Height: 5' 2" (157 5 cm) (01/10/23 0031)  Weight - Scale: 107 kg (235 lb) (01/10/23 0031)  SpO2: 99 % (01/10/23 0031)    No intake or output data in the 24 hours ending 01/10/23 0448    Invasive Devices     Peripheral Intravenous Line  Duration           Peripheral IV 01/10/23 Left Antecubital <1 day    Peripheral IV 01/10/23 Right Antecubital <1 day                Physical Exam  Vitals and nursing note reviewed  Constitutional:       General: She is not in acute distress  Appearance: Normal appearance  She is well-developed  HENT:      Head: Normocephalic and atraumatic  Right Ear: External ear normal       Left Ear: External ear normal       Nose: Nose normal       Mouth/Throat:      Pharynx: No oropharyngeal exudate  Eyes:      Conjunctiva/sclera: Conjunctivae normal       Pupils: Pupils are equal, round, and reactive to light  Cardiovascular:      Rate and Rhythm: Normal rate and regular rhythm  Pulmonary:      Effort: Pulmonary effort is normal  No respiratory distress  Abdominal:      General: Abdomen is flat  There is no distension  Palpations: Abdomen is soft  Musculoskeletal:         General: No deformity  Normal range of motion  Cervical back: Normal range of motion and neck supple  Skin:     General: Skin is warm and dry  Capillary Refill: Capillary refill takes less than 2 seconds  Neurological:      General: No focal deficit present  Mental Status: She is alert and oriented to person, place, and time  Mental status is at baseline        Coordination: Coordination normal  Psychiatric:         Mood and Affect: Mood normal          Behavior: Behavior normal          Thought Content: Thought content normal          Judgment: Judgment normal            Medical Decision Makin    Fevers after incision and drainage of abscess by interventional radiology: Septic work-up; vancomycin IV    Recent Results (from the past 36 hour(s))   CBC and differential    Collection Time: 01/10/23  2:46 AM   Result Value Ref Range    WBC 12 92 (H) 4 31 - 10 16 Thousand/uL    RBC 4 74 3 81 - 5 12 Million/uL    Hemoglobin 12 0 11 5 - 15 4 g/dL    Hematocrit 37 6 34 8 - 46 1 %    MCV 79 (L) 82 - 98 fL    MCH 25 3 (L) 26 8 - 34 3 pg    MCHC 31 9 31 4 - 37 4 g/dL    RDW 13 3 11 6 - 15 1 %    MPV 9 1 8 9 - 12 7 fL    Platelets 476 934 - 529 Thousands/uL    nRBC 0 /100 WBCs    Neutrophils Relative 75 43 - 75 %    Immat GRANS % 1 0 - 2 %    Lymphocytes Relative 14 14 - 44 %    Monocytes Relative 7 4 - 12 %    Eosinophils Relative 2 0 - 6 %    Basophils Relative 1 0 - 1 %    Neutrophils Absolute 9 78 (H) 1 85 - 7 62 Thousands/µL    Immature Grans Absolute 0 06 0 00 - 0 20 Thousand/uL    Lymphocytes Absolute 1 80 0 60 - 4 47 Thousands/µL    Monocytes Absolute 0 92 0 17 - 1 22 Thousand/µL    Eosinophils Absolute 0 29 0 00 - 0 61 Thousand/µL    Basophils Absolute 0 07 0 00 - 0 10 Thousands/µL   Comprehensive metabolic panel    Collection Time: 01/10/23  2:46 AM   Result Value Ref Range    Sodium 137 135 - 147 mmol/L    Potassium 4 1 3 5 - 5 3 mmol/L    Chloride 107 96 - 108 mmol/L    CO2 24 21 - 32 mmol/L    ANION GAP 6 4 - 13 mmol/L    BUN 15 5 - 25 mg/dL    Creatinine 0 96 0 60 - 1 30 mg/dL    Glucose 96 65 - 140 mg/dL    Calcium 8 8 8 3 - 10 1 mg/dL    AST 13 5 - 45 U/L    ALT 27 12 - 78 U/L    Alkaline Phosphatase 62 46 - 116 U/L    Total Protein 6 7 6 4 - 8 4 g/dL    Albumin 3 6 3 5 - 5 0 g/dL    Total Bilirubin 0 38 0 20 - 1 00 mg/dL    eGFR 73 ml/min/1 73sq m   Lactic acid    Collection Time: 01/10/23  2:46 AM   Result Value Ref Range    LACTIC ACID 1 2 0 5 - 2 0 mmol/L   Protime-INR    Collection Time: 01/10/23  2:46 AM   Result Value Ref Range    Protime 12 7 11 6 - 14 5 seconds    INR 0 93 0 84 - 1 19   APTT    Collection Time: 01/10/23  2:46 AM   Result Value Ref Range    PTT 29 23 - 37 seconds     No orders to display         Portions of the record may have been created with voice recognition software  Occasional wrong word or "sound a like" substitutions may have occurred due to the inherent limitations of voice recognition software  Read the chart carefully and recognize, using context, where substitutions have occurred  Critical Care Time  CriticalCare Time  Performed by: Marco A Villar MD  Authorized by: Marco A Villar MD     Critical care provider statement:     Critical care time (minutes):  32    Critical care time was exclusive of:  Separately billable procedures and treating other patients and teaching time    Critical care was necessary to treat or prevent imminent or life-threatening deterioration of the following conditions:  Sepsis    Critical care was time spent personally by me on the following activities:  Obtaining history from patient or surrogate, development of treatment plan with patient or surrogate, discussions with consultants, evaluation of patient's response to treatment, examination of patient, ordering and performing treatments and interventions, ordering and review of laboratory studies, ordering and review of radiographic studies, re-evaluation of patient's condition and review of old charts    I assumed direction of critical care for this patient from another provider in my specialty: no    Comments:      Persistent abscess right axillary area; patient now with fevers; septic work-up completed; vancomycin IV administered

## 2023-01-10 NOTE — ASSESSMENT & PLAN NOTE
History of C  difficile in 2018 9/2019  Currently on vancomycin which is not usually associated with C  difficile infection

## 2023-01-10 NOTE — MALNUTRITION/BMI
This medical record reflects one or more clinical indicators suggestive of morbid obesity  BMI Findings:  Adult BMI Classifications: Morbid Obesity 40-44 9        Body mass index is 42 98 kg/m²  See Nutrition note dated 01/10/2023   for additional details  Completed nutrition assessment is viewable in the nutrition documentation

## 2023-01-10 NOTE — H&P
INTERNAL MEDICINE RESIDENCY ADMISSION H&P     Name: Bobby Hernandez   Age & Sex: 40 y o  female   MRN: [id number]  Unit/Bed#: [facility]   Encounter: [id number]  Primary Care Provider: Ralph Quiñones MD    Code Status: Prior  Admission Status: INPATIENT   Disposition: Patient requires Med/Surg    Admit to team: SOD Team B     ASSESSMENT/PLAN     Principal Problem:    Cellulitis of axillary region  Active Problems:    Leukocytosis    Mild intermittent asthma with status asthmaticus    SIRS (systemic inflammatory response syndrome) (HCC)    History of Clostridium difficile colitis    Hypercholesterolemia    Depression with anxiety    Obstructive sleep apnea syndrome    Essential hypertension      * Cellulitis of axillary region  Assessment & Plan  Patient with right armpit pain and tenderness for the past few days and noted to have a lump in the right axillary region requiring IR aspiration for the same  She was subsequently discharged on cefadroxil had temperature > 38C along with tachycardia on presentation to the ED  Patient was also noted to have leukocytosis with WBC count of 12 9  On exam, patient's right axillary region is warm to touch, erythematous with possible fluctuance noted at the region  Patient had significant discomfort with arm movement  Based on this, patient likely has failed initial ABX regimen (cefodroxil) and now has right arm axillary cellulitis with fluctuance       - Based on IDSA guidelines for soft tissue infection with purulence, if patient has presence of SIRS and/or temperature >38°C or <36°C, tachypnea >24 breaths per minute, tachycardia >90 beats per minute, or white blood cell count >12 000 or <400 cells/µL, an antibiotic active against MRSA is recommended for patients with carbuncles or abscesses who have failed initial antibiotic treatment or have markedly impaired host defenses or in patients with SIRS and hypotension  - [facility] ABX guidelines recommend 5-7 day course of IV vancomycin  - On IV vancomycin, pharmacy consult ordered, renal function reviewed  - Monitor VS and WBC  - Trend fever curve  - Lactic acid 1 2  - Can consider further imaging if an abscess is suspected  - MRSA culture ordered  - Drainage cultures, awaiting results  - Blood cultures x2 collected    Leukocytosis  Assessment & Plan  Patient presenting with WBC count of 12 92 in the setting of recent IR aspiration for right arm cyst/abscess  Patient was also noted to be febrile at home despite being on cefadroxil    -See our plan under right arm cellulitis    SIRS (systemic inflammatory response syndrome) ([address] )  Assessment & Plan  Patient presenting with leukocytosis with WBC count of 12 9, tachycardia with heart rate at 121 in the setting of recent right arm cellulitis  These findings are concerning for SIRS in the setting of underlying infection   -See our plan under cellulitis of axillary region    Mild intermittent asthma with status asthmaticus  Assessment & Plan  Patient with history of asthma  Can consider Atrovent/Xopenex every 6 hours as needed  PFT in 01/2019  Spirometry:  FEV1/FVC Ratio is 79 %  FEV1 is 1 87 L/64 % of predicted  FVC is 2 35 L/67 % of predicted  There is significant bronchodilator response  - Previously patient has been noted to not be compliant with albuterol but has tolerated Breo Ellipta well  - Currently reporting that she has not been using any inhalers  History of Clostridium difficile colitis  Assessment & Plan  Patient has a history of C  difficile colitis from 7167-6222  Based on IDSA guidelines, in patients with history of C  difficile colitis and if getting antibiotic treatment, we may consider p o  vancomycin therapy for the duration of the antibiotic course    However, based on [facility] guidelines for CDI prophylaxis, IV vancomycin is not on the list of systemic antibiotic therapy that requires CDI prophylaxis at this time   -Patient currently on IV vancomycin, will hold off on p o  vancomycin suppression therapy  -Avoid PPI use if possible    Essential hypertension  Assessment & Plan  History of hypertension, on valsartan-hydrochlorothiazide combination pill   -Systolic BP reading in the ED in 130s with repeat in 120s, will hold antihypertensive regimen due to ongoing infection and patient being normotensive  -Can consider initiating antihypertensives if patient becomes hypertensive    Obstructive sleep apnea syndrome  Assessment & Plan  Patient with previously diagnosed obstructive sleep apnea based on a sleep study from April 2019 with AHI of 5 4  She has been compliant with CPAP use  Depression with anxiety  Assessment & Plan  History of depression with anxiety  Has been taking Cymbalta in the outpatient setting   -Started on Cymbalta while inpatient    Hypercholesterolemia  Assessment & Plan  Patient with history of hypercholesterolemia, on Crestor and fenofibrate   -Will start fenofibrate and Crestor while inpatient      VTE Pharmacologic Prophylaxis: Enoxaparin (Lovenox)  VTE Mechanical Prophylaxis: sequential compression device    CHIEF COMPLAINT     Chief Complaint   Patient presents with   • Cyst     Pt had cyst under R armpit drained with cultures from drained fluid collected in IR around 1700 earlier today  Developed fever, pain, chills and lump at drainage site  Started on abx today       HISTORY OF PRESENT ILLNESS     Miss Toni Dooley is a 41 y/o woman with h/o HTN, HLD, PRECIOUS, Anxiety, C diff Colitis (2018), and remote asthma hx not on inhaler who comes [facility] ED for worsening pain/tx failure of R armpit cellulitis s/p IR drainage + Cefadroxil x 1 day  On Thursday, Jan 5th, 5 days prior to presentation, Miss Toni Dooley began to notice a 'bump' on her R armpit  It became painful with development of fever the next day, and therefore she sought care from her PCP on Jan 6th   Her PCP considered this to be cellulitis and recommended Aleve bid + warm compress with plans to follow throughout the week  Antibiotics were considered, however pt refused abx use given h/o C diff  Cellulitis and pain d/n improve and her fever/chills worsened thus she decided to ask for abx on Jan 8th, and she was prescribed Cefadroxil  However on Monday Jan 9th, about 24 hours prior to presentation, without relief in sx's she was referred for IR drainage  IR drained 3 cc of thick/gellatinous pus less than 24 hours prior to presentation  However sx's continued and she therefore sought care at this hospital  In the ED, T was 99 8F and HR was 121 which reduced to 106 after IVF  BP ranged from 116-772T systolic  Labs were remarkable for leukocytosis with WBC 12 92  R armpit was tender and erythematous, and therefore she was started on IV Vanc and BClx's were drawn  On evaluation, Miss Homero Barraza was seen lying in mild distress on ED bed  She states that she is still in pain and feeling febrile  Prior to coming to the ED her temp was "101 5 F" at home  She states that sx's improved directly after drainage, but "3 hours" after procedure she had return of fever and chills and unrelenting pain  She denies any recent sick contacts but had a 'head cold' during [place]  She does not report any bug/animal bite or recent trauma/skin puncture to this area  Her pain is mostly located at the drainage site, and radiates to her upper chest/clavicle area  She also c/o HA but otherwise no CP, SOB, rhinorrhea, N/V/D  She is a 1/2 to 1 PPD smoker x 3-5 years  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 [facility]  REVIEW OF SYSTEMS     Review of Systems   Constitutional: Positive for chills, fatigue and fever  Negative for activity change, diaphoresis and unexpected weight change  HENT: Positive for congestion  Negative for postnasal drip, rhinorrhea, sinus pressure, sinus pain, tinnitus and trouble swallowing  Respiratory: Negative for apnea, cough, choking, chest tightness and shortness of breath      Cardiovascular: Negative for chest pain, palpitations and leg swelling  Gastrointestinal: Negative for abdominal pain, diarrhea, nausea and vomiting  Genitourinary: Negative for dysuria, enuresis, flank pain, frequency, hematuria and urgency  Musculoskeletal: Negative for arthralgias, back pain, myalgias and neck stiffness  Skin: Positive for wound  Negative for color change, pallor and rash  Allergic/Immunologic: Negative for immunocompromised state  Neurological: Positive for headaches  Negative for dizziness, syncope, weakness and light-headedness  Hematological: Negative for adenopathy  Does not bruise/bleed easily  OBJECTIVE     Vitals:    01/10/23 0031   BP: 139/84   BP Location: Right arm   Pulse: (!) 121   Resp: 18   Temp: 99 8 °F (37 7 °C)   TempSrc: Tympanic   SpO2: 99%   Weight: 107 kg (235 lb)   Height: 5' 2" (1 575 m)      Temperature:   Temp (24hrs), Av 8 °F (37 7 °C), Min:99 8 °F (37 7 °C), Max:99 8 °F (37 7 °C)    Temperature: 99 8 °F (37 7 °C)  Intake & Output:  I/O     None        Weights:        Body mass index is 42 98 kg/m²  Weight (last 2 days)     Date/Time Weight    01/10/23 003 107 (235)        Physical Exam  Constitutional:       General: She is in acute distress (Mild-Mod with Movement of UE)  HENT:      Head: Normocephalic and atraumatic  Nose: Congestion present  No rhinorrhea  Mouth/Throat:      Mouth: Mucous membranes are moist       Pharynx: Oropharynx is clear  No oropharyngeal exudate or posterior oropharyngeal erythema  Eyes:      General: No scleral icterus  Conjunctiva/sclera: Conjunctivae normal    Cardiovascular:      Rate and Rhythm: Normal rate and regular rhythm  Pulses: Normal pulses  Heart sounds: No murmur heard  No friction rub  No gallop  Pulmonary:      Effort: Pulmonary effort is normal  No respiratory distress  Breath sounds: No wheezing or rhonchi  Abdominal:      General: Abdomen is flat  Palpations: Abdomen is soft  Tenderness: There is no abdominal tenderness  There is no guarding or rebound  Musculoskeletal:         General: No swelling or tenderness  Arms:       Cervical back: No rigidity or tenderness  Right lower leg: No edema  Left lower leg: No edema  Skin:     General: Skin is warm and dry  Capillary Refill: Capillary refill takes less than 2 seconds  Coloration: Skin is not pale  Findings: Erythema present  Neurological:      Mental Status: She is alert         PAST MEDICAL HISTORY     Past Medical History:   Diagnosis Date   • Anxiety    • Asthma    • Depression    • History of Clostridium difficile colitis 2018    x 2 episodes, after antibiotics   • Hypercholesterolemia    • Hyperlipemia    • Morbid obesity ([address] )    • Pulmonary nodule    • Sleep apnea     c pap     PAST SURGICAL HISTORY     Past Surgical History:   Procedure Laterality Date   • SD CONIZATION CERVIX W/WO D&C RPR ELTRD EXC N/A 9/27/2018    Procedure: BIOPSY LEEP CERVIX;  Surgeon: Dereck Chauhan DO;  Location: [facility];  Service: Gynecology   • REDUCTION MAMMAPLASTY  1999   • TONSILLECTOMY       SOCIAL & FAMILY HISTORY     Social History     Substance and Sexual Activity   Alcohol Use Yes   • Alcohol/week: 0 0 standard drinks    Comment: occasional drink here and there     Social History     Substance and Sexual Activity   Drug Use No     Social History     Tobacco Use   Smoking Status Every Day   • Packs/day: 0 50   • Years: 18 00   • Pack years: 9 00   • Types: Cigarettes   • Passive exposure: Past   Smokeless Tobacco Never   Tobacco Comments    is on the patch      Family History   Problem Relation Age of Onset   • Asthma Mother    • Obesity Mother    • Lung cancer Father 50   • Hyperlipidemia Father    • Bone cancer Father         mets from lung cancer   • No Known Problems Sister    • No Known Problems Brother    • Arthritis Maternal Grandmother    • COPD Maternal Grandmother    • Cancer Maternal Grandfather         Lung ca-   • Lung cancer Maternal Grandfather [de-identified]   • Liver cancer Paternal Grandfather [de-identified]   • Cancer Paternal Grandfather         Liver ca-   • No Known Problems Paternal Grandmother    • No Known Problems Maternal Aunt    • No Known Problems Maternal Aunt    • No Known Problems Maternal Aunt    • No Known Problems Paternal Aunt      LABORATORY DATA     Labs: I have personally reviewed pertinent reports  Results from last 7 days   Lab Units 01/10/23  0246   WBC Thousand/uL 12 92*   HEMOGLOBIN g/dL 12 0   HEMATOCRIT % 37 6   PLATELETS Thousands/uL 347   NEUTROS PCT % 75   MONOS PCT % 7      Results from last 7 days   Lab Units 01/10/23  0246   POTASSIUM mmol/L 4 1   CHLORIDE mmol/L 107   CO2 mmol/L 24   BUN mg/dL 15   CREATININE mg/dL 0 96   CALCIUM mg/dL 8 8   ALK PHOS U/L 62   ALT U/L 27   AST U/L 13              Results from last 7 days   Lab Units 01/10/23  0246   INR  0 93   PTT seconds 29     Results from last 7 days   Lab Units 01/10/23  0246   LACTIC ACID mmol/L 1 2         Micro:  Lab Results   Component Value Date    BLOODCX No Growth After 5 Days  2018    BLOODCX No Growth After 5 Days  2018    WOUNDCULT 4+ Growth of 2019    WOUNDCULT 1+ Growth of Staphylococcus aureus (A) 2018    WOUNDCULT 1+ Growth of Staphylococcus aureus (A) 2018    WOUNDCULT 1+ Growth of 2018     IMAGING & DIAGNOSTIC TESTS     Imaging: I have personally reviewed pertinent reports  No results found  EKG, Pathology, and Other Studies: I have personally reviewed pertinent reports  ALLERGIES   No Known Allergies  MEDICATIONS PRIOR TO ARRIVAL     Prior to Admission medications    Medication Sig Start Date End Date Taking?  Authorizing Provider   ALPRAZolam Abhilash Suraj) 0 25 mg tablet     Historical Provider, MD   cefadroxil (DURICEF) 500 mg capsule Take 1 capsule (500 mg total) by mouth every 12 (twelve) hours for 7 days 23  Milvia Hallman MD cholecalciferol (VITAMIN D3) 1,000 units tablet Take by mouth daily      Historical Provider, MD   DULoxetine (CYMBALTA) 60 mg delayed release capsule Take 1 capsule (60 mg total) by mouth daily 11/2/22   Valarie Almaguer MD   fenofibrate 160 MG tablet Take 1 tablet (160 mg total) by mouth daily 11/2/22   Valarie Almaguer MD   hydrocortisone 2 5 % cream Apply topically 2 (two) times a day for 14 days Apply 1-2x a day topically to eyebrows for 14 days 4/28/22 12/19/22  Savanna Luciano MD   ketoconazole (NIZORAL) 2 % shampoo Apply 1 application topically in the morning for 30 doses Daily for 2 weeks straight and then on "Mondays, Wednesdays and Fridays":  Lather into scalp and skin on face, neck, chest, and back; leave on for 5 minutes and then rinse off completely  4/28/22 12/19/22  Savanna Luciano MD   PREVIDENT 5000 SENSITIVE 1 1-5 % PSTE  9/12/19   Historical Provider, MD   rosuvastatin (CRESTOR) 20 MG tablet Take 1 tablet (20 mg total) by mouth daily 11/2/22   Valarie Almaguer MD   SF 5000 Plus 1 1 % CREA  5/5/22   Historical Provider, MD   SODIUM FLUORIDE, DENTAL GEL, 1 1 % GEL SF 5000 Plus 1 1 % dental cream    Historical Provider, MD   valsartan-hydrochlorothiazide (DIOVAN-HCT) 80-12 5 MG per tablet Take 1 tablet by mouth daily 11/2/22   Valarie Almaguer MD     MEDICATIONS ADMINISTERED IN LAST 24 HOURS     Medication Administration - last 24 hours from 01/09/2023 0432 to 01/10/2023 0432       Date/Time Order Dose Route Action Action by     01/10/2023 0248 EST sodium chloride 0 9 % bolus 1,000 mL 1,000 mL Intravenous [place] Wu RN        CURRENT MEDICATIONS     Current Facility-Administered Medications   Medication Dose Route Frequency Provider Last Rate   • vancomycin  25 mg/kg (Adjusted) Intravenous Once Lennox Delgado MD               Admission Time  I spent 30 minutes admitting the patient    This involved direct patient contact where I performed a full history and physical, reviewing previous records, and reviewing laboratory and other diagnostic studies  Portions of the record may have been created with voice recognition software  Occasional wrong word or "sound a like" substitutions may have occurred due to the inherent limitations of voice recognition software    Read the chart carefully and recognize, using context, where substitutions have occurred     ==  Celina Espinosa MD  [address]  Internal Medicine Residency PGY-2

## 2023-01-10 NOTE — ASSESSMENT & PLAN NOTE
Worsening right axillary swelling, pain along with tenderness  S/p IR aspiration of pus, pending culture reports outpatient  Treated with 1 day of cefadroxil with noted worsening fever, tachycardia, chills  Noted leukocytosis on presentation currently downtrending  Ultrasound showing complex lobulated abscess s/p I&D by general surgery today  Currently remains afebrile, hemodynamically stable, tachycardia resolved      Continue vancomycin as prelim culture report showing staph aureus  Plan to start p o  tomorrow  General surgery following s/p I&D for wound care  Will need around 5 to 7-day course of antibiotic  Follow-up sensitivity reports  Follow-up blood cultures and MRSA culture

## 2023-01-10 NOTE — ASSESSMENT & PLAN NOTE
Patient presenting with WBC count of 12 92 in the setting of recent IR aspiration for right arm cyst/abscess    Patient was also noted to be febrile at home despite being on cefadroxil    -See our plan under right arm cellulitis

## 2023-01-11 PROBLEM — L02.411 ABSCESS OF RIGHT AXILLA: Status: ACTIVE | Noted: 2023-01-10

## 2023-01-11 LAB
ANION GAP SERPL CALCULATED.3IONS-SCNC: 4 MMOL/L (ref 4–13)
BACTERIA SPEC ANAEROBE CULT: NORMAL
BACTERIA SPEC BFLD CULT: ABNORMAL
BASOPHILS # BLD AUTO: 0.06 THOUSANDS/ÂΜL (ref 0–0.1)
BASOPHILS NFR BLD AUTO: 1 % (ref 0–1)
BUN SERPL-MCNC: 14 MG/DL (ref 5–25)
CALCIUM SERPL-MCNC: 8.3 MG/DL (ref 8.3–10.1)
CHLORIDE SERPL-SCNC: 112 MMOL/L (ref 96–108)
CO2 SERPL-SCNC: 25 MMOL/L (ref 21–32)
CREAT SERPL-MCNC: 0.96 MG/DL (ref 0.6–1.3)
EOSINOPHIL # BLD AUTO: 0.36 THOUSAND/ÂΜL (ref 0–0.61)
EOSINOPHIL NFR BLD AUTO: 4 % (ref 0–6)
ERYTHROCYTE [DISTWIDTH] IN BLOOD BY AUTOMATED COUNT: 13.5 % (ref 11.6–15.1)
GFR SERPL CREATININE-BSD FRML MDRD: 73 ML/MIN/1.73SQ M
GLUCOSE SERPL-MCNC: 82 MG/DL (ref 65–140)
GRAM STN SPEC: ABNORMAL
GRAM STN SPEC: ABNORMAL
HCT VFR BLD AUTO: 32.8 % (ref 34.8–46.1)
HGB BLD-MCNC: 10.1 G/DL (ref 11.5–15.4)
IMM GRANULOCYTES # BLD AUTO: 0.04 THOUSAND/UL (ref 0–0.2)
IMM GRANULOCYTES NFR BLD AUTO: 1 % (ref 0–2)
LYMPHOCYTES # BLD AUTO: 2.12 THOUSANDS/ÂΜL (ref 0.6–4.47)
LYMPHOCYTES NFR BLD AUTO: 25 % (ref 14–44)
MCH RBC QN AUTO: 25.1 PG (ref 26.8–34.3)
MCHC RBC AUTO-ENTMCNC: 30.8 G/DL (ref 31.4–37.4)
MCV RBC AUTO: 81 FL (ref 82–98)
MONOCYTES # BLD AUTO: 0.74 THOUSAND/ÂΜL (ref 0.17–1.22)
MONOCYTES NFR BLD AUTO: 9 % (ref 4–12)
NEUTROPHILS # BLD AUTO: 5.3 THOUSANDS/ÂΜL (ref 1.85–7.62)
NEUTS SEG NFR BLD AUTO: 60 % (ref 43–75)
NRBC BLD AUTO-RTO: 0 /100 WBCS
PLATELET # BLD AUTO: 237 THOUSANDS/UL (ref 149–390)
PMV BLD AUTO: 9.2 FL (ref 8.9–12.7)
POTASSIUM SERPL-SCNC: 4 MMOL/L (ref 3.5–5.3)
RBC # BLD AUTO: 4.03 MILLION/UL (ref 3.81–5.12)
SODIUM SERPL-SCNC: 141 MMOL/L (ref 135–147)
WBC # BLD AUTO: 8.62 THOUSAND/UL (ref 4.31–10.16)

## 2023-01-11 PROCEDURE — 0X940ZZ DRAINAGE OF RIGHT AXILLA, OPEN APPROACH: ICD-10-PCS | Performed by: SURGERY

## 2023-01-11 RX ORDER — LIDOCAINE HYDROCHLORIDE 10 MG/ML
5 INJECTION, SOLUTION EPIDURAL; INFILTRATION; INTRACAUDAL; PERINEURAL ONCE
Status: COMPLETED | OUTPATIENT
Start: 2023-01-11 | End: 2023-01-11

## 2023-01-11 RX ORDER — DOXYCYCLINE HYCLATE 100 MG/1
100 CAPSULE ORAL EVERY 12 HOURS SCHEDULED
Status: DISCONTINUED | OUTPATIENT
Start: 2023-01-12 | End: 2023-01-12 | Stop reason: HOSPADM

## 2023-01-11 RX ADMIN — VANCOMYCIN HYDROCHLORIDE 750 MG: 750 INJECTION, SOLUTION INTRAVENOUS at 17:46

## 2023-01-11 RX ADMIN — PRAVASTATIN SODIUM 80 MG: 80 TABLET ORAL at 16:48

## 2023-01-11 RX ADMIN — CHOLECALCIFEROL TAB 25 MCG (1000 UNIT) 1000 UNITS: 25 TAB at 08:56

## 2023-01-11 RX ADMIN — ENOXAPARIN SODIUM 40 MG: 40 INJECTION SUBCUTANEOUS at 08:56

## 2023-01-11 RX ADMIN — LIDOCAINE HYDROCHLORIDE 5 ML: 10 INJECTION, SOLUTION EPIDURAL; INFILTRATION; INTRACAUDAL at 10:25

## 2023-01-11 RX ADMIN — KETOROLAC TROMETHAMINE 15 MG: 30 INJECTION, SOLUTION INTRAMUSCULAR; INTRAVENOUS at 05:15

## 2023-01-11 RX ADMIN — VANCOMYCIN HYDROCHLORIDE 750 MG: 750 INJECTION, SOLUTION INTRAVENOUS at 05:12

## 2023-01-11 RX ADMIN — FENOFIBRATE 168 MG: 48 TABLET ORAL at 08:56

## 2023-01-11 RX ADMIN — ACETAMINOPHEN 650 MG: 325 TABLET, FILM COATED ORAL at 16:48

## 2023-01-11 RX ADMIN — DULOXETINE HYDROCHLORIDE 60 MG: 60 CAPSULE, DELAYED RELEASE ORAL at 08:56

## 2023-01-11 NOTE — PROGRESS NOTES
[facility]  Progress Note - Elen Organ 1980, 40 y o  female MRN: [id number]  Unit/Bed#: -01 Encounter: [id number]  Primary Care Provider: Holly Calderón MD   Date and time admitted to hospital: 1/10/2023  1:43 AM    * Abscess of right axilla  Assessment & Plan  Worsening right axillary swelling, pain along with tenderness  S/p IR aspiration of pus, pending culture reports outpatient  Treated with 1 day of cefadroxil with noted worsening fever, tachycardia, chills  Noted leukocytosis on presentation currently downtrending  Ultrasound showing complex lobulated abscess s/p I&D by general surgery today  Currently remains afebrile, hemodynamically stable, tachycardia resolved  Continue vancomycin as prelim culture report showing staph aureus  Plan to start p o  tomorrow  General surgery following s/p I&D for wound care  Will need around 5 to 7-day course of antibiotic  Follow-up sensitivity reports  Follow-up blood cultures and MRSA culture    Sepsis [facility])  Assessment & Plan  Sepsis with tachycardia, leukocytosis, right axillary abscess treated with IV fluid  Currently resolved  Afebrile since last 24 hours  Essential hypertension  Assessment & Plan  History of hypertension, on valsartan-hydrochlorothiazide combination pill  Currently on hold due to soft blood pressure  Restart when able  Obstructive sleep apnea syndrome  Assessment & Plan  Continue CPAP at bedtime    History of Clostridium difficile colitis  Assessment & Plan  History of C  difficile in 2018 9/2019  Currently on vancomycin which is not usually associated with C  difficile infection  Depression with anxiety  Assessment & Plan  Cymbalta    Mild intermittent asthma with status asthmaticus  Assessment & Plan  History of asthma currently not on any inhalers  Can start Atrovent as needed if worsening shortness of breath noted      Hypercholesterolemia  Assessment & Plan  Patient with history of hypercholesterolemia, on Crestor and fenofibrate   -Will start fenofibrate and Crestor while inpatient          INTERNAL MEDICINE RESIDENCY PROGRESS NOTE     Name: Mukseh Saucedo   Age & Sex: 40 y o  female   MRN: [id number]  Unit/Bed#: -01   Encounter: [id number]  Team: SOD Team B     PATIENT INFORMATION     Name: Mukesh Saucedo   Age & Sex: 40 y o  female   MRN: [id number]  Hospital Stay Days: 1    ASSESSMENT/PLAN     Principal Problem:    Abscess of right axilla  Active Problems:    Hypercholesterolemia    Mild intermittent asthma with status asthmaticus    Depression with anxiety    History of Clostridium difficile colitis    Obstructive sleep apnea syndrome    Essential hypertension    Sepsis ([facility] [address] )      * Abscess of right axilla  Assessment & Plan  Worsening right axillary swelling, pain along with tenderness  S/p IR aspiration of pus, pending culture reports outpatient  Treated with 1 day of cefadroxil with noted worsening fever, tachycardia, chills  Noted leukocytosis on presentation currently downtrending  Ultrasound showing complex lobulated abscess s/p I&D by general surgery today  Currently remains afebrile, hemodynamically stable, tachycardia resolved  Continue vancomycin as prelim culture report showing staph aureus  Plan to start p o  tomorrow  General surgery following s/p I&D for wound care  Will need around 5 to 7-day course of antibiotic  Follow-up sensitivity reports  Follow-up blood cultures and MRSA culture    Sepsis [facility])  Assessment & Plan  Sepsis with tachycardia, leukocytosis, right axillary abscess treated with IV fluid  Currently resolved  Afebrile since last 24 hours  Essential hypertension  Assessment & Plan  History of hypertension, on valsartan-hydrochlorothiazide combination pill  Currently on hold due to soft blood pressure  Restart when able      Obstructive sleep apnea syndrome  Assessment & Plan  Continue CPAP at bedtime    History of Clostridium difficile colitis  Assessment & Plan  History of C  difficile in 2018 2019  Currently on vancomycin which is not usually associated with C  difficile infection  Depression with anxiety  Assessment & Plan  Cymbalta    Mild intermittent asthma with status asthmaticus  Assessment & Plan  History of asthma currently not on any inhalers  Can start Atrovent as needed if worsening shortness of breath noted  Hypercholesterolemia  Assessment & Plan  Patient with history of hypercholesterolemia, on Crestor and fenofibrate   -Will start fenofibrate and Crestor while inpatient      Disposition: Discharge home likely tomorrow    SUBJECTIVE     Patient seen and examined  No acute events overnight  Still has right axillary pain  No skin changes  No discharge  No fever since yesterday  Hemodynamically stable  On room air  No diarrhea  OBJECTIVE     Vitals:    01/10/23 1302 01/10/23 1532 01/10/23 2229 23 0751   BP:  116/64 115/64 113/65   BP Location:       Pulse:       Resp:       Temp:  97 5 °F (36 4 °C) 98 6 °F (37 °C) 97 8 °F (36 6 °C)   TempSrc:       SpO2: 97%      Weight:       Height:          Temperature:   Temp (24hrs), Av °F (36 7 °C), Min:97 5 °F (36 4 °C), Max:98 6 °F (37 °C)    Temperature: 97 8 °F (36 6 °C)  Intake & Output:  I/O        0701  01/10 0700 01/10 07 07 07 07    P  O    120    IV Piggyback 1000 500     Total Intake(mL/kg) 1000 (9 3) 500 (4 7) 120 (1 1)    Net +1000 +500 +120               Weights:        Body mass index is 42 98 kg/m²  Weight (last 2 days)     Date/Time Weight    01/10/23 0031 107 (235)        Physical Exam  Constitutional:       General: She is in acute distress (Mild-Mod with Movement of UE)  HENT:      Head: Normocephalic and atraumatic  Eyes:      General: No scleral icterus  Right eye: No discharge  Left eye: No discharge  Cardiovascular:      Rate and Rhythm: Normal rate and regular rhythm  Pulses: Normal pulses  Heart sounds: No murmur heard  No friction rub  No gallop  Pulmonary:      Effort: Pulmonary effort is normal  No respiratory distress  Breath sounds: No wheezing or rhonchi  Abdominal:      General: Abdomen is flat  Palpations: Abdomen is soft  Tenderness: There is no abdominal tenderness  There is no guarding or rebound  Musculoskeletal:         General: No swelling or tenderness  Arms:       Cervical back: No rigidity or tenderness  Right lower leg: No edema  Left lower leg: No edema  Skin:     General: Skin is warm and dry  Capillary Refill: Capillary refill takes less than 2 seconds  Coloration: Skin is not pale  Comments: Around 1 cm swelling with small fluctuance noted in right axillary region  With no erythema  Tenderness on palpation also noted  Neurological:      Mental Status: She is alert  LABORATORY DATA     Labs: I have personally reviewed pertinent reports  Results from last 7 days   Lab Units 01/11/23  0445 01/10/23  0246   WBC Thousand/uL 8 62 12 92*   HEMOGLOBIN g/dL 10 1* 12 0   HEMATOCRIT % 32 8* 37 6   PLATELETS Thousands/uL 237 347   NEUTROS PCT % 60 75   MONOS PCT % 9 7      Results from last 7 days   Lab Units 01/11/23  0445 01/10/23  0246   POTASSIUM mmol/L 4 0 4 1   CHLORIDE mmol/L 112* 107   CO2 mmol/L 25 24   BUN mg/dL 14 15   CREATININE mg/dL 0 96 0 96   CALCIUM mg/dL 8 3 8 8   ALK PHOS U/L  --  62   ALT U/L  --  27   AST U/L  --  13              Results from last 7 days   Lab Units 01/10/23  0246   INR  0 93   PTT seconds 29     Results from last 7 days   Lab Units 01/10/23  0246   LACTIC ACID mmol/L 1 2           IMAGING & DIAGNOSTIC TESTING     Radiology Results: I have personally reviewed pertinent reports  US axilla    Result Date: 1/11/2023  Impression: Complex lobulated abscess collection increased from earlier study given the purulent aspiration    Edema-like fluid seen coursing inferior to the superficial collection  Workstation performed: [id number]     Other Diagnostic Testing: I have personally reviewed pertinent reports  ACTIVE MEDICATIONS     Current Facility-Administered Medications   Medication Dose Route Frequency   • acetaminophen (TYLENOL) tablet 650 mg  650 mg Oral Q6H PRN   • cholecalciferol (VITAMIN D3) tablet 1,000 Units  1,000 Units Oral Daily   • DULoxetine (CYMBALTA) delayed release capsule 60 mg  60 mg Oral Daily   • enoxaparin (LOVENOX) subcutaneous injection 40 mg  40 mg Subcutaneous Daily   • fenofibrate (TRICOR) tablet 168 mg  168 mg Oral Daily   • ketorolac (TORADOL) injection 15 mg  15 mg Intravenous Q6H PRN   • pravastatin (PRAVACHOL) tablet 80 mg  80 mg Oral Daily With Dinner   • vancomycin (VANCOCIN) IVPB (premix in dextrose) 750 mg 150 mL  10 mg/kg (Adjusted) Intravenous Q12H       VTE Pharmacologic Prophylaxis: Enoxaparin (Lovenox)  VTE Mechanical Prophylaxis: sequential compression device and foot pump applied    Portions of the record may have been created with voice recognition software  Occasional wrong word or "sound a like" substitutions may have occurred due to the inherent limitations of voice recognition software    Read the chart carefully and recognize, using context, where substitutions have occurred   ==  Jan Chiang MD  [facility]  Internal Medicine Residency

## 2023-01-11 NOTE — CONSULTS
Acute Care Surgery  Consultation    1  Right axillary wound/abscess   -Perform I&D at bedside   -Please see procedure note   -We will place half-inch packing within wound bed   -Continue antibiotics per primary team   -We will perform wound check on 1/12/2023   -We will place discharge instructions and follow-up appointment in on 1/12/2023 pending reevaluation   -Wound care instructions given to patient at bedside    Disposition: Surgery team will continue to follow  Primary team made aware  Bedside wound check on 1/12/2023  History of Present Illness   HPI:  Sera Moreira is a 40 y o  female who presents with right axillary abscess that started approximately 1 week ago  Patient states that she initially attempted to utilize warm compresses and take Tylenol as needed  Reports that the abscess did not get any better and went to IR on 1/9/2023 and had an aspiration performed  She reports that this did not help and she subsequently started having fevers on 1/10/2023 and went to the ER for further evaluation  Patient today on presentation states she has pain in her axilla with certain movements  Otherwise she has no associated numbness or tingling in her right upper extremity  Range of motion intact grossly other than associated pain with the abscess  She states that her fevers have improved  She states that there has been no new purulent drainage and the redness in her axilla has improved as well  Denies any current fevers, chills, sweats  Resting in bed comfortably  No other chest pain or shortness of breath  No nausea or vomiting  No abdominal pain  Up and ambulatory  Past medical history significant for hypertension    Review of Systems   Constitutional: Positive for fever  Negative for activity change and appetite change  HENT: Negative for ear discharge, ear pain, rhinorrhea, sore throat and trouble swallowing  Eyes: Negative for photophobia, pain and redness     Respiratory: Negative for apnea, cough, chest tightness, shortness of breath and stridor  Cardiovascular: Negative for chest pain and palpitations  Gastrointestinal: Negative for abdominal distention, abdominal pain, nausea and vomiting  Endocrine: Negative for cold intolerance and heat intolerance  Genitourinary: Negative  Musculoskeletal: Negative for arthralgias, back pain, neck pain and neck stiffness  Skin: Positive for wound  Neurological: Negative for dizziness, weakness, light-headedness and numbness  Hematological: Negative          Historical Information   Past Medical History:   Diagnosis Date   • Anxiety    • Asthma    • Depression    • History of Clostridium difficile colitis 2018    x 2 episodes, after antibiotics   • Hypercholesterolemia    • Hyperlipemia    • Morbid obesity ([facility] [address] )    • Pulmonary nodule    • Sleep apnea     c pap     Past Surgical History:   Procedure Laterality Date   • IR ASPIRATION ONLY  1/9/2023   • GA CONIZATION CERVIX W/WO D&C RPR ELTRD EXC N/A 9/27/2018    Procedure: BIOPSY LEEP CERVIX;  Surgeon: Ronald He DO;  Location: [facility];  Service: Gynecology   • REDUCTION MAMMAPLASTY  1999   • TONSILLECTOMY       Social History   Social History     Substance and Sexual Activity   Alcohol Use Yes   • Alcohol/week: 0 0 standard drinks    Comment: occasional drink here and there     Social History     Substance and Sexual Activity   Drug Use No     Social History     Tobacco Use   Smoking Status Every Day   • Packs/day: 0 50   • Years: 18 00   • Pack years: 9 00   • Types: Cigarettes   • Passive exposure: Past   Smokeless Tobacco Never   Tobacco Comments    is on the patch      Family History   Problem Relation Age of Onset   • Asthma Mother    • Obesity Mother    • Lung cancer Father 50   • Hyperlipidemia Father    • Bone cancer Father         mets from lung cancer   • No Known Problems Sister    • No Known Problems Brother    • Arthritis Maternal Grandmother    • COPD Maternal Grandmother    • Cancer Maternal Grandfather         Lung ca-   • Lung cancer Maternal Grandfather [de-identified]   • Liver cancer Paternal Grandfather [de-identified]   • Cancer Paternal Grandfather         Liver ca-   • No Known Problems Paternal Grandmother    • No Known Problems Maternal Aunt    • No Known Problems Maternal Aunt    • No Known Problems Maternal Aunt    • No Known Problems Paternal Aunt        Meds/Allergies   current meds:   Current Facility-Administered Medications   Medication Dose Route Frequency   • acetaminophen (TYLENOL) tablet 650 mg  650 mg Oral Q6H PRN   • cholecalciferol (VITAMIN D3) tablet 1,000 Units  1,000 Units Oral Daily   • DULoxetine (CYMBALTA) delayed release capsule 60 mg  60 mg Oral Daily   • enoxaparin (LOVENOX) subcutaneous injection 40 mg  40 mg Subcutaneous Daily   • fenofibrate (TRICOR) tablet 168 mg  168 mg Oral Daily   • ketorolac (TORADOL) injection 15 mg  15 mg Intravenous Q6H PRN   • pravastatin (PRAVACHOL) tablet 80 mg  80 mg Oral Daily With Dinner   • vancomycin (VANCOCIN) IVPB (premix in dextrose) 750 mg 150 mL  10 mg/kg (Adjusted) Intravenous Q12H     No Known Allergies    Objective   First Vitals:   Blood Pressure: 139/84 (01/10/23 0031)  Pulse: (!) 121 (01/10/23 0031)  Temperature: 99 8 °F (37 7 °C) (01/10/23 0031)  Temp Source: Tympanic (01/10/23 0031)  Respirations: 18 (01/10/23 0031)  Height: 5' 2" (157 5 cm) (01/10/23 0031)  Weight - Scale: 107 kg (235 lb) (01/10/23 0031)  SpO2: 99 % (01/10/23 0031)    Current Vitals:   Blood Pressure: 113/65 (23)  Pulse: 86 (01/10/23 0817)  Temperature: 97 8 °F (36 6 °C) (23)  Temp Source: Oral (01/10/23 0817)  Respirations: 17 (01/10/23 0817)  Height: 5' 2" (157 5 cm) (01/10/23 0031)  Weight - Scale: 107 kg (235 lb) (01/10/23 0031)  SpO2: 97 % (01/10/23 1302)      Intake/Output Summary (Last 24 hours) at 2023 0952  Last data filed at 2023 0900  Gross per 24 hour   Intake 120 ml   Output --   Net 120 ml       Invasive Devices     Peripheral Intravenous Line  Duration           Peripheral IV 01/10/23 Left Antecubital 1 day    Peripheral IV 01/10/23 Right Antecubital 1 day                Physical Exam  Vitals reviewed  Constitutional:       Appearance: Normal appearance  HENT:      Head: Normocephalic and atraumatic  Right Ear: External ear normal       Left Ear: External ear normal       Nose: Nose normal       Mouth/Throat:      Pharynx: Oropharynx is clear  Eyes:      Extraocular Movements: Extraocular movements intact  Pupils: Pupils are equal, round, and reactive to light  Cardiovascular:      Rate and Rhythm: Normal rate and regular rhythm  Pulses: Normal pulses  Heart sounds: Normal heart sounds  Pulmonary:      Effort: Pulmonary effort is normal       Breath sounds: Normal breath sounds  Abdominal:      General: There is no distension  Palpations: Abdomen is soft  Tenderness: There is no abdominal tenderness  There is no guarding  Musculoskeletal:      Cervical back: Normal range of motion and neck supple  Skin:     Comments: Right axillary Fluctuance appreciated  No surrounding erythema or noted  No purulence draining from wound  Neurological:      General: No focal deficit present  Mental Status: She is alert and oriented to person, place, and time  Lab Results:   I have personally reviewed pertinent lab results  , CBC:   Lab Results   Component Value Date    WBC 8 62 01/11/2023    HGB 10 1 (L) 01/11/2023    HCT 32 8 (L) 01/11/2023    MCV 81 (L) 01/11/2023     01/11/2023    MCH 25 1 (L) 01/11/2023    MCHC 30 8 (L) 01/11/2023    RDW 13 5 01/11/2023    MPV 9 2 01/11/2023    NRBC 0 01/11/2023   , CMP:   Lab Results   Component Value Date    SODIUM 141 01/11/2023    K 4 0 01/11/2023     (H) 01/11/2023    CO2 25 01/11/2023    BUN 14 01/11/2023    CREATININE 0 96 01/11/2023    CALCIUM 8 3 01/11/2023    EGFR 73 01/11/2023     Imaging:  I have personally reviewed pertinent reports  EKG, Pathology, and Other Studies: I have personally reviewed pertinent reports  Counseling / Coordination of Care  Total floor / unit time spent today 28 minutes  Greater than 50% of total time was spent with the patient and / or family counseling and / or coordination of care      Cash Anthony PA-C  1/11/2023 9:52 AM

## 2023-01-11 NOTE — DISCHARGE SUMMARY
INTERNAL MEDICINE RESIDENCY DISCHARGE SUMMARY     Brittaney Roberto   40 y o  female  MRN: [id number]  Room/Bed: [facility]/[address] MED SURG 7   Encounter: [id number]    Principal Problem:    Abscess of right axilla  Active Problems:    Hypercholesterolemia    Mild intermittent asthma with status asthmaticus    Depression with anxiety    History of Clostridium difficile colitis    Obstructive sleep apnea syndrome    Essential hypertension      * Abscess of right axilla  Assessment & Plan  Worsening right axillary swelling, pain along with tenderness  S/p IR aspiration of pus, pending culture reports outpatient  Treated with 1 day of cefadroxil with noted worsening fever, tachycardia, chills  Noted leukocytosis on presentation currently downtrending  Ultrasound showing complex lobulated abscess s/p I&D by general surgery today  Currently remains afebrile, hemodynamically stable, tachycardia resolved  Treated with IV Vanco and pharmacy consult with regular trough monitoring while inpatient  Started on doxycycline as per culture and sensitivity showing MRSA infection  Doxycycline 100 mg twice daily for 5 days to complete 7-day course due to history of C  difficile will avoid Bactrim  Ibuprofen as needed for pain  Schedule surgery follow-up outpatient  Will check MRSA nares  Explained regarding side effects of doxycycline including avoiding pregnancy, photosensitivity, avoid taking it with meals  Prescribe mupirocin cream to be taken in case if her MRSA culture is positive    Essential hypertension  Assessment & Plan  History of hypertension, on valsartan-hydrochlorothiazide combination pill  Currently on hold due to soft blood pressure  Restart when able  Obstructive sleep apnea syndrome  Assessment & Plan  Continue CPAP at bedtime    History of Clostridium difficile colitis  Assessment & Plan  History of C  difficile in 2018 9/2019    Currently on vancomycin which is not usually associated with C  difficile infection  Depression with anxiety  Assessment & Plan  Cymbalta    Mild intermittent asthma with status asthmaticus  Assessment & Plan  History of asthma currently not on any inhalers  Can start Atrovent as needed if worsening shortness of breath noted  Hypercholesterolemia  Assessment & Plan  Patient with history of hypercholesterolemia, on Crestor and fenofibrate   -Will start fenofibrate and Crestor while inpatient    Sepsis (HCC)-resolved as of 1/12/2023  Assessment & Plan  Sepsis with tachycardia, leukocytosis, right axillary abscess treated with IV fluid  Currently resolved  Afebrile since last 24 hours  [address] COURSE     Reason for Admission: Right axillary lymph node abscess    Patient with past medical history of C  difficile, obstructive sleep apnea, hypertension, hyperlipidemia, mild asthma came with worsening right-sided swelling, tenderness with pain noted to fall into sepsis criteria  Was seen by outpatient PCP and got IR drainage done  Noted abscess of right axilla with ultrasound showing lobulated complex abscess  General surgery consulted s/p I&D with significant improvement  Body fluid culture shows MRSA infection  Treated with IV vancomycin followed by doxycycline  Was advised to not get pregnant while on doxycycline  Was advised to continue following with PCP as usual   Was advised to follow-up with surgery regarding surgical site care  Was explained regarding side effects of doxycycline with pregnancy, photosensitivity  Was prescribed mupirocin cream and told her that she needs to take it only if her MRSA cultures are positive  Rest of the details as per assessment and plan      DISCHARGE INFORMATION     PCP at Discharge: Matheus Ferguson MD      Admitting Provider: Fran Kehr, MD  Admission Date: 1/10/2023    Discharge Provider: Fran Kehr, MD  Discharge Date: 01/12/23      Discharge Disposition: Home/Self Care  Discharge Condition: stable    Test Results Pending at Discharge: MRSA nares culture    Outpatient Tests Requested: None    Discharge Diagnoses:  Principal Problem:    Abscess of right axilla  Active Problems:    Hypercholesterolemia    Mild intermittent asthma with status asthmaticus    Depression with anxiety    History of Clostridium difficile colitis    Obstructive sleep apnea syndrome    Essential hypertension  Resolved Problems:    Sepsis ([address] )      Consultations During Hospital Stay:  · General surgery    Diagnostic & Therapeutic Procedures Performed:  US axilla    Result Date: 1/11/2023  Impression: Complex lobulated abscess collection increased from earlier study given the purulent aspiration  Edema-like fluid seen coursing inferior to the superficial collection   Workstation performed: [id number]       Code Status: Level 1 - Full Code  Advance Directive & Living Will: <no information>  Power of :    POLST:      Medications:  Current Discharge Medication List        Current Discharge Medication List        Current Discharge Medication List      CONTINUE these medications which have NOT CHANGED    Details   cefadroxil (DURICEF) 500 mg capsule Take 1 capsule (500 mg total) by mouth every 12 (twelve) hours for 7 days  Qty: 14 capsule, Refills: 0    Associated Diagnoses: Axillary lymphadenitis      cholecalciferol (VITAMIN D3) 1,000 units tablet Take by mouth daily        DULoxetine (CYMBALTA) 60 mg delayed release capsule Take 1 capsule (60 mg total) by mouth daily  Qty: 90 capsule, Refills: 0    Associated Diagnoses: Depression, unspecified depression type      fenofibrate 160 MG tablet Take 1 tablet (160 mg total) by mouth daily  Qty: 90 tablet, Refills: 0    Associated Diagnoses: Mixed hyperlipidemia      PREVIDENT 5000 SENSITIVE 1 1-5 % PSTE Refills: 3      rosuvastatin (CRESTOR) 20 MG tablet Take 1 tablet (20 mg total) by mouth daily  Qty: 90 tablet, Refills: 0 Associated Diagnoses: Mixed hyperlipidemia      SODIUM FLUORIDE, DENTAL GEL, 1 1 % GEL SF 5000 Plus 1 1 % dental cream      valsartan-hydrochlorothiazide (DIOVAN-HCT) 80-12 5 MG per tablet Take 1 tablet by mouth daily  Qty: 90 tablet, Refills: 0    Associated Diagnoses: Essential hypertension      ALPRAZolam (XANAX) 0 25 mg tablet       hydrocortisone 2 5 % cream Apply topically 2 (two) times a day for 14 days Apply 1-2x a day topically to eyebrows for 14 days  Qty: 30 g, Refills: 0    Associated Diagnoses: Sebopsoriasis      ketoconazole (NIZORAL) 2 % shampoo Apply 1 application topically in the morning for 30 doses Daily for 2 weeks straight and then on "Mondays, Wednesdays and Fridays":  Lather into scalp and skin on face, neck, chest, and back; leave on for 5 minutes and then rinse off completely  Qty: 120 mL, Refills: 2    Associated Diagnoses: Sebopsoriasis      SF 5000 Plus 1 1 % CREA              Allergies:  No Known Allergies    Discharge Day Visit / Exam:     Subjective: Patient was seen and examined by me  Communicated clearly  No particular overnight event reported  Hemodynamically stable and afebrile  Patient feels better overall  Still has right arm pain advised to start taking Tylenol and ibuprofen on discharge  Doing well  He medically stable  Afebrile  Vitals: Blood Pressure: 130/76 (01/11/23 1521)  Pulse: 86 (01/10/23 0817)  Temperature: 97 5 °F (36 4 °C) (01/11/23 1521)  Temp Source: Oral (01/10/23 0817)  Respirations: 18 (01/11/23 1521)  Height: 5' 2" (157 5 cm) (01/10/23 0031)  Weight - Scale: 107 kg (235 lb) (01/10/23 0031)  SpO2: 97 % (01/10/23 1302)  Exam:   Physical Exam  Vitals reviewed  Constitutional:       General: She is not in acute distress  Appearance: She is well-developed  HENT:      Head: Normocephalic and atraumatic  Eyes:      General: No scleral icterus  Right eye: No discharge  Left eye: No discharge     Cardiovascular:      Rate and Rhythm: Normal rate and regular rhythm  Pulses: Normal pulses  Heart sounds: Normal heart sounds  Pulmonary:      Effort: Pulmonary effort is normal  No respiratory distress  Breath sounds: Normal breath sounds  No wheezing or rales  Chest:      Chest wall: No tenderness  Abdominal:      General: Bowel sounds are normal  There is no distension  Palpations: Abdomen is soft  Tenderness: There is no abdominal tenderness  Musculoskeletal:         General: No swelling or tenderness  Normal range of motion  Cervical back: Normal range of motion  Right lower leg: No edema  Left lower leg: No edema  Skin:     General: Skin is warm  Coloration: Skin is not pale  Findings: Lesion (Right axilla surgical site which is clean and dry without any skin changes with some tenderness noted  ) present  Neurological:      General: No focal deficit present  Mental Status: She is alert and oriented to person, place, and time  Mental status is at baseline  Cranial Nerves: No cranial nerve deficit  Sensory: No sensory deficit  Motor: No weakness  Psychiatric:         Mood and Affect: Mood normal          Behavior: Behavior normal          Discussion with Family: None    Discharge instructions/Information to patient and family:   See after visit summary for information provided to patient and family  Provisions for Follow-Up Care:  See after visit summary for information related to follow-up care and any pertinent home health orders  Discharge Medications:  See after visit summary for reconciled discharge medications provided to patient and family  Discharge Statement:   I spent 30 minutes minutes discharging the patient  This time was spent on the day of discharge  I had direct contact with the patient on the day of discharge  Additional documentation is required if more than 30 minutes were spent on discharge      Portions of the record may have been created with voice recognition software  Occasional wrong word or "sound a like" substitutions may have occurred due to the inherent limitations of voice recognition software    Read the chart carefully and recognize, using context, where substitutions have occurred     ==  Melisa Molina MD  [address]  Internal Medicine

## 2023-01-11 NOTE — UTILIZATION REVIEW
Initial Clinical Review    Admission: Date/Time/Statement:   Admission Orders (From admission, onward)     Ordered        01/10/23 0428  INPATIENT ADMISSION  Once                      Orders Placed This Encounter   Procedures   • INPATIENT ADMISSION     Standing Status:   Standing     Number of Occurrences:   1     Order Specific Question:   Level of Care     Answer:   Med Surg [16]     Order Specific Question:   Estimated length of stay     Answer:   More than 2 Midnights     Order Specific Question:   Certification     Answer:   I certify that inpatient services are medically necessary for this patient for a duration of greater than two midnights  See H&P and MD Progress Notes for additional information about the patient's course of treatment  ED Arrival Information     Expected   -    Arrival   1/10/2023 00:21    Acuity   Urgent            Means of arrival   Walk-In    Escorted by   Self    Service   [facility]    Admission type   Emergency            Arrival complaint   Lump/Fever           Chief Complaint   Patient presents with   • Cyst     Pt had cyst under R armpit drained with cultures from drained fluid collected in IR around 1700 earlier today  Developed fever, pain, chills and lump at drainage site  Started on abx today        Initial Presentation: 40 y o  female to ED presents for worsening pain, failed treatment for R armpit cellulitis s/p IR drainage and cefadroxil x1 day  Jan 5th, 5 days prior, pt noticed a 'bump' on her R armpit  It became painful with development of fever the next day, and seen by PCP on 1/6  PCP considered cellulitis and recommended Aleve bid + warm compress with plans to follow throughout the week  Antibiotics were considered, however pt refused abx use given h/o C diff  Fever/chills worsened thus she decided to ask for abx on Jan 8th, and she was prescribed Cefadroxil   However on Monday Jan 9th, about 24 hours prior to presentation, without relief in symptoms she was referred for IR drainage  IR drained 3 cc of thick/gellatinous pus less than 24 hours prior to presentation  However sx's continued  PMH for HTN, HLD, PRECIOUS, Anxiety, C diff Colitis (2018), and remote asthma  In ED noted with temp of 101 5 and in mild distress  Admit Inpatient level of care for Cellulitis of axillary region, SIRS and Leukocytosis  Wbc 12 9, tachycardia with heart rate 121  Continue Iv antibiotics  Lactic acid 1 2  MRSA culture  Drainage cultures pending  Bld cultures x2      1/10  Per IR; Pain on arm movements, improved with pain medication  Redness outlined in pain on axilla, under arm  F/u cultures  US pending  Continue Iv antibiotics  On exam the right axilla is tender with masslike dark area at the site of what is a developing abscess  Right axilla with erythema  Date: 1/11  Day 2:   General-Surgery cons; Right axillary wound/abscess  S/p I&D at bedside  Place 1/2 inch packing within wound bed  Continue Iv antibiotics  Wound check on 1/12/23  Progress notes; Continue Iv antibiotics  F/u sensitivity report  F/u Bld and MRSA culture  Still has right axillary pain  S/p Bedside I&D to Right Axilla  Abscess  Purulent drainage  Packing placed         ED Triage Vitals [01/10/23 0031]   Temperature Pulse Respirations Blood Pressure SpO2   99 8 °F (37 7 °C) (!) 121 18 139/84 99 %      Temp Source Heart Rate Source Patient Position - Orthostatic VS BP Location FiO2 (%)   Tympanic Monitor Sitting Right arm --      Pain Score       8          Wt Readings from Last 1 Encounters:   01/10/23 107 kg (235 lb)     Additional Vital Signs:   01/11/23 07:51:11 97 8 °F (36 6 °C) -- -- 113/65 81 -- -- --   01/10/23 22:29:06 98 6 °F (37 °C) -- -- 115/64 81 -- -- --   01/10/23 2000 -- -- -- -- -- -- None (Room air) --   01/10/23 15:32:45 97 5 °F (36 4 °C) -- -- 116/64 81 -- -- --   01/10/23 1302 -- -- -- -- -- 97 % None (Room air) --   01/10/23 08:17:28 98 4 °F (36 9 °C) 86 17 116/65 82 98 % None (Room air) Pertinent Labs/Diagnostic Test Results:   US axilla   Final Result by Angel Barlow MD (01/11 0815)      Complex lobulated abscess collection increased from earlier study given the purulent aspiration  Edema-like fluid seen coursing inferior to the superficial collection  1/10  EKG - NSR      Results from last 7 days   Lab Units 01/11/23  0445 01/10/23  0246   WBC Thousand/uL 8 62 12 92*   HEMOGLOBIN g/dL 10 1* 12 0   HEMATOCRIT % 32 8* 37 6   PLATELETS Thousands/uL 237 347   NEUTROS ABS Thousands/µL 5 30 9 78*         Results from last 7 days   Lab Units 01/11/23  0445 01/10/23  0246   SODIUM mmol/L 141 137   POTASSIUM mmol/L 4 0 4 1   CHLORIDE mmol/L 112* 107   CO2 mmol/L 25 24   ANION GAP mmol/L 4 6   BUN mg/dL 14 15   CREATININE mg/dL 0 96 0 96   EGFR ml/min/1 73sq m 73 73   CALCIUM mg/dL 8 3 8 8     Results from last 7 days   Lab Units 01/10/23  0246   AST U/L 13   ALT U/L 27   ALK PHOS U/L 62   TOTAL PROTEIN g/dL 6 7   ALBUMIN g/dL 3 6   TOTAL BILIRUBIN mg/dL 0 38         Results from last 7 days   Lab Units 01/11/23  0445 01/10/23  0246   GLUCOSE RANDOM mg/dL 82 96         Results from last 7 days   Lab Units 01/10/23  0246   PROTIME seconds 12 7   INR  0 93   PTT seconds 29             Results from last 7 days   Lab Units 01/10/23  0246   LACTIC ACID mmol/L 1 2       Results from last 7 days   Lab Units 01/10/23  0246 01/09/23  1706   BLOOD CULTURE  No Growth at 24 hrs    No Growth at 24 hrs   --    GRAM STAIN RESULT   --  4+ Polys*  3+ Gram positive cocci in pairs*   BODY FLUID CULTURE, STERILE   --  2+ Growth of Staphylococcus aureus*       ED Treatment:   Medication Administration from 01/10/2023 0021 to 01/10/2023 1522       Date/Time Order Dose Route Action     01/10/2023 0248 EST sodium chloride 0 9 % bolus 1,000 mL 1,000 mL Intravenous New Bag     01/10/2023 0511 EST vancomycin (VANCOCIN) 1,750 mg in sodium chloride 0 9 % 500 mL IVPB 1,750 mg Intravenous New Bag Past Medical History:   Diagnosis Date   • Anxiety    • Asthma    • Depression    • History of Clostridium difficile colitis 2018    x 2 episodes, after antibiotics   • Hypercholesterolemia    • Hyperlipemia    • Morbid obesity ([address] )    • Pulmonary nodule    • Sleep apnea     c pap     Present on Admission:  • Hypercholesterolemia  • Mild intermittent asthma with status asthmaticus  • Depression with anxiety  • Obstructive sleep apnea syndrome  • Essential hypertension  • Abscess of right axilla  • Sepsis (HCC)      Admitting Diagnosis: Abscess [L02 91]  Cellulitis [L03 90]  Axillary lymphadenitis [I88 9]  Lump in armpit, right [R22 31]  Age/Sex: 40 y o  female     Admission Orders:  Scheduled Medications:  cholecalciferol, 1,000 Units, Oral, Daily  DULoxetine, 60 mg, Oral, Daily  enoxaparin, 40 mg, Subcutaneous, Daily  fenofibrate, 168 mg, Oral, Daily  pravastatin, 80 mg, Oral, Daily With Dinner  vancomycin, 10 mg/kg (Adjusted), Intravenous, Q12H      Continuous IV Infusions: None     PRN Meds:  acetaminophen, 650 mg, Oral, Q6H PRN  ketorolac, 15 mg, Intravenous, Q6H PRN 1/10 x2, 1/11 x1        IP CONSULT TO PHARMACY  IP CONSULT TO ACUTE CARE SURGERY    Network Utilization Review Department  ATTENTION: Please call with any questions or concerns to [phone] and carefully listen to the prompts so that you are directed to the right person  All voicemails are confidential   Kentrell Burrows all requests for admission clinical reviews, approved or denied determinations and any other requests to dedicated fax number below belonging to the campus where the patient is receiving treatment   List of dedicated fax numbers for the Facilities:  [address] DENIALS (Administrative/Medical Necessity) [phone]   [address] (Maternity/NICU/Pediatrics) [phone]   100 [facility] [address] [place] [phone]   1305 [facility] 150 [facility] [address] [place] [address] [facility] [address] [facility] 134 815 [facility] [phone]

## 2023-01-11 NOTE — PROGRESS NOTES
Vancomycin Pharmacy Consult    Wellington Morales is an 40 y o  female who is currently receiving IV vancomycin for right axillary cellulitis w/ abscess  Vancomycin Assessment:  1  ID Consult: No  2  Cultures:   1/9 Anaerobic Right Axillary: in process  1/9 Culture Right Axillary: 2+ Staph aureus (prelim)  1/10 Blood 2/2: NGTD  3  Procalcitonin:   n/a  4  Renal Function:   SCr: 0 96  CrCl: 89 mL/min  UOP: n/a  5  Days of Therapy: 2  6  Current Dose: 750 mg IV q12h  7  Last Level: n/a  8  Goal AUC(24h): 400-600  9  Goal Random/Trough: 10-15    Vancomycin Plan:  1  Evaluation: continue current regimen  2  New Dosing: continue 750 mg IV q12h  Predicted Trough / AUC(24h): 13 7 / 448  3  Next Level: random 1/12 at [address] will continue to follow closely for s/sx of nephrotoxicity, infusion reactions, and appropriateness of therapy  BMP and CBC will be ordered per protocol  We will continue to follow the patient’s culture results and clinical progress daily  Ryan Gregg, PharmD  Internal Medicine Clinical Pharmacist Specialist  [phone]  [facility]

## 2023-01-11 NOTE — UTILIZATION REVIEW
NOTIFICATION OF INPATIENT ADMISSION   AUTHORIZATION REQUEST   SERVICING FACILITY:   [facility]  Address: [address] [place] [address], [address] [place] [address]  Tax ID: [id number]  NPI: [id number] ATTENDING PROVIDER:  Attending Name and NPI#: Rubi Villanueva Md [[id number]]  Address: [address] [place] [address]  Phone: [phone]   ADMISSION INFORMATION:  [facility]patient [address] [facility]  [address]  Place of Service Code: 21  Inpatient Admission Date/Time: 1/10/23  4:29 AM  Discharge Date/Time: No discharge date for patient encounter  Admitting Diagnosis Code/Description:  Abscess [L02 91]  Cellulitis [L03 90]  Axillary lymphadenitis [I88 9]  Lump in armpit, right [R22 31]     UTILIZATION REVIEW CONTACT:  Yara Sheehan Utilization   Network Utilization Review Department  Phone: [phone]  Fax: [phone]  Email: [email or web address]  Contact for approvals/pending authorizations, clinical reviews, and discharge  PHYSICIAN ADVISORY SERVICES:  Medical Necessity Denial & Cbel-xm-Gbsj Review  Phone: [phone]  Fax: [phone]  Email: [email or web address]

## 2023-01-11 NOTE — PROCEDURES
Incision and drain    Date/Time: 1/11/2023 10:31 AM  Performed by: Jayla Benites MD  Authorized by: Jayla Benites MD   Universal Protocol:  Procedure performed by: Juanjo Boateng PA-C)  Consent: Verbal consent obtained  Risks and benefits: risks, benefits and alternatives were discussed  Consent given by: patient  Time out: Immediately prior to procedure a "time out" was called to verify the correct patient, procedure, equipment, support staff and site/side marked as required  Timeout called at: 1/11/2023 10:24 AM   Patient understanding: patient states understanding of the procedure being performed      Patient location:  Bedside  Location:     Type:  Abscess    Location:  Trunk    Trunk location: Right Axilla   Pre-procedure details:     Skin preparation:  Chloraprep  Anesthesia (see MAR for exact dosages): Anesthesia method:  Local infiltration    Local anesthetic:  Lidocaine 1% w/o epi  Procedure details:     Complexity:  Simple    Needle aspiration: no      Incision types:  Single straight    Scalpel blade:  11    Approach:  Open    Incision depth:  Subcutaneous    Wound management:  Probed and deloculated and irrigated with saline    Drainage:  Purulent    Drainage amount: Moderate    Wound treatment:  Packing placed    Packing materials:  1/2 in gauze  Post-procedure details:     Patient tolerance of procedure:   Tolerated well, no immediate complications

## 2023-01-12 VITALS
WEIGHT: 235 LBS | HEIGHT: 62 IN | HEART RATE: 68 BPM | RESPIRATION RATE: 16 BRPM | SYSTOLIC BLOOD PRESSURE: 124 MMHG | DIASTOLIC BLOOD PRESSURE: 74 MMHG | OXYGEN SATURATION: 99 % | TEMPERATURE: 97.9 F | BODY MASS INDEX: 43.24 KG/M2

## 2023-01-12 PROBLEM — L02.411 ABSCESS OF RIGHT AXILLA: Status: RESOLVED | Noted: 2023-01-10 | Resolved: 2023-01-12

## 2023-01-12 PROBLEM — A41.9 SEPSIS (HCC): Status: RESOLVED | Noted: 2023-01-10 | Resolved: 2023-01-12

## 2023-01-12 LAB — VANCOMYCIN SERPL-MCNC: 8.6 UG/ML (ref 10–20)

## 2023-01-12 RX ORDER — DOXYCYCLINE HYCLATE 100 MG/1
100 CAPSULE ORAL EVERY 12 HOURS SCHEDULED
Qty: 9 CAPSULE | Refills: 0 | Status: SHIPPED | OUTPATIENT
Start: 2023-01-12 | End: 2023-01-17

## 2023-01-12 RX ORDER — IBUPROFEN 600 MG/1
600 TABLET ORAL EVERY 6 HOURS PRN
Qty: 10 TABLET | Refills: 2 | Status: SHIPPED | OUTPATIENT
Start: 2023-01-12

## 2023-01-12 RX ADMIN — PRAVASTATIN SODIUM 80 MG: 80 TABLET ORAL at 17:42

## 2023-01-12 RX ADMIN — DULOXETINE HYDROCHLORIDE 60 MG: 60 CAPSULE, DELAYED RELEASE ORAL at 08:19

## 2023-01-12 RX ADMIN — CHOLECALCIFEROL TAB 25 MCG (1000 UNIT) 1000 UNITS: 25 TAB at 08:19

## 2023-01-12 RX ADMIN — FENOFIBRATE 168 MG: 48 TABLET ORAL at 08:19

## 2023-01-12 RX ADMIN — DOXYCYCLINE 100 MG: 100 CAPSULE ORAL at 08:19

## 2023-01-12 RX ADMIN — ENOXAPARIN SODIUM 40 MG: 40 INJECTION SUBCUTANEOUS at 08:19

## 2023-01-12 NOTE — PLAN OF CARE
Problem: PAIN - ADULT  Goal: Verbalizes/displays adequate comfort level or baseline comfort level  Description: Interventions:  - Encourage patient to monitor pain and request assistance  - Assess pain using appropriate pain scale  - Administer analgesics based on type and severity of pain and evaluate response  - Implement non-pharmacological measures as appropriate and evaluate response  - Consider cultural and social influences on pain and pain management  - Notify physician/advanced practitioner if interventions unsuccessful or patient reports new pain  Outcome: Progressing     Problem: INFECTION - ADULT  Goal: Absence or prevention of progression during hospitalization  Description: INTERVENTIONS:  - Assess and monitor for signs and symptoms of infection  - Monitor lab/diagnostic results  - Monitor all insertion sites, i e  indwelling lines, tubes, and drains  - Monitor endotracheal if appropriate and nasal secretions for changes in amount and color  - [place] appropriate cooling/warming therapies per order  - Administer medications as ordered  - Instruct and encourage patient and family to use good hand hygiene technique  - Identify and instruct in appropriate isolation precautions for identified infection/condition  Outcome: Progressing  Goal: Absence of fever/infection during neutropenic period  Description: INTERVENTIONS:  - Monitor WBC    Outcome: Progressing     Problem: SAFETY ADULT  Goal: Patient will remain free of falls  Description: INTERVENTIONS:  - Educate patient/family on patient safety including physical limitations  - Instruct patient to call for assistance with activity   - Consult OT/PT to assist with strengthening/mobility   - Keep Call bell within reach  - Keep bed low and locked with side rails adjusted as appropriate  - Keep care items and personal belongings within reach  - Initiate and maintain comfort rounds  - Make Fall Risk Sign visible to staff  - Offer Toileting every Hours, in advance of need  - Initiate/Maintain   alarm  - Obtain necessary fall risk management equipment:     - Apply yellow socks and bracelet for high fall risk patients  - Consider moving patient to room near nurses station  Outcome: Progressing  Goal: Maintain or return to baseline ADL function  Description: INTERVENTIONS:  -  Assess patient's ability to carry out ADLs; assess patient's baseline for ADL function and identify physical deficits which impact ability to perform ADLs (bathing, care of mouth/teeth, toileting, grooming, dressing, etc )  - Assess/evaluate cause of self-care deficits   - Assess range of motion  - Assess patient's mobility; develop plan if impaired  - Assess patient's need for assistive devices and provide as appropriate  - Encourage maximum independence but intervene and supervise when necessary  - Involve family in performance of ADLs  - Assess for home care needs following discharge   - Consider OT consult to assist with ADL evaluation and planning for discharge  - Provide patient education as appropriate  Outcome: Progressing  Goal: Maintains/Returns to pre admission functional level  Description: INTERVENTIONS:  - Perform BMAT or MOVE assessment daily    - Set and communicate daily mobility goal to care team and patient/family/caregiver  - Collaborate with rehabilitation services on mobility goals if consulted  - Perform Range of Motion    times a day  - Reposition patient every      hours    - Dangle patient    times a day  - Stand patient    times a day  - Ambulate patient    times a day  - Out of bed to chair      times a day   - Out of bed for meals    times a day  - Out of bed for toileting  - Record patient progress and toleration of activity level   Outcome: Progressing     Problem: DISCHARGE PLANNING  Goal: Discharge to home or other facility with appropriate resources  Description: INTERVENTIONS:  - Identify barriers to discharge w/patient and caregiver  - Arrange for needed discharge resources and transportation as appropriate  - Identify discharge learning needs (meds, wound care, etc )  - Arrange for interpretive services to assist at discharge as needed  - Refer to Case Management Department for coordinating discharge planning if the patient needs post-hospital services based on physician/advanced practitioner order or complex needs related to functional status, cognitive ability, or social support system  Outcome: Progressing     Problem: Knowledge Deficit  Goal: Patient/family/caregiver demonstrates understanding of disease process, treatment plan, medications, and discharge instructions  Description: Complete learning assessment and assess knowledge base    Interventions:  - Provide teaching at level of understanding  - Provide teaching via preferred learning methods  Outcome: Progressing     Problem: METABOLIC, FLUID AND ELECTROLYTES - ADULT  Goal: Electrolytes maintained within normal limits  Description: INTERVENTIONS:  - Monitor labs and assess patient for signs and symptoms of electrolyte imbalances  - Administer electrolyte replacement as ordered  - Monitor response to electrolyte replacements, including repeat lab results as appropriate  - Instruct patient on fluid and nutrition as appropriate  Outcome: Progressing     Problem: SKIN/TISSUE INTEGRITY - ADULT  Goal: Skin Integrity remains intact(Skin Breakdown Prevention)  Description: Assess:  -Perform Chandana assessment every     -Clean and moisturize skin every     -Inspect skin when repositioning, toileting, and assisting with ADLS  -Assess under medical devices such as    every     -Assess extremities for adequate circulation and sensation     Bed Management:  -Have minimal linens on bed & keep smooth, unwrinkled  -Change linens as needed when moist or perspiring  -Avoid sitting or lying in one position for more than    hours while in bed  -Keep HOB at   degrees     Toileting:  -Offer bedside commode  -Assess for incontinence every     -Use incontinent care products after each incontinent episode such as       Activity:  -Mobilize patient    times a day  -Encourage activity and walks on unit  -Encourage or provide ROM exercises   -Turn and reposition patient every      Hours  -Use appropriate equipment to lift or move patient in bed  -Instruct/ Assist with weight shifting every    when out of bed in chair  -Consider limitation of chair time      hour intervals    Skin Care:  -Avoid use of baby powder, tape, friction and shearing, hot water or constrictive clothing  -Relieve pressure over bony prominences using     -Do not massage red bony areas    Next Steps:  -Teach patient strategies to minimize risks such as        -Consider consults to  interdisciplinary teams such as       Outcome: Progressing     Problem: MUSCULOSKELETAL - ADULT  Goal: Maintain proper alignment of affected body part  Description: INTERVENTIONS:  - Support, maintain and protect limb and body alignment  - Provide patient/ family with appropriate education  Outcome: Progressing     Problem: Nutrition/Hydration-ADULT  Goal: Nutrient/Hydration intake appropriate for improving, restoring or maintaining nutritional needs  Description: Monitor and assess patient's nutrition/hydration status for malnutrition  Collaborate with interdisciplinary team and initiate plan and interventions as ordered  Monitor patient's weight and dietary intake as ordered or per policy  Utilize nutrition screening tool and intervene as necessary  Determine patient's food preferences and provide high-protein, high-caloric foods as appropriate       INTERVENTIONS:  - Monitor oral intake, urinary output, labs, and treatment plans  - Assess nutrition and hydration status and recommend course of action  - Evaluate amount of meals eaten  - Assist patient with eating if necessary   - Allow adequate time for meals  - Recommend/ encourage appropriate diets, oral nutritional supplements, and vitamin/mineral supplements  - Order, calculate, and assess calorie counts as needed  - Recommend, monitor, and adjust tube feedings and TPN/PPN based on assessed needs  - Assess need for intravenous fluids  - Provide specific nutrition/hydration education as appropriate  - Include patient/family/caregiver in decisions related to nutrition  Outcome: Progressing     Problem: Potential for Falls  Goal: Patient will remain free of falls  Description: INTERVENTIONS:  - Educate patient/family on patient safety including physical limitations  - Instruct patient to call for assistance with activity   - Consult OT/PT to assist with strengthening/mobility   - Keep Call bell within reach  - Keep bed low and locked with side rails adjusted as appropriate  - Keep care items and personal belongings within reach  - Initiate and maintain comfort rounds  - Make Fall Risk Sign visible to staff  - Offer Toileting every Hours, in advance of need  - Initiate/Maintain   alarm  - Obtain necessary fall risk management equipment:     - Apply yellow socks and bracelet for high fall risk patients  - Consider moving patient to room near nurses station  Outcome: Progressing

## 2023-01-12 NOTE — DISCHARGE INSTR - AVS FIRST PAGE
You are advised to start taking antibiotic doxycycline  Side effects like photosensitivity, bone pain are usually observed which can resolve over time  Avoid pregnancy until you have stopped your antibiotic  Follow-up with general surgery regarding your surgical site  You are advised to use that cream only if you are MRSA culture is positive for bacteria and nasal colonization  Axillary wound care:    - Please remove packing in axillary wound on Friday, 1/13/2023     - No further packing is necessary  May cover with a dry gauze dressing     - May shower daily  No tub baths or swimming until cleared by a physician

## 2023-01-12 NOTE — ASSESSMENT & PLAN NOTE
Worsening right axillary swelling, pain along with tenderness  S/p IR aspiration of pus, pending culture reports outpatient  Treated with 1 day of cefadroxil with noted worsening fever, tachycardia, chills  Noted leukocytosis on presentation currently downtrending  Ultrasound showing complex lobulated abscess s/p I&D by general surgery today  Currently remains afebrile, hemodynamically stable, tachycardia resolved  Treated with IV Vanco and pharmacy consult with regular trough monitoring while inpatient  Started on doxycycline as per culture and sensitivity showing MRSA infection  Doxycycline 100 mg twice daily for 5 days to complete 7-day course due to history of C  difficile will avoid Bactrim  Ibuprofen as needed for pain  Schedule surgery follow-up outpatient  Will check MRSA nares  Explained regarding side effects of doxycycline including avoiding pregnancy, photosensitivity, avoid taking it with meals    Prescribe mupirocin cream to be taken in case if her MRSA culture is positive

## 2023-01-13 ENCOUNTER — TRANSITIONAL CARE MANAGEMENT (OUTPATIENT)
Dept: FAMILY MEDICINE CLINIC | Facility: CLINIC | Age: 43
End: 2023-01-13

## 2023-01-14 LAB — MRSA NOSE QL CULT: NORMAL

## 2023-01-15 LAB
BACTERIA BLD CULT: NORMAL
BACTERIA BLD CULT: NORMAL

## 2023-01-16 ENCOUNTER — OFFICE VISIT (OUTPATIENT)
Dept: FAMILY MEDICINE CLINIC | Facility: CLINIC | Age: 43
End: 2023-01-16

## 2023-01-16 VITALS
TEMPERATURE: 98 F | WEIGHT: 228.8 LBS | HEIGHT: 62 IN | BODY MASS INDEX: 42.1 KG/M2 | RESPIRATION RATE: 16 BRPM | OXYGEN SATURATION: 98 % | DIASTOLIC BLOOD PRESSURE: 70 MMHG | HEART RATE: 107 BPM | SYSTOLIC BLOOD PRESSURE: 102 MMHG

## 2023-01-16 DIAGNOSIS — E78.2 MIXED HYPERLIPIDEMIA: ICD-10-CM

## 2023-01-16 DIAGNOSIS — B37.31 VAGINAL CANDIDIASIS: ICD-10-CM

## 2023-01-16 DIAGNOSIS — Z76.89 ENCOUNTER FOR SUPPORT AND COORDINATION OF TRANSITION OF CARE: Primary | ICD-10-CM

## 2023-01-16 DIAGNOSIS — J45.22 MILD INTERMITTENT ASTHMA WITH STATUS ASTHMATICUS: ICD-10-CM

## 2023-01-16 DIAGNOSIS — F17.200 TOBACCO DEPENDENCE SYNDROME: ICD-10-CM

## 2023-01-16 DIAGNOSIS — I10 ESSENTIAL HYPERTENSION: ICD-10-CM

## 2023-01-16 PROBLEM — E78.00 HYPERCHOLESTEROLEMIA: Status: RESOLVED | Noted: 2018-05-30 | Resolved: 2023-01-16

## 2023-01-16 RX ORDER — FLUCONAZOLE 150 MG/1
150 TABLET ORAL ONCE
Qty: 1 TABLET | Refills: 0 | Status: SHIPPED | OUTPATIENT
Start: 2023-01-16 | End: 2023-01-16

## 2023-01-16 NOTE — UTILIZATION REVIEW
NOTIFICATION OF ADMISSION DISCHARGE   This is a Notification of Discharge from [address]  Please be advised that this patient has been discharge from our facility  Below you will find the admission and discharge date and time including the patient’s disposition  UTILIZATION REVIEW CONTACT:  Norma De Paz  Utilization   Network Utilization Review Department  Phone: [phone] x carefully listen to the prompts  All voicemails are confidential   Email: [email or web address]     ADMISSION INFORMATION  PRESENTATION DATE: 1/10/2023  1:43 AM  OBERVATION ADMISSION DATE:   INPATIENT ADMISSION DATE: 1/10/23  4:29 AM   DISCHARGE DATE: 1/12/2023  6:00 PM   DISPOSITION:Home/Self Care    IMPORTANT INFORMATION:  Send all requests for admission clinical reviews, approved or denied determinations and any other requests to dedicated fax number below belonging to the campus where the patient is receiving treatment   List of dedicated fax numbers:  [address] DENIALS (Administrative/Medical Necessity) [phone]   [address] (Maternity/NICU/Pediatrics) [phone]   [facility] [phone]   [facility] [phone]   [address] [phone]   [address] [facility] [phone]   [address] [place] [address] [phone]   [address] [place] [address] 119 [phone]   [facility]  [phone] [address] [facility] [phone]   [address] [facility] [address] [facility] [phone]

## 2023-01-16 NOTE — PROGRESS NOTES
Assessment/Plan:         Problem List Items Addressed This Visit        Respiratory    Mild intermittent asthma with status asthmaticus     Under control  Continue current medication  We will re-evaluate at next office visit  Cardiovascular and Mediastinum    Essential hypertension     Under control  Continue current medication  We will re-evaluate at next office visit  Other    Tobacco dependence syndrome     Under control  Continue current medication  We will re-evaluate at next office visit  Mixed hyperlipidemia     Under control  Continue current medication  We will re-evaluate at next office visit  Other Visit Diagnoses     Encounter for support and coordination of transition of care    -  Primary    Vaginal candidiasis        Relevant Medications    fluconazole (DIFLUCAN) 150 mg tablet            Subjective:      Patient ID: Francina Klinefelter is a 40 y o  female  Patient is here for transitional care management  All the records from the hospital reviewed  Patient had abscess of the right axilla and a incision and drainage done  Currently taking doxycycline  3 incision from I&D healed up in the right axilla  Currently feeling okay  Denies any chest pain, shortness of breath, abdominal pain, nausea, vomiting, fever or chills  No lightheadedness or dizziness  No bowel or bladder issues        The following portions of the patient's history were reviewed and updated as appropriate:   Past Medical History:  She has a past medical history of Anxiety, Asthma, Depression, History of Clostridium difficile colitis (2018), Hypercholesterolemia, Hyperlipemia, Morbid obesity ([address] ), Pulmonary nodule, and Sleep apnea ,  _______________________________________________________________________  Medical Problems:  does not have any pertinent problems on file ,  _______________________________________________________________________  Past Surgical History:   has a past surgical history that includes Tonsillectomy; Reduction mammaplasty (1999); pr conization cervix w/wo d&c rpr eltrd exc (N/A, 9/27/2018); and IR aspiration only (1/9/2023)  ,  _______________________________________________________________________  Family History:  family history includes Arthritis in her maternal grandmother; Asthma in her mother; Bone cancer in her father; COPD in her maternal grandmother; Cancer in her maternal grandfather and paternal grandfather; Hyperlipidemia in her father; Liver cancer (age of onset: [de-identified]) in her paternal grandfather; Lung cancer (age of onset: 50) in her father; Lung cancer (age of onset: [de-identified]) in her maternal grandfather; No Known Problems in her brother, maternal aunt, maternal aunt, maternal aunt, paternal aunt, paternal grandmother, and sister; Obesity in her mother ,  _______________________________________________________________________  Social History:   reports that she has been smoking cigarettes  She has a 9 00 pack-year smoking history  She has been exposed to tobacco smoke  She has never used smokeless tobacco  She reports current alcohol use  She reports that she does not use drugs  ,  _______________________________________________________________________  Allergies:  has No Known Allergies     _______________________________________________________________________  Current Outpatient Medications   Medication Sig Dispense Refill   • cholecalciferol (VITAMIN D3) 1,000 units tablet Take by mouth daily       • doxycycline hyclate (VIBRAMYCIN) 100 mg capsule Take 1 capsule (100 mg total) by mouth every 12 (twelve) hours for 9 doses 9 capsule 0   • DULoxetine (CYMBALTA) 60 mg delayed release capsule Take 1 capsule (60 mg total) by mouth daily 90 capsule 0   • fenofibrate 160 MG tablet Take 1 tablet (160 mg total) by mouth daily 90 tablet 0   • fluconazole (DIFLUCAN) 150 mg tablet Take 1 tablet (150 mg total) by mouth once for 1 dose 1 tablet 0   • ibuprofen (MOTRIN) 600 mg tablet Take 1 tablet (600 mg total) by mouth every 6 (six) hours as needed for mild pain for up to 10 doses 10 tablet 2   • mupirocin (BACTROBAN) 2 % nasal ointment into each nostril 2 (two) times a day for 5 days 10 g 0   • PREVIDENT 5000 SENSITIVE 1 1-5 % PSTE   3   • rosuvastatin (CRESTOR) 20 MG tablet Take 1 tablet (20 mg total) by mouth daily 90 tablet 0   • SF 5000 Plus 1 1 % CREA      • SODIUM FLUORIDE, DENTAL GEL, 1 1 % GEL SF 5000 Plus 1 1 % dental cream     • valsartan-hydrochlorothiazide (DIOVAN-HCT) 80-12 5 MG per tablet Take 1 tablet by mouth daily 90 tablet 0   • hydrocortisone 2 5 % cream Apply topically 2 (two) times a day for 14 days Apply 1-2x a day topically to eyebrows for 14 days 30 g 0   • ketoconazole (NIZORAL) 2 % shampoo Apply 1 application topically in the morning for 30 doses Daily for 2 weeks straight and then on "Mondays, Wednesdays and Fridays":  Lather into scalp and skin on face, neck, chest, and back; leave on for 5 minutes and then rinse off completely  120 mL 2     No current facility-administered medications for this visit      _______________________________________________________________________  Review of Systems   Constitutional: Negative for chills, fatigue and fever  HENT: Negative for congestion, ear pain, rhinorrhea, sneezing and sore throat  Eyes: Negative for redness, itching and visual disturbance  Respiratory: Negative for cough, chest tightness and shortness of breath  Cardiovascular: Negative for chest pain, palpitations and leg swelling  Gastrointestinal: Negative for abdominal pain, blood in stool, diarrhea, nausea and vomiting  Endocrine: Negative for cold intolerance and heat intolerance  Genitourinary: Positive for vaginal discharge  Negative for dysuria, frequency and urgency  Musculoskeletal: Negative for arthralgias, back pain and myalgias  Skin: Negative for color change and rash     Neurological: Negative for dizziness, weakness, light-headedness, numbness and headaches  Hematological: Does not bruise/bleed easily  Psychiatric/Behavioral: Negative for agitation, behavioral problems and confusion  Objective:  Vitals:    01/16/23 1213   BP: 102/70   BP Location: Left arm   Patient Position: Sitting   Cuff Size: Standard   Pulse: (!) 107   Resp: 16   Temp: 98 °F (36 7 °C)   TempSrc: Tympanic   SpO2: 98%   Weight: 104 kg (228 lb 12 8 oz)   Height: 5' 2" (1 575 m)     Body mass index is 41 85 kg/m²  Physical Exam  Vitals and nursing note reviewed  Constitutional:       General: She is not in acute distress  Appearance: Normal appearance  She is obese  She is not ill-appearing, toxic-appearing or diaphoretic  HENT:      Head: Normocephalic and atraumatic  Right Ear: Tympanic membrane normal       Left Ear: Tympanic membrane normal       Nose: Nose normal  No congestion  Mouth/Throat:      Mouth: Mucous membranes are moist    Eyes:      General: No scleral icterus  Right eye: No discharge  Left eye: No discharge  Extraocular Movements: Extraocular movements intact  Conjunctiva/sclera: Conjunctivae normal       Pupils: Pupils are equal, round, and reactive to light  Cardiovascular:      Rate and Rhythm: Normal rate and regular rhythm  Pulses: Normal pulses  Heart sounds: Normal heart sounds  No murmur heard  No gallop  Pulmonary:      Effort: Pulmonary effort is normal  No respiratory distress  Breath sounds: Normal breath sounds  No wheezing, rhonchi or rales  Abdominal:      General: Abdomen is flat  Bowel sounds are normal  There is no distension  Palpations: Abdomen is soft  Tenderness: There is no abdominal tenderness  There is no guarding  Musculoskeletal:         General: No swelling or tenderness  Normal range of motion  Cervical back: Normal range of motion and neck supple  No rigidity     Lymphadenopathy:      Cervical: No cervical adenopathy  Skin:     General: Skin is warm  Capillary Refill: Capillary refill takes 2 to 3 seconds  Coloration: Skin is not jaundiced  Findings: No bruising or rash  Neurological:      General: No focal deficit present  Mental Status: She is alert and oriented to person, place, and time  Mental status is at baseline  Gait: Gait normal    Psychiatric:         Mood and Affect: Mood normal          TCM Call     Date and time call was made  1/13/2023  8:30 AM    Hospital care reviewed  Records reviewed    Patient was hospitialized at  [facility]    Date of Admission  01/10/23    Date of discharge  01/12/23    Diagnosis  Abscess of right axilla    Disposition  Home      TCM Call     Scheduled for follow up?   No  left message    I have advised the patient to call PCP with any new or worsening symptoms  Felecia Bolivar

## 2023-01-19 ENCOUNTER — OFFICE VISIT (OUTPATIENT)
Dept: SURGERY | Facility: CLINIC | Age: 43
End: 2023-01-19

## 2023-01-19 VITALS
DIASTOLIC BLOOD PRESSURE: 60 MMHG | HEIGHT: 62 IN | SYSTOLIC BLOOD PRESSURE: 140 MMHG | TEMPERATURE: 97.6 F | BODY MASS INDEX: 42.88 KG/M2 | WEIGHT: 233 LBS

## 2023-01-19 DIAGNOSIS — L02.411 ABSCESS OF RIGHT AXILLA: Primary | ICD-10-CM

## 2023-01-19 NOTE — PROGRESS NOTES
Office Visit - General Surgery  Maria Dolores Purcell MRN: [id number]  Encounter: [id number]    Assessment and Plan  Problem List Items Addressed This Visit        Other    Abscess of right axilla - Primary   -healing well after right axilla I&D, no acute intervention needed, no signs of recurrent abscess  -Patient already follows with dermatology  Encouraged her to discuss prevention measures with them at her next appointment  -Follow up PRN    Chief Complaint:  Maria Dolores Purcell is a 40 y o  female who presents for Post-op and axilla right    Subjective  40 yr old female who presents for a wound check  She underwent right axilla I&D 2023  She reports that since that time she has had no recurrent erythema or drainage  Her pain has resolved  She has completed her antibiotic course   She states this has not happened in the past     Past Medical History:   Diagnosis Date   • Anxiety    • Asthma    • Depression    • History of Clostridium difficile colitis 2018    x 2 episodes, after antibiotics   • Hypercholesterolemia    • Hyperlipemia    • Morbid obesity ([address] )    • Pulmonary nodule    • Sleep apnea     c pap       Past Surgical History:   Procedure Laterality Date   • IR ASPIRATION ONLY  2023   • OH CONIZATION CERVIX W/WO D&C RPR ELTRD EXC N/A 2018    Procedure: BIOPSY LEEP CERVIX;  Surgeon: Evnagelina Champion DO;  [facility]: [facility];  Service: Gynecology   • REDUCTION MAMMAPLASTY     • TONSILLECTOMY         Family History   Problem Relation Age of Onset   • Asthma Mother    • Obesity Mother    • Lung cancer Father 50   • Hyperlipidemia Father    • Bone cancer Father         mets from lung cancer   • No Known Problems Sister    • No Known Problems Brother    • Arthritis Maternal Grandmother    • COPD Maternal Grandmother    • Cancer Maternal Grandfather         Lung ca-   • Lung cancer Maternal Grandfather [de-identified]   • Liver cancer Paternal Grandfather [de-identified]   • Cancer Paternal Grandfather         Liver ca-   • No Known Problems Paternal Grandmother    • No Known Problems Maternal Aunt    • No Known Problems Maternal Aunt    • No Known Problems Maternal Aunt    • No Known Problems Paternal Aunt        Social History     Tobacco Use   • Smoking status: Every Day     Packs/day: 0 50     Years: 18 00     Pack years: 9 00     Types: Cigarettes     Passive exposure: Past   • Smokeless tobacco: Never   • Tobacco comments:     is on the patch    Vaping Use   • Vaping Use: Never used   Substance Use Topics   • Alcohol use: Yes     Alcohol/week: 0 0 standard drinks     Comment: occasional drink here and there   • Drug use: No        Medications  Current Outpatient Medications on File Prior to Visit   Medication Sig Dispense Refill   • cholecalciferol (VITAMIN D3) 1,000 units tablet Take by mouth daily       • DULoxetine (CYMBALTA) 60 mg delayed release capsule Take 1 capsule (60 mg total) by mouth daily 90 capsule 0   • fenofibrate 160 MG tablet Take 1 tablet (160 mg total) by mouth daily 90 tablet 0   • ibuprofen (MOTRIN) 600 mg tablet Take 1 tablet (600 mg total) by mouth every 6 (six) hours as needed for mild pain for up to 10 doses 10 tablet 2   • PREVIDENT 5000 SENSITIVE 1 1-5 % PSTE   3   • rosuvastatin (CRESTOR) 20 MG tablet Take 1 tablet (20 mg total) by mouth daily 90 tablet 0   • SODIUM FLUORIDE, DENTAL GEL, 1 1 % GEL SF 5000 Plus 1 1 % dental cream     • valsartan-hydrochlorothiazide (DIOVAN-HCT) 80-12 5 MG per tablet Take 1 tablet by mouth daily 90 tablet 0   • hydrocortisone 2 5 % cream Apply topically 2 (two) times a day for 14 days Apply 1-2x a day topically to eyebrows for 14 days 30 g 0   • ketoconazole (NIZORAL) 2 % shampoo Apply 1 application topically in the morning for 30 doses Daily for 2 weeks straight and then on ",  and ":  Lather into scalp and skin on face, neck, chest, and back; leave on for 5 minutes and then rinse off completely   120 mL 2   • SF 5000 Plus 1 1 % CREA  (Patient not taking: Reported on 1/19/2023)       No current facility-administered medications on file prior to visit  Allergies  No Known Allergies    Review of Systems   Constitutional: Negative  HENT: Negative  Respiratory: Negative  Cardiovascular: Negative  Gastrointestinal: Negative  Genitourinary: Negative  Musculoskeletal: Negative  Skin: Negative  Neurological: Negative  Objective  Vitals:    01/19/23 1707   BP: 140/60   Temp: 97 6 °F (36 4 °C)       Physical Exam  Constitutional:       Appearance: Normal appearance  Cardiovascular:      Rate and Rhythm: Normal rate and regular rhythm  Pulses: Normal pulses  Pulmonary:      Effort: Pulmonary effort is normal    Skin:     Comments: Right axilla I&D site well healing, no surrounding erythema or areas of fluctuance  Neurological:      General: No focal deficit present  Mental Status: She is alert

## 2023-01-31 ENCOUNTER — HOSPITAL ENCOUNTER (EMERGENCY)
Facility: HOSPITAL | Age: 43
Discharge: HOME | End: 2023-01-31
Attending: EMERGENCY MEDICINE | Admitting: EMERGENCY MEDICINE

## 2023-01-31 VITALS
SYSTOLIC BLOOD PRESSURE: 138 MMHG | RESPIRATION RATE: 18 BRPM | TEMPERATURE: 98.6 F | DIASTOLIC BLOOD PRESSURE: 63 MMHG | OXYGEN SATURATION: 98 % | HEART RATE: 88 BPM
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
## 2023-01-31 LAB
ALT SERPL W P-5'-P-CCNC: 27 U/L (ref 12–78)
HBV SURFACE AB SER-ACNC: >1000 MIU/ML
HBV SURFACE AG SER QL: NORMAL
HCV AB SER QL: NORMAL
HIV 1+2 AB+HIV1 P24 AG SERPL QL IA: NORMAL
HIV 2 AB SERPL QL IA: NORMAL
HIV1 AB SERPL QL IA: NORMAL
HIV1 P24 AG SERPL QL IA: NORMAL

## 2023-01-31 RX ADMIN — TETANUS TOXOID, REDUCED DIPHTHERIA TOXOID AND ACELLULAR PERTUSSIS VACCINE, ADSORBED 0.5 ML: 5; 2.5; 8; 8; 2.5 SUSPENSION INTRAMUSCULAR at 10:46

## 2023-01-31 NOTE — ED PROVIDER NOTES
History  Chief Complaint   Patient presents with   • Infectious Exposure - Needlestick     Pt accidentally stuck herself with used needle in L palm      18-year-old female no significant past medical history presenting to the ED today after accidental needlestick at work  Patient was working in the [employer]  They were finishing up her case and she was cleaning up when a 25-gauge needle stuck her in the left palm  She did local wound care with soap and water prior to coming to the ED  She is unsure of her last tetanus  Source patient exposure panel has already been drawn  Patient has no complaints at this time  MRN of source patient: [id number]          Prior to Admission Medications   Prescriptions Last Dose Informant Patient Reported? Taking? DULoxetine (CYMBALTA) 60 mg delayed release capsule   No No   Sig: Take 1 capsule (60 mg total) by mouth daily   PREVIDENT 5000 SENSITIVE 1 1-5 % PSTE   Yes No   SF 5000 Plus 1 1 % CREA   Yes No   Patient not taking: Reported on 1/19/2023   SODIUM FLUORIDE, DENTAL GEL, 1 1 % GEL   Yes No   Sig: SF 5000 Plus 1 1 % dental cream   cholecalciferol (VITAMIN D3) 1,000 units tablet   Yes No   Sig: Take by mouth daily     fenofibrate 160 MG tablet   No No   Sig: Take 1 tablet (160 mg total) by mouth daily   hydrocortisone 2 5 % cream   No No   Sig: Apply topically 2 (two) times a day for 14 days Apply 1-2x a day topically to eyebrows for 14 days   ibuprofen (MOTRIN) 600 mg tablet   No No   Sig: Take 1 tablet (600 mg total) by mouth every 6 (six) hours as needed for mild pain for up to 10 doses   ketoconazole (NIZORAL) 2 % shampoo   No No   Sig: Apply 1 application topically in the morning for 30 doses Daily for 2 weeks straight and then on "Mondays, Wednesdays and Fridays":  Lather into scalp and skin on face, neck, chest, and back; leave on for 5 minutes and then rinse off completely     rosuvastatin (CRESTOR) 20 MG tablet   No No   Sig: Take 1 tablet (20 mg total) by mouth daily   valsartan-hydrochlorothiazide (DIOVAN-HCT) 80-12 5 MG per tablet   No No   Sig: Take 1 tablet by mouth daily      Facility-Administered Medications: None       Past Medical History:   Diagnosis Date   • Anxiety    • Asthma    • Depression    • History of Clostridium difficile colitis 2018    x 2 episodes, after antibiotics   • Hypercholesterolemia    • Hyperlipemia    • Morbid obesity ([address] )    • Pulmonary nodule    • Sleep apnea     c pap       Past Surgical History:   Procedure Laterality Date   • IR ASPIRATION ONLY  2023   • NE CONIZATION CERVIX W/WO D&C RPR ELTRD EXC N/A 2018    Procedure: BIOPSY LEEP CERVIX;  Surgeon: Jose Francisco Beach DO;  [facility];  Service: Gynecology   • REDUCTION MAMMAPLASTY     • TONSILLECTOMY         Family History   Problem Relation Age of Onset   • Asthma Mother    • Obesity Mother    • Lung cancer Father 50   • Hyperlipidemia Father    • Bone cancer Father         mets from lung cancer   • No Known Problems Sister    • No Known Problems Brother    • Arthritis Maternal Grandmother    • COPD Maternal Grandmother    • Cancer Maternal Grandfather         Lung ca-   • Lung cancer Maternal Grandfather [de-identified]   • Liver cancer Paternal Grandfather [de-identified]   • Cancer Paternal Grandfather         Liver ca-   • No Known Problems Paternal Grandmother    • No Known Problems Maternal Aunt    • No Known Problems Maternal Aunt    • No Known Problems Maternal Aunt    • No Known Problems Paternal Aunt      I have reviewed and agree with the history as documented      E-Cigarette/Vaping   • E-Cigarette Use Never User      E-Cigarette/Vaping Substances   • Nicotine No    • THC No    • CBD No    • Flavoring No    • Other No    • Unknown No      Social History     Tobacco Use   • Smoking status: Every Day     Packs/day: 0 50     Years: 18 00     Pack years: 9 00     Types: Cigarettes     Passive exposure: Past   • Smokeless tobacco: Never   • Tobacco comments:     is on the patch    Vaping Use   • Vaping Use: Never used   Substance Use Topics   • Alcohol use: Yes     Alcohol/week: 0 0 standard drinks     Comment: occasional drink here and there   • Drug use: No        Review of Systems   Constitutional: Negative for chills and fever  HENT: Negative for hearing loss  Eyes: Negative for visual disturbance  Respiratory: Negative for shortness of breath  Cardiovascular: Negative for chest pain  Gastrointestinal: Negative for abdominal pain, constipation, diarrhea, nausea and vomiting  Genitourinary: Negative for difficulty urinating  Musculoskeletal: Negative for myalgias  Skin: Negative for color change  Neurological: Negative for dizziness and headaches  Psychiatric/Behavioral: Negative for agitation  All other systems reviewed and are negative  Physical Exam  ED Triage Vitals [01/31/23 1034]   Temperature Pulse Respirations Blood Pressure SpO2   98 6 °F (37 °C) 88 18 138/63 98 %      Temp Source Heart Rate Source Patient Position - Orthostatic VS BP Location FiO2 (%)   Tympanic Monitor Lying Left arm --      Pain Score       --             Orthostatic Vital Signs  Vitals:    01/31/23 1034   BP: 138/63   Pulse: 88   Patient Position - Orthostatic VS: Lying       Physical Exam  Vitals and nursing note reviewed  Constitutional:       General: She is not in acute distress  Appearance: Normal appearance  She is well-developed  She is not ill-appearing  HENT:      Head: Normocephalic and atraumatic  Right Ear: External ear normal       Left Ear: External ear normal       Nose: Nose normal  No congestion  Mouth/Throat:      Mouth: Mucous membranes are moist       Pharynx: Oropharynx is clear  No oropharyngeal exudate  Eyes:      General:         Right eye: No discharge  Left eye: No discharge  Extraocular Movements: Extraocular movements intact        Conjunctiva/sclera: Conjunctivae normal       Pupils: Pupils are equal, round, and reactive to light  Cardiovascular:      Rate and Rhythm: Normal rate and regular rhythm  Heart sounds: Normal heart sounds  No murmur heard  No friction rub  No gallop  Pulmonary:      Effort: Pulmonary effort is normal  No respiratory distress  Breath sounds: Normal breath sounds  No stridor  No wheezing  Abdominal:      General: Bowel sounds are normal  There is no distension  Palpations: Abdomen is soft  Tenderness: There is no abdominal tenderness  Musculoskeletal:         General: No swelling  Normal range of motion  Cervical back: Normal range of motion and neck supple  No rigidity  Skin:     General: Skin is warm and dry  Capillary Refill: Capillary refill takes less than 2 seconds  Comments: Very small puncture wound at the left palm  Neurological:      General: No focal deficit present  Mental Status: She is alert and oriented to person, place, and time  Mental status is at baseline  Cranial Nerves: No cranial nerve deficit  Motor: No weakness  Gait: Gait normal    Psychiatric:         Mood and Affect: Mood normal          Behavior: Behavior normal          ED Medications  Medications   tetanus-diphtheria-acellular pertussis (BOOSTRIX) IM injection 0 5 mL (0 5 mL Intramuscular Given 1/31/23 1046)       Diagnostic Studies  Results Reviewed     Procedure Component Value Units Date/Time    HIV 1/2 AG/AB w Reflex [facility] for 2 yr old and above [[id number]]  (Normal) Collected: 01/31/23 1047    Lab Status: Final result Specimen: Blood from Arm, Left Updated: 01/31/23 5785     HIV-1 p24 Antigen Non-Reactive     HIV-1 Antibody Non-Reactive     HIV-2 Antibody Non-Reactive     HIV Ag-Ab 5th Gen Non-Reactive    Narrative:       This 5th generation HIV Ag-Ab assay is a multiplex flow immunoassay intended for qualitative detection and differentiation of HIV-1 p24 antigen, HIV-1 (groups M and O) antibodies, and HIV-2 antibodies in human serum   This assay is intended as an aid in the diagnosis of infection with HIV-1 and/or HIV-2, including acute (primary) HIV-1 infection  The test is validated for adult patients, including pregnant women, and in pediatric patients as young as two years of age  The performance of this assay has not been established for neonates  Hepatitis B surface antigen [[id number]]  (Normal) Collected: 01/31/23 1047    Lab Status: Final result Specimen: Blood from Arm, Left Updated: 01/31/23 1219     Hepatitis B Surface Ag Non-reactive    Hepatitis C antibody [[id number]]  (Normal) Collected: 01/31/23 1047    Lab Status: Final result Specimen: Blood from Arm, Left Updated: 01/31/23 1219     Hepatitis C Ab Non-reactive    Hepatitis B surface antibody [[id number]] Collected: 01/31/23 1047    Lab Status: Final result Specimen: Blood from Arm, Left Updated: 01/31/23 1219     Hep B S Ab >1,000 00 mIU/mL     ALT [[id number]]  (Normal) Collected: 01/31/23 1047    Lab Status: Final result Specimen: Blood from Arm, Left Updated: 01/31/23 1128     ALT 27 U/L                  No orders to display         Procedures  Procedures      ED Course                                       Medical Decision Making  71-year-old female presenting to the ED today after accidental needlestick at work  At this time discussed with the patient PREP and she is low risk given the injury type and patient declines PREP at this time  Will update tetanus  Source patient is known to have hep C however will hold off on treatment and still send off exposure panel here  Will have patient follow-up with employee health  Strict return to ER precautions given and patient was discharged home  Needlestick injury accident with exposure to body fluid: acute illness or injury  Amount and/or Complexity of Data Reviewed  Labs: ordered  Risk  Prescription drug management              Disposition  Final diagnoses:   Needlestick injury accident with exposure to body fluid     Time reflects when diagnosis was documented in both MDM as applicable and the Disposition within this note     Time User Action Codes Description Comment    1/31/2023 10:22 AM Tally Dubin Add Huber Ramsey  1XXA] Needlestick injury accident with exposure to body fluid       ED Disposition     ED Disposition   Discharge    Condition   Stable    Date/Time   Tue Jan 31, 2023 10:22 AM    Comment   Ritta Lamp discharge to home/self care                 Follow-up Information     Follow up With Specialties Details Why Contact Info Additional [facility]  Call  to schedule an appointment [address] [place] [address]  [phone]     [address] [place] [address] Emergency Department Emergency Medicine Go to  If symptoms worsen, As needed [address] Emergency Department, [address] [place] [address], [address]          Discharge Medication List as of 1/31/2023 10:57 AM      CONTINUE these medications which have NOT CHANGED    Details   cholecalciferol (VITAMIN D3) 1,000 units tablet Take by mouth daily  , Historical Med      DULoxetine (CYMBALTA) 60 mg delayed release capsule Take 1 capsule (60 mg total) by mouth daily, Starting Wed 11/2/2022, Normal      fenofibrate 160 MG tablet Take 1 tablet (160 mg total) by mouth daily, Starting Wed 11/2/2022, Normal      hydrocortisone 2 5 % cream Apply topically 2 (two) times a day for 14 days Apply 1-2x a day topically to eyebrows for 14 days, Starting Thu 4/28/2022, Until Mon 12/19/2022, Normal      ibuprofen (MOTRIN) 600 mg tablet Take 1 tablet (600 mg total) by mouth every 6 (six) hours as needed for mild pain for up to 10 doses, Starting Thu 1/12/2023, Normal      ketoconazole (NIZORAL) 2 % shampoo Apply 1 application topically in the morning for 30 doses Daily for 2 weeks straight and then on "Mondays, Wednesdays and Fridays":  Lather into scalp and skin on face, neck, chest, and back; leave on for 5 minutes and then rinse off completely  , Startin g Thu 4/28/2022, Until Mon 12/19/2022, Normal      PREVIDENT 5000 SENSITIVE 1 1-5 % PSTE Starting Thu 9/12/2019, Historical Med      rosuvastatin (CRESTOR) 20 MG tablet Take 1 tablet (20 mg total) by mouth daily, Starting Wed 11/2/2022, Normal      SF 5000 Plus 1 1 % CREA Starting Thu 5/5/2022, Historical Med      SODIUM FLUORIDE, DENTAL GEL, 1 1 % GEL SF 5000 Plus 1 1 % dental cream, Historical Med      valsartan-hydrochlorothiazide (DIOVAN-HCT) 80-12 5 MG per tablet Take 1 tablet by mouth daily, Starting Wed 11/2/2022, Normal           No discharge procedures on file  PDMP Review     None           ED Provider  Attending physically available and evaluated Ambrosiokaitlyn Katarina AHUMADA managed the patient along with the ED Attending      Electronically Signed by         Daniel Guerrero MD  01/31/23 4680

## 2023-01-31 NOTE — ED ATTENDING ATTESTATION
1/31/2023  ICasa MD, saw and evaluated the patient  I have discussed the patient with the resident/non-physician practitioner and agree with the resident's/non-physician practitioner's findings, Plan of Care, and MDM as documented in the resident's/non-physician practitioner's note, except where noted  All available labs and Radiology studies were reviewed  I was present for key portions of any procedure(s) performed by the resident/non-physician practitioner and I was immediately available to provide assistance  At this point I agree with the current assessment done in the Emergency Department  I have conducted an independent evaluation of this patient a history and physical is as follows:  Patient with needlestick injury between her thumb and index finger of her left hand  Patient was in the OR and it was a 25-gauge needle that was used for injecting lidocaine  Patient is unsure if she is up-to-date on her tetanus, but is hep B vaccinated  Washed immediately  No pain currently  On exam I cannot appreciate the puncture site    We will plan to do exposure panel, exposure patient lab panel ordered per OR team  ED Course         Critical Care Time  Procedures

## 2023-02-02 ENCOUNTER — OFFICE VISIT (OUTPATIENT)
Dept: FAMILY MEDICINE CLINIC | Facility: CLINIC | Age: 43
End: 2023-02-02

## 2023-02-02 VITALS
BODY MASS INDEX: 43.79 KG/M2 | RESPIRATION RATE: 16 BRPM | SYSTOLIC BLOOD PRESSURE: 123 MMHG | HEART RATE: 80 BPM | WEIGHT: 238 LBS | TEMPERATURE: 97.7 F | DIASTOLIC BLOOD PRESSURE: 84 MMHG | OXYGEN SATURATION: 99 % | HEIGHT: 62 IN

## 2023-02-02 DIAGNOSIS — I10 ESSENTIAL HYPERTENSION: Primary | ICD-10-CM

## 2023-02-02 DIAGNOSIS — F32.A DEPRESSION, UNSPECIFIED DEPRESSION TYPE: ICD-10-CM

## 2023-02-02 DIAGNOSIS — F17.200 TOBACCO DEPENDENCE SYNDROME: ICD-10-CM

## 2023-02-02 DIAGNOSIS — E78.2 MIXED HYPERLIPIDEMIA: ICD-10-CM

## 2023-02-02 DIAGNOSIS — D50.8 OTHER IRON DEFICIENCY ANEMIA: ICD-10-CM

## 2023-02-02 DIAGNOSIS — J45.22 MILD INTERMITTENT ASTHMA WITH STATUS ASTHMATICUS: ICD-10-CM

## 2023-02-02 PROBLEM — D64.9 ABSOLUTE ANEMIA: Status: ACTIVE | Noted: 2023-02-02

## 2023-02-02 RX ORDER — DULOXETIN HYDROCHLORIDE 60 MG/1
60 CAPSULE, DELAYED RELEASE ORAL DAILY
Qty: 90 CAPSULE | Refills: 0 | Status: SHIPPED | OUTPATIENT
Start: 2023-02-02

## 2023-02-02 RX ORDER — VALSARTAN AND HYDROCHLOROTHIAZIDE 80; 12.5 MG/1; MG/1
1 TABLET, FILM COATED ORAL DAILY
Qty: 90 TABLET | Refills: 0 | Status: SHIPPED | OUTPATIENT
Start: 2023-02-02

## 2023-02-02 RX ORDER — FENOFIBRATE 160 MG/1
160 TABLET ORAL DAILY
Qty: 90 TABLET | Refills: 0 | Status: SHIPPED | OUTPATIENT
Start: 2023-02-02

## 2023-02-02 RX ORDER — ROSUVASTATIN CALCIUM 20 MG/1
20 TABLET, COATED ORAL DAILY
Qty: 90 TABLET | Refills: 0 | Status: SHIPPED | OUTPATIENT
Start: 2023-02-02

## 2023-02-02 NOTE — PROGRESS NOTES
Assessment/Plan:         Problem List Items Addressed This Visit        Respiratory    Mild intermittent asthma with status asthmaticus     Under control  Continue current medication  We will re-evaluate at next office visit  Cardiovascular and Mediastinum    Essential hypertension - Primary     Under control  Continue current medication  We will re-evaluate at next office visit  Relevant Medications    valsartan-hydrochlorothiazide (DIOVAN-HCT) 80-12 5 MG per tablet       Other    Tobacco dependence syndrome     Quit smoking  Options for nicotine patch, nicotine gum or any medication discussed with patient  Potential consequences of smoking discussed with patient  Patient seems to be understood well           Mixed hyperlipidemia     Under control  Continue current medication  We will re-evaluate at next office visit  Relevant Medications    rosuvastatin (CRESTOR) 20 MG tablet    fenofibrate 160 MG tablet    Absolute anemia    Relevant Orders    Ferritin    Iron    TIBC    Occult blood x 3, stool    CBC and differential   Other Visit Diagnoses     Depression, unspecified depression type        Relevant Medications    DULoxetine (CYMBALTA) 60 mg delayed release capsule            Subjective:      Patient ID: Francina Klinefelter is a 40 y o  female  Patient here for review of chronic medical problems and review of the labs and imaging if it is applicable  Currently has no specific complaints other than mentioned in the review of systems  Denies chest pain, SOB, cough, abdominal pain, nausea, vomiting, fever, chills, lightheadedness, dizziness,headache, tingling or numbness  No bowel or bladder problem          The following portions of the patient's history were reviewed and updated as appropriate:   Past Medical History:  She has a past medical history of Anxiety, Asthma, Depression, History of Clostridium difficile colitis (2018), Hypercholesterolemia, Hyperlipemia, Morbid obesity ([address] ), Pulmonary nodule, and Sleep apnea ,  _______________________________________________________________________  Medical Problems:  does not have any pertinent problems on file ,  _______________________________________________________________________  Past Surgical History:   has a past surgical history that includes Tonsillectomy; Reduction mammaplasty (1999); pr conization cervix w/wo d&c rpr eltrd exc (N/A, 9/27/2018); and IR aspiration only (1/9/2023)  ,  _______________________________________________________________________  Family History:  family history includes Arthritis in her maternal grandmother; Asthma in her mother; Bone cancer in her father; COPD in her maternal grandmother; Cancer in her maternal grandfather and paternal grandfather; Hyperlipidemia in her father; Liver cancer (age of onset: [de-identified]) in her paternal grandfather; Lung cancer (age of onset: 50) in her father; Lung cancer (age of onset: [de-identified]) in her maternal grandfather; No Known Problems in her brother, maternal aunt, maternal aunt, maternal aunt, paternal aunt, paternal grandmother, and sister; Obesity in her mother ,  _______________________________________________________________________  Social History:   reports that she has been smoking cigarettes  She has a 9 00 pack-year smoking history  She has been exposed to tobacco smoke  She has never used smokeless tobacco  She reports current alcohol use  She reports that she does not use drugs  ,  _______________________________________________________________________  Allergies:  has No Known Allergies     _______________________________________________________________________  Current Outpatient Medications   Medication Sig Dispense Refill   • cholecalciferol (VITAMIN D3) 1,000 units tablet Take by mouth daily       • DULoxetine (CYMBALTA) 60 mg delayed release capsule Take 1 capsule (60 mg total) by mouth daily 90 capsule 0   • fenofibrate 160 MG tablet Take 1 tablet (160 mg total) by mouth daily 90 tablet 0   • PREVIDENT 5000 SENSITIVE 1 1-5 % PSTE   3   • rosuvastatin (CRESTOR) 20 MG tablet Take 1 tablet (20 mg total) by mouth daily 90 tablet 0   • valsartan-hydrochlorothiazide (DIOVAN-HCT) 80-12 5 MG per tablet Take 1 tablet by mouth daily 90 tablet 0   • hydrocortisone 2 5 % cream Apply topically 2 (two) times a day for 14 days Apply 1-2x a day topically to eyebrows for 14 days 30 g 0   • ketoconazole (NIZORAL) 2 % shampoo Apply 1 application topically in the morning for 30 doses Daily for 2 weeks straight and then on "Mondays, Wednesdays and Fridays":  Lather into scalp and skin on face, neck, chest, and back; leave on for 5 minutes and then rinse off completely  120 mL 2   • SODIUM FLUORIDE, DENTAL GEL, 1 1 % GEL SF 5000 Plus 1 1 % dental cream       No current facility-administered medications for this visit      _______________________________________________________________________  Review of Systems   Constitutional: Negative for chills, fatigue and fever  HENT: Negative for congestion, ear pain, rhinorrhea, sneezing and sore throat  Eyes: Negative for redness, itching and visual disturbance  Respiratory: Negative for cough, chest tightness and shortness of breath  Cardiovascular: Negative for chest pain, palpitations and leg swelling  Gastrointestinal: Negative for abdominal pain, blood in stool, diarrhea, nausea and vomiting  Endocrine: Negative for cold intolerance and heat intolerance  Genitourinary: Negative for dysuria, frequency and urgency  Musculoskeletal: Negative for arthralgias, back pain and myalgias  Skin: Negative for color change and rash  Neurological: Negative for dizziness, weakness, light-headedness, numbness and headaches  Hematological: Does not bruise/bleed easily  Psychiatric/Behavioral: Negative for agitation, behavioral problems and confusion           Objective:  Vitals:    02/02/23 1044   BP: 123/84   BP Location: Left arm   Patient Position: Sitting   Cuff Size: Standard   Pulse: 80   Resp: 16   Temp: 97 7 °F (36 5 °C)   TempSrc: Tympanic   SpO2: 99%   Weight: 108 kg (238 lb)   Height: 5' 2" (1 575 m)     Body mass index is 43 53 kg/m²  Physical Exam  Vitals and nursing note reviewed  Constitutional:       General: She is not in acute distress  Appearance: Normal appearance  She is obese  She is not ill-appearing, toxic-appearing or diaphoretic  HENT:      Head: Normocephalic and atraumatic  Right Ear: Tympanic membrane and ear canal normal       Left Ear: Tympanic membrane and ear canal normal       Nose: Nose normal  No congestion  Mouth/Throat:      Mouth: Mucous membranes are moist    Eyes:      General: No scleral icterus  Right eye: No discharge  Left eye: No discharge  Extraocular Movements: Extraocular movements intact  Conjunctiva/sclera: Conjunctivae normal       Pupils: Pupils are equal, round, and reactive to light  Cardiovascular:      Rate and Rhythm: Normal rate and regular rhythm  Pulses: Normal pulses  Heart sounds: Normal heart sounds  No murmur heard  No gallop  Pulmonary:      Effort: Pulmonary effort is normal  No respiratory distress  Breath sounds: Normal breath sounds  No wheezing, rhonchi or rales  Abdominal:      General: Abdomen is flat  Bowel sounds are normal  There is no distension  Palpations: Abdomen is soft  Tenderness: There is no abdominal tenderness  There is no right CVA tenderness, left CVA tenderness or guarding  Musculoskeletal:         General: No swelling or tenderness  Normal range of motion  Cervical back: Normal range of motion and neck supple  No rigidity  Right lower leg: No edema  Left lower leg: No edema  Lymphadenopathy:      Cervical: No cervical adenopathy  Skin:     General: Skin is warm  Capillary Refill: Capillary refill takes 2 to 3 seconds        Coloration: Skin is not jaundiced  Findings: No bruising or rash  Neurological:      General: No focal deficit present  Mental Status: She is alert and oriented to person, place, and time  Mental status is at baseline  Motor: No weakness        Gait: Gait normal    Psychiatric:         Mood and Affect: Mood normal          Behavior: Behavior normal

## 2023-03-06 ENCOUNTER — HOSPITAL ENCOUNTER (OUTPATIENT)
Dept: RADIOLOGY | Age: 43
Discharge: HOME | End: 2023-03-06

## 2023-03-06 VITALS — HEIGHT: 62 IN | BODY MASS INDEX: 43.79 KG/M2 | WEIGHT: 238 LBS

## 2023-03-06 DIAGNOSIS — Z12.31 ENCOUNTER FOR SCREENING MAMMOGRAM FOR MALIGNANT NEOPLASM OF BREAST: ICD-10-CM

## 2023-03-30 ENCOUNTER — OFFICE VISIT (OUTPATIENT)
Dept: DERMATOLOGY | Facility: CLINIC | Age: 43
End: 2023-03-30

## 2023-03-30 VITALS — TEMPERATURE: 98.3 F | HEIGHT: 62 IN | WEIGHT: 237.4 LBS | BODY MASS INDEX: 43.69 KG/M2

## 2023-03-30 DIAGNOSIS — L40.8 SEBOPSORIASIS: Primary | ICD-10-CM

## 2023-03-30 RX ORDER — KETOCONAZOLE 20 MG/ML
SHAMPOO TOPICAL
Qty: 120 ML | Refills: 2 | Status: SHIPPED | OUTPATIENT
Start: 2023-03-30

## 2023-03-30 RX ORDER — FLUOCINOLONE ACETONIDE 0.1 MG/ML
SOLUTION TOPICAL
Qty: 60 ML | Refills: 1 | Status: SHIPPED | OUTPATIENT
Start: 2023-03-30

## 2023-03-30 NOTE — PATIENT INSTRUCTIONS
1  SEBOPSORIASIS      Assessment and Plan:  Based on a thorough discussion of this condition and the management approach to it (including a comprehensive discussion of the known risks, side effects and potential benefits of treatment), the patient (family) agrees to implement the following specific plan:  We have renewed Ketoconazole shampoo please use as needed  A new topical is being prescribed in replacement of hydrocortisone please follow directions on tube  Can use hydrocortisone 1% ointment as needed for the ears                 SEBOPSORIASIS  is an overlap between seborrheic dermatitis and psoriasis  Seborrhea is probably a reaction to a yeast that lives on the skin and psoriasis in an autoimmune condition where the immune system is sending signals to the skin to grow very rapidly and not to form properly  Sebopsoriasis affects primarily the scalp and the face  It causes a lot of redness and scaling and can be itchy  TREATMENT is aimed at reducing the yeast and treating the inflammation and scale with:              1  Antifungal agents - like antifungal shampoos, creams or washes              2  Topical steroids - solutions, creams, foams or shampoos              3  Keratolytics - creams or shampoos that help the skin to shed better              They contain salicylic acid, lactic acid or urea  Sometimes more aggressive treatment is needed and these will be discussed if there is a lack of response to traditional treatment

## 2023-03-30 NOTE — PROGRESS NOTES
"[facility] [address] [facility] Note     Patient Name: Craig Litten  Encounter Date: 03/30/2023     Have you been cared for by a [facility] [place] [address] Dermatologist in the last 3 years and, if so, which description applies to you? Yes  I have been here within the last 3 years, and my medical history has NOT changed since that time  I am FEMALE/of child-bearing potential     REVIEW OF SYSTEMS:  Have you recently had or currently have any of the following? · No changes in my recent health  PAST MEDICAL HISTORY:  Have you personally ever had or currently have any of the following? If \"YES,\" then please provide more detail  · No changes in my medical history  FAMILY HISTORY:  Any \"first degree relatives\" (parent, brother, sister, or child) with the following? • No changes in my family's known health  PATIENT EXPERIENCE:    • Do you want the Dermatologist to perform a COMPLETE skin exam today including a clinical examination under the \"bra and underwear\" areas? NO  • If necessary, do we have your permission to call and leave a detailed message on your Preferred Phone number that includes your specific medical information?   Yes      No Known Allergies   Current Outpatient Medications:   •  cholecalciferol (VITAMIN D3) 1,000 units tablet, Take by mouth daily  , Disp: , Rfl:   •  DULoxetine (CYMBALTA) 60 mg delayed release capsule, Take 1 capsule (60 mg total) by mouth daily, Disp: 90 capsule, Rfl: 0  •  fenofibrate 160 MG tablet, Take 1 tablet (160 mg total) by mouth daily, Disp: 90 tablet, Rfl: 0  •  hydrocortisone 2 5 % cream, Apply topically 2 (two) times a day for 14 days Apply 1-2x a day topically to eyebrows for 14 days, Disp: 30 g, Rfl: 0  •  ketoconazole (NIZORAL) 2 % shampoo, Apply 1 application topically in the morning for 30 doses Daily for 2 weeks straight and then on \"Mondays, Wednesdays and Fridays\":  Lather into scalp and skin on face, neck, chest, and back; leave on for 5 minutes and then rinse off " completely  , Disp: 120 mL, Rfl: 2  •  PREVIDENT 5000 SENSITIVE 1 1-5 % PSTE, , Disp: , Rfl: 3  •  rosuvastatin (CRESTOR) 20 MG tablet, Take 1 tablet (20 mg total) by mouth daily, Disp: 90 tablet, Rfl: 0  •  SODIUM FLUORIDE, DENTAL GEL, 1 1 % GEL, SF 5000 Plus 1 1 % dental cream, Disp: , Rfl:   •  valsartan-hydrochlorothiazide (DIOVAN-HCT) 80-12 5 MG per tablet, Take 1 tablet by mouth daily, Disp: 90 tablet, Rfl: 0          • Whom besides the patient is providing clinical information about today's encounter?   o NO ADDITIONAL HISTORIAN (patient alone provided history)    Physical Exam and Assessment/Plan by Diagnosis:      1  SEBOPSORIASIS     Physical Exam:  • Anatomic Location Affected:  Frontal scalp and eyebrows   • Morphological Description:  Normal skin appearance  • Severity: not active  • Pertinent Positives:  • Pertinent Negatives:     Additional History of Present Condition:  Tried applying selsun blue, vinegar soaks and head and shoulders  No improvement      Assessment and Plan:  Based on a thorough discussion of this condition and the management approach to it (including a comprehensive discussion of the known risks, side effects and potential benefits of treatment), the patient (family) agrees to implement the following specific plan:  • We have renewed ketoconazole shampoo please use as needed  • A new topical is being prescribed in replacement of hydrocortisone please follow directions on tube    Can use hydrocortisone 1% ointment as needed for the ears                 SEBOPSORIASIS  is an overlap between seborrhea and psoriasis  Seborrhea is probably a reaction to a yeast that lives on the skin and psoriasis in an autoimmune condition where the immune system is sending signals to the skin to grow very rapidly and not to form properly  Sebopsoriasis affects primarily the scalp and the face    It causes a lot of redness and scaling and can be itchy      TREATMENT is aimed at reducing the yeast and treating the inflammation and scale with:              1  Antifungal agents - like antifungal shampoos, creams or washes              2  Topical steroids - solutions, creams, foams or shampoos              3  Keratolytics - creams or shampoos that help the skin to shed better              They contain salicylic acid, lactic acid or urea  Sometimes more aggressive treatment is needed and these will be discussed if there is a lack of response to traditional treatment                    Scribe Attestation    I,:  Lai Nice am acting as a scribe while in the presence of the attending physician :       I,:  Dee Salazar MD personally performed the services described in this documentation    as scribed in my presence :

## 2023-04-06 ENCOUNTER — APPOINTMENT (OUTPATIENT)
Dept: LAB | Facility: CLINIC | Age: 43
End: 2023-04-06

## 2023-04-06 DIAGNOSIS — Z00.8 HEALTH EXAMINATION IN POPULATION SURVEY: ICD-10-CM

## 2023-04-06 DIAGNOSIS — D50.8 OTHER IRON DEFICIENCY ANEMIA: ICD-10-CM

## 2023-04-06 LAB
BASOPHILS # BLD AUTO: 0.1 THOUSANDS/ÂΜL (ref 0–0.1)
BASOPHILS NFR BLD AUTO: 1 % (ref 0–1)
EOSINOPHIL # BLD AUTO: 0.33 THOUSAND/ÂΜL (ref 0–0.61)
EOSINOPHIL NFR BLD AUTO: 3 % (ref 0–6)
ERYTHROCYTE [DISTWIDTH] IN BLOOD BY AUTOMATED COUNT: 14.5 % (ref 11.6–15.1)
HCT VFR BLD AUTO: 34.4 % (ref 34.8–46.1)
HGB BLD-MCNC: 10.9 G/DL (ref 11.5–15.4)
IMM GRANULOCYTES # BLD AUTO: 0.04 THOUSAND/UL (ref 0–0.2)
IMM GRANULOCYTES NFR BLD AUTO: 0 % (ref 0–2)
LYMPHOCYTES # BLD AUTO: 2.76 THOUSANDS/ÂΜL (ref 0.6–4.47)
LYMPHOCYTES NFR BLD AUTO: 27 % (ref 14–44)
MCH RBC QN AUTO: 26.2 PG (ref 26.8–34.3)
MCHC RBC AUTO-ENTMCNC: 31.7 G/DL (ref 31.4–37.4)
MCV RBC AUTO: 83 FL (ref 82–98)
MONOCYTES # BLD AUTO: 0.76 THOUSAND/ÂΜL (ref 0.17–1.22)
MONOCYTES NFR BLD AUTO: 7 % (ref 4–12)
NEUTROPHILS # BLD AUTO: 6.33 THOUSANDS/ÂΜL (ref 1.85–7.62)
NEUTS SEG NFR BLD AUTO: 62 % (ref 43–75)
NRBC BLD AUTO-RTO: 0 /100 WBCS
PLATELET # BLD AUTO: 294 THOUSANDS/UL (ref 149–390)
PMV BLD AUTO: 9.4 FL (ref 8.9–12.7)
RBC # BLD AUTO: 4.16 MILLION/UL (ref 3.81–5.12)
WBC # BLD AUTO: 10.32 THOUSAND/UL (ref 4.31–10.16)

## 2023-04-07 LAB
CHOLEST SERPL-MCNC: 139 MG/DL
EST. AVERAGE GLUCOSE BLD GHB EST-MCNC: 91 MG/DL
FERRITIN SERPL-MCNC: 12 NG/ML (ref 8–388)
HBA1C MFR BLD: 4.8 %
HDLC SERPL-MCNC: 49 MG/DL
IRON SERPL-MCNC: 41 UG/DL (ref 50–170)
LDLC SERPL CALC-MCNC: 53 MG/DL (ref 0–100)
NONHDLC SERPL-MCNC: 90 MG/DL
TIBC SERPL-MCNC: 496 UG/DL (ref 250–450)
TRIGL SERPL-MCNC: 184 MG/DL

## 2023-04-20 PROBLEM — F41.1 GAD (GENERALIZED ANXIETY DISORDER): Status: ACTIVE | Noted: 2023-04-20

## 2023-04-20 PROBLEM — E66.01 MORBID OBESITY (HCC): Status: ACTIVE | Noted: 2018-05-30

## 2023-05-20 NOTE — ASSESSMENT & PLAN NOTE
Patient with history of hypercholesterolemia, on Crestor and fenofibrate   -Will start fenofibrate and Crestor while inpatient handoff received from [facility] for cont. of care.

## 2023-07-13 ENCOUNTER — OFFICE VISIT (OUTPATIENT)
Dept: FAMILY MEDICINE CLINIC | Facility: CLINIC | Age: 43
End: 2023-07-13
Payer: COMMERCIAL

## 2023-07-13 VITALS
BODY MASS INDEX: 43.79 KG/M2 | DIASTOLIC BLOOD PRESSURE: 80 MMHG | OXYGEN SATURATION: 98 % | SYSTOLIC BLOOD PRESSURE: 110 MMHG | TEMPERATURE: 99.8 F | HEIGHT: 62 IN | WEIGHT: 238 LBS | HEART RATE: 87 BPM

## 2023-07-13 DIAGNOSIS — E78.2 MIXED HYPERLIPIDEMIA: ICD-10-CM

## 2023-07-13 DIAGNOSIS — I10 ESSENTIAL HYPERTENSION: Primary | ICD-10-CM

## 2023-07-13 DIAGNOSIS — F41.1 GAD (GENERALIZED ANXIETY DISORDER): ICD-10-CM

## 2023-07-13 DIAGNOSIS — F32.A DEPRESSION, UNSPECIFIED DEPRESSION TYPE: ICD-10-CM

## 2023-07-13 DIAGNOSIS — E66.01 MORBID OBESITY (HCC): ICD-10-CM

## 2023-07-13 DIAGNOSIS — F17.200 TOBACCO DEPENDENCE SYNDROME: ICD-10-CM

## 2023-07-13 PROCEDURE — 99213 OFFICE O/P EST LOW 20 MIN: CPT

## 2023-07-13 RX ORDER — FENOFIBRATE 160 MG/1
160 TABLET ORAL DAILY
Qty: 90 TABLET | Refills: 0 | Status: SHIPPED | OUTPATIENT
Start: 2023-07-13

## 2023-07-13 RX ORDER — DULOXETIN HYDROCHLORIDE 60 MG/1
60 CAPSULE, DELAYED RELEASE ORAL DAILY
Qty: 90 CAPSULE | Refills: 0 | Status: SHIPPED | OUTPATIENT
Start: 2023-07-13

## 2023-07-13 RX ORDER — VALSARTAN AND HYDROCHLOROTHIAZIDE 80; 12.5 MG/1; MG/1
1 TABLET, FILM COATED ORAL DAILY
Qty: 90 TABLET | Refills: 0 | Status: SHIPPED | OUTPATIENT
Start: 2023-07-13

## 2023-07-13 RX ORDER — ALPRAZOLAM 0.25 MG/1
0.25 TABLET ORAL
Qty: 30 TABLET | Refills: 0 | Status: SHIPPED | OUTPATIENT
Start: 2023-07-13

## 2023-07-13 RX ORDER — ROSUVASTATIN CALCIUM 20 MG/1
20 TABLET, COATED ORAL DAILY
Qty: 90 TABLET | Refills: 0 | Status: SHIPPED | OUTPATIENT
Start: 2023-07-13

## 2023-07-13 NOTE — ASSESSMENT & PLAN NOTE
Patient was advised to lose weight. Potential consequences of obesity discussed with patient.   Advised to have portion control no more than 60% of meal or may consider intermittent fasting  We will continue to monitor

## 2023-07-13 NOTE — PROGRESS NOTES
Assessment/Plan:         Problem List Items Addressed This Visit        Cardiovascular and Mediastinum    Essential hypertension - Primary     Under control. Continue current medication. We will re-evaluate at next office visit. Relevant Medications    valsartan-hydrochlorothiazide (DIOVAN-HCT) 80-12.5 MG per tablet       Other    Morbid obesity ([address])     Patient was advised to lose weight. Potential consequences of obesity discussed with patient. Advised to have portion control no more than 60% of meal or may consider intermittent fasting  We will continue to monitor           Tobacco dependence syndrome     Quit smoking  Options for nicotine patch, nicotine gum or any medication discussed with patient  Potential consequences of smoking discussed with patient  Patient seems to be understood well           Mixed hyperlipidemia     Under control. Continue current medication. We will re-evaluate at next office visit. Relevant Medications    fenofibrate 160 MG tablet    rosuvastatin (CRESTOR) 20 MG tablet    QIANA (generalized anxiety disorder)     Under control. Continue current medication. We will re-evaluate at next office visit. Relevant Medications    ALPRAZolam (XANAX) 0.25 mg tablet    DULoxetine (CYMBALTA) 60 mg delayed release capsule   Other Visit Diagnoses     Depression, unspecified depression type        Relevant Medications    ALPRAZolam (XANAX) 0.25 mg tablet    DULoxetine (CYMBALTA) 60 mg delayed release capsule            Subjective:      Patient ID: Bhavani Henson is a 40 y.o. female. Patient here for review of chronic medical problems and  the labs and imaging if it is applicable. Currently has no specific complaints other than mentioned in the review of systems  Denies chest pain, SOB, cough, abdominal pain, nausea, vomiting, fever, chills, lightheadedness, dizziness,headache, tingling or numbness. No bowel or bladder problem.         The following portions of the patient's history were reviewed and updated as appropriate:   Past Medical History:  She has a past medical history of Anxiety, Asthma, Depression, History of Clostridium difficile colitis (2018), Hypercholesterolemia, Hyperlipemia, Hypertension (1/2021), Morbid obesity ([address]), Obesity, Pulmonary nodule, and Sleep apnea.,  _______________________________________________________________________  Medical Problems:  does not have any pertinent problems on file.,  _______________________________________________________________________  Past Surgical History:   has a past surgical history that includes Tonsillectomy; Reduction mammaplasty (1999); pr conization cervix w/wo d&c rpr eltrd exc (N/A, 9/27/2018); and IR aspiration only (1/9/2023). ,  _______________________________________________________________________  Family History:  family history includes Arthritis in her maternal grandmother; Asthma in her mother; Bone cancer in her father; COPD in her maternal grandmother; Cancer in her father, maternal grandfather, and paternal grandfather; Hyperlipidemia in her father; Liver cancer (age of onset: 80) in her paternal grandfather; Lung cancer (age of onset: 50) in her father; Lung cancer (age of onset: 80) in her maternal grandfather; No Known Problems in her brother, maternal aunt, maternal aunt, maternal aunt, paternal aunt, paternal grandmother, and sister; Obesity in her mother.,  _______________________________________________________________________  Social History:   reports that she has been smoking cigarettes. She has a 9.00 pack-year smoking history. She has been exposed to tobacco smoke. She has never used smokeless tobacco. She reports current alcohol use. She reports that she does not use drugs. ,  _______________________________________________________________________  Allergies:  has No Known Allergies. .  _______________________________________________________________________  Current Outpatient Medications   Medication Sig Dispense Refill   • ALPRAZolam (XANAX) 0.25 mg tablet Take 1 tablet (0.25 mg total) by mouth daily at bedtime as needed for anxiety 30 tablet 0   • cholecalciferol (VITAMIN D3) 1,000 units tablet Take by mouth daily       • DULoxetine (CYMBALTA) 60 mg delayed release capsule Take 1 capsule (60 mg total) by mouth daily 90 capsule 0   • fenofibrate 160 MG tablet Take 1 tablet (160 mg total) by mouth daily 90 tablet 0   • fluocinolone (SYNALAR) 0.01 % external solution Apply sparingly to affected areas of scalp 2-4 times a day for up to 2 weeks. 60 mL 1   • ketoconazole (NIZORAL) 2 % shampoo Shampoo twice a week for 8 weeks then as needed. Lather into scalp and skin on face, neck, chest, and back; leave on for 5 minutes and then rinse off completely. 120 mL 2   • mupirocin (BACTROBAN) 2 % ointment      • PREVIDENT 5000 SENSITIVE 1.1-5 % PSTE   3   • rosuvastatin (CRESTOR) 20 MG tablet Take 1 tablet (20 mg total) by mouth daily 90 tablet 0   • SODIUM FLUORIDE, DENTAL GEL, 1.1 % GEL SF 5000 Plus 1.1 % dental cream     • valsartan-hydrochlorothiazide (DIOVAN-HCT) 80-12.5 MG per tablet Take 1 tablet by mouth daily 90 tablet 0     No current facility-administered medications for this visit.     _______________________________________________________________________  Review of Systems   Constitutional: Negative for chills, fatigue and fever. HENT: Negative for congestion, ear pain, rhinorrhea, sneezing and sore throat. Eyes: Negative for redness, itching and visual disturbance. Respiratory: Negative for cough, chest tightness and shortness of breath. Cardiovascular: Negative for chest pain, palpitations and leg swelling. Gastrointestinal: Negative for abdominal pain, blood in stool, diarrhea, nausea and vomiting. Endocrine: Negative for cold intolerance and heat intolerance. Genitourinary: Negative for dysuria, frequency and urgency.    Musculoskeletal: Positive for arthralgias (Bilateral knee pain). Negative for back pain and myalgias. Skin: Negative for color change and rash. Neurological: Negative for dizziness, weakness, light-headedness, numbness and headaches. Hematological: Does not bruise/bleed easily. Psychiatric/Behavioral: Negative for agitation, behavioral problems and confusion. Objective:  Vitals:    07/13/23 1056   BP: 110/80   BP Location: Left arm   Patient Position: Sitting   Cuff Size: Adult   Pulse: 87   Temp: 99.8 °F (37.7 °C)   TempSrc: Tympanic   SpO2: 98%   Weight: 108 kg (238 lb)   Height: 5' 2" (1.575 m)     Body mass index is 43.53 kg/m². Physical Exam  Vitals and nursing note reviewed. Constitutional:       General: She is not in acute distress. Appearance: Normal appearance. She is obese. She is not ill-appearing, toxic-appearing or diaphoretic. HENT:      Head: Normocephalic and atraumatic. Right Ear: Tympanic membrane and ear canal normal.      Left Ear: Tympanic membrane and ear canal normal.      Nose: Nose normal. No congestion. Mouth/Throat:      Mouth: Mucous membranes are moist.   Eyes:      General: No scleral icterus. Right eye: No discharge. Left eye: No discharge. Extraocular Movements: Extraocular movements intact. Conjunctiva/sclera: Conjunctivae normal.      Pupils: Pupils are equal, round, and reactive to light. Cardiovascular:      Rate and Rhythm: Normal rate and regular rhythm. Pulses: Normal pulses. Heart sounds: Normal heart sounds. No murmur heard. No gallop. Pulmonary:      Effort: Pulmonary effort is normal. No respiratory distress. Breath sounds: Normal breath sounds. No wheezing, rhonchi or rales. Abdominal:      General: Abdomen is flat. Bowel sounds are normal. There is no distension. Palpations: Abdomen is soft. Tenderness: There is no abdominal tenderness. There is no right CVA tenderness, left CVA tenderness or guarding. Musculoskeletal:         General: No swelling or tenderness. Normal range of motion. Cervical back: Normal range of motion and neck supple. No rigidity. Right lower leg: No edema. Left lower leg: No edema. Lymphadenopathy:      Cervical: No cervical adenopathy. Skin:     General: Skin is warm. Capillary Refill: Capillary refill takes 2 to 3 seconds. Coloration: Skin is not jaundiced. Findings: No bruising or rash. Neurological:      General: No focal deficit present. Mental Status: She is alert and oriented to person, place, and time. Mental status is at baseline. Motor: No weakness.       Gait: Gait normal.   Psychiatric:         Mood and Affect: Mood normal.         Behavior: Behavior normal.

## 2023-09-26 ENCOUNTER — TELEPHONE (OUTPATIENT)
Dept: FAMILY MEDICINE CLINIC | Facility: CLINIC | Age: 43
End: 2023-09-26

## 2023-09-26 ENCOUNTER — TELEPHONE (OUTPATIENT)
Age: 43
End: 2023-09-26

## 2023-09-26 DIAGNOSIS — B37.31 VAGINAL CANDIDIASIS: Primary | ICD-10-CM

## 2023-09-26 RX ORDER — FLUCONAZOLE 150 MG/1
150 TABLET ORAL ONCE
Qty: 1 TABLET | Refills: 0 | Status: SHIPPED | OUTPATIENT
Start: 2023-09-26 | End: 2023-09-26

## 2023-10-10 ENCOUNTER — OFFICE VISIT (OUTPATIENT)
Dept: OBGYN CLINIC | Facility: CLINIC | Age: 43
End: 2023-10-10
Payer: COMMERCIAL

## 2023-10-10 ENCOUNTER — HOSPITAL ENCOUNTER (OUTPATIENT)
Dept: RADIOLOGY | Facility: HOSPITAL | Age: 43
Discharge: HOME | End: 2023-10-10
Attending: ORTHOPAEDIC SURGERY
Payer: COMMERCIAL

## 2023-10-10 VITALS — HEIGHT: 62 IN | WEIGHT: 239 LBS | BODY MASS INDEX: 43.98 KG/M2

## 2023-10-10 DIAGNOSIS — M25.561 PAIN IN BOTH KNEES, UNSPECIFIED CHRONICITY: ICD-10-CM

## 2023-10-10 DIAGNOSIS — M25.562 PAIN IN BOTH KNEES, UNSPECIFIED CHRONICITY: ICD-10-CM

## 2023-10-10 DIAGNOSIS — M22.2X2 PATELLOFEMORAL SYNDROME OF BOTH KNEES: Primary | ICD-10-CM

## 2023-10-10 DIAGNOSIS — M22.2X1 PATELLOFEMORAL SYNDROME OF BOTH KNEES: Primary | ICD-10-CM

## 2023-10-10 DIAGNOSIS — M76.899 QUADRICEPS TENDONITIS: ICD-10-CM

## 2023-10-10 PROCEDURE — 99213 OFFICE O/P EST LOW 20 MIN: CPT | Performed by: ORTHOPAEDIC SURGERY

## 2023-10-10 PROCEDURE — 73562 X-RAY EXAM OF KNEE 3: CPT

## 2023-10-10 NOTE — PROGRESS NOTES
Assessment:   Diagnosis ICD-10-CM Associated Orders   1. Pain in both knees, unspecified chronicity  M25.561 XR knee 3 vw left non injury    M25.562 XR knee 3 vw right non injury      2. Quadriceps tendonitis  M76.899 Ambulatory Referral to Physical Therapy      3. Patellofemoral syndrome of both knees  M22.2X1 Ambulatory Referral to Physical Therapy    M22.2X2           Plan:  • X-rays of bilateral knees were obtained today and reviewed with the patient noting she has well-maintained joint space with no osseous abnormalities. • On exam most the patient's pain is over the quad tendon. Tight quads  • Patient has bilateral patellofemoral syndrome. • It is recommended to start a home exercise program along with formal physical therapy to work on stretching of the quads and the hip flexors along with strengthening the VMO to help counterbalance the patella. • Patient shows understanding and agrees with this plan of care. To do next visit:  Return in about 3 months (around 1/10/2024) for Bilateral knees. The above stated was discussed in layman's terms and the patient expressed understanding. All questions were answered to the patient's satisfaction. Scribe Attestation    I,:  Corby Sandhu am acting as a scribe while in the presence of the attending physician.:       I,:  Iva Person MD personally performed the services described in this documentation    as scribed in my presence.:             Subjective:   Kadi Medellin is a 40 y.o. female who presents today with bilateral knee pain. Patient reports having increased anterior knee pain that has been intermittent over the last couple of months. She reports that she has pain with going up and down stairs, kneeling bending and squatting. She takes occasional anti-inflammatories and Tylenol as needed for pain discomfort. Denies any swelling.        Review of systems negative unless otherwise specified in HPI  Review of Systems   Constitutional: Negative for chills, fever and unexpected weight change. HENT: Negative for hearing loss, nosebleeds and sore throat. Eyes: Negative for pain, redness and visual disturbance. Respiratory: Negative for cough, shortness of breath and wheezing. Cardiovascular: Negative for chest pain, palpitations and leg swelling. Gastrointestinal: Negative for abdominal pain and nausea. Genitourinary: Negative for dyspareunia, dysuria and frequency. Skin: Negative for rash and wound. Neurological: Negative for dizziness, numbness and headaches. Psychiatric/Behavioral: Negative for decreased concentration and suicidal ideas. The patient is not nervous/anxious.         Past Medical History:   Diagnosis Date   • Anxiety    • Asthma    • Depression    • History of Clostridium difficile colitis 2018    x 2 episodes, after antibiotics   • Hypercholesterolemia    • Hyperlipemia    • Hypertension 2021   • Morbid obesity ([address])    • Obesity    • Pulmonary nodule    • Sleep apnea     c pap       Past Surgical History:   Procedure Laterality Date   • IR ASPIRATION ONLY  2023   • AR CONIZATION CERVIX W/WO D&C RPR ELTRD EXC N/A 2018    Procedure: BIOPSY LEEP CERVIX;  Surgeon: Sergio Park DO;  Location: [facility];  Service: Gynecology   • REDUCTION MAMMAPLASTY     • TONSILLECTOMY         Family History   Problem Relation Age of Onset   • Asthma Mother    • Obesity Mother    • Lung cancer Father 50   • Hyperlipidemia Father    • Bone cancer Father         mets from lung cancer   • Cancer Father         Lung/bone ca-    • No Known Problems Sister    • No Known Problems Brother    • Arthritis Maternal Grandmother    • COPD Maternal Grandmother    • Cancer Maternal Grandfather         Lung ca-   • Lung cancer Maternal Grandfather 80   • Liver cancer Paternal Grandfather 80   • Cancer Paternal Grandfather         Liver ca-   • No Known Problems Paternal Grandmother    • No Known Problems Maternal Aunt    • No Known Problems Maternal Aunt    • No Known Problems Maternal Aunt    • No Known Problems Paternal Aunt        Social History     Occupational History   • Occupation: [employer], IR tech   Tobacco Use   • Smoking status: Every Day     Packs/day: 0.50     Years: 18.00     Total pack years: 9.00     Types: Cigarettes     Passive exposure: Past   • Smokeless tobacco: Never   • Tobacco comments:     is on the patch    Vaping Use   • Vaping Use: Never used   Substance and Sexual Activity   • Alcohol use: Yes     Comment: occasional drink here and there   • Drug use: No   • Sexual activity: Not Currently     Partners: Male     Birth control/protection: None         Current Outpatient Medications:   •  ALPRAZolam (XANAX) 0.25 mg tablet, Take 1 tablet (0.25 mg total) by mouth daily at bedtime as needed for anxiety, Disp: 30 tablet, Rfl: 0  •  cholecalciferol (VITAMIN D3) 1,000 units tablet, Take by mouth daily  , Disp: , Rfl:   •  DULoxetine (CYMBALTA) 60 mg delayed release capsule, Take 1 capsule (60 mg total) by mouth daily, Disp: 90 capsule, Rfl: 0  •  fenofibrate 160 MG tablet, Take 1 tablet (160 mg total) by mouth daily, Disp: 90 tablet, Rfl: 0  •  fluocinolone (SYNALAR) 0.01 % external solution, Apply sparingly to affected areas of scalp 2-4 times a day for up to 2 weeks. , Disp: 60 mL, Rfl: 1  •  ketoconazole (NIZORAL) 2 % shampoo, Shampoo twice a week for 8 weeks then as needed. Lather into scalp and skin on face, neck, chest, and back; leave on for 5 minutes and then rinse off completely. , Disp: 120 mL, Rfl: 2  •  mupirocin (BACTROBAN) 2 % ointment, , Disp: , Rfl:   •  PREVIDENT 5000 SENSITIVE 1.1-5 % PSTE, , Disp: , Rfl: 3  •  rosuvastatin (CRESTOR) 20 MG tablet, Take 1 tablet (20 mg total) by mouth daily, Disp: 90 tablet, Rfl: 0  •  SODIUM FLUORIDE, DENTAL GEL, 1.1 % GEL, SF 5000 Plus 1.1 % dental cream, Disp: , Rfl:   •  valsartan-hydrochlorothiazide (DIOVAN-HCT) 80-12.5 MG per tablet, Take 1 tablet by mouth daily, Disp: 90 tablet, Rfl: 0    No Known Allergies       There were no vitals filed for this visit. Objective:                    Right Knee Exam     Tenderness   The patient is experiencing tenderness in the patella (Quadriceps tendon). Range of Motion   Extension: 0   Flexion: 120     Tests   Varus: negative Valgus: negative    Other   Erythema: absent  Sensation: normal  Pulse: present  Swelling: none  Effusion: no effusion present    Comments:  Calf is soft and non tender      Left Knee Exam     Tenderness   The patient is experiencing tenderness in the patella (Quadriceps tendon). Range of Motion   Extension: 0   Flexion: 120     Tests   Varus: negative Valgus: negative    Other   Erythema: absent  Sensation: normal  Pulse: present  Swelling: none  Effusion: no effusion present    Comments:  Calf is soft and non tender            Diagnostics, reviewed and taken today if performed as documented: The attending physician has personally reviewed the pertinent films in PACS and interpretation is as follows:  X-rays right knee 3 views: Well-maintained joint space with minimal patella tilt  X-rays left knee 3 views: Well-maintained joint space with mild patella tilt    Procedures, if performed today:    Procedures    None performed      Portions of the record may have been created with voice recognition software. Occasional wrong word or "sound a like" substitutions may have occurred due to the inherent limitations of voice recognition software. Read the chart carefully and recognize, using context, where substitutions have occurred.

## 2023-10-10 NOTE — PATIENT INSTRUCTIONS
STRENGTHENING:   Quadriceps:   Isometric Quad sets                        Progression for Quadriceps/VMO:  Hold at 45 degree angle (Picture #1)    Key: Slow & Intentional  Two ways to perform:  Start with 10 reps on each side maintaining neutral pelvis. *   Hold position for a 3-5 count  When form and exercise because less challenging; increase the REPITITIONS or DURATION your holding. Perform Wall Squats below: 3 Sets of 10 Reps         Hold for 3-5 count          Progression for Quadriceps/VMO:  Use a ball hold between knees to activate VMO  Key: Slow & Intentional  Two ways to perform:  Start with 10 reps on each side maintaining neutral pelvis. *   Hold position for a 3-5 count  When form and exercise because less challenging; increase the REPITITIONS or DURATION your holding. Perform the Wall Squat w/ball: 3 sets 10 reps        Hold for 3-5 count                        Progression:   Adding a Theraband for resistance while maintaining good form during a wall squat is more challenging.    Perform Wall squat w/Theraband:   3 sets 10 reps  Hold for a 3-5 count                            Perform effective stretching exercises:   3 sets of 10 repetitions (rep)  Hold each rep for 20-30 seconds              Perform effective stretching exercises:   3 sets of 10 repetitions (rep)  Hold each rep for 20-30 seconds

## 2023-10-25 ENCOUNTER — OFFICE VISIT (OUTPATIENT)
Dept: FAMILY MEDICINE CLINIC | Facility: CLINIC | Age: 43
End: 2023-10-25
Payer: COMMERCIAL

## 2023-10-25 VITALS
BODY MASS INDEX: 43.61 KG/M2 | DIASTOLIC BLOOD PRESSURE: 70 MMHG | HEIGHT: 62 IN | TEMPERATURE: 97.4 F | OXYGEN SATURATION: 94 % | WEIGHT: 237 LBS | SYSTOLIC BLOOD PRESSURE: 108 MMHG | RESPIRATION RATE: 16 BRPM | HEART RATE: 84 BPM

## 2023-10-25 DIAGNOSIS — E78.2 MIXED HYPERLIPIDEMIA: ICD-10-CM

## 2023-10-25 DIAGNOSIS — Z00.00 ANNUAL PHYSICAL EXAM: Primary | ICD-10-CM

## 2023-10-25 DIAGNOSIS — I10 ESSENTIAL HYPERTENSION: ICD-10-CM

## 2023-10-25 DIAGNOSIS — F41.1 GAD (GENERALIZED ANXIETY DISORDER): ICD-10-CM

## 2023-10-25 DIAGNOSIS — F32.A DEPRESSION, UNSPECIFIED DEPRESSION TYPE: ICD-10-CM

## 2023-10-25 PROCEDURE — 99396 PREV VISIT EST AGE 40-64: CPT

## 2023-10-25 RX ORDER — VALSARTAN AND HYDROCHLOROTHIAZIDE 80; 12.5 MG/1; MG/1
1 TABLET, FILM COATED ORAL DAILY
Qty: 90 TABLET | Refills: 0 | Status: SHIPPED | OUTPATIENT
Start: 2023-10-25

## 2023-10-25 RX ORDER — DULOXETIN HYDROCHLORIDE 60 MG/1
60 CAPSULE, DELAYED RELEASE ORAL DAILY
Qty: 90 CAPSULE | Refills: 0 | Status: SHIPPED | OUTPATIENT
Start: 2023-10-25

## 2023-10-25 RX ORDER — ROSUVASTATIN CALCIUM 20 MG/1
20 TABLET, COATED ORAL DAILY
Qty: 90 TABLET | Refills: 0 | Status: SHIPPED | OUTPATIENT
Start: 2023-10-25

## 2023-10-25 RX ORDER — FENOFIBRATE 160 MG/1
160 TABLET ORAL DAILY
Qty: 90 TABLET | Refills: 0 | Status: SHIPPED | OUTPATIENT
Start: 2023-10-25

## 2023-10-25 NOTE — PATIENT INSTRUCTIONS
Wellness Visit for Adults   AMBULATORY CARE:   A wellness visit  is when you see your healthcare provider to get screened for health problems. Your healthcare provider will also give you advice on how to stay healthy. Write down your questions so you remember to ask them. Ask your healthcare provider how often you should have a wellness visit. What happens at a wellness visit:  Your healthcare provider will ask about your health, and your family history of health problems. This includes high blood pressure, heart disease, and cancer. He or she will ask if you have symptoms that concern you, if you smoke, and about your mood. You may also be asked about your intake of medicines, supplements, food, and alcohol. Any of the following may be done: Your weight  will be checked. Your height may also be checked so your body mass index (BMI) can be calculated. Your BMI shows if you are at a healthy weight. Your blood pressure  and heart rate will be checked. Your temperature may also be checked. Blood and urine tests  may be done. Blood tests may be done to check your cholesterol levels. Abnormal cholesterol levels increase your risk for heart disease and stroke. You may also need a blood or urine test to check for diabetes if you are at increased risk. Urine tests may be done to look for signs of an infection or kidney disease. A physical exam  includes checking your heartbeat and lungs with a stethoscope. Your healthcare provider may also check your skin to look for sun damage. Screening tests  may be recommended. A screening test is done to check for diseases that may not cause symptoms. The screening tests you may need depend on your age, gender, family history, and lifestyle habits. For example, colorectal screening may be recommended if you are 48years old or older. Screening tests you need if you are a woman:   A Pap smear  is used to screen for cervical cancer.  Pap smears are usually done every 3 to 5 years depending on your age. You may need them more often if you have had abnormal Pap smear test results in the past. Ask your healthcare provider how often you should have a Pap smear. A mammogram  is an x-ray of your breasts to screen for breast cancer. Experts recommend mammograms every 2 years starting at age 48 years. You may need a mammogram at age 52 years or younger if you have an increased risk for breast cancer. Talk to your healthcare provider about when you should start having mammograms and how often you need them. Vaccines you may need:   Get an influenza vaccine  every year. The influenza vaccine protects you from the flu. Several types of viruses cause the flu. The viruses change over time, so new vaccines are made each year. Get a tetanus-diphtheria (Td) booster vaccine  every 10 years. This vaccine protects you against tetanus and diphtheria. Tetanus is a severe infection that may cause painful muscle spasms and lockjaw. Diphtheria is a severe bacterial infection that causes a thick covering in the back of your mouth and throat. Get a human papillomavirus (HPV) vaccine  if you are female and aged 23 to 32 or male 23 to 24 and never received it. This vaccine protects you from HPV infection. HPV is the most common infection spread by sexual contact. HPV may also cause vaginal, penile, and anal cancers. Get a pneumococcal vaccine  if you are aged 72 years or older. The pneumococcal vaccine is an injection given to protect you from pneumococcal disease. Pneumococcal disease is an infection caused by pneumococcal bacteria. The infection may cause pneumonia, meningitis, or an ear infection. Get a shingles vaccine  if you are 60 or older, even if you have had shingles before. The shingles vaccine is an injection to protect you from the varicella-zoster virus. This is the same virus that causes chickenpox.  Shingles is a painful rash that develops in people who had chickenpox or have been exposed to the virus. How to eat healthy:  My Plate is a model for planning healthy meals. It shows the types and amounts of foods that should go on your plate. Fruits and vegetables make up about half of your plate, and grains and protein make up the other half. A serving of dairy is included on the side of your plate. The amount of calories and serving sizes you need depends on your age, gender, weight, and height. Examples of healthy foods are listed below:  Eat a variety of vegetables  such as dark green, red, and orange vegetables. You can also include canned vegetables low in sodium (salt) and frozen vegetables without added butter or sauces. Eat a variety of fresh fruits , canned fruit in 100% juice, frozen fruit, and dried fruit. Include whole grains. At least half of the grains you eat should be whole grains. Examples include whole-wheat bread, wheat pasta, brown rice, and whole-grain cereals such as oatmeal.    Eat a variety of protein foods such as seafood (fish and shellfish), lean meat, and poultry without skin (turkey and chicken). Examples of lean meats include pork leg, shoulder, or tenderloin, and beef round, sirloin, tenderloin, and extra lean ground beef. Other protein foods include eggs and egg substitutes, beans, peas, soy products, nuts, and seeds. Choose low-fat dairy products such as skim or 1% milk or low-fat yogurt, cheese, and cottage cheese. Limit unhealthy fats  such as butter, hard margarine, and shortening. Exercise:  Exercise at least 30 minutes per day on most days of the week. Some examples of exercise include walking, biking, dancing, and swimming. You can also fit in more physical activity by taking the stairs instead of the elevator or parking farther away from stores. Include muscle strengthening activities 2 days each week. Regular exercise provides many health benefits.  It helps you manage your weight, and decreases your risk for type 2 diabetes, heart disease, stroke, and high blood pressure. Exercise can also help improve your mood. Ask your healthcare provider about the best exercise plan for you. General health and safety guidelines:   Do not smoke. Nicotine and other chemicals in cigarettes and cigars can cause lung damage. Ask your healthcare provider for information if you currently smoke and need help to quit. E-cigarettes or smokeless tobacco still contain nicotine. Talk to your healthcare provider before you use these products. Limit alcohol. A drink of alcohol is 12 ounces of beer, 5 ounces of wine, or 1½ ounces of liquor. Lose weight, if needed. Being overweight increases your risk of certain health conditions. These include heart disease, high blood pressure, type 2 diabetes, and certain types of cancer. Protect your skin. Do not sunbathe or use tanning beds. Use sunscreen with a SPF 15 or higher. Apply sunscreen at least 15 minutes before you go outside. Reapply sunscreen every 2 hours. Wear protective clothing, hats, and sunglasses when you are outside. Drive safely. Always wear your seatbelt. Make sure everyone in your car wears a seatbelt. A seatbelt can save your life if you are in an accident. Do not use your cell phone when you are driving. This could distract you and cause an accident. Pull over if you need to make a call or send a text message. Practice safe sex. Use latex condoms if are sexually active and have more than one partner. Your healthcare provider may recommend screening tests for sexually transmitted infections (STIs). Wear helmets, lifejackets, and protective gear. Always wear a helmet when you ride a bike or motorcycle, go skiing, or play sports that could cause a head injury. Wear protective equipment when you play sports. Wear a lifejacket when you are on a boat or doing water sports.     © Copyright Kelly Erik 2023 Information is for End User's use only and may not be sold, redistributed or otherwise used for commercial purposes. The above information is an  only. It is not intended as medical advice for individual conditions or treatments. Talk to your doctor, nurse or pharmacist before following any medical regimen to see if it is safe and effective for you. Weight Management   AMBULATORY CARE:   Why it is important to manage your weight:  Being overweight increases your risk of health conditions such as heart disease, high blood pressure, type 2 diabetes, and certain types of cancer. It can also increase your risk for osteoarthritis, sleep apnea, and other respiratory problems. Aim for a slow, steady weight loss. Even a small amount of weight loss can lower your risk of health problems. Risks of being overweight:  Extra weight can cause many health problems, including the following:  Diabetes (high blood sugar level)    High blood pressure or high cholesterol    Heart disease    Stroke    Gallbladder or liver disease    Cancer of the colon, breast, prostate, liver, or kidney    Sleep apnea    Arthritis or gout    Screening  is done to check for health conditions before you have signs or symptoms. If you are 28to 79years old, your blood sugar level may be checked every 3 years for signs of prediabetes or diabetes. Your healthcare provider will check your blood pressure at each visit. High blood pressure can lead to a stroke or other problems. Your provider may check for signs of heart disease, cancer, or other health problems. How to lose weight safely:  A safe and healthy way to lose weight is to eat fewer calories and get regular exercise. You can lose up about 1 pound a week by decreasing the number of calories you eat by 500 calories each day. You can decrease calories by eating smaller portion sizes or by cutting out high-calorie foods. Read labels to find out how many calories are in the foods you eat.          You can also burn calories with exercise such as walking, swimming, or biking. You will be more likely to keep weight off if you make these changes part of your lifestyle. Exercise at least 30 minutes per day on most days of the week. You can also fit in more physical activity by taking the stairs instead of the elevator or parking farther away from stores. Ask your healthcare provider about the best exercise plan for you. Healthy meal plan for weight management:  A healthy meal plan includes a variety of foods, contains fewer calories, and helps you stay healthy. A healthy meal plan includes the following:     Eat whole-grain foods more often. A healthy meal plan should contain fiber. Fiber is the part of grains, fruits, and vegetables that is not broken down by your body. Whole-grain foods are healthy and provide extra fiber in your diet. Some examples of whole-grain foods are whole-wheat breads and pastas, oatmeal, brown rice, and bulgur. Eat a variety of vegetables every day. Include dark, leafy greens such as spinach, kale, jolie greens, and mustard greens. Eat yellow and orange vegetables such as carrots, sweet potatoes, and winter squash. Eat a variety of fruits every day. Choose fresh or canned fruit (canned in its own juice or light syrup) instead of juice. Fruit juice has very little or no fiber. Eat low-fat dairy foods. Drink fat-free (skim) milk or 1% milk. Eat fat-free yogurt and low-fat cottage cheese. Try low-fat cheeses such as mozzarella and other reduced-fat cheeses. Choose meat and other protein foods that are low in fat. Choose beans or other legumes such as split peas or lentils. Choose fish, skinless poultry (chicken or turkey), or lean cuts of red meat (beef or pork). Before you cook meat or poultry, cut off any visible fat. Use less fat and oil. Try baking foods instead of frying them. Add less fat, such as margarine, sour cream, regular salad dressing and mayonnaise to foods. Eat fewer high-fat foods.  Some examples of high-fat foods include french fries, doughnuts, ice cream, and cakes. Eat fewer sweets. Limit foods and drinks that are high in sugar. This includes candy, cookies, regular soda, and sweetened drinks. Ways to decrease calories:   Eat smaller portions. Use a small plate with smaller servings. Do not eat second helpings. When you eat at a restaurant, ask for a box and place half of your meal in the box before you eat. Share an entrée with someone else. Replace high-calorie snacks with healthy, low-calorie snacks. Choose fresh fruit, vegetables, fat-free rice cakes, or air-popped popcorn instead of potato chips, nuts, or chocolate. Choose water or calorie-free drinks instead of soda or sweetened drinks. Do not shop for groceries when you are hungry. You may be more likely to make unhealthy food choices. Take a grocery list of healthy foods and shop after you have eaten. Eat regular meals. Do not skip meals. Skipping meals can lead to overeating later in the day. This can make it harder for you to lose weight. Eat a healthy snack in place of a meal if you do not have time to eat a regular meal. Talk with a dietitian to help you create a meal plan and schedule that is right for you. Other things to consider as you try to lose weight:   Be aware of situations that may give you the urge to overeat, such as eating while watching television. Find ways to avoid these situations. For example, read a book, go for a walk, or do crafts. Meet with a weight loss support group or friends who are also trying to lose weight. This may help you stay motivated to continue working on your weight loss goals. © Copyright Shari [employer] 2023 Information is for End User's use only and may not be sold, redistributed or otherwise used for commercial purposes. The above information is an  only. It is not intended as medical advice for individual conditions or treatments.  Talk to your doctor, nurse or pharmacist before following any medical regimen to see if it is safe and effective for you. Cigarette Smoking and Your Health   AMBULATORY CARE:   Risks to your health if you smoke:  Nicotine and other chemicals found in tobacco and e-cigarettes can damage every cell in your body. Even if you are a light smoker, you have an increased risk for cancer, heart disease, and lung disease. If you are pregnant or have diabetes, smoking increases your risk for complications. Nicotine can affect an adolescent's developing brain. This can lead to trouble thinking, learning, or paying attention. Benefits to your health if you stop smoking: You decrease respiratory symptoms such as coughing, wheezing, and shortness of breath. You reduce your risk for cancers of the lung, mouth, throat, kidney, bladder, pancreas, stomach, and cervix. If you already have cancer, you increase the benefits of chemotherapy. You also reduce your risk for cancer returning or a second cancer from developing. You reduce your risk for heart disease, blood clots, heart attack, and stroke. You reduce your risk for lung infections, and diseases such as pneumonia, asthma, chronic bronchitis, and emphysema. Your circulation improves. More oxygen can be delivered to your body. If you have diabetes, you lower your risk for complications, such as kidney, artery, and eye diseases. You also lower your risk for nerve damage. Nerve damage can lead to amputations, poor vision, and blindness. You improve your body's ability to heal and to fight infections. An adolescent can help his or her brain and body develop in a healthy way. Talk to your adolescent about all the health risks of nicotine. If you can, start talking about nicotine when your child is younger than 12 years. This may make it easier for him or her not to start using nicotine as a teenager or adult. Explain to him or her that it is best never to start.  It can be hard to try to quit later. Benefits to the health of others if you stop smoking:  Tobacco is harmful to nonsmokers who breathe in your secondhand smoke. The following are ways the health of others around you may improve when you stop smoking: You lower the risks for lung cancer, heart disease, and stroke in nonsmoking adults. If you are pregnant, you lower the risk for miscarriage, early delivery, low birth weight, and stillbirth. You also lower your baby's risk for SIDS, obesity, developmental delay, and neurobehavioral problems, such as ADHD. If you have children, you lower their risk for ear infections, colds, pneumonia, bronchitis, and asthma. Follow up with your doctor as directed:  Write down your questions so you remember to ask them during your visits. For support and more information:   [employer]  [address] [facility]  Phone: [address]  Phone: 9- 863 - 647-3518  Web Address: [employer]    Smoke[employer] gov  Phone: 2- 058 - 416-6069  Web Address: www.smokefree. gov  © Copyright Mary Amador 2023 Information is for End User's use only and may not be sold, redistributed or otherwise used for commercial purposes. The above information is an  only. It is not intended as medical advice for individual conditions or treatments. Talk to your doctor, nurse or pharmacist before following any medical regimen to see if it is safe and effective for you.

## 2023-10-25 NOTE — PROGRESS NOTES
[address] [facility] PRIMARY CARE    NAME: Angella De La Paz  AGE: 40 y.o. SEX: female  : 1980     DATE: 10/25/2023     Assessment and Plan:     Problem List Items Addressed This Visit        Cardiovascular and Mediastinum    Essential hypertension    Relevant Medications    valsartan-hydrochlorothiazide (DIOVAN-HCT) 80-12.5 MG per tablet       Other    Mixed hyperlipidemia    Relevant Medications    fenofibrate 160 MG tablet    rosuvastatin (CRESTOR) 20 MG tablet    QIANA (generalized anxiety disorder)    Relevant Medications    DULoxetine (CYMBALTA) 60 mg delayed release capsule   Other Visit Diagnoses     Annual physical exam    -  Primary    Depression, unspecified depression type        Relevant Medications    DULoxetine (CYMBALTA) 60 mg delayed release capsule            Immunizations and preventive care screenings were discussed with patient today. Appropriate education was printed on patient's after visit summary. Counseling:  Exercise: the importance of regular exercise/physical activity was discussed. Recommend exercise 3-5 times per week for at least 30 minutes. BMI Counseling: Body mass index is 43.35 kg/m². The BMI is above normal. Nutrition recommendations include decreasing portion sizes, decreasing fast food intake, limiting drinks that contain sugar, moderation in carbohydrate intake, increasing intake of lean protein and reducing intake of saturated and trans fat. Exercise recommendations include vigorous physical activity 75 minutes/week. No pharmacotherapy was ordered. Rationale for BMI follow-up plan is due to patient being overweight or obese. Tobacco Cessation Counseling: Tobacco cessation counseling was provided. The patient is sincerely urged to quit consumption of tobacco. She is not ready to quit tobacco. Medication options and side effects of medication discussed. Patient refused medication.          Return in about 3 months (around 1/25/2024). Chief Complaint:     Chief Complaint   Patient presents with   • Physical Exam     physical      History of Present Illness:     Adult Annual Physical   Patient here for a comprehensive physical exam. The patient reports no problems. Diet and Physical Activity  Diet/Nutrition: well balanced diet, consuming 3-5 servings of fruits/vegetables daily, and adequate fiber intake. Exercise: no formal exercise. Depression Screening  PHQ-2/9 Depression Screening         General Health  Sleep: sleeps well. Hearing: normal - bilateral.  Vision: no vision problems, goes for regular eye exams, and wears glasses. Dental: regular dental visits, brushes teeth twice daily, and flosses teeth occasionally. /GYN Health  Patient is: premenopausal  Last menstrual period: 10/06/2023  Contraceptive method:  none . Advanced Care Planning  Do you have an advanced directive? no  Do you have a durable medical power of ? no     Review of Systems:     Review of Systems   Constitutional:  Negative for chills, fatigue and fever. HENT:  Negative for congestion, ear pain, rhinorrhea, sneezing and sore throat. Eyes:  Negative for discharge, redness, itching and visual disturbance. Respiratory:  Negative for cough, chest tightness and shortness of breath. Cardiovascular:  Negative for chest pain, palpitations and leg swelling. Gastrointestinal:  Negative for abdominal pain, blood in stool, diarrhea, nausea and vomiting. Endocrine: Negative for cold intolerance and heat intolerance. Genitourinary:  Negative for dysuria, frequency and urgency. Musculoskeletal:  Positive for arthralgias (Bilateral knee pain). Negative for back pain and myalgias. Skin:  Negative for color change and rash. Neurological:  Negative for dizziness, weakness, light-headedness, numbness and headaches. Hematological:  Does not bruise/bleed easily.    Psychiatric/Behavioral:  Negative for agitation, behavioral problems and confusion.        Past Medical History:     Past Medical History:   Diagnosis Date   • Anxiety    • Asthma    • Depression    • History of Clostridium difficile colitis 2018    x 2 episodes, after antibiotics   • Hypercholesterolemia    • Hyperlipemia    • Hypertension 1/2021   • Morbid obesity ([address])    • Obesity    • Pulmonary nodule    • Sleep apnea     c pap      Past Surgical History:     Past Surgical History:   Procedure Laterality Date   • IR ASPIRATION ONLY  1/9/2023   • NJ CONIZATION CERVIX W/WO D&C RPR ELTRD EXC N/A 9/27/2018    Procedure: BIOPSY LEEP CERVIX;  Surgeon: Leilani Cooper DO;  Location: [facility];  Service: Gynecology   • REDUCTION MAMMAPLASTY  1999   • TONSILLECTOMY        Social History:     Social History     Socioeconomic History   • Marital status: Single     Spouse name: None   • Number of children: None   • Years of education: None   • Highest education level: None   Occupational History   • Occupation: radiology, IR tech   Tobacco Use   • Smoking status: Every Day     Packs/day: 0.50     Years: 18.00     Total pack years: 9.00     Types: Cigarettes     Passive exposure: Past   • Smokeless tobacco: Never   • Tobacco comments:     is on the patch    Vaping Use   • Vaping Use: Never used   Substance and Sexual Activity   • Alcohol use: Yes     Comment: occasional drink here and there   • Drug use: No   • Sexual activity: Not Currently     Partners: Male     Birth control/protection: None   Other Topics Concern   • None   Social History Narrative   • None     Social Determinants of Health     Financial Resource Strain: Not on file   Food Insecurity: Not on file   Transportation Needs: Not on file   Physical Activity: Not on file   Stress: Not on file   Social Connections: Not on file   Intimate Partner Violence: Not on file   Housing Stability: Not on file      Family History:     Family History   Problem Relation Age of Onset   • Asthma Mother    • Obesity Mother    • Lung cancer Father 50   • Hyperlipidemia Father    • Bone cancer Father         mets from lung cancer   • Cancer Father         Lung/bone ca-    • No Known Problems Sister    • No Known Problems Brother    • Arthritis Maternal Grandmother    • COPD Maternal Grandmother    • Cancer Maternal Grandfather         Lung ca-   • Lung cancer Maternal Grandfather 80   • Liver cancer Paternal Grandfather 80   • Cancer Paternal Grandfather         Liver ca-   • No Known Problems Paternal Grandmother    • No Known Problems Maternal Aunt    • No Known Problems Maternal Aunt    • No Known Problems Maternal Aunt    • No Known Problems Paternal Aunt       Current Medications:     Current Outpatient Medications   Medication Sig Dispense Refill   • ALPRAZolam (XANAX) 0.25 mg tablet Take 1 tablet (0.25 mg total) by mouth daily at bedtime as needed for anxiety 30 tablet 0   • cholecalciferol (VITAMIN D3) 1,000 units tablet Take by mouth daily       • DULoxetine (CYMBALTA) 60 mg delayed release capsule Take 1 capsule (60 mg total) by mouth daily 90 capsule 0   • fenofibrate 160 MG tablet Take 1 tablet (160 mg total) by mouth daily 90 tablet 0   • fluocinolone (SYNALAR) 0.01 % external solution Apply sparingly to affected areas of scalp 2-4 times a day for up to 2 weeks. 60 mL 1   • ketoconazole (NIZORAL) 2 % shampoo Shampoo twice a week for 8 weeks then as needed. Lather into scalp and skin on face, neck, chest, and back; leave on for 5 minutes and then rinse off completely. 120 mL 2   • PREVIDENT 5000 SENSITIVE 1.1-5 % PSTE   3   • rosuvastatin (CRESTOR) 20 MG tablet Take 1 tablet (20 mg total) by mouth daily 90 tablet 0   • SODIUM FLUORIDE, DENTAL GEL, 1.1 % GEL SF 5000 Plus 1.1 % dental cream     • valsartan-hydrochlorothiazide (DIOVAN-HCT) 80-12.5 MG per tablet Take 1 tablet by mouth daily 90 tablet 0     No current facility-administered medications for this visit.       Allergies: No Known Allergies   Physical Exam:     /70 (BP Location: Left arm, Patient Position: Sitting, Cuff Size: Large)   Pulse 84   Temp (!) 97.4 °F (36.3 °C) (Tympanic)   Resp 16   Ht 5' 2" (1.575 m)   Wt 108 kg (237 lb)   SpO2 94%   BMI 43.35 kg/m²     Physical Exam  Vitals and nursing note reviewed. Constitutional:       General: She is not in acute distress. Appearance: Normal appearance. She is well-developed. She is obese. She is not ill-appearing, toxic-appearing or diaphoretic. HENT:      Head: Normocephalic and atraumatic. Right Ear: Tympanic membrane and external ear normal. There is no impacted cerumen. Left Ear: Tympanic membrane and external ear normal. There is no impacted cerumen. Nose: Nose normal.      Mouth/Throat:      Mouth: Mucous membranes are moist.      Pharynx: Oropharynx is clear. Eyes:      General: No scleral icterus. Right eye: No discharge. Left eye: No discharge. Extraocular Movements: Extraocular movements intact. Conjunctiva/sclera: Conjunctivae normal.      Pupils: Pupils are equal, round, and reactive to light. Cardiovascular:      Rate and Rhythm: Normal rate and regular rhythm. Pulses: Normal pulses. Heart sounds: Normal heart sounds. No murmur heard. Pulmonary:      Effort: Pulmonary effort is normal. No respiratory distress. Breath sounds: Normal breath sounds. No wheezing, rhonchi or rales. Abdominal:      General: Abdomen is flat. Bowel sounds are normal. There is no distension. Palpations: Abdomen is soft. Tenderness: There is no abdominal tenderness. There is no right CVA tenderness or left CVA tenderness. Musculoskeletal:         General: No swelling or tenderness. Normal range of motion. Cervical back: Normal range of motion and neck supple. No rigidity. Right lower leg: No edema. Left lower leg: No edema. Lymphadenopathy:      Cervical: No cervical adenopathy. Skin:     General: Skin is warm and dry. Capillary Refill: Capillary refill takes 2 to 3 seconds. Coloration: Skin is not jaundiced or pale. Neurological:      General: No focal deficit present. Mental Status: She is alert and oriented to person, place, and time. Mental status is at baseline. Motor: No weakness.       Gait: Gait normal.   Psychiatric:         Mood and Affect: Mood normal.         Behavior: Behavior normal.          Faith Capone MD  [address]

## 2023-10-26 ENCOUNTER — EVALUATION (OUTPATIENT)
Dept: PHYSICAL THERAPY | Facility: REHABILITATION | Age: 43
End: 2023-10-26
Payer: COMMERCIAL

## 2023-10-26 DIAGNOSIS — M76.899 QUADRICEPS TENDONITIS: ICD-10-CM

## 2023-10-26 DIAGNOSIS — M22.2X2 PATELLOFEMORAL SYNDROME OF BOTH KNEES: ICD-10-CM

## 2023-10-26 DIAGNOSIS — M22.2X1 PATELLOFEMORAL SYNDROME OF BOTH KNEES: ICD-10-CM

## 2023-10-26 PROCEDURE — 97110 THERAPEUTIC EXERCISES: CPT | Performed by: PHYSICAL THERAPIST

## 2023-10-26 PROCEDURE — 97161 PT EVAL LOW COMPLEX 20 MIN: CPT | Performed by: PHYSICAL THERAPIST

## 2023-10-26 RX ORDER — ALPRAZOLAM 0.25 MG/1
0.25 TABLET ORAL
Qty: 30 TABLET | Refills: 0 | Status: SHIPPED | OUTPATIENT
Start: 2023-10-26

## 2023-10-26 NOTE — PROGRESS NOTES
PT Evaluation     Today's date: 10/26/2023  Patient name: Yue Skinner  : 1980  MRN: [id number]  Referring provider: Dominga Navarro MD  Dx:   Encounter Diagnosis     ICD-10-CM    1. Quadriceps tendonitis  M76.899 Ambulatory Referral to Physical Therapy      2. Patellofemoral syndrome of both knees  M22.2X1 Ambulatory Referral to Physical Therapy    M22.2X2                      Assessment  Assessment details: Yue Skinner is a pleasant 40 y.o. female who presents with B/L knee pain. The patient's greatest concerns are worry over not knowing what's wrong, concern at no signs of improvement, fear of not being able to keep active, and future ill health (and wanting to prevent it). No further referral appears necessary at this time based upon examination results. Primary movement impairment diagnosis of impaired quad and hip strength. Her impairments have lead to participation restrictions in squatting, kneeling, and stairs. She would benefit from working with a skilled PT in order to increase participation in aforementioned participation restrictions. Primary Impairments  1. Impaired quad strength   2. Impaired hip strength   Impairments: abnormal or restricted ROM, abnormal movement, activity intolerance, impaired physical strength, lacks appropriate home exercise program, pain with function and poor body mechanics    Symptom irritability: lowUnderstanding of Dx/Px/POC: good   Prognosis: good  Prognosis details: Positive prognostic indicators include positive attitude toward recovery and absence of observed red flags. Negative prognostic indicators include none. Goals  ST. Independent with HEP in 2 weeks  2. Pt will have verbal report of improvement in symptoms by >/=25% in 2 weeks     To be achieved by D/C   LT. Pt will improve FOTO score by >/= 9 points in 6 weeks  2. Pt will improve FOTO score to >/= 83 by visit # 9  3.  Pt will be able to squat without reproduction of sx's 4. Pt will be able to kneel without reproduction of sx's   5. Pt will be able to perform stairs with no difficulty   6. Pt will be able to return to recreational exercise     Plan  Patient would benefit from: skilled physical therapy  Planned therapy interventions: activity modification, joint mobilization, manual therapy, motor coordination training, neuromuscular re-education, patient education, self care, therapeutic activities, therapeutic exercise, graded activity, home exercise program, behavior modification, graded exercise, functional ROM exercises and strengthening  Frequency: 2x week  Duration in weeks: 8  Plan of Care beginning date: 10/26/2023  Plan of Care expiration date: 2023  Treatment plan discussed with: patient        Subjective Evaluation    History of Present Illness  Mechanism of injury: Pt reports that over the last 3 months she has had insidious onset of knee pain. Her symptoms are when her knees are bent such as sitting, kneeling, and sleeping on her sides. 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 She has no difficulty when she is at work. She is even able to wear the lead apron and has no difficulty.    Patient Goals  Patient goals for therapy: decreased pain  Patient goal: squat, kneeling, be able to return to the gym, be able to do stairs,  Pain  At best pain ratin  At worst pain ratin  Quality: dull ache  Aggravating factors: sitting and stair climbing (going from sitting to standing, kneeling, squatting)  Progression: no change          Objective     Active Range of Motion   Left Knee   Flexion: 138 degrees   Prone flexion: WFL  Extension: 15 degrees   Extensor lag: -3 degrees     Right Knee   Flexion: 140 degrees   Prone flexion: WFL  Extension: 20 degrees   Extensor lag: -5 degrees     Mobility   Patellar Mobility:   Left Knee   WFL: medial and lateral.   Hypomobile: left superior and left inferior    Right Knee   WFL: medial and lateral  Hypomobile: superior and inferior     Strength/Myotome Testing     Left Hip   Planes of Motion   Flexion: 5  Extension: 4+  Abduction: 4    Right Hip   Planes of Motion   Flexion: 5  Extension: 4+  Abduction: 4    Left Knee   Normal strength  Quadriceps contraction: fair    Right Knee   Normal strength  Quadriceps contraction: fair    Functional Assessment      Squat    Pain, left valgus, left tibial anterior translation beyond toes, right valgus and right tibial anterior translation beyond toes.      Single Leg Stance   Left: 60 seconds    Comments  SLS on foam   L : 53 seconds   R: 39 seconds     Squatting post mobilization : decreased sx's improved motion         POC expires Auth Status Unit limit Start date  Expiration date PT/OT + Visit Limit?   12/21/23  N/A  10/26   BOMN                                                                    Precautions: none      Manuals 10/26            Patellar mobility sup/inf KB gr 4                                                   Neuro Re-Ed             SLS on foam              Stepping up to bosu              Stepping laterally to bosu                                                                  Ther Ex             Pt education on HEP, POC 10 min             VG for LE strength / endurance              SLR flexion 2x10            SLR abduction 2x10            Bridges c holds 2x10            Clamshells c TB              Side steps c TB                          Ther Activity                                       Gait Training                                       Modalities

## 2023-11-02 ENCOUNTER — OFFICE VISIT (OUTPATIENT)
Dept: PHYSICAL THERAPY | Facility: REHABILITATION | Age: 43
End: 2023-11-02
Payer: COMMERCIAL

## 2023-11-02 DIAGNOSIS — M22.2X1 PATELLOFEMORAL SYNDROME OF BOTH KNEES: ICD-10-CM

## 2023-11-02 DIAGNOSIS — M76.899 QUADRICEPS TENDONITIS: Primary | ICD-10-CM

## 2023-11-02 DIAGNOSIS — M22.2X2 PATELLOFEMORAL SYNDROME OF BOTH KNEES: ICD-10-CM

## 2023-11-02 PROCEDURE — 97110 THERAPEUTIC EXERCISES: CPT

## 2023-11-02 PROCEDURE — 97140 MANUAL THERAPY 1/> REGIONS: CPT

## 2023-11-02 NOTE — PROGRESS NOTES
Daily Note     Today's date: 2023  Patient name: Brett Horner  : 1980  MRN: [id number]  Referring provider: Yanira Kruse MD  Dx:   Encounter Diagnosis     ICD-10-CM    1. Quadriceps tendonitis  M76.899       2. Patellofemoral syndrome of both knees  M22.2X1     M22.2X2                      Subjective: Paddy Mckeon has been compliant with HEP, with generalized muscle soreness. Objective: See treatment diary below      Assessment: Tolerated treatment well. Patient has good recall of TE from HEP with good execution. Good tolerance and execution of new tasks, with fatigue. Paddy Mckeon was pain free throughout session. She continues to benefit from manuals to promote patellar mobility. Continued PT would be beneficial to improve function. Plan: Continue per plan of care. Precautions: none      Manuals 10/26 11/2           Patellar mobility sup/inf KB gr 4 Glides                                                   Neuro Re-Ed             SLS on foam   3x20"           Stepping up to bosu              Stepping laterally to bosu                                                                  Ther Ex             Pt education on HEP, POC 10 min             VG for LE strength / endurance   L7 6'           SLR flexion 2x10 2x10           SLR abduction 2x10 2x10           Bridges c holds 2x10 2x10x3 sec           Clamshells c TB   OTB 2x10           Side steps c TB  OTB 20 ft 4x                        Ther Activity                                       Gait Training                                       Modalities                                          Access Code: [email or web address]  URL: [email or web address]  Date: 2023  Prepared by: Marley Huston    Exercises  - Supine Bridge  - 1 x daily - 7 x weekly - 2 sets - 10 reps - 3 hold  - Sidelying Hip Abduction  - 1 x daily - 7 x weekly - 2 sets - 10 reps  - Supine Active Straight Leg Raise  - 1 x daily - 7 x weekly - 2 sets - 10 reps  - Clamshell with Resistance  - 1 x daily - 7 x weekly - 2 sets - 10 reps  - Side Stepping with Resistance at Ankles  - 1 x daily - 7 x weekly - 2 laps

## 2023-11-06 ENCOUNTER — OFFICE VISIT (OUTPATIENT)
Dept: PHYSICAL THERAPY | Facility: REHABILITATION | Age: 43
End: 2023-11-06
Payer: COMMERCIAL

## 2023-11-06 DIAGNOSIS — M76.899 QUADRICEPS TENDONITIS: Primary | ICD-10-CM

## 2023-11-06 DIAGNOSIS — M22.2X1 PATELLOFEMORAL SYNDROME OF BOTH KNEES: ICD-10-CM

## 2023-11-06 DIAGNOSIS — M22.2X2 PATELLOFEMORAL SYNDROME OF BOTH KNEES: ICD-10-CM

## 2023-11-06 PROCEDURE — 97112 NEUROMUSCULAR REEDUCATION: CPT

## 2023-11-06 PROCEDURE — 97110 THERAPEUTIC EXERCISES: CPT

## 2023-11-06 NOTE — PROGRESS NOTES
Daily Note     Today's date: 2023  Patient name: Sonal Nation  : 1980  MRN: [id number]  Referring provider: Miguel Angel Hawkins MD  Dx:   Encounter Diagnosis     ICD-10-CM    1. Quadriceps tendonitis  M76.899       2. Patellofemoral syndrome of both knees  M22.2X1     M22.2X2           Start Time: 5772  Stop Time: 4040  Total time in clinic (min): 45 minutes    Subjective: Pt reports she had increased soreness for about two days following her LV, especially in L ant hip/prox quad. Has not performed HEP while       Objective: See treatment diary below      Assessment: Tolerated treatment well. Continued with program as outlined below, with focus on strength and stability of bilat hip abductors. Able to complete all exercise today with no complaints of increased soreness. Patient would benefit from continued PT to further improve strength, decrease pain, and maximize overall function. Plan: Continue per plan of care.       Precautions: none      Manuals 10/26 11/2 11/6          Patellar mobility sup/inf KB gr 4 Glides  Prom  AFB                                                 Neuro Re-Ed             SLS on foam   3x20" 3x20" ea          Stepping up to [employer] Step up  5"x15          Stepping laterally to bosu                                                                  Ther Ex             Pt education on HEP, POC 10 min             VG for LE strength / endurance   L7 6' L7 6'          SLR flexion 2x10 2x10 2x10          SLR abduction 2x10 2x10 2x10          Bridges c holds 2x10 2x10x3 sec YHB  2x10          Clamshells c TB   OTB 2x10 YHB  2x10          Side steps c TB  OTB 20 ft 4x                        Ther Activity                                       Gait Training                                       Modalities

## 2023-11-09 ENCOUNTER — OFFICE VISIT (OUTPATIENT)
Dept: PHYSICAL THERAPY | Facility: REHABILITATION | Age: 43
End: 2023-11-09
Payer: COMMERCIAL

## 2023-11-09 DIAGNOSIS — M22.2X1 PATELLOFEMORAL SYNDROME OF BOTH KNEES: ICD-10-CM

## 2023-11-09 DIAGNOSIS — M22.2X2 PATELLOFEMORAL SYNDROME OF BOTH KNEES: ICD-10-CM

## 2023-11-09 DIAGNOSIS — M76.899 QUADRICEPS TENDONITIS: Primary | ICD-10-CM

## 2023-11-09 PROCEDURE — 97110 THERAPEUTIC EXERCISES: CPT

## 2023-11-09 PROCEDURE — 97112 NEUROMUSCULAR REEDUCATION: CPT

## 2023-11-09 NOTE — PROGRESS NOTES
Daily Note     Today's date: 2023  Patient name: Brett Horner  : 1980  MRN: [id number]  Referring provider: Yanira Kruse MD  Dx:   Encounter Diagnosis     ICD-10-CM    1. Quadriceps tendonitis  M76.899       2. Patellofemoral syndrome of both knees  M22.2X1     M22.2X2           Start Time: 3297  Stop Time:   Total time in clinic (min): 42 minutes    Subjective: Pt reports she was a little sore following her LV but nothing to crazy. Has not had her usual knee pain in either LE since LV. Objective: See treatment diary below      Assessment: Tolerated treatment well. Pt is progressing well with decreased pain levels and able to complete higher demanding activities with only mild soreness. Able to progress with standing side steps and wobble board this visit. Is appropriately challenged with these progressions. Patient would benefit from continued PT to further improve strength, decrease pain, and maximize overall function. Plan: Continue per plan of care.       Precautions: none      Manuals 10/26 11/2 11/6 11/9         Patellar mobility sup/inf KB gr 4 Glides  Prom  AFB                                                 Neuro Re-Ed             SLS on foam   3x20" 3x20" ea          Stepping up to [employer] Step up  [employer] Step up  5"x15         Stepping laterally to [employer]     FF/SS  2 min ea                                                Ther Ex             Pt education on HEP, POC 10 min             VG for LE strength / endurance   L7 6' L7 6' L7 8'         SLR flexion 2x10 2x10 2x10 2# 2x10         SLR abduction 2x10 2x10 2x10          Bridges c holds 2x10 2x10x3 sec YHB  2x10 YHB  2x10         Clamshells c TB   OTB 2x10 YHB  2x10          Side steps c TB  OTB 20 ft 4x  YHB  3x8                      Ther Activity                                       Gait Training                                       Modalities

## 2023-11-13 ENCOUNTER — OFFICE VISIT (OUTPATIENT)
Dept: PHYSICAL THERAPY | Facility: REHABILITATION | Age: 43
End: 2023-11-13
Payer: COMMERCIAL

## 2023-11-13 DIAGNOSIS — M76.899 QUADRICEPS TENDONITIS: Primary | ICD-10-CM

## 2023-11-13 DIAGNOSIS — M22.2X2 PATELLOFEMORAL SYNDROME OF BOTH KNEES: ICD-10-CM

## 2023-11-13 DIAGNOSIS — M22.2X1 PATELLOFEMORAL SYNDROME OF BOTH KNEES: ICD-10-CM

## 2023-11-13 PROCEDURE — 97112 NEUROMUSCULAR REEDUCATION: CPT

## 2023-11-13 PROCEDURE — 97110 THERAPEUTIC EXERCISES: CPT

## 2023-11-13 NOTE — PROGRESS NOTES
Daily Note     Today's date: 2023  Patient name: Angella De La Paz  : 1980  MRN: [id number]  Referring provider: Anuja Strauss MD  Dx:   Encounter Diagnosis     ICD-10-CM    1. Quadriceps tendonitis  M76.899       2. Patellofemoral syndrome of both knees  M22.2X1     M22.2X2           Start Time: 8661  Stop Time: 0088  Total time in clinic (min): 40 minutes    Subjective: Pt reports she went hiking this past Saturday with no significant increase in symptoms of either knee. Did however help her boyfriend lift a very heavy cabinet with a hydraulic lift inside her house, which caused increased pain in her L knee and LB. Was sore following her LV for roughly 1 day. Objective: See treatment diary below      Assessment: Tolerated treatment well. Continued with program as outlined below. No progressions made today d/t subjective comments at start of treatment. Improved balance with both wobble board and sand dune step up today. No complaints of pain with any assigned exercise this visit. Patient would benefit from continued PT      Plan: Continue per plan of care.       Precautions: none      Manuals 10/26 11/2 11/6 11/9 11/13        Patellar mobility sup/inf KB gr 4 Glides  Prom  AFB                                                 Neuro Re-Ed             SLS on foam   3x20" 3x20" ea          Stepping up to [employer] Step up  [employer] Step up  [employer] Step up  5"x15        Stepping laterally to [employer]     FF/SS  2 min ea FF/SS  2 min ea                                               Ther Ex             Pt education on HEP, POC 10 min             VG for LE strength / endurance   L7 6' L7 6' L7 8' L7 8'        SLR flexion 2x10 2x10 2x10 2# 2x10 2# 2x10        SLR abduction 2x10 2x10 2x10          Bridges c holds 2x10 2x10x3 sec YHB  2x10 YHB  2x10 YHB  2x10        Clamshells c TB   OTB 2x10 YHB  2x10          Side steps c TB  OTB 20 ft 4x YHB  3x8 YHB  3x8        Pball roll out     10"x5                     Ther Activity                                       Gait Training                                       Modalities

## 2023-11-15 ENCOUNTER — ANNUAL EXAM (OUTPATIENT)
Dept: OBGYN CLINIC | Facility: CLINIC | Age: 43
End: 2023-11-15
Payer: COMMERCIAL

## 2023-11-15 ENCOUNTER — OFFICE VISIT (OUTPATIENT)
Dept: PHYSICAL THERAPY | Facility: REHABILITATION | Age: 43
End: 2023-11-15
Payer: COMMERCIAL

## 2023-11-15 VITALS
BODY MASS INDEX: 44.83 KG/M2 | SYSTOLIC BLOOD PRESSURE: 112 MMHG | WEIGHT: 243.6 LBS | DIASTOLIC BLOOD PRESSURE: 72 MMHG | HEIGHT: 62 IN

## 2023-11-15 DIAGNOSIS — M22.2X2 PATELLOFEMORAL SYNDROME OF BOTH KNEES: ICD-10-CM

## 2023-11-15 DIAGNOSIS — Z01.419 ENCOUNTER FOR ANNUAL ROUTINE GYNECOLOGICAL EXAMINATION: Primary | ICD-10-CM

## 2023-11-15 DIAGNOSIS — M22.2X1 PATELLOFEMORAL SYNDROME OF BOTH KNEES: ICD-10-CM

## 2023-11-15 DIAGNOSIS — Z12.31 ENCOUNTER FOR SCREENING MAMMOGRAM FOR MALIGNANT NEOPLASM OF BREAST: ICD-10-CM

## 2023-11-15 DIAGNOSIS — M76.899 QUADRICEPS TENDONITIS: Primary | ICD-10-CM

## 2023-11-15 DIAGNOSIS — N92.0 MENORRHAGIA WITH REGULAR CYCLE: ICD-10-CM

## 2023-11-15 PROCEDURE — G0145 SCR C/V CYTO,THINLAYER,RESCR: HCPCS | Performed by: OBSTETRICS & GYNECOLOGY

## 2023-11-15 PROCEDURE — S0612 ANNUAL GYNECOLOGICAL EXAMINA: HCPCS | Performed by: OBSTETRICS & GYNECOLOGY

## 2023-11-15 PROCEDURE — 97110 THERAPEUTIC EXERCISES: CPT | Performed by: PHYSICAL THERAPIST

## 2023-11-15 RX ORDER — TRANEXAMIC ACID 650 MG/1
1300 TABLET ORAL 3 TIMES DAILY
Qty: 30 TABLET | Refills: 0 | Status: SHIPPED | OUTPATIENT
Start: 2023-11-15 | End: 2023-11-20

## 2023-11-15 NOTE — PROGRESS NOTES
Daily Note     Today's date: 11/15/2023  Patient name: Ramya Larose  : 1980  MRN: [id number]  Referring provider: Candelario Samuel MD  Dx:   Encounter Diagnosis     ICD-10-CM    1. Quadriceps tendonitis  M76.899       2. Patellofemoral syndrome of both knees  M22.2X1     M22.2X2           Start Time: 5629  Stop Time: 1809  Total time in clinic (min): 39 minutes    Subjective: Pt reports she is feeling better today. Objective: See treatment diary below      Assessment: Tolerated treatment well. Patient would benefit from continued PT      Plan: Continue per plan of care.       Precautions: none      Manuals 10/26 11/2 11/6 11/9 11/13 11/15       Patellar mobility sup/inf KB gr 4 Glides  Prom  AFB                                                 Neuro Re-Ed      11/15       SLS on foam   3x20" 3x20" ea          Stepping up to [employer] Step up  [employer] Step up  [employer] Step up  5"x15 15x ea sand dune       Stepping laterally to [employer]     FF/SS  2 min ea FF/SS  2 min ea 2min ea way                                              Ther Ex      11/15       Pt education on HEP, POC 10 min             VG for LE strength / endurance   L7 6' L7 6' L7 8' L7 8' L7 8'       SLR flexion 2x10 2x10 2x10 2# 2x10 2# 2x10 2# 2x10       SLR abduction 2x10 2x10 2x10          Bridges c holds 2x10 2x10x3 sec YHB  2x10 YHB  2x10 YHB  2x10 2x10 YHB       Clamshells c TB   OTB 2x10 YHB  2x10          Side steps c TB  OTB 20 ft 4x  YHB  3x8 YHB  3x8 YHB 3x8       Pball roll out     10"x5 15x5"                    Ther Activity                                       Gait Training                                       Modalities

## 2023-11-15 NOTE — PROGRESS NOTES
Assessment/Plan:  Pap done for cytology reflex HPV. Encouraged self breast examination as well as calcium supplementation. Continue annual mammogram.  Discussed treatment options for cycle control, limited given history medical and tobacco.  Offered progesterone only agents versus lysteda. Risks and benefits reviewed. She will try lysteda with her next cycle. She will continue to follow-up with primary care as scheduled. Return to office in 1 year or as needed  No problem-specific Assessment & Plan notes found for this encounter. Diagnoses and all orders for this visit:    Encounter for annual routine gynecological examination  -     Liquid-based pap, screening    Encounter for screening mammogram for malignant neoplasm of breast  -     Mammo screening bilateral w 3d & cad; Future  -     Mammo screening bilateral w 3d & cad; Future    Menorrhagia with regular cycle  -     Tranexamic Acid 650 MG TABS; Take 2 tablets (1,300 mg total) by mouth 3 (three) times a day for 5 days          Subjective:      Patient ID: Jaime Hope is a 44 y.o. female. HPI    This is a very pleasant 81-year-old female G0 who presents for GYN exam.  She states her cycles are approximately every 4 to 5 weeks lasting 5 days, heavy on day 2 passing large clots. She denies any breakthrough bleeding. No changes in bowel or bladder function. She is sexually active and has been in a monogamous relationship for 14 months. They are not using condoms. She is okay if she gets pregnant. She was hospitalized 1/2023 with sepsis, MRSA, right adnexal abscess etiology unknown. She follows up with her family physician on a regular basis for hypertriglyceridemia, hypertension. She continues to smoke a pack of cigarettes per day.     The following portions of the patient's history were reviewed and updated as appropriate: allergies, current medications, past family history, past medical history, past social history, past surgical history, and problem list.    Review of Systems   Constitutional:  Negative for fatigue, fever and unexpected weight change. Respiratory:  Negative for cough, chest tightness, shortness of breath and wheezing. Cardiovascular: Negative. Negative for chest pain and palpitations. Gastrointestinal: Negative. Negative for abdominal distention, abdominal pain, blood in stool, constipation, diarrhea, nausea and vomiting. Genitourinary: Negative. Negative for difficulty urinating, dyspareunia, dysuria, flank pain, frequency, genital sores, hematuria, pelvic pain, urgency, vaginal bleeding, vaginal discharge and vaginal pain. Skin:  Negative for rash. Objective:      /72   Ht 5' 2" (1.575 m)   Wt 110 kg (243 lb 9.6 oz)   LMP 11/03/2023 (Exact Date)   BMI 44.56 kg/m²          Physical Exam  Constitutional:       Appearance: Normal appearance. She is well-developed. HENT:      Head: Normocephalic and atraumatic. Cardiovascular:      Rate and Rhythm: Normal rate and regular rhythm. Pulmonary:      Effort: Pulmonary effort is normal.      Breath sounds: Normal breath sounds. Chest:   Breasts:     Right: No inverted nipple, mass, nipple discharge, skin change or tenderness. Left: No inverted nipple, mass, nipple discharge, skin change or tenderness. Abdominal:      General: Bowel sounds are normal. There is no distension. Palpations: Abdomen is soft. Tenderness: There is no abdominal tenderness. There is no guarding or rebound. Genitourinary:     Labia:         Right: No rash, tenderness or lesion. Left: No rash, tenderness or lesion. Vagina: Normal. No signs of injury. No vaginal discharge or tenderness. Cervix: No cervical motion tenderness, discharge, friability, lesion or cervical bleeding. Uterus: Not enlarged and not tender. Adnexa:         Right: No mass, tenderness or fullness. Left: No mass, tenderness or fullness. Neurological:      Mental Status: She is alert.    Psychiatric:         Behavior: Behavior normal.

## 2023-11-20 ENCOUNTER — OFFICE VISIT (OUTPATIENT)
Dept: PHYSICAL THERAPY | Facility: REHABILITATION | Age: 43
End: 2023-11-20
Payer: COMMERCIAL

## 2023-11-20 DIAGNOSIS — M76.899 QUADRICEPS TENDONITIS: Primary | ICD-10-CM

## 2023-11-20 DIAGNOSIS — M22.2X2 PATELLOFEMORAL SYNDROME OF BOTH KNEES: ICD-10-CM

## 2023-11-20 DIAGNOSIS — M22.2X1 PATELLOFEMORAL SYNDROME OF BOTH KNEES: ICD-10-CM

## 2023-11-20 PROCEDURE — 97110 THERAPEUTIC EXERCISES: CPT

## 2023-11-20 NOTE — PROGRESS NOTES
Daily Note     Today's date: 2023  Patient name: Magalie Molina  : 1980  MRN: [id number]  Referring provider: Smita Phillips MD  Dx:   Encounter Diagnosis     ICD-10-CM    1. Quadriceps tendonitis  M76.899       2. Patellofemoral syndrome of both knees  M22.2X1     M22.2X2           Start Time: 5160  Stop Time: 1817  Total time in clinic (min): 42 minutes    Subjective: Pt reports both knees feeling sore today. Soreness started to develop over the weekend and was not doing anything out of the ordinary to aggravate symptoms. Objective: See treatment diary below      Assessment: Tolerated treatment well. Despite soreness reported at start of treatment, was able to progress patient with addition of ene walkouts with no significant aggravation of symptoms. Improved balance on a/p wobble board today compared to previous visits. Patient would benefit from continued PT to further decrease pain, increase strength, and maximize overall function. Plan: Continue per plan of care.       Precautions: none      Manuals 10/26 11/2 11/6 11/9 11/13 11/15 11/20      Patellar mobility sup/inf KB gr 4 Glides  Prom  AFB    Prom  AFB                                             Neuro Re-Ed      11/15 11/20      SLS on foam   3x20" 3x20" ea          Stepping up to [employer] Step up  [employer] Step up  [employer] Step up  5"x15 15x ea sand dune       Stepping laterally to [employer]     FF/SS  2 min ea FF/SS  2 min ea 2min ea way 2min ea way                                             Ther Ex      11/15 11/20      Pt education on HEP, POC 10 min             VG for LE strength / endurance   L7 6' L7 6' L7 8' L7 8' L7 8' L7 8'      SLR flexion 2x10 2x10 2x10 2# 2x10 2# 2x10 2# 2x10 2# 3x8      SLR abduction 2x10 2x10 2x10          Bridges c holds 2x10 2x10x3 sec YHB  2x10 YHB  2x10 YHB  2x10 2x10 YHB 2x10 YHB      Clamshells c TB   OTB 2x10 YHB  2x10          Side steps c TB  OTB 20 ft 4x  YHB  3x8 YHB  3x8 YHB 3x8       [place] walk out       15.0  1x8 ea dir      Pball roll out     10"x5 15x5"                    Ther Activity                                       Gait Training                                       Modalities

## 2023-11-22 ENCOUNTER — OFFICE VISIT (OUTPATIENT)
Dept: PHYSICAL THERAPY | Facility: REHABILITATION | Age: 43
End: 2023-11-22
Payer: COMMERCIAL

## 2023-11-22 DIAGNOSIS — M22.2X1 PATELLOFEMORAL SYNDROME OF BOTH KNEES: ICD-10-CM

## 2023-11-22 DIAGNOSIS — M22.2X2 PATELLOFEMORAL SYNDROME OF BOTH KNEES: ICD-10-CM

## 2023-11-22 DIAGNOSIS — M76.899 QUADRICEPS TENDONITIS: Primary | ICD-10-CM

## 2023-11-22 PROCEDURE — 97110 THERAPEUTIC EXERCISES: CPT

## 2023-11-22 NOTE — PROGRESS NOTES
Daily Note     Today's date: 2023  Patient name: Kadi Medellin  : 1980  MRN: [id number]  Referring provider: Iva Person MD  Dx:   Encounter Diagnosis     ICD-10-CM    1. Quadriceps tendonitis  M76.899       2. Patellofemoral syndrome of both knees  M22.2X1     M22.2X2           Start Time: 1718  Stop Time: 1800  Total time in clinic (min): 42 minutes    Subjective: Pt reports she still gets some soreness along distal quads, just proximal to each patella. Did notice she could squat down earlier today to pet her cat and symptoms were not as intense as usual in this position. Objective: See treatment diary below      Assessment: Tolerated treatment well. Continued with program as outlined below. Very challenged with both knee ext machine and increased resistance for bridges, but able to complete assigned reps/sets for each. Patient would benefit from continued PT to further improve strength, decrease pain, and maximize overall function. Plan: Continue per plan of care.       Precautions: none      Manuals 10/26 11/2 11/6 11/9 11/13 11/15 11/20 11/22     Patellar mobility sup/inf KB gr 4 Glides  Prom  AFB    Prom  AFB                                             Neuro Re-Ed      11/15 11/20 11/22     SLS on foam   3x20" 3x20" ea          Stepping up to [employer] Step up  [employer] Step up  [employer] Step up  5"x15 15x ea sand dune       Stepping laterally to [employer]     FF/SS  2 min ea FF/SS  2 min ea 2min ea way 2min ea way 2min ea way                                            Ther Ex      11/15 11/20 11/22     Pt education on HEP, POC 10 min             VG for LE strength / endurance   L7 6' L7 6' L7 8' L7 8' L7 8' L7 8' L7 8'     SLR flexion 2x10 2x10 2x10 2# 2x10 2# 2x10 2# 2x10 2# 3x8 2# 3x8     SLR abduction 2x10 2x10 2x10          Bridges c holds 2x10 2x10x3 sec YHB  2x10 YHB  2x10 YHB  2x10 2x10 YHB 2x10 YHB 2x10 RHB [place] c TB   OTB 2x10 YHB  2x10          Side steps c TB  OTB 20 ft 4x  YHB  3x8 YHB  3x8 YHB 3x8       [place] walk out       15.0  1x8 ea dir 15.0  1x5 ea dir     SL Knee Ext        20#  2x10 ea     Pball roll out     10"x5 15x5"                    Ther Activity                                       Gait Training                                       Modalities

## 2023-11-24 LAB
LAB AP GYN PRIMARY INTERPRETATION: NORMAL
Lab: NORMAL

## 2023-11-27 ENCOUNTER — OFFICE VISIT (OUTPATIENT)
Dept: PHYSICAL THERAPY | Facility: REHABILITATION | Age: 43
End: 2023-11-27
Payer: COMMERCIAL

## 2023-11-27 DIAGNOSIS — M22.2X1 PATELLOFEMORAL SYNDROME OF BOTH KNEES: ICD-10-CM

## 2023-11-27 DIAGNOSIS — M22.2X2 PATELLOFEMORAL SYNDROME OF BOTH KNEES: ICD-10-CM

## 2023-11-27 DIAGNOSIS — M76.899 QUADRICEPS TENDONITIS: Primary | ICD-10-CM

## 2023-11-27 PROCEDURE — 97112 NEUROMUSCULAR REEDUCATION: CPT

## 2023-11-27 PROCEDURE — 97110 THERAPEUTIC EXERCISES: CPT

## 2023-11-27 NOTE — PROGRESS NOTES
Daily Note     Today's date: 2023  Patient name: Keyona Leon  : 1980  MRN: [id number]  Referring provider: Delmy Lord MD  Dx:   Encounter Diagnosis     ICD-10-CM    1. Quadriceps tendonitis  M76.899       2. Patellofemoral syndrome of both knees  M22.2X1     M22.2X2           Start Time: 3636  Stop Time: 9248  Total time in clinic (min): 42 minutes    Subjective: Pt reports increased soreness for roughly 2 days following her LV. Both knees did start to bother her more over the weekend, especially with the colder weather. Objective: See treatment diary below      Assessment: Tolerated treatment well. Continued with program as outlined below. Progressed with hip abd iso at wall since hip halo was in use by another patient, and unavailable for bridges. Is appropriately challenged with current program and able to complete all exercise with no reports of increased pain. Most challenged with S/S wobble board and SL knee ext. Patient would benefit from continued PT to further improve strength, decrease pain, and maximize overall function. Plan: Continue per plan of care.       Precautions: none      Manuals 10/26 11/2 11/6 11/9 11/13 11/15 11/20 11/22 11/27    Patellar mobility sup/inf KB gr 4 Glides  Prom  AFB    Prom  AFB                                             Neuro Re-Ed      11/15 11/20 11/22 11/27    SLS on foam   3x20" 3x20" ea          Stepping up to [employer] Step up  [employer] Step up  [employer] Step up  5"x15 15x ea sand dune       Stepping laterally to [employer]     FF/SS  2 min ea FF/SS  2 min ea 2min ea way 2min ea way 2min ea way 2min ea way    Hip abd iso         5"x5 ea                              Ther Ex      11/15 11/20 11/22 11/27    Pt education on HEP, POC 10 min             VG for LE strength / endurance   L7 6' L7 6' L7 8' L7 8' L7 8' L7 8' L7 8' L7 8'    SLR flexion 2x10 2x10 2x10 2# 2x10 2# 2x10 2# 2x10 2# 3x8 2# 3x8 2# 3x8    SLR abduction 2x10 2x10 2x10          Bridges c holds 2x10 2x10x3 sec YHB  2x10 YHB  2x10 YHB  2x10 2x10 YHB 2x10 YHB 2x10 RHB nv    Clamshells c TB   OTB 2x10 YHB  2x10          Side steps c TB  OTB 20 ft 4x  YHB  3x8 YHB  3x8 YHB 3x8       [place] walk out       15.0  1x8 ea dir 15.0  1x5 ea dir 15.0  1x5 ea dir    SL Knee Ext        20#  2x10 ea 20#  2x10 ea    Pball roll out     10"x5 15x5"                    Ther Activity                                       Gait Training                                       Modalities

## 2023-11-30 ENCOUNTER — APPOINTMENT (OUTPATIENT)
Dept: PHYSICAL THERAPY | Facility: REHABILITATION | Age: 43
End: 2023-11-30
Payer: COMMERCIAL

## 2023-12-04 ENCOUNTER — OFFICE VISIT (OUTPATIENT)
Dept: PHYSICAL THERAPY | Facility: REHABILITATION | Age: 43
End: 2023-12-04
Payer: COMMERCIAL

## 2023-12-04 DIAGNOSIS — M22.2X2 PATELLOFEMORAL SYNDROME OF BOTH KNEES: ICD-10-CM

## 2023-12-04 DIAGNOSIS — M22.2X1 PATELLOFEMORAL SYNDROME OF BOTH KNEES: ICD-10-CM

## 2023-12-04 DIAGNOSIS — M76.899 QUADRICEPS TENDONITIS: Primary | ICD-10-CM

## 2023-12-04 PROCEDURE — 97112 NEUROMUSCULAR REEDUCATION: CPT

## 2023-12-04 PROCEDURE — 97110 THERAPEUTIC EXERCISES: CPT

## 2023-12-04 NOTE — PROGRESS NOTES
Daily Note     Today's date: 2023  Patient name: Jh Huggins  : 1980  MRN: [id number]  Referring provider: Hung Lopez MD  Dx:   Encounter Diagnosis     ICD-10-CM    1. Quadriceps tendonitis  M76.899       2. Patellofemoral syndrome of both knees  M22.2X1     M22.2X2           Start Time: 1815  Stop Time: 1900  Total time in clinic (min): 45 minutes    Subjective: Pt reports she tends to get stiff and increased pain after being static for roughly 30 min. States when pain is present, steps are the most aggravating activity for symptoms. Both knees tend to do best when staying active and moving frequently. Objective: See treatment diary below      Assessment: Tolerated treatment well. Continued with program as outlined below. Able to complete all exercise with no complaints of pain. Noticeable fatigue in both glutes with performance of TB resisted bridge and hip abd iso with ball at wall. Patient would benefit from continued PT to further improve strength, decrease pain, and maximize function. Plan: Continue per plan of care.       Precautions: none      Manuals 10/26 11/2 11/6 11/9 11/13 11/15 11/20 11/22 11/27 12/   Patellar mobility sup/inf KB gr 4 Glides  Prom  AFB    Prom  AFB                                             Neuro Re-Ed      11/15 11/20 11/22 11/27 12/   SLS on foam   3x20" 3x20" ea          Stepping up to [employer] Step up  5"x15 [employer] Step up  [employer] Step up  5"x15 15x ea sand dune       Stepping laterally to [employer]     FF/SS  2 min ea FF/SS  2 min ea 2min ea way 2min ea way 2min ea way 2min ea way 3 min ea way   Hip abd iso         5"x5 ea 5"x5 ea                             Ther Ex      11/15 11/20 11/22 11/27 12/   Pt education on HEP, POC 10 min             VG for LE strength / endurance   L7 6' L7 6' L7 8' L7 8' L7 8' L7 8' L7 8' L7 8' L7 8'   SLR flexion 2x10 2x10 2x10 2# 2x10 2# 2x10 2# 2x10 2# 3x8 2# 3x8 2# 3x8 2# 3x8   SLR abduction 2x10 2x10 2x10          Bridges c holds 2x10 2x10x3 sec YHB  2x10 YHB  2x10 YHB  2x10 2x10 YHB 2x10 YHB 2x10 RHB nv 2x10 RHB   Clamshells c TB   OTB 2x10 YHB  2x10          Side steps c TB  OTB 20 ft 4x  YHB  3x8 YHB  3x8 YHB 3x8       [place] walk out       15.0  1x8 ea dir 15.0  1x5 ea dir 15.0  1x5 ea dir 15.0  1x6 ea dir   SL Knee Ext        20#  2x10 ea 20#  2x10 ea 20#  2x10 ea   Pball roll out     10"x5 15x5"                    Ther Activity                                       Gait Training                                       Modalities

## 2023-12-06 DIAGNOSIS — Z00.6 ENCOUNTER FOR EXAMINATION FOR NORMAL COMPARISON OR CONTROL IN CLINICAL RESEARCH PROGRAM: ICD-10-CM

## 2023-12-07 ENCOUNTER — OFFICE VISIT (OUTPATIENT)
Dept: PHYSICAL THERAPY | Facility: REHABILITATION | Age: 43
End: 2023-12-07
Payer: COMMERCIAL

## 2023-12-07 DIAGNOSIS — M22.2X1 PATELLOFEMORAL SYNDROME OF BOTH KNEES: ICD-10-CM

## 2023-12-07 DIAGNOSIS — M76.899 QUADRICEPS TENDONITIS: Primary | ICD-10-CM

## 2023-12-07 DIAGNOSIS — M22.2X2 PATELLOFEMORAL SYNDROME OF BOTH KNEES: ICD-10-CM

## 2023-12-07 PROCEDURE — 97110 THERAPEUTIC EXERCISES: CPT

## 2023-12-07 PROCEDURE — 97112 NEUROMUSCULAR REEDUCATION: CPT

## 2023-12-07 NOTE — PROGRESS NOTES
Daily Note     Today's date: 2023  Patient name: Kadi Medellin  : 1980  MRN: [id number]  Referring provider: Iva Person MD  Dx:   Encounter Diagnosis     ICD-10-CM    1. Quadriceps tendonitis  M76.899       2. Patellofemoral syndrome of both knees  M22.2X1     M22.2X2           Start Time: 4368  Stop Time: 3498  Total time in clinic (min): 50 minutes    Subjective: Pt reports she has been feeling good. States she has not been very static since her LV and been busy most of the week, so her knees have not been bothering her as much. Objective: See treatment diary below. Educated patient on performance of self STM with foam roller against wall or for fiance to perform at home in sidelying, to bilateral ITB. Assessment: Tolerated treatment well. Continued with program as outlined below. Moderate soft tissue restriction and TTP along bilateral ITB. Able to progress with increase reps during hip abd iso and knee extension. Is making steady progress with increased strength and improvement in overall function. Patient would benefit from continued PT. Plan: Continue per plan of care.       Precautions: none      Manuals 12/7   11/9 11/13 11/15 11/20 11/22 11/27 12/   Patellar mobility sup/inf       Prom  AFB      ITB STM AFB  bilat                                      Neuro Re-Ed 12/7     11/15 11/20 11/22 11/27 12/   SLS on foam              Stepping up to [address] [facility] Step up  5"x15 [employer] Step up  5"x15 15x ea sand dune       Stepping laterally to [employer]   FF/SS  2 min ea FF/SS  2 min ea 2min ea way 2min ea way 2min ea way 2min ea way 3 min ea way   Hip abd iso 5"  2x5 ea        5"x5 ea 5"x5 ea                             Ther Ex 12/7     11/15 11/20 11/22 11/27 12/4   Pt education on HEP, POC             VG for LE strength / endurance  L7 7'   L7 8' L7 8' L7 8' L7 8' L7 8' L7 8' L7 8'   SLR flexion 2# 3x8   2# 2x10 2# 2x10 2# 2x10 2# 3x8 2# 3x8 2# 3x8 2# 3x8   SLR abduction             Bridges c holds 2x10 RHB   YHB  2x10 YHB  2x10 2x10 YHB 2x10 YHB 2x10 RHB nv 2x10 RHB   Clamshells c TB              Side steps c TB    YHB  3x8 YHB  3x8 YHB 3x8       [place] walk out 15.0  1x6 ea dir      15.0  1x8 ea dir 15.0  1x5 ea dir 15.0  1x5 ea dir 15.0  1x6 ea dir   SL Knee Ext 20#  3x10 ea       20#  2x10 ea 20#  2x10 ea 20#  2x10 ea   Pball roll out     10"x5 15x5"                    Ther Activity                                       Gait Training                                       Modalities

## 2023-12-08 ENCOUNTER — TELEPHONE (OUTPATIENT)
Dept: OBGYN CLINIC | Facility: CLINIC | Age: 43
End: 2023-12-08

## 2023-12-08 DIAGNOSIS — N92.0 MENORRHAGIA WITH REGULAR CYCLE: Primary | ICD-10-CM

## 2023-12-08 NOTE — TELEPHONE ENCOUNTER
----- Message from [employer] sent at 12/8/2023  1:43 PM EST -----  Regarding: Tranexamic Acid  Contact: [phone]  Thank you so much for suggesting this medication. This is a game changer and wow did it help me! Amazing medicine! My period was much lighter and cramps were way more manageable to bear! If i am able to stay on this every month, i would like too! If so, do you call it in monthly or how will it work? Thanks so much again!  Adry Garcia

## 2023-12-10 RX ORDER — TRANEXAMIC ACID 650 MG/1
TABLET ORAL
Qty: 30 TABLET | Refills: 10 | Status: SHIPPED | OUTPATIENT
Start: 2023-12-10

## 2023-12-11 ENCOUNTER — OFFICE VISIT (OUTPATIENT)
Dept: PHYSICAL THERAPY | Facility: REHABILITATION | Age: 43
End: 2023-12-11
Payer: COMMERCIAL

## 2023-12-11 DIAGNOSIS — M22.2X1 PATELLOFEMORAL SYNDROME OF BOTH KNEES: ICD-10-CM

## 2023-12-11 DIAGNOSIS — M22.2X2 PATELLOFEMORAL SYNDROME OF BOTH KNEES: ICD-10-CM

## 2023-12-11 DIAGNOSIS — M76.899 QUADRICEPS TENDONITIS: Primary | ICD-10-CM

## 2023-12-11 PROCEDURE — 97112 NEUROMUSCULAR REEDUCATION: CPT

## 2023-12-11 PROCEDURE — 97110 THERAPEUTIC EXERCISES: CPT

## 2023-12-11 NOTE — PROGRESS NOTES
Daily Note     Today's date: 2023  Patient name: Rosas Renee  : 1980  MRN: [id number]  Referring provider: Laura Marie MD  Dx:   Encounter Diagnosis     ICD-10-CM    1. Quadriceps tendonitis  M76.899       2. Patellofemoral syndrome of both knees  M22.2X1     M22.2X2           Start Time:   Stop Time:   Total time in clinic (min): 46 minutes    Subjective: Pt reports both knees have been doing well. Had a professional massage done yesterday and she asked them to make sure they massaged both quads/ITB. Is able to squat a little deeper than before, but still gets some discomfort with deeper squats. Objective: See treatment diary below      Assessment: Tolerated treatment well. Continued with program as outlined below. Is making good progress with overall strength and performance of exercise with less pain. Able to increased reps with [place] walkout today. Patient would benefit from continued PT. Plan: Continue per plan of care.       Precautions: none      Manuals 12/7 12/11  11/9 11/13 11/15 11/20 11/22 11/27 12   Patellar mobility sup/inf       Prom  AFB      ITB STM AFB  bilat                                      Neuro Re-Ed 12/7 12/11    11/15 11/20 11/22 11/27 12   SLS on foam              Stepping up to [address] [facility] Step up  5"x15 [place] Step up  5"x15 15x ea sand dune       Stepping laterally to [employer] 3 min ea way  FF/SS  2 min ea FF/SS  2 min ea 2min ea way 2min ea way 2min ea way 2min ea way 3 min ea way   Hip abd iso 5"  2x5 ea 5"  2x5 ea       5"x5 ea 5"x5 ea                             Ther Ex 12/7 12/11    11/15 11/20 11/22 11/27 12   Pt education on HEP, POC             VG for LE strength / endurance  L7 7' L7 7'  L7 8' L7 8' L7 8' L7 8' L7 8' L7 8' L7 8'   SLR flexion 2# 3x8 2# 3x8  2# 2x10 2# 2x10 2# 2x10 2# 3x8 2# 3x8 2# 3x8 2# 3x8   SLR abduction             Bridges c holds 2x10 RHB 2x10 RHB  YHB  2x10 YHB  2x10 2x10 YHB 2x10 YHB 2x10 RHB nv 2x10 RHB   Clamshells c TB              Side steps c TB    YHB  3x8 YHB  3x8 YHB 3x8       [place] walk out 15.0  1x6 ea dir 15.0  1x8 ea dir     15.0  1x8 ea dir 15.0  1x5 ea dir 15.0  1x5 ea dir 15.0  1x6 ea dir   SL Knee Ext 20#  3x10 ea 20#  3x10 ea      20#  2x10 ea 20#  2x10 ea 20#  2x10 ea   Pball roll out     10"x5 15x5"                    Ther Activity                                       Gait Training                                       Modalities

## 2023-12-13 ENCOUNTER — OFFICE VISIT (OUTPATIENT)
Dept: PHYSICAL THERAPY | Facility: REHABILITATION | Age: 43
End: 2023-12-13
Payer: COMMERCIAL

## 2023-12-13 DIAGNOSIS — M22.2X2 PATELLOFEMORAL SYNDROME OF BOTH KNEES: ICD-10-CM

## 2023-12-13 DIAGNOSIS — M76.899 QUADRICEPS TENDONITIS: Primary | ICD-10-CM

## 2023-12-13 DIAGNOSIS — M22.2X1 PATELLOFEMORAL SYNDROME OF BOTH KNEES: ICD-10-CM

## 2023-12-13 PROCEDURE — 97112 NEUROMUSCULAR REEDUCATION: CPT

## 2023-12-13 PROCEDURE — 97110 THERAPEUTIC EXERCISES: CPT | Performed by: PHYSICAL THERAPIST

## 2023-12-13 NOTE — PROGRESS NOTES
Daily Note     Today's date: 2023  Patient name: Sonal Nation  : 1980  MRN: [id number]  Referring provider: Miguel Angel Hawkins MD  Dx:   Encounter Diagnosis     ICD-10-CM    1. Quadriceps tendonitis  M76.899       2. Patellofemoral syndrome of both knees  M22.2X1     M22.2X2           Start Time: 3550  Stop Time: 1815  Total time in clinic (min): 45 minutes    Subjective: Pt reports both knees have been doing well. States they are getting better. Objective: See treatment diary below      Assessment: Tolerated treatment well. Patient would benefit from continued PT. Plan: Continue per plan of care.       Precautions: none      Manuals 12/7 12/11  11/9 11/13 11/15 11/20 11/22 11/27 12   Patellar mobility sup/inf       Prom  AFB      ITB STM AFB  bilat                                      Neuro Re-Ed 12/7 12/11 12/13   11/15 11/20 11/22 11/27 12   SLS on foam              Stepping up to [address] [facility] Step up  5"x15 [place] Step up  5"x15 15x ea sand dune       Stepping laterally to [employer] 3 min ea way 3min ea way FF/SS  2 min ea FF/SS  2 min ea 2min ea way 2min ea way 2min ea way 2min ea way 3 min ea way   Hip abd iso 5"  2x5 ea 5"  2x5 ea 10x3" ea      5"x5 ea 5"x5 ea                             Ther Ex 12/7 12/11 12/13   11/15 11/20 11/22 11/27 12/   Pt education on HEP, POC             VG for LE strength / endurance  L7 7' L7 7' L7 7' L7 8' L7 8' L7 8' L7 8' L7 8' L7 8' L7 8'   SLR flexion 2# 3x8 2# 3x8 3# 2x10 2# 2x10 2# 2x10 2# 2x10 2# 3x8 2# 3x8 2# 3x8 2# 3x8   SLR abduction             Bridges c holds 2x10 RHB 2x10 RHB 2x10 RHB YHB  2x10 YHB  2x10 2x10 YHB 2x10 YHB 2x10 RHB nv 2x10 RHB   Clamshells c TB              Side steps c TB    YHB  3x8 YHB  3x8 YHB 3x8       [place] walk out 15.0  1x6 ea dir 15.0  1x8 ea dir 15lb 8x ea dir    15.0  1x8 ea dir 15.0  1x5 ea dir 15.0  1x5 ea dir 15.0  1x6 ea dir   SL Knee Ext 20#  3x10 ea 20#  3x10 ea 20#  2x10 ea 20#  2x10 ea 20#  2x10 ea   Pball roll out     10"x5 15x5"                    Ther Activity                                       Gait Training                                       Modalities

## 2023-12-15 DIAGNOSIS — N92.0 MENORRHAGIA WITH REGULAR CYCLE: ICD-10-CM

## 2023-12-15 RX ORDER — TRANEXAMIC ACID 650 MG/1
TABLET ORAL
Qty: 30 TABLET | Refills: 0 | Status: CANCELLED | OUTPATIENT
Start: 2023-12-15

## 2023-12-18 ENCOUNTER — HOSPITAL ENCOUNTER (OUTPATIENT)
Facility: HOSPITAL | Age: 43
Setting detail: OBSERVATION
Discharge: HOME | End: 2023-12-19
Attending: EMERGENCY MEDICINE | Admitting: INTERNAL MEDICINE
Payer: COMMERCIAL

## 2023-12-18 ENCOUNTER — APPOINTMENT (EMERGENCY)
Dept: RADIOLOGY | Facility: HOSPITAL | Age: 43
End: 2023-12-18
Payer: COMMERCIAL

## 2023-12-18 DIAGNOSIS — R06.82 TACHYPNEA: ICD-10-CM

## 2023-12-18 DIAGNOSIS — J45.901 ASTHMA EXACERBATION: Primary | ICD-10-CM

## 2023-12-18 PROBLEM — R91.1 PULMONARY NODULE: Status: ACTIVE | Noted: 2023-12-18

## 2023-12-18 PROBLEM — J45.41 MODERATE PERSISTENT ASTHMA WITH ACUTE EXACERBATION: Status: ACTIVE | Noted: 2023-12-18

## 2023-12-18 PROBLEM — J09.X2 INFECTION DUE TO NOVEL INFLUENZA A VIRUS: Status: ACTIVE | Noted: 2023-12-18

## 2023-12-18 LAB
ALBUMIN SERPL BCP-MCNC: 4 G/DL (ref 3.5–5)
ALP SERPL-CCNC: 54 U/L (ref 34–104)
ALT SERPL W P-5'-P-CCNC: 34 U/L (ref 7–52)
ANION GAP SERPL CALCULATED.3IONS-SCNC: 10 MMOL/L
AST SERPL W P-5'-P-CCNC: 34 U/L (ref 13–39)
ATRIAL RATE: 92 BPM
BASOPHILS # BLD AUTO: 0.02 THOUSANDS/ÂΜL (ref 0–0.1)
BASOPHILS NFR BLD AUTO: 0 % (ref 0–1)
BILIRUB SERPL-MCNC: 0.31 MG/DL (ref 0.2–1)
BUN SERPL-MCNC: 18 MG/DL (ref 5–25)
CALCIUM SERPL-MCNC: 8.6 MG/DL (ref 8.4–10.2)
CHLORIDE SERPL-SCNC: 104 MMOL/L (ref 96–108)
CO2 SERPL-SCNC: 22 MMOL/L (ref 21–32)
CREAT SERPL-MCNC: 0.88 MG/DL (ref 0.6–1.3)
EOSINOPHIL # BLD AUTO: 0.01 THOUSAND/ÂΜL (ref 0–0.61)
EOSINOPHIL NFR BLD AUTO: 0 % (ref 0–6)
ERYTHROCYTE [DISTWIDTH] IN BLOOD BY AUTOMATED COUNT: 13.3 % (ref 11.6–15.1)
FLUAV RNA RESP QL NAA+PROBE: POSITIVE
FLUBV RNA RESP QL NAA+PROBE: NEGATIVE
GFR SERPL CREATININE-BSD FRML MDRD: 81 ML/MIN/1.73SQ M
GLUCOSE SERPL-MCNC: 99 MG/DL (ref 65–140)
HCT VFR BLD AUTO: 39.6 % (ref 34.8–46.1)
HGB BLD-MCNC: 13.8 G/DL (ref 11.5–15.4)
IMM GRANULOCYTES # BLD AUTO: 0.03 THOUSAND/UL (ref 0–0.2)
IMM GRANULOCYTES NFR BLD AUTO: 0 % (ref 0–2)
LYMPHOCYTES # BLD AUTO: 0.62 THOUSANDS/ÂΜL (ref 0.6–4.47)
LYMPHOCYTES NFR BLD AUTO: 9 % (ref 14–44)
MCH RBC QN AUTO: 29.1 PG (ref 26.8–34.3)
MCHC RBC AUTO-ENTMCNC: 34.8 G/DL (ref 31.4–37.4)
MCV RBC AUTO: 84 FL (ref 82–98)
MONOCYTES # BLD AUTO: 0.35 THOUSAND/ÂΜL (ref 0.17–1.22)
MONOCYTES NFR BLD AUTO: 5 % (ref 4–12)
NEUTROPHILS # BLD AUTO: 6.08 THOUSANDS/ÂΜL (ref 1.85–7.62)
NEUTS SEG NFR BLD AUTO: 86 % (ref 43–75)
NRBC BLD AUTO-RTO: 0 /100 WBCS
P AXIS: 66 DEGREES
PLATELET # BLD AUTO: 201 THOUSANDS/UL (ref 149–390)
PMV BLD AUTO: 8.7 FL (ref 8.9–12.7)
POTASSIUM SERPL-SCNC: 3.6 MMOL/L (ref 3.5–5.3)
PR INTERVAL: 150 MS
PROT SERPL-MCNC: 7 G/DL (ref 6.4–8.4)
QRS AXIS: -24 DEGREES
QRSD INTERVAL: 90 MS
QT INTERVAL: 474 MS
QTC INTERVAL: 586 MS
RBC # BLD AUTO: 4.74 MILLION/UL (ref 3.81–5.12)
RSV RNA RESP QL NAA+PROBE: NEGATIVE
SARS-COV-2 RNA RESP QL NAA+PROBE: NEGATIVE
SODIUM SERPL-SCNC: 136 MMOL/L (ref 135–147)
T WAVE AXIS: 46 DEGREES
VENTRICULAR RATE: 92 BPM
WBC # BLD AUTO: 7.11 THOUSAND/UL (ref 4.31–10.16)

## 2023-12-18 PROCEDURE — 0241U HB NFCT DS VIR RESP RNA 4 TRGT: CPT

## 2023-12-18 PROCEDURE — 99291 CRITICAL CARE FIRST HOUR: CPT | Performed by: EMERGENCY MEDICINE

## 2023-12-18 PROCEDURE — 71046 X-RAY EXAM CHEST 2 VIEWS: CPT

## 2023-12-18 PROCEDURE — 94760 N-INVAS EAR/PLS OXIMETRY 1: CPT

## 2023-12-18 PROCEDURE — 93005 ELECTROCARDIOGRAM TRACING: CPT

## 2023-12-18 PROCEDURE — 99285 EMERGENCY DEPT VISIT HI MDM: CPT

## 2023-12-18 PROCEDURE — 36415 COLL VENOUS BLD VENIPUNCTURE: CPT

## 2023-12-18 PROCEDURE — 96372 THER/PROPH/DIAG INJ SC/IM: CPT

## 2023-12-18 PROCEDURE — 94640 AIRWAY INHALATION TREATMENT: CPT

## 2023-12-18 PROCEDURE — 80053 COMPREHEN METABOLIC PANEL: CPT

## 2023-12-18 PROCEDURE — 85025 COMPLETE CBC W/AUTO DIFF WBC: CPT

## 2023-12-18 PROCEDURE — 94644 CONT INHLJ TX 1ST HOUR: CPT

## 2023-12-18 RX ORDER — SODIUM CHLORIDE FOR INHALATION 0.9 %
12 VIAL, NEBULIZER (ML) INHALATION ONCE
Status: COMPLETED | OUTPATIENT
Start: 2023-12-18 | End: 2023-12-18

## 2023-12-18 RX ORDER — BENZONATATE 100 MG/1
200 CAPSULE ORAL 3 TIMES DAILY
Status: DISCONTINUED | OUTPATIENT
Start: 2023-12-18 | End: 2023-12-19 | Stop reason: HOSPADM

## 2023-12-18 RX ORDER — LOSARTAN POTASSIUM 50 MG/1
50 TABLET ORAL DAILY
Status: DISCONTINUED | OUTPATIENT
Start: 2023-12-18 | End: 2023-12-19 | Stop reason: HOSPADM

## 2023-12-18 RX ORDER — ONDANSETRON 4 MG/1
4 TABLET, ORALLY DISINTEGRATING ORAL ONCE
Status: COMPLETED | OUTPATIENT
Start: 2023-12-18 | End: 2023-12-18

## 2023-12-18 RX ORDER — OSELTAMIVIR PHOSPHATE 75 MG/1
75 CAPSULE ORAL EVERY 12 HOURS SCHEDULED
Status: DISCONTINUED | OUTPATIENT
Start: 2023-12-18 | End: 2023-12-19 | Stop reason: HOSPADM

## 2023-12-18 RX ORDER — ENOXAPARIN SODIUM 100 MG/ML
40 INJECTION SUBCUTANEOUS 2 TIMES DAILY
Status: DISCONTINUED | OUTPATIENT
Start: 2023-12-18 | End: 2023-12-19 | Stop reason: HOSPADM

## 2023-12-18 RX ORDER — FENOFIBRATE 145 MG/1
145 TABLET, COATED ORAL DAILY
Status: DISCONTINUED | OUTPATIENT
Start: 2023-12-18 | End: 2023-12-19 | Stop reason: HOSPADM

## 2023-12-18 RX ORDER — BENZONATATE 100 MG/1
200 CAPSULE ORAL 3 TIMES DAILY PRN
Status: DISCONTINUED | OUTPATIENT
Start: 2023-12-18 | End: 2023-12-18

## 2023-12-18 RX ORDER — NICOTINE 21 MG/24HR
1 PATCH, TRANSDERMAL 24 HOURS TRANSDERMAL DAILY
Status: DISCONTINUED | OUTPATIENT
Start: 2023-12-18 | End: 2023-12-19 | Stop reason: HOSPADM

## 2023-12-18 RX ORDER — DULOXETIN HYDROCHLORIDE 60 MG/1
60 CAPSULE, DELAYED RELEASE ORAL DAILY
Status: DISCONTINUED | OUTPATIENT
Start: 2023-12-18 | End: 2023-12-19 | Stop reason: HOSPADM

## 2023-12-18 RX ORDER — LEVALBUTEROL INHALATION SOLUTION 1.25 MG/3ML
1.25 SOLUTION RESPIRATORY (INHALATION) EVERY 6 HOURS
Status: DISCONTINUED | OUTPATIENT
Start: 2023-12-18 | End: 2023-12-18

## 2023-12-18 RX ORDER — PREDNISONE 20 MG/1
40 TABLET ORAL DAILY
Status: DISCONTINUED | OUTPATIENT
Start: 2023-12-18 | End: 2023-12-19 | Stop reason: HOSPADM

## 2023-12-18 RX ORDER — LEVALBUTEROL INHALATION SOLUTION 0.63 MG/3ML
0.63 SOLUTION RESPIRATORY (INHALATION) EVERY 6 HOURS
Status: DISCONTINUED | OUTPATIENT
Start: 2023-12-18 | End: 2023-12-18

## 2023-12-18 RX ORDER — GUAIFENESIN 600 MG/1
1200 TABLET, EXTENDED RELEASE ORAL EVERY 12 HOURS SCHEDULED
Status: DISCONTINUED | OUTPATIENT
Start: 2023-12-18 | End: 2023-12-19 | Stop reason: HOSPADM

## 2023-12-18 RX ORDER — ATORVASTATIN CALCIUM 40 MG/1
40 TABLET, FILM COATED ORAL
Status: DISCONTINUED | OUTPATIENT
Start: 2023-12-18 | End: 2023-12-19 | Stop reason: HOSPADM

## 2023-12-18 RX ORDER — KETOROLAC TROMETHAMINE 30 MG/ML
15 INJECTION, SOLUTION INTRAMUSCULAR; INTRAVENOUS ONCE
Status: COMPLETED | OUTPATIENT
Start: 2023-12-18 | End: 2023-12-18

## 2023-12-18 RX ORDER — HYDROCHLOROTHIAZIDE 12.5 MG/1
12.5 TABLET ORAL DAILY
Status: DISCONTINUED | OUTPATIENT
Start: 2023-12-18 | End: 2023-12-19 | Stop reason: HOSPADM

## 2023-12-18 RX ORDER — LEVALBUTEROL INHALATION SOLUTION 1.25 MG/3ML
1.25 SOLUTION RESPIRATORY (INHALATION) EVERY 6 HOURS
Status: DISCONTINUED | OUTPATIENT
Start: 2023-12-18 | End: 2023-12-19 | Stop reason: HOSPADM

## 2023-12-18 RX ORDER — ALPRAZOLAM 0.25 MG/1
0.25 TABLET ORAL
Status: DISCONTINUED | OUTPATIENT
Start: 2023-12-18 | End: 2023-12-19 | Stop reason: HOSPADM

## 2023-12-18 RX ADMIN — Medication 12 ML: at 12:34

## 2023-12-18 RX ADMIN — LEVALBUTEROL HYDROCHLORIDE 0.63 MG: 0.63 SOLUTION RESPIRATORY (INHALATION) at 15:26

## 2023-12-18 RX ADMIN — LEVALBUTEROL HYDROCHLORIDE 1.25 MG: 1.25 SOLUTION RESPIRATORY (INHALATION) at 20:09

## 2023-12-18 RX ADMIN — KETOROLAC TROMETHAMINE 15 MG: 30 INJECTION, SOLUTION INTRAMUSCULAR at 12:38

## 2023-12-18 RX ADMIN — OSELTAMIVIR PHOSPHATE 75 MG: 75 CAPSULE ORAL at 15:32

## 2023-12-18 RX ADMIN — DEXAMETHASONE 10 MG: 4 TABLET ORAL at 12:33

## 2023-12-18 RX ADMIN — GUAIFENESIN 1200 MG: 600 TABLET, EXTENDED RELEASE ORAL at 15:32

## 2023-12-18 RX ADMIN — ONDANSETRON 4 MG: 4 TABLET, ORALLY DISINTEGRATING ORAL at 12:33

## 2023-12-18 RX ADMIN — ALBUTEROL SULFATE 10 MG: 2.5 SOLUTION RESPIRATORY (INHALATION) at 12:34

## 2023-12-18 RX ADMIN — PREDNISONE 40 MG: 20 TABLET ORAL at 15:32

## 2023-12-18 RX ADMIN — HYDROCHLOROTHIAZIDE 12.5 MG: 12.5 TABLET ORAL at 15:32

## 2023-12-18 RX ADMIN — FENOFIBRATE 145 MG: 145 TABLET, FILM COATED ORAL at 15:32

## 2023-12-18 RX ADMIN — ATORVASTATIN CALCIUM 40 MG: 40 TABLET, FILM COATED ORAL at 15:32

## 2023-12-18 RX ADMIN — IPRATROPIUM BROMIDE 0.5 MG: 0.5 SOLUTION RESPIRATORY (INHALATION) at 15:26

## 2023-12-18 RX ADMIN — IPRATROPIUM BROMIDE 1 MG: 0.5 SOLUTION RESPIRATORY (INHALATION) at 12:34

## 2023-12-18 RX ADMIN — BENZONATATE 200 MG: 100 CAPSULE ORAL at 21:45

## 2023-12-18 RX ADMIN — IPRATROPIUM BROMIDE 0.5 MG: 0.5 SOLUTION RESPIRATORY (INHALATION) at 20:09

## 2023-12-18 RX ADMIN — GUAIFENESIN 1200 MG: 600 TABLET, EXTENDED RELEASE ORAL at 21:45

## 2023-12-18 RX ADMIN — BENZONATATE 200 MG: 100 CAPSULE ORAL at 15:32

## 2023-12-18 RX ADMIN — ENOXAPARIN SODIUM 40 MG: 40 INJECTION SUBCUTANEOUS at 17:43

## 2023-12-18 RX ADMIN — OSELTAMIVIR PHOSPHATE 75 MG: 75 CAPSULE ORAL at 21:45

## 2023-12-18 RX ADMIN — LOSARTAN POTASSIUM 50 MG: 50 TABLET, FILM COATED ORAL at 15:32

## 2023-12-18 NOTE — ED ATTENDING ATTESTATION
12/18/2023  I, Shayy Waldron DO, saw and evaluated the patient. I have discussed the patient with the resident/non-physician practitioner and agree with the resident's/non-physician practitioner's findings, Plan of Care, and MDM as documented in the resident's/non-physician practitioner's note, except where noted. All available labs and Radiology studies were reviewed.  I was present for key portions of any procedure(s) performed by the resident/non-physician practitioner and I was immediately available to provide assistance.       At this point I agree with the current assessment done in the Emergency Department.  I have conducted an independent evaluation of this patient a history and physical is as follows:    43-year-old female presents with shortness of breath and chest tightness since yesterday.  Patient is also had congestion, cough, myalgias.  Has history of exercise-induced asthma but does not use any medication at home at this time.  Denies sick contacts.  On exam-no acute distress, does have increased work of breathing and is dyspneic with conversation, heart mildly tachycardic, lungs decreased with wheezes.  Plan-suspect infectious etiology as trigger of asthma/reactive airway disease.  Will give hour-long neb, steroids, magnesium.  Check flu/COVID/RSV.  Chest x-ray.  Reevaluation.  Patient may require admission if unable to control symptoms.    ED Course         Critical Care Time  CriticalCare Time    Date/Time: 12/18/2023 3:52 PM    Performed by: Shayy Waldron DO  Authorized by: Shayy Waldron DO    Critical care provider statement:     Critical care time (minutes):  31    Critical care time was exclusive of:  Separately billable procedures and treating other patients and teaching time    Critical care was necessary to treat or prevent imminent or life-threatening deterioration of the following conditions:  Respiratory failure    Critical care was time spent personally by me on  the following activities:  Obtaining history from patient or surrogate, evaluation of patient's response to treatment, examination of patient, re-evaluation of patient's condition and ordering and review of laboratory studies    I assumed direction of critical care for this patient from another provider in my specialty: no

## 2023-12-18 NOTE — ASSESSMENT & PLAN NOTE
Patient has history of hypertension  Patient is on home hydrochlorothiazide 12.5 mg and valsartan 80 mg  Plan  Continue losartan 50 mg/hydrochlorothiazide 12.5 mg based on hospital availability

## 2023-12-18 NOTE — ED PROVIDER NOTES
History  Chief Complaint   Patient presents with    Shortness of Breath     Started yesterday with SOB and chest tightness. C/o body aches, fever (101, took tylenol 1 hour PTA)     42 yo F PMHx of hypertension, hyperlipidemia exercise-induced asthma presents to ED complaining of cough, congestion and shortness of breath since yesterday.  Patient tells me that he sometimes has episodes of wheezing when she gets sick with viral URI but otherwise does not have a formal diagnosis of asthma or COPD.  She has no maintenance medication for asthma or wheezing at home.  Yesterday, she developed myalgias, cough and quickly developed shortness of breath.  She endorses associated chest tightness but no pain.  She endorses fatigue and myalgia.  She is in active smoker with 1 pack/day history since age 19.  Patient has been taking Tylenol and ibuprofen for pain every 4-6 hours.  She takes no other medications on a daily basis.        Prior to Admission Medications   Prescriptions Last Dose Informant Patient Reported? Taking?   ALPRAZolam (XANAX) 0.25 mg tablet   No No   Sig: Take 1 tablet (0.25 mg total) by mouth daily at bedtime as needed for anxiety   DULoxetine (CYMBALTA) 60 mg delayed release capsule   No No   Sig: Take 1 capsule (60 mg total) by mouth daily   PREVIDENT 5000 SENSITIVE 1.1-5 % PSTE  Self Yes No   SODIUM FLUORIDE, DENTAL GEL, 1.1 % GEL  Self Yes No   Sig: SF 5000 Plus 1.1 % dental cream   Tranexamic Acid 650 MG TABS   No No   Sig: Take 2 tablets by mouth TID x 5 days each month   cholecalciferol (VITAMIN D3) 1,000 units tablet  Self Yes No   Sig: Take by mouth daily     fenofibrate 160 MG tablet   No No   Sig: Take 1 tablet (160 mg total) by mouth daily   fluocinolone (SYNALAR) 0.01 % external solution   No No   Sig: Apply sparingly to affected areas of scalp 2-4 times a day for up to 2 weeks.   ketoconazole (NIZORAL) 2 % shampoo   No No   Sig: Shampoo twice a week for 8 weeks then as needed.  Lather into scalp  and skin on face, neck, chest, and back; leave on for 5 minutes and then rinse off completely.   rosuvastatin (CRESTOR) 20 MG tablet   No No   Sig: Take 1 tablet (20 mg total) by mouth daily   valsartan-hydrochlorothiazide (DIOVAN-HCT) 80-12.5 MG per tablet   No No   Sig: Take 1 tablet by mouth daily      Facility-Administered Medications: None       Past Medical History:   Diagnosis Date    Anxiety     Asthma     Depression     History of Clostridium difficile colitis 2018    x 2 episodes, after antibiotics    Hypercholesterolemia     Hyperlipemia     Hypertension 2021    Morbid obesity (HCC)     Obesity     Pulmonary nodule     Sleep apnea     c pap       Past Surgical History:   Procedure Laterality Date    IR ASPIRATION ONLY  2023    NH CONIZATION CERVIX W/WO D&C RPR ELTRD EXC N/A 2018    Procedure: BIOPSY LEEP CERVIX;  Surgeon: Radha Bush DO;  Location: [facility];  Service: Gynecology    REDUCTION MAMMAPLASTY      TONSILLECTOMY         Family History   Problem Relation Age of Onset    Asthma Mother     Obesity Mother     Lung cancer Father 48    Hyperlipidemia Father     Bone cancer Father         mets from lung cancer    Cancer Father         Lung/bone ca-     No Known Problems Sister     No Known Problems Brother     Arthritis Maternal Grandmother     COPD Maternal Grandmother     Cancer Maternal Grandfather         Lung ca-    Lung cancer Maternal Grandfather 80    Liver cancer Paternal Grandfather 80    Cancer Paternal Grandfather         Liver ca-    No Known Problems Paternal Grandmother     No Known Problems Maternal Aunt     No Known Problems Maternal Aunt     No Known Problems Maternal Aunt     No Known Problems Paternal Aunt      I have reviewed and agree with the history as documented.    E-Cigarette/Vaping    E-Cigarette Use Never User      E-Cigarette/Vaping Substances    Nicotine No     THC No     CBD No     Flavoring No     Other No     Unknown  No      Social History     Tobacco Use    Smoking status: Every Day     Current packs/day: 0.50     Average packs/day: 0.5 packs/day for 18.0 years (9.0 ttl pk-yrs)     Types: Cigarettes     Passive exposure: Past    Smokeless tobacco: Never    Tobacco comments:     is on the patch    Vaping Use    Vaping status: Never Used   Substance Use Topics    Alcohol use: Yes     Comment: occasional drink here and there    Drug use: No        Review of Systems   Constitutional:  Negative for chills and fever.   HENT:  Negative for ear pain and sore throat.    Eyes:  Negative for pain and visual disturbance.   Respiratory:  Positive for cough, chest tightness, shortness of breath and wheezing.    Cardiovascular:  Negative for chest pain and palpitations.   Gastrointestinal:  Negative for abdominal pain and vomiting.   Genitourinary:  Negative for dysuria and hematuria.   Musculoskeletal:  Negative for arthralgias and back pain.   Skin:  Negative for color change and rash.   Neurological:  Negative for seizures and syncope.   All other systems reviewed and are negative.      Physical Exam  ED Triage Vitals   Temperature Pulse Respirations Blood Pressure SpO2   12/18/23 1139 12/18/23 1138 12/18/23 1141 12/18/23 1138 12/18/23 1139   99.7 °F (37.6 °C) 100 22 131/89 95 %      Temp Source Heart Rate Source Patient Position - Orthostatic VS BP Location FiO2 (%)   12/18/23 1139 12/18/23 1138 12/18/23 1138 12/18/23 1138 --   Oral Monitor Lying Left arm       Pain Score       --                    Orthostatic Vital Signs  Vitals:    12/18/23 1138 12/18/23 1300 12/18/23 1500   BP: 131/89 112/55 99/58   Pulse: 100 96 98   Patient Position - Orthostatic VS: Lying Sitting        Physical Exam  Vitals and nursing note reviewed.   Constitutional:       General: She is not in acute distress.     Appearance: She is well-developed.   HENT:      Head: Normocephalic and atraumatic.   Eyes:      Conjunctiva/sclera: Conjunctivae normal.    Cardiovascular:      Rate and Rhythm: Normal rate and regular rhythm.      Heart sounds: No murmur heard.  Pulmonary:      Effort: Tachypnea and respiratory distress present.      Breath sounds: Decreased air movement present. Examination of the right-upper field reveals wheezing. Examination of the left-upper field reveals wheezing. Wheezing present.   Abdominal:      Palpations: Abdomen is soft.      Tenderness: There is no abdominal tenderness.   Musculoskeletal:         General: No swelling.      Cervical back: Neck supple.   Skin:     General: Skin is warm and dry.      Capillary Refill: Capillary refill takes less than 2 seconds.   Neurological:      Mental Status: She is alert.   Psychiatric:         Mood and Affect: Mood normal.         ED Medications  Medications   ALPRAZolam (XANAX) tablet 0.25 mg (has no administration in time range)   DULoxetine (CYMBALTA) delayed release capsule 60 mg (60 mg Oral Not Given 12/18/23 1530)   fenofibrate (TRICOR) tablet 145 mg (145 mg Oral Given 12/18/23 1532)   atorvastatin (LIPITOR) tablet 40 mg (40 mg Oral Given 12/18/23 1532)   losartan (COZAAR) tablet 50 mg (50 mg Oral Given 12/18/23 1532)     And   hydrochlorothiazide (HYDRODIURIL) tablet 12.5 mg (12.5 mg Oral Given 12/18/23 1532)   nicotine (NICODERM CQ) 21 mg/24 hr TD 24 hr patch 1 patch (1 patch Transdermal Not Given 12/18/23 1532)   enoxaparin (LOVENOX) subcutaneous injection 40 mg (has no administration in time range)   predniSONE tablet 40 mg (40 mg Oral Given 12/18/23 1532)   ipratropium (ATROVENT) 0.02 % inhalation solution 0.5 mg (0.5 mg Nebulization Given 12/18/23 1526)   oseltamivir (TAMIFLU) capsule 75 mg (75 mg Oral Given 12/18/23 1532)   guaiFENesin (MUCINEX) 12 hr tablet 1,200 mg (1,200 mg Oral Given 12/18/23 1532)   benzonatate (TESSALON PERLES) capsule 200 mg (200 mg Oral Given 12/18/23 1532)   levalbuterol (XOPENEX) inhalation solution 1.25 mg (has no administration in time range)   albuterol  inhalation solution 10 mg (10 mg Nebulization Given 12/18/23 1234)   ipratropium (ATROVENT) 0.02 % inhalation solution 1 mg (1 mg Nebulization Given 12/18/23 1234)   sodium chloride 0.9 % inhalation solution 12 mL (12 mL Nebulization Given 12/18/23 1234)   dexamethasone (DECADRON) tablet 10 mg (10 mg Oral Given 12/18/23 1233)   ondansetron (ZOFRAN-ODT) dispersible tablet 4 mg (4 mg Oral Given 12/18/23 1233)   ketorolac (TORADOL) injection 15 mg (15 mg Intramuscular Given 12/18/23 1238)       Diagnostic Studies  Results Reviewed       Procedure Component Value Units Date/Time    Comprehensive metabolic panel [[id number]] Collected: 12/18/23 1525    Lab Status: Final result Specimen: Blood from Arm, Left Updated: 12/18/23 1555     Sodium 136 mmol/L      Potassium 3.6 mmol/L      Chloride 104 mmol/L      CO2 22 mmol/L      ANION GAP 10 mmol/L      BUN 18 mg/dL      Creatinine 0.88 mg/dL      Glucose 99 mg/dL      Calcium 8.6 mg/dL      AST 34 U/L      ALT 34 U/L      Alkaline Phosphatase 54 U/L      Total Protein 7.0 g/dL      Albumin 4.0 g/dL      Total Bilirubin 0.31 mg/dL      eGFR 81 ml/min/1.73sq m     Narrative:      National Kidney Disease Foundation guidelines for Chronic Kidney Disease (CKD):     Stage 1 with normal or high GFR (GFR > 90 mL/min/1.73 square meters)    Stage 2 Mild CKD (GFR = 60-89 mL/min/1.73 square meters)    Stage 3A Moderate CKD (GFR = 45-59 mL/min/1.73 square meters)    Stage 3B Moderate CKD (GFR = 30-44 mL/min/1.73 square meters)    Stage 4 Severe CKD (GFR = 15-29 mL/min/1.73 square meters)    Stage 5 End Stage CKD (GFR <15 mL/min/1.73 square meters)  Note: GFR calculation is accurate only with a steady state creatinine    CBC and differential [[id number]]  (Abnormal) Collected: 12/18/23 1525    Lab Status: Final result Specimen: Blood from Arm, Left Updated: 12/18/23 1534     WBC 7.11 Thousand/uL      RBC 4.74 Million/uL      Hemoglobin 13.8 g/dL      Hematocrit 39.6 %      MCV 84 fL       MCH 29.1 pg      MCHC 34.8 g/dL      RDW 13.3 %      MPV 8.7 fL      Platelets 201 Thousands/uL      nRBC 0 /100 WBCs      Neutrophils Relative 86 %      Immat GRANS % 0 %      Lymphocytes Relative 9 %      Monocytes Relative 5 %      Eosinophils Relative 0 %      Basophils Relative 0 %      Neutrophils Absolute 6.08 Thousands/µL      Immature Grans Absolute 0.03 Thousand/uL      Lymphocytes Absolute 0.62 Thousands/µL      Monocytes Absolute 0.35 Thousand/µL      Eosinophils Absolute 0.01 Thousand/µL      Basophils Absolute 0.02 Thousands/µL     FLU/RSV/COVID - if FLU/RSV clinically relevant [[id number]]  (Abnormal) Collected: 12/18/23 1238    Lab Status: Final result Specimen: Nares from Nose Updated: 12/18/23 1344     SARS-CoV-2 Negative     INFLUENZA A PCR Positive     INFLUENZA B PCR Negative     RSV PCR Negative    Narrative:      FOR PEDIATRIC PATIENTS - copy/paste COVID Guidelines URL to browser: [email or web address]    SARS-CoV-2 assay is a Nucleic Acid Amplification assay intended for the  qualitative detection of nucleic acid from SARS-CoV-2 in nasopharyngeal  swabs. Results are for the presumptive identification of SARS-CoV-2 RNA.    Positive results are indicative of infection with SARS-CoV-2, the virus  causing COVID-19, but do not rule out bacterial infection or co-infection  with other viruses. Laboratories within the [place] and its  territories are required to report all positive results to the appropriate  public health authorities. Negative results do not preclude SARS-CoV-2  infection and should not be used as the sole basis for treatment or other  patient management decisions. Negative results must be combined with  clinical observations, patient history, and epidemiological information.  This test has not been FDA cleared or approved.    This test has been authorized by FDA under an Emergency Use Authorization  (EUA). This test is only  authorized for the duration of time the  declaration that circumstances exist justifying the authorization of the  emergency use of an in vitro diagnostic tests for detection of SARS-CoV-2  virus and/or diagnosis of COVID-19 infection under section 564(b)(1) of  the Act, 21 U.S.C. 360bbb-3(b)(1), unless the authorization is terminated  or revoked sooner. The test has been validated but independent review by FDA  and CLIA is pending.    Test performed using [employer] GeneXpert: This RT-PCR assay targets N2,  a region unique to SARS-CoV-2. A conserved region in the E-gene was chosen  for pan-Sarbecovirus detection which includes SARS-CoV-2.    According to CMS-2020-01-R, this platform meets the definition of high-throughput technology.                   XR chest 2 views   ED Interpretation by Ashley Duncan MD (12/18 1301)   No acute cardiopulmonary abnormality       Final Result by Ismael Dalton MD (12/18 1432)   Minimal platelike atelectasis in the lingula.   No other acute cardiopulmonary disease.                  Workstation performed: [id number]               Procedures  Procedures      ED Course  ED Course as of 12/18/23 1659   Mon Dec 18, 2023   1400 Contacted SOD for admission.                              SBIRT 22yo+      Flowsheet Row Most Recent Value   Initial Alcohol Screen: US AUDIT-C     1. How often do you have a drink containing alcohol? 0 Filed at: 12/18/2023 1138   2. How many drinks containing alcohol do you have on a typical day you are drinking?  0 Filed at: 12/18/2023 1138   3b. FEMALE Any Age, or MALE 65+: How often do you have 4 or more drinks on one occassion? 0 Filed at: 12/18/2023 1138   Audit-C Score 0 Filed at: 12/18/2023 1138   JUVE: How many times in the past year have you...    Used an illegal drug or used a prescription medication for non-medical reasons? Never Filed at: 12/18/2023 1138                  Medical Decision Making  42 yo F PMHx of hypertension, hyperlipidemia  exercise-induced asthma presents to ED complaining of cough, congestion and shortness of breath since yesterday.    Ddx: Asthma or COPD exacerbation, viral URI,.    Patient tachypneic and conversationally dyspneic.  Poor aeration with wheezing throughout.  Will treat for asthma exacerbation with heart neb, steroids and reevaluate for improvement in wheezing and aeration.  Will also plan to swab for COVID flu RSV.    Following reevaluation, patient mildly improved however continues to be tachypneic and continues with wheezing.  Case discussed with inpatient medicine, patient admitted for continued management of likely asthma exacerbation in the setting of viral illness.    Amount and/or Complexity of Data Reviewed  Radiology: ordered and independent interpretation performed.    Risk  Prescription drug management.  Decision regarding hospitalization.          Disposition  Final diagnoses:   Asthma exacerbation   Tachypnea     Time reflects when diagnosis was documented in both MDM as applicable and the Disposition within this note       Time User Action Codes Description Comment    12/18/2023  2:26 PM Ashley Duncan [J45.901] Asthma exacerbation     12/18/2023  2:26 PM Ashley Duncan [R06.82] Tachypnea           ED Disposition       ED Disposition   Admit    Condition   Stable    Date/Time   Mon Dec 18, 2023 3805    Comment   Case was discussed with Dr. Xavier and the patient's admission status was agreed to be Admission Status: observation status to the service of Dr. Xavier.               Follow-up Information    None         Patient's Medications   Discharge Prescriptions    No medications on file     No discharge procedures on file.    PDMP Review         Value Time User    PDMP Reviewed  Yes 10/25/2023 11:18 AM Severo Hays MD             ED Provider  Attending physically available and evaluated Nely Starr. I managed the patient along with the ED Attending.    Electronically Signed by           Ashley  MD Perla  12/18/23 7655

## 2023-12-18 NOTE — ASSESSMENT & PLAN NOTE
Patient has history of bronchial asthma  Patient denies any history of intubation and last asthma exacerbation was when she was in high school  Patient does not have any inhalers at home  Patient pulmonary function test in 2019 showed moderate airway obstruction with improvement on bronchodilators  Patient presents with shortness of breath, wheezes since yesterday  Patient presented with fever and tachypnea  Patient was tested positive for flu in the ED  Patient was given Decadron 10 mg tablet and albuterol inhaler in the ED  Patient showed mild improvement with persistence of shortness of breath, tachypnea and wheezes    Plan  Albuterol prn    Prednisone 40 mg tablet daily, finish 5 day course  Tamiflu, finish 5 day course  Guaifenesin 1200 mg tablet for cough  Benzonatate 200 mg capsule 3 times daily for cough  Continue monitoring pulse oximetry

## 2023-12-18 NOTE — ASSESSMENT & PLAN NOTE
Patient has history of fibromyalgia  Patient is on home Cymbalta 60 mg tablet  Plan  Continue Cymbalta 60 mg tablet once daily

## 2023-12-18 NOTE — ASSESSMENT & PLAN NOTE
Patient has history of depression and anxiety  Patient is on home Cymbalta 60 mg once daily and Xanax 0.25 at bedtime as needed  Plan  Continue Cymbalta 60 mg once daily  Continue as needed Xanax 0.25 mg at bedtime

## 2023-12-18 NOTE — ASSESSMENT & PLAN NOTE
Patient was tested positive for influenza A virus  Patient was presented with shortness of breath, tachypnea, fever and wheezing chest  Patient has history of asthma  Plan  Tamiflu 75 mg twice daily for 5 days

## 2023-12-18 NOTE — ASSESSMENT & PLAN NOTE
Patient has history of hyperlipidemia  Patient on fenofibrate 160 mg tablet and Crestor 20 mg tablet once daily  Plan  Continue with atorvastatin 40 mg tablet  Continue fenofibrate 145 mg tablet based on hospital availability

## 2023-12-18 NOTE — H&P
INTERNAL MEDICINE RESIDENCY ADMISSION H&P     Name: Nely Starr   Age & Sex: 43 y.o. female   MRN: [id number]  Unit/Bed#: QCA   Encounter: [id number]  Primary Care Provider: Severo Hays MD    Code Status: Level 1 - Full Code  Admission Status: OBSERVATION  Disposition: Patient requires Med/Surg    Admit to team: SOD Team C     ASSESSMENT/PLAN     Principal Problem:    Acute asthma exacerbation  Active Problems:    Infection due to novel influenza A virus    Morbid obesity (HCC)    Depression with anxiety    Obstructive sleep apnea syndrome    Essential hypertension    Fibromyalgia    Tobacco dependence syndrome    Mixed hyperlipidemia    Pulmonary nodule      * Acute asthma exacerbation  Assessment & Plan  Patient has history of bronchial asthma  Patient denies any history of intubation and last asthma exacerbation was when she was in high school  Patient does not have any inhalers at home  Patient pulmonary function test in 2019 showed moderate airway obstruction with improvement on bronchodilators  Patient presents with shortness of breath, wheezes since yesterday  Patient presented with fever and tachypnea  Patient was tested positive for flu in the ED  Patient was given Decadron 10 mg tablet and albuterol inhaler in the ED  Patient showed mild improvement with persistence of shortness of breath, tachypnea and wheezes  Chest x-ray was done pending read  Plan  CBC was ordered  CMP was ordered  Albuterol inhalation solution every 6 hours  Ipratropium inhalation solution every 6 hours  Prednisone 40 mg tablet daily  Guaifenesin 1200 mg tablet for cough  Benzonatate 200 mg capsule 3 times daily for cough  Continue monitoring pulse oximetry    Infection due to novel influenza A virus  Assessment & Plan  Patient was tested positive for influenza A virus  Patient was presented with shortness of breath, tachypnea, fever and wheezing chest  Patient has history of asthma  Plan  Tamiflu 75 mg twice daily for 5  days    Pulmonary nodule  Assessment & Plan  Patient has history of pulm nodule  CT on 2014 of the chest showed Stable 3.5 mm left lower lobe noncalcified pulmonary nodule.  Plan  Recommended outpatient CT chest for follow-up    Mixed hyperlipidemia  Assessment & Plan  Patient has history of hyperlipidemia  Patient on fenofibrate 160 mg tablet and Crestor 20 mg tablet once daily  Plan  Continue with atorvastatin 40 mg tablet  Continue fenofibrate 145 mg tablet based on hospital availability    Tobacco dependence syndrome  Assessment & Plan  Patient has history of alcohol dependence  Patient smokes 1 pack of cigarettes per day since he was 19-year-old  Plan  NicoDerm was offered to the patient  Outpatient counseling about the importance of smoking cessation    Fibromyalgia  Assessment & Plan  Patient has history of fibromyalgia  Patient is on home Cymbalta 60 mg tablet  Plan  Continue Cymbalta 60 mg tablet once daily    Essential hypertension  Assessment & Plan  Patient has history of hypertension  Patient is on home hydrochlorothiazide 12.5 mg and valsartan 80 mg  Plan  Continue losartan 50 mg/hydrochlorothiazide 12.5 mg based on hospital availability    Obstructive sleep apnea syndrome  Assessment & Plan  Patient has history of obstructive sleep apnea on CPAP    Depression with anxiety  Assessment & Plan  Patient has history of depression and anxiety  Patient is on home Cymbalta 60 mg once daily and Xanax 0.25 at bedtime as needed  Plan  Continue Cymbalta 60 mg once daily  Continue as needed Xanax 0.25 mg at bedtime    Morbid obesity (HCC)  Assessment & Plan  Patient BMI is 44.56  Plan  Outpatient counseling about the importance of weight loss        VTE Pharmacologic Prophylaxis: Enoxaparin (Lovenox)  VTE Mechanical Prophylaxis: sequential compression device    CHIEF COMPLAINT     Chief Complaint   Patient presents with    Shortness of Breath     Started yesterday with SOB and chest tightness. C/o body aches,  fever (101, took tylenol 1 hour PTA)      HISTORY OF PRESENT ILLNESS     43-year-old female patient with past medical history of hypertension, hyperlipidemia, anxiety and depression, mild intermittent asthma, fibromyalgia who presents to the ED with shortness of breath.  Patient reported that shortness of breath, wheezes and fever started yesterday.  Patient initially reported that her asthma usually comes when she is sick or with exercise.  Patient was tested for flu, COVID, RSV and was found to be positive for flu virus.  Patient was treated by albuterol inhaler however she showed mild improvement.  Patient reported persistently shortness of breath.  Will admit to SOD for further observation and treatment.  CBC, CMP was ordered.  Chest x-ray was ordered and pending rate.  Will start patient on prednisone 50 mg with albuterol and ipratropium inhalers solution.  Will start on Tamiflu.  Patient reports smoking of 1 pack of cigarette per day since she was 19-year-old.  Patient denies any alcohol or drug use.  Patient lives with her fiancé at home.  Patient is level 1 full code  REVIEW OF SYSTEMS     Review of Systems   Constitutional:  Positive for fever. Negative for activity change, appetite change, chills, diaphoresis and fatigue.   HENT:  Negative for congestion, drooling, ear pain, facial swelling, mouth sores, nosebleeds, rhinorrhea, sinus pressure, sinus pain, sneezing, sore throat, tinnitus, trouble swallowing and voice change.    Eyes:  Negative for photophobia, pain, discharge and redness.   Respiratory:  Positive for cough, chest tightness, shortness of breath and wheezing. Negative for apnea, choking and stridor.    Cardiovascular:  Negative for chest pain, palpitations and leg swelling.   Gastrointestinal:  Negative for abdominal distention, abdominal pain, blood in stool, constipation, diarrhea and nausea.   Endocrine: Negative for heat intolerance, polydipsia, polyphagia and polyuria.   Genitourinary:   Negative for decreased urine volume, difficulty urinating, dysuria, enuresis, hematuria, menstrual problem and vaginal bleeding.   Musculoskeletal:  Negative for back pain, gait problem, joint swelling and myalgias.   Skin:  Negative for color change, pallor, rash and wound.   Neurological:  Negative for dizziness, tremors, seizures, facial asymmetry, light-headedness, numbness and headaches.   Psychiatric/Behavioral:  Negative for agitation, behavioral problems, confusion, decreased concentration, dysphoric mood, hallucinations, self-injury, sleep disturbance and suicidal ideas. The patient is not nervous/anxious and is not hyperactive.      OBJECTIVE     Vitals:    23 1138 23 1139 23 1141 23 1300   BP: 131/89   112/55   BP Location: Left arm   Left arm   Pulse: 100   96   Resp:   22 (!) 28   Temp:  99.7 °F (37.6 °C)     TempSrc:  Oral     SpO2:  95%  99%      Temperature:   Temp (24hrs), Av.7 °F (37.6 °C), Min:99.7 °F (37.6 °C), Max:99.7 °F (37.6 °C)    Temperature: 99.7 °F (37.6 °C)  Intake & Output:  I/O       None          Weights:        There is no height or weight on file to calculate BMI.  Weight (last 2 days)       None          Physical Exam  Constitutional:       General: She is in acute distress.      Appearance: She is obese. She is not ill-appearing, toxic-appearing or diaphoretic.   HENT:      Head: Normocephalic and atraumatic.      Nose: Nose normal. No congestion or rhinorrhea.      Mouth/Throat:      Mouth: Mucous membranes are moist.      Pharynx: No oropharyngeal exudate or posterior oropharyngeal erythema.   Eyes:      General: No scleral icterus.        Right eye: No discharge.         Left eye: No discharge.      Pupils: Pupils are equal, round, and reactive to light.   Neck:      Vascular: No carotid bruit.   Cardiovascular:      Rate and Rhythm: Normal rate and regular rhythm.      Pulses: Normal pulses.      Heart sounds: No murmur heard.     No friction rub.    Pulmonary:      Effort: Respiratory distress (Mild) present.      Breath sounds: No stridor. Wheezing present. No rhonchi or rales.      Comments: Decreased air entry  Chest:      Chest wall: No tenderness.   Abdominal:      General: Abdomen is flat. There is no distension.      Palpations: There is no mass.      Tenderness: There is no abdominal tenderness. There is no right CVA tenderness, left CVA tenderness, guarding or rebound.      Hernia: No hernia is present.   Musculoskeletal:         General: No swelling, tenderness, deformity or signs of injury. Normal range of motion.      Cervical back: Normal range of motion and neck supple. No rigidity or tenderness.      Right lower leg: No edema.      Left lower leg: No edema.   Lymphadenopathy:      Cervical: No cervical adenopathy.   Skin:     General: Skin is warm.      Coloration: Skin is not jaundiced or pale.      Findings: No bruising, erythema, lesion or rash.   Neurological:      General: No focal deficit present.      Mental Status: She is alert.      Cranial Nerves: No cranial nerve deficit.      Sensory: No sensory deficit.      Motor: No weakness.   Psychiatric:         Mood and Affect: Mood normal.         Behavior: Behavior normal.         Thought Content: Thought content normal.         Judgment: Judgment normal.       PAST MEDICAL HISTORY     Past Medical History:   Diagnosis Date    Anxiety     Asthma     Depression     History of Clostridium difficile colitis 2018    x 2 episodes, after antibiotics    Hypercholesterolemia     Hyperlipemia     Hypertension 1/2021    Morbid obesity (HCC)     Obesity     Pulmonary nodule     Sleep apnea     c pap     PAST SURGICAL HISTORY     Past Surgical History:   Procedure Laterality Date    IR ASPIRATION ONLY  1/9/2023    NH CONIZATION CERVIX W/WO D&C RPR ELTRD EXC N/A 9/27/2018    Procedure: BIOPSY LEEP CERVIX;  Surgeon: Radha Bush DO;  Location: [facility];  Service: Gynecology    REDUCTION  "MAMMAPLASTY  1999    TONSILLECTOMY       SOCIAL & FAMILY HISTORY     Social History     Substance and Sexual Activity   Alcohol Use Yes    Comment: occasional drink here and there       Social History     Substance and Sexual Activity   Drug Use No     Social History     Tobacco Use   Smoking Status Every Day    Current packs/day: 0.50    Average packs/day: 0.5 packs/day for 18.0 years (9.0 ttl pk-yrs)    Types: Cigarettes    Passive exposure: Past   Smokeless Tobacco Never   Tobacco Comments    is on the patch      Family History   Problem Relation Age of Onset    Asthma Mother     Obesity Mother     Lung cancer Father 48    Hyperlipidemia Father     Bone cancer Father         mets from lung cancer    Cancer Father         Lung/bone ca-     No Known Problems Sister     No Known Problems Brother     Arthritis Maternal Grandmother     COPD Maternal Grandmother     Cancer Maternal Grandfather         Lung ca-    Lung cancer Maternal Grandfather 80    Liver cancer Paternal Grandfather 80    Cancer Paternal Grandfather         Liver ca-    No Known Problems Paternal Grandmother     No Known Problems Maternal Aunt     No Known Problems Maternal Aunt     No Known Problems Maternal Aunt     No Known Problems Paternal Aunt      LABORATORY DATA     Labs: I have personally reviewed pertinent reports.             Invalid input(s): \"LABALBU\"                       Micro:  Lab Results   Component Value Date    BLOODCX No Growth After 5 Days. 01/10/2023    BLOODCX No Growth After 5 Days. 01/10/2023    BLOODCX No Growth After 5 Days. 2018    BLOODCX No Growth After 5 Days. 2018    WOUNDCULT 4+ Growth of 2019    WOUNDCULT 1+ Growth of Staphylococcus aureus (A) 2018    WOUNDCULT 1+ Growth of Staphylococcus aureus (A) 2018    WOUNDCULT 1+ Growth of 2018     IMAGING & DIAGNOSTIC TESTS     Imaging: I have personally reviewed pertinent reports.    XR chest 2 views    Result " Date: 12/18/2023  Impression: Minimal platelike atelectasis in the lingula. No other acute cardiopulmonary disease. Workstation performed: [id number]     EKG, Pathology, and Other Studies: I have personally reviewed pertinent reports.     ALLERGIES   No Known Allergies  MEDICATIONS PRIOR TO ARRIVAL     Prior to Admission medications    Medication Sig Start Date End Date Taking? Authorizing Provider   ALPRAZolam (XANAX) 0.25 mg tablet Take 1 tablet (0.25 mg total) by mouth daily at bedtime as needed for anxiety 10/26/23   Severo Hays MD   cholecalciferol (VITAMIN D3) 1,000 units tablet Take by mouth daily      Historical Provider, MD   DULoxetine (CYMBALTA) 60 mg delayed release capsule Take 1 capsule (60 mg total) by mouth daily 10/25/23   Severo Hays MD   fenofibrate 160 MG tablet Take 1 tablet (160 mg total) by mouth daily 10/25/23   Severo Hays MD   fluocinolone (SYNALAR) 0.01 % external solution Apply sparingly to affected areas of scalp 2-4 times a day for up to 2 weeks. 3/30/23   Chris Cooney MD   ketoconazole (NIZORAL) 2 % shampoo Shampoo twice a week for 8 weeks then as needed.  Lather into scalp and skin on face, neck, chest, and back; leave on for 5 minutes and then rinse off completely. 3/30/23   Chris Cooney MD   PREVIDENT 5000 SENSITIVE 1.1-5 % PSTE  9/12/19   Historical Provider, MD   rosuvastatin (CRESTOR) 20 MG tablet Take 1 tablet (20 mg total) by mouth daily 10/25/23   Severo Hays MD   SODIUM FLUORIDE, DENTAL GEL, 1.1 % GEL SF 5000 Plus 1.1 % dental cream    Historical Provider, MD   Tranexamic Acid 650 MG TABS Take 2 tablets by mouth TID x 5 days each month 12/10/23   Radha Bush DO   valsartan-hydrochlorothiazide (DIOVAN-HCT) 80-12.5 MG per tablet Take 1 tablet by mouth daily 10/25/23   Severo Hays MD   hydrocortisone 2.5 % cream Apply topically 2 (two) times a day for 14 days Apply 1-2x a day topically to eyebrows for 14 days 4/28/22 3/30/23  Sulema Grimaldo MD      MEDICATIONS ADMINISTERED IN LAST 24 HOURS     Medication Administration - last 24 hours from 12/17/2023 1512 to 12/18/2023 1512         Date/Time Order Dose Route Action Action by     12/18/2023 1234 EST albuterol inhalation solution 10 mg 10 mg Nebulization Given Alcira Bocanegra RN     12/18/2023 1234 EST ipratropium (ATROVENT) 0.02 % inhalation solution 1 mg 1 mg Nebulization Given Alcira Bocanegra RN     12/18/2023 1234 EST sodium chloride 0.9 % inhalation solution 12 mL 12 mL Nebulization Given Alcira Bocanegra RN     12/18/2023 1233 EST dexamethasone (DECADRON) tablet 10 mg 10 mg Oral Given Alcira Bocanegra RN     12/18/2023 1233 EST ondansetron (ZOFRAN-ODT) dispersible tablet 4 mg 4 mg Oral Given Alcira Bocanegra RN     12/18/2023 1238 EST ketorolac (TORADOL) injection 15 mg 15 mg Intramuscular Given Alcira Bocanegra RN          CURRENT MEDICATIONS     Current Facility-Administered Medications   Medication Dose Route Frequency Provider Last Rate    ALPRAZolam  0.25 mg Oral HS PRN David Hernandez,       atorvastatin  40 mg Oral Daily With Dinner David Hernandez,       benzonatate  200 mg Oral TID David Hernandez,       DULoxetine  60 mg Oral Daily David Hernandez,       enoxaparin  40 mg Subcutaneous BID David Hernandez,       fenofibrate  145 mg Oral Daily David Hernandez,       guaiFENesin  1,200 mg Oral Q12H [facility] David Hernandez,       losartan  50 mg Oral Daily David Hernandez DO      And    hydrochlorothiazide  12.5 mg Oral Daily David Hernandez DO      ipratropium  0.5 mg Nebulization Q6H David Hernandez, DO      levalbuterol  0.63 mg Nebulization Q6H David Hernandez DO      nicotine  1 patch Transdermal Daily David Hernandez DO      oseltamivir  75 mg Oral Q12H [facility] David Hernandez,       predniSONE  40 mg Oral Daily David Hernandez,           ALPRAZolam, 0.25 mg, HS PRN        Admission Time  I spent 45 minutes admitting the patient.  This involved direct patient contact where I performed a full history and  "physical, reviewing previous records, and reviewing laboratory and other diagnostic studies.    Portions of the record may have been created with voice recognition software.  Occasional wrong word or \"sound a like\" substitutions may have occurred due to the inherent limitations of voice recognition software.  Read the chart carefully and recognize, using context, where substitutions have occurred.    ==  David Hernandez,   [facility]  Internal Medicine Residency PGY-2   "

## 2023-12-18 NOTE — ASSESSMENT & PLAN NOTE
Patient has history of pulm nodule  CT on 2014 of the chest showed Stable 3.5 mm left lower lobe noncalcified pulmonary nodule.  Plan  Recommended outpatient CT chest for follow-up

## 2023-12-18 NOTE — RESPIRATORY THERAPY NOTE
RT Protocol Note  Nely Starr 43 y.o. female MRN: [id number]  Unit/Bed#: QCA Encounter: [id number]    Assessment    Principal Problem:    Acute asthma exacerbation  Active Problems:    Morbid obesity (HCC)    Depression with anxiety    Obstructive sleep apnea syndrome    Essential hypertension    Fibromyalgia    Tobacco dependence syndrome    Mixed hyperlipidemia    Infection due to novel influenza A virus    Pulmonary nodule      Home Pulmonary Medications:         Past Medical History:   Diagnosis Date    Anxiety     Asthma     Depression     History of Clostridium difficile colitis 2018    x 2 episodes, after antibiotics    Hypercholesterolemia     Hyperlipemia     Hypertension 1/2021    Morbid obesity (HCC)     Obesity     Pulmonary nodule     Sleep apnea     c pap     Social History     Socioeconomic History    Marital status: Single     Spouse name: None    Number of children: None    Years of education: None    Highest education level: None   Occupational History    Occupation: radiology, IR tech   Tobacco Use    Smoking status: Every Day     Current packs/day: 0.50     Average packs/day: 0.5 packs/day for 18.0 years (9.0 ttl pk-yrs)     Types: Cigarettes     Passive exposure: Past    Smokeless tobacco: Never    Tobacco comments:     is on the patch    Vaping Use    Vaping status: Never Used   Substance and Sexual Activity    Alcohol use: Yes     Comment: occasional drink here and there    Drug use: No    Sexual activity: Not Currently     Partners: Male     Birth control/protection: None   Other Topics Concern    None   Social History Narrative    None     Social Determinants of Health     Financial Resource Strain: Not on file   Food Insecurity: Not on file   Transportation Needs: Not on file   Physical Activity: Not on file   Stress: Not on file   Social Connections: Not on file   Intimate Partner Violence: Not on file   Housing Stability: Not on file       Subjective         Objective    Physical Exam:    Assessment Type: (P) Assess only  General Appearance: (P) Alert, Awake    Vitals:  Blood pressure 99/58, pulse 98, temperature 99.7 °F (37.6 °C), temperature source Oral, resp. rate (!) 27, last menstrual period 12/04/2023, SpO2 91%, not currently breastfeeding.          Imaging and other studies: I have personally reviewed pertinent reports.            Plan    Respiratory Plan: (P) Moderate/Severe Distress pathway        Resp Comments: (P) Pt assessed per Resp protocol, Pt has hx Asthma. Pt ordered UDN Q6. Will continue to monitor pt.

## 2023-12-18 NOTE — ASSESSMENT & PLAN NOTE
Patient has history of alcohol dependence  Patient smokes 1 pack of cigarettes per day since he was 19-year-old  Plan  NicoDerm was offered to the patient  Outpatient counseling about the importance of smoking cessation

## 2023-12-19 ENCOUNTER — TELEPHONE (OUTPATIENT)
Dept: FAMILY MEDICINE CLINIC | Facility: CLINIC | Age: 43
End: 2023-12-19

## 2023-12-19 ENCOUNTER — TRANSITIONAL CARE MANAGEMENT (OUTPATIENT)
Dept: FAMILY MEDICINE CLINIC | Facility: CLINIC | Age: 43
End: 2023-12-19

## 2023-12-19 VITALS
OXYGEN SATURATION: 94 % | HEART RATE: 80 BPM | TEMPERATURE: 99.1 F | RESPIRATION RATE: 20 BRPM | SYSTOLIC BLOOD PRESSURE: 112 MMHG | DIASTOLIC BLOOD PRESSURE: 68 MMHG

## 2023-12-19 LAB
ANION GAP SERPL CALCULATED.3IONS-SCNC: 10 MMOL/L
BASOPHILS # BLD AUTO: 0.01 THOUSANDS/ÂΜL (ref 0–0.1)
BASOPHILS NFR BLD AUTO: 0 % (ref 0–1)
BUN SERPL-MCNC: 17 MG/DL (ref 5–25)
CALCIUM SERPL-MCNC: 8.9 MG/DL (ref 8.4–10.2)
CHLORIDE SERPL-SCNC: 103 MMOL/L (ref 96–108)
CO2 SERPL-SCNC: 25 MMOL/L (ref 21–32)
CREAT SERPL-MCNC: 0.81 MG/DL (ref 0.6–1.3)
EOSINOPHIL # BLD AUTO: 0 THOUSAND/ÂΜL (ref 0–0.61)
EOSINOPHIL NFR BLD AUTO: 0 % (ref 0–6)
ERYTHROCYTE [DISTWIDTH] IN BLOOD BY AUTOMATED COUNT: 13.2 % (ref 11.6–15.1)
GFR SERPL CREATININE-BSD FRML MDRD: 89 ML/MIN/1.73SQ M
GLUCOSE SERPL-MCNC: 97 MG/DL (ref 65–140)
HCT VFR BLD AUTO: 39 % (ref 34.8–46.1)
HGB BLD-MCNC: 13.3 G/DL (ref 11.5–15.4)
IMM GRANULOCYTES # BLD AUTO: 0.02 THOUSAND/UL (ref 0–0.2)
IMM GRANULOCYTES NFR BLD AUTO: 0 % (ref 0–2)
LYMPHOCYTES # BLD AUTO: 0.75 THOUSANDS/ÂΜL (ref 0.6–4.47)
LYMPHOCYTES NFR BLD AUTO: 12 % (ref 14–44)
MCH RBC QN AUTO: 28.7 PG (ref 26.8–34.3)
MCHC RBC AUTO-ENTMCNC: 34.1 G/DL (ref 31.4–37.4)
MCV RBC AUTO: 84 FL (ref 82–98)
MONOCYTES # BLD AUTO: 0.52 THOUSAND/ÂΜL (ref 0.17–1.22)
MONOCYTES NFR BLD AUTO: 8 % (ref 4–12)
NEUTROPHILS # BLD AUTO: 5.02 THOUSANDS/ÂΜL (ref 1.85–7.62)
NEUTS SEG NFR BLD AUTO: 80 % (ref 43–75)
NRBC BLD AUTO-RTO: 0 /100 WBCS
PLATELET # BLD AUTO: 243 THOUSANDS/UL (ref 149–390)
PMV BLD AUTO: 9 FL (ref 8.9–12.7)
POTASSIUM SERPL-SCNC: 3.7 MMOL/L (ref 3.5–5.3)
RBC # BLD AUTO: 4.63 MILLION/UL (ref 3.81–5.12)
SODIUM SERPL-SCNC: 138 MMOL/L (ref 135–147)
WBC # BLD AUTO: 6.32 THOUSAND/UL (ref 4.31–10.16)

## 2023-12-19 PROCEDURE — 94664 DEMO&/EVAL PT USE INHALER: CPT

## 2023-12-19 PROCEDURE — 94760 N-INVAS EAR/PLS OXIMETRY 1: CPT

## 2023-12-19 PROCEDURE — 94640 AIRWAY INHALATION TREATMENT: CPT

## 2023-12-19 PROCEDURE — 99223 1ST HOSP IP/OBS HIGH 75: CPT | Performed by: INTERNAL MEDICINE

## 2023-12-19 PROCEDURE — 85025 COMPLETE CBC W/AUTO DIFF WBC: CPT

## 2023-12-19 PROCEDURE — 80048 BASIC METABOLIC PNL TOTAL CA: CPT

## 2023-12-19 PROCEDURE — NC001 PR NO CHARGE: Performed by: INTERNAL MEDICINE

## 2023-12-19 RX ORDER — OSELTAMIVIR PHOSPHATE 75 MG/1
75 CAPSULE ORAL EVERY 12 HOURS SCHEDULED
Qty: 6 CAPSULE | Refills: 0 | Status: SHIPPED | OUTPATIENT
Start: 2023-12-19 | End: 2023-12-22

## 2023-12-19 RX ORDER — PREDNISONE 20 MG/1
40 TABLET ORAL DAILY
Qty: 8 TABLET | Refills: 0 | Status: SHIPPED | OUTPATIENT
Start: 2023-12-19 | End: 2023-12-23

## 2023-12-19 RX ORDER — ALBUTEROL SULFATE 90 UG/1
2 AEROSOL, METERED RESPIRATORY (INHALATION) EVERY 6 HOURS PRN
Qty: 8.5 G | Refills: 0 | Status: SHIPPED | OUTPATIENT
Start: 2023-12-19

## 2023-12-19 RX ORDER — BENZONATATE 200 MG/1
200 CAPSULE ORAL 3 TIMES DAILY
Qty: 9 CAPSULE | Refills: 0 | Status: SHIPPED | OUTPATIENT
Start: 2023-12-19 | End: 2023-12-22

## 2023-12-19 RX ORDER — GUAIFENESIN 1200 MG/1
1200 TABLET, EXTENDED RELEASE ORAL EVERY 12 HOURS SCHEDULED
Qty: 6 TABLET | Refills: 0 | Status: SHIPPED | OUTPATIENT
Start: 2023-12-19 | End: 2023-12-22

## 2023-12-19 RX ADMIN — LOSARTAN POTASSIUM 50 MG: 50 TABLET, FILM COATED ORAL at 08:48

## 2023-12-19 RX ADMIN — ENOXAPARIN SODIUM 40 MG: 40 INJECTION SUBCUTANEOUS at 08:48

## 2023-12-19 RX ADMIN — DULOXETINE HYDROCHLORIDE 60 MG: 60 CAPSULE, DELAYED RELEASE ORAL at 08:48

## 2023-12-19 RX ADMIN — BENZONATATE 200 MG: 100 CAPSULE ORAL at 08:48

## 2023-12-19 RX ADMIN — FENOFIBRATE 145 MG: 145 TABLET, FILM COATED ORAL at 08:49

## 2023-12-19 RX ADMIN — GUAIFENESIN 1200 MG: 600 TABLET, EXTENDED RELEASE ORAL at 08:48

## 2023-12-19 RX ADMIN — OSELTAMIVIR PHOSPHATE 75 MG: 75 CAPSULE ORAL at 08:49

## 2023-12-19 RX ADMIN — LEVALBUTEROL HYDROCHLORIDE 1.25 MG: 1.25 SOLUTION RESPIRATORY (INHALATION) at 01:54

## 2023-12-19 RX ADMIN — LEVALBUTEROL HYDROCHLORIDE 1.25 MG: 1.25 SOLUTION RESPIRATORY (INHALATION) at 08:31

## 2023-12-19 RX ADMIN — IPRATROPIUM BROMIDE 0.5 MG: 0.5 SOLUTION RESPIRATORY (INHALATION) at 08:31

## 2023-12-19 RX ADMIN — IPRATROPIUM BROMIDE 0.5 MG: 0.5 SOLUTION RESPIRATORY (INHALATION) at 01:54

## 2023-12-19 RX ADMIN — HYDROCHLOROTHIAZIDE 12.5 MG: 12.5 TABLET ORAL at 08:48

## 2023-12-19 RX ADMIN — PREDNISONE 40 MG: 20 TABLET ORAL at 08:48

## 2023-12-19 NOTE — DISCHARGE INSTR - AVS FIRST PAGE
You were hospitalized for an asthma exacerbation from the flu  Complete a course of Tamiflu and prednisone as directed  Start taking albuterol inhaler as needed at home, this was sent to your pharmacy  Follow-up with your PCP within 1-2 weeks

## 2023-12-19 NOTE — TELEPHONE ENCOUNTER
Called patient to schedule a tcm apt with pcp , went straight to voicemail, left a detailed message for a call back,

## 2023-12-19 NOTE — UTILIZATION REVIEW
Initial Clinical Review    Admission: Date/Time/Statement:   Admission Orders (From admission, onward)       Ordered        12/18/23 1426  Place in Observation  Once                          Orders Placed This Encounter   Procedures    Place in Observation     Standing Status:   Standing     Number of Occurrences:   1     Order Specific Question:   Level of Care     Answer:   Med Surg [16]     ED Arrival Information       Expected   -    Arrival   12/18/2023 11:30    Acuity   Urgent              Means of arrival   Walk-In    Escorted by   Family Member    Service   [facility]    Admission type   Emergency              Arrival complaint   SOB             Chief Complaint   Patient presents with    Shortness of Breath     Started yesterday with SOB and chest tightness. C/o body aches, fever (101, took tylenol 1 hour PTA)       Initial Presentation: 43 y.o. female with past medical history of hypertension, hyperlipidemia, anxiety and depression, mild intermittent asthma, fibromyalgia who presents to the ED with shortness of breath.  Patient reported that shortness of breath, wheezes and fever started yesterday.  Patient initially reported that her asthma usually comes when she is sick or with exercise.  Patient was tested for flu, COVID, RSV and was found to be positive for flu virus.  Patient was treated by albuterol inhaler however she showed mild improvement.  Patient reported persistently shortness of breath.  Will admit to [facility] for further observation and treatment.  CBC, CMP was ordered.  Chest x-ray was ordered and pending rate.  Will start patient on prednisone 50 mg with albuterol and ipratropium inhalers solution.  Will start on Tamiflu.  Patient reports smoking of 1 pack of cigarette per day since she was 19-year-old.  Patient denies any alcohol or drug use.  Patient lives with her fiancé at home.    ADMIT OBSERVATION STATUS    Date:     Day 2:      ED Triage Vitals   Temperature Pulse Respirations Blood  "Pressure SpO2   12/18/23 1139 12/18/23 1138 12/18/23 1141 12/18/23 1138 12/18/23 1139   99.7 °F (37.6 °C) 100 22 131/89 95 %      Temp Source Heart Rate Source Patient Position - Orthostatic VS BP Location FiO2 (%)   12/18/23 1139 12/18/23 1138 12/18/23 1138 12/18/23 1138 --   Oral Monitor Lying Left arm       Pain Score       12/18/23 1820       No Pain          Wt Readings from Last 1 Encounters:   11/15/23 110 kg (243 lb 9.6 oz)     Additional Vital Signs:         Pertinent Labs/Diagnostic Test Results:   XR chest 2 views   ED Interpretation by Ashley Duncan MD (12/18 1301)   No acute cardiopulmonary abnormality       Final Result by Ismael Dalton MD (12/18 1432)   Minimal platelike atelectasis in the lingula.   No other acute cardiopulmonary disease.                  Workstation performed: [id number]           Results from last 7 days   Lab Units 12/18/23  1238   SARS-COV-2  Negative     Results from last 7 days   Lab Units 12/19/23  0646 12/18/23  1525   WBC Thousand/uL 6.32 7.11   HEMOGLOBIN g/dL 13.3 13.8   HEMATOCRIT % 39.0 39.6   PLATELETS Thousands/uL 243 201   NEUTROS ABS Thousands/µL 5.02 6.08         Results from last 7 days   Lab Units 12/19/23  0646 12/18/23  1525   SODIUM mmol/L 138 136   POTASSIUM mmol/L 3.7 3.6   CHLORIDE mmol/L 103 104   CO2 mmol/L 25 22   ANION GAP mmol/L 10 10   BUN mg/dL 17 18   CREATININE mg/dL 0.81 0.88   EGFR ml/min/1.73sq m 89 81   CALCIUM mg/dL 8.9 8.6     Results from last 7 days   Lab Units 12/18/23  1525   AST U/L 34   ALT U/L 34   ALK PHOS U/L 54   TOTAL PROTEIN g/dL 7.0   ALBUMIN g/dL 4.0   TOTAL BILIRUBIN mg/dL 0.31         Results from last 7 days   Lab Units 12/19/23  0646 12/18/23  1525   GLUCOSE RANDOM mg/dL 97 99             No results found for: \"BETA-HYDROXYBUTYRATE\"                                                                                           Results from last 7 days   Lab Units 12/18/23  1238   INFLUENZA A PCR  Positive*   INFLUENZA B PCR "  Negative   RSV PCR  Negative                                               ED Treatment:   Medication Administration from 12/18/2023 1130 to 12/18/2023 4837         Date/Time Order Dose Route Action Action by Comments     12/18/2023 1234 EST albuterol inhalation solution 10 mg 10 mg Nebulization Given Alcira Bocanegra RN --     12/18/2023 1234 EST ipratropium (ATROVENT) 0.02 % inhalation solution 1 mg 1 mg Nebulization Given Alcira Bocanegra RN --     12/18/2023 1234 EST sodium chloride 0.9 % inhalation solution 12 mL 12 mL Nebulization Given Alcira Bocanegra RN --     12/18/2023 1233 EST dexamethasone (DECADRON) tablet 10 mg 10 mg Oral Given Alcira Bocanegra RN --     12/18/2023 1233 EST ondansetron (ZOFRAN-ODT) dispersible tablet 4 mg 4 mg Oral Given Alcira Bocanegra RN --     12/18/2023 1238 EST ketorolac (TORADOL) injection 15 mg 15 mg Intramuscular Given Alcira Bocanegra RN --     12/18/2023 1530 EST DULoxetine (CYMBALTA) delayed release capsule 60 mg 60 mg Oral Not Given Alcira Bocanegra RN patient took this AM     12/18/2023 1532 EST fenofibrate (TRICOR) tablet 145 mg 145 mg Oral Given Alcira Bocanegra RN --     12/18/2023 1532 EST atorvastatin (LIPITOR) tablet 40 mg 40 mg Oral Given Alcira Bocanegra RN --     12/18/2023 1532 EST losartan (COZAAR) tablet 50 mg 50 mg Oral Given Alcira Bocanegra RN --     12/18/2023 1532 EST hydrochlorothiazide (HYDRODIURIL) tablet 12.5 mg 12.5 mg Oral Given Alcira Bocanegra RN --     12/18/2023 1532 EST nicotine (NICODERM CQ) 21 mg/24 hr TD 24 hr patch 1 patch 1 patch Transdermal Not Given Alcira Bocanegra RN --     12/18/2023 1532 EST predniSONE tablet 40 mg 40 mg Oral Given Alcira Bocanegra RN --     12/18/2023 1526 EST ipratropium (ATROVENT) 0.02 % inhalation solution 0.5 mg 0.5 mg Nebulization Given Guero Kaur RN --     12/18/2023 1526 EST levalbuterol (XOPENEX) inhalation solution 0.63 mg 0.63 mg Nebulization Given Guero Kaur, RN --     12/18/2023 1532 EST oseltamivir (TAMIFLU) capsule 75 mg  75 mg Oral Given Alcira Bocanegra RN --     12/18/2023 1532 EST guaiFENesin (MUCINEX) 12 hr tablet 1,200 mg 1,200 mg Oral Given Alcira Bocanegra RN --     12/18/2023 1532 EST benzonatate (TESSALON PERLES) capsule 200 mg 200 mg Oral Given Alcira Bocanegra RN --          Past Medical History:   Diagnosis Date    Anxiety     Asthma     Depression     History of Clostridium difficile colitis 2018    x 2 episodes, after antibiotics    Hypercholesterolemia     Hyperlipemia     Hypertension 1/2021    Morbid obesity (HCC)     Obesity     Pulmonary nodule     Sleep apnea     c pap     Present on Admission:   Morbid obesity (HCC)   Depression with anxiety   Obstructive sleep apnea syndrome   Essential hypertension   Fibromyalgia   Mixed hyperlipidemia   Tobacco dependence syndrome      Admitting Diagnosis: Shortness of breath [R06.02]  Tachypnea [R06.82]  Asthma exacerbation [J45.901]  Age/Sex: 43 y.o. female  Admission Orders:  Scheduled Medications:  atorvastatin, 40 mg, Oral, Daily With Dinner  benzonatate, 200 mg, Oral, TID  DULoxetine, 60 mg, Oral, Daily  enoxaparin, 40 mg, Subcutaneous, BID  fenofibrate, 145 mg, Oral, Daily  guaiFENesin, 1,200 mg, Oral, Q12H GIUSEPPE  losartan, 50 mg, Oral, Daily   And  hydrochlorothiazide, 12.5 mg, Oral, Daily  ipratropium, 0.5 mg, Nebulization, Q6H  levalbuterol, 1.25 mg, Nebulization, Q6H  nicotine, 1 patch, Transdermal, Daily  oseltamivir, 75 mg, Oral, Q12H GIUSEPPE  predniSONE, 40 mg, Oral, Daily      Continuous IV Infusions:     PRN Meds:  ALPRAZolam, 0.25 mg, Oral, HS PRN        IP CONSULT TO CASE MANAGEMENT    Network Utilization Review Department  ATTENTION: Please call with any questions or concerns to [phone] and carefully listen to the prompts so that you are directed to the right person. All voicemails are confidential.   For Discharge needs, contact Care Management DC Support Team at [phone] opt. 2  Send all requests for admission clinical reviews, approved or denied determinations  and any other requests to dedicated fax number below belonging to the campus where the patient is receiving treatment. List of dedicated fax numbers for the Facilities:  FACILITY NAME UR FAX NUMBER   ADMISSION DENIALS (Administrative/Medical Necessity) [phone]   DISCHARGE SUPPORT TEAM (NETWORK) [phone]   PARENT CHILD HEALTH (Maternity/NICU/Pediatrics) [phone]   [facility] [phone]   [facility] [phone]   [facility] [phone]   [facility] [phone]   [facility] [phone]   [facility] [phone]   [facility] [phone]   [facility] [phone]   [facility] [phone]   [facility] [phone]   [facility] [phone]

## 2023-12-19 NOTE — DISCHARGE SUMMARY
INTERNAL MEDICINE RESIDENCY DISCHARGE SUMMARY     Nely Starr   43 y.o. female  MRN: [id number]  Room/Bed: [facility] 214/[facility] 214-01     [facility]    Encounter: [id number]    Principal Problem:    Acute asthma exacerbation  Active Problems:    Morbid obesity (HCC)    Depression with anxiety    Obstructive sleep apnea syndrome    Essential hypertension    Fibromyalgia    Tobacco dependence syndrome    Mixed hyperlipidemia    Infection due to novel influenza A virus    Pulmonary nodule      * Acute asthma exacerbation  Assessment & Plan  Patient has history of bronchial asthma  Patient denies any history of intubation and last asthma exacerbation was when she was in high school  Patient does not have any inhalers at home  Patient pulmonary function test in 2019 showed moderate airway obstruction with improvement on bronchodilators  Patient presents with shortness of breath, wheezes since yesterday  Patient presented with fever and tachypnea  Patient was tested positive for flu in the ED  Patient was given Decadron 10 mg tablet and albuterol inhaler in the ED  Patient showed mild improvement with persistence of shortness of breath, tachypnea and wheezes    Plan  Albuterol prn    Prednisone 40 mg tablet daily, finish 5 day course  Tamiflu, finish 5 day course  Guaifenesin 1200 mg tablet for cough  Benzonatate 200 mg capsule 3 times daily for cough  Continue monitoring pulse oximetry    Pulmonary nodule  Assessment & Plan  Patient has history of pulm nodule  CT on 2014 of the chest showed Stable 3.5 mm left lower lobe noncalcified pulmonary nodule.  Plan  Recommended outpatient CT chest for follow-up    Infection due to novel influenza A virus  Assessment & Plan  Patient was tested positive for influenza A virus  Patient was presented with shortness of breath, tachypnea, fever and wheezing chest  Patient has history of asthma  Plan  Tamiflu 75 mg twice daily for 5 days    Mixed  hyperlipidemia  Assessment & Plan  Patient has history of hyperlipidemia  Patient on fenofibrate 160 mg tablet and Crestor 20 mg tablet once daily  Plan  Continue with atorvastatin 40 mg tablet  Continue fenofibrate 145 mg tablet based on hospital availability    Tobacco dependence syndrome  Assessment & Plan  Patient has history of alcohol dependence  Patient smokes 1 pack of cigarettes per day since he was 19-year-old  Plan  NicoDerm was offered to the patient  Outpatient counseling about the importance of smoking cessation    Fibromyalgia  Assessment & Plan  Patient has history of fibromyalgia  Patient is on home Cymbalta 60 mg tablet  Plan  Continue Cymbalta 60 mg tablet once daily    Essential hypertension  Assessment & Plan  Patient has history of hypertension  Patient is on home hydrochlorothiazide 12.5 mg and valsartan 80 mg  Plan  Continue losartan 50 mg/hydrochlorothiazide 12.5 mg based on hospital availability    Obstructive sleep apnea syndrome  Assessment & Plan  Patient has history of obstructive sleep apnea on CPAP  Patient stopped using CPAP    Depression with anxiety  Assessment & Plan  Patient has history of depression and anxiety  Patient is on home Cymbalta 60 mg once daily and Xanax 0.25 at bedtime as needed  Plan  Continue Cymbalta 60 mg once daily  Continue as needed Xanax 0.25 mg at bedtime    Morbid obesity (HCC)  Assessment & Plan  Patient BMI is 44.56  Plan  Outpatient counseling about the importance of weight loss        DETAILS OF HOSPITAL COURSE     49-year-old female admitted to the hospital with acute shortness of breath and wheezing for 1 day.  Patient was found to be positive for influenza A in the emergency department.  CXR negative for infiltrate.  She was started on Tamiflu, prednisone and DuoNebs with improvement of symptoms.  Admitted to the hospital for observation    Overnight, the patient's symptoms improved.  On my evaluation this morning, patient had minimal wheeze and was  satting well on room air.  Patient was discharged with 5-day course of Tamiflu and prednisone and also discharged with as needed albuterol inhaler.  She is instructed to follow-up with her PCP    DISCHARGE INFORMATION       Admitting Provider: Thierry Xavier MD  Admission Date: 12/18/2023    Discharge Provider: No att. providers found  Discharge Date: 12/19/2023    Discharge Disposition: Home/Self Care  Discharge Condition: good  Discharge with Lines: no    Discharge Diet: regular diet  Activity Restrictions: none  Test Results Pending at Discharge: None    Discharge Diagnoses:  Principal Problem:    Acute asthma exacerbation  Active Problems:    Morbid obesity (HCC)    Depression with anxiety    Obstructive sleep apnea syndrome    Essential hypertension    Fibromyalgia    Tobacco dependence syndrome    Mixed hyperlipidemia    Infection due to novel influenza A virus    Pulmonary nodule  Resolved Problems:    * No resolved hospital problems. *      Consulting Providers:      Diagnostic & Therapeutic Procedures Performed:  XR chest 2 views    Result Date: 12/18/2023  Impression: Minimal platelike atelectasis in the lingula. No other acute cardiopulmonary disease. Workstation performed: [id number]       Code Status: Level 1 - Full Code  Advance Directive & Living Will: <no information>  Power of :    POLST:      Medications:    Current Discharge Medication List          START taking these medications    Details   albuterol (ProAir HFA) 90 mcg/act inhaler Inhale 2 puffs every 6 (six) hours as needed for wheezing  Qty: 8.5 g, Refills: 0    Comments: Substitution to a formulary equivalent within the same pharmaceutical class is authorized.  Associated Diagnoses: Asthma exacerbation      benzonatate (TESSALON) 200 MG capsule Take 1 capsule (200 mg total) by mouth 3 (three) times a day for 3 days  Qty: 9 capsule, Refills: 0    Associated Diagnoses: Asthma exacerbation      guaiFENesin 1200 MG TB12 Take 1 tablet  (1,200 mg total) by mouth every 12 (twelve) hours for 3 days  Qty: 6 tablet, Refills: 0    Associated Diagnoses: Asthma exacerbation      oseltamivir (TAMIFLU) 75 mg capsule Take 1 capsule (75 mg total) by mouth every 12 (twelve) hours for 3 days  Qty: 6 capsule, Refills: 0    Associated Diagnoses: Asthma exacerbation      predniSONE 20 mg tablet Take 2 tablets (40 mg total) by mouth daily for 4 days  Qty: 8 tablet, Refills: 0    Associated Diagnoses: Asthma exacerbation           Current Discharge Medication List        CONTINUE these medications which have NOT CHANGED    Details   ALPRAZolam (XANAX) 0.25 mg tablet Take 1 tablet (0.25 mg total) by mouth daily at bedtime as needed for anxiety  Qty: 30 tablet, Refills: 0    Associated Diagnoses: QIANA (generalized anxiety disorder)      cholecalciferol (VITAMIN D3) 1,000 units tablet Take by mouth daily        DULoxetine (CYMBALTA) 60 mg delayed release capsule Take 1 capsule (60 mg total) by mouth daily  Qty: 90 capsule, Refills: 0    Associated Diagnoses: Depression, unspecified depression type      fenofibrate 160 MG tablet Take 1 tablet (160 mg total) by mouth daily  Qty: 90 tablet, Refills: 0    Associated Diagnoses: Mixed hyperlipidemia      fluocinolone (SYNALAR) 0.01 % external solution Apply sparingly to affected areas of scalp 2-4 times a day for up to 2 weeks.  Qty: 60 mL, Refills: 1    Associated Diagnoses: Sebopsoriasis      ketoconazole (NIZORAL) 2 % shampoo Shampoo twice a week for 8 weeks then as needed.  Lather into scalp and skin on face, neck, chest, and back; leave on for 5 minutes and then rinse off completely.  Qty: 120 mL, Refills: 2    Associated Diagnoses: Sebopsoriasis      PREVIDENT 5000 SENSITIVE 1.1-5 % PSTE Refills: 3      rosuvastatin (CRESTOR) 20 MG tablet Take 1 tablet (20 mg total) by mouth daily  Qty: 90 tablet, Refills: 0    Associated Diagnoses: Mixed hyperlipidemia      SODIUM FLUORIDE, DENTAL GEL, 1.1 % GEL SF 5000 Plus 1.1 %  "dental cream      Tranexamic Acid 650 MG TABS Take 2 tablets by mouth TID x 5 days each month  Qty: 30 tablet, Refills: 10    Associated Diagnoses: Menorrhagia with regular cycle      valsartan-hydrochlorothiazide (DIOVAN-HCT) 80-12.5 MG per tablet Take 1 tablet by mouth daily  Qty: 90 tablet, Refills: 0    Associated Diagnoses: Essential hypertension             Allergies:  No Known Allergies    FOLLOW-UP     PCP Outpatient Follow-up:  Instructed to follow-up with her PCP outpatient    Consulting Providers Follow-up:  none required     Active Issues Requiring Follow-up:   none    Discharge Statement:   I spent 20 minutes minutes discharging the patient. This time was spent on the day of discharge. I had direct contact with the patient on the day of discharge. Additional documentation is required if more than 30 minutes were spent on discharge.    Portions of the record may have been created with voice recognition software.  Occasional wrong word or \"sound a like\" substitutions may have occurred due to the inherent limitations of voice recognition software.  Read the chart carefully and recognize, using context, where substitutions have occurred.    ==  Roberto Loya MD  [facility]  Internal Medicine Resident PGY-1     "

## 2023-12-19 NOTE — PROGRESS NOTES
INTERNAL MEDICINE RESIDENCY PROGRESS NOTE     Name: Nely Starr   Age & Sex: 43 y.o. female   MRN: [id number]  Unit/Bed#: CW2 214-01   Encounter: [id number]  Team: SOD Team C     PATIENT INFORMATION     Name: Nely Starr   Age & Sex: 43 y.o. female   MRN: [id number]  Hospital Stay Days: 0    ASSESSMENT/PLAN     Principal Problem:    Acute asthma exacerbation  Active Problems:    Morbid obesity (HCC)    Depression with anxiety    Obstructive sleep apnea syndrome    Essential hypertension    Fibromyalgia    Tobacco dependence syndrome    Mixed hyperlipidemia    Infection due to novel influenza A virus    Pulmonary nodule      * Acute asthma exacerbation  Assessment & Plan  Patient has history of bronchial asthma  Patient denies any history of intubation and last asthma exacerbation was when she was in high school  Patient does not have any inhalers at home  Patient pulmonary function test in 2019 showed moderate airway obstruction with improvement on bronchodilators  Patient presents with shortness of breath, wheezes since yesterday  Patient presented with fever and tachypnea  Patient was tested positive for flu in the ED  Patient was given Decadron 10 mg tablet and albuterol inhaler in the ED  Patient showed mild improvement with persistence of shortness of breath, tachypnea and wheezes  Chest x-ray was done pending read  Plan  CBC: no leukocytosis  CMP: wnl  Duonebs Q6H  Prednisone 40 mg tablet daily  Guaifenesin 1200 mg tablet for cough  Benzonatate 200 mg capsule 3 times daily for cough  Continue monitoring pulse oximetry    Pulmonary nodule  Assessment & Plan  Patient has history of pulm nodule  CT on 2014 of the chest showed Stable 3.5 mm left lower lobe noncalcified pulmonary nodule.  Plan  Recommended outpatient CT chest for follow-up    Infection due to novel influenza A virus  Assessment & Plan  Patient was tested positive for influenza A virus  Patient was presented with shortness of breath,  tachypnea, fever and wheezing chest  Patient has history of asthma  Plan  Tamiflu 75 mg twice daily for 5 days    Mixed hyperlipidemia  Assessment & Plan  Patient has history of hyperlipidemia  Patient on fenofibrate 160 mg tablet and Crestor 20 mg tablet once daily  Plan  Continue with atorvastatin 40 mg tablet  Continue fenofibrate 145 mg tablet based on hospital availability    Tobacco dependence syndrome  Assessment & Plan  Patient has history of alcohol dependence  Patient smokes 1 pack of cigarettes per day since he was 19-year-old  Plan  NicoDerm was offered to the patient  Outpatient counseling about the importance of smoking cessation    Fibromyalgia  Assessment & Plan  Patient has history of fibromyalgia  Patient is on home Cymbalta 60 mg tablet  Plan  Continue Cymbalta 60 mg tablet once daily    Essential hypertension  Assessment & Plan  Patient has history of hypertension  Patient is on home hydrochlorothiazide 12.5 mg and valsartan 80 mg  Plan  Continue losartan 50 mg/hydrochlorothiazide 12.5 mg based on hospital availability    Obstructive sleep apnea syndrome  Assessment & Plan  Patient has history of obstructive sleep apnea on CPAP  Patient stopped using CPAP    Depression with anxiety  Assessment & Plan  Patient has history of depression and anxiety  Patient is on home Cymbalta 60 mg once daily and Xanax 0.25 at bedtime as needed  Plan  Continue Cymbalta 60 mg once daily  Continue as needed Xanax 0.25 mg at bedtime    Morbid obesity (HCC)  Assessment & Plan  Patient BMI is 44.56  Plan  Outpatient counseling about the importance of weight loss        Disposition: discharge home      SUBJECTIVE     Patient seen and examined. No acute events overnight. She feels better today. States that she is ready to go home.     OBJECTIVE     Vitals:    12/18/23 1736 12/18/23 2012 12/18/23 2148 12/18/23 2350   BP: 120/68  115/68 97/54   BP Location:   Left arm    Pulse: 94   74   Resp:   20 20   Temp: (!) 96.8 °F  (36 °C)      TempSrc:       SpO2: 90% 92%  91%      Temperature:   Temp (24hrs), Av.3 °F (36.8 °C), Min:96.8 °F (36 °C), Max:99.7 °F (37.6 °C)    Temperature: (!) 96.8 °F (36 °C)  Intake & Output:  I/O       None          Weights:        There is no height or weight on file to calculate BMI.  Weight (last 2 days)       None          Physical Exam  Vitals reviewed.   Constitutional:       Appearance: Normal appearance.   HENT:      Head: Normocephalic and atraumatic.      Nose: No congestion or rhinorrhea.   Eyes:      Extraocular Movements: Extraocular movements intact.      Conjunctiva/sclera: Conjunctivae normal.      Pupils: Pupils are equal, round, and reactive to light.   Cardiovascular:      Rate and Rhythm: Normal rate and regular rhythm.      Pulses: Normal pulses.      Heart sounds: Normal heart sounds.   Pulmonary:      Effort: Pulmonary effort is normal.      Breath sounds: Normal breath sounds.   Abdominal:      General: Abdomen is flat. Bowel sounds are normal.      Palpations: Abdomen is soft.   Musculoskeletal:      Right lower leg: No edema.      Left lower leg: No edema.   Skin:     General: Skin is warm and dry.      Capillary Refill: Capillary refill takes less than 2 seconds.   Neurological:      General: No focal deficit present.      Mental Status: She is alert and oriented to person, place, and time. Mental status is at baseline.       LABORATORY DATA     Labs: I have personally reviewed pertinent reports.  Results from last 7 days   Lab Units 23  1525   WBC Thousand/uL 7.11   HEMOGLOBIN g/dL 13.8   HEMATOCRIT % 39.6   PLATELETS Thousands/uL 201   NEUTROS PCT % 86*   MONOS PCT % 5   EOS PCT % 0      Results from last 7 days   Lab Units 23  1525   POTASSIUM mmol/L 3.6   CHLORIDE mmol/L 104   CO2 mmol/L 22   BUN mg/dL 18   CREATININE mg/dL 0.88   CALCIUM mg/dL 8.6   ALK PHOS U/L 54   ALT U/L 34   AST U/L 34                            IMAGING & DIAGNOSTIC TESTING     Radiology  "Results: I have personally reviewed pertinent reports.  XR chest 2 views    Result Date: 12/18/2023  Impression: Minimal platelike atelectasis in the lingula. No other acute cardiopulmonary disease. Workstation performed: [id number]     Other Diagnostic Testing: I have personally reviewed pertinent reports.    ACTIVE MEDICATIONS     Current Facility-Administered Medications   Medication Dose Route Frequency    ALPRAZolam (XANAX) tablet 0.25 mg  0.25 mg Oral HS PRN    atorvastatin (LIPITOR) tablet 40 mg  40 mg Oral Daily With Dinner    benzonatate (TESSALON PERLES) capsule 200 mg  200 mg Oral TID    DULoxetine (CYMBALTA) delayed release capsule 60 mg  60 mg Oral Daily    enoxaparin (LOVENOX) subcutaneous injection 40 mg  40 mg Subcutaneous BID    fenofibrate (TRICOR) tablet 145 mg  145 mg Oral Daily    guaiFENesin (MUCINEX) 12 hr tablet 1,200 mg  1,200 mg Oral Q12H GIUSEPPE    losartan (COZAAR) tablet 50 mg  50 mg Oral Daily    And    hydrochlorothiazide (HYDRODIURIL) tablet 12.5 mg  12.5 mg Oral Daily    ipratropium (ATROVENT) 0.02 % inhalation solution 0.5 mg  0.5 mg Nebulization Q6H    levalbuterol (XOPENEX) inhalation solution 1.25 mg  1.25 mg Nebulization Q6H    nicotine (NICODERM CQ) 21 mg/24 hr TD 24 hr patch 1 patch  1 patch Transdermal Daily    oseltamivir (TAMIFLU) capsule 75 mg  75 mg Oral Q12H GIUSEPPE    predniSONE tablet 40 mg  40 mg Oral Daily       VTE Pharmacologic Prophylaxis: Enoxaparin (Lovenox)  VTE Mechanical Prophylaxis: sequential compression device    Portions of the record may have been created with voice recognition software.  Occasional wrong word or \"sound a like\" substitutions may have occurred due to the inherent limitations of voice recognition software.  Read the chart carefully and recognize, using context, where substitutions have occurred.  ==  Roberto Loya MD  [facility]  Internal Medicine Residency PGY-1      "

## 2023-12-20 ENCOUNTER — APPOINTMENT (OUTPATIENT)
Dept: PHYSICAL THERAPY | Facility: REHABILITATION | Age: 43
End: 2023-12-20
Payer: COMMERCIAL

## 2023-12-21 ENCOUNTER — TELEMEDICINE (OUTPATIENT)
Dept: FAMILY MEDICINE CLINIC | Facility: CLINIC | Age: 43
End: 2023-12-21
Payer: COMMERCIAL

## 2023-12-21 ENCOUNTER — APPOINTMENT (OUTPATIENT)
Dept: PHYSICAL THERAPY | Facility: REHABILITATION | Age: 43
End: 2023-12-21
Payer: COMMERCIAL

## 2023-12-21 DIAGNOSIS — F17.200 TOBACCO DEPENDENCE SYNDROME: ICD-10-CM

## 2023-12-21 DIAGNOSIS — J09.X2 INFECTION DUE TO NOVEL INFLUENZA A VIRUS: ICD-10-CM

## 2023-12-21 DIAGNOSIS — J45.21 MILD INTERMITTENT ASTHMA WITH ACUTE EXACERBATION: Primary | ICD-10-CM

## 2023-12-21 DIAGNOSIS — I10 ESSENTIAL HYPERTENSION: ICD-10-CM

## 2023-12-21 PROCEDURE — 99496 TRANSJ CARE MGMT HIGH F2F 7D: CPT

## 2023-12-21 NOTE — ASSESSMENT & PLAN NOTE
Quit smoking  Options for nicotine patch, nicotine gum or any medication discussed with patient  Potential consequences of smoking discussed with patient  Patient seems to be understood well

## 2023-12-21 NOTE — PROGRESS NOTES
Virtual TCM Visit:    Verification of patient location:    Patient is located at Home in the following state in which I hold an active license [place]    Assessment/Plan:          Problem List Items Addressed This Visit     Essential hypertension     Under control.    Continue current medication.    We will re-evaluate at next office visit.           Tobacco dependence syndrome     Quit smoking  Options for nicotine patch, nicotine gum or any medication discussed with patient  Potential consequences of smoking discussed with patient  Patient seems to be understood well           Acute asthma exacerbation - Primary     Patient is improving.  Patient was advised if symptom gets worse get back to us right away or go to the emergency room.  Continue all current medication.         Infection due to novel influenza A virus     Continue Tamiflu for total 5 days             Reason for visit is TCM    Encounter provider Severo Hays MD     Provider located at [facility]  [facility]  [address] [place] [id number] [phone]    Recent Visits  Date Type Provider Dept   12/19/23 Telephone Aliyah Rosen [facility]   Showing recent visits within past 7 days and meeting all other requirements  Today's Visits  Date Type Provider Dept   12/21/23 Telemedicine Severo Hays MD [facility]   Showing today's visits and meeting all other requirements  Future Appointments  No visits were found meeting these conditions.  Showing future appointments within next 150 days and meeting all other requirements       After connecting through [employer], the patient was identified by name and date of birth. Nely Starr was informed that this is a telemedicine visit and that the visit is being conducted through the [employer] platform. She agrees to proceed..  My office door was closed. No one else was in the room.  She acknowledged consent and  understanding of privacy and security of the video platform. The patient has agreed to participate and understands they can discontinue the visit at any time.    Patient is aware this is a billable service.    Transitional Care Management Review:  Nely Starr is a 43 y.o. female here for TCM follow up.     During the TCM phone call patient stated:    TCM Call     Date and time call was made  12/19/2023  2:55 PM    Hospital care reviewed  Records reviewed    Patient was hospitialized at  [facility]    Date of Admission  12/18/23    Date of discharge  12/19/23    Diagnosis  Acute asthma exacerbation    Disposition  Home      TCM Call     Scheduled for follow up?  No  left message    I have advised the patient to call PCP with any new or worsening symptoms  Aliyah Rosen MA        Subjective:     Patient ID: Nely Starr is a 43 y.o. female.    Patient requested patient requested virtual visit for transitional care management as patient was admitted with acute asthma exacerbation secondary to influenza A infection.  Patient was discharged with 5-day course of prednisone Tamiflu for 5 days and cough medicines patient is feeling about 50 to 60% better still have some cough heavy voice some wheezing and exertional shortness of breath also feels fatigue and tired.  No fever no chills no chest pain no headache no lightheadedness or dizziness no earache no other specific complaint patient was advised if symptoms get worse particularly if start getting chest pain high-grade fever or oxygen level drops then that could be a sign of secondary pneumonia then she should go to emergency room      Review of Systems   Constitutional:  Positive for fatigue. Negative for chills and fever.   HENT:  Positive for congestion and voice change. Negative for ear pain, rhinorrhea, sneezing and sore throat.    Eyes:  Negative for redness, itching and visual disturbance.   Respiratory:  Positive for cough, shortness of breath and  wheezing. Negative for chest tightness.    Cardiovascular:  Negative for chest pain, palpitations and leg swelling.   Gastrointestinal:  Negative for abdominal pain, blood in stool, diarrhea, nausea and vomiting.   Endocrine: Negative for cold intolerance and heat intolerance.   Genitourinary:  Negative for dysuria, frequency and urgency.   Musculoskeletal:  Positive for arthralgias (Bilateral knee pain). Negative for back pain and myalgias.   Skin:  Negative for color change and rash.   Neurological:  Negative for dizziness, weakness, light-headedness, numbness and headaches.   Hematological:  Does not bruise/bleed easily.   Psychiatric/Behavioral:  Negative for agitation, behavioral problems and confusion.          Objective:    There were no vitals filed for this visit.    Physical Exam  Constitutional:       General: She is not in acute distress.     Appearance: Normal appearance. She is not ill-appearing, toxic-appearing or diaphoretic.   HENT:      Head: Normocephalic.   Eyes:      General: No scleral icterus.        Right eye: No discharge.         Left eye: No discharge.      Extraocular Movements: Extraocular movements intact.      Conjunctiva/sclera: Conjunctivae normal.   Pulmonary:      Effort: Pulmonary effort is normal.   Neurological:      Mental Status: She is alert and oriented to person, place, and time.   Psychiatric:         Mood and Affect: Mood normal.         Behavior: Behavior normal.             I spent 32 minutes with the patient during this visit.    Severo Hays MD      VIRTUAL VISIT DISCLAIMER    Nely Starr verbally agrees to participate in Virtual Care Services. Pt is aware that Virtual Care Services could be limited without vital signs or the ability to perform a full hands-on physical exam. Nely Starr understands she or the provider may request at any time to terminate the video visit and request the patient to seek care or treatment in person.    TCM Call     Date and time  call was made  12/19/2023  2:55 PM    Hospital care reviewed  Records reviewed    Patient was hospitialized at  [facility]    Date of Admission  12/18/23    Date of discharge  12/19/23    Diagnosis  Acute asthma exacerbation    Disposition  Home      TCM Call     Scheduled for follow up?  No  left message    I have advised the patient to call PCP with any new or worsening symptoms  Aliyah Rosen MA

## 2023-12-21 NOTE — ASSESSMENT & PLAN NOTE
Patient is improving.  Patient was advised if symptom gets worse get back to us right away or go to the emergency room.  Continue all current medication.

## 2023-12-27 ENCOUNTER — OFFICE VISIT (OUTPATIENT)
Dept: PHYSICAL THERAPY | Facility: REHABILITATION | Age: 43
End: 2023-12-27
Payer: COMMERCIAL

## 2023-12-27 DIAGNOSIS — M76.899 QUADRICEPS TENDONITIS: Primary | ICD-10-CM

## 2023-12-27 DIAGNOSIS — M22.2X2 PATELLOFEMORAL SYNDROME OF BOTH KNEES: ICD-10-CM

## 2023-12-27 DIAGNOSIS — M22.2X1 PATELLOFEMORAL SYNDROME OF BOTH KNEES: ICD-10-CM

## 2023-12-27 PROCEDURE — 97110 THERAPEUTIC EXERCISES: CPT | Performed by: PHYSICAL THERAPIST

## 2023-12-27 PROCEDURE — 97112 NEUROMUSCULAR REEDUCATION: CPT | Performed by: PHYSICAL THERAPIST

## 2023-12-27 NOTE — PROGRESS NOTES
"Daily Note     Today's date: 2023  Patient name: Nely Starr  : 1980  MRN: [id number]  Referring provider: Cherrie Frank MD  Dx:   Encounter Diagnosis     ICD-10-CM    1. Quadriceps tendonitis  M76.899       2. Patellofemoral syndrome of both knees  M22.2X1     M22.2X2           Start Time: 1730  Stop Time: 1800  Total time in clinic (min): 30 minutes    Subjective: Pt reports she had the flu so she is fatigued and tired still. Just went back to work yesterday.       Objective: See treatment diary below      Assessment: Tolerated treatment well.  Treatment modified due to pt fatigue. Patient would benefit from continued PT.      Plan: Continue per plan of care.      Precautions: none      Manuals 12/7 12/11    11/15 11/20 11/22 11/27 12/4   Patellar mobility sup/inf       Prom  AFB      ITB STM AFB  bilat                                      Neuro Re-Ed 12/7 12/11 12/13 12/27  11/15 11/20 11/22 11/27 12/4   SLS on foam              Stepping up to bosu       15x ea [place]       Stepping laterally to bosu              Wobble board  nv 3 min ea way 3min ea way 3min ea  2min ea way 2min ea way 2min ea way 2min ea way 3 min ea way   Hip abd iso 5\"  2x5 ea 5\"  2x5 ea 10x3\" ea 10x3\" ea     5\"x5 ea 5\"x5 ea                             Ther Ex 12/7 12/11 12/13 12/27  11/15 11/20 11/22 11/27 12   Pt education on HEP, POC             VG for LE strength / endurance  L7 7' L7 7' L7 7' L5 8'  L7 8' L7 8' L7 8' L7 8' L7 8'   SLR flexion 2# 3x8 2# 3x8 3# 2x10 3x10 ea no weight  2# 2x10 2# 3x8 2# 3x8 2# 3x8 2# 3x8   SLR abduction             Bridges c holds 2x10 RHB 2x10 RHB 2x10 RHB 2x10 RHB  2x10 YHB 2x10 YHB 2x10 RHB nv 2x10 RHB   Clamshells c TB              Side steps c TB      YHB 3x8       [place] walk out 15.0  1x6 ea dir 15.0  1x8 ea dir 15lb 8x ea dir nv   15.0  1x8 ea dir 15.0  1x5 ea dir 15.0  1x5 ea dir 15.0  1x6 ea dir   SL Knee Ext 20#  3x10 ea 20#  3x10 ea      20#  2x10 ea 20#  2x10 ea " "20#  2x10 [place] roll out      15x5\"                    Ther Activity                                       Gait Training                                       Modalities                                                              "

## 2024-01-03 ENCOUNTER — OFFICE VISIT (OUTPATIENT)
Dept: PHYSICAL THERAPY | Facility: REHABILITATION | Age: 44
End: 2024-01-03
Payer: COMMERCIAL

## 2024-01-03 DIAGNOSIS — M22.2X1 PATELLOFEMORAL SYNDROME OF BOTH KNEES: ICD-10-CM

## 2024-01-03 DIAGNOSIS — M22.2X2 PATELLOFEMORAL SYNDROME OF BOTH KNEES: ICD-10-CM

## 2024-01-03 DIAGNOSIS — M76.899 QUADRICEPS TENDONITIS: Primary | ICD-10-CM

## 2024-01-03 PROCEDURE — 97110 THERAPEUTIC EXERCISES: CPT

## 2024-01-03 PROCEDURE — 97112 NEUROMUSCULAR REEDUCATION: CPT

## 2024-01-03 NOTE — PROGRESS NOTES
"Daily Note     Today's date: 1/3/2024  Patient name: Nely Starr  : 1980  MRN: [id number]  Referring provider: Cherrie Frank MD  Dx:   Encounter Diagnosis     ICD-10-CM    1. Quadriceps tendonitis  M76.899       2. Patellofemoral syndrome of both knees  M22.2X1     M22.2X2           Start Time: 1737  Stop Time: 1820  Total time in clinic (min): 43 minutes    Subjective: Pt reports she has been feeling much better LV.  Was briefly hospitalized d/t onset of the flu.  Since recovering from the flu her knees have been doing better.  Steps have not caused as much discomfort at before.        Objective: See treatment diary below      Assessment: Tolerated treatment well. Continued with program as outlined below.  Was able to perform all exercise with no complaints of increased pain.  Some visible fatigue present post treatment.  Patient would benefit from continued PT to further improve strength and decrease pain with ADL's.        Plan: Continue per plan of care.      Precautions: none      Manuals    Patellar mobility sup/inf             ITB STM AFB  bilat                                      Neuro Re-Ed 12/7 12/11 12/13 12/27 1/3   11/22 11/27 12/4   SLS on foam              Stepping up to bosu              Stepping laterally to bosu              Wobble board  nv 3 min ea way 3min ea way 3min ea 3min ea   2min ea way 2min ea way 3 min ea way   Hip abd iso 5\"  2x5 ea 5\"  2x5 ea 10x3\" ea 10x3\" ea 10x3\" ea    5\"x5 ea 5\"x5 ea                             Ther Ex 12/7 12/11 12/13 12/27 1/3   11/22 11/27 12/4   Pt education on HEP, POC             VG for LE strength / endurance  L7 7' L7 7' L7 7' L5 8' L5 8'   L7 8' L7 8' L7 8'   SLR flexion 2# 3x8 2# 3x8 3# 2x10 3x10 ea no weight 2# 2x10   2# 3x8 2# 3x8 2# 3x8   SLR abduction             Bridges c holds 2x10 RHB 2x10 RHB 2x10 RHB 2x10 RHB 2x10 RHB   2x10 RHB nv 2x10 RHB   Clamshells c TB              Side steps c TB           "   [place] walk out 15.0  1x6 ea dir 15.0  1x8 ea dir 15lb 8x ea dir nv 15lb 8x ea dir   15.0  1x5 ea dir 15.0  1x5 ea dir 15.0  1x6 ea dir   SL Knee Ext 20#  3x10 ea 20#  3x10 ea      20#  2x10 ea 20#  2x10 ea 20#  2x10 ea   Pball roll out                          Ther Activity                                       Gait Training                                       Modalities

## 2024-01-04 ENCOUNTER — OFFICE VISIT (OUTPATIENT)
Dept: PHYSICAL THERAPY | Facility: REHABILITATION | Age: 44
End: 2024-01-04
Payer: COMMERCIAL

## 2024-01-04 DIAGNOSIS — M22.2X2 PATELLOFEMORAL SYNDROME OF BOTH KNEES: ICD-10-CM

## 2024-01-04 DIAGNOSIS — M76.899 QUADRICEPS TENDONITIS: Primary | ICD-10-CM

## 2024-01-04 DIAGNOSIS — M22.2X1 PATELLOFEMORAL SYNDROME OF BOTH KNEES: ICD-10-CM

## 2024-01-04 PROCEDURE — 97112 NEUROMUSCULAR REEDUCATION: CPT

## 2024-01-04 PROCEDURE — 97110 THERAPEUTIC EXERCISES: CPT

## 2024-01-04 NOTE — PROGRESS NOTES
"Daily Note     Today's date: 2024  Patient name: Nely Starr  : 1980  MRN: [id number]  Referring provider: Cherrie Frank MD  Dx:   Encounter Diagnosis     ICD-10-CM    1. Quadriceps tendonitis  M76.899       2. Patellofemoral syndrome of both knees  M22.2X1     M22.2X2           Start Time: 1733  Stop Time: 1815  Total time in clinic (min): 42 minutes    Subjective: Pt reports a little sore today from yesterday's visit, but nothing significant.        Objective: See treatment diary below      Assessment: Tolerated treatment well. Continued with program as outlined below.  Able to tolerate increased resistance today during [place] walkout's demonstrating slight improvements in strength.  Better stability today with hip abd iso, being able to perform without any use of hands for stabilization.  Patient would benefit from continued PT.      Plan: Continue per plan of care.      Precautions: none      Manuals    Patellar mobility sup/inf             ITB STM AFB  bilat                                      Neuro Re-Ed 12/7 12/11 12/13 12/27 1/3 1/4  11/22 11/27 12/4   SLS on foam              Stepping up to bosu              Stepping laterally to bosu              Wobble board  nv 3 min ea way 3min ea way 3min ea 3min ea 3 min ea  2min ea way 2min ea way 3 min ea way   Hip abd iso 5\"  2x5 ea 5\"  2x5 ea 10x3\" ea 10x3\" ea 10x3\" ea 10x3\" ea   5\"x5 ea 5\"x5 ea                             Ther Ex 12/7 12/11 12/13 12/27 1/3 1/4  11/22 11/27 12/4   Pt education on HEP, POC             VG for LE strength / endurance  L7 7' L7 7' L7 7' L5 8' L5 8' L5 8'  L7 8' L7 8' L7 8'   SLR flexion 2# 3x8 2# 3x8 3# 2x10 3x10 ea no weight 2# 2x10 2# 2x10  2# 3x8 2# 3x8 2# 3x8   SLR abduction             Bridges c holds 2x10 RHB 2x10 RHB 2x10 RHB 2x10 RHB 2x10 RHB 2x10 RHB  2x10 RHB nv 2x10 RHB   Clamshells c TB              Side steps c TB             [place] walk out 15.0  1x6 ea dir 15.0  1x8 " ea dir 15lb 8x ea dir nv 15lb 8x ea dir 18lb 8x ea dir  15.0  1x5 ea dir 15.0  1x5 ea dir 15.0  1x6 ea dir   SL Knee Ext 20#  3x10 ea 20#  3x10 ea      20#  2x10 ea 20#  2x10 ea 20#  2x10 ea   Pball roll out                          Ther Activity                                       Gait Training                                       Modalities

## 2024-01-08 ENCOUNTER — OFFICE VISIT (OUTPATIENT)
Dept: PHYSICAL THERAPY | Facility: REHABILITATION | Age: 44
End: 2024-01-08
Payer: COMMERCIAL

## 2024-01-08 DIAGNOSIS — M22.2X2 PATELLOFEMORAL SYNDROME OF BOTH KNEES: ICD-10-CM

## 2024-01-08 DIAGNOSIS — M22.2X1 PATELLOFEMORAL SYNDROME OF BOTH KNEES: ICD-10-CM

## 2024-01-08 DIAGNOSIS — M76.899 QUADRICEPS TENDONITIS: Primary | ICD-10-CM

## 2024-01-08 PROCEDURE — 97110 THERAPEUTIC EXERCISES: CPT

## 2024-01-08 PROCEDURE — 97112 NEUROMUSCULAR REEDUCATION: CPT

## 2024-01-08 NOTE — PROGRESS NOTES
"Daily Note     Today's date: 2024  Patient name: Nely Starr  : 1980  MRN: [id number]  Referring provider: Cherrie Frank MD  Dx:   Encounter Diagnosis     ICD-10-CM    1. Quadriceps tendonitis  M76.899       2. Patellofemoral syndrome of both knees  M22.2X1     M22.2X2                      Subjective: squatting is getting easier.        Objective: See treatment diary below      Assessment: Tolerated treatment well. Pt continues to make incremental gains in LE strength. Generalized muscle fatigue noted, more so at hip abds with resisted lateral walking. Continued PT would be beneficial to improve function.          Plan: Continue per plan of care.       Precautions: none      Manuals    Patellar mobility sup/inf             ITB STM AF[employer]  bilat                                      Neuro Re-Ed 12/7 12/11 12/13 12/27 1/3 1/4 1/8 11/22 11/27 12/4   SLS on foam              Stepping up to bosu              Stepping laterally to bosu              Wobble board  nv 3 min ea way 3min ea way 3min ea 3min ea 3 min ea 3 min ea 2min ea way 2min ea way 3 min ea way   Hip abd iso 5\"  2x5 ea 5\"  2x5 ea 10x3\" ea 10x3\" ea 10x3\" ea 10x3\" ea 10x3\" ea  5\"x5 ea 5\"x5 ea                             Ther Ex 12/7 12/11 12/13 12/27 1/3 1/4 1/8 11/22 11/27 12/4   Pt education on HEP, POC             VG for LE strength / endurance  L7 7' L7 7' L7 7' L5 8' L5 8' L5 8' L5 8' L7 8' L7 8' L7 8'   SLR flexion 2# 3x8 2# 3x8 3# 2x10 3x10 ea no weight 2# 2x10 2# 2x10 2# 2x10 2# 3x8 2# 3x8 2# 3x8   SLR abduction             Bridges c holds 2x10 RHB 2x10 RHB 2x10 RHB 2x10 RHB 2x10 RHB 2x10 RHB 2x10 RHB 2x10 RHB nv 2x10 RHB   Clamshells c TB              Side steps c TB             [place] walk out 15.0  1x6 ea dir 15.0  1x8 ea dir 15lb 8x ea dir nv 15lb 8x ea dir 18lb 8x ea dir 18lb 8x ea dir 15.0  1x5 ea dir 15.0  1x5 ea dir 15.0  1x6 ea dir   SL Knee Ext 20#  3x10 ea 20#  3x10 ea      20#  2x10 ea " 20#  2x10 ea 20#  2x10 ea   Pball roll out                          Ther Activity                                       Gait Training                                       Modalities

## 2024-01-11 ENCOUNTER — OFFICE VISIT (OUTPATIENT)
Dept: PHYSICAL THERAPY | Facility: REHABILITATION | Age: 44
End: 2024-01-11
Payer: COMMERCIAL

## 2024-01-11 DIAGNOSIS — M22.2X1 PATELLOFEMORAL SYNDROME OF BOTH KNEES: ICD-10-CM

## 2024-01-11 DIAGNOSIS — M76.899 QUADRICEPS TENDONITIS: Primary | ICD-10-CM

## 2024-01-11 DIAGNOSIS — M22.2X2 PATELLOFEMORAL SYNDROME OF BOTH KNEES: ICD-10-CM

## 2024-01-11 PROCEDURE — 97110 THERAPEUTIC EXERCISES: CPT | Performed by: PHYSICAL THERAPIST

## 2024-01-11 PROCEDURE — 97112 NEUROMUSCULAR REEDUCATION: CPT | Performed by: PHYSICAL THERAPIST

## 2024-01-11 NOTE — PROGRESS NOTES
"Daily Note     Today's date: 2024  Patient name: Nely Starr  : 1980  MRN: [id number]  Referring provider: Cherrie Frank MD  Dx:   Encounter Diagnosis     ICD-10-CM    1. Quadriceps tendonitis  M76.899       2. Patellofemoral syndrome of both knees  M22.2X1     M22.2X2           Start Time: 1735  Stop Time: 1815  Total time in clinic (min): 40 minutes    Subjective: Knees have been feeling very good.      Objective: See treatment diary below    Assessment: Tolerated treatment well. Potentially going to once a week following next week due to improvement in symptoms. Continued PT would be beneficial to improve function.      Plan: Continue per plan of care.       Precautions: none      Manuals    Patellar mobility sup/inf             ITB STM AFB  bilat                                      Neuro Re-Ed 12/7 12/11 12/13 12/27 1/3 1/4 1/8 1/11 11/27 12/4   SLS on foam              Stepping up to bosu              Stepping laterally to bosu              Wobble board  nv 3 min ea way 3min ea way 3min ea 3min ea 3 min ea 3 min ea 3 min ea 2min ea way 3 min ea way   Hip abd iso 5\"  2x5 ea 5\"  2x5 ea 10x3\" ea 10x3\" ea 10x3\" ea 10x3\" ea 10x3\" ea 10x3\" ea 5\"x5 ea 5\"x5 ea                             Ther Ex 12/7 12/11 12/13 12/27 1/3 1/4 1/8 1/11 11/27 12/4   Pt education on HEP, POC             VG for LE strength / endurance  L7 7' L7 7' L7 7' L5 8' L5 8' L5 8' L5 8' L5 9' L7 8' L7 8'   SLR flexion 2# 3x8 2# 3x8 3# 2x10 3x10 ea no weight 2# 2x10 2# 2x10 2# 2x10 2# 2x10 2# 3x8 2# 3x8   SLR abduction             Bridges c holds 2x10 RHB 2x10 RHB 2x10 RHB 2x10 RHB 2x10 RHB 2x10 RHB 2x10 RHB 2x10 RHB nv 2x10 RHB   Clamshells c TB              Side steps c TB             [place] walk out 15.0  1x6 ea dir 15.0  1x8 ea dir 15lb 8x ea dir nv 15lb 8x ea dir 18lb 8x ea dir 18lb 8x ea dir 18lb 6x ea dir 15.0  1x5 ea dir 15.0  1x6 ea dir   SL Knee Ext 20#  3x10 ea 20#  3x10 ea       20#  2x10 " ea 20#  2x10 ea   Pball roll out                          Ther Activity                                       Gait Training                                       Modalities

## 2024-01-14 ENCOUNTER — APPOINTMENT (OUTPATIENT)
Dept: LAB | Facility: CLINIC | Age: 44
End: 2024-01-14

## 2024-01-14 DIAGNOSIS — Z00.6 ENCOUNTER FOR EXAMINATION FOR NORMAL COMPARISON OR CONTROL IN CLINICAL RESEARCH PROGRAM: ICD-10-CM

## 2024-01-14 PROCEDURE — 36415 COLL VENOUS BLD VENIPUNCTURE: CPT

## 2024-01-15 ENCOUNTER — OFFICE VISIT (OUTPATIENT)
Dept: PHYSICAL THERAPY | Facility: REHABILITATION | Age: 44
End: 2024-01-15
Payer: COMMERCIAL

## 2024-01-15 DIAGNOSIS — M22.2X2 PATELLOFEMORAL SYNDROME OF BOTH KNEES: ICD-10-CM

## 2024-01-15 DIAGNOSIS — M76.899 QUADRICEPS TENDONITIS: Primary | ICD-10-CM

## 2024-01-15 DIAGNOSIS — M22.2X1 PATELLOFEMORAL SYNDROME OF BOTH KNEES: ICD-10-CM

## 2024-01-15 PROCEDURE — 97112 NEUROMUSCULAR REEDUCATION: CPT

## 2024-01-15 PROCEDURE — 97110 THERAPEUTIC EXERCISES: CPT

## 2024-01-15 NOTE — PROGRESS NOTES
"Daily Note     Today's date: 1/15/2024  Patient name: Nely Starr  : 1980  MRN: [id number]  Referring provider: Cherrie Frank MD  Dx:   Encounter Diagnosis     ICD-10-CM    1. Quadriceps tendonitis  M76.899       2. Patellofemoral syndrome of both knees  M22.2X1     M22.2X2           Start Time: 1730  Stop Time: 1815  Total time in clinic (min): 45 minutes    Subjective: Pt reports both knees have been doing better.  Performance of squatting and stairs has been improving with less pain/discomfort.        Objective: See treatment diary below      Assessment: Tolerated treatment well. Progressed with increased hold times during hip abd iso to further improve stability of both hips.  Is making good progress toward long term goals.  Patient would benefit from continued PT to further decrease intensity/frequency of symptoms and maximize overall function with ADL's.      Plan: Continue per plan of care.  Progress as able.  Potential reduction of treatments to once a week following her NV.       Precautions: none      Manuals            Patellar mobility sup/inf             ITB STM AF[employer]  bilat                                      Neuro Re-Ed 12/7 12/11 12/13 12/27 1/3 1/4 1/8 1/11 1/15    SLS on foam              Stepping up to bosu              Stepping laterally to bosu              Wobble board  nv 3 min ea way 3min ea way 3min ea 3min ea 3 min ea 3 min ea 3 min ea 3 min ea    Hip abd iso 5\"  2x5 ea 5\"  2x5 ea 10x3\" ea 10x3\" ea 10x3\" ea 10x3\" ea 10x3\" ea 10x3\" ea 10\"x10                              Ther Ex 12/7 12/11 12/13 12/27 1/3 1/4 1/8 1/11 1/15    Pt education on HEP, POC             VG for LE strength / endurance  L7 7' L7 7' L7 7' L5 8' L5 8' L5 8' L5 8' L5 9' L5 9'    SLR flexion 2# 3x8 2# 3x8 3# 2x10 3x10 ea no weight 2# 2x10 2# 2x10 2# 2x10 2# 2x10 2# 2x10    SLR abduction             Bridges c holds 2x10 RHB 2x10 RHB 2x10 RHB 2x10 RHB 2x10 RHB 2x10 RHB 2x10 RHB 2x10 RHB 2x10 RHB  "   Oscar c TB              Side steps c TB             [place] walk out 15.0  1x6 ea dir 15.0  1x8 ea dir 15lb 8x ea dir nv 15lb 8x ea dir 18lb 8x ea dir 18lb 8x ea dir 18lb 6x ea dir 18lb 6x ea dir    SL Knee Ext 20#  3x10 ea 20#  3x10 ea           Pball roll out                          Ther Activity                                       Gait Training                                       Modalities

## 2024-01-16 ENCOUNTER — HOSPITAL ENCOUNTER (EMERGENCY)
Facility: HOSPITAL | Age: 44
Discharge: HOME | End: 2024-01-16
Attending: EMERGENCY MEDICINE
Payer: COMMERCIAL

## 2024-01-16 VITALS
SYSTOLIC BLOOD PRESSURE: 161 MMHG | RESPIRATION RATE: 17 BRPM | HEART RATE: 74 BPM | TEMPERATURE: 98 F | OXYGEN SATURATION: 98 % | DIASTOLIC BLOOD PRESSURE: 58 MMHG

## 2024-01-16 DIAGNOSIS — L02.412 ABSCESS OF LEFT AXILLA: Primary | ICD-10-CM

## 2024-01-16 PROCEDURE — 99284 EMERGENCY DEPT VISIT MOD MDM: CPT | Performed by: EMERGENCY MEDICINE

## 2024-01-16 PROCEDURE — 99284 EMERGENCY DEPT VISIT MOD MDM: CPT

## 2024-01-16 PROCEDURE — 76882 US LMTD JT/FCL EVL NVASC XTR: CPT | Performed by: EMERGENCY MEDICINE

## 2024-01-16 RX ORDER — LIDOCAINE HYDROCHLORIDE 10 MG/ML
5 INJECTION, SOLUTION EPIDURAL; INFILTRATION; INTRACAUDAL; PERINEURAL ONCE
Status: COMPLETED | OUTPATIENT
Start: 2024-01-16 | End: 2024-01-16

## 2024-01-16 RX ADMIN — LIDOCAINE HYDROCHLORIDE 5 ML: 10 INJECTION, SOLUTION EPIDURAL; INFILTRATION; INTRACAUDAL at 15:42

## 2024-01-16 NOTE — ED ATTENDING ATTESTATION
Next appt 04-03-18  Last appt 10-03-17    Refill request for   Disp Refills Start End    clonazePAM (KLONOPIN) 1 MG tablet 60 tablet 0 11/6/2017 12/11/2017    Sig - Route: Take 1 tablet by mouth 2 times daily as needed for Anxiety. - Oral    Class: Normal        Refill unable to be completed per standing protocol due to; NON PROTOCOL MEDICATION  Orders pended, and routed to provider for approval.   1/16/2024  I, David Zepeda DO, saw and evaluated the patient. I have discussed the patient with the resident/non-physician practitioner and agree with the resident's/non-physician practitioner's findings, Plan of Care, and MDM as documented in the resident's/non-physician practitioner's note, except where noted. All available labs and Radiology studies were reviewed.  I was present for key portions of any procedure(s) performed by the resident/non-physician practitioner and I was immediately available to provide assistance.       At this point I agree with the current assessment done in the Emergency Department.  I have conducted an independent evaluation of this patient a history and physical is as follows:    Patient is a 43-year-old female with a history of hypertension, asthma, fibromyalgia, hyperlipidemia, says 2 days ago she noticed some slight discomfort in her left axilla, small lump there with a scant amount of redness.  No fever, no chills, no numbness or tingling, no falls or trauma.  She says that she had a abscess in her right axilla about a year ago, which initially was treated conservatively, then got worse requiring surgery and hospitalization with antibiotics.  She says that abscess presented red, warm and swollen which is different from the lump that she has noticed in the left axilla.  She called her general surgeons and has an appointment next Wednesday, 8 days from now but was concerned because of the previous experience with the right axilla abscess.  She says that she did have C. difficile twice in 2018 after antibiotics and required treatment with metronidazole and Flagyl.    General:  Patient is well-appearing  Head:  Atraumatic  Eyes:  Conjunctiva pink  ENT:  Mucous membranes are moist  Neck:  Supple  Cardiac:  S1-S2, without murmurs  Lungs:  Clear to auscultation bilaterally  Abdomen:  Soft, nontender, normal bowel sounds, no CVA tenderness, no tympany, no rigidity, no  guarding  Extremities: The patient's left axilla is a 5 mm diameter area of macular erythema, underneath this is a palpable 2 cm x 1 cm firm nodule, not erythematous or fluctuant.  There is no sloughing of the skin, no warmth or redness at the shoulder and no discomfort with passive range of motion of the left shoulder or elbow.  Neurologic:  Awake, fluent speech, normal comprehension. AAOx3.   Skin:  Pink warm and dry  Psychiatric:  Alert, pleasant, cooperative    ED Course     Point-of-care bedside ultrasound performed by ED resident and directly supervised by me shows an area in the left axilla which may represent a superficial abscess    Needle aspiration performed by ED resident after analgesia, found no fluid on aspiration.    Patient reassessed several times, feeling comfortable.  At this point I suspect the may have a cyst versus enlarged lymph node, do not believe this is a true abscess based on the aspiration.  There is no surrounding cellulitis, and given her history of 2 episodes of C. difficile in the past, do not believe empirically treating with antibiotics at this point would be appropriate.  She is overall well-appearing, no symptoms suggestive of systemic infection and   I do not believe laboratory studies are indicated at this point.  I believe discharge home with outpatient follow-up with general surgery as previously scheduled and return precautions is appropriate and the patient is comfortable with this plan.Supportive care, importance of follow-up and return precautions were discussed with the patient, who expressed understanding.    DIAGNOSIS:  Acute left axillary lesion    MEDICAL DECISION MAKING CODING      Chronic conditions affecting care: As per HPI    COLLECTION AND INTERPRETATION OF DATA  I reviewed prior external notes, including December 19, 2023 hospital discharge summary which showed the patient was hospitalized for an acute asthma exacerbation        Tests considered but not  ordered: See above    RISK  All of the patient's current prescription medications should be continued.        Critical Care Time  Procedures

## 2024-01-16 NOTE — DISCHARGE INSTRUCTIONS
Please return to the ED if you have fever, worsening pain, redness, or increased swelling of the area. Can apply warm compresses to the area and take tylenol and motrin as needed for pain.     Please follow up as scheduled with general surgery next week.

## 2024-01-16 NOTE — ED PROCEDURE NOTE
Procedure  POC MSK/Soft Tissue US    Date/Time: 1/16/2024 6:25 PM    Performed by: Salvador Wilkinson MD  Authorized by: Salvador Wilkinson MD    Patient location:  ED  Performed by:  Resident  Other Assisting Provider: No    Procedure:     Performed: soft tissue ultrasound    Procedure details:     Exam Type:  Diagnostic    Longitudinal view:  Obtained    Transverse view:  Obtained    Image quality: diagnostic      Image availability:  Images available in PACS  Soft tissue ultrasound:     Soft tissue indications: suspected abscess      Anatomic location:  Axilla    Soft tissue findings: cobblestoning and subcutaneous collection (comment)    Interpretation:     Soft tissue impressions: consistent with abscess                     Salvador Wilkinson MD  01/16/24 1827

## 2024-01-16 NOTE — ED PROVIDER NOTES
History  Chief Complaint   Patient presents with    Abscess     Pt reports abscess under left arm. Pt was US in department and reported that abscess is tunneling deeper than it appears. Pt reports having this issue in the past and becoming septic      Patient is a 44 yo female who presents for evaluation of abscess in left axilla. Patient states it has been present for the past several days and has been growing in size. Patient notes pain and tenderness to touch, as well as redness and warmth. She has not noticed any drainage from the area and has not applied warm compresses or tried to express anything manually. She has not taken any OTC pain medications. Patient works at [employer] and states one of her colleagues did examine the abscess with an US yesterday and today, and there was concern that it was extending deeper. Patient last had an abscess in her right axilla approx. 1 year ago and underwent IR aspiration; subsequently admitted to the hospital due to worsening infection. Treated inpatient with abx at the time, however, patient is concerned about further antibiotic use as she developed c. diff infection 2x in the past after courses of antibiotics. Of note, patient did contact general surgery due to h/o abscess and was scheduled to be seen next week, but came to the ED because she did not know if she could wait until then to be seen.         Prior to Admission Medications   Prescriptions Last Dose Informant Patient Reported? Taking?   ALPRAZolam (XANAX) 0.25 mg tablet   No No   Sig: Take 1 tablet (0.25 mg total) by mouth daily at bedtime as needed for anxiety   DULoxetine (CYMBALTA) 60 mg delayed release capsule   No No   Sig: Take 1 capsule (60 mg total) by mouth daily   PREVIDENT 5000 SENSITIVE 1.1-5 % PSTE  Self Yes No   SODIUM FLUORIDE, DENTAL GEL, 1.1 % GEL  Self Yes No   Sig: SF 5000 Plus 1.1 % dental cream   Tranexamic Acid 650 MG TABS   No No   Sig: Take 2 tablets by mouth TID x 5 days each month   Patient  taking differently: Take 2 tablets by mouth TID x 5 days each month - only menstrual cycle   albuterol (ProAir HFA) 90 mcg/act inhaler   No No   Sig: Inhale 2 puffs every 6 (six) hours as needed for wheezing   Patient taking differently: Inhale 2 puffs 4 (four) times a day   cholecalciferol (VITAMIN D3) 1,000 units tablet  Self Yes No   Sig: Take by mouth daily     fenofibrate 160 MG tablet   No No   Sig: Take 1 tablet (160 mg total) by mouth daily   fluocinolone (SYNALAR) 0.01 % external solution   No No   Sig: Apply sparingly to affected areas of scalp 2-4 times a day for up to 2 weeks.   ketoconazole (NIZORAL) 2 % shampoo   No No   Sig: Shampoo twice a week for 8 weeks then as needed.  Lather into scalp and skin on face, neck, chest, and back; leave on for 5 minutes and then rinse off completely.   rosuvastatin (CRESTOR) 20 MG tablet   No No   Sig: Take 1 tablet (20 mg total) by mouth daily   valsartan-hydrochlorothiazide (DIOVAN-HCT) 80-12.5 MG per tablet   No No   Sig: Take 1 tablet by mouth daily      Facility-Administered Medications: None       Past Medical History:   Diagnosis Date    Anxiety     Asthma     Depression     History of Clostridium difficile colitis 2018    x 2 episodes, after antibiotics    Hypercholesterolemia     Hyperlipemia     Hypertension 2021    Morbid obesity (HCC)     Obesity     Pulmonary nodule     Sleep apnea     c pap       Past Surgical History:   Procedure Laterality Date    IR ASPIRATION ONLY  2023    [place] CONIZATION CERVIX W/WO D&C RPR ELTRD EXC N/A 2018    Procedure: BIOPSY LEEP CERVIX;  Surgeon: Radha Bush DO;  Location: [facility];  Service: Gynecology    REDUCTION MAMMAPLASTY      TONSILLECTOMY         Family History   Problem Relation Age of Onset    Asthma Mother     Obesity Mother     Lung cancer Father 48    Hyperlipidemia Father     Bone cancer Father         mets from lung cancer    Cancer Father         Lung/bone ca-     No Known  Problems Sister     No Known Problems Brother     Arthritis Maternal Grandmother     COPD Maternal Grandmother     Cancer Maternal Grandfather         Lung ca-    Lung cancer Maternal Grandfather 80    Liver cancer Paternal Grandfather 80    Cancer Paternal Grandfather         Liver ca-    No Known Problems Paternal Grandmother     No Known Problems Maternal Aunt     No Known Problems Maternal Aunt     No Known Problems Maternal Aunt     No Known Problems Paternal Aunt      I have reviewed and agree with the history as documented.    E-Cigarette/Vaping    E-Cigarette Use Never User      E-Cigarette/Vaping Substances    Nicotine No     THC No     CBD No     Flavoring No     Other No     Unknown No      Social History     Tobacco Use    Smoking status: Every Day     Current packs/day: 0.50     Average packs/day: 0.5 packs/day for 18.0 years (9.0 ttl pk-yrs)     Types: Cigarettes     Passive exposure: Past    Smokeless tobacco: Never    Tobacco comments:     is on the patch    Vaping Use    Vaping status: Never Used   Substance Use Topics    Alcohol use: Yes     Comment: occasional drink here and there    Drug use: No        Review of Systems   All other systems reviewed and are negative.      Physical Exam  ED Triage Vitals   Temperature Pulse Respirations Blood Pressure SpO2   24 1333 24 1333 24 1333 24 1334 24 1333   98 °F (36.7 °C) 74 17 161/58 98 %      Temp Source Heart Rate Source Patient Position - Orthostatic VS BP Location FiO2 (%)   24 1333 24 1333 24 1334 24 1334 --   Oral Monitor Lying Right arm       Pain Score       24 1333       5             Orthostatic Vital Signs  Vitals:    24 1333 24 1334   BP:  161/58   Pulse: 74    Patient Position - Orthostatic VS:  Lying       Physical Exam  Constitutional:       Appearance: Normal appearance. She is obese.   HENT:      Head: Normocephalic and atraumatic.   Cardiovascular:       Rate and Rhythm: Normal rate.   Pulmonary:      Effort: Pulmonary effort is normal.   Musculoskeletal:      Cervical back: Normal range of motion and neck supple.   Skin:     General: Skin is warm and dry.      Comments: 1 cm well-circumscribed firm area of skin in left axilla. Mildly erythematous. Tender to palpation. No fluctuance. No visible discharge or opening in skin.    Neurological:      General: No focal deficit present.      Mental Status: She is alert.   Psychiatric:         Mood and Affect: Mood normal.         Behavior: Behavior normal.         ED Medications  Medications   lidocaine (PF) (XYLOCAINE-MPF) 1 % injection 5 mL (5 mL Infiltration Given 1/16/24 1542)       Diagnostic Studies  Results Reviewed       None                   No orders to display         Procedures  Incision and drain    Date/Time: 1/16/2024 6:05 PM    Performed by: Florinda Romero MD  Authorized by: Florinda Romero MD  Universal Protocol:  Consent: Verbal consent obtained.  Risks and benefits: risks, benefits and alternatives were discussed  Patient identity confirmed: arm band    Patient location:  Bedside  Location:     Type:  Abscess    Size:  2 cm    Location:  Trunk (left axilla)  Pre-procedure details:     Skin preparation:  Chloraprep  Anesthesia (see MAR for exact dosages):     Anesthesia method:  Local infiltration    Local anesthetic:  Lidocaine 1% w/o epi  Procedure details:     Complexity:  Simple    Needle aspiration: yes      Needle size:  18 G    Incision depth:  Subcutaneous    Wound management:  Irrigated with saline    Drainage:  Purulent and bloody    Drainage amount:  Scant    Wound treatment:  Wound left open    Packing materials:  None  Post-procedure details:     Patient tolerance of procedure:  Tolerated well, no immediate complications        ED Course                                       Medical Decision Making  Nely Starr is a 43 y.o. who presents with complaints of abscess in left  axilla.     Vital signs are stable, afebrile    Ddx: abscess vs. Enlarged lymph node    Plan: US of area performed  Needle aspiration performed   (See 2 separate procedure notes)    Disposition: Patient stable for discharge home. No abx at this time as risk of abx use in patient w/ history of c diff outweigh benefits of use for treatment of abscess. Recommend follow up as scheduled with general surgery next week. Return precautions provided. Patient understands and is agreeable to plan.            Risk  Prescription drug management.          Disposition  Final diagnoses:   Abscess of left axilla     Time reflects when diagnosis was documented in both MDM as applicable and the Disposition within this note       Time User Action Codes Description Comment    1/16/2024  5:02 PM Florinda Romero [L02.412] Abscess of left axilla           ED Disposition       ED Disposition   Discharge    Condition   Stable    Date/Time   Tue Jan 16, 2024  5:02 PM    Comment   Nely Starr discharge to home/self care.                   Follow-up Information       Follow up With Specialties Details Why Contact Info    Severo Hays MD Internal Medicine Go in 2 days As needed, If symptoms worsen [address] [place] [address]  [phone]              Discharge Medication List as of 1/16/2024  5:03 PM        CONTINUE these medications which have NOT CHANGED    Details   albuterol (ProAir HFA) 90 mcg/act inhaler Inhale 2 puffs every 6 (six) hours as needed for wheezing, Starting Tue 12/19/2023, Normal      ALPRAZolam (XANAX) 0.25 mg tablet Take 1 tablet (0.25 mg total) by mouth daily at bedtime as needed for anxiety, Starting Thu 10/26/2023, Normal      cholecalciferol (VITAMIN D3) 1,000 units tablet Take by mouth daily  , Historical Med      DULoxetine (CYMBALTA) 60 mg delayed release capsule Take 1 capsule (60 mg total) by mouth daily, Starting Wed 10/25/2023, Normal      fenofibrate 160 MG tablet Take 1 tablet  (160 mg total) by mouth daily, Starting Wed 10/25/2023, Normal      fluocinolone (SYNALAR) 0.01 % external solution Apply sparingly to affected areas of scalp 2-4 times a day for up to 2 weeks., Normal      ketoconazole (NIZORAL) 2 % shampoo Shampoo twice a week for 8 weeks then as needed.  Lather into scalp and skin on face, neck, chest, and back; leave on for 5 minutes and then rinse off completely., Normal      PREVIDENT 5000 SENSITIVE 1.1-5 % PSTE Starting Thu 9/12/2019, Historical Med      rosuvastatin (CRESTOR) 20 MG tablet Take 1 tablet (20 mg total) by mouth daily, Starting Wed 10/25/2023, Normal      SODIUM FLUORIDE, DENTAL GEL, 1.1 % GEL SF 5000 Plus 1.1 % dental cream, Historical Med      Tranexamic Acid 650 MG TABS Take 2 tablets by mouth TID x 5 days each month, Normal      valsartan-hydrochlorothiazide (DIOVAN-HCT) 80-12.5 MG per tablet Take 1 tablet by mouth daily, Starting Wed 10/25/2023, Normal           No discharge procedures on file.    PDMP Review         Value Time User    PDMP Reviewed  Yes 10/25/2023 11:18 AM Severo aHys MD             ED Provider  Attending physically available and evaluated Nely Starr. I managed the patient along with the ED Attending.    Electronically Signed by           Florinda Romero MD  01/16/24 6584

## 2024-01-17 ENCOUNTER — APPOINTMENT (OUTPATIENT)
Dept: PHYSICAL THERAPY | Facility: REHABILITATION | Age: 44
End: 2024-01-17
Payer: COMMERCIAL

## 2024-01-17 ENCOUNTER — CONSULT (OUTPATIENT)
Dept: SURGERY | Facility: CLINIC | Age: 44
End: 2024-01-17
Payer: COMMERCIAL

## 2024-01-17 VITALS — HEIGHT: 62 IN | TEMPERATURE: 97.8 F | BODY MASS INDEX: 43.69 KG/M2 | WEIGHT: 237.4 LBS

## 2024-01-17 DIAGNOSIS — L02.412 ABSCESS OF LEFT AXILLA: Primary | ICD-10-CM

## 2024-01-17 PROCEDURE — 10060 I&D ABSCESS SIMPLE/SINGLE: CPT | Performed by: SURGERY

## 2024-01-17 RX ORDER — OXYCODONE HYDROCHLORIDE 5 MG/1
5 TABLET ORAL EVERY 4 HOURS PRN
Qty: 5 TABLET | Refills: 0 | Status: SHIPPED | OUTPATIENT
Start: 2024-01-17 | End: 2024-01-26 | Stop reason: ALTCHOICE

## 2024-01-17 NOTE — PROGRESS NOTES
Office Visit - General Surgery  Nely Starr MRN: [id number]  Encounter: [id number]  Assessment/Plan    Problem List Items Addressed This Visit    None      Subjective    Nely Starr is a 43 y.o. female who presents for valuation left axillary abscess.  She has had previous abscess in the right axilla sometime ago.  She began having pain and swelling in the left axilla.  An informal ultrasound showed some fluid and this was aspirated for small amount of purulence.  Continues to have some redness and swelling at that site      Pt  has a past medical history of Anxiety, Asthma, Depression, History of Clostridium difficile colitis (2018), Hypercholesterolemia, Hyperlipemia, Hypertension (1/2021), Morbid obesity (HCC), Obesity, Pulmonary nodule, and Sleep apnea.    Pt  has a past surgical history that includes Tonsillectomy; Reduction mammaplasty (1999); pr conization cervix w/wo d&c rpr eltrd exc (N/A, 9/27/2018); and IR aspiration only (1/9/2023).    family history includes Arthritis in her maternal grandmother; Asthma in her mother; Bone cancer in her father; COPD in her maternal grandmother; Cancer in her father, maternal grandfather, and paternal grandfather; Hyperlipidemia in her father; Liver cancer (age of onset: 80) in her paternal grandfather; Lung cancer (age of onset: 48) in her father; Lung cancer (age of onset: 80) in her maternal grandfather; No Known Problems in her brother, maternal aunt, maternal aunt, maternal aunt, paternal aunt, paternal grandmother, and sister; Obesity in her mother.    Nely Starr reports that she has been smoking cigarettes. She has a 9.0 pack-year smoking history. She has been exposed to tobacco smoke. She has never used smokeless tobacco. She reports current alcohol use. She reports that she does not use drugs.      Current Outpatient Medications on File Prior to Visit   Medication Sig Dispense Refill    albuterol (ProAir HFA) 90 mcg/act inhaler Inhale 2 puffs every 6  (six) hours as needed for wheezing (Patient taking differently: Inhale 2 puffs 4 (four) times a day) 8.5 g 0    ALPRAZolam (XANAX) 0.25 mg tablet Take 1 tablet (0.25 mg total) by mouth daily at bedtime as needed for anxiety 30 tablet 0    cholecalciferol (VITAMIN D3) 1,000 units tablet Take by mouth daily        DULoxetine (CYMBALTA) 60 mg delayed release capsule Take 1 capsule (60 mg total) by mouth daily 90 capsule 0    fenofibrate 160 MG tablet Take 1 tablet (160 mg total) by mouth daily 90 tablet 0    fluocinolone (SYNALAR) 0.01 % external solution Apply sparingly to affected areas of scalp 2-4 times a day for up to 2 weeks. 60 mL 1    ketoconazole (NIZORAL) 2 % shampoo Shampoo twice a week for 8 weeks then as needed.  Lather into scalp and skin on face, neck, chest, and back; leave on for 5 minutes and then rinse off completely. 120 mL 2    PREVIDENT 5000 SENSITIVE 1.1-5 % PSTE   3    rosuvastatin (CRESTOR) 20 MG tablet Take 1 tablet (20 mg total) by mouth daily 90 tablet 0    SODIUM FLUORIDE, DENTAL GEL, 1.1 % GEL SF 5000 Plus 1.1 % dental cream      Tranexamic Acid 650 MG TABS Take 2 tablets by mouth TID x 5 days each month (Patient taking differently: Take 2 tablets by mouth TID x 5 days each month - only menstrual cycle) 30 tablet 10    valsartan-hydrochlorothiazide (DIOVAN-HCT) 80-12.5 MG per tablet Take 1 tablet by mouth daily 90 tablet 0    [DISCONTINUED] hydrocortisone 2.5 % cream Apply topically 2 (two) times a day for 14 days Apply 1-2x a day topically to eyebrows for 14 days 30 g 0     Current Facility-Administered Medications on File Prior to Visit   Medication Dose Route Frequency Provider Last Rate Last Admin    [COMPLETED] lidocaine (PF) (XYLOCAINE-MPF) 1 % injection 5 mL  5 mL Infiltration Once Florinda Romero MD   5 mL at 01/16/24 1542     No Known Allergies    Review of Systems    Objective    Vitals:    01/17/24 1101   Temp: 97.8 °F (36.6 °C)       Physical Exam  Mid left axilla is a an  area of erythema.  The middle of this is a firmer area about 2 cm across    Procedures  After permission, the area is prepped and draped in usual fashion.  Timeout taken.  Local anesthesia of 2% lidocaine with epi was infiltrated into the skin and subcutaneous tissue and deeper layers tissue.  11 blade knife was used incision made into the area in question.  No purulence was obtained.  A scissors was used and probed into this area in a number of locations and no gross purulence was identified.  A piece of packing was placed.  Gauze dressing applied.  Tolerated procedure well.

## 2024-01-17 NOTE — ASSESSMENT & PLAN NOTE
I incised the area but no gross purulence was identified.  Packing was placed.  She is asked to remove the packing tomorrow and keep the wound covered as long as it is draining.  With longstanding history of C. difficile in the past, did not feel we should do antibiotics at this time.

## 2024-01-22 ENCOUNTER — OFFICE VISIT (OUTPATIENT)
Dept: PHYSICAL THERAPY | Facility: REHABILITATION | Age: 44
End: 2024-01-22
Payer: COMMERCIAL

## 2024-01-22 DIAGNOSIS — M76.899 QUADRICEPS TENDONITIS: Primary | ICD-10-CM

## 2024-01-22 DIAGNOSIS — M22.2X2 PATELLOFEMORAL SYNDROME OF BOTH KNEES: ICD-10-CM

## 2024-01-22 DIAGNOSIS — M22.2X1 PATELLOFEMORAL SYNDROME OF BOTH KNEES: ICD-10-CM

## 2024-01-22 PROCEDURE — 97112 NEUROMUSCULAR REEDUCATION: CPT

## 2024-01-22 PROCEDURE — 97110 THERAPEUTIC EXERCISES: CPT

## 2024-01-22 NOTE — PROGRESS NOTES
"Daily Note     Today's date: 2024  Patient name: Nely Starr  : 1980  MRN: [id number]  Referring provider: Cherrie Frank MD  Dx:   Encounter Diagnosis     ICD-10-CM    1. Quadriceps tendonitis  M76.899       2. Patellofemoral syndrome of both knees  M22.2X1     M22.2X2           Start Time: 1734  Stop Time: 1815  Total time in clinic (min): 41 minutes    Subjective: Pt reports both knees have been doing.      Objective: See treatment diary below      Assessment: Tolerated treatment well. Continues to make progress toward long term goals.  Challenged with increased reps during TB resisted bridge but able to complete all assigned reps.  Patient demonstrated fatigue post treatment and would benefit from continued PT      Plan: Continue per plan of care.  Dropping visits to once a week.       Precautions: none      Manuals            Patellar mobility sup/inf             ITB STM AFB  bilat                                      Neuro Re-Ed 12/7 12/11 12/13 12/27 1/3 1/4 1/8 1/11 1/15 1/22   SLS on foam              Stepping up to bosu              Stepping laterally to bosu              Wobble board  nv 3 min ea way 3min ea way 3min ea 3min ea 3 min ea 3 min ea 3 min ea 3 min ea 3 min ea   Hip abd iso 5\"  2x5 ea 5\"  2x5 ea 10x3\" ea 10x3\" ea 10x3\" ea 10x3\" ea 10x3\" ea 10x3\" ea 10\"x10 10\" 2x5 ea                             Ther Ex 12/7 12/11 12/13 12/27 1/3 1/4 1/8 1/11 1/15 1/22   Pt education on HEP, POC             VG for LE strength / endurance  L7 7' L7 7' L7 7' L5 8' L5 8' L5 8' L5 8' L5 9' L5 9' L5 9'   SLR flexion 2# 3x8 2# 3x8 3# 2x10 3x10 ea no weight 2# 2x10 2# 2x10 2# 2x10 2# 2x10 2# 2x10 2# 2x10   SLR abduction             Bridges c holds 2x10 RHB 2x10 RHB 2x10 RHB 2x10 RHB 2x10 RHB 2x10 RHB 2x10 RHB 2x10 RHB 2x10 RHB 2x12 RHB   Clamshells c TB              Side steps c TB             [place] walk out 15.0  1x6 ea dir 15.0  1x8 ea dir 15lb 8x ea dir nv 15lb 8x ea dir 18lb 8x ea " dir 18lb 8x ea dir 18lb 6x ea dir 18lb 6x ea dir 18lb 6x ea dir   SL Knee Ext 20#  3x10 ea 20#  3x10 ea           Pball roll out                          Ther Activity                                       Gait Training                                       Modalities

## 2024-01-25 ENCOUNTER — APPOINTMENT (OUTPATIENT)
Dept: PHYSICAL THERAPY | Facility: REHABILITATION | Age: 44
End: 2024-01-25
Payer: COMMERCIAL

## 2024-01-26 ENCOUNTER — OFFICE VISIT (OUTPATIENT)
Dept: FAMILY MEDICINE CLINIC | Facility: CLINIC | Age: 44
End: 2024-01-26
Payer: COMMERCIAL

## 2024-01-26 VITALS
DIASTOLIC BLOOD PRESSURE: 70 MMHG | HEIGHT: 62 IN | WEIGHT: 237.8 LBS | RESPIRATION RATE: 18 BRPM | HEART RATE: 104 BPM | SYSTOLIC BLOOD PRESSURE: 110 MMHG | BODY MASS INDEX: 43.76 KG/M2 | TEMPERATURE: 97.2 F | OXYGEN SATURATION: 98 %

## 2024-01-26 DIAGNOSIS — R21 RASH: Primary | ICD-10-CM

## 2024-01-26 DIAGNOSIS — F41.1 GAD (GENERALIZED ANXIETY DISORDER): ICD-10-CM

## 2024-01-26 DIAGNOSIS — E66.01 MORBID OBESITY (HCC): ICD-10-CM

## 2024-01-26 DIAGNOSIS — J45.20 MILD INTERMITTENT ASTHMA WITHOUT COMPLICATION: ICD-10-CM

## 2024-01-26 DIAGNOSIS — E78.2 MIXED HYPERLIPIDEMIA: ICD-10-CM

## 2024-01-26 DIAGNOSIS — I10 ESSENTIAL HYPERTENSION: ICD-10-CM

## 2024-01-26 DIAGNOSIS — F32.A DEPRESSION, UNSPECIFIED DEPRESSION TYPE: ICD-10-CM

## 2024-01-26 PROCEDURE — 99213 OFFICE O/P EST LOW 20 MIN: CPT

## 2024-01-26 RX ORDER — ALPRAZOLAM 0.25 MG/1
0.25 TABLET ORAL
Qty: 30 TABLET | Refills: 0 | Status: SHIPPED | OUTPATIENT
Start: 2024-01-26

## 2024-01-26 RX ORDER — ALBUTEROL SULFATE 90 UG/1
2 AEROSOL, METERED RESPIRATORY (INHALATION) EVERY 6 HOURS PRN
Qty: 8.5 G | Refills: 0 | Status: SHIPPED | OUTPATIENT
Start: 2024-01-26 | End: 2024-01-26

## 2024-01-26 RX ORDER — ALBUTEROL SULFATE 90 UG/1
2 AEROSOL, METERED RESPIRATORY (INHALATION) EVERY 6 HOURS PRN
Qty: 8.5 G | Refills: 0 | Status: SHIPPED | OUTPATIENT
Start: 2024-01-26

## 2024-01-26 RX ORDER — DULOXETIN HYDROCHLORIDE 60 MG/1
60 CAPSULE, DELAYED RELEASE ORAL DAILY
Qty: 90 CAPSULE | Refills: 0 | Status: SHIPPED | OUTPATIENT
Start: 2024-01-26

## 2024-01-26 RX ORDER — FENOFIBRATE 160 MG/1
160 TABLET ORAL DAILY
Qty: 90 TABLET | Refills: 0 | Status: SHIPPED | OUTPATIENT
Start: 2024-01-26

## 2024-01-26 RX ORDER — VALSARTAN AND HYDROCHLOROTHIAZIDE 80; 12.5 MG/1; MG/1
1 TABLET, FILM COATED ORAL DAILY
Qty: 90 TABLET | Refills: 0 | Status: SHIPPED | OUTPATIENT
Start: 2024-01-26

## 2024-01-26 RX ORDER — ROSUVASTATIN CALCIUM 20 MG/1
20 TABLET, COATED ORAL DAILY
Qty: 90 TABLET | Refills: 0 | Status: SHIPPED | OUTPATIENT
Start: 2024-01-26

## 2024-01-26 NOTE — PROGRESS NOTES
Assessment/Plan:         Problem List Items Addressed This Visit     Morbid obesity (HCC)     Patient was advised to lose weight.  Potential consequences of obesity discussed with patient.  Advised to have portion control no more than 60% of meal or may consider intermittent fasting  We will continue to monitor           Depression     Under control.    Continue current medication.    We will re-evaluate at next office visit.           Relevant Medications    DULoxetine (CYMBALTA) 60 mg delayed release capsule    ALPRAZolam (XANAX) 0.25 mg tablet    Essential hypertension     Under control.    Continue current medication.    We will re-evaluate at next office visit.           Relevant Medications    valsartan-hydrochlorothiazide (DIOVAN-HCT) 80-12.5 MG per tablet    Mixed hyperlipidemia     Under control.  Continue rosuvastatin and fenofibrate.  We will continue to monitor         Relevant Medications    rosuvastatin (CRESTOR) 20 MG tablet    fenofibrate 160 MG tablet    QIANA (generalized anxiety disorder)     Under control.    Continue current medication.    We will re-evaluate at next office visit.           Relevant Medications    DULoxetine (CYMBALTA) 60 mg delayed release capsule    ALPRAZolam (XANAX) 0.25 mg tablet   Other Visit Diagnoses     Rash    -  Primary    Relevant Medications    mupirocin (BACTROBAN) 2 % ointment    Mild intermittent asthma without complication        Relevant Medications    albuterol (ProAir HFA) 90 mcg/act inhaler            Subjective:      Patient ID: Nely Starr is a 43 y.o. female.    Patient here for follow-up.  Recently had abscess on the left armpit and it was incision and drainage without any antibiotic as CT has bad.  History of C. Difficile.  So incision is healing really well.  But now she has some almost like a pimple or pustules around it.  Otherwise feeling okay.  Denies any chest pain, shortness of breath, abdominal pain, nausea, vomiting, fever or chills.  No  lightheadedness or dizziness.  No tingling numbness or weakness.  No other specific problem.        The following portions of the patient's history were reviewed and updated as appropriate:   Past Medical History:  She has a past medical history of Anemia (2/2023), Anxiety, Asthma, Depression, History of Clostridium difficile colitis (2018), Hypercholesterolemia, Hyperlipemia, Hypertension (1/2021), Morbid obesity (HCC), Obesity, Pulmonary nodule, and Sleep apnea.,  _______________________________________________________________________  Medical Problems:  does not have any pertinent problems on file.,  _______________________________________________________________________  Past Surgical History:   has a past surgical history that includes Tonsillectomy; Reduction mammaplasty (1999); pr conization cervix w/wo d&c rpr eltrd exc (N/A, 9/27/2018); and IR aspiration only (1/9/2023).,  _______________________________________________________________________  Family History:  family history includes Anxiety disorder in her father; Arthritis in her maternal grandmother; Asthma in her mother; Bone cancer in her father; COPD in her maternal grandmother; Cancer in her father, maternal grandfather, and paternal grandfather; Hyperlipidemia in her father; Liver cancer (age of onset: 80) in her paternal grandfather; Lung cancer (age of onset: 48) in her father; Lung cancer (age of onset: 80) in her maternal grandfather; No Known Problems in her brother, maternal aunt, maternal aunt, maternal aunt, paternal aunt, paternal grandmother, and sister; Obesity in her mother.,  _______________________________________________________________________  Social History:   reports that she has been smoking cigarettes. She has a 9.0 pack-year smoking history. She has been exposed to tobacco smoke. She has never used smokeless tobacco. She reports current alcohol use. She reports that she does not use  drugs.,  _______________________________________________________________________  Allergies:  has No Known Allergies..  _______________________________________________________________________  Current Outpatient Medications   Medication Sig Dispense Refill   • albuterol (ProAir HFA) 90 mcg/act inhaler Inhale 2 puffs every 6 (six) hours as needed for wheezing 8.5 g 0   • ALPRAZolam (XANAX) 0.25 mg tablet Take 1 tablet (0.25 mg total) by mouth daily at bedtime as needed for anxiety 30 tablet 0   • cholecalciferol (VITAMIN D3) 1,000 units tablet Take by mouth daily       • DULoxetine (CYMBALTA) 60 mg delayed release capsule Take 1 capsule (60 mg total) by mouth daily 90 capsule 0   • fenofibrate 160 MG tablet Take 1 tablet (160 mg total) by mouth daily 90 tablet 0   • fluocinolone (SYNALAR) 0.01 % external solution Apply sparingly to affected areas of scalp 2-4 times a day for up to 2 weeks. 60 mL 1   • ketoconazole (NIZORAL) 2 % shampoo Shampoo twice a week for 8 weeks then as needed.  Lather into scalp and skin on face, neck, chest, and back; leave on for 5 minutes and then rinse off completely. 120 mL 2   • mupirocin (BACTROBAN) 2 % ointment Apply topically 3 (three) times a day 30 g 0   • PREVIDENT 5000 SENSITIVE 1.1-5 % PSTE   3   • rosuvastatin (CRESTOR) 20 MG tablet Take 1 tablet (20 mg total) by mouth daily 90 tablet 0   • SODIUM FLUORIDE, DENTAL GEL, 1.1 % GEL SF 5000 Plus 1.1 % dental cream     • Tranexamic Acid 650 MG TABS Take 2 tablets by mouth TID x 5 days each month (Patient taking differently: Take 2 tablets by mouth TID x 5 days each month - only menstrual cycle) 30 tablet 10   • valsartan-hydrochlorothiazide (DIOVAN-HCT) 80-12.5 MG per tablet Take 1 tablet by mouth daily 90 tablet 0     No current facility-administered medications for this visit.     _______________________________________________________________________  Review of Systems   Constitutional:  Negative for chills, fatigue and fever.  "  HENT:  Negative for congestion, ear pain, rhinorrhea, sneezing, sore throat and voice change.    Eyes:  Negative for redness, itching and visual disturbance.   Respiratory:  Negative for cough, chest tightness, shortness of breath and wheezing.    Cardiovascular:  Negative for chest pain, palpitations and leg swelling.   Gastrointestinal:  Negative for abdominal pain, blood in stool, diarrhea, nausea and vomiting.   Endocrine: Negative for cold intolerance and heat intolerance.   Genitourinary:  Negative for dysuria, frequency and urgency.   Musculoskeletal:  Negative for arthralgias (Bilateral knee pain), back pain and myalgias.   Skin:  Positive for rash (Left axillary region). Negative for color change.   Neurological:  Negative for dizziness, weakness, light-headedness, numbness and headaches.   Hematological:  Does not bruise/bleed easily.   Psychiatric/Behavioral:  Negative for agitation, behavioral problems and confusion.          Objective:  Vitals:    01/26/24 1146   BP: 110/70   BP Location: Right arm   Patient Position: Sitting   Cuff Size: Large   Pulse: 104   Resp: 18   Temp: (!) 97.2 °F (36.2 °C)   TempSrc: Tympanic   SpO2: 98%   Weight: 108 kg (237 lb 12.8 oz)   Height: 5' 2\" (1.575 m)     Body mass index is 43.49 kg/m².     Physical Exam  Vitals and nursing note reviewed.   Constitutional:       General: She is not in acute distress.     Appearance: Normal appearance. She is obese. She is not ill-appearing, toxic-appearing or diaphoretic.   HENT:      Head: Normocephalic and atraumatic.      Right Ear: Tympanic membrane and ear canal normal.      Left Ear: Tympanic membrane and ear canal normal.      Nose: Nose normal. No congestion.      Mouth/Throat:      Mouth: Mucous membranes are moist.   Eyes:      General: No scleral icterus.        Right eye: No discharge.         Left eye: No discharge.      Extraocular Movements: Extraocular movements intact.      Conjunctiva/sclera: Conjunctivae normal. "      Pupils: Pupils are equal, round, and reactive to light.   Cardiovascular:      Rate and Rhythm: Normal rate and regular rhythm.      Pulses: Normal pulses.      Heart sounds: Normal heart sounds. No murmur heard.     No gallop.   Pulmonary:      Effort: Pulmonary effort is normal. No respiratory distress.      Breath sounds: Normal breath sounds. No wheezing, rhonchi or rales.   Abdominal:      General: Abdomen is flat. Bowel sounds are normal. There is no distension.      Palpations: Abdomen is soft.      Tenderness: There is no abdominal tenderness. There is no right CVA tenderness, left CVA tenderness or guarding.   Musculoskeletal:         General: No swelling or tenderness. Normal range of motion.      Cervical back: Normal range of motion and neck supple. No rigidity.      Right lower leg: No edema.      Left lower leg: No edema.   Lymphadenopathy:      Cervical: No cervical adenopathy.   Skin:     General: Skin is warm.      Capillary Refill: Capillary refill takes 2 to 3 seconds.      Coloration: Skin is not jaundiced.      Findings: Rash (Multiple most likely healing pustules) present. No bruising.   Neurological:      General: No focal deficit present.      Mental Status: She is alert and oriented to person, place, and time. Mental status is at baseline.      Motor: No weakness.      Gait: Gait normal.   Psychiatric:         Mood and Affect: Mood normal.         Behavior: Behavior normal.

## 2024-01-29 ENCOUNTER — OFFICE VISIT (OUTPATIENT)
Dept: PHYSICAL THERAPY | Facility: REHABILITATION | Age: 44
End: 2024-01-29
Payer: COMMERCIAL

## 2024-01-29 DIAGNOSIS — M76.899 QUADRICEPS TENDONITIS: Primary | ICD-10-CM

## 2024-01-29 DIAGNOSIS — M22.2X1 PATELLOFEMORAL SYNDROME OF BOTH KNEES: ICD-10-CM

## 2024-01-29 DIAGNOSIS — M22.2X2 PATELLOFEMORAL SYNDROME OF BOTH KNEES: ICD-10-CM

## 2024-01-29 PROCEDURE — 97110 THERAPEUTIC EXERCISES: CPT

## 2024-01-29 PROCEDURE — 97112 NEUROMUSCULAR REEDUCATION: CPT

## 2024-01-29 NOTE — PROGRESS NOTES
"Daily Note     Today's date: 2024  Patient name: Nely Starr  : 1980  MRN: [id number]  Referring provider: Cherrie Frank MD  Dx:   Encounter Diagnosis     ICD-10-CM    1. Quadriceps tendonitis  M76.899       2. Patellofemoral syndrome of both knees  M22.2X1     M22.2X2           Start Time: 1736  Stop Time: 1818  Total time in clinic (min): 42 minutes    Subjective: Pt reports both knees have been holding up pretty well with only 1 visit per week and has not had any significant flare ups with her daily activities.        Objective: See treatment diary below      Assessment: Tolerated treatment well. Continued with program as outlined below.  Continues to be challenged with all hip/glute strengthening interventions with noticeable fatigue following these exercises.  Able to complete all exercise without any complaints of pain.  Patient would benefit from continued PT to further improve strength and maximize overall function.        Plan: Continue per plan of care.   Progress as able.       Precautions: none      Manuals             Patellar mobility sup/inf             ITB STM                                       Neuro Re-Ed 1/29   12/27 1/3 1/4 1/8 1/11 1/15 1/22   SLS on foam              Stepping up to bosu              Stepping laterally to bosu              Wobble board  3 min ea   3min ea 3min ea 3 min ea 3 min ea 3 min ea 3 min ea 3 min ea   Hip abd iso 10\" 2x5 ea   10x3\" ea 10x3\" ea 10x3\" ea 10x3\" ea 10x3\" ea 10\"x10 10\" 2x5 ea                             Ther Ex 1/29   12/27 1/3 1/4 1/8 1/11 1/15 1/22   Pt education on HEP, POC             VG for LE strength / endurance  L5 9'   L5 8' L5 8' L5 8' L5 8' L5 9' L5 9' L5 9'   SLR flexion 2# 2x10   3x10 ea no weight 2# 2x10 2# 2x10 2# 2x10 2# 2x10 2# 2x10 2# 2x10   SLR abduction             Bridges c holds 2x10 RHB   2x10 RHB 2x10 RHB 2x10 RHB 2x10 RHB 2x10 RHB 2x10 RHB 2x12 RHB   Clamshells c TB              Side steps c TB           "   [place] walk out 18lb 6x ea dir   nv 15lb 8x ea dir 18lb 8x ea dir 18lb 8x ea dir 18lb 6x ea dir 18lb 6x ea dir 18lb 6x ea dir   SL Knee Ext             Pball roll out                          Ther Activity                                       Gait Training                                       Modalities

## 2024-01-30 ENCOUNTER — OFFICE VISIT (OUTPATIENT)
Dept: SURGERY | Facility: CLINIC | Age: 44
End: 2024-01-30
Payer: COMMERCIAL

## 2024-01-30 VITALS — BODY MASS INDEX: 43.58 KG/M2 | WEIGHT: 236.8 LBS | TEMPERATURE: 97.8 F | HEIGHT: 62 IN

## 2024-01-30 DIAGNOSIS — L02.412 ABSCESS OF LEFT AXILLA: Primary | ICD-10-CM

## 2024-01-30 PROCEDURE — 99212 OFFICE O/P EST SF 10 MIN: CPT | Performed by: SURGERY

## 2024-01-30 RX ORDER — DOXYCYCLINE 100 MG/1
100 TABLET ORAL 2 TIMES DAILY
Qty: 20 TABLET | Refills: 0 | Status: SHIPPED | OUTPATIENT
Start: 2024-01-30 | End: 2024-02-09

## 2024-01-30 NOTE — PROGRESS NOTES
Office Visit - General Surgery  Nely Starr MRN: [id number]  Encounter: [id number]  Assessment/Plan    Problem List Items Addressed This Visit          Other    Abscess of left axilla - Primary     The area that was drained seems to resolve.  There is a few superficial red areas.  I put her on doxycycline for 10 days if that helps settle things down.  She will let me know in a few days how things are going.  Call sooner if something else changes.         Relevant Medications    doxycycline (ADOXA) 100 MG tablet       Subjective    Nely Starr is a 43 y.o. female who presents for follow-up.  I tried to drain an abscess recently but nothing was expressed.  Since then, she noted several red spots that drained purulent type material.  She was seen by family doctor put on Bactroban for the rash she area.  She notices a pea-sized lump in the left axilla near the previous incision site.  There are couple other red marks a bit lower down on the lateral chest wall.        Pt  has a past medical history of Anemia (2/2023), Anxiety, Asthma, Depression, History of Clostridium difficile colitis (2018), Hypercholesterolemia, Hyperlipemia, Hypertension (1/2021), Morbid obesity (HCC), Obesity, Pulmonary nodule, and Sleep apnea.    Pt  has a past surgical history that includes Tonsillectomy; Reduction mammaplasty (1999); pr conization cervix w/wo d&c rpr eltrd exc (N/A, 9/27/2018); and IR aspiration only (1/9/2023).    family history includes Anxiety disorder in her father; Arthritis in her maternal grandmother; Asthma in her mother; Bone cancer in her father; COPD in her maternal grandmother; Cancer in her father, maternal grandfather, and paternal grandfather; Hyperlipidemia in her father; Liver cancer (age of onset: 80) in her paternal grandfather; Lung cancer (age of onset: 48) in her father; Lung cancer (age of onset: 80) in her maternal grandfather; No Known Problems in her brother, maternal aunt, maternal aunt, maternal  aunt, paternal aunt, paternal grandmother, and sister; Obesity in her mother.    Nely Starr reports that she has been smoking cigarettes. She has a 9.0 pack-year smoking history. She has been exposed to tobacco smoke. She has never used smokeless tobacco. She reports current alcohol use. She reports that she does not use drugs.      Current Outpatient Medications on File Prior to Visit   Medication Sig Dispense Refill    albuterol (ProAir HFA) 90 mcg/act inhaler Inhale 2 puffs every 6 (six) hours as needed for wheezing 8.5 g 0    ALPRAZolam (XANAX) 0.25 mg tablet Take 1 tablet (0.25 mg total) by mouth daily at bedtime as needed for anxiety 30 tablet 0    cholecalciferol (VITAMIN D3) 1,000 units tablet Take by mouth daily        DULoxetine (CYMBALTA) 60 mg delayed release capsule Take 1 capsule (60 mg total) by mouth daily 90 capsule 0    fenofibrate 160 MG tablet Take 1 tablet (160 mg total) by mouth daily 90 tablet 0    fluocinolone (SYNALAR) 0.01 % external solution Apply sparingly to affected areas of scalp 2-4 times a day for up to 2 weeks. 60 mL 1    ketoconazole (NIZORAL) 2 % shampoo Shampoo twice a week for 8 weeks then as needed.  Lather into scalp and skin on face, neck, chest, and back; leave on for 5 minutes and then rinse off completely. 120 mL 2    mupirocin (BACTROBAN) 2 % ointment Apply topically 3 (three) times a day 30 g 0    PREVIDENT 5000 SENSITIVE 1.1-5 % PSTE   3    rosuvastatin (CRESTOR) 20 MG tablet Take 1 tablet (20 mg total) by mouth daily 90 tablet 0    SODIUM FLUORIDE, DENTAL GEL, 1.1 % GEL SF 5000 Plus 1.1 % dental cream      Tranexamic Acid 650 MG TABS Take 2 tablets by mouth TID x 5 days each month (Patient taking differently: Take 2 tablets by mouth TID x 5 days each month - only menstrual cycle) 30 tablet 10    valsartan-hydrochlorothiazide (DIOVAN-HCT) 80-12.5 MG per tablet Take 1 tablet by mouth daily 90 tablet 0    [DISCONTINUED] hydrocortisone 2.5 % cream Apply topically 2  (two) times a day for 14 days Apply 1-2x a day topically to eyebrows for 14 days 30 g 0     No current facility-administered medications on file prior to visit.     No Known Allergies    Review of Systems    Objective    Vitals:    01/30/24 1027   Temp: 97.8 °F (36.6 °C)       Physical Exam  Left axillary incision well-healed small nodule less than a centimeter in size in the lower part of the axilla.  Somewhat further down on the chest wall is couple red marks with little firmness underneath scabbed over

## 2024-01-30 NOTE — ASSESSMENT & PLAN NOTE
The area that was drained seems to resolve.  There is a few superficial red areas.  I put her on doxycycline for 10 days if that helps settle things down.  She will let me know in a few days how things are going.  Call sooner if something else changes.

## 2024-02-01 ENCOUNTER — APPOINTMENT (OUTPATIENT)
Dept: PHYSICAL THERAPY | Facility: REHABILITATION | Age: 44
End: 2024-02-01
Payer: COMMERCIAL

## 2024-02-05 ENCOUNTER — OFFICE VISIT (OUTPATIENT)
Dept: PHYSICAL THERAPY | Facility: REHABILITATION | Age: 44
End: 2024-02-05
Payer: COMMERCIAL

## 2024-02-05 DIAGNOSIS — M22.2X2 PATELLOFEMORAL SYNDROME OF BOTH KNEES: ICD-10-CM

## 2024-02-05 DIAGNOSIS — M76.899 QUADRICEPS TENDONITIS: Primary | ICD-10-CM

## 2024-02-05 DIAGNOSIS — M22.2X1 PATELLOFEMORAL SYNDROME OF BOTH KNEES: ICD-10-CM

## 2024-02-05 PROCEDURE — 97112 NEUROMUSCULAR REEDUCATION: CPT

## 2024-02-05 PROCEDURE — 97110 THERAPEUTIC EXERCISES: CPT

## 2024-02-05 NOTE — PROGRESS NOTES
"Daily Note     Today's date: 2024  Patient name: Nely Starr  : 1980  MRN: [id number]  Referring provider: Cherrie Frank MD  Dx:   Encounter Diagnosis     ICD-10-CM    1. Quadriceps tendonitis  M76.899       2. Patellofemoral syndrome of both knees  M22.2X1     M22.2X2           Start Time:   Stop Time: 1855  Total time in clinic (min): 40 minutes    Subjective: Pt reports both knees have been doing well with no new complaints today.       Objective: See treatment diary below      Assessment: Tolerated treatment well. Continued with program as outlined below.  Continues to be challenged with all hip/glute strengthening interventions.  Progressed with SLS ABC's to further improve glute stability.  Was challenged with this progression but able to complete without any increased pain; challenged RLE > LLE  Patient would benefit from continued PT.      Plan: Continue per plan of care.   Progress as able.       Precautions: none      Manuals             Patellar mobility sup/inf             ITB STM                                       Neuro Re-Ed 1/29 2/5  12/27 1/3 1/4 1/8 1/11 1/15 1/22   SLS on foam              Stepping up to bosu              Stepping laterally to bosu              Wobble board  3 min ea 3 min ea  3min ea 3min ea 3 min ea 3 min ea 3 min ea 3 min ea 3 min ea   Hip abd iso 10\" 2x5 ea   10x3\" ea 10x3\" ea 10x3\" ea 10x3\" ea 10x3\" ea 10\"x10 10\" 2x5 ea   SLS ABC  4lb ball  X1 ea                        Ther Ex 1/29 2/5  12/27 1/3 1/4 1/8 1/11 1/15 1/22   Pt education on HEP, POC             VG for LE strength / endurance  L5 9' L5 8' ROM  L5 8' L5 8' L5 8' L5 8' L5 9' L5 9' L5 9'   SLR flexion 2# 2x10 2.5# 2x10  3x10 ea no weight 2# 2x10 2# 2x10 2# 2x10 2# 2x10 2# 2x10 2# 2x10   SLR abduction             Bridges c holds 2x10 RHB 2x10 RHB  2x10 RHB 2x10 RHB 2x10 RHB 2x10 RHB 2x10 RHB 2x10 RHB 2x12 RHB   Clamshells c TB              Side steps c TB             [place] walk out 18lb 6x " ea dir 18lb 6x ea dir  nv 15lb 8x ea dir 18lb 8x ea dir 18lb 8x ea dir 18lb 6x ea dir 18lb 6x ea dir 18lb 6x ea dir   SL Knee Ext             Pball roll out                          Ther Activity                                       Gait Training                                       Modalities

## 2024-02-09 DIAGNOSIS — B37.31 VAGINAL CANDIDIASIS: Primary | ICD-10-CM

## 2024-02-09 RX ORDER — FLUCONAZOLE 150 MG/1
150 TABLET ORAL ONCE
Qty: 1 TABLET | Refills: 0 | Status: SHIPPED | OUTPATIENT
Start: 2024-02-09 | End: 2024-02-09

## 2024-02-12 ENCOUNTER — OFFICE VISIT (OUTPATIENT)
Dept: PHYSICAL THERAPY | Facility: REHABILITATION | Age: 44
End: 2024-02-12
Payer: COMMERCIAL

## 2024-02-12 DIAGNOSIS — M22.2X1 PATELLOFEMORAL SYNDROME OF BOTH KNEES: ICD-10-CM

## 2024-02-12 DIAGNOSIS — M76.899 QUADRICEPS TENDONITIS: Primary | ICD-10-CM

## 2024-02-12 DIAGNOSIS — M22.2X2 PATELLOFEMORAL SYNDROME OF BOTH KNEES: ICD-10-CM

## 2024-02-12 LAB
APOB+LDLR+PCSK9 GENE MUT ANL BLD/T: NOT DETECTED
BRCA1+BRCA2 DEL+DUP + FULL MUT ANL BLD/T: NOT DETECTED
MLH1+MSH2+MSH6+PMS2 GN DEL+DUP+FUL M: NOT DETECTED

## 2024-02-12 PROCEDURE — 97110 THERAPEUTIC EXERCISES: CPT

## 2024-02-12 PROCEDURE — 97112 NEUROMUSCULAR REEDUCATION: CPT

## 2024-02-12 NOTE — PROGRESS NOTES
"Daily Note     Today's date: 2024  Patient name: Nely Starr  : 1980  MRN: [id number]  Referring provider: Cherrie Frank MD  Dx:   Encounter Diagnosis     ICD-10-CM    1. Quadriceps tendonitis  M76.899       2. Patellofemoral syndrome of both knees  M22.2X1     M22.2X2           Start Time: 1730  Stop Time: 1813  Total time in clinic (min): 43 minutes    Subjective: Pt reports both knees have been feeling pretty good with no new complaints since LV.  Feels ready for discharge today.        Objective: See treatment diary below  HEP updated today.  Access Code: [email or web address]       Assessment: Tolerated treatment well. Continued with program as outlined below.  Demonstrates good understanding of program.  Able to complete all exercise with no complaints of pain.  Is ready for discharged today.      Plan: Patient and evaluating physical therapist in agreement for discharge to [facility] this visit.      Precautions: none      Manuals             Patellar mobility sup/inf             ITB STM                                       Neuro Re-Ed 1/29 2/5 2/12  1/3 1/4 1/8 1/11 1/15 1/22   SLS on foam              Stepping up to bosu              Stepping laterally to bosu              Wobble board  3 min ea 3 min ea 3 min ea  3min ea 3 min ea 3 min ea 3 min ea 3 min ea 3 min ea   Hip abd iso 10\" 2x5 ea    10x3\" ea 10x3\" ea 10x3\" ea 10x3\" ea 10\"x10 10\" 2x5 ea   SLS ABC  4lb ball  X1 ea 4lb ball  X1 ea                       Ther Ex 1/29 2/5 2/12  1/3 1/4 1/8 1/11 1/15 1/22   Pt education on HEP, POC   5'          VG for LE strength / endurance  L5 9' L5 8' ROM L5 8' ROM  L5 8' L5 8' L5 8' L5 9' L5 9' L5 9'   SLR flexion 2# 2x10 2.5# 2x10 2.5# 2x10  2# 2x10 2# 2x10 2# 2x10 2# 2x10 2# 2x10 2# 2x10   SLR abduction             Bridges c holds 2x10 RHB 2x10 RHB 2x10 RHB  2x10 RHB 2x10 RHB 2x10 RHB 2x10 RHB 2x10 RHB 2x12 RHB   Clamshells c TB              Side steps c TB             [place] walk out 18lb 6x ea dir 18lb 6x ea " dir 18lb 6x ea dir  15lb 8x ea dir 18lb 8x ea dir 18lb 8x ea dir 18lb 6x ea dir 18lb 6x ea dir 18lb 6x ea dir   SL Knee Ext             Pball roll out                          Ther Activity                                       Gait Training                                       Modalities

## 2024-02-16 ENCOUNTER — TELEPHONE (OUTPATIENT)
Dept: FAMILY MEDICINE CLINIC | Facility: CLINIC | Age: 44
End: 2024-02-16

## 2024-02-16 PROBLEM — J09.X2 INFECTION DUE TO NOVEL INFLUENZA A VIRUS: Status: RESOLVED | Noted: 2023-12-18 | Resolved: 2024-02-16

## 2024-02-16 NOTE — TELEPHONE ENCOUNTER
Called pt in regards for her yeast infection, and how she is feeling and taken care of. Left a detailed message for a call back.

## 2024-02-23 NOTE — PROGRESS NOTES
[facility] Progress Note  Patient: Salma Edwards 41 y o  female   MRN: [id number]  PCP: Jacquie Sánchez MD  Unit/Bed#: [facility] 468-36 Encounter: [id number]  Date Of Visit: 01/01/19    Assessment:    Principal Problem:    Acute respiratory failure with hypoxia and hypercapnia (HCC)  Active Problems:    Hypercholesterolemia    Mild intermittent asthma with status asthmaticus    Obesity    Depression with anxiety    Tobacco use    Leukocytosis    History of Clostridium difficile colitis      Plan:    1  Mild intermittent asthma with status asthmaticus, slowly improving on IV Solu-Medrol and bronchodilators, negative chest x-ray,  respiratory pathogen profile positive for rhinovirus, pulmonology is following, add Mucinex and incentive spirometry  2  Acute hypoxic respiratory failure, secondary to above, improving, continue to wean oxygen  3  Tobacco smoking, continue nicotine patch   4  Anxiety and depression, continue Cymbalta and Topamax  5  Hyperlipidemia, continue statin  6  Leukocytosis, likely steroid effect, monitor  7  History of C diff colitis       VTE Pharmacologic Prophylaxis:   Pharmacologic: Enoxaparin (Lovenox)  Mechanical VTE Prophylaxis in Place: Yes    Patient Centered Rounds: I have performed bedside rounds with nursing staff today  Discussions with Specialists or Other Care Team Provider:     Education and Discussions with Family / Patient:  Patient    Time Spent for Care: 30 minutes  More than 50% of total time spent on counseling and coordination of care as described above      Current Length of Stay: 3 day(s)    Current Patient Status: Inpatient   Certification Statement: The patient will continue to require additional inpatient hospital stay due to Management of asthma exacerbation    Discharge Plan / Estimated Discharge Date: not ready yet    Code Status: Level 1 - Full Code      Subjective:     Patient seen and examined  Continue to have cough,  chest congestion and dyspnea on exertion  No nausea vomiting or diarrhea      Objective:     Vitals:   Temp (24hrs), Av 1 °F (36 7 °C), Min:97 8 °F (36 6 °C), Max:98 5 °F (36 9 °C)    Temp:  [97 8 °F (36 6 °C)-98 5 °F (36 9 °C)] 98 °F (36 7 °C)  HR:  [66-98] 76  Resp:  [20] 20  BP: (125-149)/(63-88) 140/71  SpO2:  [95 %-100 %] 95 %  Body mass index is 37 49 kg/m²  Input and Output Summary (last 24 hours): Intake/Output Summary (Last 24 hours) at 19 0817  Last data filed at 19 0733   Gross per 24 hour   Intake              840 ml   Output             1525 ml   Net             -685 ml       Physical Exam:     Physical Exam  Patient is awake alert oriented no acute distress  Lungs with diffuse expiratory wheezing  Heart positive S1-S2 no murmur  Abdomen soft nontender positive bowel sounds  Lower extremities no edema    Additional Data:     Labs:      Results from last 7 days  Lab Units 18  0623   WBC Thousand/uL 14 20*   HEMOGLOBIN g/dL 11 3*   HEMATOCRIT % 34 7*   PLATELETS Thousands/uL 180   NEUTROS PCT % 89*   LYMPHS PCT % 6*   MONOS PCT % 4   EOS PCT % 0       Results from last 7 days  Lab Units 18  0623 18  0450 18  1459   POTASSIUM mmol/L 4 1 3 9  --    CHLORIDE mmol/L 110* 107  --    CO2 mmol/L 23 25  --    CO2, I-STAT mmol/L  --   --  21   BUN mg/dL 14 11  --    CREATININE mg/dL 0 66 0 60  --    CALCIUM mg/dL 8 1* 7 5*  --    ALK PHOS U/L  --  69  --    ALT U/L  --  15  --    AST U/L  --  10  --    GLUCOSE, ISTAT mg/dl  --   --  164*           * I Have Reviewed All Lab Data Listed Above  * Additional Pertinent Lab Tests Reviewed: [facility] Admission Reviewed    Imaging:    Imaging Reports Reviewed Today Include:   Imaging Personally Reviewed by Myself Includes:      Recent Cultures (last 7 days):       Results from last 7 days  Lab Units 18  0043 18  1532 18  1403   BLOOD CULTURE   --  No Growth at 48 hrs    No Growth at 48 hrs   --    INFLUENZA B PCR --   --  None Detected   RSV PCR   --   --  None Detected   LEGIONELLA URINARY ANTIGEN  Negative  --   --        Last 24 Hours Medication List:     Current Facility-Administered Medications:  acetaminophen 650 mg Oral Q6H PRN MABEL Robbins   ALPRAZolam 0 25 mg Oral BID PRN Lida Rideau, MABEL   ascorbic acid 500 mg Oral Daily Lida Rideau, MABEL   atorvastatin 20 mg Oral Daily With [employer], MABEL   cholecalciferol 1,000 Units Oral Daily Lida Rideau, MABEL   DULoxetine 60 mg Oral Daily Christina Fedoryszak, MABEL   enoxaparin 40 mg Subcutaneous Daily MABEL Robbins   influenza vaccine 0 5 mL Intramuscular Prior to discharge Lida Ridromie, MABEL   insulin lispro 1-5 Units Subcutaneous TID AC Elizabeth Middleton, MABEL   insulin lispro 1-5 Units Subcutaneous HS MABEL Best   ipratropium 0 5 mg Nebulization Q6H Janie Cage,    levalbuterol 1 25 mg Nebulization Q6H Janie Cage DO   methylPREDNISolone sodium succinate 40 mg Intravenous Q8H [address] Allan Baez PA-C   nicotine 7 mg Transdermal Daily Lida Rideau, MABEL   norgestrel-ethinyl estradiol 1 tablet Oral Daily Lida Rideau, MABEL   ondansetron 4 mg Intravenous Q4H PRN Lida Rideau, MABEL   senna-docusate sodium 1 tablet Oral BID Lida Rideau, MABEL   topiramate 25 mg Oral BID MABEL Robbins        Today, Patient Was Seen By: Cody Mercer DO    ** Please Note: This note has been constructed using a voice recognition system   ** Detail Level: Simple Additional Notes: ED&C 7/2023. Render Risk Assessment In Note?: no

## 2024-02-28 ENCOUNTER — OFFICE VISIT (OUTPATIENT)
Dept: URGENT CARE | Age: 44
End: 2024-02-28
Payer: COMMERCIAL

## 2024-02-28 VITALS
DIASTOLIC BLOOD PRESSURE: 100 MMHG | RESPIRATION RATE: 16 BRPM | TEMPERATURE: 98.3 F | SYSTOLIC BLOOD PRESSURE: 150 MMHG | HEART RATE: 70 BPM | OXYGEN SATURATION: 98 %

## 2024-02-28 DIAGNOSIS — J01.90 ACUTE SINUSITIS, RECURRENCE NOT SPECIFIED, UNSPECIFIED LOCATION: Primary | ICD-10-CM

## 2024-02-28 PROCEDURE — 99213 OFFICE O/P EST LOW 20 MIN: CPT

## 2024-02-28 RX ORDER — PREDNISONE 20 MG/1
20 TABLET ORAL 2 TIMES DAILY WITH MEALS
Qty: 10 TABLET | Refills: 0 | Status: SHIPPED | OUTPATIENT
Start: 2024-02-28 | End: 2024-03-04

## 2024-02-28 RX ORDER — DM/ACETAMINOPHEN/DOXYLAMINE 10-325/15
30 LIQUID (ML) ORAL 3 TIMES DAILY PRN
Qty: 355 ML | Refills: 0 | Status: SHIPPED | OUTPATIENT
Start: 2024-02-28

## 2024-02-28 RX ORDER — FLUTICASONE PROPIONATE 50 MCG
1 SPRAY, SUSPENSION (ML) NASAL DAILY
Qty: 11.1 ML | Refills: 0 | Status: SHIPPED | OUTPATIENT
Start: 2024-02-28

## 2024-02-28 NOTE — LETTER
February 28, 2024     Patient: Nely Starr   YOB: 1980   Date of Visit: 2/28/2024       To Whom it May Concern:    Nely Starr was seen in my clinic on 2/28/2024. She may return on 03/01/2024.     If you have any questions or concerns, please don't hesitate to call.         Sincerely,          MABEL Cunningham        CC: No Recipients

## 2024-02-29 NOTE — PROGRESS NOTES
[facility]        NAME: Nely Starr is a 43 y.o. female  : 1980    MRN: [id number]  DATE: 2024  TIME: 8:11 PM    Assessment and Plan   Acute sinusitis, recurrence not specified, unspecified location [J01.90]  1. Acute sinusitis, recurrence not specified, unspecified location  fluticasone (FLONASE) 50 mcg/act nasal spray    Dextromethorphan-GG-APAP (Coricidin HBP Cold/Cough/Flu) -325 MG/15ML LIQD    predniSONE 20 mg tablet      Please begin short course of prednisone as directed.   Please begin Flonase and Coricidin as directed. Discontinue use of Afrin after 3 days of use to avoid rebound nasal congestion.   Stay well hydrated. A cool mist humidifier/hot shower steam may be helpful.   Follow up with PCP if no relief within one week.       Patient Instructions   Sinusitis   WHAT YOU NEED TO KNOW:   Sinusitis is inflammation or infection of your sinuses. Sinusitis is most often caused by a virus. Acute sinusitis may last up to 12 weeks. Chronic sinusitis lasts longer than 12 weeks. Recurrent sinusitis means you have 4 or more infections in 1 year.         DISCHARGE INSTRUCTIONS:   Return to the emergency department if:   You have trouble breathing or wheezing that is getting worse.     You have a stiff neck, a fever, or a bad headache.      You cannot open your eye.      Your eyeball bulges out or you cannot move your eye.      You are more sleepy than normal, or you notice changes in your ability to think, move, or talk.     You have swelling of your forehead or scalp.     Call your doctor if:   You have vision changes, such as double vision.     Your eye and eyelid are red, swollen, and painful.      Your symptoms do not improve or go away after 10 days.     You have nausea and are vomiting.     Your nose is bleeding.     You have questions or concerns about your condition or care.     Medicines:  Your symptoms may go away on their own. Your healthcare provider may recommend  watchful waiting for up to 10 days before starting antibiotics. You may need any of the following:  Acetaminophen  decreases pain and fever. It is available without a doctor's order. Ask how much to take and how often to take it. Follow directions. Read the labels of all other medicines you are using to see if they also contain acetaminophen, or ask your doctor or pharmacist. Acetaminophen can cause liver damage if not taken correctly.     NSAIDs , such as ibuprofen, help decrease swelling, pain, and fever. This medicine is available with or without a doctor's order. NSAIDs can cause stomach bleeding or kidney problems in certain people. If you take blood thinner medicine, always ask your healthcare provider if NSAIDs are safe for you. Always read the medicine label and follow directions.     Nasal steroid sprays  may help decrease inflammation in your nose and sinuses.     Decongestants  help reduce swelling and drain mucus in the nose and sinuses. They may help you breathe easier.      Antihistamines  help dry mucus in the nose and relieve sneezing.      Antibiotics  help treat or prevent a bacterial infection.     Take your medicine as directed.  Contact your healthcare provider if you think your medicine is not helping or if you have side effects. Tell your provider if you are allergic to any medicine. Keep a list of the medicines, vitamins, and herbs you take. Include the amounts, and when and why you take them. Bring the list or the pill bottles to follow-up visits. Carry your medicine list with you in case of an emergency.     Self-care:   Rinse your sinuses as directed.  Use a sinus rinse device to rinse your nasal passages with a saline (salt water) solution or distilled water. Do not use tap water. This will help thin the mucus in your nose and rinse away pollen and dirt. It will also help reduce swelling so you can breathe normally.     Use a humidifier  to increase air moisture in your home. This may  make it easier for you to breathe and help decrease your cough.      Sleep with your head elevated.  Place an extra pillow under your head before you go to sleep to help your sinuses drain.      Drink liquids as directed.  Ask your healthcare provider how much liquid to drink each day and which liquids are best for you. Liquids will thin the mucus in your nose and help it drain. Avoid drinks that contain alcohol or caffeine.      Do not smoke, and avoid secondhand smoke.  Nicotine and other chemicals in cigarettes and cigars can make your symptoms worse. Ask your healthcare provider for information if you currently smoke and need help to quit. E-cigarettes or smokeless tobacco still contain nicotine. Talk to your healthcare provider before you use these products.     Prevent the spread of germs:   Wash your hands often with soap and water.  Wash your hands after you use the bathroom, change a child's diaper, or sneeze. Wash your hands before you prepare or eat food.          Stay away from people who are sick.  Some germs spread easily and quickly through contact.     Follow up with your doctor as directed:  You may be referred to an ear, nose, and throat specialist. Write down your questions so you remember to ask them during your visits.   © Copyright [employer] 2023 Information is for End User's use only and may not be sold, redistributed or otherwise used for commercial purposes.  The above information is an  only. It is not intended as medical advice for individual conditions or treatments. Talk to your doctor, nurse or pharmacist before following any medical regimen to see if it is safe and effective for you.       Follow up with PCP in 3-5 days.  Proceed to  ER if symptoms worsen.    Chief Complaint     Chief Complaint   Patient presents with    Cold Like Symptoms     Head pressure, sinus congestion and bilateral ear ache x 2 days.          History of Present Illness       Sinusitis  This is a  new problem. The current episode started in the past 7 days (x 2 days). The problem is unchanged. There has been no fever. Associated symptoms include ear pain, headaches and sinus pressure. Pertinent negatives include no chills, congestion, coughing, diaphoresis, hoarse voice, neck pain, shortness of breath, sneezing, sore throat or swollen glands. Past treatments include nasal decongestants. The treatment provided no relief (takin Afrin x 2 days).       Review of Systems   Review of Systems   Constitutional:  Negative for chills, diaphoresis, fatigue and fever.   HENT:  Positive for ear pain, postnasal drip and sinus pressure. Negative for congestion, ear discharge, hoarse voice, rhinorrhea, sinus pain, sneezing and sore throat.    Eyes: Negative.  Negative for pain, discharge, redness and itching.   Respiratory: Negative.  Negative for apnea, cough, choking, chest tightness, shortness of breath, wheezing and stridor.    Cardiovascular: Negative.  Negative for chest pain and palpitations.   Gastrointestinal: Negative.  Negative for diarrhea, nausea and vomiting.   Endocrine: Negative.  Negative for polydipsia, polyphagia and polyuria.   Genitourinary: Negative.  Negative for decreased urine volume and flank pain.   Musculoskeletal: Negative.  Negative for arthralgias, back pain, myalgias, neck pain and neck stiffness.   Skin: Negative.  Negative for color change and rash.   Allergic/Immunologic: Negative.  Negative for environmental allergies.   Neurological:  Positive for headaches. Negative for dizziness, facial asymmetry, light-headedness and numbness.   Hematological: Negative.  Negative for adenopathy.   Psychiatric/Behavioral: Negative.           Current Medications       Current Outpatient Medications:     Dextromethorphan-GG-APAP (Coricidin HBP Cold/Cough/Flu) -325 MG/15ML LIQD, Take 30 mL by mouth 3 (three) times a day as needed (cough, congestion), Disp: 355 mL, Rfl: 0    fluticasone (FLONASE) 50  mcg/act nasal spray, 1 spray into each nostril daily, Disp: 11.1 mL, Rfl: 0    predniSONE 20 mg tablet, Take 1 tablet (20 mg total) by mouth 2 (two) times a day with meals for 5 days, Disp: 10 tablet, Rfl: 0    albuterol (ProAir HFA) 90 mcg/act inhaler, Inhale 2 puffs every 6 (six) hours as needed for wheezing, Disp: 8.5 g, Rfl: 0    ALPRAZolam (XANAX) 0.25 mg tablet, Take 1 tablet (0.25 mg total) by mouth daily at bedtime as needed for anxiety, Disp: 30 tablet, Rfl: 0    cholecalciferol (VITAMIN D3) 1,000 units tablet, Take by mouth daily  , Disp: , Rfl:     DULoxetine (CYMBALTA) 60 mg delayed release capsule, Take 1 capsule (60 mg total) by mouth daily, Disp: 90 capsule, Rfl: 0    fenofibrate 160 MG tablet, Take 1 tablet (160 mg total) by mouth daily, Disp: 90 tablet, Rfl: 0    fluocinolone (SYNALAR) 0.01 % external solution, Apply sparingly to affected areas of scalp 2-4 times a day for up to 2 weeks., Disp: 60 mL, Rfl: 1    ketoconazole (NIZORAL) 2 % shampoo, Shampoo twice a week for 8 weeks then as needed.  Lather into scalp and skin on face, neck, chest, and back; leave on for 5 minutes and then rinse off completely., Disp: 120 mL, Rfl: 2    mupirocin (BACTROBAN) 2 % ointment, Apply topically 3 (three) times a day, Disp: 30 g, Rfl: 0    PREVIDENT 5000 SENSITIVE 1.1-5 % PSTE, , Disp: , Rfl: 3    rosuvastatin (CRESTOR) 20 MG tablet, Take 1 tablet (20 mg total) by mouth daily, Disp: 90 tablet, Rfl: 0    SODIUM FLUORIDE, DENTAL GEL, 1.1 % GEL, SF 5000 Plus 1.1 % dental cream, Disp: , Rfl:     Tranexamic Acid 650 MG TABS, Take 2 tablets by mouth TID x 5 days each month (Patient taking differently: Take 2 tablets by mouth TID x 5 days each month - only menstrual cycle), Disp: 30 tablet, Rfl: 10    valsartan-hydrochlorothiazide (DIOVAN-HCT) 80-12.5 MG per tablet, Take 1 tablet by mouth daily, Disp: 90 tablet, Rfl: 0    Current Allergies     Allergies as of 02/28/2024    (No Known Allergies)            The following  portions of the patient's history were reviewed and updated as appropriate: allergies, current medications, past family history, past medical history, past social history, past surgical history and problem list.     Past Medical History:   Diagnosis Date    Anemia 2023    Anxiety     Asthma     Depression     History of Clostridium difficile colitis 2018    x 2 episodes, after antibiotics    Hypercholesterolemia     Hyperlipemia     Hypertension 2021    Morbid obesity (HCC)     Obesity     Pulmonary nodule     Sleep apnea     c pap       Past Surgical History:   Procedure Laterality Date    IR ASPIRATION ONLY  2023    NM CONIZATION CERVIX W/WO D&C RPR ELTRD EXC N/A 2018    Procedure: BIOPSY LEEP CERVIX;  Surgeon: Radha Bush DO;  Location: [facility];  Service: Gynecology    REDUCTION MAMMAPLASTY      TONSILLECTOMY         Family History   Problem Relation Age of Onset    Asthma Mother     Obesity Mother     Lung cancer Father 48    Hyperlipidemia Father     Bone cancer Father         mets from lung cancer    Cancer Father         Lung/bone ca-     Anxiety disorder Father     No Known Problems Sister     No Known Problems Brother     Arthritis Maternal Grandmother     COPD Maternal Grandmother     Cancer Maternal Grandfather         Lung ca-    Lung cancer Maternal Grandfather 80    Liver cancer Paternal Grandfather 80    Cancer Paternal Grandfather         Liver ca-    No Known Problems Paternal Grandmother     No Known Problems Maternal Aunt     No Known Problems Maternal Aunt     No Known Problems Maternal Aunt     No Known Problems Paternal Aunt          Medications have been verified.        Objective   /100 (BP Location: Left arm, Patient Position: Sitting, Cuff Size: Adult)   Pulse 70   Temp 98.3 °F (36.8 °C) (Tympanic)   Resp 16   SpO2 98%        Physical Exam     Physical Exam  Vitals and nursing note reviewed.   Constitutional:       General: She  is not in acute distress.     Appearance: Normal appearance. She is not ill-appearing, toxic-appearing or diaphoretic.      Interventions: She is not intubated.  HENT:      Head: Normocephalic and atraumatic.      Right Ear: Hearing, tympanic membrane, ear canal and external ear normal. There is no impacted cerumen. Tympanic membrane is not erythematous or bulging.      Left Ear: Hearing, tympanic membrane, ear canal and external ear normal. There is no impacted cerumen. Tympanic membrane is not erythematous or bulging.      Nose: Nose normal. No congestion or rhinorrhea.      Mouth/Throat:      Mouth: Mucous membranes are moist.      Pharynx: Oropharynx is clear. Uvula midline. No pharyngeal swelling, oropharyngeal exudate, posterior oropharyngeal erythema or uvula swelling.      Tonsils: No tonsillar exudate or tonsillar abscesses. 1+ on the right. 1+ on the left.   Eyes:      Extraocular Movements: Extraocular movements intact.      Conjunctiva/sclera: Conjunctivae normal.      Pupils: Pupils are equal, round, and reactive to light.   Cardiovascular:      Rate and Rhythm: Normal rate and regular rhythm.      Pulses: Normal pulses.      Heart sounds: Normal heart sounds, S1 normal and S2 normal. Heart sounds not distant. No murmur heard.     No friction rub. No gallop.   Pulmonary:      Effort: Pulmonary effort is normal. No tachypnea, bradypnea, accessory muscle usage, prolonged expiration, respiratory distress or retractions. She is not intubated.      Breath sounds: Normal breath sounds. No stridor, decreased air movement or transmitted upper airway sounds. No decreased breath sounds, wheezing, rhonchi or rales.   Abdominal:      General: Bowel sounds are normal.      Palpations: Abdomen is soft.      Tenderness: There is no abdominal tenderness. There is no guarding or rebound.   Musculoskeletal:         General: Normal range of motion.      Cervical back: Full passive range of motion without pain, normal  range of motion and neck supple. No tenderness. No spinous process tenderness or muscular tenderness. Normal range of motion.   Lymphadenopathy:      Cervical: No cervical adenopathy.      Right cervical: No superficial cervical adenopathy.     Left cervical: No superficial cervical adenopathy.   Skin:     General: Skin is warm and dry.      Capillary Refill: Capillary refill takes less than 2 seconds.   Neurological:      General: No focal deficit present.      Mental Status: She is alert and oriented to person, place, and time.      Cranial Nerves: No cranial nerve deficit.   Psychiatric:         Mood and Affect: Mood normal.         Behavior: Behavior normal.

## 2024-02-29 NOTE — PATIENT INSTRUCTIONS
Please begin short course of prednisone as directed.   Please begin Flonase and Coricidin as directed. Discontinue use of Afrin after 3 days of use to avoid rebound nasal congestion.   Stay well hydrated. A cool mist humidifier/hot shower steam may be helpful.   Follow up with PCP if no relief within one week.

## 2024-04-12 ENCOUNTER — HOSPITAL ENCOUNTER (OUTPATIENT)
Dept: RADIOLOGY | Age: 44
Discharge: HOME | End: 2024-04-12
Payer: COMMERCIAL

## 2024-04-12 VITALS — BODY MASS INDEX: 43.43 KG/M2 | HEIGHT: 62 IN | WEIGHT: 236 LBS

## 2024-04-12 DIAGNOSIS — Z12.31 ENCOUNTER FOR SCREENING MAMMOGRAM FOR MALIGNANT NEOPLASM OF BREAST: ICD-10-CM

## 2024-04-12 PROCEDURE — 77063 BREAST TOMOSYNTHESIS BI: CPT

## 2024-04-12 PROCEDURE — 77067 SCR MAMMO BI INCL CAD: CPT

## 2024-04-24 ENCOUNTER — OFFICE VISIT (OUTPATIENT)
Dept: FAMILY MEDICINE CLINIC | Facility: CLINIC | Age: 44
End: 2024-04-24
Payer: COMMERCIAL

## 2024-04-24 VITALS
TEMPERATURE: 98 F | HEART RATE: 84 BPM | DIASTOLIC BLOOD PRESSURE: 80 MMHG | WEIGHT: 243 LBS | BODY MASS INDEX: 44.72 KG/M2 | RESPIRATION RATE: 18 BRPM | OXYGEN SATURATION: 97 % | SYSTOLIC BLOOD PRESSURE: 114 MMHG | HEIGHT: 62 IN

## 2024-04-24 DIAGNOSIS — F41.1 GAD (GENERALIZED ANXIETY DISORDER): ICD-10-CM

## 2024-04-24 DIAGNOSIS — E66.01 MORBID OBESITY (HCC): ICD-10-CM

## 2024-04-24 DIAGNOSIS — J45.20 MILD INTERMITTENT ASTHMA WITHOUT COMPLICATION: ICD-10-CM

## 2024-04-24 DIAGNOSIS — I10 ESSENTIAL HYPERTENSION: Primary | ICD-10-CM

## 2024-04-24 DIAGNOSIS — E78.2 MIXED HYPERLIPIDEMIA: ICD-10-CM

## 2024-04-24 DIAGNOSIS — F32.A DEPRESSION, UNSPECIFIED DEPRESSION TYPE: ICD-10-CM

## 2024-04-24 DIAGNOSIS — G47.33 OBSTRUCTIVE SLEEP APNEA SYNDROME: ICD-10-CM

## 2024-04-24 PROCEDURE — 99213 OFFICE O/P EST LOW 20 MIN: CPT

## 2024-04-24 RX ORDER — ALPRAZOLAM 0.25 MG/1
0.25 TABLET ORAL
Qty: 30 TABLET | Refills: 0 | Status: SHIPPED | OUTPATIENT
Start: 2024-04-24

## 2024-04-24 RX ORDER — ROSUVASTATIN CALCIUM 20 MG/1
20 TABLET, COATED ORAL DAILY
Qty: 90 TABLET | Refills: 0 | Status: SHIPPED | OUTPATIENT
Start: 2024-04-24

## 2024-04-24 RX ORDER — DULOXETIN HYDROCHLORIDE 60 MG/1
60 CAPSULE, DELAYED RELEASE ORAL DAILY
Qty: 90 CAPSULE | Refills: 0 | Status: SHIPPED | OUTPATIENT
Start: 2024-04-24

## 2024-04-24 RX ORDER — FENOFIBRATE 160 MG/1
160 TABLET ORAL DAILY
Qty: 90 TABLET | Refills: 0 | Status: SHIPPED | OUTPATIENT
Start: 2024-04-24

## 2024-04-24 RX ORDER — VALSARTAN AND HYDROCHLOROTHIAZIDE 80; 12.5 MG/1; MG/1
1 TABLET, FILM COATED ORAL DAILY
Qty: 90 TABLET | Refills: 0 | Status: SHIPPED | OUTPATIENT
Start: 2024-04-24

## 2024-04-24 NOTE — PROGRESS NOTES
Assessment/Plan:         Problem List Items Addressed This Visit     Mild intermittent asthma without complication     Under control.    Continue current medication.    We will re-evaluate at next office visit.           Morbid obesity (HCC)     Patient was advised to lose weight.  Potential consequences of obesity discussed with patient.  Advised to have portion control no more than 60% of meal or may consider intermittent fasting  We will continue to monitor           Depression    Relevant Medications    ALPRAZolam (XANAX) 0.25 mg tablet    DULoxetine (CYMBALTA) 60 mg delayed release capsule    Obstructive sleep apnea syndrome     Under control with CPAP which patient uses every night and patient wakes up refreshed.  Patient does not feel daytime sleepiness or fatigue.  He will continue evaluate every office visit.           Essential hypertension - Primary     Under control.    Continue valsartan with hydrochlorothiazide  We will re-evaluate at next office visit.           Relevant Medications    valsartan-hydrochlorothiazide (DIOVAN-HCT) 80-12.5 MG per tablet    Mixed hyperlipidemia     Under control.  Continue rosuvastatin and fenofibrate.  We will continue to monitor         Relevant Medications    fenofibrate 160 MG tablet    rosuvastatin (CRESTOR) 20 MG tablet    QIANA (generalized anxiety disorder)     Under control.    Continue current medication.    We will re-evaluate at next office visit.           Relevant Medications    ALPRAZolam (XANAX) 0.25 mg tablet    DULoxetine (CYMBALTA) 60 mg delayed release capsule         Subjective:      Patient ID: Nely Starr is a 43 y.o. female.    Patient here for review of chronic medical problems and  the labs and imaging if it is applicable.  Currently has no specific complaints other than mentioned in the review of systems  Denies chest pain, SOB, cough, abdominal pain, nausea, vomiting, fever, chills, lightheadedness, dizziness,headache, tingling or numbness.No  bowel or bladder problem.          The following portions of the patient's history were reviewed and updated as appropriate:   Past Medical History:  She has a past medical history of Anemia (2/2023), Anxiety, Asthma, Depression, History of Clostridium difficile colitis (2018), Hypercholesterolemia, Hyperlipemia, Hypertension (1/2021), Morbid obesity (HCC), Obesity, Pulmonary nodule, and Sleep apnea.,  _______________________________________________________________________  Medical Problems:  does not have any pertinent problems on file.,  _______________________________________________________________________  Past Surgical History:   has a past surgical history that includes Tonsillectomy; Reduction mammaplasty (1999); pr conization cervix w/wo d&c rpr eltrd exc (N/A, 9/27/2018); and IR aspiration only (1/9/2023).,  _______________________________________________________________________  Family History:  family history includes Anxiety disorder in her father; Arthritis in her maternal grandmother; Asthma in her mother; BRCA1 Negative in her mother; BRCA2 Negative in her mother; Bone cancer in her father; Breast cancer (age of onset: 68) in her mother; COPD in her maternal grandmother; Cancer in her father, maternal grandfather, and paternal grandfather; Hyperlipidemia in her father; Liver cancer (age of onset: 80) in her paternal grandfather; Lung cancer (age of onset: 48) in her father; Lung cancer (age of onset: 80) in her maternal grandfather; No Known Problems in her brother, maternal aunt, maternal aunt, maternal aunt, paternal aunt, paternal grandmother, and sister; Obesity in her mother.,  _______________________________________________________________________  Social History:   reports that she has been smoking cigarettes. She has a 9 pack-year smoking history. She has been exposed to tobacco smoke. She has never used smokeless tobacco. She reports current alcohol use. She reports that she does not use  drugs.,  _______________________________________________________________________  Allergies:  has No Known Allergies..  _______________________________________________________________________  Current Outpatient Medications   Medication Sig Dispense Refill   • albuterol (ProAir HFA) 90 mcg/act inhaler Inhale 2 puffs every 6 (six) hours as needed for wheezing 8.5 g 0   • ALPRAZolam (XANAX) 0.25 mg tablet Take 1 tablet (0.25 mg total) by mouth daily at bedtime as needed for anxiety 30 tablet 0   • cholecalciferol (VITAMIN D3) 1,000 units tablet Take by mouth daily       • DULoxetine (CYMBALTA) 60 mg delayed release capsule Take 1 capsule (60 mg total) by mouth daily 90 capsule 0   • fenofibrate 160 MG tablet Take 1 tablet (160 mg total) by mouth daily 90 tablet 0   • fluocinolone (SYNALAR) 0.01 % external solution Apply sparingly to affected areas of scalp 2-4 times a day for up to 2 weeks. 60 mL 1   • ketoconazole (NIZORAL) 2 % shampoo Shampoo twice a week for 8 weeks then as needed.  Lather into scalp and skin on face, neck, chest, and back; leave on for 5 minutes and then rinse off completely. 120 mL 2   • PREVIDENT 5000 SENSITIVE 1.1-5 % PSTE   3   • rosuvastatin (CRESTOR) 20 MG tablet Take 1 tablet (20 mg total) by mouth daily 90 tablet 0   • SODIUM FLUORIDE, DENTAL GEL, 1.1 % GEL SF 5000 Plus 1.1 % dental cream     • Tranexamic Acid 650 MG TABS Take 2 tablets by mouth TID x 5 days each month (Patient taking differently: Take 2 tablets by mouth TID x 5 days each month - only menstrual cycle) 30 tablet 10   • valsartan-hydrochlorothiazide (DIOVAN-HCT) 80-12.5 MG per tablet Take 1 tablet by mouth daily 90 tablet 0     No current facility-administered medications for this visit.     _______________________________________________________________________  Review of Systems   Constitutional:  Negative for chills, fatigue and fever.   HENT:  Negative for congestion, ear pain, rhinorrhea, sneezing, sore throat and voice  "change.    Eyes:  Negative for redness, itching and visual disturbance.   Respiratory:  Negative for cough, chest tightness, shortness of breath and wheezing.    Cardiovascular:  Negative for chest pain, palpitations and leg swelling.   Gastrointestinal:  Negative for abdominal pain, blood in stool, diarrhea, nausea and vomiting.   Endocrine: Negative for cold intolerance and heat intolerance.   Genitourinary:  Negative for dysuria, frequency and urgency.   Musculoskeletal:  Positive for arthralgias (Bilateral knee pain). Negative for back pain and myalgias.   Skin:  Negative for color change and rash.   Neurological:  Negative for dizziness, weakness, light-headedness, numbness and headaches.   Hematological:  Does not bruise/bleed easily.   Psychiatric/Behavioral:  Negative for agitation, behavioral problems and confusion.          Objective:  Vitals:    04/24/24 1200   BP: 114/80   BP Location: Left arm   Patient Position: Sitting   Cuff Size: Large   Pulse: 84   Resp: 18   Temp: 98 °F (36.7 °C)   TempSrc: Temporal   SpO2: 97%   Weight: 110 kg (243 lb)   Height: 5' 2\" (1.575 m)     Body mass index is 44.45 kg/m².     Physical Exam  Vitals and nursing note reviewed.   Constitutional:       General: She is not in acute distress.     Appearance: Normal appearance. She is obese. She is not ill-appearing, toxic-appearing or diaphoretic.   HENT:      Head: Normocephalic and atraumatic.      Nose: Nose normal. No congestion.      Mouth/Throat:      Mouth: Mucous membranes are moist.   Eyes:      General: No scleral icterus.        Right eye: No discharge.         Left eye: No discharge.      Extraocular Movements: Extraocular movements intact.      Conjunctiva/sclera: Conjunctivae normal.      Pupils: Pupils are equal, round, and reactive to light.   Cardiovascular:      Rate and Rhythm: Normal rate and regular rhythm.      Pulses: Normal pulses.      Heart sounds: Normal heart sounds. No murmur heard.     No gallop. "   Pulmonary:      Effort: Pulmonary effort is normal. No respiratory distress.      Breath sounds: Normal breath sounds. No wheezing, rhonchi or rales.   Abdominal:      General: Abdomen is flat. Bowel sounds are normal. There is no distension.      Palpations: Abdomen is soft.      Tenderness: There is no abdominal tenderness. There is no right CVA tenderness, left CVA tenderness or guarding.   Musculoskeletal:         General: No swelling or tenderness. Normal range of motion.      Cervical back: Normal range of motion and neck supple. No rigidity.      Right lower leg: No edema.      Left lower leg: No edema.   Lymphadenopathy:      Cervical: No cervical adenopathy.   Skin:     General: Skin is warm.      Capillary Refill: Capillary refill takes 2 to 3 seconds.      Coloration: Skin is not jaundiced.      Findings: No bruising or rash.   Neurological:      General: No focal deficit present.      Mental Status: She is alert and oriented to person, place, and time. Mental status is at baseline.      Motor: No weakness.      Gait: Gait normal.   Psychiatric:         Mood and Affect: Mood normal.         Behavior: Behavior normal.

## 2024-04-24 NOTE — ASSESSMENT & PLAN NOTE
Consult ophthalmology for persistent eyelid lid lesion   Under control.    Continue current medication.    We will re-evaluate at next office visit.

## 2024-04-24 NOTE — ASSESSMENT & PLAN NOTE
Under control.    Continue valsartan with hydrochlorothiazide  We will re-evaluate at next office visit.

## 2024-04-24 NOTE — ASSESSMENT & PLAN NOTE
Under control with CPAP which patient uses every night and patient wakes up refreshed.  Patient does not feel daytime sleepiness or fatigue.  He will continue evaluate every office visit.

## 2024-05-19 ENCOUNTER — HOSPITAL ENCOUNTER (EMERGENCY)
Facility: HOSPITAL | Age: 44
Discharge: HOME | End: 2024-05-19
Attending: EMERGENCY MEDICINE
Payer: COMMERCIAL

## 2024-05-19 VITALS
SYSTOLIC BLOOD PRESSURE: 165 MMHG | HEART RATE: 102 BPM | TEMPERATURE: 98.4 F | RESPIRATION RATE: 18 BRPM | DIASTOLIC BLOOD PRESSURE: 92 MMHG | OXYGEN SATURATION: 96 %

## 2024-05-19 DIAGNOSIS — L02.412 ABSCESS OF LEFT AXILLA: Primary | ICD-10-CM

## 2024-05-19 PROCEDURE — 10060 I&D ABSCESS SIMPLE/SINGLE: CPT | Performed by: EMERGENCY MEDICINE

## 2024-05-19 PROCEDURE — 99285 EMERGENCY DEPT VISIT HI MDM: CPT

## 2024-05-19 PROCEDURE — 99284 EMERGENCY DEPT VISIT MOD MDM: CPT | Performed by: EMERGENCY MEDICINE

## 2024-05-19 RX ORDER — DOXYCYCLINE HYCLATE 100 MG/1
100 CAPSULE ORAL 2 TIMES DAILY
Qty: 14 CAPSULE | Refills: 0 | Status: SHIPPED | OUTPATIENT
Start: 2024-05-19 | End: 2024-05-26

## 2024-05-19 RX ORDER — LIDOCAINE HYDROCHLORIDE 10 MG/ML
5 INJECTION, SOLUTION EPIDURAL; INFILTRATION; INTRACAUDAL; PERINEURAL ONCE
Status: COMPLETED | OUTPATIENT
Start: 2024-05-19 | End: 2024-05-19

## 2024-05-19 RX ORDER — DOXYCYCLINE HYCLATE 100 MG/1
100 CAPSULE ORAL ONCE
Status: COMPLETED | OUTPATIENT
Start: 2024-05-19 | End: 2024-05-19

## 2024-05-19 RX ADMIN — DOXYCYCLINE 100 MG: 100 CAPSULE ORAL at 11:26

## 2024-05-19 RX ADMIN — LIDOCAINE HYDROCHLORIDE 5 ML: 10 INJECTION, SOLUTION EPIDURAL; INFILTRATION; INTRACAUDAL; PERINEURAL at 11:45

## 2024-05-19 NOTE — DISCHARGE INSTRUCTIONS
You were seen in the emergency department for an abscess in your left axilla.  A small pocket of fluid was seen on ultrasound but was unable to be drained.  A prescription for doxycycline was sent to your pharmacy.  Follow-up with the surgery clinic.  If you have worsening symptoms or any new concerning symptoms please return to the emergency department.

## 2024-05-19 NOTE — Clinical Note
Nely Starr was seen and treated in our emergency department on 5/19/2024.                Diagnosis:     Nely  may return to work on return date.    She may return on this date: 05/21/2024         If you have any questions or concerns, please don't hesitate to call.      Paxton Hunt, DO    ______________________________           _______________          _______________  Hospital Representative                              Date                                Time

## 2024-05-19 NOTE — ED ATTENDING ATTESTATION
5/19/2024  I, Jolanta Vazquez MD, saw and evaluated the patient. I have discussed the patient with the resident/non-physician practitioner and agree with the resident's/non-physician practitioner's findings, Plan of Care, and MDM as documented in the resident's/non-physician practitioner's note, except where noted. All available labs and Radiology studies were reviewed.  I was present for key portions of any procedure(s) performed by the resident/non-physician practitioner and I was immediately available to provide assistance.       At this point I agree with the current assessment done in the Emergency Department.  I have conducted an independent evaluation of this patient a history and physical is as follows:    ED Course         Critical Care Time  Procedures    42 yo female with hx of abscesses, here for potential abscess in left axillar enlarging since Friday.  Pt with no fever, no drainage. Pt with redness and pain over site.  No n/v.  Vss, afebrile, lungs cta, rrr, abdomen soft nontender, left axilla with 4cm indurated area, no fluid on u/s. Abx.

## 2024-05-19 NOTE — ED PROVIDER NOTES
[address] [facility]                  [address]                              HISTORY AND PHYSICAL    PATIENT NAME: Ingrid Rasheed                 :        1937  MED REC NO:   [id number]                            ROOM:       2940  ACCOUNT NO:   [de-identified]                           ADMIT DATE: 2023  PROVIDER:     Marquita Kaur History  Chief Complaint   Patient presents with    Abscess     Patient coming in for abscess on on her left axilla since Friday. Patient denies discharge, fevers, and chills.      43-year-old female who presents for evaluation of an abscess in her left axilla.  The patient states that she first noticed what looked like a pimple in her left axilla on Friday which has progressively enlarged.  The patient states that she is having pain in the area.  The patient the borders of her dialysis last night and states that it has continued to enlarge.  The patient has had previous abscesses requiring incision and drainage and antibiotics.  The patient denies fever, chills, chest pain, shortness of breath, nausea, vomiting, diarrhea.        Prior to Admission Medications   Prescriptions Last Dose Informant Patient Reported? Taking?   ALPRAZolam (XANAX) 0.25 mg tablet   No No   Sig: Take 1 tablet (0.25 mg total) by mouth daily at bedtime as needed for anxiety   DULoxetine (CYMBALTA) 60 mg delayed release capsule   No No   Sig: Take 1 capsule (60 mg total) by mouth daily   PREVIDENT 5000 SENSITIVE 1.1-5 % PSTE  Self Yes No   SODIUM FLUORIDE, DENTAL GEL, 1.1 % GEL  Self Yes No   Sig: SF 5000 Plus 1.1 % dental cream   Tranexamic Acid 650 MG TABS   No No   Sig: Take 2 tablets by mouth TID x 5 days each month   Patient taking differently: Take 2 tablets by mouth TID x 5 days each month - only menstrual cycle   albuterol (ProAir HFA) 90 mcg/act inhaler   No No   Sig: Inhale 2 puffs every 6 (six) hours as needed for wheezing   cholecalciferol (VITAMIN D3) 1,000 units tablet  Self Yes No   Sig: Take by mouth daily     fenofibrate 160 MG tablet   No No   Sig: Take 1 tablet (160 mg total) by mouth daily   fluocinolone (SYNALAR) 0.01 % external solution   No No   Sig: Apply sparingly to affected areas of scalp 2-4 times a day for up to 2 weeks.   ketoconazole (NIZORAL) 2 % shampoo   No No   Sig: Shampoo twice a week for 8 weeks then as  needed.  Lather into scalp and skin on face, neck, chest, and back; leave on for 5 minutes and then rinse off completely.   rosuvastatin (CRESTOR) 20 MG tablet   No No   Sig: Take 1 tablet (20 mg total) by mouth daily   valsartan-hydrochlorothiazide (DIOVAN-HCT) 80-12.5 MG per tablet   No No   Sig: Take 1 tablet by mouth daily      Facility-Administered Medications: None       Past Medical History:   Diagnosis Date    Anemia 2023    Anxiety     Asthma     Depression     History of Clostridium difficile colitis 2018    x 2 episodes, after antibiotics    Hypercholesterolemia     Hyperlipemia     Hypertension 2021    Morbid obesity (HCC)     Obesity     Pulmonary nodule     Sleep apnea     c pap       Past Surgical History:   Procedure Laterality Date    IR ASPIRATION ONLY  2023    FL CONIZATION CERVIX W/WO D&C RPR ELTRD EXC N/A 2018    Procedure: BIOPSY LEEP CERVIX;  Surgeon: Radha Bush DO;  Location: [facility];  Service: Gynecology    REDUCTION MAMMAPLASTY      TONSILLECTOMY         Family History   Problem Relation Age of Onset    BRCA2 Negative Mother     BRCA1 Negative Mother     Breast cancer Mother 68    Asthma Mother     Obesity Mother     Lung cancer Father 48    Hyperlipidemia Father     Bone cancer Father         mets from lung cancer    Cancer Father         Lung/bone ca-     Anxiety disorder Father     No Known Problems Sister     Arthritis Maternal Grandmother     COPD Maternal Grandmother     Cancer Maternal Grandfather         Lung ca-    Lung cancer Maternal Grandfather 80    No Known Problems Paternal Grandmother     Liver cancer Paternal Grandfather 80    Cancer Paternal Grandfather         Liver ca-    No Known Problems Brother     No Known Problems Maternal Aunt     No Known Problems Maternal Aunt     No Known Problems Maternal Aunt     No Known Problems Paternal Aunt      I have reviewed and agree with the history as  [id number]     Doc#: [id number]    CC: documented.    E-Cigarette/Vaping    E-Cigarette Use Never User      E-Cigarette/Vaping Substances    Nicotine No     THC No     CBD No     Flavoring No     Other No     Unknown No      Social History     Tobacco Use    Smoking status: Every Day     Current packs/day: 0.50     Average packs/day: 0.5 packs/day for 18.0 years (9.0 ttl pk-yrs)     Types: Cigarettes     Passive exposure: Past    Smokeless tobacco: Never    Tobacco comments:     is on the patch    Vaping Use    Vaping status: Never Used   Substance Use Topics    Alcohol use: Yes     Comment: occasional drink here and there    Drug use: No        Review of Systems   Constitutional:  Negative for chills, diaphoresis and fever.   HENT:  Negative for congestion and sore throat.    Eyes:  Negative for pain and redness.   Respiratory:  Negative for cough and shortness of breath.    Cardiovascular:  Negative for chest pain, palpitations and leg swelling.   Gastrointestinal:  Negative for abdominal pain, diarrhea, nausea and vomiting.   Genitourinary:  Negative for dysuria, flank pain and hematuria.   Musculoskeletal:  Negative for arthralgias and myalgias.   Skin:  Positive for color change. Negative for pallor and rash.        Abscess   Neurological:  Negative for dizziness, syncope, weakness, light-headedness, numbness and headaches.   All other systems reviewed and are negative.      Physical Exam  ED Triage Vitals [05/19/24 1050]   Temperature Pulse Respirations Blood Pressure SpO2   98.4 °F (36.9 °C) 102 18 165/92 96 %      Temp Source Heart Rate Source Patient Position - Orthostatic VS BP Location FiO2 (%)   Temporal Monitor Sitting Right arm --      Pain Score       5             Orthostatic Vital Signs  Vitals:    05/19/24 1050   BP: 165/92   Pulse: 102   Patient Position - Orthostatic VS: Sitting       Physical Exam  Vitals and nursing note reviewed.   Constitutional:       General: She is not in acute distress.     Appearance: Normal appearance. She  is not ill-appearing, toxic-appearing or diaphoretic.   HENT:      Head: Normocephalic and atraumatic.      Nose: Nose normal. No congestion or rhinorrhea.      Mouth/Throat:      Mouth: Mucous membranes are moist.      Pharynx: Oropharynx is clear.   Eyes:      General: No scleral icterus.     Extraocular Movements: Extraocular movements intact.      Conjunctiva/sclera: Conjunctivae normal.      Pupils: Pupils are equal, round, and reactive to light.   Cardiovascular:      Rate and Rhythm: Normal rate and regular rhythm.      Pulses: Normal pulses.      Heart sounds: Normal heart sounds. No murmur heard.     No friction rub. No gallop.   Pulmonary:      Effort: Pulmonary effort is normal.      Breath sounds: Normal breath sounds. No wheezing, rhonchi or rales.   Abdominal:      General: Abdomen is flat.      Palpations: Abdomen is soft.      Tenderness: There is no abdominal tenderness. There is no right CVA tenderness, left CVA tenderness, guarding or rebound.   Musculoskeletal:         General: No swelling, tenderness, deformity or signs of injury. Normal range of motion.      Cervical back: Normal range of motion and neck supple. No rigidity or tenderness.      Right lower leg: No edema.      Left lower leg: No edema.   Lymphadenopathy:      Cervical: No cervical adenopathy.   Skin:     General: Skin is warm and dry.      Capillary Refill: Capillary refill takes less than 2 seconds.      Coloration: Skin is not jaundiced or pale.      Findings: Erythema and lesion present. No bruising or rash.      Comments: Circular area of swelling and erythema in the left axilla approximately 4 cm in diameter   Neurological:      General: No focal deficit present.      Mental Status: She is alert and oriented to person, place, and time.         ED Medications  Medications   doxycycline hyclate (VIBRAMYCIN) capsule 100 mg (has no administration in time range)       Diagnostic Studies  Results Reviewed       None                    US bedside procedure   Final Result by Interface, Externalimages (05/19 1114)            Procedures  Incision and drain    Date/Time: 5/19/2024 11:47 AM    Performed by: Paxton Hunt DO  Authorized by: Paxton Hunt DO  Universal Protocol:  Consent: Verbal consent obtained.  Risks and benefits: risks, benefits and alternatives were discussed  Consent given by: patient  Patient identity confirmed: verbally with patient    Patient location:  ED  Location:     Type:  Abscess    Size:  4 cm in diameter    Location:  Trunk    Trunk location: Left axilla.  Pre-procedure details:     Skin preparation:  Chloraprep  Anesthesia (see MAR for exact dosages):     Anesthesia method:  Local infiltration    Local anesthetic:  Lidocaine 1% w/o epi  Procedure details:     Complexity:  Simple    Incision types:  Stab incision    Scalpel blade:  11    Approach:  Puncture    Drainage:  Bloody    Drainage amount:  Scant    Wound treatment:  Wound left open  Post-procedure details:     Patient tolerance of procedure:  Tolerated well, no immediate complications        ED Course                             SBIRT 22yo+      Flowsheet Row Most Recent Value   Initial Alcohol Screen: US AUDIT-C     1. How often do you have a drink containing alcohol? 0 Filed at: 05/19/2024 1052   2. How many drinks containing alcohol do you have on a typical day you are drinking?  0 Filed at: 05/19/2024 1052   3a. Male UNDER 65: How often do you have five or more drinks on one occasion? 0 Filed at: 05/19/2024 1052   3b. FEMALE Any Age, or MALE 65+: How often do you have 4 or more drinks on one occassion? 0 Filed at: 05/19/2024 1052   Audit-C Score 0 Filed at: 05/19/2024 1052   JUVE: How many times in the past year have you...    Used an illegal drug or used a prescription medication for non-medical reasons? Never Filed at: 05/19/2024 1052                  Medical Decision Making  43-year-old female who presents for evaluation of an  abscess in her left axilla.  Vitals are within the normal limits.  Exam is significant for circular area of swelling and erythema in the left axilla approximately 4 cm in diameter.  Bedside soft tissue ultrasound shows small pocket of fluid consistent with abscess.  After discussing with the patient that incision and drainage is unlikely to be successful the patient requests that incision and drainage be attempted.  The patient is given a dose of doxycycline and a prescription for doxycycline sent to the patient's pharmacy.  The patient is instructed to follow-up with the surgery clinic.  Return precautions are given and the patient is discharged.    Risk  Prescription drug management.          Disposition  Final diagnoses:   Abscess of left axilla     Time reflects when diagnosis was documented in both MDM as applicable and the Disposition within this note       Time User Action Codes Description Comment    5/19/2024 11:18 AM Paxton Hunt Add [L02.91] Abscess     5/19/2024 11:18 AM Paxton Hunt Remove [L02.91] Abscess     5/19/2024 11:18 AM Paxton Hunt Add [L02.412] Abscess of left axilla           ED Disposition       ED Disposition   Discharge    Condition   Stable    Date/Time   Sun May 19, 2024 1118    Comment   Nely Starr discharge to home/self care.                   Follow-up Information       Follow up With Specialties Details Why Contact Info Additional Information    [facility] Emergency Department Emergency Medicine Go to  If symptoms worsen [address] [place] [id number] [phone] [facility] Emergency Department, [address] [place] [address], [address] [phone]            Patient's Medications   Discharge Prescriptions    DOXYCYCLINE HYCLATE (VIBRAMYCIN) 100 MG CAPSULE    Take 1 capsule (100 mg total) by mouth 2 (two) times a day for 7 days       Start Date: 5/19/2024 End Date: 5/26/2024       Order Dose:  100 mg       Quantity: 14 capsule    Refills: 0     No discharge procedures on file.    PDMP Review         Value Time User    PDMP Reviewed  Yes 4/24/2024 12:05 PM Severo Hays MD             ED Provider  Attending physically available and evaluated Nely Starr. I managed the patient along with the ED Attending.    Electronically Signed by           Paxton Hunt DO  05/19/24 6171

## 2024-05-21 ENCOUNTER — PROCEDURE VISIT (OUTPATIENT)
Dept: SURGERY | Facility: CLINIC | Age: 44
End: 2024-05-21
Payer: COMMERCIAL

## 2024-05-21 DIAGNOSIS — L02.412 ABSCESS OF LEFT AXILLA: Primary | ICD-10-CM

## 2024-05-21 PROCEDURE — 10060 I&D ABSCESS SIMPLE/SINGLE: CPT | Performed by: SURGERY

## 2024-05-21 NOTE — PROGRESS NOTES
Ambulatory Visit  Name: Nely Starr      : 1980      MRN: [id number]  Encounter Provider: Doc Sawyer MD  Encounter Date: 2024   Encounter department: [facility]    Assessment & Plan   1. Abscess of left axilla  Assessment & Plan:  I&D performed.  Remove packing tomorrow, wash daily and keep covered.  Continue antibiotics.    History of Present Illness     Nely Starr is a 43 y.o. female who presents with another abscess in her left axilla.  This started Friday and worsened.  Seen in the ED , I&D done and antibiotics started although it continued to worsen.    Review of Systems    Objective     There were no vitals taken for this visit.    Physical Exam  Left axilla large area of erythema, tender,soft in mid portion    Procedures  After permission, the area was prepped and draped.  Local anesthesia of 2% Lidocaine with epi was infiltrated into skin and deeper layers of tissue.  Scalpel used and incision made.  Small amount of thick purulent material removed, wound probed and irrigated.  1/4 inch plain packing placed.  Dressings applied.  Tolerated procedure  Administrative Statements
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
## 2024-06-06 ENCOUNTER — APPOINTMENT (OUTPATIENT)
Dept: RADIOLOGY | Facility: CLINIC | Age: 44
End: 2024-06-06
Payer: COMMERCIAL

## 2024-06-06 ENCOUNTER — OFFICE VISIT (OUTPATIENT)
Dept: PODIATRY | Facility: CLINIC | Age: 44
End: 2024-06-06
Payer: COMMERCIAL

## 2024-06-06 VITALS
WEIGHT: 246.5 LBS | HEIGHT: 62 IN | SYSTOLIC BLOOD PRESSURE: 126 MMHG | DIASTOLIC BLOOD PRESSURE: 80 MMHG | HEART RATE: 94 BPM | BODY MASS INDEX: 45.36 KG/M2

## 2024-06-06 DIAGNOSIS — S93.492A SPRAIN OF ANTERIOR TALOFIBULAR LIGAMENT OF LEFT ANKLE, INITIAL ENCOUNTER: Primary | ICD-10-CM

## 2024-06-06 DIAGNOSIS — M79.672 PAIN IN LEFT FOOT: ICD-10-CM

## 2024-06-06 PROCEDURE — 99213 OFFICE O/P EST LOW 20 MIN: CPT | Performed by: PODIATRIST

## 2024-06-06 PROCEDURE — 73630 X-RAY EXAM OF FOOT: CPT

## 2024-07-24 ENCOUNTER — OFFICE VISIT (OUTPATIENT)
Age: 44
End: 2024-07-24
Payer: COMMERCIAL

## 2024-07-24 ENCOUNTER — APPOINTMENT (OUTPATIENT)
Dept: LAB | Facility: CLINIC | Age: 44
End: 2024-07-24

## 2024-07-24 VITALS
DIASTOLIC BLOOD PRESSURE: 80 MMHG | HEART RATE: 89 BPM | OXYGEN SATURATION: 98 % | SYSTOLIC BLOOD PRESSURE: 116 MMHG | TEMPERATURE: 98 F | RESPIRATION RATE: 18 BRPM | BODY MASS INDEX: 45.45 KG/M2 | HEIGHT: 62 IN | WEIGHT: 247 LBS

## 2024-07-24 DIAGNOSIS — E78.2 MIXED HYPERLIPIDEMIA: ICD-10-CM

## 2024-07-24 DIAGNOSIS — Z00.8 ENCOUNTER FOR OTHER GENERAL EXAMINATION: ICD-10-CM

## 2024-07-24 DIAGNOSIS — J45.20 MILD INTERMITTENT ASTHMA WITHOUT COMPLICATION: ICD-10-CM

## 2024-07-24 DIAGNOSIS — E66.01 MORBID OBESITY (HCC): ICD-10-CM

## 2024-07-24 DIAGNOSIS — F32.A DEPRESSION, UNSPECIFIED DEPRESSION TYPE: ICD-10-CM

## 2024-07-24 DIAGNOSIS — I10 ESSENTIAL HYPERTENSION: Primary | ICD-10-CM

## 2024-07-24 DIAGNOSIS — F17.200 TOBACCO DEPENDENCE SYNDROME: ICD-10-CM

## 2024-07-24 DIAGNOSIS — F41.1 GAD (GENERALIZED ANXIETY DISORDER): ICD-10-CM

## 2024-07-24 LAB
CHOLEST SERPL-MCNC: 147 MG/DL
EST. AVERAGE GLUCOSE BLD GHB EST-MCNC: 111 MG/DL
HBA1C MFR BLD: 5.5 %
HDLC SERPL-MCNC: 49 MG/DL
LDLC SERPL CALC-MCNC: 71 MG/DL (ref 0–100)
NONHDLC SERPL-MCNC: 98 MG/DL
TRIGL SERPL-MCNC: 134 MG/DL

## 2024-07-24 PROCEDURE — 99213 OFFICE O/P EST LOW 20 MIN: CPT

## 2024-07-24 PROCEDURE — 80061 LIPID PANEL: CPT

## 2024-07-24 PROCEDURE — 36415 COLL VENOUS BLD VENIPUNCTURE: CPT

## 2024-07-24 PROCEDURE — 83036 HEMOGLOBIN GLYCOSYLATED A1C: CPT

## 2024-07-24 RX ORDER — ALPRAZOLAM 0.25 MG/1
0.25 TABLET ORAL
Qty: 90 TABLET | Refills: 0 | Status: SHIPPED | OUTPATIENT
Start: 2024-07-24

## 2024-07-24 RX ORDER — DULOXETIN HYDROCHLORIDE 60 MG/1
60 CAPSULE, DELAYED RELEASE ORAL DAILY
Qty: 90 CAPSULE | Refills: 0 | Status: SHIPPED | OUTPATIENT
Start: 2024-07-24

## 2024-07-24 RX ORDER — VALSARTAN AND HYDROCHLOROTHIAZIDE 80; 12.5 MG/1; MG/1
1 TABLET, FILM COATED ORAL DAILY
Qty: 90 TABLET | Refills: 0 | Status: SHIPPED | OUTPATIENT
Start: 2024-07-24

## 2024-07-24 RX ORDER — FENOFIBRATE 160 MG/1
160 TABLET ORAL DAILY
Qty: 90 TABLET | Refills: 0 | Status: SHIPPED | OUTPATIENT
Start: 2024-07-24

## 2024-07-24 RX ORDER — ROSUVASTATIN CALCIUM 20 MG/1
20 TABLET, COATED ORAL DAILY
Qty: 90 TABLET | Refills: 0 | Status: SHIPPED | OUTPATIENT
Start: 2024-07-24

## 2024-07-24 NOTE — PROGRESS NOTES
Assessment/Plan:         Problem List Items Addressed This Visit     Mild intermittent asthma without complication     Under control.    Continue current medication.    We will re-evaluate at next office visit.           Morbid obesity (HCC)     Patient was advised to lose weight.  Potential consequences of obesity discussed with patient.  Advised to have portion control no more than 60% of meal or may consider intermittent fasting  We will continue to monitor           Relevant Medications    semaglutide, 0.25 or 0.5 mg/dose, (Ozempic, 0.25 or 0.5 MG/DOSE,) 2 mg/3 mL injection pen    Depression    Relevant Medications    ALPRAZolam (XANAX) 0.25 mg tablet    DULoxetine (CYMBALTA) 60 mg delayed release capsule    Essential hypertension - Primary     Under control.    Continue valsartan with hydrochlorothiazide  We will re-evaluate at next office visit.           Relevant Medications    valsartan-hydrochlorothiazide (DIOVAN-HCT) 80-12.5 MG per tablet    Tobacco dependence syndrome     Quit smoking  Options for nicotine patch, nicotine gum or any medication discussed with patient  Potential consequences of smoking discussed with patient  Patient seems to be understood well           Mixed hyperlipidemia     Under control.  Continue rosuvastatin and fenofibrate.  We will continue to monitor         Relevant Medications    fenofibrate 160 MG tablet    rosuvastatin (CRESTOR) 20 MG tablet    QIANA (generalized anxiety disorder)     Under control.    Continue current medication.    We will re-evaluate at next office visit.           Relevant Medications    ALPRAZolam (XANAX) 0.25 mg tablet    DULoxetine (CYMBALTA) 60 mg delayed release capsule           Subjective:      Patient ID: Nely Starr is a 43 y.o. female.    Patient here for review of chronic medical problems and  the labs and imaging if it is applicable.  Currently has no specific complaints other than mentioned in the review of systems  Denies chest pain, SOB,  cough, abdominal pain, nausea, vomiting, fever, chills, lightheadedness, dizziness,headache, tingling or numbness.No bowel or bladder problem.          The following portions of the patient's history were reviewed and updated as appropriate:   Past Medical History:  She has a past medical history of Anemia (2/2023), Anxiety, Asthma, Depression, History of Clostridium difficile colitis (2018), Hypercholesterolemia, Hyperlipemia, Hypertension (1/2021), Morbid obesity (HCC), Obesity, Pulmonary nodule, and Sleep apnea.,  _______________________________________________________________________  Medical Problems:  does not have any pertinent problems on file.,  _______________________________________________________________________  Past Surgical History:   has a past surgical history that includes Tonsillectomy; Reduction mammaplasty (1999); pr conization cervix w/wo d&c rpr eltrd exc (N/A, 09/27/2018); IR aspiration only (01/09/2023); and Abscess drainage (05/21/2024).,  _______________________________________________________________________  Family History:  family history includes Anxiety disorder in her father; Arthritis in her maternal grandmother; Asthma in her mother; BRCA1 Negative in her mother; BRCA2 Negative in her mother; Bone cancer in her father; Breast cancer (age of onset: 68) in her mother; COPD in her maternal grandmother; Cancer in her father, maternal grandfather, and paternal grandfather; Hyperlipidemia in her father; Liver cancer (age of onset: 80) in her paternal grandfather; Lung cancer (age of onset: 48) in her father; Lung cancer (age of onset: 80) in her maternal grandfather; No Known Problems in her brother, maternal aunt, maternal aunt, maternal aunt, paternal aunt, paternal grandmother, and sister; Obesity in her mother.,  _______________________________________________________________________  Social History:   reports that she has been smoking cigarettes. She has a 9 pack-year smoking  history. She has been exposed to tobacco smoke. She has never used smokeless tobacco. She reports current alcohol use. She reports that she does not use drugs.,  _______________________________________________________________________  Allergies:  has No Known Allergies..  _______________________________________________________________________  Current Outpatient Medications   Medication Sig Dispense Refill   • albuterol (ProAir HFA) 90 mcg/act inhaler Inhale 2 puffs every 6 (six) hours as needed for wheezing 8.5 g 0   • ALPRAZolam (XANAX) 0.25 mg tablet Take 1 tablet (0.25 mg total) by mouth daily at bedtime as needed for anxiety 90 tablet 0   • cholecalciferol (VITAMIN D3) 1,000 units tablet Take by mouth daily       • DULoxetine (CYMBALTA) 60 mg delayed release capsule Take 1 capsule (60 mg total) by mouth daily 90 capsule 0   • fenofibrate 160 MG tablet Take 1 tablet (160 mg total) by mouth daily 90 tablet 0   • fluocinolone (SYNALAR) 0.01 % external solution Apply sparingly to affected areas of scalp 2-4 times a day for up to 2 weeks. 60 mL 1   • ketoconazole (NIZORAL) 2 % shampoo Shampoo twice a week for 8 weeks then as needed.  Lather into scalp and skin on face, neck, chest, and back; leave on for 5 minutes and then rinse off completely. 120 mL 2   • PREVIDENT 5000 SENSITIVE 1.1-5 % PSTE   3   • rosuvastatin (CRESTOR) 20 MG tablet Take 1 tablet (20 mg total) by mouth daily 90 tablet 0   • semaglutide, 0.25 or 0.5 mg/dose, (Ozempic, 0.25 or 0.5 MG/DOSE,) 2 mg/3 mL injection pen 0.25 mg under the skin every 7 days for 4 doses (28 days), THEN 0.5 mg under the skin every 7 days 3 mL 0   • SODIUM FLUORIDE, DENTAL GEL, 1.1 % GEL SF 5000 Plus 1.1 % dental cream     • Tranexamic Acid 650 MG TABS Take 2 tablets by mouth TID x 5 days each month (Patient taking differently: Take 2 tablets by mouth TID x 5 days each month - only menstrual cycle) 30 tablet 10   • valsartan-hydrochlorothiazide (DIOVAN-HCT) 80-12.5 MG per  "tablet Take 1 tablet by mouth daily 90 tablet 0     No current facility-administered medications for this visit.     _______________________________________________________________________  Review of Systems   Constitutional:  Negative for chills, fatigue and fever.   HENT:  Negative for congestion, ear pain, rhinorrhea, sneezing, sore throat and voice change.    Eyes:  Negative for redness, itching and visual disturbance.   Respiratory:  Negative for cough, chest tightness, shortness of breath and wheezing.    Cardiovascular:  Negative for chest pain, palpitations and leg swelling.   Gastrointestinal:  Negative for abdominal pain, blood in stool, diarrhea, nausea and vomiting.   Endocrine: Negative for cold intolerance and heat intolerance.   Genitourinary:  Negative for dysuria, frequency and urgency.   Musculoskeletal:  Positive for arthralgias (Bilateral knee pain). Negative for back pain and myalgias.   Skin:  Negative for color change and rash.   Neurological:  Negative for dizziness, weakness, light-headedness, numbness and headaches.   Hematological:  Does not bruise/bleed easily.   Psychiatric/Behavioral:  Negative for agitation, behavioral problems and confusion.          Objective:  Vitals:    07/24/24 1242   BP: 116/80   BP Location: Right arm   Patient Position: Sitting   Cuff Size: Large   Pulse: 89   Resp: 18   Temp: 98 °F (36.7 °C)   TempSrc: Temporal   SpO2: 98%   Weight: 112 kg (247 lb)   Height: 5' 2\" (1.575 m)     Body mass index is 45.18 kg/m².     Physical Exam  Vitals and nursing note reviewed.   Constitutional:       General: She is not in acute distress.     Appearance: Normal appearance. She is obese. She is not ill-appearing, toxic-appearing or diaphoretic.   HENT:      Head: Normocephalic and atraumatic.      Nose: Nose normal. No congestion.      Mouth/Throat:      Mouth: Mucous membranes are moist.   Eyes:      General: No scleral icterus.        Right eye: No discharge.         Left " eye: No discharge.      Extraocular Movements: Extraocular movements intact.      Conjunctiva/sclera: Conjunctivae normal.      Pupils: Pupils are equal, round, and reactive to light.   Cardiovascular:      Rate and Rhythm: Normal rate and regular rhythm.      Pulses: Normal pulses.      Heart sounds: Normal heart sounds. No murmur heard.     No gallop.   Pulmonary:      Effort: Pulmonary effort is normal. No respiratory distress.      Breath sounds: Normal breath sounds. No wheezing, rhonchi or rales.   Abdominal:      General: Abdomen is flat. Bowel sounds are normal. There is no distension.      Palpations: Abdomen is soft.      Tenderness: There is no abdominal tenderness. There is no right CVA tenderness, left CVA tenderness or guarding.   Musculoskeletal:         General: No swelling or tenderness. Normal range of motion.      Cervical back: Normal range of motion and neck supple. No rigidity.      Right lower leg: No edema.      Left lower leg: No edema.   Lymphadenopathy:      Cervical: No cervical adenopathy.   Skin:     General: Skin is warm.      Capillary Refill: Capillary refill takes 2 to 3 seconds.      Coloration: Skin is not jaundiced.      Findings: No bruising or rash.   Neurological:      General: No focal deficit present.      Mental Status: She is alert and oriented to person, place, and time. Mental status is at baseline.      Motor: No weakness.      Gait: Gait normal.   Psychiatric:         Mood and Affect: Mood normal.         Behavior: Behavior normal.

## 2024-07-29 ENCOUNTER — TELEPHONE (OUTPATIENT)
Age: 44
End: 2024-07-29

## 2024-07-29 NOTE — TELEPHONE ENCOUNTER
Patient called in to let us know that the Pharmacy informed her a pre Auth is required for Ozempic, Please let patient know when it' in place.

## 2024-07-30 NOTE — TELEPHONE ENCOUNTER
PA for ozempic 0.25 mg/0.5 ml  SUBMITTED     via    [][employer]   [x][employer]-Case ID # [id number]   []Faxed to plan   []Other website   []Phone call Case ID #     Office notes sent, clinical questions answered. Awaiting determination    Turnaround time for your insurance to make a decision on your Prior Authorization can take 7-21 business days.

## 2024-08-06 DIAGNOSIS — E66.01 MORBID OBESITY (HCC): Primary | ICD-10-CM

## 2024-08-06 NOTE — TELEPHONE ENCOUNTER
PA for ozempic 0.25 mg Denied    Reason:Patient must have type 2 diabetes         Message sent to office clinical pool Yes    Denial letter scanned into Media no    Appeal started No ( Provider will need to decide if appeal is warranted and send clinical documentation to Prior Authorization Team for initiation.)    **Please follow up with your patient regarding denial and next steps**

## 2024-09-11 ENCOUNTER — TELEPHONE (OUTPATIENT)
Age: 44
End: 2024-09-11

## 2024-09-11 NOTE — TELEPHONE ENCOUNTER
Called and spoke to patient regarding here abdominal rash and the fact that we have no appointments available for the rest of the week. She did apply hydrocortisone cream on and that seems to be helping. If condition gets worse she will f/u with Urgent Care.Advised her to let us know if there is improvement.

## 2024-09-12 DIAGNOSIS — B37.2 CANDIDIASIS OF SKIN: Primary | ICD-10-CM

## 2024-09-12 RX ORDER — CLOTRIMAZOLE AND BETAMETHASONE DIPROPIONATE 10; .64 MG/G; MG/G
CREAM TOPICAL 2 TIMES DAILY
Qty: 45 G | Refills: 1 | Status: SHIPPED | OUTPATIENT
Start: 2024-09-12

## 2024-09-13 ENCOUNTER — HOSPITAL ENCOUNTER (EMERGENCY)
Facility: HOSPITAL | Age: 44
Discharge: HOME | End: 2024-09-14
Attending: EMERGENCY MEDICINE
Payer: COMMERCIAL

## 2024-09-13 ENCOUNTER — APPOINTMENT (EMERGENCY)
Dept: RADIOLOGY | Facility: HOSPITAL | Age: 44
End: 2024-09-13
Payer: COMMERCIAL

## 2024-09-13 VITALS
RESPIRATION RATE: 20 BRPM | SYSTOLIC BLOOD PRESSURE: 135 MMHG | TEMPERATURE: 100.3 F | OXYGEN SATURATION: 98 % | DIASTOLIC BLOOD PRESSURE: 65 MMHG | HEART RATE: 105 BPM

## 2024-09-13 DIAGNOSIS — R68.89 FLU-LIKE SYMPTOMS: Primary | ICD-10-CM

## 2024-09-13 DIAGNOSIS — T36.95XA ANTIBIOTIC-INDUCED YEAST INFECTION: ICD-10-CM

## 2024-09-13 DIAGNOSIS — B37.9 ANTIBIOTIC-INDUCED YEAST INFECTION: ICD-10-CM

## 2024-09-13 DIAGNOSIS — R09.81 HEAD CONGESTION: ICD-10-CM

## 2024-09-13 DIAGNOSIS — R05.9 COUGH: ICD-10-CM

## 2024-09-13 PROCEDURE — 71046 X-RAY EXAM CHEST 2 VIEWS: CPT

## 2024-09-13 PROCEDURE — 99283 EMERGENCY DEPT VISIT LOW MDM: CPT

## 2024-09-13 PROCEDURE — 99284 EMERGENCY DEPT VISIT MOD MDM: CPT | Performed by: EMERGENCY MEDICINE

## 2024-09-13 RX ORDER — OXYMETAZOLINE HYDROCHLORIDE 0.05 G/100ML
2 SPRAY NASAL ONCE
Status: COMPLETED | OUTPATIENT
Start: 2024-09-13 | End: 2024-09-13

## 2024-09-13 RX ORDER — BENZONATATE 100 MG/1
100 CAPSULE ORAL EVERY 8 HOURS
Qty: 21 CAPSULE | Refills: 0 | Status: SHIPPED | OUTPATIENT
Start: 2024-09-13

## 2024-09-13 RX ORDER — ONDANSETRON 4 MG/1
4 TABLET, ORALLY DISINTEGRATING ORAL ONCE
Status: COMPLETED | OUTPATIENT
Start: 2024-09-13 | End: 2024-09-13

## 2024-09-13 RX ORDER — IBUPROFEN 600 MG/1
600 TABLET, FILM COATED ORAL ONCE
Status: COMPLETED | OUTPATIENT
Start: 2024-09-13 | End: 2024-09-13

## 2024-09-13 RX ORDER — ACETAMINOPHEN 325 MG/1
975 TABLET ORAL ONCE
Status: COMPLETED | OUTPATIENT
Start: 2024-09-13 | End: 2024-09-13

## 2024-09-13 RX ADMIN — OXYMETAZOLINE HYDROCHLORIDE 2 SPRAY: 0.05 SPRAY NASAL at 23:24

## 2024-09-13 RX ADMIN — IBUPROFEN 600 MG: 600 TABLET, FILM COATED ORAL at 23:24

## 2024-09-13 RX ADMIN — ONDANSETRON 4 MG: 4 TABLET, ORALLY DISINTEGRATING ORAL at 23:24

## 2024-09-13 RX ADMIN — ACETAMINOPHEN 975 MG: 325 TABLET ORAL at 23:24

## 2024-09-13 NOTE — Clinical Note
Nely Starr was seen and treated in our emergency department on 9/13/2024.                Diagnosis:     Nely  is off the rest of the shift today, may return to work on return date.    She may return on this date: 09/16/2024         If you have any questions or concerns, please don't hesitate to call.      Goapl Bonilla, DO    ______________________________           _______________          _______________  Hospital Representative                              Date                                Time

## 2024-09-14 NOTE — ED PROVIDER NOTES
1. Flu-like symptoms    2. Cough    3. Head congestion      ED Disposition       ED Disposition   Discharge    Condition   Stable    Date/Time   Fri Sep 13, 2024 11:39 PM    Comment   Nely Starr discharge to home/self care.                   Assessment & Plan       Medical Decision Making  Patient is a 43 y.o. female with PMH of morbid obesity, depression, status post LEEP procedure, asthma, fibromyalgia, tobacco dependence who presents to the ED with complaint of viral symptoms.    Vital signs on arrival temperature 100.3, pulse 105, otherwise vital signs within normal limits. On exam patient is alert, oriented, no evidence respiratory distress, appears congested and uncomfortable abdomen is soft and nontender.    History and physical exam most consistent with viral upper respiratory illness. However, differential diagnosis included but not limited to COVID/flu, pneumonia, sinus congestion. Plan chest x-ray to rule out pneumonia, Tylenol/Motrin/Zofran/Afrin    View ED course above for further discussion on patient workup.     ED chest x-ray evaluation without evidence of acute cardiopulmonary disease    All labs reviewed and utilized in the medical decision making process  All radiology studies independently viewed by me and interpreted by the radiologist.  I reviewed all testing with the patient.     Upon re-evaluation patient continues remain hemodynamically stable, symptomatic improvement status post administration of Tylenol/Motrin/Zofran/Afrin, official chest x-ray read pending.    PCP follow-up recommended, return precautions given.       Amount and/or Complexity of Data Reviewed  Radiology: ordered.    Risk  OTC drugs.  Prescription drug management.                       Medications   oxymetazoline (AFRIN) 0.05 % nasal spray 2 spray (2 sprays Each Nare Given 9/13/24 2324)   ibuprofen (MOTRIN) tablet 600 mg (600 mg Oral Given 9/13/24 2324)   acetaminophen (TYLENOL) tablet 975 mg (975 mg Oral Given 9/13/24  2324)   ondansetron (ZOFRAN-ODT) dispersible tablet 4 mg (4 mg Oral Given 9/13/24 2324)       History of Present Illness       Patient is a 43-year-old female with past medical history of depression, obesity, tobacco abuse, asthma, who presents emergency department for complaint of myalgias, cough.  Patient is reporting 24 hours of generalized weakness secondary to onset of cough, fevers/chills with no documented temperatures, nausea without vomiting, generalized myalgias, and a lot of upper respiratory congestion, reporting no rash, no chest pain or difficulty breathing, does not feel that she is wheezing, is not using her inhaler more frequently, denies diarrhea, denies bloody sputum, denies sick contacts, states she has taken DayQuil and NyQuil without significant relief.             Review of Systems        Objective     ED Triage Vitals [09/13/24 2159]   Temperature Pulse Blood Pressure Respirations SpO2 Patient Position - Orthostatic VS   100.3 °F (37.9 °C) 105 135/65 20 98 % Lying      Temp Source Heart Rate Source BP Location FiO2 (%) Pain Score    Oral Monitor Right arm -- 8        Physical Exam  Vitals and nursing note reviewed.   Constitutional:       General: She is not in acute distress.     Appearance: She is well-developed. She is ill-appearing. She is not toxic-appearing.   HENT:      Head: Normocephalic and atraumatic.      Nose: Congestion and rhinorrhea present.      Mouth/Throat:      Mouth: Mucous membranes are moist.      Pharynx: Oropharynx is clear. No oropharyngeal exudate or posterior oropharyngeal erythema.   Eyes:      Extraocular Movements: Extraocular movements intact.      Conjunctiva/sclera: Conjunctivae normal.      Pupils: Pupils are equal, round, and reactive to light.      Comments: Pupils are 6+ reactive bilaterally   Cardiovascular:      Rate and Rhythm: Normal rate and regular rhythm.      Heart sounds: No murmur heard.  Pulmonary:      Effort: Pulmonary effort is normal. No  respiratory distress.      Breath sounds: Normal breath sounds. No wheezing, rhonchi or rales.   Abdominal:      Palpations: Abdomen is soft.      Tenderness: There is no abdominal tenderness. There is no guarding or rebound.   Musculoskeletal:         General: No swelling.      Cervical back: Neck supple. No rigidity.   Skin:     General: Skin is warm and dry.      Capillary Refill: Capillary refill takes less than 2 seconds.      Findings: No erythema or rash.   Neurological:      Mental Status: She is alert.   Psychiatric:         Mood and Affect: Mood normal.         Labs Reviewed - No data to display  XR chest 2 views   Final Interpretation by Marcellus Marquez MD (09/15 0651)      Subtle right lower lobe infiltrate.      Workstation performed: [id number]             Procedures         Gopal Bonilla DO  09/16/24 0254

## 2024-09-14 NOTE — DISCHARGE INSTRUCTIONS
Please return to the emergency department if you develop new or worsening symptoms including fever, chest pain, shortness of breath, nausea and vomiting that does not resolve on its own.    Please follow-up with your primary care provider for additional care and management of your viral symptoms.     Please continue to take your home medications as prescribed, please take your newly prescribed Tessalon Perles as needed for cough.

## 2024-09-14 NOTE — ED ATTENDING ATTESTATION
9/13/2024  IJuan MD, saw and evaluated the patient. I have discussed the patient with the resident/non-physician practitioner and agree with the resident's/non-physician practitioner's findings, Plan of Care, and MDM as documented in the resident's/non-physician practitioner's note, except where noted. All available labs and Radiology studies were reviewed.  I was present for key portions of any procedure(s) performed by the resident/non-physician practitioner and I was immediately available to provide assistance.       At this point I agree with the current assessment done in the Emergency Department.  I have conducted an independent evaluation of this patient a history and physical is as follows:      Final Diagnosis:  1. Flu-like symptoms    2. Cough    3. Head congestion      Chief Complaint   Patient presents with    Flu Symptoms     Pt having generalized body aches, cough, congestion, fever, nausea. Started last night. Pt took dayquil and nyquil and no relief.            A:  -43-year-old female presents with congestion, cough, fever, body aches.      P:  -Symptoms consistent with viral syndrome.  Treat symptomatically with Tylenol and Motrin.  Check chest x-ray to evaluate for pneumonia.      H:    43-year-old female who presents with viral symptoms.  For the past 24 hours, patient has been experiencing congestion, cough, headache, body aches, fever.  No known sick contacts.      PMH:  Past Medical History:   Diagnosis Date    Anemia 2/2023    Anxiety     Asthma     Depression     History of Clostridium difficile colitis 2018    x 2 episodes, after antibiotics    Hypercholesterolemia     Hyperlipemia     Hypertension 1/2021    Morbid obesity (HCC)     Obesity     Pulmonary nodule     Sleep apnea     c pap       PSH:  Past Surgical History:   Procedure Laterality Date    ABCESS DRAINAGE  05/21/2024    Abscess of left axilla    IR ASPIRATION ONLY  01/09/2023    PA CONIZATION CERVIX W/WO D&C RPR  ELTRD EXC N/A 09/27/2018    Procedure: BIOPSY LEEP CERVIX;  Surgeon: Radha Bush DO;  Location: [facility];  Service: Gynecology    REDUCTION MAMMAPLASTY  1999    TONSILLECTOMY           PE:   Vitals:    09/13/24 2159 09/13/24 2200   BP: 135/65    BP Location: Right arm    Pulse: 105    Resp: 20    Temp: 100.3 °F (37.9 °C)    TempSrc: Oral    SpO2: 98% 98%         Constitutional: She appears well-developed. She is cooperative. No distress.   HENT:   Mouth/Throat: Uvula is midline, oropharynx is clear and moist and mucous membranes are normal.   Eyes: Pupils are equal, round, and reactive to light. Conjunctivae and EOM are normal.   Neck: Trachea normal. No thyroid mass and no thyromegaly present.   Cardiovascular: Tachycardic, regular rhythm, normal heart sounds.   No murmur heard.  Pulmonary/Chest: Effort normal and breath sounds normal.   Abdominal: Soft. Normal appearance and bowel sounds are normal. There is no tenderness. There is no rebound, no guarding.   Neurological: She is alert.   Skin: Skin is warm, dry and intact.   Psychiatric: She has a normal mood and affect. Her speech is normal and behavior is normal. Thought content normal.          - 13 point ROS was performed and all are normal unless stated in the history above.   - Nursing note reviewed. Vitals reviewed.   - Orders placed by myself and/or advanced practitioner / resident.    - Previous chart was reviewed  - No language barrier.   - History obtained from patient.   - There are no limitations to the history obtained. Reasons ROS could not be obtained:  N/A         Medications   oxymetazoline (AFRIN) 0.05 % nasal spray 2 spray (2 sprays Each Nare Given 9/13/24 2324)   ibuprofen (MOTRIN) tablet 600 mg (600 mg Oral Given 9/13/24 2324)   acetaminophen (TYLENOL) tablet 975 mg (975 mg Oral Given 9/13/24 2324)   ondansetron (ZOFRAN-ODT) dispersible tablet 4 mg (4 mg Oral Given 9/13/24 2324)     XR chest 2 views    (Results Pending)     Orders  Placed This Encounter   Procedures    XR chest 2 views     Labs Reviewed - No data to display  Time reflects when diagnosis was documented in both MDM as applicable and the Disposition within this note       Time User Action Codes Description Comment    9/13/2024 11:39 PM Gopal Bonilla [R68.89] Flu-like symptoms     9/13/2024 11:39 PM Gopal Bonilla [R05.9] Cough     9/13/2024 11:39 PM Gopal Bonilla Add [R09.81] Head congestion           ED Disposition       ED Disposition   Discharge    Condition   Stable    Date/Time   Fri Sep 13, 2024 11:39 PM    Comment   Nely Matty discharge to home/self care.                   Follow-up Information       Follow up With Specialties Details Why Contact Info Additional Information    [facility] Emergency Department Emergency Medicine Go to  If symptoms worsen [address] [place] [id number] [phone] Counts include 234 beds at the [facility] Emergency Department, [address] [address], [address] [phone]    Severo Hays MD Internal Medicine Schedule an appointment as soon as possible for a visit in 1 week  [address] [place] [address]  [phone]             Patient's Medications   Discharge Prescriptions    BENZONATATE (TESSALON PERLES) 100 MG CAPSULE    Take 1 capsule (100 mg total) by mouth every 8 (eight) hours       Start Date: 9/13/2024 End Date: --       Order Dose: 100 mg       Quantity: 21 capsule    Refills: 0     No discharge procedures on file.  Prior to Admission Medications   Prescriptions Last Dose Informant Patient Reported? Taking?   ALPRAZolam (XANAX) 0.25 mg tablet   No No   Sig: Take 1 tablet (0.25 mg total) by mouth daily at bedtime as needed for anxiety   DULoxetine (CYMBALTA) 60 mg delayed release capsule   No No   Sig: Take 1 capsule (60 mg total) by mouth daily   PREVIDENT 5000 SENSITIVE 1.1-5 % PSTE  Self Yes No   SODIUM FLUORIDE, DENTAL GEL, 1.1 % GEL  Self Yes No   Sig:  "SF 5000 Plus 1.1 % dental cream   Tranexamic Acid 650 MG TABS   No No   Sig: Take 2 tablets by mouth TID x 5 days each month   Patient taking differently: Take 2 tablets by mouth TID x 5 days each month - only menstrual cycle   albuterol (ProAir HFA) 90 mcg/act inhaler   No No   Sig: Inhale 2 puffs every 6 (six) hours as needed for wheezing   cholecalciferol (VITAMIN D3) 1,000 units tablet  Self Yes No   Sig: Take by mouth daily     clotrimazole-betamethasone (LOTRISONE) 1-0.05 % cream   No No   Sig: Apply topically 2 (two) times a day   fenofibrate 160 MG tablet   No No   Sig: Take 1 tablet (160 mg total) by mouth daily   fluocinolone (SYNALAR) 0.01 % external solution   No No   Sig: Apply sparingly to affected areas of scalp 2-4 times a day for up to 2 weeks.   ketoconazole (NIZORAL) 2 % shampoo   No No   Sig: Shampoo twice a week for 8 weeks then as needed.  Lather into scalp and skin on face, neck, chest, and back; leave on for 5 minutes and then rinse off completely.   rosuvastatin (CRESTOR) 20 MG tablet   No No   Sig: Take 1 tablet (20 mg total) by mouth daily   semaglutide, 0.25 or 0.5 mg/dose, (Ozempic, 0.25 or 0.5 MG/DOSE,) 2 mg/3 mL injection pen   No No   Si.25 mg under the skin every 7 days for 4 doses (28 days), THEN 0.5 mg under the skin every 7 days   valsartan-hydrochlorothiazide (DIOVAN-HCT) 80-12.5 MG per tablet   No No   Sig: Take 1 tablet by mouth daily      Facility-Administered Medications: None       Portions of the record may have been created with voice recognition software. Occasional wrong word or \"sound a like\" substitutions may have occurred due to the inherent limitations of voice recognition software. Read the chart carefully and recognize, using context, where substitutions have occurred.     ED Course         Critical Care Time  Procedures      "

## 2024-09-16 ENCOUNTER — TELEPHONE (OUTPATIENT)
Age: 44
End: 2024-09-16

## 2024-09-16 RX ORDER — AMOXICILLIN 500 MG/1
500 CAPSULE ORAL 3 TIMES DAILY
Qty: 21 CAPSULE | Refills: 0 | Status: SHIPPED | OUTPATIENT
Start: 2024-09-16 | End: 2024-09-24

## 2024-09-16 RX ORDER — FLUCONAZOLE 150 MG/1
150 TABLET ORAL ONCE
Qty: 1 TABLET | Refills: 0 | Status: SHIPPED | OUTPATIENT
Start: 2024-09-16 | End: 2024-09-16

## 2024-09-16 NOTE — TELEPHONE ENCOUNTER
PT was in the E/R, which she just received a phone call that there a a small infiltrate on her lung and she will be prescribed an antibiotic. PT was advised to f/u with her PCP, and Dr Hays's first availabilty I could find is 10/3. PT is inquiring if she should wait that far out to see Dr Hays?? Please call PT to advise.    Thank You

## 2024-09-24 ENCOUNTER — TELEPHONE (OUTPATIENT)
Dept: BARIATRICS | Facility: CLINIC | Age: 44
End: 2024-09-24

## 2024-09-24 ENCOUNTER — OFFICE VISIT (OUTPATIENT)
Dept: BARIATRICS | Facility: CLINIC | Age: 44
End: 2024-09-24
Payer: COMMERCIAL

## 2024-09-24 VITALS
SYSTOLIC BLOOD PRESSURE: 110 MMHG | HEIGHT: 62 IN | BODY MASS INDEX: 45.27 KG/M2 | HEART RATE: 98 BPM | WEIGHT: 246 LBS | DIASTOLIC BLOOD PRESSURE: 84 MMHG

## 2024-09-24 DIAGNOSIS — I10 ESSENTIAL HYPERTENSION: ICD-10-CM

## 2024-09-24 DIAGNOSIS — E88.9 METABOLIC DISORDER: ICD-10-CM

## 2024-09-24 DIAGNOSIS — Z13.220 SCREENING CHOLESTEROL LEVEL: ICD-10-CM

## 2024-09-24 DIAGNOSIS — E78.2 MIXED HYPERLIPIDEMIA: ICD-10-CM

## 2024-09-24 DIAGNOSIS — E66.01 MORBID OBESITY (HCC): ICD-10-CM

## 2024-09-24 DIAGNOSIS — G47.33 OBSTRUCTIVE SLEEP APNEA SYNDROME: ICD-10-CM

## 2024-09-24 DIAGNOSIS — E66.01 CLASS 3 SEVERE OBESITY DUE TO EXCESS CALORIES WITH SERIOUS COMORBIDITY AND BODY MASS INDEX (BMI) OF 45.0 TO 49.9 IN ADULT (HCC): Primary | ICD-10-CM

## 2024-09-24 DIAGNOSIS — E55.9 VITAMIN D DEFICIENCY: ICD-10-CM

## 2024-09-24 DIAGNOSIS — E66.813 CLASS 3 SEVERE OBESITY DUE TO EXCESS CALORIES WITH SERIOUS COMORBIDITY AND BODY MASS INDEX (BMI) OF 45.0 TO 49.9 IN ADULT (HCC): Primary | ICD-10-CM

## 2024-09-24 PROCEDURE — 99204 OFFICE O/P NEW MOD 45 MIN: CPT | Performed by: NURSE PRACTITIONER

## 2024-09-24 RX ORDER — TIRZEPATIDE 2.5 MG/.5ML
2.5 INJECTION, SOLUTION SUBCUTANEOUS WEEKLY
Qty: 2 ML | Refills: 0 | Status: SHIPPED | OUTPATIENT
Start: 2024-09-24 | End: 2024-10-22

## 2024-09-24 NOTE — ASSESSMENT & PLAN NOTE
Blood pressure well-controlled today  Currently on valsartan/hydrochlorothiazide 80/12.5 mg  Blood pressure control may likely improve with weight loss and diet changes

## 2024-09-24 NOTE — ASSESSMENT & PLAN NOTE
- Discussed options of HealthyCORE-Intensive Lifestyle Intervention Program, Very Low Calorie Diet-VLCD, and Conservative Program and the role of weight loss medications.  - Patient is interested in pursuing Conservative Program and follow up visits with medical weight management provider.  - Explained the importance of making lifestyle changes in addition to starting any anti-obesity medications.   - Initial weight loss goal of 5-10% weight loss for improved health. Weight loss can improve patient's co-morbid conditions and/or prevent weight-related complications.  - Weight is not at goal and patient has been unable to achieve a meaningful weight loss above 5% using various programs and tools for more than 6 months  - Labs reviewed from 12/2023 -a new lab order was provided today to complete prior to starting GLP-1 medications    General Recommendations:  Nutrition:  Eat breakfast daily.  Do not skip meals.      Food log (ie.) www.Wowboard.com, sparkpeople.com, loseit.com, Deem.com, etc.     Practice mindful eating.  Be sure to set aside time to eat, eat slowly, and savor your food.     Hydration:    At least 64oz of water daily.  No sugar sweetened beverages.  No juice (eat the fruit instead).     Exercise:  Studies have shown that the ideal exercise goal is somewhere between 150 to 300 minutes of moderate intensity exercise a week.  Start with exercising 10 minutes every other day and gradually increase physical activity with a goal of at least 150 minutes of moderate intensity exercise a week, divided over at least 3 days a week.  An example of this would be exercising 30 minutes a day, 5 days a week.  Resistance training can increase muscle mass and increase our resting metabolic rate.   FULL BODY resistance training is recommended 2-3 times a week.  Do not do this on consecutive days to allow for muscle recovery.     Aim for a bare minimum 5000 steps, even on days you do not exercise.      Monitoring:   Weigh yourself daily.  If this causes undue stress, then just weigh yourself once a week.  Weigh yourself the same time of the day with the same amount of clothing on.  Preferably this should be done after waking up, before you eat, and with no clothing or minimal clothing on.     Specific Goals:  Patient lifestyle habits were reviewed and barriers to weight loss were addressed today.  Patient will be meeting with the dietitian to establish a balanced nutrition plan and make sure she is hitting all of her nutritional requirements.  Patient was also advised to make sure she is consuming only 64 ounces of water daily and limiting her artificial sugar intake.  Activity was discussed and she was encouraged to continue with her 10,000 steps per day goal and to consider incorporating some strength training exercises.  Medications were discussed:  Phentermine and topiramate worked efficiently in the past for patient and may be considered again  Bupropion because possible side effects in the past but patient willing to try if necessary  GLP-1 medications were discussed and would likely be the most effective tool based on patient's BMI and weight loss goals.  Zepbound was discussed and patient was in agreement to try this medication.    Zepbound Instructions:    - Begin Zepbound 2.5 mg subcutaneously once a week. Dose changes may occur after 4 doses if medication is tolerated. You will be assessed prior to each dose change to make sure you are tolerating the medication well.  - Please message me when you have 2 pens left from the prescription so there are no lapses in treatment.  - If you have been off the medication for more than 14 days please contact the office as you will need to restart the titration at the starting dose again to avoid significant side effects or adverse events.  - Visit Zepbound.com for further information/injection instructions.   -Please eat small frequent meals to help reduce nausea.  Lemon water and saltine crackers may help with this.   - Side effects of Zepbound discussed: nausea, vomiting, diarrhea, and constipation. If you experience fever, nausea/vomiting, and pain radiating to your back this may be a sign of pancreatitis. Please go to the emergency room if this occurs.  - If on oral birth control a 2nd method of birth control is recommended during the 1st 8 weeks of therapy and for 4 weeks after any dosage change.   - Patient understands the side effects of the medication and proper administration. Patient agrees with the treatment plan and all questions were answered.   -Supply concerns were discussed with patient and patient voiced understanding that they will need to call with adequate time for refills to avoid any lapses in treatment.  Patient may also have to call around to different pharmacies to see if any pharmacy has the appropriate dose in stock.  -Pen demonstrated in the office today

## 2024-09-24 NOTE — PROGRESS NOTES
Assessment/Plan:    Class 3 severe obesity due to excess calories with serious comorbidity and body mass index (BMI) of 45.0 to 49.9 in adult (HCC)  - Discussed options of HealthyCORE-Intensive Lifestyle Intervention Program, Very Low Calorie Diet-VLCD, and Conservative Program and the role of weight loss medications.  - Patient is interested in pursuing Conservative Program and follow up visits with medical weight management provider.  - Explained the importance of making lifestyle changes in addition to starting any anti-obesity medications.   - Initial weight loss goal of 5-10% weight loss for improved health. Weight loss can improve patient's co-morbid conditions and/or prevent weight-related complications.  - Weight is not at goal and patient has been unable to achieve a meaningful weight loss above 5% using various programs and tools for more than 6 months  - Labs reviewed from 12/2023 -a new lab order was provided today to complete prior to starting GLP-1 medications    General Recommendations:  Nutrition:  Eat breakfast daily.  Do not skip meals.      Food log (ie.) [email or web address], sparkpeople.com, loseit.com, calorieking.com, etc.     Practice mindful eating.  Be sure to set aside time to eat, eat slowly, and savor your food.     Hydration:    At least 64oz of water daily.  No sugar sweetened beverages.  No juice (eat the fruit instead).     Exercise:  Studies have shown that the ideal exercise goal is somewhere between 150 to 300 minutes of moderate intensity exercise a week.  Start with exercising 10 minutes every other day and gradually increase physical activity with a goal of at least 150 minutes of moderate intensity exercise a week, divided over at least 3 days a week.  An example of this would be exercising 30 minutes a day, 5 days a week.  Resistance training can increase muscle mass and increase our resting metabolic rate.   FULL BODY resistance training is recommended 2-3 times a week.  Do  not do this on consecutive days to allow for muscle recovery.     Aim for a bare minimum 5000 steps, even on days you do not exercise.     Monitoring:   Weigh yourself daily.  If this causes undue stress, then just weigh yourself once a week.  Weigh yourself the same time of the day with the same amount of clothing on.  Preferably this should be done after waking up, before you eat, and with no clothing or minimal clothing on.     Specific Goals:  Patient lifestyle habits were reviewed and barriers to weight loss were addressed today.  Patient will be meeting with the dietitian to establish a balanced nutrition plan and make sure she is hitting all of her nutritional requirements.  Patient was also advised to make sure she is consuming only 64 ounces of water daily and limiting her artificial sugar intake.  Activity was discussed and she was encouraged to continue with her 10,000 steps per day goal and to consider incorporating some strength training exercises.  Medications were discussed:  Phentermine and topiramate worked efficiently in the past for patient and may be considered again  Bupropion because possible side effects in the past but patient willing to try if necessary  GLP-1 medications were discussed and would likely be the most effective tool based on patient's BMI and weight loss goals.  Zepbound was discussed and patient was in agreement to try this medication.    Zepbound Instructions:    - Begin Zepbound 2.5 mg subcutaneously once a week. Dose changes may occur after 4 doses if medication is tolerated. You will be assessed prior to each dose change to make sure you are tolerating the medication well.  - Please message me when you have 2 pens left from the prescription so there are no lapses in treatment.  - If you have been off the medication for more than 14 days please contact the office as you will need to restart the titration at the starting dose again to avoid significant side effects or  adverse events.  - Visit Zepbound.com for further information/injection instructions.   -Please eat small frequent meals to help reduce nausea. Lemon water and saltine crackers may help with this.   - Side effects of Zepbound discussed: nausea, vomiting, diarrhea, and constipation. If you experience fever, nausea/vomiting, and pain radiating to your back this may be a sign of pancreatitis. Please go to the emergency room if this occurs.  - If on oral birth control a 2nd method of birth control is recommended during the 1st 8 weeks of therapy and for 4 weeks after any dosage change.   - Patient understands the side effects of the medication and proper administration. Patient agrees with the treatment plan and all questions were answered.   -Supply concerns were discussed with patient and patient voiced understanding that they will need to call with adequate time for refills to avoid any lapses in treatment.  Patient may also have to call around to different pharmacies to see if any pharmacy has the appropriate dose in stock.  -Pen demonstrated in the office today    Mixed hyperlipidemia  Will likely improve with weight loss and diet changes  Currently on rosuvastatin 20 mg daily    Essential hypertension  Blood pressure well-controlled today  Currently on valsartan/hydrochlorothiazide 80/12.5 mg  Blood pressure control may likely improve with weight loss and diet changes    Obstructive sleep apnea syndrome  Encourage nightly use of CPAP machine         Nely was seen today for consult.    Diagnoses and all orders for this visit:    Class 3 severe obesity due to excess calories with serious comorbidity and body mass index (BMI) of 45.0 to 49.9 in adult (HCC)  -     Ambulatory Referral to Weight Management  -     Vitamin D 25 hydroxy; Future  -     Hemoglobin A1C; Future  -     TSH, 3rd generation with Free T4 reflex; Future  -     Lipid panel; Future  -     Comprehensive metabolic panel; Future  -     CBC and  "differential; Future  -     Insulin, fasting; Future  -     tirzepatide (Zepbound) 2.5 mg/0.5 mL auto-injector; Inject 0.5 mL (2.5 mg total) under the skin once a week for 28 days    Morbid obesity (HCC)  -     Ambulatory Referral to Weight Management    Screening cholesterol level  -     Lipid panel; Future    Metabolic disorder  -     Hemoglobin A1C; Future  -     Insulin, fasting; Future    Vitamin D deficiency  -     Vitamin D 25 hydroxy; Future    Mixed hyperlipidemia    Essential hypertension    Obstructive sleep apnea syndrome       Patient denies personal history of pancreatitis. Patient also denies personal and family history of medullary thyroid cancer and multiple endocrine neoplasia type 2 (MEN 2 tumor). Patient denies any history of kidney stones, seizures, or glaucoma.  Patient currently does not use any birth control and was educated at length about avoiding pregnancy while on GLP-1 medications.  Medication consent was reviewed today and all questions were answered.  Patient agreed with all bulleted points.  Consent was signed, scanned into chart and patient was provided with a copy for their records.      Total time spent reviewing chart, interviewing patient, examining patient, discussing plan, answering all questions, and documentin min, with >50% face-to-face time spent counseling patient on nonsurgical interventions for the treatment of excess weight. Discussed in detail nonsurgical options including intensive lifestyle intervention program, very low-calorie diet program and conservative program.  Discussed the role of weight loss medications.  Counseled patient on diet behavior and exercise modification for weight loss.    Follow up in approximately 2 months with Non-Surgical Physician/Advanced Practitioner.    Subjective:   Chief Complaint   Patient presents with    Consult     Pt is here for MWM consult. Waist - 53.5\"       Patient ID: Nely Starr  is a 43 y.o. female with excess " weight/obesity here to pursue weight management.  Previous notes and records have been reviewed.    Past Medical History:   Diagnosis Date    Anemia 2/2023    Anxiety     Asthma     Depression     History of Clostridium difficile colitis 2018    x 2 episodes, after antibiotics    Hypercholesterolemia     Hyperlipemia     Hypertension 1/2021    Morbid obesity (HCC)     Obesity     Pulmonary nodule     Sleep apnea     c pap     Past Surgical History:   Procedure Laterality Date    ABCESS DRAINAGE  05/21/2024    Abscess of left axilla    IR ASPIRATION ONLY  01/09/2023    NM CONIZATION CERVIX W/WO D&C RPR ELTRD EXC N/A 09/27/2018    Procedure: BIOPSY LEEP CERVIX;  Surgeon: Radha Bush DO;  Location: [facility];  Service: Gynecology    REDUCTION MAMMAPLASTY  1999    TONSILLECTOMY         HPI:  Wt Readings from Last 20 Encounters:   09/24/24 112 kg (246 lb)   07/24/24 112 kg (247 lb)   06/06/24 112 kg (246 lb 8 oz)   04/24/24 110 kg (243 lb)   04/12/24 107 kg (236 lb)   01/30/24 107 kg (236 lb 12.8 oz)   01/26/24 108 kg (237 lb 12.8 oz)   01/17/24 108 kg (237 lb 6.4 oz)   11/15/23 110 kg (243 lb 9.6 oz)   10/25/23 108 kg (237 lb)   10/10/23 108 kg (239 lb)   07/13/23 108 kg (238 lb)   04/20/23 108 kg (237 lb)   03/30/23 108 kg (237 lb 6.4 oz)   03/06/23 108 kg (238 lb)   02/02/23 108 kg (238 lb)   01/19/23 106 kg (233 lb)   01/16/23 104 kg (228 lb 12.8 oz)   01/10/23 107 kg (235 lb)   01/06/23 107 kg (236 lb 6.4 oz)       Patient presents today to medical weight management office for consult.  Patient has been struggling with her weight for most of her life.  She was pursuing a bariatric surgery process a few years ago but was unable to stop smoking cigarettes so was halted on her surgery process.  She would like to avoid surgery if possible and would like to talk about other options.  Patient states she has obesity in the family on her mother side.  She was placed on phentermine and topiramate in the past and she  was able to lose 60 pounds but when she stopped the medication she rebounded with her weight gain.  Patient tries to stay consistent with her eating habits and does not snack throughout the day.  She does not do any formal exercise but does make sure she hits 10,000 steps per day and uses a walking pad if necessary.  Patient is currently on tranexamic acid for her periods as they have been very heavy and frequent for the past year.  She had always had normal periods up until then.  Patient is currently on duloxetine for depression.  She was tried on bupropion which caused side effects, but patient suspects it may have also been related to her withdrawing from duloxetine at the time.      Obesity/Excess Weight:  Severity: Severe  Onset:  lifelong    Modifiers: Diet and Exercise, Physician Supervised Weight Loss Program, Commercial Weight Loss Programs-ie. Weight Watchers, [employer], [employer], etc., and Prescription Weight Loss Medications  Contributing factors: Insufficient Physical Activity, Lack of knowledge of appropriate lifestyle changes, Medications, and Insufficient time to make appropriate lifestyle changes  Associated symptoms: comorbid conditions, fatigue, increased joint pain, decreased exercise capacity, body image issues, decreased self esteem, increased shortness of breath, decreased mobility, depression, inability to do certain activities, and clothes do not fit    Diet recall:  B: yogurt parfait (light and fit) with berries and protein granola  L: /grilled chicken wrap with side salad  D: varies - protein with vegetables and starch  S: no    Hydration: diet pepsi, water, coffee (zero sugar creamer or protein shake)  Alcohol: very rare  Smokin-2 cigarettes, vaping  Exercise: walking pad - hits 10k steps  Occupation: interventional radiology tech  Sleep: has PRECIOUS - doesn't sleep every night    Current weight: 246 lbs  Current BMI: 45.43  Current Waist Measurement: 53.5 in  Goal  "weight: \"get healthy\"    Mammogram: 2024    The following portions of the patient's history were reviewed and updated as appropriate: allergies, current medications, past family history, past medical history, past social history, past surgical history, and problem list.    Family History   Problem Relation Age of Onset    BRCA2 Negative Mother     BRCA1 Negative Mother     Breast cancer Mother 68    Asthma Mother     Obesity Mother     Lung cancer Father 48    Hyperlipidemia Father     Bone cancer Father         mets from lung cancer    Cancer Father         Lung/bone ca-     Anxiety disorder Father     No Known Problems Sister     Arthritis Maternal Grandmother     COPD Maternal Grandmother     Cancer Maternal Grandfather         Lung ca-    Lung cancer Maternal Grandfather 80    No Known Problems Paternal Grandmother     Liver cancer Paternal Grandfather 80    Cancer Paternal Grandfather         Liver ca-    No Known Problems Brother     No Known Problems Maternal Aunt     No Known Problems Maternal Aunt     No Known Problems Maternal Aunt     No Known Problems Paternal Aunt         Review of Systems   Constitutional:  Negative for fatigue.   HENT:  Negative for sore throat.    Respiratory:  Negative for cough and shortness of breath.    Cardiovascular:  Negative for chest pain, palpitations and leg swelling.   Gastrointestinal:  Negative for abdominal pain, constipation, diarrhea and nausea.   Genitourinary:  Negative for dysuria.   Musculoskeletal:  Positive for arthralgias (knees). Negative for back pain.   Skin:  Negative for rash.   Neurological:  Negative for headaches.   Psychiatric/Behavioral:  Negative for dysphoric mood. The patient is not nervous/anxious.        Objective:  /84   Pulse 98   Ht 5' 1.7\" (1.567 m)   Wt 112 kg (246 lb)   LMP 2024 (Exact Date)   BMI 45.43 kg/m²     Physical Exam  Vitals and nursing note reviewed.   Constitutional:       " Appearance: Normal appearance. She is obese.   HENT:      Head: Normocephalic.   Pulmonary:      Effort: Pulmonary effort is normal.   Neurological:      General: No focal deficit present.      Mental Status: She is alert and oriented to person, place, and time.   Psychiatric:         Mood and Affect: Mood normal.         Behavior: Behavior normal.         Thought Content: Thought content normal.         Judgment: Judgment normal.              Labs and Imaging  Recent labs and imaging have been personally reviewed.  Lab Results   Component Value Date    WBC 6.32 12/19/2023    HGB 13.3 12/19/2023    HCT 39.0 12/19/2023    MCV 84 12/19/2023     12/19/2023     Lab Results   Component Value Date     09/15/2015    SODIUM 138 12/19/2023    K 3.7 12/19/2023     12/19/2023    CO2 25 12/19/2023    ANIONGAP 7 09/15/2015    AGAP 10 12/19/2023    BUN 17 12/19/2023    CREATININE 0.81 12/19/2023    GLUC 97 12/19/2023    GLUF 94 02/01/2022    CALCIUM 8.9 12/19/2023    AST 34 12/18/2023    ALT 34 12/18/2023    ALKPHOS 54 12/18/2023    PROT 6.4 09/15/2015    TP 7.0 12/18/2023    BILITOT 0.18 (L) 09/15/2015    TBILI 0.31 12/18/2023    EGFR 89 12/19/2023     Lab Results   Component Value Date    HGBA1C 5.5 07/24/2024     Lab Results   Component Value Date    JKY5AWEPRUGY 1.400 01/24/2021     Lab Results   Component Value Date    CHOLESTEROL 147 07/24/2024     Lab Results   Component Value Date    HDL 49 (L) 07/24/2024     Lab Results   Component Value Date    TRIG 134 07/24/2024     Lab Results   Component Value Date    LDLCALC 71 07/24/2024

## 2024-10-03 ENCOUNTER — OFFICE VISIT (OUTPATIENT)
Age: 44
End: 2024-10-03
Payer: COMMERCIAL

## 2024-10-03 VITALS
HEIGHT: 61 IN | DIASTOLIC BLOOD PRESSURE: 78 MMHG | OXYGEN SATURATION: 97 % | TEMPERATURE: 98.1 F | RESPIRATION RATE: 16 BRPM | BODY MASS INDEX: 45.88 KG/M2 | WEIGHT: 243 LBS | SYSTOLIC BLOOD PRESSURE: 125 MMHG | HEART RATE: 87 BPM

## 2024-10-03 DIAGNOSIS — I10 ESSENTIAL HYPERTENSION: Primary | ICD-10-CM

## 2024-10-03 DIAGNOSIS — F17.200 TOBACCO DEPENDENCE SYNDROME: ICD-10-CM

## 2024-10-03 DIAGNOSIS — J45.20 MILD INTERMITTENT ASTHMA WITHOUT COMPLICATION: ICD-10-CM

## 2024-10-03 DIAGNOSIS — E78.2 MIXED HYPERLIPIDEMIA: ICD-10-CM

## 2024-10-03 PROCEDURE — 99213 OFFICE O/P EST LOW 20 MIN: CPT

## 2024-10-03 NOTE — PROGRESS NOTES
Assessment/Plan:         Problem List Items Addressed This Visit       RESOLVED: Mild intermittent asthma without complication     Under control.    Continue current medication.    We will re-evaluate at next office visit.           Essential hypertension - Primary     Under control.    Continue valsartan with hydrochlorothiazide  We will re-evaluate at next office visit.           Tobacco dependence syndrome     Quit smoking  Options for nicotine patch, nicotine gum or any medication discussed with patient  Potential consequences of smoking discussed with patient  Patient seems to be understood well           Mixed hyperlipidemia     Under control.  Continue rosuvastatin and fenofibrate.  We will continue to monitor              Subjective:      Patient ID: Nely Starr is a 43 y.o. female.    Patient here after the emergency room visit on September 13, 2024 which was treated like for flulike symptoms but chest x-ray later showed right lower lobe infiltrate was treated with antibiotic now feeling better.  No more chest pain shortness of breath fever or chills.  No lightheadedness or dizziness.  No headache.  No tingling numbness or weakness.  No bowel or bladder problem.  No other specific problem.        The following portions of the patient's history were reviewed and updated as appropriate:   Past Medical History:  She has a past medical history of Anemia (2/2023), Anxiety, Asthma, Depression, History of Clostridium difficile colitis (2018), Hypercholesterolemia, Hyperlipemia, Hypertension (1/2021), Morbid obesity (HCC), Obesity, Pulmonary nodule, and Sleep apnea.,  _______________________________________________________________________  Medical Problems:  does not have any pertinent problems on file.,  _______________________________________________________________________  Past Surgical History:   has a past surgical history that includes Tonsillectomy; Reduction mammaplasty (1999); pr conization cervix w/wo  d&c rpr eltrd exc (N/A, 09/27/2018); IR aspiration only (01/09/2023); and Abscess drainage (05/21/2024).,  _______________________________________________________________________  Family History:  family history includes Anxiety disorder in her father; Arthritis in her maternal grandmother; Asthma in her mother; BRCA1 Negative in her mother; BRCA2 Negative in her mother; Bone cancer in her father; Breast cancer (age of onset: 68) in her mother; COPD in her maternal grandmother; Cancer in her father, maternal grandfather, and paternal grandfather; Hyperlipidemia in her father; Liver cancer (age of onset: 80) in her paternal grandfather; Lung cancer (age of onset: 48) in her father; Lung cancer (age of onset: 80) in her maternal grandfather; No Known Problems in her brother, maternal aunt, maternal aunt, maternal aunt, paternal aunt, paternal grandmother, and sister; Obesity in her mother.,  _______________________________________________________________________  Social History:   reports that she has quit smoking. Her smoking use included cigarettes. She has a 9 pack-year smoking history. She has been exposed to tobacco smoke. She has never used smokeless tobacco. She reports current alcohol use. She reports that she does not use drugs.,  _______________________________________________________________________  Allergies:  has No Known Allergies..  _______________________________________________________________________  Current Outpatient Medications   Medication Sig Dispense Refill    albuterol (ProAir HFA) 90 mcg/act inhaler Inhale 2 puffs every 6 (six) hours as needed for wheezing 8.5 g 0    ALPRAZolam (XANAX) 0.25 mg tablet Take 1 tablet (0.25 mg total) by mouth daily at bedtime as needed for anxiety 90 tablet 0    cholecalciferol (VITAMIN D3) 1,000 units tablet Take by mouth daily        clotrimazole-betamethasone (LOTRISONE) 1-0.05 % cream Apply topically 2 (two) times a day 45 g 1    DULoxetine (CYMBALTA) 60 mg  delayed release capsule Take 1 capsule (60 mg total) by mouth daily 90 capsule 0    fenofibrate 160 MG tablet Take 1 tablet (160 mg total) by mouth daily 90 tablet 0    fluocinolone (SYNALAR) 0.01 % external solution Apply sparingly to affected areas of scalp 2-4 times a day for up to 2 weeks. 60 mL 1    ketoconazole (NIZORAL) 2 % shampoo Shampoo twice a week for 8 weeks then as needed.  Lather into scalp and skin on face, neck, chest, and back; leave on for 5 minutes and then rinse off completely. 120 mL 2    PREVIDENT 5000 SENSITIVE 1.1-5 % PSTE   3    rosuvastatin (CRESTOR) 20 MG tablet Take 1 tablet (20 mg total) by mouth daily 90 tablet 0    SODIUM FLUORIDE, DENTAL GEL, 1.1 % GEL SF 5000 Plus 1.1 % dental cream      tirzepatide (Zepbound) 2.5 mg/0.5 mL auto-injector Inject 0.5 mL (2.5 mg total) under the skin once a week for 28 days 2 mL 0    Tranexamic Acid 650 MG TABS Take 2 tablets by mouth TID x 5 days each month (Patient taking differently: Take 2 tablets by mouth TID x 5 days each month - only menstrual cycle) 30 tablet 10    valsartan-hydrochlorothiazide (DIOVAN-HCT) 80-12.5 MG per tablet Take 1 tablet by mouth daily 90 tablet 0     No current facility-administered medications for this visit.     _______________________________________________________________________  Review of Systems   Constitutional:  Negative for chills, fatigue and fever.   HENT:  Negative for congestion, ear pain, rhinorrhea, sneezing, sore throat and voice change.    Eyes:  Negative for redness, itching and visual disturbance.   Respiratory:  Negative for cough, chest tightness, shortness of breath and wheezing.    Cardiovascular:  Negative for chest pain, palpitations and leg swelling.   Gastrointestinal:  Negative for abdominal pain, blood in stool, diarrhea, nausea and vomiting.   Endocrine: Negative for cold intolerance and heat intolerance.   Genitourinary:  Negative for dysuria, frequency and urgency.   Musculoskeletal:   "Positive for arthralgias (Bilateral knee pain). Negative for back pain and myalgias.   Skin:  Negative for color change and rash.   Neurological:  Negative for dizziness, weakness, light-headedness, numbness and headaches.   Hematological:  Does not bruise/bleed easily.   Psychiatric/Behavioral:  Negative for agitation, behavioral problems and confusion.          Objective:  Vitals:    10/03/24 0816   BP: 125/78   Pulse: 87   Resp: 16   Temp: 98.1 °F (36.7 °C)   TempSrc: Tympanic   SpO2: 97%   Weight: 110 kg (243 lb)   Height: 5' 1\" (1.549 m)     Body mass index is 45.91 kg/m².     Physical Exam  Vitals and nursing note reviewed.   Constitutional:       General: She is not in acute distress.     Appearance: Normal appearance. She is obese. She is not ill-appearing, toxic-appearing or diaphoretic.   HENT:      Head: Normocephalic and atraumatic.      Nose: Nose normal. No congestion.      Mouth/Throat:      Mouth: Mucous membranes are moist.   Eyes:      General: No scleral icterus.        Right eye: No discharge.         Left eye: No discharge.      Extraocular Movements: Extraocular movements intact.      Conjunctiva/sclera: Conjunctivae normal.      Pupils: Pupils are equal, round, and reactive to light.   Cardiovascular:      Rate and Rhythm: Normal rate and regular rhythm.      Pulses: Normal pulses.      Heart sounds: Normal heart sounds. No murmur heard.     No gallop.   Pulmonary:      Effort: Pulmonary effort is normal. No respiratory distress.      Breath sounds: Normal breath sounds. No wheezing, rhonchi or rales.   Abdominal:      General: Abdomen is flat. Bowel sounds are normal. There is no distension.      Palpations: Abdomen is soft.      Tenderness: There is no abdominal tenderness. There is no right CVA tenderness, left CVA tenderness or guarding.   Musculoskeletal:         General: No swelling or tenderness. Normal range of motion.      Cervical back: Normal range of motion and neck supple. No " rigidity.      Right lower leg: No edema.      Left lower leg: No edema.   Lymphadenopathy:      Cervical: No cervical adenopathy.   Skin:     General: Skin is warm.      Capillary Refill: Capillary refill takes 2 to 3 seconds.      Coloration: Skin is not jaundiced.      Findings: No bruising or rash.   Neurological:      General: No focal deficit present.      Mental Status: She is alert and oriented to person, place, and time. Mental status is at baseline.      Motor: No weakness.      Gait: Gait normal.   Psychiatric:         Mood and Affect: Mood normal.         Behavior: Behavior normal.

## 2024-10-06 ENCOUNTER — APPOINTMENT (OUTPATIENT)
Dept: LAB | Facility: CLINIC | Age: 44
End: 2024-10-06
Payer: COMMERCIAL

## 2024-10-06 DIAGNOSIS — E55.9 VITAMIN D DEFICIENCY: ICD-10-CM

## 2024-10-06 DIAGNOSIS — E88.9 METABOLIC DISORDER: ICD-10-CM

## 2024-10-06 DIAGNOSIS — Z13.220 SCREENING CHOLESTEROL LEVEL: ICD-10-CM

## 2024-10-06 DIAGNOSIS — E66.01 CLASS 3 SEVERE OBESITY DUE TO EXCESS CALORIES WITH SERIOUS COMORBIDITY AND BODY MASS INDEX (BMI) OF 45.0 TO 49.9 IN ADULT (HCC): ICD-10-CM

## 2024-10-06 DIAGNOSIS — E66.813 CLASS 3 SEVERE OBESITY DUE TO EXCESS CALORIES WITH SERIOUS COMORBIDITY AND BODY MASS INDEX (BMI) OF 45.0 TO 49.9 IN ADULT (HCC): ICD-10-CM

## 2024-10-06 LAB
25(OH)D3 SERPL-MCNC: 34.8 NG/ML (ref 30–100)
ALBUMIN SERPL BCG-MCNC: 4.3 G/DL (ref 3.5–5)
ALP SERPL-CCNC: 49 U/L (ref 34–104)
ALT SERPL W P-5'-P-CCNC: 39 U/L (ref 7–52)
ANION GAP SERPL CALCULATED.3IONS-SCNC: 11 MMOL/L (ref 4–13)
AST SERPL W P-5'-P-CCNC: 33 U/L (ref 13–39)
BASOPHILS # BLD AUTO: 0.07 THOUSANDS/ΜL (ref 0–0.1)
BASOPHILS NFR BLD AUTO: 1 % (ref 0–1)
BILIRUB SERPL-MCNC: 0.48 MG/DL (ref 0.2–1)
BUN SERPL-MCNC: 15 MG/DL (ref 5–25)
CALCIUM SERPL-MCNC: 9 MG/DL (ref 8.4–10.2)
CHLORIDE SERPL-SCNC: 102 MMOL/L (ref 96–108)
CHOLEST SERPL-MCNC: 137 MG/DL
CO2 SERPL-SCNC: 26 MMOL/L (ref 21–32)
CREAT SERPL-MCNC: 0.83 MG/DL (ref 0.6–1.3)
EOSINOPHIL # BLD AUTO: 0.17 THOUSAND/ΜL (ref 0–0.61)
EOSINOPHIL NFR BLD AUTO: 3 % (ref 0–6)
ERYTHROCYTE [DISTWIDTH] IN BLOOD BY AUTOMATED COUNT: 12.6 % (ref 11.6–15.1)
EST. AVERAGE GLUCOSE BLD GHB EST-MCNC: 100 MG/DL
GFR SERPL CREATININE-BSD FRML MDRD: 87 ML/MIN/1.73SQ M
GLUCOSE P FAST SERPL-MCNC: 80 MG/DL (ref 65–99)
HBA1C MFR BLD: 5.1 %
HCT VFR BLD AUTO: 40.3 % (ref 34.8–46.1)
HDLC SERPL-MCNC: 45 MG/DL
HGB BLD-MCNC: 13.3 G/DL (ref 11.5–15.4)
IMM GRANULOCYTES # BLD AUTO: 0.04 THOUSAND/UL (ref 0–0.2)
IMM GRANULOCYTES NFR BLD AUTO: 1 % (ref 0–2)
INSULIN SERPL-ACNC: 24.94 UIU/ML (ref 1.9–23)
LDLC SERPL CALC-MCNC: 59 MG/DL (ref 0–100)
LYMPHOCYTES # BLD AUTO: 1.87 THOUSANDS/ΜL (ref 0.6–4.47)
LYMPHOCYTES NFR BLD AUTO: 27 % (ref 14–44)
MCH RBC QN AUTO: 28.3 PG (ref 26.8–34.3)
MCHC RBC AUTO-ENTMCNC: 33 G/DL (ref 31.4–37.4)
MCV RBC AUTO: 86 FL (ref 82–98)
MONOCYTES # BLD AUTO: 0.57 THOUSAND/ΜL (ref 0.17–1.22)
MONOCYTES NFR BLD AUTO: 8 % (ref 4–12)
NEUTROPHILS # BLD AUTO: 4.15 THOUSANDS/ΜL (ref 1.85–7.62)
NEUTS SEG NFR BLD AUTO: 60 % (ref 43–75)
NONHDLC SERPL-MCNC: 92 MG/DL
NRBC BLD AUTO-RTO: 0 /100 WBCS
PLATELET # BLD AUTO: 284 THOUSANDS/UL (ref 149–390)
PMV BLD AUTO: 9.2 FL (ref 8.9–12.7)
POTASSIUM SERPL-SCNC: 3.9 MMOL/L (ref 3.5–5.3)
PROT SERPL-MCNC: 6.8 G/DL (ref 6.4–8.4)
RBC # BLD AUTO: 4.7 MILLION/UL (ref 3.81–5.12)
SODIUM SERPL-SCNC: 139 MMOL/L (ref 135–147)
TRIGL SERPL-MCNC: 164 MG/DL
TSH SERPL DL<=0.05 MIU/L-ACNC: 1.61 UIU/ML (ref 0.45–4.5)
WBC # BLD AUTO: 6.87 THOUSAND/UL (ref 4.31–10.16)

## 2024-10-06 PROCEDURE — 82306 VITAMIN D 25 HYDROXY: CPT

## 2024-10-06 PROCEDURE — 80061 LIPID PANEL: CPT

## 2024-10-06 PROCEDURE — 36415 COLL VENOUS BLD VENIPUNCTURE: CPT

## 2024-10-06 PROCEDURE — 80053 COMPREHEN METABOLIC PANEL: CPT

## 2024-10-06 PROCEDURE — 83525 ASSAY OF INSULIN: CPT

## 2024-10-06 PROCEDURE — 85025 COMPLETE CBC W/AUTO DIFF WBC: CPT

## 2024-10-06 PROCEDURE — 83036 HEMOGLOBIN GLYCOSYLATED A1C: CPT

## 2024-10-06 PROCEDURE — 84443 ASSAY THYROID STIM HORMONE: CPT

## 2024-10-07 DIAGNOSIS — E66.813 CLASS 3 SEVERE OBESITY DUE TO EXCESS CALORIES WITH SERIOUS COMORBIDITY AND BODY MASS INDEX (BMI) OF 45.0 TO 49.9 IN ADULT (HCC): Primary | ICD-10-CM

## 2024-10-07 DIAGNOSIS — E66.01 CLASS 3 SEVERE OBESITY DUE TO EXCESS CALORIES WITH SERIOUS COMORBIDITY AND BODY MASS INDEX (BMI) OF 45.0 TO 49.9 IN ADULT (HCC): Primary | ICD-10-CM

## 2024-10-07 RX ORDER — TIRZEPATIDE 5 MG/.5ML
5 INJECTION, SOLUTION SUBCUTANEOUS WEEKLY
Qty: 2 ML | Refills: 1 | Status: SHIPPED | OUTPATIENT
Start: 2024-10-07 | End: 2024-12-02

## 2024-10-24 ENCOUNTER — PATIENT MESSAGE (OUTPATIENT)
Age: 44
End: 2024-10-24

## 2024-10-24 DIAGNOSIS — F32.A DEPRESSION, UNSPECIFIED DEPRESSION TYPE: ICD-10-CM

## 2024-10-24 RX ORDER — DULOXETIN HYDROCHLORIDE 60 MG/1
60 CAPSULE, DELAYED RELEASE ORAL DAILY
Qty: 90 CAPSULE | Refills: 1 | Status: SHIPPED | OUTPATIENT
Start: 2024-10-24

## 2024-10-25 NOTE — PROGRESS NOTES
"Weight Management Medical Nutrition Assessment ---Prescibed zepbbound    Nely presented for a meal planning session. Today's weight is 236.6# .Hx ot Htn, Mixed HLD and elevated fasting insulin.  On Cymblalta and on Zepbound. On her fifth dosage of Zepbound. No adverse side affects.   Patient has been struggling with her weight for most of her life.  She was pursuing a bariatric surgery process a few years ago but would like to avoid surgery. Tries to stay consistent with her eating habits and does not snack throughout the day.  She does not do any formal exercise but does make sure she hits 10,000 steps per day and uses a walking pad if necessary.  Per dietary recall patient appears to be inadequate in fiber. Patient has decreased appetite since starting Zepbound.              .  Developed and reviewed a low calorie meal plan. Open to adding more fiber to meals and making meals more well rounded.  Protein intake and Hydration appear to remain consistent. Will follow up.   Dislikes: pork/ turkey/ nuts/seafood    Goals:  1)  Strength training 1 day a week for 10-15 minutes  2) Starting to add fiber to meals-1 fruit or vegetable at meal times.    Patient seen by Medical Provider in past 6 months:  yes 10/03/2024  Requested to schedule appointment with Medical Provider: No      Anthropometric Measurements  Start Weight (#): 238#(06/25/2019)  Current Weight (#): 236.6#  TBW % Change from start weight:0.6%  Ideal Body Weight (#):105#  Goal Weight (#):\"get healthy ideally 175#\"  Highest: 247#t  Lowest: 180#    Weight Loss History  Previous weight loss attempts: Phentermine, Gym.    Food and Nutrition Related History  Wake up: 7 am   Bed Time: 10-11 pm    Food Recall  Breakfast:Muscle Milk protein shake + 3 egg whites     Snack:skip  Lunch:Side salad with hard boiled eggs or chicken quesadilla( meal from hospital cafe)  Snack:skip  Dinner:varies - protein with vegetables and starch or protein shake +  " coffee  Snack:skip    Beverages: water, diet soda, coffee/tea, and muscle milk protein drinks( 2 a day)  Volume of beverage intake: 40 -60 ounces a day    Weekends: Worse, less active and states eating less  Cravings: n/a  Trouble area of day:n/a    Frequency of Eating out: everyday at the hospital cafeteria 5 days/week  Food restrictions:n/a  Cooking: self   Food Shopping: self    Physical Activity Intake  Activity:Walking  Frequency:  walking pad - hits 10k steps/every day / gym 2 times a week  Physical limitations/barriers to exercise: maybe a tear in the arm    Estimated Needs  Energy  SECA: BMR:n/a      X 1.3 -1000 =  [address] [employer] Needs: BMR : 1737   1-2# loss weekly sedentary:  3819-4604 kcal             1-2# loss weekly lightly active:3190-2765  Maintenance calories for sedentary activity level: 2084 kcal  Protein:61-77 grams      (1.2-1.5g/kg IBW)  Fluid: 60 ounces     (35mL/kg IBW)    Nutrition Diagnosis  Yes;    Overweight/obesity  related to Excess energy intake as evidenced by  BMI more than normative standard for age and sex (obesity-grade III 40+)       Nutrition Intervention    Nutrition Prescription  Calories:1400 kcal  Protein:70 grams  Fluid:60 ounces    Meal Plan (Lemuel/Pro/Carb)  Breakfast: 300 kcal/30 grams  Snack: skip  Lunch: 400 kcal/25-30 grams protein  Snack:150-200 kcal/5-25 grams protein  Dinner: 400 kcal/25-30 grams protein  Snack: skip    Nutrition Education:    Calorie controlled menu  Lean protein food choices  Healthy snack options  Food journaling tips      Nutrition Counseling:  Strategies: meal planning, portion sizes, healthy snack choices, hydration, fiber intake, protein intake, exercise, food journal      Monitoring and Evaluation:  Evaluation criteria:  Energy Intake  Meet protein needs  Maintain adequate hydration  Monitor weekly weight  Meal planning/preparation  Food journal   Decreased portions at mealtimes and snacks  Physical activity     Barriers to  learning:none  Readiness to change: Action:  (Changing behavior)  Comprehension: excellent  Expected Compliance: excellent

## 2024-10-26 ENCOUNTER — HOSPITAL ENCOUNTER (OUTPATIENT)
Dept: RADIOLOGY | Facility: HOSPITAL | Age: 44
Discharge: HOME | End: 2024-10-26
Payer: COMMERCIAL

## 2024-10-26 ENCOUNTER — OFFICE VISIT (OUTPATIENT)
Dept: OBGYN CLINIC | Facility: CLINIC | Age: 44
End: 2024-10-26

## 2024-10-26 VITALS — OXYGEN SATURATION: 96 % | HEIGHT: 61 IN | WEIGHT: 238 LBS | BODY MASS INDEX: 44.93 KG/M2 | HEART RATE: 107 BPM
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
## 2024-10-26 DIAGNOSIS — M25.512 LEFT SHOULDER PAIN, UNSPECIFIED CHRONICITY: Primary | ICD-10-CM

## 2024-10-26 DIAGNOSIS — M25.512 LEFT SHOULDER PAIN, UNSPECIFIED CHRONICITY: ICD-10-CM

## 2024-10-26 PROCEDURE — 99213 OFFICE O/P EST LOW 20 MIN: CPT | Performed by: PHYSICIAN ASSISTANT

## 2024-10-26 PROCEDURE — 73030 X-RAY EXAM OF SHOULDER: CPT

## 2024-10-26 NOTE — PROGRESS NOTES
Assessment & Plan   Diagnoses and all orders for this visit:    Left shoulder pain, acute    Fall from standing, initial encounter    - Concern for labral injury  - Start PT  - Ice as needed  - Activity as tolerated  - Follow up with Dr. Mendieta or Dr. Arroyo in 4-6 weeks (pt prefers [facility])      Subjective   Patient ID: Nely Starr is a 43 y.o. female.    Vitals:    10/26/24 1006   Pulse: (!) 107   SpO2: 96%     42yo female comes in for an evaluation of her left shoulder.  She was injured at work on 10/17/24. She slipped on a cord that was on the floor, fell, and caught her left forearm on an xray table, forcing her shoulder upward.  Since then, she has been having pain, stiffness, and clicking sensations in the shoulder.  The pain is dull in character, mild in severity, does not radiate and is not associated with numbness.          The following portions of the patient's history were reviewed and updated as appropriate: allergies, current medications, past family history, past medical history, past social history, past surgical history, and problem list.    Review of Systems  Ortho Exam  Past Medical History:   Diagnosis Date    Anemia 2/2023    Anxiety     Asthma     Depression     History of Clostridium difficile colitis 2018    x 2 episodes, after antibiotics    Hypercholesterolemia     Hyperlipemia     Hypertension 1/2021    Morbid obesity (HCC)     Obesity     Pulmonary nodule     Sleep apnea     c pap     Past Surgical History:   Procedure Laterality Date    ABCESS DRAINAGE  05/21/2024    Abscess of left axilla    IR ASPIRATION ONLY  01/09/2023    SC CONIZATION CERVIX W/WO D&C RPR ELTRD EXC N/A 09/27/2018    Procedure: BIOPSY LEEP CERVIX;  Surgeon: Radha Bush DO;  Location: [facility];  Service: Gynecology    REDUCTION MAMMAPLASTY  1999    TONSILLECTOMY       Family History   Problem Relation Age of Onset    BRCA2 Negative Mother     BRCA1 Negative Mother     Breast cancer Mother 68    Asthma  Mother     Obesity Mother     Lung cancer Father 48    Hyperlipidemia Father     Bone cancer Father         mets from lung cancer    Cancer Father         Lung/bone ca-     Anxiety disorder Father     No Known Problems Sister     Arthritis Maternal Grandmother     COPD Maternal Grandmother     Cancer Maternal Grandfather         Lung ca-    Lung cancer Maternal Grandfather 80    No Known Problems Paternal Grandmother     Liver cancer Paternal Grandfather 80    Cancer Paternal Grandfather         Liver ca-    No Known Problems Brother     No Known Problems Maternal Aunt     No Known Problems Maternal Aunt     No Known Problems Maternal Aunt     No Known Problems Paternal Aunt      Social History     Occupational History    Occupation: [email or web address], IR tech   Tobacco Use    Smoking status: Former     Current packs/day: 0.50     Average packs/day: 0.5 packs/day for 18.0 years (9.0 ttl pk-yrs)     Types: Cigarettes     Passive exposure: Past    Smokeless tobacco: Never    Tobacco comments:     Pt states vapes daily. Vape has nicotine   Vaping Use    Vaping status: Every Day    Substances: Nicotine, Flavoring   Substance and Sexual Activity    Alcohol use: Yes     Comment: occasional drink here and there    Drug use: No    Sexual activity: Yes     Partners: Male     Birth control/protection: None       Review of Systems   Constitutional: Negative.    HENT: Negative.    Eyes: Negative.    Respiratory: Negative.    Cardiovascular: Negative.    Gastrointestinal: Negative.    Endocrine: Negative.    Genitourinary: Negative.    Musculoskeletal: As below..   Allergic/Immunologic: Negative.    Neurological: Negative.    Hematological: Negative.    Psychiatric/Behavioral: Negative.        Objective   Physical Exam    Xrays:  Xray: 3 views of the left shoulder were done in the office today.  Images reviewed.  Radiologist reading pending.  No acute displaced fracture or severe  osteoarthritis.      Constitutional: Awake, Alert, Oriented  Eyes: EOMI  Psych: Mood and affect appropriate  Heart: regular rate   Lungs: No audible wheezing  Abdomen: No guarding  Lymph: no lymphedema      left Shoulder:  Appearance  No swelling, discoloration, deformity, or ecchymosis  Palpation  No tenderness to palpation of the clavicle, AC joint, biceps tendon, scapula, or proximal humerus  ROM  Flexion: active 90 passive 170, ER: 90, and IR: L2  Motor  Internal and external rotation 5/5 with shoulder at side, flexion 5/5  Special tests  Empty Can Test negative, Drop Arm Test negative, Hawkin's Impingement Test negative, and Haughton Test positive  NVI distally

## 2024-10-28 ENCOUNTER — CLINICAL SUPPORT (OUTPATIENT)
Age: 44
End: 2024-10-28
Payer: COMMERCIAL

## 2024-10-28 VITALS — HEIGHT: 61 IN | BODY MASS INDEX: 44.67 KG/M2 | WEIGHT: 236.6 LBS

## 2024-10-28 DIAGNOSIS — E66.813 CLASS 3 SEVERE OBESITY DUE TO EXCESS CALORIES WITH SERIOUS COMORBIDITY AND BODY MASS INDEX (BMI) OF 40.0 TO 44.9 IN ADULT (HCC): Primary | ICD-10-CM

## 2024-10-28 DIAGNOSIS — E66.01 CLASS 3 SEVERE OBESITY DUE TO EXCESS CALORIES WITH SERIOUS COMORBIDITY AND BODY MASS INDEX (BMI) OF 40.0 TO 44.9 IN ADULT (HCC): Primary | ICD-10-CM

## 2024-10-28 PROCEDURE — S9470 NUTRITIONAL COUNSELING, DIET: HCPCS

## 2024-10-28 PROCEDURE — RECHECK

## 2024-11-01 ENCOUNTER — OFFICE VISIT (OUTPATIENT)
Age: 44
End: 2024-11-01
Payer: COMMERCIAL

## 2024-11-01 VITALS
HEART RATE: 78 BPM | BODY MASS INDEX: 44.67 KG/M2 | DIASTOLIC BLOOD PRESSURE: 82 MMHG | OXYGEN SATURATION: 96 % | HEIGHT: 61 IN | SYSTOLIC BLOOD PRESSURE: 128 MMHG | TEMPERATURE: 97.3 F | RESPIRATION RATE: 16 BRPM | WEIGHT: 236.6 LBS

## 2024-11-01 DIAGNOSIS — E78.2 MIXED HYPERLIPIDEMIA: ICD-10-CM

## 2024-11-01 DIAGNOSIS — I10 ESSENTIAL HYPERTENSION: ICD-10-CM

## 2024-11-01 DIAGNOSIS — Z00.00 ANNUAL PHYSICAL EXAM: Primary | ICD-10-CM

## 2024-11-01 DIAGNOSIS — F41.1 GAD (GENERALIZED ANXIETY DISORDER): ICD-10-CM

## 2024-11-01 PROCEDURE — 99396 PREV VISIT EST AGE 40-64: CPT

## 2024-11-01 RX ORDER — ALPRAZOLAM 0.25 MG/1
0.25 TABLET ORAL
Qty: 90 TABLET | Refills: 0 | Status: SHIPPED | OUTPATIENT
Start: 2024-11-01

## 2024-11-01 RX ORDER — VALSARTAN AND HYDROCHLOROTHIAZIDE 80; 12.5 MG/1; MG/1
1 TABLET, FILM COATED ORAL DAILY
Qty: 90 TABLET | Refills: 0 | Status: SHIPPED | OUTPATIENT
Start: 2024-11-01

## 2024-11-01 RX ORDER — ROSUVASTATIN CALCIUM 20 MG/1
20 TABLET, COATED ORAL DAILY
Qty: 90 TABLET | Refills: 0 | Status: SHIPPED | OUTPATIENT
Start: 2024-11-01

## 2024-11-01 RX ORDER — FENOFIBRATE 160 MG/1
160 TABLET ORAL DAILY
Qty: 90 TABLET | Refills: 0 | Status: SHIPPED | OUTPATIENT
Start: 2024-11-01

## 2024-11-01 NOTE — PROGRESS NOTES
Adult Annual Physical  Name: Nely Starr      : 1980      MRN: [id number]  Encounter Provider: Severo Hays MD  Encounter Date: 2024   Encounter department: [facility]    Assessment & Plan  Annual physical exam         QIANA (generalized anxiety disorder)    Orders:    ALPRAZolam (XANAX) 0.25 mg tablet; Take 1 tablet (0.25 mg total) by mouth daily at bedtime as needed for anxiety    Mixed hyperlipidemia    Orders:    fenofibrate 160 MG tablet; Take 1 tablet (160 mg total) by mouth daily    rosuvastatin (CRESTOR) 20 MG tablet; Take 1 tablet (20 mg total) by mouth daily    Essential hypertension    Orders:    valsartan-hydrochlorothiazide (DIOVAN-HCT) 80-12.5 MG per tablet; Take 1 tablet by mouth daily    Immunizations and preventive care screenings were discussed with patient today. Appropriate education was printed on patient's after visit summary.    Counseling:  Sexual health: discussed sexually transmitted diseases, partner selection, use of condoms, avoidance of unintended pregnancy, and contraceptive alternatives.  Exercise: the importance of regular exercise/physical activity was discussed. Recommend exercise 3-5 times per week for at least 30 minutes.       Tobacco Cessation Counseling: Tobacco cessation counseling was provided. The patient is sincerely urged to quit consumption of tobacco. She is not ready to quit tobacco. Medication options and side effects of medication discussed. Patient refused medication.         History of Present Illness     Adult Annual Physical:  Patient presents for annual physical.     Diet and Physical Activity:  - Diet/Nutrition: well balanced diet, portion control and low carb diet.  - Exercise: walking, moderate cardiovascular exercise, 3-4 times a week on average and 30-60 minutes on average.    General Health:  - Sleep: sleeps well, snores loudly and 7-8 hours of sleep on average.  - Hearing: normal hearing right ear and normal  hearing left ear.  - Vision: no vision problems, wears glasses, vision problems and most recent eye exam < 1 year ago.  - Dental: regular dental visits and brushes teeth twice daily.    /GYN Health:  - Follows with GYN: yes.   - History of STDs: no    Advanced Care Planning:  - Has an advanced directive?: no    - Has a durable medical POA?: no    - ACP document given to patient?: no      Review of Systems   Constitutional:  Negative for chills, fatigue and fever.   HENT:  Negative for congestion, ear pain, rhinorrhea, sneezing, sore throat and voice change.    Eyes:  Negative for redness, itching and visual disturbance.   Respiratory:  Negative for cough, chest tightness, shortness of breath and wheezing.    Cardiovascular:  Negative for chest pain, palpitations and leg swelling.   Gastrointestinal:  Negative for abdominal pain, blood in stool, diarrhea, nausea and vomiting.   Endocrine: Negative for cold intolerance and heat intolerance.   Genitourinary:  Negative for dysuria, frequency and urgency.   Musculoskeletal:  Positive for arthralgias (Bilateral knee pain). Negative for back pain and myalgias.   Skin:  Negative for color change and rash.   Neurological:  Negative for dizziness, weakness, light-headedness, numbness and headaches.   Hematological:  Does not bruise/bleed easily.   Psychiatric/Behavioral:  Negative for agitation, behavioral problems and confusion.      Medical History Reviewed by provider this encounter:  Tobacco  Allergies  Meds  Problems  Med Hx  Surg Hx  Fam Hx       Past Medical History   Past Medical History:   Diagnosis Date    Anemia 2/2023    Anxiety     Asthma     Depression     History of Clostridium difficile colitis 2018    x 2 episodes, after antibiotics    Hypercholesterolemia     Hyperlipemia     Hypertension 1/2021    Morbid obesity (HCC)     Obesity     Pulmonary nodule     Sleep apnea     c pap     Past Surgical History:   Procedure Laterality Date    ABCESS DRAINAGE   2024    Abscess of left axilla    IR ASPIRATION ONLY  2023    AK CONIZATION CERVIX W/WO D&C RPR ELTRD EXC N/A 2018    Procedure: BIOPSY LEEP CERVIX;  Surgeon: Radha Bush DO;  Location: [facility];  Service: Gynecology    REDUCTION MAMMAPLASTY      TONSILLECTOMY       Family History   Problem Relation Age of Onset    BRCA2 Negative Mother     BRCA1 Negative Mother     Breast cancer Mother 68    Asthma Mother     Obesity Mother     Lung cancer Father 48    Hyperlipidemia Father     Bone cancer Father         mets from lung cancer    Cancer Father         Lung/bone ca-     Anxiety disorder Father     No Known Problems Sister     Arthritis Maternal Grandmother     COPD Maternal Grandmother     Cancer Maternal Grandfather         Lung ca-    Lung cancer Maternal Grandfather 80    No Known Problems Paternal Grandmother     Liver cancer Paternal Grandfather 80    Cancer Paternal Grandfather         Liver ca-    No Known Problems Brother     No Known Problems Maternal Aunt     No Known Problems Maternal Aunt     No Known Problems Maternal Aunt     No Known Problems Paternal Aunt      Current Outpatient Medications on File Prior to Visit   Medication Sig Dispense Refill    albuterol (ProAir HFA) 90 mcg/act inhaler Inhale 2 puffs every 6 (six) hours as needed for wheezing 8.5 g 0    cholecalciferol (VITAMIN D3) 1,000 units tablet Take by mouth daily        clotrimazole-betamethasone (LOTRISONE) 1-0.05 % cream Apply topically 2 (two) times a day 45 g 1    DULoxetine (CYMBALTA) 60 mg delayed release capsule Take 1 capsule (60 mg total) by mouth daily 90 capsule 1    fluocinolone (SYNALAR) 0.01 % external solution Apply sparingly to affected areas of scalp 2-4 times a day for up to 2 weeks. 60 mL 1    ketoconazole (NIZORAL) 2 % shampoo Shampoo twice a week for 8 weeks then as needed.  Lather into scalp and skin on face, neck, chest, and back; leave on for 5 minutes and  then rinse off completely. 120 mL 2    PREVIDENT 5000 SENSITIVE 1.1-5 % PSTE   3    SODIUM FLUORIDE, DENTAL GEL, 1.1 % GEL SF 5000 Plus 1.1 % dental cream      tirzepatide (Zepbound) 5 mg/0.5 mL auto-injector Inject 0.5 mL (5 mg total) under the skin once a week 2 mL 1    Tranexamic Acid 650 MG TABS Take 2 tablets by mouth TID x 5 days each month (Patient taking differently: Take 2 tablets by mouth TID x 5 days each month - only menstrual cycle) 30 tablet 10    [DISCONTINUED] ALPRAZolam (XANAX) 0.25 mg tablet Take 1 tablet (0.25 mg total) by mouth daily at bedtime as needed for anxiety 90 tablet 0    [DISCONTINUED] fenofibrate 160 MG tablet Take 1 tablet (160 mg total) by mouth daily 90 tablet 0    [DISCONTINUED] rosuvastatin (CRESTOR) 20 MG tablet Take 1 tablet (20 mg total) by mouth daily 90 tablet 0    [DISCONTINUED] valsartan-hydrochlorothiazide (DIOVAN-HCT) 80-12.5 MG per tablet Take 1 tablet by mouth daily 90 tablet 0    [DISCONTINUED] hydrocortisone 2.5 % cream Apply topically 2 (two) times a day for 14 days Apply 1-2x a day topically to eyebrows for 14 days 30 g 0     No current facility-administered medications on file prior to visit.   No Known Allergies   Current Outpatient Medications on File Prior to Visit   Medication Sig Dispense Refill    albuterol (ProAir HFA) 90 mcg/act inhaler Inhale 2 puffs every 6 (six) hours as needed for wheezing 8.5 g 0    cholecalciferol (VITAMIN D3) 1,000 units tablet Take by mouth daily        clotrimazole-betamethasone (LOTRISONE) 1-0.05 % cream Apply topically 2 (two) times a day 45 g 1    DULoxetine (CYMBALTA) 60 mg delayed release capsule Take 1 capsule (60 mg total) by mouth daily 90 capsule 1    fluocinolone (SYNALAR) 0.01 % external solution Apply sparingly to affected areas of scalp 2-4 times a day for up to 2 weeks. 60 mL 1    ketoconazole (NIZORAL) 2 % shampoo Shampoo twice a week for 8 weeks then as needed.  Lather into scalp and skin on face, neck, chest, and  "back; leave on for 5 minutes and then rinse off completely. 120 mL 2    PREVIDENT 5000 SENSITIVE 1.1-5 % PSTE   3    SODIUM FLUORIDE, DENTAL GEL, 1.1 % GEL SF 5000 Plus 1.1 % dental cream      tirzepatide (Zepbound) 5 mg/0.5 mL auto-injector Inject 0.5 mL (5 mg total) under the skin once a week 2 mL 1    Tranexamic Acid 650 MG TABS Take 2 tablets by mouth TID x 5 days each month (Patient taking differently: Take 2 tablets by mouth TID x 5 days each month - only menstrual cycle) 30 tablet 10    [DISCONTINUED] ALPRAZolam (XANAX) 0.25 mg tablet Take 1 tablet (0.25 mg total) by mouth daily at bedtime as needed for anxiety 90 tablet 0    [DISCONTINUED] fenofibrate 160 MG tablet Take 1 tablet (160 mg total) by mouth daily 90 tablet 0    [DISCONTINUED] rosuvastatin (CRESTOR) 20 MG tablet Take 1 tablet (20 mg total) by mouth daily 90 tablet 0    [DISCONTINUED] valsartan-hydrochlorothiazide (DIOVAN-HCT) 80-12.5 MG per tablet Take 1 tablet by mouth daily 90 tablet 0    [DISCONTINUED] hydrocortisone 2.5 % cream Apply topically 2 (two) times a day for 14 days Apply 1-2x a day topically to eyebrows for 14 days 30 g 0     No current facility-administered medications on file prior to visit.      Social History     Tobacco Use    Smoking status: Former     Current packs/day: 0.50     Average packs/day: 0.5 packs/day for 18.0 years (9.0 ttl pk-yrs)     Types: Cigarettes     Passive exposure: Past    Smokeless tobacco: Never    Tobacco comments:     Pt states vapes daily. Vape has nicotine   Vaping Use    Vaping status: Every Day    Substances: Nicotine, Flavoring   Substance and Sexual Activity    Alcohol use: Yes     Comment: occasional drink here and there    Drug use: No    Sexual activity: Yes     Partners: Male     Birth control/protection: None       Objective     /82 (BP Location: Left arm, Patient Position: Sitting, Cuff Size: Standard)   Pulse 78   Temp (!) 97.3 °F (36.3 °C) (Tympanic)   Resp 16   Ht 5' 1\" (1.549 " m)   Wt 107 kg (236 lb 9.6 oz)   SpO2 96%   BMI 44.71 kg/m²     Physical Exam  Vitals and nursing note reviewed.   Constitutional:       General: She is not in acute distress.     Appearance: Normal appearance. She is well-developed. She is obese. She is not ill-appearing, toxic-appearing or diaphoretic.   HENT:      Head: Normocephalic and atraumatic.      Right Ear: Tympanic membrane, ear canal and external ear normal. There is no impacted cerumen.      Left Ear: Tympanic membrane, ear canal and external ear normal. There is no impacted cerumen.      Nose: Nose normal.      Mouth/Throat:      Mouth: Mucous membranes are moist.      Pharynx: Oropharynx is clear.   Eyes:      General: No scleral icterus.        Right eye: No discharge.         Left eye: No discharge.      Extraocular Movements: Extraocular movements intact.      Conjunctiva/sclera: Conjunctivae normal.      Pupils: Pupils are equal, round, and reactive to light.   Cardiovascular:      Rate and Rhythm: Normal rate and regular rhythm.      Pulses: Normal pulses.      Heart sounds: Normal heart sounds. No murmur heard.  Pulmonary:      Effort: Pulmonary effort is normal. No respiratory distress.      Breath sounds: Normal breath sounds. No wheezing, rhonchi or rales.   Abdominal:      General: Abdomen is flat. Bowel sounds are normal. There is no distension.      Palpations: Abdomen is soft.      Tenderness: There is no abdominal tenderness. There is no right CVA tenderness or left CVA tenderness.   Musculoskeletal:         General: No swelling or tenderness. Normal range of motion.      Cervical back: Normal range of motion and neck supple. No rigidity.      Right lower leg: No edema.      Left lower leg: No edema.   Lymphadenopathy:      Cervical: No cervical adenopathy.   Skin:     General: Skin is warm and dry.      Capillary Refill: Capillary refill takes 2 to 3 seconds.      Coloration: Skin is not jaundiced or pale.   Neurological:       General: No focal deficit present.      Mental Status: She is alert and oriented to person, place, and time. Mental status is at baseline.      Motor: No weakness.      Gait: Gait normal.   Psychiatric:         Mood and Affect: Mood normal.         Behavior: Behavior normal.

## 2024-11-01 NOTE — ASSESSMENT & PLAN NOTE
Orders:    fenofibrate 160 MG tablet; Take 1 tablet (160 mg total) by mouth daily    rosuvastatin (CRESTOR) 20 MG tablet; Take 1 tablet (20 mg total) by mouth daily

## 2024-11-01 NOTE — ASSESSMENT & PLAN NOTE
Orders:    ALPRAZolam (XANAX) 0.25 mg tablet; Take 1 tablet (0.25 mg total) by mouth daily at bedtime as needed for anxiety

## 2024-11-01 NOTE — ASSESSMENT & PLAN NOTE
Orders:    valsartan-hydrochlorothiazide (DIOVAN-HCT) 80-12.5 MG per tablet; Take 1 tablet by mouth daily

## 2024-11-01 NOTE — PATIENT INSTRUCTIONS
"Patient Education     Routine physical for adults   The Basics   Written by the doctors and editors at [facility]   What is a physical? -- A physical is a routine visit, or \"check-up,\" with your doctor. You might also hear it called a \"wellness visit\" or \"preventive visit.\"  During each visit, the doctor will:   Ask about your physical and mental health   Ask about your habits, behaviors, and lifestyle   Do an exam   Give you vaccines if needed   Talk to you about any medicines you take   Give advice about your health   Answer your questions  Getting regular check-ups is an important part of taking care of your health. It can help your doctor find and treat any problems you have. But it's also important for preventing health problems.  A routine physical is different from a \"sick visit.\" A sick visit is when you see a doctor because of a health concern or problem. Since physicals are scheduled ahead of time, you can think about what you want to ask the doctor.  How often should I get a physical? -- It depends on your age and health. In general, for people age 21 years and older:   If you are younger than 50 years, you might be able to get a physical every 3 years.   If you are 50 years or older, your doctor might recommend a physical every year.  If you have an ongoing health condition, like diabetes or high blood pressure, your doctor will probably want to see you more often.  What happens during a physical? -- In general, each visit will include:   Physical exam - The doctor or nurse will check your height, weight, heart rate, and blood pressure. They will also look at your eyes and ears. They will ask about how you are feeling and whether you have any symptoms that bother you.   Medicines - It's a good idea to bring a list of all the medicines you take to each doctor visit. Your doctor will talk to you about your medicines and answer any questions. Tell them if you are having any side effects that bother you. You " "should also tell them if you are having trouble paying for any of your medicines.   Habits and behaviors - This includes:   Your diet   Your exercise habits   Whether you smoke, drink alcohol, or use drugs   Whether you are sexually active   Whether you feel safe at home  Your doctor will talk to you about things you can do to improve your health and lower your risk of health problems. They will also offer help and support. For example, if you want to quit smoking, they can give you advice and might prescribe medicines. If you want to improve your diet or get more physical activity, they can help you with this, too.   Lab tests, if needed - The tests you get will depend on your age and situation. For example, your doctor might want to check your:   Cholesterol   Blood sugar   Iron level   Vaccines - The recommended vaccines will depend on your age, health, and what vaccines you already had. Vaccines are very important because they can prevent certain serious or deadly infections.   Discussion of screening - \"Screening\" means checking for diseases or other health problems before they cause symptoms. Your doctor can recommend screening based on your age, risk, and preferences. This might include tests to check for:   Cancer, such as breast, prostate, cervical, ovarian, colorectal, prostate, lung, or skin cancer   Sexually transmitted infections, such as chlamydia and gonorrhea   Mental health conditions like depression and anxiety  Your doctor will talk to you about the different types of screening tests. They can help you decide which screenings to have. They can also explain what the results might mean.   Answering questions - The physical is a good time to ask the doctor or nurse questions about your health. If needed, they can refer you to other doctors or specialists, too.  Adults older than 65 years often need other care, too. As you get older, your doctor will talk to you about:   How to prevent falling at " home   Hearing or vision tests   Memory testing   How to take your medicines safely   Making sure that you have the help and support you need at home  All topics are updated as new evidence becomes available and our peer review process is complete.  This topic retrieved from [employer] on: May 02, 2024.  Topic 734785 Version 1.0  Release: 32.4.3 - C32.122  © 2024 UpToDate, Inc. and/or its affiliates. All rights reserved.  Consumer Information Use and Disclaimer   Disclaimer: This generalized information is a limited summary of diagnosis, treatment, and/or medication information. It is not meant to be comprehensive and should be used as a tool to help the user understand and/or assess potential diagnostic and treatment options. It does NOT include all information about conditions, treatments, medications, side effects, or risks that may apply to a specific patient. It is not intended to be medical advice or a substitute for the medical advice, diagnosis, or treatment of a health care provider based on the health care provider's examination and assessment of a patient's specific and unique circumstances. Patients must speak with a health care provider for complete information about their health, medical questions, and treatment options, including any risks or benefits regarding use of medications. This information does not endorse any treatments or medications as safe, effective, or approved for treating a specific patient. UpToDate, Inc. and its affiliates disclaim any warranty or liability relating to this information or the use thereof.The use of this information is governed by the Terms of Use, available at https://www.wolters[employer]Gouwer.com/en/know/clinical-effectiveness-terms. 2024© UpToDate, Inc. and its affiliates and/or licensors. All rights reserved.  Copyright   © 2024 UpToDate, Inc. and/or its affiliates. All rights reserved.

## 2024-11-18 ENCOUNTER — EVALUATION (OUTPATIENT)
Dept: PHYSICAL THERAPY | Facility: REHABILITATION | Age: 44
End: 2024-11-18
Payer: OTHER MISCELLANEOUS

## 2024-11-18 DIAGNOSIS — M25.512 LEFT SHOULDER PAIN, UNSPECIFIED CHRONICITY: Primary | ICD-10-CM

## 2024-11-18 PROCEDURE — 97162 PT EVAL MOD COMPLEX 30 MIN: CPT

## 2024-11-18 PROCEDURE — 97110 THERAPEUTIC EXERCISES: CPT

## 2024-11-18 NOTE — PROGRESS NOTES
PT Evaluation     Today's date: 2024  Patient name: Nely Starr  : 1980  MRN: [id number]  Referring provider: Richardson Saez PA-C  Dx:   Encounter Diagnosis     ICD-10-CM    1. Left shoulder pain, unspecified chronicity  M25.512 Ambulatory Referral to Physical Therapy          Start Time: 1745  Stop Time: 1830  Total time in clinic (min): 45 minutes    Assessment  Impairments: abnormal muscle tone, abnormal or restricted ROM, activity intolerance, impaired physical strength, lacks appropriate home exercise program, pain with function and weight-bearing intolerance  Symptom irritability: moderate    Assessment details:   Nely Starr is a pleasant 44 y.o. female who presents with acute left shoulder pain. Symptoms consistent with referring diagnosis. No capsular pattern with ROM. No catching/locking with AROM/PROM. Provided and reviewed HEP today. The impairments listed above are resulting in restrictions with work, reduced QoL, and pain with ADLs/IADLs. I expect gradual improvements over the next 4-6 weeks.    Understanding of Dx/Px/POC: good     Prognosis: good    Goals  Short Term Goals (Week 4):  1. Decreased pain by 50%  2. Demonstrate full PROM <3/10  3. Improve strength by 1/2 measure in all deficient areas      Long Term Goals (8 weeks):  1. GROC >75%  2. Patient will exceed FOTO predicted outcome score  3. Patient will be fully independent with HEP by discharge  4. Patient will be able to manage symptoms independently.       Plan  Patient would benefit from: skilled physical therapy    Planned therapy interventions: graded exercise, functional ROM exercises, flexibility, gait training, graded activity, home exercise program, therapeutic training, therapeutic exercise, therapeutic activities, stretching, strengthening, patient education, neuromuscular re-education, nerve gliding, behavior modification, balance, activity modification, manual therapy, IASTM and joint  mobilization    Frequency: 1-2x week  Duration in weeks: 8  Treatment plan discussed with: patient        Subjective Evaluation    History of Present Illness  Mechanism of injury: trauma  Mechanism of injury: Patient presents to PT with acute left shoulder pain. Symptoms started around 1 month after a fall at work in which she caught herself with her left shoulder. Patient reports symptoms are localized to anterior and posterior shoulder. Patient denies any numbness/tingling. Patient denies any radiating symptoms into the elbow. Patient denies any prior injuries. Patient reports reports her symptoms are worsened with activities, use, and ADLs/IADLs. Patient reports symptoms are improved with rest.   Patient Goals  Patient goals for therapy: increased motion, decreased pain, increased strength, return to sport/leisure activities and independence with ADLs/IADLs    Pain  Current pain ratin  At worst pain ratin  Quality: sharp and dull ache      Diagnostic Tests  X-ray: abnormal      Shoulder Range of Motion and Strength Testing   PROM AROM MMTs    L R L R L R   Flexion 130! 150 120 150 <3/5! 5/5   Abduction 100! 140 100 140 <3/5! 5/5   ER 70! 90 T1 T2 4+/5! 5/5   IR 45 45 Lumbar Lumbar 4+/5! 5/5   MT         LT             Neurodynamic Testing  Median Nerve: unable to assess  Ulnar Nerve: unable to assess  Radial Nerve: unable to assess      Special Tests  Impingement:   Maya (+), Infraspinatus (+), Painful Arc (+), Danelle's (+), Neers (+)    Rotator Cuff:   Drop Arm (-), Infraspinatus (+), Painful Arc (+), Belly Press (-), ER Lag Sign (-)       Precautions:       Manuals                                                                 Neuro Re-Ed                                                                                                        Ther Ex                                                                                                                     Ther Activity                                        Gait Training                                       Modalities

## 2024-11-22 NOTE — PROGRESS NOTES
"Weight Management Medical Nutrition Assessment     Nely presented for a meal planning session 2/12  Employee benefit. Today's weight is 231# .Hx ot Htn, Mixed HLD and elevated fasting insulin.  On Cymblalta and on Zepbound. On her fifth dosage of Zepbound. No adverse side affects.   Patient has been struggling with her weight for most of her life.  She was pursuing a bariatric surgery process a few years ago but would like to avoid surgery. Tries to stay consistent with her eating habits and does not snack throughout the day.  She does not do any formal exercise but does make sure she hits 10,000 steps per day and uses a walking pad if necessary.  Per dietary recall patient appears to be inadequate in fiber. Patient has decreased appetite since starting Zepbound.              .  Developed and reviewed a low calorie meal plan. Open to adding more fiber to meals and making meals more well rounded.  Protein intake and Hydration appear to remain consistent. Increasing hydration intake at this time 40 oz cup. And experiencing appetite fluctuations. Will continue to ensure adequate protein intake and will focus on adding in more fiber at dinner time.  Will follow up.   Dislikes: pork/ turkey/ nuts/seafood    Goals:  1) Adding in Strength training 1 day a week for 10-15 minutes  2) Starting to add fiber to meals-1 fruit(breakfast)  and 1 vegetable at dinner time.     Patient seen by Medical Provider in past 6 months:  yes 10/03/2024  Requested to schedule appointment with Medical Provider: No      Anthropometric Measurements  Start Weight (#): 238#(06/25/2019)  Current Weight (#): 231#  TBW % Change from start weight:3%  Ideal Body Weight (#):105#  Goal Weight (#):\"get healthy ideally 175#\"  Highest: 247#t  Lowest: 180#    Weight Loss History  Previous weight loss attempts: Phentermine, Gym.    Food and Nutrition Related History  Wake up: 7 am   Bed Time: 10-11 pm    Food Recall  Breakfast:Muscle Milk protein shake + 3 " egg whites Mandarin orange + coffee with protein shake(1/2 muscle milk)    Snack:skip  Lunch:Side salad with hard boiled eggs or chicken quesadilla( meal from hospital cafe) or homemade sandwich( 2 pieces cheese thin sliced and 5 roast beef slices (4-5 ounces)  or  wrap  Snack:coffee and remainder of muscle milk  Dinner:varies - protein with vegetables and starch or protein shake +  coffee  Snack:skip    Beverages: water, diet soda, coffee/tea, and muscle milk protein drinks( 2 a day)  Volume of beverage intake: 40 -60 ounces a day    Weekends: Worse, less active and states eating less  Cravings: n/a  Trouble area of day:n/a    Frequency of Eating out: everyday at the hospital cafeteria 5 days/week  Food restrictions:n/a  Cooking: self   Food Shopping: self    Physical Activity Intake  Activity:Walking  Frequency:  walking pad - hits 10k steps/every day / Started PT for shoulder 2 times a week  Physical limitations/barriers to exercise: maybe a tear in the arm    Estimated Needs  Energy  SECA: BMR:n/a      X 1.3 -1000 =  [address] [employer] Needs: BMR : 1731   1-2# loss weekly sedentary:  3138-9536 kcal             1-2# loss weekly lightly active:0130-0427  Maintenance calories for sedentary activity level: 2077 kcal  Protein:61-77 grams      (1.2-1.5g/kg IBW)  Fluid: 60 ounces     (35mL/kg IBW)    Nutrition Diagnosis  Yes;    Overweight/obesity  related to Excess energy intake as evidenced by  BMI more than normative standard for age and sex (obesity-grade III 40+)       Nutrition Intervention    Nutrition Prescription  Calories:1400 kcal  Protein:70 grams  Fluid:60 ounces    Meal Plan (Lemuel/Pro/Carb)  Breakfast: 300 kcal/30 grams  Snack: skip  Lunch: 400 kcal/25-30 grams protein  Snack:150-200 kcal/5-25 grams protein  Dinner: 400 kcal/25-30 grams protein  Snack: skip    Nutrition Education:    Calorie controlled menu  Lean protein food choices  Healthy snack options  Food journaling  tips      Nutrition Counseling:  Strategies: meal planning, portion sizes, healthy snack choices, hydration, fiber intake, protein intake, exercise, food journal      Monitoring and Evaluation:  Evaluation criteria:  Energy Intake  Meet protein needs  Maintain adequate hydration  Monitor weekly weight  Meal planning/preparation  Food journal   Decreased portions at mealtimes and snacks  Physical activity     Barriers to learning:none  Readiness to change: Action:  (Changing behavior)  Comprehension: excellent  Expected Compliance: excellent

## 2024-11-25 ENCOUNTER — CLINICAL SUPPORT (OUTPATIENT)
Age: 44
End: 2024-11-25
Payer: COMMERCIAL

## 2024-11-25 ENCOUNTER — OFFICE VISIT (OUTPATIENT)
Dept: PHYSICAL THERAPY | Facility: REHABILITATION | Age: 44
End: 2024-11-25
Payer: OTHER MISCELLANEOUS

## 2024-11-25 VITALS — WEIGHT: 231 LBS | BODY MASS INDEX: 43.61 KG/M2 | HEIGHT: 61 IN

## 2024-11-25 DIAGNOSIS — E66.813 CLASS 3 SEVERE OBESITY DUE TO EXCESS CALORIES WITH SERIOUS COMORBIDITY AND BODY MASS INDEX (BMI) OF 40.0 TO 44.9 IN ADULT (HCC): Primary | ICD-10-CM

## 2024-11-25 DIAGNOSIS — E66.813 CLASS 3 SEVERE OBESITY DUE TO EXCESS CALORIES WITH SERIOUS COMORBIDITY AND BODY MASS INDEX (BMI) OF 45.0 TO 49.9 IN ADULT (HCC): ICD-10-CM

## 2024-11-25 DIAGNOSIS — E66.01 CLASS 3 SEVERE OBESITY DUE TO EXCESS CALORIES WITH SERIOUS COMORBIDITY AND BODY MASS INDEX (BMI) OF 40.0 TO 44.9 IN ADULT (HCC): Primary | ICD-10-CM

## 2024-11-25 DIAGNOSIS — M25.512 LEFT SHOULDER PAIN, UNSPECIFIED CHRONICITY: Primary | ICD-10-CM

## 2024-11-25 DIAGNOSIS — E66.01 CLASS 3 SEVERE OBESITY DUE TO EXCESS CALORIES WITH SERIOUS COMORBIDITY AND BODY MASS INDEX (BMI) OF 45.0 TO 49.9 IN ADULT (HCC): ICD-10-CM

## 2024-11-25 PROCEDURE — 97110 THERAPEUTIC EXERCISES: CPT

## 2024-11-25 PROCEDURE — RECHECK

## 2024-11-25 PROCEDURE — 97140 MANUAL THERAPY 1/> REGIONS: CPT | Performed by: PHYSICAL THERAPIST

## 2024-11-25 PROCEDURE — S9470 NUTRITIONAL COUNSELING, DIET: HCPCS

## 2024-11-25 RX ORDER — TIRZEPATIDE 5 MG/.5ML
5 INJECTION, SOLUTION SUBCUTANEOUS WEEKLY
Qty: 2 ML | Refills: 1 | Status: SHIPPED | OUTPATIENT
Start: 2024-11-25 | End: 2025-01-20

## 2024-11-25 NOTE — PROGRESS NOTES
Daily Note     Today's date: 2024  Patient name: Nely Starr  : 1980  MRN: [id number]  Referring provider: Richardson Saez PA-C  Dx:   Encounter Diagnosis     ICD-10-CM    1. Left shoulder pain, unspecified chronicity  M25.512           Start Time: 1745  Stop Time: 1830  Total time in clinic (min): 45 minutes    Subjective: Pt states her shoulder is more painful today than other days.  She had a difficult time holding up a 2# saline bag.       Objective: See treatment diary below  (+) median, (-) radial, unable to assess ulnar  Sig increased tissue texture density of the lateral seam.   C/o pain with shoulder PROM in flexion and abduction.       Assessment: Tolerated treatment fair today. Increased symptom irritability since last session likely related to continued to overuse from work. Discussed activity modifications with driving and at home. Discussed use of ice for the next 2 days to reduce symptoms, will switch to heat if this does not improve. Will re-assess symptoms next visit. Patient would benefit from continued PT      Plan: Continue per plan of care.      Precautions:       Manuals            PROM - left shoulder  LMR           Inf GH mobs  Gr 1 & 2 - LMR           Median nerve glides  Gentle - LMR                        Neuro Re-Ed                                                                                                        Ther Ex             Pt Edu, HEP  PRR            No Money HEP hold           Supine/Seated Cervical Retr + Ext  HEP                                                                                         Ther Activity                                       Gait Training                                       Modalities

## 2024-12-02 ENCOUNTER — APPOINTMENT (OUTPATIENT)
Dept: PHYSICAL THERAPY | Facility: REHABILITATION | Age: 44
End: 2024-12-02
Payer: OTHER MISCELLANEOUS

## 2024-12-04 ENCOUNTER — OFFICE VISIT (OUTPATIENT)
Dept: PHYSICAL THERAPY | Facility: REHABILITATION | Age: 44
End: 2024-12-04
Payer: OTHER MISCELLANEOUS

## 2024-12-04 DIAGNOSIS — M25.512 LEFT SHOULDER PAIN, UNSPECIFIED CHRONICITY: Primary | ICD-10-CM

## 2024-12-04 PROCEDURE — 97110 THERAPEUTIC EXERCISES: CPT

## 2024-12-04 PROCEDURE — 97140 MANUAL THERAPY 1/> REGIONS: CPT

## 2024-12-04 NOTE — PROGRESS NOTES
Daily Note     Today's date: 2024  Patient name: Nely Starr  : 1980  MRN: [id number]  Referring provider: Richardson Saez PA-C  Dx:   Encounter Diagnosis     ICD-10-CM    1. Left shoulder pain, unspecified chronicity  M25.512           Start Time: 1730  Stop Time: 1800  Total time in clinic (min): 30 minutes    Subjective: Patient reports her shoulder is getting worse. States work is really bothering it.      Objective: See treatment diary below      Assessment: Tolerated treatment fair today. Focused on light STM to anterior shoulder and PROM. Very high irritability of symptoms. Feel patient would benefit from possible CSI for ortho. Patient would benefit from continued PT      Plan: Continue per plan of care.      Precautions:       Manuals           PROM - left shoulder  LMR PRR          Inf GH mobs  Gr 1 & 2 - LMR           Median nerve glides  Gentle - LMR           STM   Ant shdr PRR light          LAD   Gr 1-2 PRR 2x10          Neuro Re-Ed                                                                                                        Ther Ex             Pt Edu, HEP  PRR            No Money HEP hold           Supine/Seated Cervical Retr + Ext  HEP hold          Supine Shdr ER/IR propped on pillw   HEP          Pullies   2x10 (pain)                                                              Ther Activity                                       Gait Training                                       Modalities

## 2024-12-09 ENCOUNTER — OFFICE VISIT (OUTPATIENT)
Dept: PHYSICAL THERAPY | Facility: REHABILITATION | Age: 44
End: 2024-12-09
Payer: OTHER MISCELLANEOUS

## 2024-12-09 DIAGNOSIS — M25.512 LEFT SHOULDER PAIN, UNSPECIFIED CHRONICITY: Primary | ICD-10-CM

## 2024-12-09 PROCEDURE — 97110 THERAPEUTIC EXERCISES: CPT

## 2024-12-09 PROCEDURE — 97140 MANUAL THERAPY 1/> REGIONS: CPT

## 2024-12-09 NOTE — PROGRESS NOTES
Daily Note     Today's date: 2024  Patient name: Nely Starr  : 1980  MRN: [id number]  Referring provider: Richardson Saez PA-C  Dx:   Encounter Diagnosis     ICD-10-CM    1. Left shoulder pain, unspecified chronicity  M25.512           Start Time: 1730  Stop Time: 1810  Total time in clinic (min): 40 minutes    Subjective: Patient reports her shoulder is doing much better than last week. States it still hurts a lot but compared to last week it much better. Patient reports she didn't work this weekend.       Objective: See treatment diary below      Assessment: Tolerated treatment well today. Significantly better tolerance for light AAROM/AROM and PROM today compared to last visit. Patient would benefit from continued PT      Plan: Continue per plan of care.      Precautions:       Manuals          PROM - left shoulder  LMR PRR PRR         Inf GH mobs  Gr 1 & 2 - LMR           Median nerve glides  Gentle - LMR           STM   Ant shdr PRR light          LAD   Gr 1-2 PRR 2x10 Gr 1-2 PRR 2x10         Neuro Re-Ed             R SL [place] Punches    2x10         R SL [place] Flexion    2x10         R SL ER    2x10                                                             Ther Ex             Pt Edu, HEP  PRR            No Money HEP hold           Supine/Seated Cervical Retr + Ext  HEP hold          Supine Shdr ER/IR propped on pillw   HEP          Pullies   2x10 (pain)                                                              Ther Activity                                       Gait Training                                       Modalities

## 2024-12-11 ENCOUNTER — OFFICE VISIT (OUTPATIENT)
Dept: PHYSICAL THERAPY | Facility: REHABILITATION | Age: 44
End: 2024-12-11
Payer: OTHER MISCELLANEOUS

## 2024-12-11 DIAGNOSIS — M25.512 LEFT SHOULDER PAIN, UNSPECIFIED CHRONICITY: Primary | ICD-10-CM

## 2024-12-11 PROCEDURE — 97110 THERAPEUTIC EXERCISES: CPT

## 2024-12-11 NOTE — PROGRESS NOTES
Daily Note     Today's date: 2024  Patient name: Nely Starr  : 1980  MRN: [id number]  Referring provider: Richardson Saez PA-C  Dx:   Encounter Diagnosis     ICD-10-CM    1. Left shoulder pain, unspecified chronicity  M25.512           Start Time:   Stop Time:   Total time in clinic (min): 38 minutes    Subjective: Pt reports L shldr feeling about the same as LV.  No improvement since LV.  Did have some soreness at L shldr, but like she worked out.      Muscle soreness at L shldr reported by end of treatment.        Objective: See treatment diary below      Assessment: Tolerated treatment well. Continued with program as outlined below.  Continues to have decent tolerance to P/AAROM tonight, but does have pain discomfort at end ranges with empty end feel.  Most challenged with sidelying ER with slight increase in discomfort during last few reps.  Patient would benefit from continued PT to further improve ROM, decrease pain, and maximize overall function.        Plan: Continue per plan of care.      Precautions:       Manuals         PROM - left shoulder  LMR PRR PRR AFB        Inf GH mobs  Gr 1 & 2 - LMR           Median nerve glides  Gentle - LMR           STM   Ant shdr PRR light          LAD   Gr 1-2 PRR 2x10 Gr 1-2 PRR 2x10         Neuro Re-Ed             R SL [place] Punches    2x10 2x10        R SL [place] Flexion    2x10 2x10        R SL ER    2x10 2x10                                                            Ther Ex             Pt Edu, HEP  PRR            No Money HEP hold           Supine/Seated Cervical Retr + Ext  HEP hold          Supine Shdr ER/IR propped on pillw   HEP          Pullies   2x10 (pain)                                                              Ther Activity                                       Gait Training                                       Modalities

## 2024-12-12 ENCOUNTER — OFFICE VISIT (OUTPATIENT)
Dept: OBGYN CLINIC | Facility: OTHER | Age: 44
End: 2024-12-12
Payer: OTHER MISCELLANEOUS

## 2024-12-12 VITALS
BODY MASS INDEX: 42.14 KG/M2 | HEART RATE: 86 BPM | DIASTOLIC BLOOD PRESSURE: 76 MMHG | HEIGHT: 62 IN | WEIGHT: 229 LBS | SYSTOLIC BLOOD PRESSURE: 110 MMHG

## 2024-12-12 DIAGNOSIS — M24.812 INTERNAL DERANGEMENT OF LEFT SHOULDER: Primary | ICD-10-CM

## 2024-12-12 PROCEDURE — 99214 OFFICE O/P EST MOD 30 MIN: CPT | Performed by: ORTHOPAEDIC SURGERY

## 2024-12-12 NOTE — PROGRESS NOTES
"  Assessment  Diagnoses and all orders for this visit:    Internal derangement of left shoulder    Discussion and Plan:    Explained to the patient that her examination, symptoms and acute injury are consistent with a possible acute rotator cuff pathology of her LEFT shoulder.   She has been performing PT/HEP without benefit and noticed an increase in pain about the shoulder with the last 2 PT visits.   At this time, she is a candidate to obtain further diagnostic imaging in the form of an MRI scan to further evaluate for a rotator cuff injury  Continue PT/HEP as tolerated  She is currently self limiting safely without current restrictions  Follow up after MRI for discussion of results and further treatment options based on these results.     Subjective:   Patient ID: Nely Starr is a 44 y.o. female presents with a chief complaint of left shoulder pain.   The pain began 1 month(s) ago and is associated with an acute injury.  Patient states that she was injured on 10/17/24 after a slip and fall at work on a cord that was on the ground. The patient describes the pain as aching and dull in intensity,  intermittent, occurring with increasing frequency in timing, and localizes the pain to the  left subacromial joint, deltoid.  The pain is worse with movement, overhead work, and overuse and relieved by rest, ice, avoiding the painful activities.  The pain is not associated with numbness and tingling.  The pain is not associated with constitutional symptoms. The patient is awoken at night by the pain.    The patient has had treatment in the form of formal PT/HEP without benefit.    The following portions of the patient's history were reviewed and updated as appropriate: allergies, current medications, past family history, past medical history, past social history, past surgical history and problem list.    Objective:  /76   Pulse 86   Ht 5' 2\" (1.575 m)   Wt 104 kg (229 lb)   BMI 41.88 kg/m²       Left Shoulder " Exam     Range of Motion   Left shoulder forward flexion: AROM: 120. Full PROM with pain.     Muscle Strength   Abduction: 4/5   External rotation: 4/5     Tests   Drop arm: positive    Other   Erythema: absent  Sensation: normal  Pulse: present             Physical Exam  Constitutional:       Appearance: She is well-developed.   Eyes:      Pupils: Pupils are equal, round, and reactive to light.   Pulmonary:      Effort: Pulmonary effort is normal.      Breath sounds: Normal breath sounds.   Skin:     General: Skin is warm and dry.   Neurological:      Mental Status: She is alert and oriented to person, place, and time.   Psychiatric:         Behavior: Behavior normal.         Thought Content: Thought content normal.         Judgment: Judgment normal.       I have personally reviewed pertinent films in PACS and my interpretation is as follows.    X Ray Left Shoulder 10/26/24: No acute osseous abnormalities or degenerative changes      Records Reviewed: office notes from PT and Richardson Saez PA-C    Scribe Attestation      I,:  Elias Anderson am acting as a scribe while in the presence of the attending physician.:       I,:  Trav Mendieta MD personally performed the services described in this documentation    as scribed in my presence.:

## 2024-12-16 ENCOUNTER — OFFICE VISIT (OUTPATIENT)
Dept: PHYSICAL THERAPY | Facility: REHABILITATION | Age: 44
End: 2024-12-16
Payer: OTHER MISCELLANEOUS

## 2024-12-16 DIAGNOSIS — M25.512 LEFT SHOULDER PAIN, UNSPECIFIED CHRONICITY: Primary | ICD-10-CM

## 2024-12-16 PROCEDURE — 97110 THERAPEUTIC EXERCISES: CPT

## 2024-12-16 PROCEDURE — 97140 MANUAL THERAPY 1/> REGIONS: CPT

## 2024-12-16 NOTE — PROGRESS NOTES
Daily Note     Today's date: 2024  Patient name: Nely Starr  : 1980  MRN: [id number]  Referring provider: Richardson Saez PA-C  Dx:   Encounter Diagnosis     ICD-10-CM    1. Left shoulder pain, unspecified chronicity  M25.512           Start Time: 1820  Stop Time: 1900  Total time in clinic (min): 40 minutes    Subjective: patient reports her shoulder is better overall. States putting direct pressure through her arm hurts a lot still.      Objective: See treatment diary below      Assessment: Tolerated treatment well today. Progressed activities with mild symptoms. Improved tolerance for ranger activities with PT assistance with scapular retractions. Progress as tolerated. Patient would benefit from continued PT      Plan: Continue per plan of care.      Precautions:       Manuals        PROM - left shoulder  LMR PRR PRR AFB PRR       Inf GH mobs  Gr 1 & 2 - LMR           Median nerve glides  Gentle - LMR           STM   Ant shdr PRR light          LAD   Gr 1-2 PRR 2x10 Gr 1-2 PRR 2x10         Neuro Re-Ed             R SL [place] Punches    2x10 2x10 3x10 w/ PT assist       R SL [place] Flexion    2x10 2x10 3x10 w/ PT assist       R SL ER    2x10 2x10 3x5 1# db                                                           Ther Ex             Pt Edu, HEP  PRR            No Money HEP hold           Supine/Seated Cervical Retr + Ext  HEP hold          Supine Shdr ER/IR propped on pillw   HEP          Pullies   2x10 (pain)                                                              Ther Activity                                       Gait Training                                       Modalities

## 2024-12-17 DIAGNOSIS — N92.0 MENORRHAGIA WITH REGULAR CYCLE: ICD-10-CM

## 2024-12-17 RX ORDER — TRANEXAMIC ACID 650 MG/1
TABLET ORAL
Qty: 30 TABLET | Refills: 1 | Status: SHIPPED | OUTPATIENT
Start: 2024-12-17

## 2024-12-18 ENCOUNTER — OFFICE VISIT (OUTPATIENT)
Dept: PHYSICAL THERAPY | Facility: REHABILITATION | Age: 44
End: 2024-12-18
Payer: OTHER MISCELLANEOUS

## 2024-12-18 DIAGNOSIS — M25.512 LEFT SHOULDER PAIN, UNSPECIFIED CHRONICITY: Primary | ICD-10-CM

## 2024-12-18 PROCEDURE — 97110 THERAPEUTIC EXERCISES: CPT

## 2024-12-18 PROCEDURE — 97140 MANUAL THERAPY 1/> REGIONS: CPT

## 2024-12-18 NOTE — PROGRESS NOTES
Daily Note     Today's date: 2024  Patient name: Nely Starr  : 1980  MRN: [id number]  Referring provider: Richardson Saez PA-C  Dx:   Encounter Diagnosis     ICD-10-CM    1. Left shoulder pain, unspecified chronicity  M25.512           Start Time:   Stop Time:   Total time in clinic (min): 40 minutes    Subjective: Patient reports overall should is doing much better. States her shoulder has its moments still but much better than a few weeks ago.       Objective: See treatment diary below      Assessment: Tolerated treatment well today. Progressed activities today. Discussed likeliness of soreness tonight/tomorrow, patient aware. Progress as tolerated. Patient would benefit from continued PT      Plan: Continue per plan of care.      Precautions:       Manuals       PROM - left shoulder  LMR PRR PRR AFB PRR PRR      Inf GH mobs  Gr 1 & 2 - LMR           Median nerve glides  Gentle - LMR           STM   Ant shdr PRR light          LAD   Gr 1-2 PRR 2x10 Gr 1-2 PRR 2x10         Neuro Re-Ed             R SL [place] Punches    2x10 2x10 3x10 w/ PT assist 3x10       R SL [place] Flexion    2x10 2x10 3x10 w/ PT assist 3x10       R SL ER    2x10 2x10 3x5 1# db 3x10 1# db      TB Ext       Ptb 3x5      TB Rows       Ptb 3x5                                Ther Ex             Pt Edu, HEP  PRR            No Money HEP hold           Supine/Seated Cervical Retr + Ext  HEP hold          Supine Shdr ER/IR propped on pillw   HEP          Pullies   2x10 (pain)    5' (start)  3' (end)      Suitcase Carry       3# kb 75 ft                                             Ther Activity                                       Gait Training                                       Modalities

## 2024-12-20 NOTE — PROGRESS NOTES
Weight Management Medical Nutrition Assessment     Nely presented for a meal planning session 4/12  Employee benefit. Today's weight is 228.2# or loss of 2.8# since last visit .Hx ot Htn, Mixed HLD and elevated fasting insulin.  On Cymblalta and on Zepbound. On her 4th mg 0.5 mg dosage of Zepbound. No adverse side affects.   Patient has been struggling with her weight for most of her life.  She was pursuing a bariatric surgery process a few years ago but would like to avoid surgery. Tries to stay consistent with her eating habits and does not snack throughout the day.  She does not do any formal exercise but does make sure she hits 10,000 steps per day and uses a walking pad if necessary.  Per dietary recall patient appears to be inadequate in fiber. Patient has decreased appetite since starting Zepbound.              .  Developed and reviewed a low calorie meal plan. Open to adding more fiber to meals and making meals more well rounded.  Protein intake and Hydration appear to remain consistent. Increasing hydration intake at this time 40 oz cup.  Has increased protein intake all around and will focus on adding in protein at dinner time even if it is a small portion. Struggles with decreased appetite at dinner times. Reviewed various protein sources to potentially add. Is continuing to focus on adding fiber especially at dinner time.  Will follow up.   Dislikes: pork/ turkey/ nuts/seafood    Goals: Continue current goals into the new year. To get MRI on shoulder-may potentially change physical activity goals  1) Adding in Strength training 1 day a week for 10-15 minutes(lower body)  2) Starting to add fiber to meals-1 fruit(breakfast)  and 1 vegetable at dinner time.     Patient seen by Medical Provider in past 6 months:  yes 10/03/2024  Requested to schedule appointment with Medical Provider: No      Anthropometric Measurements  Start Weight (#): 238#(06/25/2019)  Current Weight (#): 228.2#  TBW % Change from  "start weight:4.2%  Ideal Body Weight (#):105#  Goal Weight (#):\"get healthy ideally 175#\"  Highest: 247#  Lowest: 180#    Weight Loss History  Previous weight loss attempts: Phentermine, Gym.    Food and Nutrition Related History  Wake up: 7 am   Bed Time: 10-11 pm    Food Recall  Breakfast:Muscle Milk protein shake + 3 egg whites Mandarin orange + coffee with protein shake(1/2 muscle milk)    Snack:skip  Lunch:Side salad with hard boiled eggs or chicken quesadilla( meal from hospital cafe) or chicken noodle soup from cafeteria + side vegetables  Snack:coffee and remainder of muscle milk  Dinner:varies - protein with vegetables and starch or protein shake +  coffee bag of vegetables   Snack:skip    Beverages: water, diet soda, coffee/tea, and muscle milk protein drinks( 2 a day)  Volume of beverage intake: 40 -60 ounces a day    Weekends: Worse, less active and states eating less  Cravings: n/a  Trouble area of day:n/a    Frequency of Eating out: everyday at the hospital cafeteria 5 days/week  Food restrictions:n/a  Cooking: self   Food Shopping: self    Physical Activity Intake  Activity:Walking  Frequency:  walking pad - hits 10k steps/every day / Started PT for shoulder 2 times a week  Physical limitations/barriers to exercise: maybe a tear in the arm    Estimated Needs  Energy  SECA: BMR:n/a      X 1.3 -1000 =  [place] [employer] Energy Needs: BMR : 1643   1-2# loss weekly sedentary:  972-1472 kcal             1-2# loss weekly lightly active:3836-4706  Maintenance calories for sedentary activity level: 1972 kcal  Protein:61-77 grams      (1.2-1.5g/kg IBW)  Fluid: 60 ounces     (35mL/kg IBW)    Nutrition Diagnosis  Yes;    Overweight/obesity  related to Excess energy intake as evidenced by  BMI more than normative standard for age and sex (obesity-grade III 40+)       Nutrition Intervention    Nutrition Prescription  Calories:1400 kcal  Protein:70 grams  Fluid:60 ounces    Meal Plan (Lemuel/Pro/Carb)  Breakfast: 300 " kcal/30 grams  Snack: skip  Lunch: 400 kcal/25-30 grams protein  Snack:150-200 kcal/5-25 grams protein  Dinner: 400 kcal/25-30 grams protein  Snack: skip    Nutrition Education:    Calorie controlled menu  Lean protein food choices  Healthy snack options  Food journaling tips      Nutrition Counseling:  Strategies: meal planning, portion sizes, healthy snack choices, hydration, fiber intake, protein intake, exercise, food journal      Monitoring and Evaluation:  Evaluation criteria:  Energy Intake  Meet protein needs  Maintain adequate hydration  Monitor weekly weight  Meal planning/preparation  Food journal   Decreased portions at mealtimes and snacks  Physical activity     Barriers to learning:none  Readiness to change: Action:  (Changing behavior)  Comprehension: excellent  Expected Compliance: excellent

## 2024-12-23 ENCOUNTER — CLINICAL SUPPORT (OUTPATIENT)
Age: 44
End: 2024-12-23
Payer: COMMERCIAL

## 2024-12-23 VITALS — WEIGHT: 228.2 LBS | BODY MASS INDEX: 41.99 KG/M2 | HEIGHT: 62 IN

## 2024-12-23 DIAGNOSIS — E66.813 CLASS 3 SEVERE OBESITY DUE TO EXCESS CALORIES WITH BODY MASS INDEX (BMI) OF 40.0 TO 44.9 IN ADULT (HCC): Primary | ICD-10-CM

## 2024-12-23 DIAGNOSIS — E66.01 CLASS 3 SEVERE OBESITY DUE TO EXCESS CALORIES WITH BODY MASS INDEX (BMI) OF 40.0 TO 44.9 IN ADULT (HCC): Primary | ICD-10-CM

## 2024-12-23 PROCEDURE — RECHECK

## 2024-12-23 PROCEDURE — S9470 NUTRITIONAL COUNSELING, DIET: HCPCS

## 2024-12-26 ENCOUNTER — OFFICE VISIT (OUTPATIENT)
Dept: PHYSICAL THERAPY | Facility: REHABILITATION | Age: 44
End: 2024-12-26
Payer: OTHER MISCELLANEOUS

## 2024-12-26 DIAGNOSIS — M25.512 LEFT SHOULDER PAIN, UNSPECIFIED CHRONICITY: Primary | ICD-10-CM

## 2024-12-26 PROCEDURE — 97110 THERAPEUTIC EXERCISES: CPT

## 2024-12-26 NOTE — PROGRESS NOTES
Daily Note     Today's date: 2024  Patient name: Nely Starr  : 1980  MRN: [id number]  Referring provider: Richardson Saez PA-C  Dx:   Encounter Diagnosis     ICD-10-CM    1. Left shoulder pain, unspecified chronicity  M25.512                      Subjective: Pt reports she had extreme increase in symptoms for about 2 days following her LV.  Has been feeling better the past couple of days but not like before LV.  Has MRI scheduled for next Tuesday.      Objective: See treatment diary below      Assessment: Tolerated treatment well. Programming modified this visit and focused on light P/AAROM, in effort to avoid any significant aggravation of symptoms.  AROM seems to aggravate symptoms the most and avoided this visit.  Patient would benefit from continued PT.      Plan: Continue per plan of care.      Precautions:       Manuals      PROM - left shoulder  LMR PRR PRR AFB PRR PRR AFB     Inf GH mobs  Gr 1 & 2 - LMR           Median nerve glides  Gentle - LMR           STM   Ant shdr PRR light          LAD   Gr 1-2 PRR 2x10 Gr 1-2 PRR 2x10         Neuro Re-Ed             R SL [place] Punches    2x10 2x10 3x10 w/ PT assist 3x10  3x10     R SL [place] Flexion    2x10 2x10 3x10 w/ PT assist 3x10  3x10     R SL ER    2x10 2x10 3x5 1# db 3x10 1# db np     TB Ext       Ptb 3x5 np     TB Rows       Ptb 3x5 np                               Ther Ex             Pt Edu, HEP  PRR            No Money HEP hold           Supine/Seated Cervical Retr + Ext  HEP hold          Supine Shdr ER/IR propped on pillw   HEP          Pullies   2x10 (pain)    5' (start)  3' (end) 5' (start)  3' (end)     Suitcase Carry       3# kb 75 ft 3# kb 100'x3                                            Ther Activity                                       Gait Training                                       Modalities

## 2024-12-30 ENCOUNTER — OFFICE VISIT (OUTPATIENT)
Dept: PHYSICAL THERAPY | Facility: REHABILITATION | Age: 44
End: 2024-12-30
Payer: OTHER MISCELLANEOUS

## 2024-12-30 DIAGNOSIS — M25.512 LEFT SHOULDER PAIN, UNSPECIFIED CHRONICITY: Primary | ICD-10-CM

## 2024-12-30 PROCEDURE — 97110 THERAPEUTIC EXERCISES: CPT

## 2024-12-30 PROCEDURE — 97140 MANUAL THERAPY 1/> REGIONS: CPT

## 2024-12-30 NOTE — PROGRESS NOTES
Daily Note     Today's date: 2024  Patient name: Nely Starr  : 1980  MRN: [id number]  Referring provider: Richardson Saez PA-C  Dx:   Encounter Diagnosis     ICD-10-CM    1. Left shoulder pain, unspecified chronicity  M25.512           Start Time: 1735  Stop Time: 1815  Total time in clinic (min): 40 minutes    Subjective: Patient reports her shoulder calmed down since the other day. Reports her MRI is scheduled for tomorrow morning.       Objective: See treatment diary below      Assessment: Tolerated treatment well today. Lightly progressed activities today without issue. Progress slowly. Will await results of MRI to determine adjustments to POC. Patient would benefit from continued PT      Plan: Continue per plan of care.      Precautions:       Manuals     PROM - left shoulder  LMR PRR PRR AFB PRR PRR AFB PRR    Inf GH mobs  Gr 1 & 2 - LMR           Median nerve glides  Gentle - LMR           STM   Ant shdr PRR light          LAD   Gr 1-2 PRR 2x10 Gr 1-2 PRR 2x10         Neuro Re-Ed             R SL [place] Punches    2x10 2x10 3x10 w/ PT assist 3x10  3x10     R SL [place] Flexion    2x10 2x10 3x10 w/ PT assist 3x10  3x10     R SL ER    2x10 2x10 3x5 1# db 3x10 1# db np     TB Ext       Ptb 3x5 np Ptb 2x10    TB Rows       Ptb 3x5 np     Table Slides:   Flexion             Table Slides:  Abduction             Ther Ex             Pt Edu, HEP  PRR            No Money HEP hold           Supine/Seated Cervical Retr + Ext  HEP hold          Supine Shdr ER/IR propped on pillw   HEP          Pullies   2x10 (pain)    5' (start)  3' (end) 5' (start)  3' (end) 5' (end)    Suitcase Carry       3# kb 75 ft 3# kb 100'x3 Np resume                                           Ther Activity                                       Gait Training                                       Modalities

## 2024-12-31 ENCOUNTER — HOSPITAL ENCOUNTER (OUTPATIENT)
Dept: RADIOLOGY | Age: 44
Discharge: HOME | End: 2024-12-31
Payer: OTHER MISCELLANEOUS

## 2024-12-31 DIAGNOSIS — M24.812 INTERNAL DERANGEMENT OF LEFT SHOULDER: ICD-10-CM

## 2024-12-31 PROCEDURE — 73221 MRI JOINT UPR EXTREM W/O DYE: CPT

## 2025-01-02 ENCOUNTER — OFFICE VISIT (OUTPATIENT)
Dept: PHYSICAL THERAPY | Facility: REHABILITATION | Age: 45
End: 2025-01-02
Payer: OTHER MISCELLANEOUS

## 2025-01-02 ENCOUNTER — OFFICE VISIT (OUTPATIENT)
Dept: BARIATRICS | Facility: CLINIC | Age: 45
End: 2025-01-02
Payer: COMMERCIAL

## 2025-01-02 VITALS
BODY MASS INDEX: 41.37 KG/M2 | OXYGEN SATURATION: 98 % | HEART RATE: 102 BPM | SYSTOLIC BLOOD PRESSURE: 114 MMHG | HEIGHT: 62 IN | WEIGHT: 224.8 LBS | DIASTOLIC BLOOD PRESSURE: 78 MMHG

## 2025-01-02 DIAGNOSIS — E66.01 CLASS 3 SEVERE OBESITY DUE TO EXCESS CALORIES WITH SERIOUS COMORBIDITY AND BODY MASS INDEX (BMI) OF 40.0 TO 44.9 IN ADULT (HCC): ICD-10-CM

## 2025-01-02 DIAGNOSIS — E66.813 CLASS 3 SEVERE OBESITY DUE TO EXCESS CALORIES WITH SERIOUS COMORBIDITY AND BODY MASS INDEX (BMI) OF 40.0 TO 44.9 IN ADULT (HCC): ICD-10-CM

## 2025-01-02 DIAGNOSIS — M25.512 LEFT SHOULDER PAIN, UNSPECIFIED CHRONICITY: Primary | ICD-10-CM

## 2025-01-02 PROCEDURE — 99214 OFFICE O/P EST MOD 30 MIN: CPT | Performed by: NURSE PRACTITIONER

## 2025-01-02 PROCEDURE — 97140 MANUAL THERAPY 1/> REGIONS: CPT

## 2025-01-02 PROCEDURE — 97110 THERAPEUTIC EXERCISES: CPT

## 2025-01-02 RX ORDER — TIRZEPATIDE 5 MG/.5ML
5 INJECTION, SOLUTION SUBCUTANEOUS WEEKLY
Qty: 2 ML | Refills: 2 | Status: SHIPPED | OUTPATIENT
Start: 2025-01-02 | End: 2025-04-02

## 2025-01-02 NOTE — PROGRESS NOTES
Assessment/Plan:     Class 3 severe obesity due to excess calories with serious comorbidity and body mass index (BMI) of 40.0 to 44.9 in adult (HCC)  - Patient is pursuing Conservative Program and follow up visits with medical weight management provider  - Initial weight loss goal of 5-10% weight loss for improved health. Weight loss can improve patient's co-morbid conditions and/or prevent weight-related complications.  - Explained the importance of continuing lifestyle changes in addition to any anti-obesity medications.   - Labs reviewed from 10/2024    General Recommendations:  Nutrition:  Eat breakfast daily.  Do not skip meals.      Food log (ie.) www.Foodem.com, sparkpeople.com, loseit.com, EndoEvolution.com, etc.     Practice mindful eating.  Be sure to set aside time to eat, eat slowly, and savor your food.     Hydration:    At least 64oz of water daily.  No sugar sweetened beverages.  No juice (eat the fruit instead).     Exercise:  Studies have shown that the ideal exercise goal is somewhere between 150 to 300 minutes of moderate intensity exercise a week.  Start with exercising 10 minutes every other day and gradually increase physical activity with a goal of at least 150 minutes of moderate intensity exercise a week, divided over at least 3 days a week.  An example of this would be exercising 30 minutes a day, 5 days a week.  Resistance training can increase muscle mass and increase our resting metabolic rate.   FULL BODY resistance training is recommended 2-3 times a week.  Do not do this on consecutive days to allow for muscle recovery.     Aim for a bare minimum 5000 steps, even on days you do not exercise.     Monitoring:   Weigh yourself daily.  If this causes undue stress, then just weigh yourself once a week.  Weigh yourself the same time of the day with the same amount of clothing on.  Preferably this should be done after waking up, before you eat, and with no clothing or minimal clothing  on.     Specific Goals:  Calorie goal:  1400 julia/day   Patient lifestyle habits were reviewed and patient was congratulated on her successful weight loss.  To follow with the dietitian to better balance her nutrition and for accountability.  She will continue with her regular walking routine and lower body strength exercises.  Medications were discussed and patient will maintain on Zepbound 5 mg as she is seeing good weight loss success and appetite suppression.  May consider increasing the dose if she finds that her weight loss has slowed or she has a weight loss plateau.  Patient will follow-up in the office in 3 months for monitoring of her weight loss journey.         Nely was seen today for follow-up.    Diagnoses and all orders for this visit:    Class 3 severe obesity due to excess calories with serious comorbidity and body mass index (BMI) of 40.0 to 44.9 in adult (HCC)  -     tirzepatide (Zepbound) 5 mg/0.5 mL auto-injector; Inject 0.5 mL (5 mg total) under the skin once a week        Total time spent reviewing chart, interviewing patient, examining patient, discussing plan, answering all questions, and documentin minutes with >50% face-to-face time with the patient.    Follow up in approximately 3 months with Non-Surgical Physician/Advanced Practitioner.    Subjective:   Chief Complaint   Patient presents with    Follow-up     Pt is here today for MWM f/u,        Patient ID: Nely Starr  is a 44 y.o. female with excess weight/obesity here to pursue weight management.  Patient is pursuing Conservative Program.   Most recent notes and records were reviewed.    HPI    Wt Readings from Last 20 Encounters:   25 102 kg (224 lb 12.8 oz)   24 104 kg (228 lb 3.2 oz)   24 104 kg (229 lb)   24 105 kg (231 lb)   24 107 kg (236 lb 9.6 oz)   10/28/24 107 kg (236 lb 9.6 oz)   10/26/24 108 kg (238 lb)   10/03/24 110 kg (243 lb)   24 112 kg (246 lb)   24 112 kg (247 lb)  "  24 112 kg (246 lb 8 oz)   24 110 kg (243 lb)   24 107 kg (236 lb)   24 107 kg (236 lb 12.8 oz)   24 108 kg (237 lb 12.8 oz)   24 108 kg (237 lb 6.4 oz)   11/15/23 110 kg (243 lb 9.6 oz)   10/25/23 108 kg (237 lb)   10/10/23 108 kg (239 lb)   23 108 kg (238 lb)       Patient presents today to medical weight management office for follow up.  Patient was started on Zepbound at last office visit and is currently on the 5 mg dose.  She denies any side effects to the medication and feels that has been extremely helpful at controlling her appetite and her portions.  She is able to be in better control of her food choices and often has to remind herself to eat as she does not feel hungry.  She is continuing to follow with a dietitian to better balance her nutrition and focus on increased protein.  She is dealing with a shoulder injury but continues to walk on her walking pad and do lower body resistance workouts.    Weight loss medication and dose: Zepbound 5mg  Started weight and date: 246 lbs in 2024  Current weight: 224.8 lbs  Difference: -21.2 lbs  Goal weight: \"get healthy\"    Starting BMI: 45.43 in 2024  Current BMI: 41.52    Waist Measurements:  2024: 53.5 in  2025:        Nutrition Prescription  Calories:1400 kcal  Protein:70 grams  Fluid:60 ounces      Diet recall:  B: yogurt parfait (light and fit) with berries and protein granola  L: /grilled chicken wrap with side salad  D: varies - protein with vegetables and starch  S: no     Hydration: diet pepsi, water, coffee (zero sugar creamer or protein shake)  Alcohol: very rare  Smokin-2 cigarettes, vaping  Exercise: walking pad - hits 10k steps  Occupation: interventional radiology tech  Sleep: has PRECIOUS - doesn't sleep every night       Mammogram: 2024      The following portions of the patient's history were reviewed and updated as appropriate: allergies, current medications, past family history, " "past medical history, past social history, past surgical history, and problem list.    Family History   Problem Relation Age of Onset    BRCA2 Negative Mother     BRCA1 Negative Mother     Breast cancer Mother 68    Asthma Mother     Obesity Mother     Lung cancer Father 48    Hyperlipidemia Father     Bone cancer Father         mets from lung cancer    Cancer Father         Lung/bone ca-     Anxiety disorder Father     No Known Problems Sister     Arthritis Maternal Grandmother     COPD Maternal Grandmother     Cancer Maternal Grandfather         Lung ca-    Lung cancer Maternal Grandfather 80    No Known Problems Paternal Grandmother     Liver cancer Paternal Grandfather 80    Cancer Paternal Grandfather         Liver ca-    No Known Problems Brother     No Known Problems Maternal Aunt     No Known Problems Maternal Aunt     No Known Problems Maternal Aunt     No Known Problems Paternal Aunt         Review of Systems   Constitutional:  Negative for fatigue.   HENT:  Negative for sore throat.    Respiratory:  Negative for cough and shortness of breath.    Cardiovascular:  Negative for chest pain, palpitations and leg swelling.   Gastrointestinal:  Negative for abdominal pain, constipation, diarrhea and nausea.   Genitourinary:  Negative for dysuria.   Musculoskeletal:  Negative for arthralgias and back pain.   Skin:  Negative for rash.   Neurological:  Negative for headaches.   Psychiatric/Behavioral:  Negative for dysphoric mood. The patient is not nervous/anxious.        Objective:  /78 (BP Location: Left arm, Patient Position: Sitting, Cuff Size: Large)   Pulse 102   Ht 5' 1.7\" (1.567 m)   Wt 102 kg (224 lb 12.8 oz)   LMP 2024 (Exact Date)   SpO2 98%   BMI 41.52 kg/m²     Physical Exam  Vitals and nursing note reviewed.   Constitutional:       Appearance: Normal appearance. She is obese.   HENT:      Head: Normocephalic.   Pulmonary:      Effort: Pulmonary effort is " normal.   Neurological:      General: No focal deficit present.      Mental Status: She is alert and oriented to person, place, and time.   Psychiatric:         Mood and Affect: Mood normal.         Behavior: Behavior normal.         Thought Content: Thought content normal.         Judgment: Judgment normal.            Labs   Most recent labs reviewed   Lab Results   Component Value Date     09/15/2015    SODIUM 139 10/06/2024    K 3.9 10/06/2024     10/06/2024    CO2 26 10/06/2024    ANIONGAP 7 09/15/2015    AGAP 11 10/06/2024    BUN 15 10/06/2024    CREATININE 0.83 10/06/2024    GLUC 97 12/19/2023    GLUF 80 10/06/2024    CALCIUM 9.0 10/06/2024    AST 33 10/06/2024    ALT 39 10/06/2024    ALKPHOS 49 10/06/2024    PROT 6.4 09/15/2015    TP 6.8 10/06/2024    BILITOT 0.18 (L) 09/15/2015    TBILI 0.48 10/06/2024    EGFR 87 10/06/2024     Lab Results   Component Value Date    HGBA1C 5.1 10/06/2024     Lab Results   Component Value Date    BQW1VFSZGHFA 1.608 10/06/2024     Lab Results   Component Value Date    CHOLESTEROL 137 10/06/2024     Lab Results   Component Value Date    HDL 45 (L) 10/06/2024     Lab Results   Component Value Date    TRIG 164 (H) 10/06/2024     Lab Results   Component Value Date    LDLCALC 59 10/06/2024

## 2025-01-02 NOTE — ASSESSMENT & PLAN NOTE
- Patient is pursuing Conservative Program and follow up visits with medical weight management provider  - Initial weight loss goal of 5-10% weight loss for improved health. Weight loss can improve patient's co-morbid conditions and/or prevent weight-related complications.  - Explained the importance of continuing lifestyle changes in addition to any anti-obesity medications.   - Labs reviewed from 10/2024    General Recommendations:  Nutrition:  Eat breakfast daily.  Do not skip meals.      Food log (ie.) www.[employer].com, sparkpeople.com, loseit.com, calorieking.com, etc.     Practice mindful eating.  Be sure to set aside time to eat, eat slowly, and savor your food.     Hydration:    At least 64oz of water daily.  No sugar sweetened beverages.  No juice (eat the fruit instead).     Exercise:  Studies have shown that the ideal exercise goal is somewhere between 150 to 300 minutes of moderate intensity exercise a week.  Start with exercising 10 minutes every other day and gradually increase physical activity with a goal of at least 150 minutes of moderate intensity exercise a week, divided over at least 3 days a week.  An example of this would be exercising 30 minutes a day, 5 days a week.  Resistance training can increase muscle mass and increase our resting metabolic rate.   FULL BODY resistance training is recommended 2-3 times a week.  Do not do this on consecutive days to allow for muscle recovery.     Aim for a bare minimum 5000 steps, even on days you do not exercise.     Monitoring:   Weigh yourself daily.  If this causes undue stress, then just weigh yourself once a week.  Weigh yourself the same time of the day with the same amount of clothing on.  Preferably this should be done after waking up, before you eat, and with no clothing or minimal clothing on.     Specific Goals:  Calorie goal:  1400 julia/day   Patient lifestyle habits were reviewed and patient was congratulated on her successful weight  loss.  To follow with the dietitian to better balance her nutrition and for accountability.  She will continue with her regular walking routine and lower body strength exercises.  Medications were discussed and patient will maintain on Zepbound 5 mg as she is seeing good weight loss success and appetite suppression.  May consider increasing the dose if she finds that her weight loss has slowed or she has a weight loss plateau.  Patient will follow-up in the office in 3 months for monitoring of her weight loss journey.

## 2025-01-02 NOTE — PROGRESS NOTES
Daily Note     Today's date: 2025  Patient name: Nely Starr  : 1980  MRN: [id number]  Referring provider: Richardson Saez PA-C  Dx:   Encounter Diagnosis     ICD-10-CM    1. Left shoulder pain, unspecified chronicity  M25.512           Start Time: 1645  Stop Time: 1730  Total time in clinic (min): 45 minutes    Subjective: Patient reports her shoulder is doing ok. No increased symptoms after last session. Patient reports she had her MRI prior to her session today.      Objective: See treatment diary below      Assessment: Tolerated treatment well today. Significant improvements in pain, PROM/AROM following STM to posterior cuff and CTJ along with thoracic mobilizations. Updated HEP see below. Progress as tolerated. Patient would benefit from continued PT      Plan: Continue per plan of care.      Precautions:       Manuals  1/2   PROM - left shoulder  LMR PRR PRR AFB PRR PRR AFB PRR PRR   Inf GH mobs  Gr 1 & 2 - LMR           Median nerve glides  Gentle - LMR           Prone Thoracici Mobs          CPA and UPAs Gr3-4 PRR   Prone CTJ mobs          PRR Gr3-4   STM to CTJ with OH Raises          PRR   STM   Ant shdr PRR light       CTJ and posterior cuff   LAD   Gr 1-2 PRR 2x10 Gr 1-2 PRR 2x10         Neuro Re-Ed             R SL [place] Punches    2x10 2x10 3x10 w/ PT assist 3x10  3x10     R SL [place] Flexion    2x10 2x10 3x10 w/ PT assist 3x10  3x10     R SL ER    2x10 2x10 3x5 1# db 3x10 1# db np     TB Ext       Ptb 3x5 np Ptb 2x10    TB Rows       Ptb 3x5 np     Table Slides:   Flexion             Table Slides:  Abduction             Prone Ts          HEP                Ther Ex             Pt Edu, HEP  PRR            No Money HEP hold           Supine/Seated Cervical Retr + Ext  HEP hold          Supine Shdr ER/IR propped on pillw   HEP          Pullies   2x10 (pain)    5' (start)  3' (end) 5' (start)  3' (end) 5' (end) 5' (end)   Suitcase Carry        3# kb 75 ft 3# kb 100'x3 Np resume    Self CTJ Mobs w/ Strap          HEP                             Ther Activity                                       Gait Training                                       Modalities

## 2025-01-06 ENCOUNTER — OFFICE VISIT (OUTPATIENT)
Dept: PHYSICAL THERAPY | Facility: REHABILITATION | Age: 45
End: 2025-01-06
Payer: OTHER MISCELLANEOUS

## 2025-01-06 DIAGNOSIS — M25.512 LEFT SHOULDER PAIN, UNSPECIFIED CHRONICITY: Primary | ICD-10-CM

## 2025-01-06 PROCEDURE — 97110 THERAPEUTIC EXERCISES: CPT

## 2025-01-06 PROCEDURE — 97140 MANUAL THERAPY 1/> REGIONS: CPT

## 2025-01-06 NOTE — PROGRESS NOTES
Daily Note     Today's date: 2025  Patient name: Nely Starr  : 1980  MRN: [id number]  Referring provider: Richardson Saez PA-C  Dx:   Encounter Diagnosis     ICD-10-CM    1. Left shoulder pain, unspecified chronicity  M25.512           Start Time: 1730  Stop Time: 1810  Total time in clinic (min): 40 minutes    Subjective: Patient reports was sore from last session but improved afterwards.       Objective: See treatment diary below      Assessment: Tolerated treatment well today. Primary symptom was anterior shoulder pain. Improvements in symptoms, AROM, PROM following manuals. Progress as tolerated. Patient would benefit from continued PT      Plan: Continue per plan of care.      Precautions:       Manuals  1/2   PROM - left shoulder   PRR PRR AFB PRR PRR AFB PRR PRR   Inf GH mobs             Median nerve glides             Prone Thoracici Mobs CPA and UPAs Gr3-4 PRR         CPA and UPAs Gr3-4 PRR   Prone CTJ mobs PRR Gr3-4         PRR Gr3-4   STM to CTJ with [place] Raises PRR         PRR   STM   Ant shdr PRR light       CTJ and posterior cuff   LAD   Gr 1-2 PRR 2x10 Gr 1-2 PRR 2x10         Neuro Re-Ed             R SL [place] Punches    2x10 2x10 3x10 w/ PT assist 3x10  3x10     R SL [place] Flexion    2x10 2x10 3x10 w/ PT assist 3x10  3x10     R SL ER    2x10 2x10 3x5 1# db 3x10 1# db np     TB Ext       Ptb 3x5 np Ptb 2x10    TB Rows       Ptb 3x5 np     Table Slides:   Flexion             Table Slides:  Abduction             Prone Ts          HEP                Ther Ex             Pt Edu, HEP             No Money             Supine/Seated Cervical Retr + Ext   hold          Supine Shdr ER/IR propped on pillw   HEP          Pullies 5' (end)  2x10 (pain)    5' (start)  3' (end) 5' (start)  3' (end) 5' (end) 5' (end)   Suitcase Carry       3# kb 75 ft 3# kb 100'x3 Np resume    Self CTJ Mobs w/ Strap          HEP                             Ther Activity                                        Gait Training                                       Modalities

## 2025-01-08 ENCOUNTER — OFFICE VISIT (OUTPATIENT)
Dept: PHYSICAL THERAPY | Facility: REHABILITATION | Age: 45
End: 2025-01-08
Payer: OTHER MISCELLANEOUS

## 2025-01-08 DIAGNOSIS — M25.512 LEFT SHOULDER PAIN, UNSPECIFIED CHRONICITY: Primary | ICD-10-CM

## 2025-01-08 PROCEDURE — 97140 MANUAL THERAPY 1/> REGIONS: CPT

## 2025-01-08 PROCEDURE — 97110 THERAPEUTIC EXERCISES: CPT

## 2025-01-08 NOTE — PROGRESS NOTES
Daily Note     Today's date: 2025  Patient name: Nely Starr  : 1980  MRN: [id number]  Referring provider: Richardson Saez PA-C  Dx:   Encounter Diagnosis     ICD-10-CM    1. Left shoulder pain, unspecified chronicity  M25.512           Start Time: 1730  Stop Time: 1815  Total time in clinic (min): 45 minutes    Subjective: Patient reports her shoulder is getting better.       Objective: See treatment diary below      Assessment: Tolerated treatment well today. Able to achieve full PROM today and nearly full pain free AROM following activities. Pain primarily localized to anterior shoulder. Patient would benefit from continued PT      Plan: Continue per plan of care.      Precautions:       Manuals    PROM - left shoulder     AFB PRR PRR AFB PRR PRR   Inf GH mobs             Median nerve glides             Prone Thoracici Mobs CPA and UPAs Gr3-4 PRR CPA and UPAs Gr3-4 PRR        CPA and UPAs Gr3-4 PRR   Prone CTJ mobs PRR Gr3-4 PRR Gr3-4        PRR Gr3-4   STM to CTJ with OH Raises PRR PRR        PRR   STM  Prox bicep        CTJ and posterior cuff   LAD             Neuro Re-Ed             R SL [place] Punches     2x10 3x10 w/ PT assist 3x10  3x10     R SL [place] Flexion     2x10 3x10 w/ PT assist 3x10  3x10     R SL ER     2x10 3x5 1# db 3x10 1# db np     TB Ext       Ptb 3x5 np Ptb 2x10    TB Rows       Ptb 3x5 np     Table Slides:   Flexion             Table Slides:  Abduction             Prone Ts  2x10        HEP                Ther Ex             Pt Edu, HEP             No Money             Supine/Seated Cervical Retr + Ext             Supine Shdr ER/IR propped on pillw             Pullies 5' (end)      5' (start)  3' (end) 5' (start)  3' (end) 5' (end) 5' (end)   Suitcase Carry       3# kb 75 ft 3# kb 100'x3 Np resume    Self CTJ Mobs w/ Strap          HEP                             Ther Activity                                       Gait Training                                        Modalities

## 2025-01-09 ENCOUNTER — OFFICE VISIT (OUTPATIENT)
Dept: OBGYN CLINIC | Facility: OTHER | Age: 45
End: 2025-01-09
Payer: OTHER MISCELLANEOUS

## 2025-01-09 VITALS — BODY MASS INDEX: 40.85 KG/M2 | WEIGHT: 222 LBS | HEIGHT: 62 IN

## 2025-01-09 DIAGNOSIS — S40.012D CONTUSION OF LEFT SHOULDER, SUBSEQUENT ENCOUNTER: Primary | ICD-10-CM

## 2025-01-09 PROBLEM — S40.012A CONTUSION OF LEFT SHOULDER: Status: ACTIVE | Noted: 2025-01-09

## 2025-01-09 PROCEDURE — 99214 OFFICE O/P EST MOD 30 MIN: CPT | Performed by: ORTHOPAEDIC SURGERY

## 2025-01-09 NOTE — PROGRESS NOTES
"  Assessment  Diagnoses and all orders for this visit:    Contusion of left shoulder, subsequent encounter    Discussion and Plan:    Discussed with the patient that the MRI is consistent with a bone contusion of the glenoid secondary to her injury.  Explained this is not a structural issue that would indicate the need for surgical intervention.  Continue PT wean to HEP under the guidance of the therapist.   Symptom should continue to improve with time.  If symptoms fail to improve in 4-6 mos we could consider a diagnostic arthroscopy.   Patient reports she is able to self limit at work.   Follow up as needed    Subjective:   Patient ID: Nely Starr is a 44 y.o. female      HPI  Patient present today to discuss the findings of her left shoulder MRI. Patient states that she was injured on 10/17/24 after a slip and fall at work on a cord that was on the ground.  Patient has been attending physical therapy as instructed.       The following portions of the patient's history were reviewed and updated as appropriate: allergies, current medications, past family history, past medical history, past social history, past surgical history and problem list.        Objective:  Ht 5' 2\" (1.575 m)   Wt 101 kg (222 lb)   LMP 12/11/2024 (Exact Date)   BMI 40.60 kg/m²       Left Shoulder Exam     Tenderness   The patient is experiencing no tenderness.     Range of Motion   The patient has normal left shoulder ROM.    Muscle Strength   The patient has normal left shoulder strength.    Other   Erythema: absent  Sensation: normal  Pulse: present             Physical Exam  Vitals reviewed.   Constitutional:       Appearance: She is well-developed.   Eyes:      Pupils: Pupils are equal, round, and reactive to light.   Pulmonary:      Effort: Pulmonary effort is normal.      Breath sounds: Normal breath sounds.   Abdominal:      General: Abdomen is flat. There is no distension.   Skin:     General: Skin is warm and dry. "   Neurological:      Mental Status: She is alert and oriented to person, place, and time.   Psychiatric:         Behavior: Behavior normal.         Thought Content: Thought content normal.         Judgment: Judgment normal.           I have personally reviewed pertinent films in PACS and my interpretation is as follows.    MRI left shoulder demonstrates a glenoid contusion with tendonitis of the supraspinatus       Scribe Attestation      I,:  Sari Kimball MA am acting as a scribe while in the presence of the attending physician.:       I,:  Trav Mendieta MD personally performed the services described in this documentation    as scribed in my presence.:

## 2025-01-09 NOTE — LETTER
January 9, 2025     Patient: Nely Starr  YOB: 1980  Date of Visit: 1/9/2025      To Whom it May Concern:    Nely Starr is under my professional care. Nely was seen in my office on 1/9/2025. Nely may return to full duty.     If you have any questions or concerns, please don't hesitate to call.         Sincerely,          Trav Mendieta MD        CC: No Recipients

## 2025-01-13 ENCOUNTER — OFFICE VISIT (OUTPATIENT)
Dept: PHYSICAL THERAPY | Facility: REHABILITATION | Age: 45
End: 2025-01-13
Payer: OTHER MISCELLANEOUS

## 2025-01-13 DIAGNOSIS — M25.512 LEFT SHOULDER PAIN, UNSPECIFIED CHRONICITY: Primary | ICD-10-CM

## 2025-01-13 PROCEDURE — 97110 THERAPEUTIC EXERCISES: CPT

## 2025-01-13 PROCEDURE — 97140 MANUAL THERAPY 1/> REGIONS: CPT

## 2025-01-13 NOTE — PROGRESS NOTES
Daily Note     Today's date: 2025  Patient name: Nely Starr  : 1980  MRN: [id number]  Referring provider: Richardson Saez PA-C  Dx:   Encounter Diagnosis     ICD-10-CM    1. Left shoulder pain, unspecified chronicity  M25.512           Start Time: 1730  Stop Time: 1815  Total time in clinic (min): 45 minutes    Subjective: Pt reports general soreness of L shldr.  States she has good days and bad days, seeing slow improvement of symptoms.        Objective: See treatment diary below      Assessment: Tolerated treatment well. Continued with program as outlined below, with focus on addressing soft tissue restriction with manual STM.  STM performed both statically and with AROM of L shldr/bicep.  Most restricted and tender along L subscap, but did seem to improve with manual techniques.  Patient would benefit from continued PT to further improve strength, decrease pain, and maximize function.        Plan: Continue per plan of care.      Precautions:       Manuals    PROM - left shoulder     AFB PRR PRR AFB PRR PRR   Inf GH mobs             Median nerve glides             Prone Thoracici Mobs CPA and UPAs Gr3-4 PRR CPA and UPAs Gr3-4 PRR        CPA and UPAs Gr3-4 PRR   Prone CTJ mobs PRR Gr3-4 PRR Gr3-4        PRR Gr3-4   STM to CTJ with [place] Raises PRR PRR Prone  No raises  AFB       PRR   STM  Prox bicep Pec min, prox bicep, teres tarun/min, sub scap       CTJ and posterior cuff   LAD             Neuro Re-Ed             R SL [place] Punches     2x10 3x10 w/ PT assist 3x10  3x10     R SL [place] Flexion     2x10 3x10 w/ PT assist 3x10  3x10     R SL ER     2x10 3x5 1# db 3x10 1# db np     TB Ext       Ptb 3x5 np Ptb 2x10    TB Rows       Ptb 3x5 np     Table Slides:   Flexion             Table Slides:  Abduction             Prone Ts  2x10        HEP                Ther Ex             Pt Edu, HEP             No Money             Supine/Seated Cervical Retr + Ext    "          Supine Shdr ER/IR propped on pillw             Pullies 5' (end)  5'   (Start)    5' (start)  3' (end) 5' (start)  3' (end) 5' (end) 5' (end)   Thoracic ext   10\"x10          Suitcase Carry       3# kb 75 ft 3# kb 100'x3 Np resume    Self CTJ Mobs w/ Strap          HEP                             Ther Activity                                       Gait Training                                       Modalities                                                                  "

## 2025-01-16 ENCOUNTER — OFFICE VISIT (OUTPATIENT)
Dept: PHYSICAL THERAPY | Facility: REHABILITATION | Age: 45
End: 2025-01-16
Payer: OTHER MISCELLANEOUS

## 2025-01-16 DIAGNOSIS — M25.512 LEFT SHOULDER PAIN, UNSPECIFIED CHRONICITY: Primary | ICD-10-CM

## 2025-01-16 PROCEDURE — 97140 MANUAL THERAPY 1/> REGIONS: CPT

## 2025-01-16 PROCEDURE — 97112 NEUROMUSCULAR REEDUCATION: CPT

## 2025-01-16 NOTE — PROGRESS NOTES
Daily Note     Today's date: 2025  Patient name: Nely Starr  : 1980  MRN: [id number]  Referring provider: Richardson Saez PA-C  Dx:   Encounter Diagnosis     ICD-10-CM    1. Left shoulder pain, unspecified chronicity  M25.512           Start Time: 1730  Stop Time:   Total time in clinic (min): 45 minutes    Subjective: Pt reports she did not feel too bad after her LV, but woke up the next day with significant increased pain with all surrounding musculature of L shldr, especially her subscap and pec minor; could not take muscle relaxer since on call for work.  Is feeling better today, but still generally sore.        Objective: See treatment diary below      Assessment: Tolerated treatment fair. STM performed more gently in effort to avoid further aggravation of symptoms.  Continues to present with significant restrictions along L subscap and pec minor, but slightly improve density compared to LV.  Progressed with isometric holds in effort to re-educate activation of GH and scapular musculature, without over working and aggravating symptoms.  Patient would benefit from continued PT to further improve strength, decrease pain, and maximize overall function.        Plan: Continue per plan of care.      Precautions:       Manuals    PROM - left shoulder      PRR PRR AFB PRR PRR   Inf GH mobs             Median nerve glides             Prone Thoracici Mobs CPA and UPAs Gr3-4 PRR CPA and UPAs Gr3-4 PRR  CPA and UPAs Gr3-4 PRR      CPA and UPAs Gr3-4 PRR   Prone CTJ mobs PRR Gr3-4 PRR Gr3-4  PRR Gr3-4      PRR Gr3-4   STM to CTJ with [place] Raises PRR PRR Prone  No raises  AFB       PRR   STM  Prox bicep Pec min, prox bicep, teres tarun/min, sub scap Pec min, sub scap      CTJ and posterior cuff   LAD             Neuro Re-Ed             R SL [place] Punches      3x10 w/ PT assist 3x10  3x10     R SL [place] Flexion      3x10 w/ PT assist 3x10  3x10     R SL ER       "3x5 1# db 3x10 1# db np     TB Ext       Ptb 3x5 np Ptb 2x10    TB Rows       Ptb 3x5 np     Table Slides:   Flexion             Table Slides:  Abduction             Prone Ts  2x10  2x6      HEP   Prone row    Attempted          IR/ER iso walkout    Grn XS  1x8 ea         TB shldr ext iso step back    Grn XS  1x8                      Ther Ex             Pt Edu, HEP             No Money             Supine/Seated Cervical Retr + Ext             Supine Shdr ER/IR propped on pillw             Pullies 5' (end)  5'   (Start) 3' (end)   5' (start)  3' (end) 5' (start)  3' (end) 5' (end) 5' (end)   Thoracic ext   10\"x10          Suitcase Carry    10# kb  100'x2   3# kb 75 ft 3# kb 100'x3 Np resume    Self CTJ Mobs w/ Strap          HEP                             Ther Activity                                       Gait Training                                       Modalities                                                                    "

## 2025-01-20 ENCOUNTER — OFFICE VISIT (OUTPATIENT)
Dept: PHYSICAL THERAPY | Facility: REHABILITATION | Age: 45
End: 2025-01-20
Payer: OTHER MISCELLANEOUS

## 2025-01-20 ENCOUNTER — TELEPHONE (OUTPATIENT)
Dept: BARIATRICS | Facility: CLINIC | Age: 45
End: 2025-01-20

## 2025-01-20 DIAGNOSIS — F32.A DEPRESSION, UNSPECIFIED DEPRESSION TYPE: ICD-10-CM

## 2025-01-20 DIAGNOSIS — M25.512 LEFT SHOULDER PAIN, UNSPECIFIED CHRONICITY: Primary | ICD-10-CM

## 2025-01-20 PROCEDURE — 97112 NEUROMUSCULAR REEDUCATION: CPT

## 2025-01-20 PROCEDURE — 97140 MANUAL THERAPY 1/> REGIONS: CPT

## 2025-01-20 PROCEDURE — 97110 THERAPEUTIC EXERCISES: CPT

## 2025-01-20 RX ORDER — DULOXETIN HYDROCHLORIDE 60 MG/1
60 CAPSULE, DELAYED RELEASE ORAL DAILY
Qty: 90 CAPSULE | Refills: 1 | Status: SHIPPED | OUTPATIENT
Start: 2025-01-20

## 2025-01-20 NOTE — PROGRESS NOTES
Daily Note     Today's date: 2025  Patient name: Nely Starr  : 1980  MRN: [id number]  Referring provider: Richardson Saez PA-C  Dx:   Encounter Diagnosis     ICD-10-CM    1. Left shoulder pain, unspecified chronicity  M25.512           Start Time: 1735  Stop Time: 1815  Total time in clinic (min): 40 minutes    Subjective: Pt reports her L shldr was very angry the next day after her LV, but intensity has decreased slight since this time.        Objective: See treatment diary below      Assessment: Tolerated treatment well. Continues to make slow gains/progress with strength, as demonstrated by performance of sidelying SA punches.  Limited tolerance to STM/TpR along L lat and subscap.  Frequent spasms of L post cuff/lat.  Patient would benefit from continued PT to further improve strength, decrease pain, and maximize function with reduced frequency of symptoms.        Plan: Continue per plan of care.      Precautions:       Manuals    PROM - left shoulder       PRR AFB PRR PRR   Inf GH mobs             Median nerve glides             Prone Thoracici Mobs CPA and UPAs Gr3-4 PRR CPA and UPAs Gr3-4 PRR  CPA and UPAs Gr3-4 PRR CPA and UPAs Gr3-4 PRR     CPA and UPAs Gr3-4 PRR   Prone CTJ mobs PRR Gr3-4 PRR Gr3-4  PRR Gr3-4 PRR Gr3-4     PRR Gr3-4   STM to CTJ with [place] Raises PRR PRR Prone  No raises  AFB       PRR   STM  Prox bicep Pec min, prox bicep, teres tarun/min, sub scap Pec min, sub scap Sub scap, lat     CTJ and posterior cuff   LAD             Neuro Re-Ed             R SL [place] Punches       3x10  3x10     R SL [place] Flexion       3x10  3x10     R SL ER       3x10 1# db np     TB Ext       Ptb 3x5 np Ptb 2x10    TB Rows       Ptb 3x5 np     Table Slides:   Flexion             Table Slides:  Abduction             Prone Ts  2x10  2x6      HEP   Prone row    Attempted          IR/ER iso walkout    Grn XS  1x8 ea Grn XS  1x8 ea        TB shldr ext iso  "step back    Grn XS  1x8 Grn XS  1x8                     Ther Ex             Pt Edu, HEP             No Money             Supine/Seated Cervical Retr + Ext             Supine Shdr ER/IR propped on pillw             Pullies 5' (end)  5'   (Start) 3' (end)   5' (start)  3' (end) 5' (start)  3' (end) 5' (end) 5' (end)   Table slide     Flex, at  Hi[place], 45 deg  1x10        Supine SA punch     1# ball  2x8        Supine SA circles     1# ball  2x10 cw/ccw        Thoracic ext   10\"x10          Suitcase Carry    10# kb  100'x2   3# kb 75 ft 3# kb 100'x3 Np resume    Self CTJ Mobs w/ Strap          HEP                             Ther Activity                                       Gait Training                                       Modalities                                                                      "

## 2025-01-23 ENCOUNTER — OFFICE VISIT (OUTPATIENT)
Dept: PHYSICAL THERAPY | Facility: REHABILITATION | Age: 45
End: 2025-01-23
Payer: OTHER MISCELLANEOUS

## 2025-01-23 DIAGNOSIS — M25.512 LEFT SHOULDER PAIN, UNSPECIFIED CHRONICITY: Primary | ICD-10-CM

## 2025-01-23 PROCEDURE — 97110 THERAPEUTIC EXERCISES: CPT

## 2025-01-23 PROCEDURE — 97140 MANUAL THERAPY 1/> REGIONS: CPT

## 2025-01-23 NOTE — PROGRESS NOTES
Weight Management Medical Nutrition Assessment     Nely presented for a meal planning session 1/12  Employee benefit. Today's weight is 218.4# or loss of 9.8# since last visit or loss  8.3% TBW .Hx ot Htn, Mixed HLD and elevated fasting insulin.  On Cymblalta and on Zepbound. On her 4th mg 0.5 mg dosage of Zepbound. No adverse side affects.   Patient has been struggling with her weight for most of her life.  She was pursuing a bariatric surgery process a few years ago but would like to avoid surgery. Tries to stay consistent with her eating habits and does not snack throughout the day.  She does not do any formal exercise but does make sure she hits 10,000 steps per day and uses a walking pad if necessary.  Per dietary recall patient appears to be inadequate in fiber. Patient has decreased appetite since starting Zepbound.              .  Developed and reviewed a low calorie meal plan. Open to adding more fiber to meals and making meals more well rounded.  Protein intake and Hydration appear to remain consistent. Continues to increase hydration intake at this time 40 oz/day  and increased  in protein at dinner time and a  serving of vegetables for fiber. Struggles with decreased appetite at dinner times. Reviewed various protein sources to potentially add. Has also been ensuring 10,000 steps/day with treadmill workouts 2-4 days a week. Presents with consistent weight loss. Keep up the great work!   Will follow up in 4 weeks.   Dislikes: pork/ turkey/ nuts/seafood    Goals: Continue current goals   1) Focus on PT but think about adding in Strength training 1 day a week for 10-15 minutes(lower body)  2) Continue to add fiber to meals-1 fruit(breakfast)  and 1 vegetable at dinner time.     Patient seen by Medical Provider in past 6 months:  yes 10/03/2024  Requested to schedule appointment with Medical Provider: No      Anthropometric Measurements  Start Weight (#): 238#(06/25/2019)  Current Weight (#): 218.4#  TBW  "% Change from start weight:8.3%  Ideal Body Weight (#):105#  Goal Weight (#):\"get healthy ideally 175#\"  Highest: 247#  Lowest: 180#    Weight Loss History  Previous weight loss attempts: Phentermine, Gym.    Food and Nutrition Related History  Wake up: 7 am   Bed Time: 10-11 pm    Food Recall  Breakfast:Muscle Milk protein shake + 3 egg whites Mandarin orange + coffee with protein shake(1/2 muscle milk)    Snack:skip  Lunch:Side salad with hard boiled eggs or chicken quesadilla( meal from hospital cafe) or Tuna salad wrap with vegetables  + side vegetables + side soup  Snack:coffee and remainder of muscle milk  Dinner:varies - protein with vegetables and starch or protein shake +  coffee + bag of vegetables   Snack:skip    Beverages: water, diet soda, coffee/tea, and muscle milk protein drinks( 2 a day)  Volume of beverage intake: 40 -60 ounces a day    Weekends: Worse, less active and states eating less  Cravings: n/a  Trouble area of day:n/a    Frequency of Eating out: everyday at the hospital cafeteria 5 days/week  Food restrictions:n/a  Cooking: self   Food Shopping: self    Physical Activity Intake  Activity:Walking  Frequency:  walking pad - hits 10k steps/every day / Started PT for shoulder 2 times a week/Treadmill 2-4 times a week 10,000 steps/day  Physical limitations/barriers to exercise: maybe a tear in the arm    Estimated Needs  Energy  SECA: BMR:n/a      X 1.3 -1000 =  [place] Energy Needs: BMR : 1769   1-2# loss weekly sedentary:  0066-1742 kcal             1-2# loss weekly lightly active:5954-7062  Maintenance calories for sedentary activity level: 2123 kcal  Protein:61-77 grams      (1.2-1.5g/kg IBW)  Fluid: 60 ounces     (35mL/kg IBW)    Nutrition Diagnosis  Yes;    Overweight/obesity  related to Excess energy intake as evidenced by  BMI more than normative standard for age and sex (obesity-grade III 40+)       Nutrition Intervention    Nutrition Prescription  Calories:1400 " kcal  Protein:70 grams  Fluid:60 ounces    Meal Plan (Lemuel/Pro/Carb)  Breakfast: 300 kcal/30 grams  Snack: skip  Lunch: 400 kcal/25-30 grams protein  Snack:150-200 kcal/5-25 grams protein  Dinner: 400 kcal/25-30 grams protein  Snack: skip    Nutrition Education:    Calorie controlled menu  Lean protein food choices  Healthy snack options  Food journaling tips      Nutrition Counseling:  Strategies: meal planning, portion sizes, healthy snack choices, hydration, fiber intake, protein intake, exercise, food journal      Monitoring and Evaluation:  Evaluation criteria:  Energy Intake  Meet protein needs  Maintain adequate hydration  Monitor weekly weight  Meal planning/preparation  Food journal   Decreased portions at mealtimes and snacks  Physical activity     Barriers to learning:none  Readiness to change: Action:  (Changing behavior)  Comprehension: excellent  Expected Compliance: excellent

## 2025-01-23 NOTE — PROGRESS NOTES
Daily Note     Today's date: 2025  Patient name: Nely Starr  : 1980  MRN: [id number]  Referring provider: Richardson Saez PA-C  Dx:   Encounter Diagnosis     ICD-10-CM    1. Left shoulder pain, unspecified chronicity  M25.512           Start Time: 1735  Stop Time: 1815  Total time in clinic (min): 40 minutes    Subjective: Patient reports similar symptoms to last visit. States symptoms along lat are most noticeable.       Objective: See treatment diary below      Assessment: Tolerated treatment well today. Improved IR pain and strength bicep curl eccentric in supine. Progress as tolerated. Patient would benefit from continued PT      Plan: Continue per plan of care.      Precautions:       Manuals    PROM - left shoulder       PRR AFB PRR PRR   Inf GH mobs             Median nerve glides             Prone Thoracici Mobs CPA and UPAs Gr3-4 PRR CPA and UPAs Gr3-4 PRR  CPA and UPAs Gr3-4 PRR CPA and UPAs Gr3-4 PRR     CPA and UPAs Gr3-4 PRR   Prone CTJ mobs PRR Gr3-4 PRR Gr3-4  PRR Gr3-4 PRR Gr3-4     PRR Gr3-4   STM to CTJ with [place] Raises PRR PRR Prone  No raises  AFB       PRR   STM  Prox bicep Pec min, prox bicep, teres tarun/min, sub scap Pec min, sub scap Sub scap, lat PRR lat, bicep,    CTJ and posterior cuff   Re-Assessment      PRR       Neuro Re-Ed             R SL [place] Punches       3x10  3x10     R SL [place] Flexion       3x10  3x10     R SL ER       3x10 1# db np     TB Ext       Ptb 3x5 np Ptb 2x10    TB Rows       Ptb 3x5 np     Table Slides:   Flexion             Table Slides:  Abduction             Prone Ts  2x10  2x6      HEP   Prone row    Attempted          IR/ER iso walkout    Grn XS  1x8 ea Grn XS  1x8 ea        TB shldr ext iso step back    Grn XS  1x8 Grn XS  1x8        Supine Bicep Curls       Ptb 3x5 HEP       Ther Ex             Pt Edu, HEP             No Money             Supine/Seated Cervical Retr + Ext             Supine  "Shdr ER/IR propped on pillw             Pullies 5' (end)  5'   (Start) 3' (end)   5' (start)  3' (end) 5' (start)  3' (end) 5' (end) 5' (end)   Table slide     Flex, at  Hi-lo, 45 deg  1x10        Supine SA punch     1# ball  2x8        Supine SA circles     1# ball  2x10 cw/ccw        Thoracic ext   10\"x10          Suitcase Carry    10# kb  100'x2   3# kb 75 ft 3# kb 100'x3 Np resume    Self CTJ Mobs w/ Strap          HEP   Supine Biceps Curl                          Ther Activity                                       Gait Training                                       Modalities                                                                        "

## 2025-01-27 ENCOUNTER — OFFICE VISIT (OUTPATIENT)
Dept: PHYSICAL THERAPY | Facility: REHABILITATION | Age: 45
End: 2025-01-27
Payer: OTHER MISCELLANEOUS

## 2025-01-27 ENCOUNTER — CLINICAL SUPPORT (OUTPATIENT)
Age: 45
End: 2025-01-27
Payer: COMMERCIAL

## 2025-01-27 VITALS — BODY MASS INDEX: 40.19 KG/M2 | HEIGHT: 62 IN | WEIGHT: 218.4 LBS

## 2025-01-27 DIAGNOSIS — E66.812 CLASS 2 OBESITY DUE TO EXCESS CALORIES WITH BODY MASS INDEX (BMI) OF 39.0 TO 39.9 IN ADULT, UNSPECIFIED WHETHER SERIOUS COMORBIDITY PRESENT: Primary | ICD-10-CM

## 2025-01-27 DIAGNOSIS — E66.09 CLASS 2 OBESITY DUE TO EXCESS CALORIES WITH BODY MASS INDEX (BMI) OF 39.0 TO 39.9 IN ADULT, UNSPECIFIED WHETHER SERIOUS COMORBIDITY PRESENT: Primary | ICD-10-CM

## 2025-01-27 DIAGNOSIS — M25.512 LEFT SHOULDER PAIN, UNSPECIFIED CHRONICITY: Primary | ICD-10-CM

## 2025-01-27 PROCEDURE — 97140 MANUAL THERAPY 1/> REGIONS: CPT

## 2025-01-27 PROCEDURE — RECHECK

## 2025-01-27 PROCEDURE — 97110 THERAPEUTIC EXERCISES: CPT

## 2025-01-27 PROCEDURE — S9470 NUTRITIONAL COUNSELING, DIET: HCPCS

## 2025-01-27 NOTE — PROGRESS NOTES
"Daily Note     Today's date: 2025  Patient name: Nely Starr  : 1980  MRN: [id number]  Referring provider: Richardson Saez PA-C  Dx:   Encounter Diagnosis     ICD-10-CM    1. Left shoulder pain, unspecified chronicity  M25.512           Start Time: 1735  Stop Time: 1815  Total time in clinic (min): 40 minutes    Subjective: L shldr and lat have been feeling much better.  \"It's been a good week so far.\"  Notes increased spaming and tension in R UT/cervical paraspinals, likely from over compensating for L shldr.        Objective: See treatment diary below      Assessment: Tolerated treatment well. Able to perform activities today that previously had caused aggravation of symptoms.  Improved muscle density in L lat, sub scap, and bicep compared to previous visits.  Patient would benefit from continued PT      Plan: Continue per plan of care.      Precautions:       Manuals  1   PROM - left shoulder         PRR PRR   Inf GH mobs             Median nerve glides             Prone Thoracici Mobs CPA and UPAs Gr3-4 PRR CPA and UPAs Gr3-4 PRR  CPA and UPAs Gr3-4 PRR CPA and UPAs Gr3-4 PRR     CPA and UPAs Gr3-4 PRR   Prone CTJ mobs PRR Gr3-4 PRR Gr3-4  PRR Gr3-4 PRR Gr3-4     PRR Gr3-4   STM to CTJ with [place] Raises PRR PRR Prone  No raises  AFB       PRR   STM  Prox bicep Pec min, prox bicep, teres tarun/min, sub scap Pec min, sub scap Sub scap, lat PRR lat, bicep, AFB lat, bicep, sub scap, UT   CTJ and posterior cuff   Re-Assessment      PRR       Neuro Re-Ed             R SL [place] Punches             R SL [place] Flexion             R SL ER             TB Ext         Ptb 2x10    TB Rows             Table Slides:   Flexion             Table Slides:  Abduction             Prone Ts  2x10  2x6      HEP   Prone row    Attempted          IR/ER iso walkout    Grn XS  1x8 ea Grn XS  1x8 ea        TB shldr ext iso step back    Grn XS  1x8 Grn XS  1x8        Supine Bicep Curls    " "   Ptb 3x5 HEP [place]  2x15      Ther Ex             Pt Edu, HEP             No Money       [place]  2x10      Supine/Seated Cervical Retr + Ext             Supine Shdr ER/IR propped on pillw             Pullies 5' (end)  5'   (Start) 3' (end)     5' (end) 5' (end)   Table slide     Flex, at  Hi-lo, 45 deg  1x10  Wall slide  2x10      Supine SA punch     1# ball  2x8 1# ball  2x8 1# ball  2x8      Supine SA circles     1# ball  2x10 cw/ccw 1# ball  2x10 cw/ccw 1# ball  2x8 cw/ccw      Thoracic ext   10\"x10          Suitcase Carry    10# kb  100'x2     Np resume    Self CTJ Mobs w/ Strap          HEP   Supine Biceps Curl                          Ther Activity                                       Gait Training                                       Modalities                                                                          "

## 2025-01-30 ENCOUNTER — OFFICE VISIT (OUTPATIENT)
Dept: PHYSICAL THERAPY | Facility: REHABILITATION | Age: 45
End: 2025-01-30
Payer: OTHER MISCELLANEOUS

## 2025-01-30 DIAGNOSIS — M25.512 LEFT SHOULDER PAIN, UNSPECIFIED CHRONICITY: Primary | ICD-10-CM

## 2025-01-30 PROCEDURE — 97110 THERAPEUTIC EXERCISES: CPT

## 2025-01-30 PROCEDURE — 97140 MANUAL THERAPY 1/> REGIONS: CPT

## 2025-01-30 NOTE — PROGRESS NOTES
Daily Note     Today's date: 2025  Patient name: Nely Starr  : 1980  MRN: [id number]  Referring provider: Richardson Saez PA-C  Dx:   Encounter Diagnosis     ICD-10-CM    1. Left shoulder pain, unspecified chronicity  M25.512                      Subjective: Pt reports L shldr is not as good as LV, but still better.  R shldr pain/spasms still present and has also been dealing with sciatic pain since LV.  Not sure what she did to cause sciatic pain.        Objective: See treatment diary below      Assessment: Tolerated treatment well. Continues to tolerate more activity, but still presents with soft tissue restriction along L lat/teres.  Making slow progress toward long term goals.  Patient would benefit from continued PT.      Plan: Continue per plan of care.      Precautions:       Manuals   1   PROM - left shoulder          PRR   Inf GH mobs             Median nerve glides             Prone Thoracici Mobs CPA and UPAs Gr3-4 PRR CPA and UPAs Gr3-4 PRR  CPA and UPAs Gr3-4 PRR CPA and UPAs Gr3-4 PRR     CPA and UPAs Gr3-4 PRR   Prone CTJ mobs PRR Gr3-4 PRR Gr3-4  PRR Gr3-4 PRR Gr3-4     PRR Gr3-4   STM to CTJ with [place] Raises PRR PRR Prone  No raises  AFB       PRR   STM  Prox bicep Pec min, prox bicep, teres tarun/min, sub scap Pec min, sub scap Sub scap, lat PRR lat, bicep, AFB lat, bicep, sub scap, UT AFB L lat/teres    R para scapular  CTJ and posterior cuff   Re-Assessment      PRR       Neuro Re-Ed             R SL [place] Punches             R SL [place] Flexion             R SL ER             TB Ext             TB Rows             Table Slides:   Flexion             Table Slides:  Abduction             Prone Ts  2x10  2x6      HEP   Prone row    Attempted          IR/ER iso walkout    Grn XS  1x8 ea Grn XS  1x8 ea        TB shldr ext iso step back    Grn XS  1x8 Grn XS  1x8        Supine Bicep Curls       Ptb 3x5 HEP [place]  2x15 [place]  2x15     Ther Ex         "     Pt Edu, HEP             No Money       [place]  2x10 [place]  2x10     Robbery        At wall with foam roller  0#  2x10     Supine/Seated Cervical Retr + Ext             Supine Shdr ER/IR propped on pillw             Pullies 5' (end)  5'   (Start) 3' (end)      5' (end)   Table slide     Flex, at  Hi-lo, 45 deg  1x10  Wall slide  2x10 Wall slide  2x10     Supine SA punch     1# ball  2x8 1# ball  2x8 1# ball  2x8 [place]  2#  2x10     Supine SA circles     1# ball  2x10 cw/ccw 1# ball  2x10 cw/ccw 1# ball  2x8 cw/ccw 1# ball  2x10 cw/ccw     Thoracic ext   10\"x10          Suitcase Carry    10# kb  100'x2         Self CTJ Mobs w/ Strap          HEP   Supine Biceps Curl                          Ther Activity                                       Gait Training                                       Modalities                                                                            "

## 2025-02-06 ENCOUNTER — OFFICE VISIT (OUTPATIENT)
Dept: PHYSICAL THERAPY | Facility: REHABILITATION | Age: 45
End: 2025-02-06
Payer: OTHER MISCELLANEOUS

## 2025-02-06 DIAGNOSIS — M25.512 LEFT SHOULDER PAIN, UNSPECIFIED CHRONICITY: Primary | ICD-10-CM

## 2025-02-06 PROCEDURE — 97110 THERAPEUTIC EXERCISES: CPT

## 2025-02-06 PROCEDURE — 97112 NEUROMUSCULAR REEDUCATION: CPT

## 2025-02-06 PROCEDURE — 97140 MANUAL THERAPY 1/> REGIONS: CPT

## 2025-02-06 NOTE — PROGRESS NOTES
Daily Note     Today's date: 2025  Patient name: Nely Starr  : 1980  MRN: [id number]  Referring provider: Richardson Saez PA-C  Dx:   Encounter Diagnosis     ICD-10-CM    1. Left shoulder pain, unspecified chronicity  M25.512           Start Time:   Stop Time: 1850  Total time in clinic (min): 40 minutes    Subjective: Patient reports her should has been feeling a lot better over the last few days.       Objective: See treatment diary below      Assessment: Tolerated treatment well today. Progressed neuro re-ed and strengthening today as ROM and irritability allowed it. Patient had nearly full PROM of shoulder without any pain and/or guarding. Progress as tolerated. Patient would benefit from continued PT      Plan: Continue per plan of care.      Precautions:       Manuals    PROM - left shoulder          PRR   Inf GH mobs             Median nerve glides             Prone Thoracici Mobs CPA and UPAs Gr3-4 PRR CPA and UPAs Gr3-4 PRR  CPA and UPAs Gr3-4 PRR CPA and UPAs Gr3-4 PRR     CPA and UPAs Gr3-4 PRR   Prone CTJ mobs PRR Gr3-4 PRR Gr3-4  PRR Gr3-4 PRR Gr3-4     PRR Gr3-4   STM to CTJ with [place] Raises PRR PRR Prone  No raises  AFB       PRR   STM  Prox bicep Pec min, prox bicep, teres tarun/min, sub scap Pec min, sub scap Sub scap, lat PRR lat, bicep, AFB lat, bicep, sub scap, UT AFB L lat/teres    R para scapular  CTJ and posterior cuff   Re-Assessment      PRR   PRR    Neuro Re-Ed             Prone Ts  2x10  2x6      HEP   Prone row    Attempted          IR/ER iso walkout    Grn XS  1x8 ea Grn XS  1x8 ea        TB shldr ext iso step back    Grn XS  1x8 Grn XS  1x8        XS Face Pulls         Grn 2x10    XS Shdr Abd         Grn 2x10    Supine Bicep Curls       Ptb 3x5 HEP [place]  2x15 [place]  2x15     Ther Ex             Pt Edu, HEP             No Money       [place]  2x10 [place]  2x10     Robbery        At wall with foam roller  0#  2x10     Pullies 5'  (end)  5'   (Start) 3' (end)      5' (end)   Table slide     Flex, at  Hi-lo, 45 deg  1x10  Wall slide  2x10 Wall slide  2x10     Supine SA punch     1# ball  2x8 1# ball  2x8 1# ball  2x8 Dowel  2#  2x10 Dowel  2#  2x10    Supine SA circles     1# ball  2x10 cw/ccw 1# ball  2x10 cw/ccw 1# ball  2x8 cw/ccw 1# ball  2x10 cw/ccw 1# ball  2x10 cw/ccw    ER Iso Jan LIfts         Yb 2x10    Statue of [place] Walks         3# kb 4 laps 50ft     Self CTJ Mobs w/ Strap          HEP   Supine Biceps Curl                          Ther Activity                                       Gait Training                                       Modalities

## 2025-02-07 ENCOUNTER — OFFICE VISIT (OUTPATIENT)
Age: 45
End: 2025-02-07
Payer: COMMERCIAL

## 2025-02-07 VITALS
TEMPERATURE: 97.2 F | HEIGHT: 62 IN | DIASTOLIC BLOOD PRESSURE: 80 MMHG | RESPIRATION RATE: 18 BRPM | WEIGHT: 217.2 LBS | BODY MASS INDEX: 39.97 KG/M2 | SYSTOLIC BLOOD PRESSURE: 110 MMHG | OXYGEN SATURATION: 98 % | HEART RATE: 88 BPM

## 2025-02-07 DIAGNOSIS — E78.2 MIXED HYPERLIPIDEMIA: ICD-10-CM

## 2025-02-07 DIAGNOSIS — F41.1 GAD (GENERALIZED ANXIETY DISORDER): ICD-10-CM

## 2025-02-07 DIAGNOSIS — F17.200 TOBACCO DEPENDENCE SYNDROME: ICD-10-CM

## 2025-02-07 DIAGNOSIS — I10 ESSENTIAL HYPERTENSION: Primary | ICD-10-CM

## 2025-02-07 PROCEDURE — 99214 OFFICE O/P EST MOD 30 MIN: CPT

## 2025-02-07 RX ORDER — ALPRAZOLAM 0.25 MG/1
0.25 TABLET ORAL
Qty: 90 TABLET | Refills: 0 | Status: SHIPPED | OUTPATIENT
Start: 2025-02-07

## 2025-02-07 RX ORDER — FENOFIBRATE 160 MG/1
160 TABLET ORAL DAILY
Qty: 90 TABLET | Refills: 0 | Status: SHIPPED | OUTPATIENT
Start: 2025-02-07

## 2025-02-07 RX ORDER — ROSUVASTATIN CALCIUM 20 MG/1
20 TABLET, COATED ORAL DAILY
Qty: 90 TABLET | Refills: 0 | Status: SHIPPED | OUTPATIENT
Start: 2025-02-07

## 2025-02-07 RX ORDER — VALSARTAN AND HYDROCHLOROTHIAZIDE 80; 12.5 MG/1; MG/1
1 TABLET, FILM COATED ORAL DAILY
Qty: 90 TABLET | Refills: 0 | Status: SHIPPED | OUTPATIENT
Start: 2025-02-07

## 2025-02-07 NOTE — PROGRESS NOTES
Name: Nely Starr      : 1980      MRN: [id number]  Encounter Provider: Severo Hays MD  Encounter Date: 2025   Encounter department: [facility] PRIMARY [facility]  :  Assessment & Plan  Essential hypertension  Under control.  Continue valsartan with hydrochlorothiazide.  We will continue to monitor  Orders:  •  valsartan-hydrochlorothiazide (DIOVAN-HCT) 80-12.5 MG per tablet; Take 1 tablet by mouth daily    Mixed hyperlipidemia  Under control.  Continue rosuvastatin.  We will continue to monitor  Orders:  •  fenofibrate 160 MG tablet; Take 1 tablet (160 mg total) by mouth daily  •  rosuvastatin (CRESTOR) 20 MG tablet; Take 1 tablet (20 mg total) by mouth daily    Tobacco dependence syndrome  Quit smoking  Options for nicotine patch, nicotine gum or any medication discussed with patient  Potential consequences of smoking discussed with patient  Patient seems to be understood well         QIANA (generalized anxiety disorder)  Under control.  Continue duloxetine and also continue Xanax as needed.  We will continue to monitor  Orders:  •  ALPRAZolam (XANAX) 0.25 mg tablet; Take 1 tablet (0.25 mg total) by mouth daily at bedtime as needed for anxiety          Tobacco Cessation Counseling: The patient is sincerely urged to quit consumption of tobacco. She is not ready to quit tobacco.       History of Present Illness   Patient here for review of chronic medical problems and  the labs and imaging if it is applicable.  Currently has no specific complaints other than mentioned in the review of systems  Denies chest pain, SOB, cough, abdominal pain, nausea, vomiting, fever, chills, lightheadedness, dizziness,headache, tingling or numbness.No bowel or bladder problem.  Patient lost about 20 pounds in the last 3 months since Tyzine peptide started by weight management      Review of Systems   Constitutional:  Negative for chills, fatigue and fever.   HENT:  Negative for congestion, ear pain,  "rhinorrhea, sneezing, sore throat and voice change.    Eyes:  Negative for redness, itching and visual disturbance.   Respiratory:  Negative for cough, chest tightness, shortness of breath and wheezing.    Cardiovascular:  Negative for chest pain, palpitations and leg swelling.   Gastrointestinal:  Negative for abdominal pain, blood in stool, diarrhea, nausea and vomiting.   Endocrine: Negative for cold intolerance and heat intolerance.   Genitourinary:  Negative for dysuria, frequency and urgency.   Musculoskeletal:  Positive for arthralgias (Bilateral knee pain). Negative for back pain and myalgias.   Skin:  Negative for color change and rash.   Neurological:  Negative for dizziness, weakness, light-headedness, numbness and headaches.   Hematological:  Does not bruise/bleed easily.   Psychiatric/Behavioral:  Negative for agitation, behavioral problems and confusion.        Objective   /80 (BP Location: Right arm, Patient Position: Sitting, Cuff Size: Large)   Pulse 88   Temp (!) 97.2 °F (36.2 °C) (Temporal)   Resp 18   Ht 5' 2\" (1.575 m)   Wt 98.5 kg (217 lb 3.2 oz)   SpO2 98%   BMI 39.73 kg/m²      Physical Exam  Vitals and nursing note reviewed.   Constitutional:       General: She is not in acute distress.     Appearance: Normal appearance. She is well-developed. She is obese. She is not ill-appearing, toxic-appearing or diaphoretic.   HENT:      Head: Normocephalic and atraumatic.      Right Ear: External ear normal.      Left Ear: External ear normal.      Nose: Nose normal.      Mouth/Throat:      Mouth: Mucous membranes are moist.      Pharynx: Oropharynx is clear.   Eyes:      General: No scleral icterus.        Right eye: No discharge.         Left eye: No discharge.      Extraocular Movements: Extraocular movements intact.      Conjunctiva/sclera: Conjunctivae normal.      Pupils: Pupils are equal, round, and reactive to light.   Cardiovascular:      Rate and Rhythm: Normal rate and regular " rhythm.      Pulses: Normal pulses.      Heart sounds: Normal heart sounds. No murmur heard.  Pulmonary:      Effort: Pulmonary effort is normal. No respiratory distress.      Breath sounds: Normal breath sounds. No wheezing, rhonchi or rales.   Abdominal:      General: Abdomen is flat. Bowel sounds are normal. There is no distension.      Palpations: Abdomen is soft.      Tenderness: There is no abdominal tenderness. There is no right CVA tenderness or left CVA tenderness.   Musculoskeletal:         General: No swelling or tenderness. Normal range of motion.      Cervical back: Normal range of motion and neck supple. No rigidity.      Right lower leg: No edema.      Left lower leg: No edema.   Lymphadenopathy:      Cervical: No cervical adenopathy.   Skin:     General: Skin is warm and dry.      Capillary Refill: Capillary refill takes 2 to 3 seconds.      Coloration: Skin is not jaundiced or pale.   Neurological:      General: No focal deficit present.      Mental Status: She is alert and oriented to person, place, and time. Mental status is at baseline.      Motor: No weakness.      Gait: Gait normal.   Psychiatric:         Mood and Affect: Mood normal.         Behavior: Behavior normal.

## 2025-02-07 NOTE — ASSESSMENT & PLAN NOTE
Under control.  Continue valsartan with hydrochlorothiazide.  We will continue to monitor  Orders:  •  valsartan-hydrochlorothiazide (DIOVAN-HCT) 80-12.5 MG per tablet; Take 1 tablet by mouth daily

## 2025-02-07 NOTE — ASSESSMENT & PLAN NOTE
Under control.  Continue rosuvastatin.  We will continue to monitor  Orders:  •  fenofibrate 160 MG tablet; Take 1 tablet (160 mg total) by mouth daily  •  rosuvastatin (CRESTOR) 20 MG tablet; Take 1 tablet (20 mg total) by mouth daily

## 2025-02-07 NOTE — ASSESSMENT & PLAN NOTE
Under control.  Continue duloxetine and also continue Xanax as needed.  We will continue to monitor  Orders:  •  ALPRAZolam (XANAX) 0.25 mg tablet; Take 1 tablet (0.25 mg total) by mouth daily at bedtime as needed for anxiety

## 2025-02-10 ENCOUNTER — OFFICE VISIT (OUTPATIENT)
Dept: PHYSICAL THERAPY | Facility: REHABILITATION | Age: 45
End: 2025-02-10
Payer: OTHER MISCELLANEOUS

## 2025-02-10 DIAGNOSIS — M25.512 LEFT SHOULDER PAIN, UNSPECIFIED CHRONICITY: Primary | ICD-10-CM

## 2025-02-10 PROCEDURE — 97140 MANUAL THERAPY 1/> REGIONS: CPT

## 2025-02-10 PROCEDURE — 97112 NEUROMUSCULAR REEDUCATION: CPT

## 2025-02-10 PROCEDURE — 97110 THERAPEUTIC EXERCISES: CPT

## 2025-02-10 NOTE — PROGRESS NOTES
Daily Note     Today's date: 2/10/2025  Patient name: Nely Starr  : 1980  MRN: [id number]  Referring provider: Richardson Saez PA-C  Dx:   Encounter Diagnosis     ICD-10-CM    1. Left shoulder pain, unspecified chronicity  M25.512           Start Time:   Stop Time: 1850  Total time in clinic (min): 40 minutes    Subjective: Patient reports her shoulder is doing alright. States no significant soreness after last visit.       Objective: See treatment diary below      Assessment: Tolerated treatment well today. Progressed activities today without issue. Muscular fatigues/soreness throughout session. Patient would benefit from continued PT      Plan: Continue per plan of care.      Precautions:       Manuals 1/6 1/8 1/13 1/16 1/20 1/23 1/27 1/30 2/6 2/10   Prone Thoracici Mobs CPA and UPAs Gr3-4 PRR CPA and UPAs Gr3-4 PRR  CPA and UPAs Gr3-4 PRR CPA and UPAs Gr3-4 PRR        Prone CTJ mobs PRR Gr3-4 PRR Gr3-4  PRR Gr3-4 PRR Gr3-4        STM  Prox bicep Pec min, prox bicep, teres tarun/min, sub scap Pec min, sub scap Sub scap, lat PRR lat, bicep, AFB lat, bicep, sub scap, UT AFB L lat/teres    R para scapular  PRR pec minor    Re-Assessment      PRR   PRR PRR   Neuro Re-Ed             Prone Ts  2x10  2x6         Prone row    Attempted          IR/ER iso walkout    Grn XS  1x8 ea Grn XS  1x8 ea        TB shldr ext iso step back    Grn XS  1x8 Grn XS  1x8        XS Face Pulls         Grn 2x10 Grn 2x10   XS Shdr Abd         Grn 2x10 Grn 2x10   Supine Bicep Curls       Ptb 3x5 HEP [place]  2x15 [place]  2x15     Ther Ex             Pt Edu, HEP             No Money       [place]  2x10 [place]  2x10     Robbery        At wall with foam roller  0#  2x10     Table slide     Flex, at  Hi-lo, 45 deg  1x10  Wall slide  2x10 Wall slide  2x10     Supine SA punch     1# ball  2x8 1# ball  2x8 1# ball  2x8 Dowel  2#  2x10 Dowel  2#  2x10 Dow[place]  2#  2x10   Supine SA circles     1# ball  2x10 cw/ccw 1# ball  2x10 cw/ccw 1#  ball  2x8 cw/ccw 1# ball  2x10 cw/ccw 1# ball  2x10 cw/ccw 1# ball  2x10 cw/ccw   ER Iso Jan LIfts         Yb 2x10 Yb 2x10   Statue of [place] Walks         3# kb 4 laps 50ft     Shdr Taps          2x10 plinth   [place] Raises          nv   Self CTJ Mobs w/ Strap             Supine Biceps Curl                          Ther Activity                                       Gait Training                                       Modalities

## 2025-02-13 ENCOUNTER — OFFICE VISIT (OUTPATIENT)
Dept: PHYSICAL THERAPY | Facility: REHABILITATION | Age: 45
End: 2025-02-13
Payer: OTHER MISCELLANEOUS

## 2025-02-13 DIAGNOSIS — M25.512 LEFT SHOULDER PAIN, UNSPECIFIED CHRONICITY: Primary | ICD-10-CM

## 2025-02-13 PROCEDURE — 97140 MANUAL THERAPY 1/> REGIONS: CPT

## 2025-02-13 PROCEDURE — 97110 THERAPEUTIC EXERCISES: CPT

## 2025-02-13 PROCEDURE — 97112 NEUROMUSCULAR REEDUCATION: CPT

## 2025-02-13 NOTE — PROGRESS NOTES
Daily Note     Today's date: 2025  Patient name: Nely Starr  : 1980  MRN: [id number]  Referring provider: Richardson Saez PA-C  Dx:   Encounter Diagnosis     ICD-10-CM    1. Left shoulder pain, unspecified chronicity  M25.512           Start Time: 1820  Stop Time: 1900  Total time in clinic (min): 40 minutes    Subjective: Patient reports her shoulder was sore the day following last visit and into today.      Objective: See treatment diary below      Assessment: Tolerated treatment well today. Increased muscular discomfort throughout shoulder today, likely secondary to sustained to DOMs from last session. Regressed activities today to manage soreness. Resume previous exercises and intensities as tolerated next visit. Patient would benefit from continued PT      Plan: Continue per plan of care.      Precautions:       Manuals 2/13   1/16 1/20 1/23 1/27 1/30 2/6 2/10   Prone Thoracici Mobs CPA and UPAs Gr3-4 PRR   CPA and UPAs Gr3-4 PRR CPA and UPAs Gr3-4 PRR        Prone CTJ mobs PRR Gr3-4   PRR Gr3-4 PRR Gr3-4        STM PRR lat, subscap, posterior cuff   Pec min, sub scap Sub scap, lat PRR lat, bicep, AFB lat, bicep, sub scap, UT AFB L lat/teres    R para scapular  PRR pec minor    Re-Assessment PRR     PRR   PRR PRR   Neuro Re-Ed             Prone Ts    2x6         Prone row    Attempted          IR/ER iso walkout    Grn XS  1x8 ea Grn XS  1x8 ea        TB shldr ext iso step back    Grn XS  1x8 Grn XS  1x8        XS Face Pulls         Grn 2x10 Grn 2x10   XS Shdr Abd         Grn 2x10 Grn 2x10   XS Shdr Rows Grn 2x10            XS Shdr Ext Grn 2x10            Supine Bicep Curls       Ptb 3x5 HEP Peach  2x15 Peach  2x15     Ther Ex             Pt Edu, HEP             No Money       Peach  2x10 Peach  2x10     Robbery        At wall with foam roller  0#  2x10     Table slide     Flex, at  Hi-lo, 45 deg  1x10  Wall slide  2x10 Wall slide  2x10     Supine SA punch Dowel  2#  2x10    1# ball  2x8 1#  ball  2x8 1# ball  2x8 Dowel  2#  2x10 Dowel  2#  2x10 Dowel  2#  2x10   Supine SA circles 1# ball  2x10 cw/ccw    1# ball  2x10 cw/ccw 1# ball  2x10 cw/ccw 1# ball  2x8 cw/ccw 1# ball  2x10 cw/ccw 1# ball  2x10 cw/ccw 1# ball  2x10 cw/ccw   ER Iso Jan LIfts Yb 2x10        Yb 2x10 Yb 2x10   Stat[place] Walks Np resume        3# kb 4 laps 50ft  3# kb 4 laps 50ft    Shdr Taps Np resume         2x10 plinth   Scaption Raises          nv   Self CTJ Mobs w/ Strap             Supine Biceps Curl                          Ther Activity                                       Gait Training                                       Modalities

## 2025-02-17 ENCOUNTER — OFFICE VISIT (OUTPATIENT)
Dept: PHYSICAL THERAPY | Facility: REHABILITATION | Age: 45
End: 2025-02-17
Payer: OTHER MISCELLANEOUS

## 2025-02-17 DIAGNOSIS — M25.512 LEFT SHOULDER PAIN, UNSPECIFIED CHRONICITY: Primary | ICD-10-CM

## 2025-02-17 PROCEDURE — 97110 THERAPEUTIC EXERCISES: CPT

## 2025-02-17 PROCEDURE — 97140 MANUAL THERAPY 1/> REGIONS: CPT

## 2025-02-17 PROCEDURE — 97112 NEUROMUSCULAR REEDUCATION: CPT

## 2025-02-17 NOTE — PROGRESS NOTES
Daily Note     Today's date: 2025  Patient name: Nely Starr  : 1980  MRN: [id number]  Referring provider: Richardson Saez PA-C  Dx:   Encounter Diagnosis     ICD-10-CM    1. Left shoulder pain, unspecified chronicity  M25.512           Start Time:   Stop Time:   Total time in clinic (min): 40 minutes    Subjective: Patient reports her shoulder is doing better again the last few days even with having to work one shift over the weekend.        Objective: See treatment diary below      Assessment: Tolerated treatment well today. Resumed previous activities and intensities today with mild soreness. Will slowly progress as tolerated. Patient would benefit from continued PT      Plan: Continue per plan of care.      Precautions:       Manuals 2/13 2/17  1/16 1/20 1/23 1/27 1/30 2/6 2/10   Prone Thoracic Mobs CPA and UPAs Gr3-4 PRR   CPA and UPAs Gr3-4 PRR CPA and UPAs Gr3-4 PRR        Prone CTJ mobs PRR Gr3-4   PRR Gr3-4 PRR Gr3-4        STM PRR lat, subscap, posterior cuff   Pec min, sub scap Sub scap, lat PRR lat, bicep, AFB lat, bicep, sub scap, UT AFB L lat/teres    R para scapular  PRR pec minor    FOTO  PRR           Re-Assessment PRR PRR    PRR   PRR PRR   Neuro Re-Ed             Prone Ts    2x6         Prone row    Attempted          IR/ER iso walkout    Grn XS  1x8 ea Grn XS  1x8 ea        TB shldr ext iso step back    Grn XS  1x8 Grn XS  1x8        XS Face Pulls  Grn 3x10        Grn 2x10 Grn 2x10   XS Shdr Abd  Grn 3x10        Grn 2x10 Grn 2x10   XS Shdr Rows Grn 2x10            XS Shdr Ext Grn 2x10            Supine Bicep Curls       Ptb 3x5 HEP Peach  2x15 Peach  2x15     Ther Ex             Pt Edu, HEP             No Money       Peach  2x10 Peach  2x10     Robbery        At wall with foam roller  0#  2x10     Table slide     Flex, at  Hi-lo, 45 deg  1x10  Wall slide  2x10 Wall slide  2x10     Supine SA punch Dowel  2#  2x10 Dowel  2#  2x10   1# ball  2x8 1# ball  2x8 1# ball  2x8  Dowel  2#  2x10 Dowel  2#  2x10 Dowel  2#  2x10   Supine SA circles 1# ball  2x10 cw/ccw 1# ball  2x10 cw/ccw   1# ball  2x10 cw/ccw 1# ball  2x10 cw/ccw 1# ball  2x8 cw/ccw 1# ball  2x10 cw/ccw 1# ball  2x10 cw/ccw 1# ball  2x10 cw/ccw   ER Iso Jan LIfts Yb 2x10 Yb 2x10       Yb 2x10 Yb 2x10   [place]ue of [place] Walks Np resume Press up 3#kb 4x5       3# kb 4 laps 50ft  3# kb 4 laps 50ft    Shdr Taps Np resume 2x10 plinth        2x10 plinth   Scaption Raises          nv   Self CTJ Mobs w/ Strap             Supine Biceps Curl                          Ther Activity                                       Gait Training                                       Modalities

## 2025-02-20 ENCOUNTER — ANNUAL EXAM (OUTPATIENT)
Dept: OBGYN CLINIC | Facility: CLINIC | Age: 45
End: 2025-02-20
Payer: COMMERCIAL

## 2025-02-20 ENCOUNTER — OFFICE VISIT (OUTPATIENT)
Dept: PHYSICAL THERAPY | Facility: REHABILITATION | Age: 45
End: 2025-02-20
Payer: OTHER MISCELLANEOUS

## 2025-02-20 VITALS
WEIGHT: 217.8 LBS | HEIGHT: 62 IN | SYSTOLIC BLOOD PRESSURE: 120 MMHG | DIASTOLIC BLOOD PRESSURE: 70 MMHG | BODY MASS INDEX: 40.08 KG/M2

## 2025-02-20 DIAGNOSIS — Z12.31 ENCOUNTER FOR SCREENING MAMMOGRAM FOR MALIGNANT NEOPLASM OF BREAST: ICD-10-CM

## 2025-02-20 DIAGNOSIS — N92.0 MENORRHAGIA WITH REGULAR CYCLE: ICD-10-CM

## 2025-02-20 DIAGNOSIS — Z01.419 ENCOUNTER FOR ANNUAL ROUTINE GYNECOLOGICAL EXAMINATION: Primary | ICD-10-CM

## 2025-02-20 DIAGNOSIS — M25.512 LEFT SHOULDER PAIN, UNSPECIFIED CHRONICITY: Primary | ICD-10-CM

## 2025-02-20 PROCEDURE — 97140 MANUAL THERAPY 1/> REGIONS: CPT

## 2025-02-20 PROCEDURE — G0145 SCR C/V CYTO,THINLAYER,RESCR: HCPCS | Performed by: SPECIALIST

## 2025-02-20 PROCEDURE — S0612 ANNUAL GYNECOLOGICAL EXAMINA: HCPCS | Performed by: OBSTETRICS & GYNECOLOGY

## 2025-02-20 PROCEDURE — 97110 THERAPEUTIC EXERCISES: CPT

## 2025-02-20 RX ORDER — TRANEXAMIC ACID 650 MG/1
TABLET ORAL
Qty: 30 TABLET | Refills: 4 | Status: SHIPPED | OUTPATIENT
Start: 2025-02-20

## 2025-02-20 NOTE — PROGRESS NOTES
Name: Nely Starr      : 1980      MRN: [id number]  Encounter Provider: Radha Bush DO  Encounter Date: 2025   Encounter department: [facility]  :  Assessment & Plan  Encounter for annual routine gynecological examination         Encounter for screening mammogram for malignant neoplasm of breast    Orders:    Mammo screening bilateral w 3d and cad; Future    Menorrhagia with regular cycle    Orders:    Tranexamic Acid 650 MG TABS; Take 2 tablets by mouth TID x 5 days each month - only menstrual cycle      Pap smear done for cytology, reflex HPV.  Encouraged self breast examination as well as calcium supplementation.  Continue annual mammogram.  Continue TXA monthly, much improvement.  She will continue to follow-up with bariatric medicine, primary care as scheduled.  Return to office in 1 year or as needed    History of Present Illness   HPI  Nely Starr is a 44 y.o. female who presents     This is a pleasant 44-year-old female G0 who presents for her GYN exam.  She states her cycles are regular every 4 weeks lasting 4 days with no breakthrough bleeding.  She is using TXA monthly with much improvement in menstrual flow.  She denies any changes in bowel or bladder function.  She is sexually active, monogamous relationship for over 16 months.  They are not using any form of contraception.  She is okay if she gets pregnant.  She discontinued tobacco use however now she is vaping.    She does follow-up with bariatric medicine, on Zepbound for the last 6 months with a 30 pound weight loss, doing well.    H/o LEE 2018    Mammo 2024    Employed [employer]- department            History obtained from: patient    Review of Systems   Constitutional:  Negative for fatigue, fever and unexpected weight change.   Respiratory:  Negative for cough, chest tightness, shortness of breath and wheezing.    Cardiovascular: Negative.  Negative for chest pain and palpitations.   Gastrointestinal:  Negative.  Negative for abdominal distention, abdominal pain, blood in stool, constipation, diarrhea, nausea and vomiting.   Genitourinary: Negative.  Negative for difficulty urinating, dyspareunia, dysuria, flank pain, frequency, genital sores, hematuria, pelvic pain, urgency, vaginal bleeding, vaginal discharge and vaginal pain.   Skin:  Negative for rash.     Current Outpatient Medications on File Prior to Visit   Medication Sig Dispense Refill    albuterol (ProAir HFA) 90 mcg/act inhaler Inhale 2 puffs every 6 (six) hours as needed for wheezing 8.5 g 0    ALPRAZolam (XANAX) 0.25 mg tablet Take 1 tablet (0.25 mg total) by mouth daily at bedtime as needed for anxiety 90 tablet 0    cholecalciferol (VITAMIN D3) 1,000 units tablet Take by mouth daily        DULoxetine (CYMBALTA) 60 mg delayed release capsule Take 1 capsule (60 mg total) by mouth daily 90 capsule 1    fenofibrate 160 MG tablet Take 1 tablet (160 mg total) by mouth daily 90 tablet 0    fluocinolone (SYNALAR) 0.01 % external solution Apply sparingly to affected areas of scalp 2-4 times a day for up to 2 weeks. 60 mL 1    ketoconazole (NIZORAL) 2 % shampoo Shampoo twice a week for 8 weeks then as needed.  Lather into scalp and skin on face, neck, chest, and back; leave on for 5 minutes and then rinse off completely. 120 mL 2    rosuvastatin (CRESTOR) 20 MG tablet Take 1 tablet (20 mg total) by mouth daily 90 tablet 0    tirzepatide (Zepbound) 5 mg/0.5 mL auto-injector Inject 0.5 mL (5 mg total) under the skin once a week 2 mL 2    valsartan-hydrochlorothiazide (DIOVAN-HCT) 80-12.5 MG per tablet Take 1 tablet by mouth daily 90 tablet 0    [DISCONTINUED] Tranexamic Acid 650 MG TABS Take 2 tablets by mouth TID x 5 days each month - only menstrual cycle 30 tablet 1    clotrimazole-betamethasone (LOTRISONE) 1-0.05 % cream Apply topically 2 (two) times a day (Patient not taking: Reported on 1/2/2025) 45 g 1    PREVIDENT 5000 SENSITIVE 1.1-5 % PSTE   3     "SODIUM FLUORIDE, DENTAL GEL, 1.1 % GEL SF 5000 Plus 1.1 % dental cream      [DISCONTINUED] hydrocortisone 2.5 % cream Apply topically 2 (two) times a day for 14 days Apply 1-2x a day topically to eyebrows for 14 days 30 g 0     No current facility-administered medications on file prior to visit.         Objective   /70   Ht 5' 2\" (1.575 m)   Wt 98.8 kg (217 lb 12.8 oz)   LMP 02/04/2025 (Exact Date)   BMI 39.84 kg/m²      Physical Exam  Constitutional:       Appearance: Normal appearance. She is well-developed.   HENT:      Head: Normocephalic and atraumatic.   Cardiovascular:      Rate and Rhythm: Normal rate and regular rhythm.   Pulmonary:      Effort: Pulmonary effort is normal.      Breath sounds: Normal breath sounds.   Chest:   Breasts:     Right: No inverted nipple, mass, nipple discharge, skin change or tenderness.      Left: No inverted nipple, mass, nipple discharge, skin change or tenderness.   Abdominal:      General: Bowel sounds are normal. There is no distension.      Palpations: Abdomen is soft.      Tenderness: There is no abdominal tenderness. There is no guarding or rebound.   Genitourinary:     Labia:         Right: No rash, tenderness or lesion.         Left: No rash, tenderness or lesion.       Vagina: Normal. No signs of injury. No vaginal discharge or tenderness.      Cervix: No cervical motion tenderness, discharge, friability, lesion or cervical bleeding.      Uterus: Not enlarged and not tender.       Adnexa:         Right: No mass, tenderness or fullness.          Left: No mass, tenderness or fullness.     Neurological:      Mental Status: She is alert.   Psychiatric:         Behavior: Behavior normal.         Administrative Statements   I have spent a total time of 25 minutes in caring for this patient on the day of the visit/encounter including Impressions, Counseling / Coordination of care, Documenting in the medical record, Reviewing/placing orders in the medical record " (including tests, medications, and/or procedures), and Obtaining or reviewing history  .

## 2025-02-20 NOTE — PROGRESS NOTES
Daily Note     Today's date: 2025  Patient name: Nely Starr  : 1980  MRN: [id number]  Referring provider: Richardson Saez PA-C  Dx:   Encounter Diagnosis     ICD-10-CM    1. Left shoulder pain, unspecified chronicity  M25.512           Start Time: 1755  Stop Time: 1830  Total time in clinic (min): 35 minutes  Patient 1 on 1 with PT -615p and 615p-630p with PT AB    Subjective: Patient reports her shoulder has been hurting the last 2-3 days.       Objective: See treatment diary below      Assessment: Tolerated treatment well today. Focused on manuals and light AROM due to residual soreness still being present. Progress as tolerated. Patient would benefit from continued PT      Plan: Continue per plan of care.      Precautions:       Manuals 2/13 2/17 2/20 1/16 1/20 1/23 1/27 1/30 2/6 2/10   Prone Thoracic Mobs CPA and UPAs Gr3-4 PRR   CPA and UPAs Gr3-4 PRR CPA and UPAs Gr3-4 PRR        Prone CTJ mobs PRR Gr3-4   PRR Gr3-4 PRR Gr3-4        STM PRR lat, subscap, posterior cuff  PRR lat, subscap, posterior cuff Pec min, sub scap Sub scap, lat PRR lat, bicep, AFB lat, bicep, sub scap, UT AFB L lat/teres    R para scapular  PRR pec minor    FOTO  PRR           Re-Assessment PRR PRR    PRR   PRR PRR   Neuro Re-Ed             Prone Ts    2x6         Prone row    Attempted          IR/ER iso walkout    Grn XS  1x8 ea Grn XS  1x8 ea        TB shldr ext iso step back    Grn XS  1x8 Grn XS  1x8        XS Face Pulls  Grn 3x10  hold      Grn 2x10 Grn 2x10   XS Shdr Abd  Grn 3x10  hold      Grn 2x10 Grn 2x10   XS Shdr Rows Grn 2x10            XS Shdr Ext Grn 2x10            Supine Bicep Curls       Ptb 3x5 HEP Peach  2x15 Peach  2x15     Ther Ex             Pt Edu, HEP             No Money       Peach  2x10 Peach  2x10     Robbery        At wall with foam roller  0#  2x10     Table slide     Flex, at  Hi-lo, 45 deg  1x10  Wall slide  2x10 Wall slide  2x10     Supine SA punch Dowel  2#  2x10  Dowel  2#  2x10 2x10 no weight  1# ball  2x8 1# ball  2x8 1# ball  2x8 Dowel  2#  2x10 Dowel  2#  2x10 Dowel  2#  2x10   Supine SA circles 1# ball  2x10 cw/ccw 1# ball  2x10 cw/ccw hold  1# ball  2x10 cw/ccw 1# ball  2x10 cw/ccw 1# ball  2x8 cw/ccw 1# ball  2x10 cw/ccw 1# ball  2x10 cw/ccw 1# ball  2x10 cw/ccw   ER Iso Jan LIfts Yb 2x10 Yb 2x10 hold      Yb 2x10 Yb 2x10   Statue of [place] Walks Np resume Press up 3#kb 4x5 hold      3# kb 4 laps 50ft  3# kb 4 laps 50ft    Shdr Taps Np resume 2x10 plinth Hold        2x10 plinth   Scaption Raises          nv   Self CTJ Mobs w/ Strap             Supine Biceps Curl                          Ther Activity                                       Gait Training                                       Modalities

## 2025-02-24 ENCOUNTER — OFFICE VISIT (OUTPATIENT)
Dept: PHYSICAL THERAPY | Facility: REHABILITATION | Age: 45
End: 2025-02-24
Payer: OTHER MISCELLANEOUS

## 2025-02-24 DIAGNOSIS — M25.512 LEFT SHOULDER PAIN, UNSPECIFIED CHRONICITY: Primary | ICD-10-CM

## 2025-02-24 PROCEDURE — 97110 THERAPEUTIC EXERCISES: CPT

## 2025-02-24 PROCEDURE — 97140 MANUAL THERAPY 1/> REGIONS: CPT

## 2025-02-24 PROCEDURE — 97112 NEUROMUSCULAR REEDUCATION: CPT

## 2025-02-24 NOTE — PROGRESS NOTES
Daily Note     Today's date: 2025  Patient name: Nely Starr  : 1980  MRN: [id number]  Referring provider: Richardson Saez PA-C  Dx:   Encounter Diagnosis     ICD-10-CM    1. Left shoulder pain, unspecified chronicity  M25.512           Start Time: 1115  Stop Time: 1200  Total time in clinic (min): 45 minutes    Subjective: Patient reports her shoulder is feeling much better than the other day.       Objective: See treatment diary below      Assessment: Tolerated treatment well today. Hold on band activities for now. Will assess soreness following activities today next visit. Patient would benefit from continued PT      Plan: Continue per plan of care.      Precautions:       Manuals 2/13 2/17 2/20 2/24  1/23 1/27 1/30 2/6 2/10   Prone Thoracic Mobs CPA and UPAs Gr3-4 PRR            Prone CTJ mobs PRR Gr3-4            STM PRR lat, subscap, posterior cuff  PRR lat, subscap, posterior cuff RR lat, subscap, posterior cuff  PRR lat, bicep, AFB lat, bicep, sub scap, UT AFB L lat/teres    R para scapular  PRR pec minor    FOTO  PRR           Re-Assessment PRR PRR    PRR   PRR PRR   Neuro Re-Ed             Prone Ts             Prone row             IR/ER iso walkout             TB shldr ext iso step back             XS Face Pulls  Grn 3x10  hold      Grn 2x10 Grn 2x10   XS Shdr Abd  Grn 3x10  hold      Grn 2x10 Grn 2x10   XS Shdr Rows Grn 2x10            XS Shdr Ext Grn 2x10            Supine Bicep Curls       Ptb 3x5 HEP Peach  2x15 Peach  2x15     Ther Ex             Pt Edu, HEP             No Money       Peach  2x10 Peach  2x10     Robbery        At wall with foam roller  0#  2x10     Table slide       Wall slide  2x10 Wall slide  2x10     Supine SA punch Dowel  2#  2x10 Dowel  2#  2x10 2x10 no weight 2x10  1# ball  2x8 1# ball  2x8 Dowel  2#  2x10 Dowel  2#  2x10 Dowel  2#  2x10   Supine SA circles 1# ball  2x10 cw/ccw 1# ball  2x10 cw/ccw hold 2x10 1# ball cw/ccw  1# ball  2x10 cw/ccw 1# ball  2x8  cw/ccw 1# ball  2x10 cw/ccw 1# ball  2x10 cw/ccw 1# ball  2x10 cw/ccw   ER Iso Jan LIfts Yb 2x10 Yb 2x10 hold Np reusme     Yb 2x10 Yb 2x10   Statue of [place] Walks Np resume Press up 3#kb 4x5 hold 3# kb 2 laps x2 50 ft     3# kb 4 laps 50ft  3# kb 4 laps 50ft    Shdr Taps Np resume 2x10 plinth Hold  2x10 plinth      2x10 plinth   Scaption Raises    1# 2x10      nv   Self CTJ Mobs w/ Strap             Supine Biceps Curl                          Ther Activity                                       Gait Training                                       Modalities

## 2025-02-26 ENCOUNTER — OFFICE VISIT (OUTPATIENT)
Dept: PHYSICAL THERAPY | Facility: REHABILITATION | Age: 45
End: 2025-02-26
Payer: OTHER MISCELLANEOUS

## 2025-02-26 DIAGNOSIS — M25.512 LEFT SHOULDER PAIN, UNSPECIFIED CHRONICITY: Primary | ICD-10-CM

## 2025-02-26 PROCEDURE — 97140 MANUAL THERAPY 1/> REGIONS: CPT

## 2025-02-26 PROCEDURE — 97110 THERAPEUTIC EXERCISES: CPT

## 2025-02-26 NOTE — PROGRESS NOTES
Daily Note     Today's date: 2025  Patient name: Nely Starr  : 1980  MRN: [id number]  Referring provider: Richardson Saez PA-C  Dx:   Encounter Diagnosis     ICD-10-CM    1. Left shoulder pain, unspecified chronicity  M25.512           Start Time: 1800  Stop Time: 1845  Total time in clinic (min): 45 minutes    Subjective: Patient reports her shoulder is really bothering since increasing her hours and days at work.       Objective: See treatment diary below      Assessment: Tolerated treatment fair today. Increased irritability in the shoulder with all activities except PROM. Positive response to moist heat end of session. Will monitor symptoms with her next work hours, possible referral back to ortho may be necessary. Patient would benefit from continued PT      Plan: Continue per plan of care.      Precautions:       Manuals 2/13 2/17 2/20 2/24 2/26 1/23 1/27 1/30 2/6 2/10   Prone Thoracic Mobs CPA and UPAs Gr3-4 PRR            Prone CTJ mobs PRR Gr3-4            STM PRR lat, subscap, posterior cuff  PRR lat, subscap, posterior cuff RR lat, subscap, posterior cuff PRR lat, subscap, posterior cuff, biceps PRR lat, bicep, AFB lat, bicep, sub scap, UT AFB L lat/teres    R para scapular  PRR pec minor    FOTO  PRR           Re-Assessment PRR PRR   PRR PRR   PRR PRR   Neuro Re-Ed             Prone Ts             Prone row             IR/ER iso walkout             TB shldr ext iso step back             XS Face Pulls  Grn 3x10  hold      Grn 2x10 Grn 2x10   XS Shdr Abd  Grn 3x10  hold      Grn 2x10 Grn 2x10   XS Shdr Rows Grn 2x10            XS Shdr Ext Grn 2x10            Supine Bicep Curls       Ptb 3x5 HEP Peach  2x15 Peach  2x15     Ther Ex             Pt Edu, HEP             No Money       Peach  2x10 Peach  2x10     Robbery        At wall with foam roller  0#  2x10     Table slide       Wall slide  2x10 Wall slide  2x10     Supine SA punch Dowel  2#  2x10 Dowel  2#  2x10 2x10 no weight 2x10  1#  ball  2x8 1# ball  2x8 Dowel  2#  2x10 Dowel  2#  2x10 Dowel  2#  2x10   Supine SA circles 1# ball  2x10 cw/ccw 1# ball  2x10 cw/ccw hold 2x10 1# ball cw/ccw  1# ball  2x10 cw/ccw 1# ball  2x8 cw/ccw 1# ball  2x10 cw/ccw 1# ball  2x10 cw/ccw 1# ball  2x10 cw/ccw   ER Iso Jan LIfts Yb 2x10 Yb 2x10 hold Np reusme     Yb 2x10 Yb 2x10   Statue of [place] Walks Np resume Press up 3#kb 4x5 hold 3# kb 2 laps x2 50 ft     3# kb 4 laps 50ft  3# kb 4 laps 50ft    Shdr Taps Np resume 2x10 plinth Hold  2x10 plinth      2x10 plinth   Scaption Raises    1# 2x10      nv   Self CTJ Mobs w/ Strap             Supine Biceps Curl                          Ther Activity                                       Gait Training                                       Modalities             HP     10 (end)

## 2025-02-27 NOTE — PROGRESS NOTES
Weight Management Medical Nutrition Assessment     Nely presented for a meal planning session 3/12  Employee benefit. Today's weight is 218.4#  or loss of 4.8# since last visit or loss  10.3% TBW .Hx ot Htn, Mixed HLD and elevated fasting insulin.  On Cymblalta and on Zepbound. On 5 mg Zepbound. No adverse side affects.   Patient has been struggling with her weight for most of her life.  She was pursuing a bariatric surgery process a few years ago but would like to avoid surgery. Tries to stay consistent with her eating habits and does not snack throughout the day.  She does not do any formal exercise but does make sure she hits 10,000 steps per day and uses a walking pad if necessary.  Per dietary recall patient appears to be inadequate in fiber. Patient has decreased appetite since starting Zepbound.              .  Developed and reviewed a low calorie meal plan. Open to adding more fiber to meals and making meals more well rounded.  Protein intake and Hydration appear to remain consistent. Recent stressors such as work hours changing to 10 hr days. This has resulted in decreased fluid intake. Otherwise has continued to increased  in protein at dinner time and a  serving of vegetables for fiber. Struggles with decreased appetite at dinner times. Reviewed various protein sources to potentially add. Continuing 10,000 steps/day with treadmill workouts 2-4 days a week. Presents with consistent weight loss. Will attempt to incorporate lower leg strength training.  Keep up the great work!   Will follow up in 4 weeks.   Dislikes: pork/ turkey/ nuts/seafood    Goals: Continue current goals   1) Focus on PT but think about adding in Strength training 1 day a week for 10-15 minutes(lower body)  2) Continue to add fiber to meals-1 fruit(breakfast)  and 1 vegetable at dinner time.   3) Get back up to 40 oz of water a day    Patient seen by Medical Provider in past 6 months:  yes 10/03/2024  Requested to schedule  "appointment with Medical Provider: No      Anthropometric Measurements  Start Weight (#): 238#(06/25/2019)  Current Weight (#): 213.6#  TBW % Change from start weight:10.3%  Ideal Body Weight (#):105#  Goal Weight (#):\"get healthy ideally 175#\"  Highest: 247#  Lowest: 180#    Weight Loss History  Previous weight loss attempts: Phentermine, Gym.    Food and Nutrition Related History  Wake up: 7 am   Bed Time: 10-11 pm    Food Recall  Breakfast:Muscle Milk protein shake + 3 egg whites Mandarin orange + coffee with protein shake(1/2 muscle milk)    Snack:skip  Lunch:Side salad with hard boiled eggs or chicken quesadilla( meal from hospital cafe) or Tuna salad wrap with vegetables  + side vegetables + side soup  Snack:coffee and remainder of muscle milk  Dinner:varies - protein with vegetables and starch or protein shake +  coffee + bag of vegetables   Snack:skip    Beverages: water, diet soda, coffee/tea, and muscle milk protein drinks( 2 a day)  Volume of beverage intake: 40 -60 ounces a day 20 oz a day past 2 weeks    Weekends: Worse, less active and states eating less  Cravings: n/a  Trouble area of day:n/a    Frequency of Eating out: everyday at the hospital cafeteria 5 days/week  Food restrictions:n/a  Cooking: self   Food Shopping: self    Physical Activity Intake  Activity:Walking  Frequency:  walking pad - hits 10k steps/every day / Started PT for shoulder 2 times a week/Treadmill 2-4 times a week 10,000 steps/day  Physical limitations/barriers to exercise: maybe a tear in the arm    Estimated Needs  Energy  SECA: BMR:n/a      X 1.3 -1000 =  [place] Energy Needs: BMR : 1577   1-2# loss weekly sedentary:  893-1393 kcal             1-2# loss weekly lightly active:1169--1669  Maintenance calories for sedentary activity level: 1893 kcal  Protein:61-77 grams      (1.2-1.5g/kg IBW)  Fluid: 60 ounces     (35mL/kg IBW)    Nutrition Diagnosis  Yes;    Overweight/obesity  related to Excess energy intake as " evidenced by  BMI more than normative standard for age and sex (obesity-grade III 40+)       Nutrition Intervention    Nutrition Prescription  Calories:1400 kcal  Protein:70 grams  Fluid:60 ounces    Meal Plan (Lemuel/Pro/Carb)  Breakfast: 300 kcal/30 grams  Snack: skip  Lunch: 400 kcal/25-30 grams protein  Snack:150-200 kcal/5-25 grams protein  Dinner: 400 kcal/25-30 grams protein  Snack: skip    Nutrition Education:    Calorie controlled menu  Lean protein food choices  Healthy snack options  Food journaling tips      Nutrition Counseling:  Strategies: meal planning, portion sizes, healthy snack choices, hydration, fiber intake, protein intake, exercise, food journal      Monitoring and Evaluation:  Evaluation criteria:  Energy Intake  Meet protein needs  Maintain adequate hydration  Monitor weekly weight  Meal planning/preparation  Food journal   Decreased portions at mealtimes and snacks  Physical activity     Barriers to learning:none  Readiness to change: Action:  (Changing behavior)  Comprehension: excellent  Expected Compliance: excellent

## 2025-02-28 LAB
HPV HR 12 DNA CVX QL NAA+PROBE: NEGATIVE
HPV16 DNA CVX QL NAA+PROBE: NEGATIVE
HPV18 DNA CVX QL NAA+PROBE: NEGATIVE
LAB AP GYN PRIMARY INTERPRETATION: ABNORMAL
Lab: ABNORMAL
PATH INTERP SPEC-IMP: ABNORMAL

## 2025-02-28 PROCEDURE — G0124 SCREEN C/V THIN LAYER BY MD: HCPCS | Performed by: SPECIALIST

## 2025-03-03 ENCOUNTER — RESULTS FOLLOW-UP (OUTPATIENT)
Dept: OBGYN CLINIC | Facility: CLINIC | Age: 45
End: 2025-03-03

## 2025-03-03 ENCOUNTER — OFFICE VISIT (OUTPATIENT)
Dept: PHYSICAL THERAPY | Facility: REHABILITATION | Age: 45
End: 2025-03-03
Payer: OTHER MISCELLANEOUS

## 2025-03-03 ENCOUNTER — CLINICAL SUPPORT (OUTPATIENT)
Age: 45
End: 2025-03-03
Payer: COMMERCIAL

## 2025-03-03 VITALS — HEIGHT: 62 IN | BODY MASS INDEX: 39.31 KG/M2 | WEIGHT: 213.6 LBS

## 2025-03-03 DIAGNOSIS — E66.812 CLASS 2 OBESITY DUE TO EXCESS CALORIES WITH BODY MASS INDEX (BMI) OF 39.0 TO 39.9 IN ADULT, UNSPECIFIED WHETHER SERIOUS COMORBIDITY PRESENT: Primary | ICD-10-CM

## 2025-03-03 DIAGNOSIS — E66.09 CLASS 2 OBESITY DUE TO EXCESS CALORIES WITH BODY MASS INDEX (BMI) OF 39.0 TO 39.9 IN ADULT, UNSPECIFIED WHETHER SERIOUS COMORBIDITY PRESENT: Primary | ICD-10-CM

## 2025-03-03 DIAGNOSIS — M25.512 LEFT SHOULDER PAIN, UNSPECIFIED CHRONICITY: Primary | ICD-10-CM

## 2025-03-03 PROCEDURE — RECHECK

## 2025-03-03 PROCEDURE — 97110 THERAPEUTIC EXERCISES: CPT

## 2025-03-03 PROCEDURE — S9470 NUTRITIONAL COUNSELING, DIET: HCPCS

## 2025-03-03 PROCEDURE — 97140 MANUAL THERAPY 1/> REGIONS: CPT

## 2025-03-03 NOTE — PROGRESS NOTES
Daily Note     Today's date: 3/3/2025  Patient name: Nely Starr  : 1980  MRN: [id number]  Referring provider: Richardson Saez PA-C  Dx:   Encounter Diagnosis     ICD-10-CM    1. Left shoulder pain, unspecified chronicity  M25.512           Start Time: 1115  Stop Time: 1155  Total time in clinic (min): 40 minutes  Patient 1 on 1 with PT from 7752-4245.     Subjective: Patient reports her shoulder is dong pretty good last few days. Patient reports she had the weekend off from work. Patient will be working Tuesday through Friday this week.       Objective: See treatment diary below      Assessment: Tolerated treatment well today. Minimal irritation in the shoulder today with activities. Will defer strengthening activities today to avoid provoking DOMs before her work starts up this week. Patient would benefit from continued PT      Plan: Continue per plan of care.      Precautions:       Manuals 2/13 2/17 2/20 2/24 2/26 3/3  1/30 2/6 2/10   Prone Thoracic Mobs CPA and UPAs Gr3-4 PRR            Prone CTJ mobs PRR Gr3-4            STM PRR lat, subscap, posterior cuff  PRR lat, subscap, posterior cuff RR lat, subscap, posterior cuff PRR lat, subscap, posterior cuff, biceps   AFB L lat/teres    R para scapular  PRR pec minor    FOTO  PRR           Re-Assessment PRR PRR   PRR PRR   PRR PRR   Neuro Re-Ed             XS Face Pulls  Grn 3x10  hold      Grn 2x10 Grn 2x10   XS Shdr Abd  Grn 3x10  hold      Grn 2x10 Grn 2x10   XS Shdr Rows Grn 2x10            XS Shdr Ext Grn 2x10            Supine Bicep Curls         [place]  2x15     Ther Ex             Pt Edu, HEP             No Money        [place]  2x10     Robbery        At wall with foam roller  0#  2x10     Table slide        Wall slide  2x10     Supine Shoulder Flexion w/ Dowel      2x10       Supine SA punch Dowel  2#  2x10 Dowel  2#  2x10 2x10 no weight 2x10    Dowel  2#  2x10 Dowel  2#  2x10 Dowel  2#  2x10   Supine SA circles 1# ball  2x10 cw/ccw 1#  ball  2x10 cw/ccw hold 2x10 1# ball cw/ccw  2x10 1# ball cw/ccw  1# ball  2x10 cw/ccw 1# ball  2x10 cw/ccw 1# ball  2x10 cw/ccw   ER Iso Jan LIfts Yb 2x10 Yb 2x10 hold Np reusme  No moneys yb x30    Yb 2x10 Yb 2x10   Statue of [place] Walks Np resume Press up 3#kb 4x5 hold 3# kb 2 laps x2 50 ft     3# kb 4 laps 50ft  3# kb 4 laps 50ft    Shdr Taps Np resume 2x10 plinth Hold  2x10 plinth  2x10 plinth    2x10 plinth   Scaption Raises    1# 2x10  1# 2x10    nv   Self CTJ Mobs w/ Strap             Supine Biceps Curl                          Ther Activity                                       Gait Training                                       Modalities             HP     10 (end)

## 2025-03-04 NOTE — TELEPHONE ENCOUNTER
FYI - Patient returned call regarding results. Read Dr Bush's message verbatim. Pt verbalized understanding & had no further questions.   Please close result note.

## 2025-03-04 NOTE — TELEPHONE ENCOUNTER
Pt returned call to discuss pap results. Attempted warm transfer to [facility], unsuccessful. Please provide a c/b to review results and recommendations. Thank you.

## 2025-03-06 ENCOUNTER — OFFICE VISIT (OUTPATIENT)
Dept: PHYSICAL THERAPY | Facility: REHABILITATION | Age: 45
End: 2025-03-06
Payer: OTHER MISCELLANEOUS

## 2025-03-06 DIAGNOSIS — M25.512 LEFT SHOULDER PAIN, UNSPECIFIED CHRONICITY: Primary | ICD-10-CM

## 2025-03-06 PROCEDURE — 97110 THERAPEUTIC EXERCISES: CPT

## 2025-03-06 PROCEDURE — 97112 NEUROMUSCULAR REEDUCATION: CPT

## 2025-03-06 NOTE — PROGRESS NOTES
Daily Note     Today's date: 3/6/2025  Patient name: Nely Starr  : 1980  MRN: [id number]  Referring provider: Richardson Saez PA-C  Dx:   Encounter Diagnosis     ICD-10-CM    1. Left shoulder pain, unspecified chronicity  M25.512                      Subjective: Patient reports her shoulder is sore today.  Rates her pain level 6/10 today.      Objective: See treatment diary below      Assessment: Tolerated treatment well today.  Progressed with mobility and strengthening activities.  Multiple verbal cues for correct technique with exercises as patient is quite guarded with active mobility.   Patient would benefit from continued PT      Plan: Continue per plan of care.      Precautions:       Manuals 2/13 2/17 2/20 2/24 2/26 3/3 3/6 1/30 2/6 2/10   Prone Thoracic Mobs CPA and UPAs Gr3-4 PRR            Prone CTJ mobs PRR Gr3-4            STM PRR lat, subscap, posterior cuff  PRR lat, subscap, posterior cuff RR lat, subscap, posterior cuff PRR lat, subscap, posterior cuff, biceps   AFB L lat/teres    R para scapular  PRR pec minor    FOTO  PRR           Re-Assessment PRR PRR   PRR PRR   PRR PRR   Neuro Re-Ed             XS Face Pulls  Grn 3x10  hold      Grn 2x10 Grn 2x10   XS Shdr Abd  Grn 3x10  hold      Grn 2x10 Grn 2x10   XS Shdr Rows Grn 2x10            XS Shdr Ext Grn 2x10            Supine Bicep Curls         [place]  2x15     Ther Ex             Pt Edu, HEP             No Money        [place]  2x10     Robbery        At wall with foam roller  0#  2x10     Table slide        Wall slide  2x10     Supine Shoulder Flexion w/ Dowel      2x10 2x10      Supine SA punch Dowel  2#  2x10 Dowel  2#  2x10 2x10 no weight 2x10   2x10 dowel Dowel  2#  2x10 Dowel  2#  2x10 Dowel  2#  2x10   Supine SA circles 1# ball  2x10 cw/ccw 1# ball  2x10 cw/ccw hold 2x10 1# ball cw/ccw  2x10 1# ball cw/ccw 2x10 1# ball cw/ccw 1# ball  2x10 cw/ccw 1# ball  2x10 cw/ccw 1# ball  2x10 cw/ccw   ER Iso Jan LIfts Yb 2x10 Yb 2x10 hold  Np reusme  No moneys yb x30  No moneys yb x30  Yb 2x10 Yb 2x10   Statue of [place] Walks Np resume Press up 3#kb 4x5 hold 3# kb 2 laps x2 50 ft     3# kb 4 laps 50ft  3# kb 4 laps 50ft    Shdr Taps Np resume 2x10 plinth Hold  2x10 plinth  2x10 plinth 2x10 plinth   2x10 plinth   Scaption Raises    1# 2x10  1# 2x10 1#  2x10   nv   Self CTJ Mobs w/ Strap             Supine Biceps Curl             Supine horiz abd       YTB 2x10      Wall walks with T band       YTB 2x5      Open books on wall        2x10                                Ther Activity                                       Gait Training                                       Modalities             HP     10 (end)

## 2025-03-10 ENCOUNTER — OFFICE VISIT (OUTPATIENT)
Dept: PHYSICAL THERAPY | Facility: REHABILITATION | Age: 45
End: 2025-03-10
Payer: OTHER MISCELLANEOUS

## 2025-03-10 DIAGNOSIS — M25.512 LEFT SHOULDER PAIN, UNSPECIFIED CHRONICITY: Primary | ICD-10-CM

## 2025-03-10 PROCEDURE — 97112 NEUROMUSCULAR REEDUCATION: CPT

## 2025-03-10 PROCEDURE — 97110 THERAPEUTIC EXERCISES: CPT

## 2025-03-10 NOTE — PROGRESS NOTES
Daily Note     Today's date: 3/10/2025  Patient name: Nely Starr  : 1980  MRN: [id number]  Referring provider: Richardson Saez PA-C  Dx:   Encounter Diagnosis     ICD-10-CM    1. Left shoulder pain, unspecified chronicity  M25.512           Start Time: 1045  Stop Time: 1115  Total time in clinic (min): 30 minutes    Subjective: Patient reports her arm is doing ok today. States she will have a full week this week.      Objective: See treatment diary below      Assessment: Tolerated treatment well today. Focused on light activities today as patient will be working 4 days straight this week. Progress as tolerated. Patient would benefit from continued PT      Plan: Continue per plan of care.      Precautions:       Manuals 2/13 2/17 2/20 2/24 2/26 3/3 3/6 3/10  210   Prone Thoracic Mobs CPA and UPAs Gr3-4 PRR            Prone CTJ mobs PRR Gr3-4            STM PRR lat, subscap, posterior cuff  PRR lat, subscap, posterior cuff RR lat, subscap, posterior cuff PRR lat, subscap, posterior cuff, biceps   PRR lat, subscap, posterior cuff, biceps  PRR pec minor    FOTO  PRR           Re-Assessment PRR PRR   PRR PRR    PRR   Neuro Re-Ed             XS Face Pulls  Grn 3x10  hold       Grn 2x10   XS Shdr Abd  Grn 3x10  hold       Grn 2x10   XS Shdr Rows Grn 2x10            XS Shdr Ext Grn 2x10                         Supine Bicep Curls              Ther Ex             PROM             Supine Shoulder Flexion w/ Dowel      2x10 2x10 3x10     Supine SA punch Dowel  2#  2x10 Dowel  2#  2x10 2x10 no weight 2x10   2x10 dowel 3x10 dowel  Dowel  2#  2x10   Supine SA circles 1# ball  2x10 cw/ccw 1# ball  2x10 cw/ccw hold 2x10 1# ball cw/ccw  2x10 1# ball cw/ccw 2x10 1# ball cw/ccw 3x10 1# ball cw/ccw  1# ball  2x10 cw/ccw   ER Iso Jan LIfts Yb 2x10 Yb 2x10 hold Np reusme  No moneys yb x30  No moneys yb x30   Yb 2x10   Statue of [place] Walks Np resume Press up 3#kb 4x5 hold 3# kb 2 laps x2 50 ft      3# kb 4 laps 50ft     Shdr Taps Np resume 2x10 plinth Hold  2x10 plinth  2x10 plinth 2x10 plinth 3x10 plinth  2x10 plinth   Scaption Raises    1# 2x10  1# 2x10 1#  2x10 1# 2x10  nv   Supine horiz abd       YTB 2x10      Wall walks with T band       YTB 2x5      Open books on wall        2x10                                Ther Activity                                       Gait Training                                       Modalities             HP     10 (end)

## 2025-03-12 ENCOUNTER — OFFICE VISIT (OUTPATIENT)
Dept: PHYSICAL THERAPY | Facility: REHABILITATION | Age: 45
End: 2025-03-12
Payer: OTHER MISCELLANEOUS

## 2025-03-12 DIAGNOSIS — M25.512 LEFT SHOULDER PAIN, UNSPECIFIED CHRONICITY: Primary | ICD-10-CM

## 2025-03-12 PROCEDURE — 97140 MANUAL THERAPY 1/> REGIONS: CPT

## 2025-03-12 PROCEDURE — 97110 THERAPEUTIC EXERCISES: CPT

## 2025-03-12 NOTE — PROGRESS NOTES
Daily Note     Today's date: 3/12/2025  Patient name: Nely Starr  : 1980  MRN: [id number]  Referring provider: Richardson Saez PA-C  Dx:   Encounter Diagnosis     ICD-10-CM    1. Left shoulder pain, unspecified chronicity  M25.512           Start Time:   Stop Time:   Total time in clinic (min): 40 minutes    Subjective: Patient reports her shoulder is still hurting from work.      Objective: See treatment diary below      Assessment: Tolerated treatment well today. Focused on manuals for symptom relief today to allow soreness/pain to settle over the weekend. Progress as tolerated. Patient would benefit from continued PT      Plan: Continue per plan of care.      Precautions:       Manuals 2/13 2/17 2/20 2/24 2/26 3/3 3/6 3/10 3/12 2/10   Prone Thoracic Mobs CPA and UPAs Gr3-4 PRR        CPA and UPAs Gr3-4 PRR    Prone CTJ mobs PRR Gr3-4        PRR Gr3-4    STM PRR lat, subscap, posterior cuff  PRR lat, subscap, posterior cuff RR lat, subscap, posterior cuff PRR lat, subscap, posterior cuff, biceps   PRR lat, subscap, posterior cuff, biceps PRR lat, subscap, posterior cuff, biceps PRR pec minor    FOTO  PRR           Re-Assessment PRR PRR   PRR PRR   PRR PRR   Neuro Re-Ed             XS Face Pulls  Grn 3x10  hold       Grn 2x10   XS Shdr Abd  Grn 3x10  hold       Grn 2x10   XS Shdr Rows Grn 2x10            XS Shdr Ext Grn 2x10                         Supine Bicep Curls              Ther Ex             PROM         PRR    Supine Shoulder Flexion w/ Dowel      2x10 2x10 3x10     Supine SA punch Dowel  2#  2x10 Dowel  2#  2x10 2x10 no weight 2x10   2x10 dowel 3x10 dowel  Dowel  2#  2x10   Supine SA circles 1# ball  2x10 cw/ccw 1# ball  2x10 cw/ccw hold 2x10 1# ball cw/ccw  2x10 1# ball cw/ccw 2x10 1# ball cw/ccw 3x10 1# ball cw/ccw  1# ball  2x10 cw/ccw   ER Iso Jan LIfts Yb 2x10 Yb 2x10 hold Np reusme  No moneys yb x30  No moneys yb x30   Yb 2x10   Statue of [place] Walks Np resume Press up 3#kb  4x5 hold 3# kb 2 laps x2 50 ft      3# kb 4 laps 50ft    Shdr Taps Np resume 2x10 plinth Hold  2x10 plinth  2x10 plinth 2x10 plinth 3x10 plinth  2x10 plinth   Scaption Raises    1# 2x10  1# 2x10 1#  2x10 1# 2x10  nv   Supine horiz abd       YTB 2x10      Wall walks with T band       YTB 2x5      Open books on wall        2x10                                Ther Activity                                       Gait Training                                       Modalities             HP     10 (end)

## 2025-03-17 ENCOUNTER — OFFICE VISIT (OUTPATIENT)
Dept: PHYSICAL THERAPY | Facility: REHABILITATION | Age: 45
End: 2025-03-17
Payer: OTHER MISCELLANEOUS

## 2025-03-17 DIAGNOSIS — M25.512 LEFT SHOULDER PAIN, UNSPECIFIED CHRONICITY: Primary | ICD-10-CM

## 2025-03-17 PROCEDURE — 97140 MANUAL THERAPY 1/> REGIONS: CPT

## 2025-03-17 NOTE — PROGRESS NOTES
Daily Note     Today's date: 3/17/2025  Patient name: Nely Starr  : 1980  MRN: [id number]  Referring provider: Richardson Saez PA-C  Dx:   Encounter Diagnosis     ICD-10-CM    1. Left shoulder pain, unspecified chronicity  M25.512           Start Time: 1030  Stop Time: 1100  Total time in clinic (min): 30 minutes    Subjective: Patient reports her shoulder is doing good today. States she didn't work over the weekend so she was able to give her shoulder time to rest.       Objective: See treatment diary below      Assessment:   Work continues continues to be the primary lorrie for Nely's recovery. Symptoms are consistently aggravated following work. Symptoms will nearly resolve when she has time to left her shoulder rest. Will continue to hold focus on light manuals and ROM activities when she is in PT. Patient would benefit from continued PT      Plan: Continue per plan of care.      Precautions:       Manuals 2/13 2/17 2/20 2/24 2/26 3/3 3/6 3/10 3/12 3/17   Prone Thoracic Mobs CPA and UPAs Gr3-4 PRR        CPA and UPAs Gr3-4 PRR CPA and UPAs Gr3-4 PRR   Prone CTJ mobs PRR Gr3-4        PRR Gr3-4 PRR Gr3-4   STM PRR lat, subscap, posterior cuff  PRR lat, subscap, posterior cuff RR lat, subscap, posterior cuff PRR lat, subscap, posterior cuff, biceps   PRR lat, subscap, posterior cuff, biceps PRR lat, subscap, posterior cuff, biceps    FOTO  PRR           Re-Assessment PRR PRR   PRR PRR   PRR PRR   Neuro Re-Ed             XS Face Pulls  Grn 3x10  hold          XS Shdr Abd  Grn 3x10  hold          XS Shdr Rows Grn 2x10            XS Shdr Ext Grn 2x10                         Supine Bicep Curls              Ther Ex             PROM         PRR PRR   Supine Shoulder Flexion w/ Dowel      2x10 2x10 3x10     Supine SA punch Dowel  2#  2x10 Dowel  2#  2x10 2x10 no weight 2x10   2x10 dowel 3x10 dowel     Supine SA circles 1# ball  2x10 cw/ccw 1# ball  2x10 cw/ccw hold 2x10 1# ball cw/ccw  2x10 1# ball cw/ccw  2x10 1# ball cw/ccw 3x10 1# ball cw/ccw     ER Iso Jan LIfts Yb 2x10 Yb 2x10 hold Np reusme  No moneys yb x30  No moneys yb x30      Statue of [place] Walks Np resume Press up 3#kb 4x5 hold 3# kb 2 laps x2 50 ft         Shdr Taps Np resume 2x10 plinth Hold  2x10 plinth  2x10 plinth 2x10 plinth 3x10 plinth     Scaption Raises    1# 2x10  1# 2x10 1#  2x10 1# 2x10     Supine horiz abd       YTB 2x10      Wall walks with T band       YTB 2x5      Open books on wall        2x10                                Ther Activity                                       Gait Training                                       Modalities             HP     10 (end)

## 2025-03-19 ENCOUNTER — OFFICE VISIT (OUTPATIENT)
Dept: PHYSICAL THERAPY | Facility: REHABILITATION | Age: 45
End: 2025-03-19
Payer: OTHER MISCELLANEOUS

## 2025-03-19 DIAGNOSIS — M25.512 LEFT SHOULDER PAIN, UNSPECIFIED CHRONICITY: Primary | ICD-10-CM

## 2025-03-19 PROCEDURE — 97140 MANUAL THERAPY 1/> REGIONS: CPT

## 2025-03-19 NOTE — PROGRESS NOTES
Daily Note     Today's date: 3/19/2025  Patient name: Nely Starr  : 1980  MRN: [id number]  Referring provider: Richardson Saez PA-C  Dx:   Encounter Diagnosis     ICD-10-CM    1. Left shoulder pain, unspecified chronicity  M25.512           Start Time: 174  Stop Time:   Total time in clinic (min): 30 minutes    Subjective: Patient reports her shoulder held up pretty good at work the last 2 days. States some discomfort in the neck and spasms in the posterior shoulder/axilla.       Objective: See treatment diary below      Assessment: Tolerated treatment well today. Focused on manuals again today as she will be working again tomorrow. Progress as tolerated. Patient would benefit from continued PT      Plan: Continue per plan of care.      Precautions:       Manuals 3/19   2/24 2/26 3/3 3/6 3/10 3/12 3/17   Prone Thoracic Mobs CPA and UPAs Gr3-4 PRR        CPA and UPAs Gr3-4 PRR CPA and UPAs Gr3-4 PRR   Prone CTJ mobs PRR Gr3-4 GR 5        PRR Gr3-4 PRR Gr3-4   STM UT, post cuff   RR lat, subscap, posterior cuff PRR lat, subscap, posterior cuff, biceps   PRR lat, subscap, posterior cuff, biceps PRR lat, subscap, posterior cuff, biceps    FOTO             Re-Assessment     PRR PRR   PRR PRR   Neuro Re-Ed             XS Face Pulls             XS Shdr Abd             XS Shdr Rows             XS Shdr Ext                          Supine Bicep Curls              Ther Ex             PROM         PRR PRR   Supine Shoulder Flexion w/ Dowel      2x10 2x10 3x10     Supine SA punch    2x10   2x10 dowel 3x10 dowel     Supine SA circles    2x10 1# ball cw/ccw  2x10 1# ball cw/ccw 2x10 1# ball cw/ccw 3x10 1# ball cw/ccw     ER Iso Jan LIfts    Np reusme  No moneys yb x30  No moneys yb x30      Statue of [place] Walks    3# kb 2 laps x2 50 ft         Shdr Taps    2x10 plinth  2x10 plinth 2x10 plinth 3x10 plinth     Scaption Raises    1# 2x10  1# 2x10 1#  2x10 1# 2x10     Supine horiz abd       YTB 2x10      Wall  walks with T band       YTB 2x5      Open books on wall        2x10                                Ther Activity                                       Gait Training                                       Modalities             HP     10 (end)

## 2025-03-24 ENCOUNTER — OFFICE VISIT (OUTPATIENT)
Dept: PHYSICAL THERAPY | Facility: REHABILITATION | Age: 45
End: 2025-03-24
Payer: OTHER MISCELLANEOUS

## 2025-03-24 DIAGNOSIS — M25.512 LEFT SHOULDER PAIN, UNSPECIFIED CHRONICITY: Primary | ICD-10-CM

## 2025-03-24 PROCEDURE — 97140 MANUAL THERAPY 1/> REGIONS: CPT

## 2025-03-24 NOTE — PROGRESS NOTES
Daily Note     Today's date: 3/24/2025  Patient name: Nely Starr  : 1980  MRN: [id number]  Referring provider: Richardson Saez PA-C  Dx:   Encounter Diagnosis     ICD-10-CM    1. Left shoulder pain, unspecified chronicity  M25.512           Start Time: 1045  Stop Time: 1115  Total time in clinic (min): 30 minutes  Patient 1 on 1 with PT from 0138-4568.    Subjective: Patient reports her shoulder held up very well over the weekend end of last week especially with working extra hours. Patient is pleased.      Objective: See treatment diary below      Assessment:   Patient's symptoms have shown good improvements over the last 1-2 weeks despite increased hours at work. Focus has been on symptoms modulation and soft-tissue mobilizations at PT to minimize additional load/strain to the shoulder, which has proven to be effective so far. Progress as tolerated. Patient would benefit from continued PT      Plan: Continue per plan of care.      Precautions:       Manuals 3/19 3/24  2/24 2/26 3/3 3/6 3/10 3/12 3/17   Prone Thoracic Mobs CPA and UPAs Gr3-4 PRR CPA and UPAs Gr3-4 PRR       CPA and UPAs Gr3-4 PRR CPA and UPAs Gr3-4 PRR   Prone CTJ mobs PRR Gr3-4 GR 5 PRR Gr3-4       PRR Gr3-4 PRR Gr3-4   STM UT, post cuff UT, post cuff  RR lat, subscap, posterior cuff PRR lat, subscap, posterior cuff, biceps   PRR lat, subscap, posterior cuff, biceps PRR lat, subscap, posterior cuff, biceps    FOTO             Re-Assessment     PRR PRR   PRR PRR   Neuro Re-Ed             XS Face Pulls             XS Shdr Abd             XS Shdr Rows             XS Shdr Ext                          Supine Bicep Curls              Ther Ex             PROM         PRR PRR   Supine Shoulder Flexion w/ Dowel      2x10 2x10 3x10     Supine SA punch    2x10   2x10 dowel 3x10 dowel     Supine SA circles    2x10 1# ball cw/ccw  2x10 1# ball cw/ccw 2x10 1# ball cw/ccw 3x10 1# ball cw/ccw     ER Iso Jan LIfts    Np reusme  No moneys yb x30  No  moneys yb x30      Statue of [place] Walks    3# kb 2 laps x2 50 ft         Shdr Taps    2x10 plinth  2x10 plinth 2x10 plinth 3x10 plinth     Scaption Raises    1# 2x10  1# 2x10 1#  2x10 1# 2x10     Supine horiz abd       YTB 2x10      Wall walks with T band       YTB 2x5      Open books on wall        2x10                                Ther Activity                                       Gait Training                                       Modalities             HP     10 (end)

## 2025-03-27 ENCOUNTER — OFFICE VISIT (OUTPATIENT)
Dept: PHYSICAL THERAPY | Facility: REHABILITATION | Age: 45
End: 2025-03-27
Payer: OTHER MISCELLANEOUS

## 2025-03-27 DIAGNOSIS — M25.512 LEFT SHOULDER PAIN, UNSPECIFIED CHRONICITY: Primary | ICD-10-CM

## 2025-03-27 PROCEDURE — 97140 MANUAL THERAPY 1/> REGIONS: CPT

## 2025-03-27 NOTE — PROGRESS NOTES
Daily Note     Today's date: 3/27/2025  Patient name: Nely Starr  : 1980  MRN: [id number]  Referring provider: Richardson Saez PA-C  Dx:   Encounter Diagnosis     ICD-10-CM    1. Left shoulder pain, unspecified chronicity  M25.512           Start Time: 1705  Stop Time: 1745  Total time in clinic (min): 40 minutes    Subjective: Pt reports she tried to stand up from a prone position, pushing with BUE which seemed to aggravate symptoms at LUE/shldr.  Notes she has also had slight increased work demands this week, which likely did not help with her shldr.      Objective: See treatment diary below      Assessment: Tolerated treatment well. Responded well to manual treatment noting decrease in symptoms by end of session.  Several trigger points along L post cuff, UT, and medial border of L scap.  Patient would benefit from continued PT.        Plan: Continue per plan of care.      Precautions:       Manuals 3/19 3/24 3/27  2/26 3/3 3/6 3/10 3/12 3/17   Prone Thoracic Mobs CPA and UPAs Gr3-4 PRR CPA and UPAs Gr3-4 PRR CPA and UPAs Gr3-4  MM      CPA and UPAs Gr3-4 PRR CPA and UPAs Gr3-4 PRR   Prone CTJ mobs PRR Gr3-4 GR 5 PRR Gr3-4 MM Gr3-4        PRR Gr3-4 PRR Gr3-4   STM UT, post cuff UT, post cuff UT, post cuff, lat, med border scap  PRR lat, subscap, posterior cuff, biceps   PRR lat, subscap, posterior cuff, biceps PRR lat, subscap, posterior cuff, biceps    FOTO             Re-Assessment     PRR PRR   PRR PRR   Neuro Re-Ed             XS Face Pulls             XS Shdr Abd             XS Shdr Rows             XS Shdr Ext                          Supine Bicep Curls              Ther Ex             PROM         PRR PRR   Supine Shoulder Flexion w/ Dowel      2x10 2x10 3x10     Supine SA punch       2x10 dowel 3x10 dowel     Supine SA circles      2x10 1# ball cw/ccw 2x10 1# ball cw/ccw 3x10 1# ball cw/ccw     ER Iso Jan LIfts      No moneys yb x30  No moneys yb x30      Statue of [place] Walks              Shdr Taps      2x10 plinth 2x10 plinth 3x10 plinth     Scaption Raises      1# 2x10 1#  2x10 1# 2x10     Supine horiz abd       YTB 2x10      Wall walks with T band       YTB 2x5      Open books on wall        2x10                                Ther Activity                                       Gait Training                                       Modalities             HP     10 (end)

## 2025-03-31 ENCOUNTER — OFFICE VISIT (OUTPATIENT)
Dept: PHYSICAL THERAPY | Facility: REHABILITATION | Age: 45
End: 2025-03-31
Payer: OTHER MISCELLANEOUS

## 2025-03-31 DIAGNOSIS — M25.512 LEFT SHOULDER PAIN, UNSPECIFIED CHRONICITY: Primary | ICD-10-CM

## 2025-03-31 PROCEDURE — 97140 MANUAL THERAPY 1/> REGIONS: CPT | Performed by: PHYSICAL THERAPIST

## 2025-03-31 NOTE — PROGRESS NOTES
Daily Note     Today's date: 3/31/2025  Patient name: Nely Starr  : 1980  MRN: [id number]  Referring provider: Richardson Saez PA-C  Dx:   Encounter Diagnosis     ICD-10-CM    1. Left shoulder pain, unspecified chronicity  M25.512           Start Time: 1100  Stop Time: 1140  Total time in clinic (min): 40 minutes    Subjective: Pt states her shoulder was bothering her over the weekend for unknown reason.     Objective: See treatment diary below    Assessment: Tolerated treatment well. Patient would benefit from continued PT    Plan: Continue per plan of care.      Precautions:       Manuals 3/19 3/24 3/27 03/31         Prone Thoracic Mobs CPA and UPAs Gr3-4 PRR CPA and UPAs Gr3-4 PRR CPA and UPAs Gr3-4  MM UPA - gr 4 - LMR         Prone CTJ mobs PRR Gr3-4 GR 5 PRR Gr3-4 MM Gr3-4   np         STM UT, post cuff UT, post cuff UT, post cuff, lat, med border scap Post cuff, med scap border, subscap - LMR         FOTO             Re-Assessment             Neuro Re-Ed             XS Face Pulls             XS Shdr Abd             XS Shdr Rows             XS Shdr Ext                          Supine Bicep Curls              Ther Ex             PROM             Supine Shoulder Flexion w/ Dowel             Supine SA punch             Supine SA circles             ER Iso Jan LIfts             Statue of [place] Walks             Shdr Taps             Scaption Raises             Supine horiz abd             Wall walks with T band             Open books on wall                                        Ther Activity                                       Gait Training                                       Modalities             HP     10 (end)

## 2025-04-02 ENCOUNTER — OFFICE VISIT (OUTPATIENT)
Dept: PHYSICAL THERAPY | Facility: REHABILITATION | Age: 45
End: 2025-04-02
Payer: OTHER MISCELLANEOUS

## 2025-04-02 DIAGNOSIS — M25.512 LEFT SHOULDER PAIN, UNSPECIFIED CHRONICITY: Primary | ICD-10-CM

## 2025-04-02 PROCEDURE — 97140 MANUAL THERAPY 1/> REGIONS: CPT

## 2025-04-02 NOTE — PROGRESS NOTES
Daily Note     Today's date: 2025  Patient name: Nely Starr  : 1980  MRN: [id number]  Referring provider: Richardson Saez PA-C  Dx:   Encounter Diagnosis     ICD-10-CM    1. Left shoulder pain, unspecified chronicity  M25.512           Start Time:   Stop Time:   Total time in clinic (min): 40 minutes    Subjective: Patient reports her shoulder is holding up well. States it has good and bad days but she is able to manage symptoms heat and rest at home along with PT. Patient is pleased with how her shoulder is doing with work.       Objective: See treatment diary below      Assessment: Tolerated treatment well today. Went lighter with STM today as she had bruising following last session. Patient's left shoulder is holding up nicely. Patient would benefit from continued PT      Plan: Continue per plan of care.      Precautions:       Manuals 3/19 3/24 3/27 03/31 4/2        Prone Thoracic Mobs CPA and UPAs Gr3-4 PRR CPA and UPAs Gr3-4 PRR CPA and UPAs Gr3-4  MM UPA - gr 4 - LMR CPA and UPAs Gr3-4 PRR        Prone CTJ mobs PRR Gr3-4 GR 5 PRR Gr3-4 MM Gr3-4   np         STM UT, post cuff UT, post cuff UT, post cuff, lat, med border scap Post cuff, med scap border, subscap - LMR UT, post cuff, lat, med border scap        FOTO             Re-Assessment             Neuro Re-Ed             XS Face Pulls             XS Shdr Abd             XS Shdr Rows             XS Shdr Ext                          Supine Bicep Curls              Ther Ex             PROM             Supine Shoulder Flexion w/ Dowel             Supine SA punch             Supine SA circles             ER Iso Jan LIfts             Statue of [place] Walks             Shdr Taps             Scaption Raises             Supine horiz abd             Wall walks with T band             Open books on wall                                        Ther Activity                                       Gait Training                                        Modalities                  10 (end)

## 2025-04-03 ENCOUNTER — OFFICE VISIT (OUTPATIENT)
Dept: BARIATRICS | Facility: CLINIC | Age: 45
End: 2025-04-03
Payer: COMMERCIAL

## 2025-04-03 VITALS
DIASTOLIC BLOOD PRESSURE: 70 MMHG | HEART RATE: 84 BPM | WEIGHT: 211.8 LBS | HEIGHT: 62 IN | SYSTOLIC BLOOD PRESSURE: 110 MMHG | BODY MASS INDEX: 38.98 KG/M2

## 2025-04-03 DIAGNOSIS — E66.813 CLASS 3 SEVERE OBESITY DUE TO EXCESS CALORIES WITH SERIOUS COMORBIDITY AND BODY MASS INDEX (BMI) OF 40.0 TO 44.9 IN ADULT (HCC): ICD-10-CM

## 2025-04-03 DIAGNOSIS — E66.01 CLASS 3 SEVERE OBESITY DUE TO EXCESS CALORIES WITH SERIOUS COMORBIDITY AND BODY MASS INDEX (BMI) OF 40.0 TO 44.9 IN ADULT (HCC): ICD-10-CM

## 2025-04-03 PROCEDURE — 99214 OFFICE O/P EST MOD 30 MIN: CPT | Performed by: NURSE PRACTITIONER

## 2025-04-03 RX ORDER — TIRZEPATIDE 5 MG/.5ML
5 INJECTION, SOLUTION SUBCUTANEOUS WEEKLY
Qty: 2 ML | Refills: 2 | Status: SHIPPED | OUTPATIENT
Start: 2025-04-03 | End: 2025-07-02

## 2025-04-03 NOTE — PROGRESS NOTES
Assessment/Plan:     Class 3 severe obesity due to excess calories with serious comorbidity and body mass index (BMI) of 40.0 to 44.9 in adult (HCC)  Patient has transitioned from class III obesity to class II obesity with the help of medical weight management and GLP-1 medications  - Patient is pursuing Conservative Program and follow up visits with medical weight management provider  - Initial weight loss goal of 5-10% weight loss for improved health. Weight loss can improve patient's co-morbid conditions and/or prevent weight-related complications.  - Explained the importance of continuing lifestyle changes in addition to any anti-obesity medications.   - Labs reviewed from 10/2024     General Recommendations:  Nutrition:  Eat breakfast daily.  Do not skip meals.      Food log (ie.) www.Edlogics.com, sparkpeople.com, loseit.com, calorieking.com, etc.     Practice mindful eating.  Be sure to set aside time to eat, eat slowly, and savor your food.     Hydration:    At least 64oz of water daily.  No sugar sweetened beverages.  No juice (eat the fruit instead).     Exercise:  Studies have shown that the ideal exercise goal is somewhere between 150 to 300 minutes of moderate intensity exercise a week.  Start with exercising 10 minutes every other day and gradually increase physical activity with a goal of at least 150 minutes of moderate intensity exercise a week, divided over at least 3 days a week.  An example of this would be exercising 30 minutes a day, 5 days a week.  Resistance training can increase muscle mass and increase our resting metabolic rate.   FULL BODY resistance training is recommended 2-3 times a week.  Do not do this on consecutive days to allow for muscle recovery.     Aim for a bare minimum 5000 steps, even on days you do not exercise.     Monitoring:   Weigh yourself daily.  If this causes undue stress, then just weigh yourself once a week.  Weigh yourself the same time of the day with the  same amount of clothing on.  Preferably this should be done after waking up, before you eat, and with no clothing or minimal clothing on.     Specific Goals:  Calorie goal:  1400 julia/day   Patient lifestyle habits were reviewed and patient was congratulated on her continued weight loss.  Nutrition was discussed and she will continue to follow with a dietitian and make sure she is balancing her calories throughout the day and getting adequate protein.  Activity was discussed and she will continue with her physical therapy exercises and walking when she can.  Medications were discussed and patient will maintain on Zepbound 5 mg as she is seeing good weight loss success and appetite suppression.  May consider increasing the dose if she finds that her weight loss has slowed or she has a weight loss plateau.  Patient will follow-up in the office in 3 months for monitoring of her weight loss journey.         Nely was seen today for follow-up.    Diagnoses and all orders for this visit:    Class 3 severe obesity due to excess calories with serious comorbidity and body mass index (BMI) of 40.0 to 44.9 in adult (HCC)  -     tirzepatide (Zepbound) 5 mg/0.5 mL auto-injector; Inject 0.5 mL (5 mg total) under the skin once a week        Total time spent reviewing chart, interviewing patient, examining patient, discussing plan, answering all questions, and documentin minutes with >50% face-to-face time with the patient.    Follow up in approximately 3 months with Non-Surgical Physician/Advanced Practitioner.    Subjective:   Chief Complaint   Patient presents with    Follow-up     Pt is here for MWM f/u. Waist -        Patient ID: Nely Starr  is a 44 y.o. female with excess weight/obesity here to pursue weight management.  Patient is pursuing Conservative Program.   Most recent notes and records were reviewed.    HPI    Wt Readings from Last 20 Encounters:   25 96.1 kg (211 lb 12.8 oz)   25 96.9 kg (213 lb  "9.6 oz)   25 98.8 kg (217 lb 12.8 oz)   25 98.5 kg (217 lb 3.2 oz)   25 99.1 kg (218 lb 6.4 oz)   25 101 kg (222 lb)   25 102 kg (224 lb 12.8 oz)   24 104 kg (228 lb 3.2 oz)   24 104 kg (229 lb)   24 105 kg (231 lb)   24 107 kg (236 lb 9.6 oz)   10/28/24 107 kg (236 lb 9.6 oz)   10/26/24 108 kg (238 lb)   10/03/24 110 kg (243 lb)   24 112 kg (246 lb)   24 112 kg (247 lb)   24 112 kg (246 lb 8 oz)   24 110 kg (243 lb)   24 107 kg (236 lb)   24 107 kg (236 lb 12.8 oz)       Patient presents today to medical weight management office for follow up.  Patient continues on Zepbound 5 mg and continues to see successful weight loss.  She states the medication continues to help her control her appetite and portions.  She states there are days where she still is struggling to get in her adequate nutrition and follows with a dietitian monthly.  She is in PT for shoulder injury but continues to try to stay active.  She is very happy with her progress and wishes to continue.      Weight loss medication and dose: Zepbound 5mg  Started weight and date: 246 lbs in 2024  Current weight: 211.8 lbs (224.8 lbs at last OV)  Difference: -34.2 lbs (-13 lbs since last OV)  Percentage of weight loss: -13.9%  Goal weight: \"get healthy\"    Starting BMI: 45.43 in 2024  Current BMI: 38.74    Waist Measurements:  2024: 53.5 in  2025: 47.5 in       Nutrition Prescription  Calories:1400 kcal  Protein:70 grams  Fluid:60 ounces      [facility] Weight Tracker:  2024: 246 lbs *start of Zepbound  2025: 224.8 lbs (-21.2 lbs)  2025: 211.8 lbs (-13 lbs)    Diet recall:  B: yogurt parfait (light and fit) with berries and protein granola  L: /grilled chicken wrap with side salad  D: varies - protein with vegetables and starch  S: no     Hydration: diet pepsi, water, coffee (zero sugar creamer or protein shake)  Alcohol: very rare  Smokin-2 " "cigarettes, vaping  Exercise: walking pad - hits 10k steps  Occupation: interventional radiology tech  Sleep: has PRECIOUS - doesn't sleep every night       Mammogram: 2024      The following portions of the patient's history were reviewed and updated as appropriate: allergies, current medications, past family history, past medical history, past social history, past surgical history, and problem list.    Family History   Problem Relation Age of Onset    BRCA2 Negative Mother     BRCA1 Negative Mother     Breast cancer Mother 68    Asthma Mother     Obesity Mother     Lung cancer Father 48    Hyperlipidemia Father     Bone cancer Father         mets from lung cancer    Cancer Father         Lung/bone ca-     Anxiety disorder Father     No Known Problems Sister     Arthritis Maternal Grandmother     COPD Maternal Grandmother     Cancer Maternal Grandfather         Lung ca-    Lung cancer Maternal Grandfather 80    No Known Problems Paternal Grandmother     Liver cancer Paternal Grandfather 80    Cancer Paternal Grandfather         Liver ca-    No Known Problems Brother     No Known Problems Maternal Aunt     No Known Problems Maternal Aunt     No Known Problems Maternal Aunt     No Known Problems Paternal Aunt         Review of Systems   Constitutional:  Negative for fatigue.   HENT:  Negative for sore throat.    Respiratory:  Negative for cough and shortness of breath.    Cardiovascular:  Negative for chest pain, palpitations and leg swelling.   Gastrointestinal:  Negative for abdominal pain, constipation, diarrhea and nausea.   Genitourinary:  Negative for dysuria.   Musculoskeletal:  Negative for arthralgias and back pain.   Skin:  Negative for rash.   Neurological:  Negative for headaches.   Psychiatric/Behavioral:  Negative for dysphoric mood. The patient is not nervous/anxious.        Objective:  /70 (Patient Position: Sitting, Cuff Size: Large)   Pulse 84   Ht 5' 2\" (1.575 m)   " Wt 96.1 kg (211 lb 12.8 oz)   LMP 03/09/2025   BMI 38.74 kg/m²     Physical Exam  Vitals and nursing note reviewed.   Constitutional:       Appearance: Normal appearance. She is obese.   HENT:      Head: Normocephalic.   Pulmonary:      Effort: Pulmonary effort is normal.   Neurological:      General: No focal deficit present.      Mental Status: She is alert and oriented to person, place, and time.   Psychiatric:         Mood and Affect: Mood normal.         Behavior: Behavior normal.         Thought Content: Thought content normal.         Judgment: Judgment normal.            Labs   Most recent labs reviewed   Lab Results   Component Value Date     09/15/2015    SODIUM 139 10/06/2024    K 3.9 10/06/2024     10/06/2024    CO2 26 10/06/2024    ANIONGAP 7 09/15/2015    AGAP 11 10/06/2024    BUN 15 10/06/2024    CREATININE 0.83 10/06/2024    GLUC 97 12/19/2023    GLUF 80 10/06/2024    CALCIUM 9.0 10/06/2024    AST 33 10/06/2024    ALT 39 10/06/2024    ALKPHOS 49 10/06/2024    PROT 6.4 09/15/2015    TP 6.8 10/06/2024    BILITOT 0.18 (L) 09/15/2015    TBILI 0.48 10/06/2024    EGFR 87 10/06/2024     Lab Results   Component Value Date    HGBA1C 5.1 10/06/2024     Lab Results   Component Value Date    MBP0ICUSUDUR 1.608 10/06/2024     Lab Results   Component Value Date    CHOLESTEROL 137 10/06/2024     Lab Results   Component Value Date    HDL 45 (L) 10/06/2024     Lab Results   Component Value Date    TRIG 164 (H) 10/06/2024     Lab Results   Component Value Date    LDLCALC 59 10/06/2024

## 2025-04-03 NOTE — ASSESSMENT & PLAN NOTE
Patient has transitioned from class III obesity to class II obesity with the help of medical weight management and GLP-1 medications  - Patient is pursuing Conservative Program and follow up visits with medical weight management provider  - Initial weight loss goal of 5-10% weight loss for improved health. Weight loss can improve patient's co-morbid conditions and/or prevent weight-related complications.  - Explained the importance of continuing lifestyle changes in addition to any anti-obesity medications.   - Labs reviewed from 10/2024     General Recommendations:  Nutrition:  Eat breakfast daily.  Do not skip meals.      Food log (ie.) www.CallVU.com, sparkpeople.com, loseit.com, [employer].com, etc.     Practice mindful eating.  Be sure to set aside time to eat, eat slowly, and savor your food.     Hydration:    At least 64oz of water daily.  No sugar sweetened beverages.  No juice (eat the fruit instead).     Exercise:  Studies have shown that the ideal exercise goal is somewhere between 150 to 300 minutes of moderate intensity exercise a week.  Start with exercising 10 minutes every other day and gradually increase physical activity with a goal of at least 150 minutes of moderate intensity exercise a week, divided over at least 3 days a week.  An example of this would be exercising 30 minutes a day, 5 days a week.  Resistance training can increase muscle mass and increase our resting metabolic rate.   FULL BODY resistance training is recommended 2-3 times a week.  Do not do this on consecutive days to allow for muscle recovery.     Aim for a bare minimum 5000 steps, even on days you do not exercise.     Monitoring:   Weigh yourself daily.  If this causes undue stress, then just weigh yourself once a week.  Weigh yourself the same time of the day with the same amount of clothing on.  Preferably this should be done after waking up, before you eat, and with no clothing or minimal clothing on.     Specific  Goals:  Calorie goal:  1400 julia/day   Patient lifestyle habits were reviewed and patient was congratulated on her continued weight loss.  Nutrition was discussed and she will continue to follow with a dietitian and make sure she is balancing her calories throughout the day and getting adequate protein.  Activity was discussed and she will continue with her physical therapy exercises and walking when she can.  Medications were discussed and patient will maintain on Zepbound 5 mg as she is seeing good weight loss success and appetite suppression.  May consider increasing the dose if she finds that her weight loss has slowed or she has a weight loss plateau.  Patient will follow-up in the office in 3 months for monitoring of her weight loss journey.

## 2025-04-04 ENCOUNTER — TELEPHONE (OUTPATIENT)
Dept: PHYSICAL THERAPY | Facility: OTHER | Age: 45
End: 2025-04-04

## 2025-04-04 ENCOUNTER — NURSE TRIAGE (OUTPATIENT)
Dept: PHYSICAL THERAPY | Facility: OTHER | Age: 45
End: 2025-04-04

## 2025-04-04 DIAGNOSIS — M54.42 ACUTE LEFT-SIDED LOW BACK PAIN WITH LEFT-SIDED SCIATICA: Primary | ICD-10-CM

## 2025-04-04 NOTE — TELEPHONE ENCOUNTER
"Patient called into [facility] today and left v/m @9:43am. Patient received a message from triage nurse regarding an online form patient filled out yesterday due to her back pain.    Returned patient's call @10:20am.    NO REF, pt filled out an online form.    Additional Information   Negative: Is this related to a work injury?   Negative: Is this related to an MVA?   Negative: Are you currently recieving homecare services?    Background - Initial Assessment  Clinical complaint: Lower Back pain mostly on the left side. Hx of sciatica in the past. Patient states pain started about 2 months ago but, recently has gotten worse. Pain radiate down to the left buttock and only one time all the way down to her left calf. No numbness or tingling sensation. NKI (not the past 2 weeks) but she had work injury last October and is having therapy for her shoulder \"I don't believe my back pain is related\". 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 Patient described pain as stiffness, burning, aching, sharp, shooting, dull, stabbing, throbbing.  Date of onset: 2 months ago  Frequency of pain: constant  Quality of pain: aching, burning, dull, sharp, shooting, stabbing, and throbbing    Protocols used: [facility] Protocol    "

## 2025-04-04 NOTE — TELEPHONE ENCOUNTER
Patient filled out an On-Line form yesterday at 5:55 pm for  [facility].   Nurse reached out to discuss the program and the services offered with her.    Message left confirming receipt of On-Line Request and to CB to review  CSP services.   Nurse included Contact info, hours of operation, and VM instructions.     Will await CB from the patient.     Patient demographics (e-mail & phone) match the info in this profile/chart.

## 2025-04-04 NOTE — TELEPHONE ENCOUNTER
Additional Information   Negative: Has the patient had unexplained weight loss?   Negative: Does the patient have a fever?   Negative: Is the patient experiencing urine retention?   Negative: Is the patient experiencing acute drop foot or paralysis?   Negative: Has the patient experienced major trauma? (fall from height, high speed collision, direct blow to spine) and is also experiencing nausea, light-headedness, or loss of consciousness?   Negative: Is the patient experiencing blood in sputum?   Negative: Is this a chronic condition?    Protocols used: [facility] Protocol    [facility] was reviewed in detail and what we can provide for their back pain.  Patient is agreeable to being triaged and would like to proceed with Physical Therapy.    Referral was placed for Physical Therapy at the [place] site. Patients information was sent to the  to make evaluation appointment. Patient made aware that the PT office  will be calling to schedule the appointment.  Patient was provided with the phone number to the PT office.    No further questions and/or concerns were voiced by the patient at this time. Patient states understanding of the referral that was placed.    Referral Closed.

## 2025-04-07 ENCOUNTER — OFFICE VISIT (OUTPATIENT)
Dept: PHYSICAL THERAPY | Facility: REHABILITATION | Age: 45
End: 2025-04-07
Payer: OTHER MISCELLANEOUS

## 2025-04-07 DIAGNOSIS — M25.512 LEFT SHOULDER PAIN, UNSPECIFIED CHRONICITY: Primary | ICD-10-CM

## 2025-04-07 PROCEDURE — 97140 MANUAL THERAPY 1/> REGIONS: CPT

## 2025-04-07 NOTE — PROGRESS NOTES
Daily Note     Today's date: 2025  Patient name: Nely Starr  : 1980  MRN: [id number]  Referring provider: Richardson Saez PA-C  Dx:   Encounter Diagnosis     ICD-10-CM    1. Left shoulder pain, unspecified chronicity  M25.512           Start Time: 1030  Stop Time: 1100  Total time in clinic (min): 30 minutes    Subjective: Patient reports her shoulder is holding up well with work.       Objective: See treatment diary below      Assessment: Tolerated treatment well today. Continue to focus on manuals as symptoms are stable currently. . Patient would benefit from continued PT      Plan: Continue per plan of care.      Precautions:       Manuals 3/19 3/24 3/27 03/31 4/2 4/7       Prone Thoracic Mobs CPA and UPAs Gr3-4 PRR CPA and UPAs Gr3-4 PRR CPA and UPAs Gr3-4  MM UPA - gr 4 - LMR CPA and UPAs Gr3-4 PRR CPA and UPAs Gr3-4 PRR       Prone CTJ mobs PRR Gr3-4 GR 5 PRR Gr3-4 MM Gr3-4   np         STM UT, post cuff UT, post cuff UT, post cuff, lat, med border scap Post cuff, med scap border, subscap - LMR UT, post cuff, lat, med border scap CPA and UPAs Gr3-4 PRR       FOTO             Re-Assessment             Neuro Re-Ed             XS Face Pulls             XS Shdr Abd             XS Shdr Rows             XS Shdr Ext                          Supine Bicep Curls              Ther Ex             PROM             Supine Shoulder Flexion w/ Dowel             Supine SA punch             Supine SA circles             ER Iso Jan LIfts             Statue of [place] Walks             Shdr Taps             Scaption Raises             Supine horiz abd             Wall walks with T band             Open books on wall                                        Ther Activity                                       Gait Training                                       Modalities             HP     10 (end)

## 2025-04-09 ENCOUNTER — OFFICE VISIT (OUTPATIENT)
Dept: PHYSICAL THERAPY | Facility: REHABILITATION | Age: 45
End: 2025-04-09
Payer: OTHER MISCELLANEOUS

## 2025-04-09 DIAGNOSIS — M54.42 ACUTE LEFT-SIDED LOW BACK PAIN WITH LEFT-SIDED SCIATICA: ICD-10-CM

## 2025-04-09 DIAGNOSIS — M25.512 LEFT SHOULDER PAIN, UNSPECIFIED CHRONICITY: Primary | ICD-10-CM

## 2025-04-09 PROCEDURE — 97140 MANUAL THERAPY 1/> REGIONS: CPT

## 2025-04-09 NOTE — PROGRESS NOTES
Daily Note     Today's date: 2025  Patient name: Nely Starr  : 1980  MRN: [id number]  Referring provider: Richardson Saez PA-C  Dx:   Encounter Diagnosis     ICD-10-CM    1. Left shoulder pain, unspecified chronicity  M25.512       2. Acute left-sided low back pain with left-sided sciatica  M54.42           Start Time: 1820  Stop Time: 1900  Total time in clinic (min): 40 minutes    Subjective: Patient reports her shoulder is holding up pretty good.       Objective: See treatment diary below      Assessment: Tolerated treatment well today. TTP in periscapular musculature today. Continue with manuals. Patient would benefit from continued PT      Plan: Continue per plan of care.      Precautions:       Manuals 3/19 3/24 3/27 03/31 4/2 4/7 4/9      Prone Thoracic Mobs CPA and UPAs Gr3-4 PRR CPA and UPAs Gr3-4 PRR CPA and UPAs Gr3-4  MM UPA - gr 4 - LMR CPA and UPAs Gr3-4 PRR CPA and UPAs Gr3-4 PRR CPA and UPAs Gr3-4 PRR      Prone CTJ mobs PRR Gr3-4 GR 5 PRR Gr3-4 MM Gr3-4   np         STM UT, post cuff UT, post cuff UT, post cuff, lat, med border scap Post cuff, med scap border, subscap - LMR UT, post cuff, lat, med border scap CPA and UPAs Gr3-4 PRR CPA and UPAs Gr3-4 PRR      FOTO             Re-Assessment             Neuro Re-Ed             XS Face Pulls             XS Shdr Abd             XS Shdr Rows             XS Shdr Ext                          Supine Bicep Curls              Ther Ex             PROM             Supine Shoulder Flexion w/ Dowel             Supine SA punch             Supine SA circles             ER Iso Jan LIfts             Statue of [place] Walks             Shdr Taps             Scaption Raises             Supine horiz abd             Wall walks with T band             Open books on wall                                        Ther Activity                                       Gait Training                                       Modalities             HP     10 (end)

## 2025-04-14 ENCOUNTER — OFFICE VISIT (OUTPATIENT)
Dept: PHYSICAL THERAPY | Facility: REHABILITATION | Age: 45
End: 2025-04-14
Attending: PHYSICIAN ASSISTANT
Payer: OTHER MISCELLANEOUS

## 2025-04-14 DIAGNOSIS — M25.512 LEFT SHOULDER PAIN, UNSPECIFIED CHRONICITY: Primary | ICD-10-CM

## 2025-04-14 PROCEDURE — 97140 MANUAL THERAPY 1/> REGIONS: CPT

## 2025-04-14 NOTE — PROGRESS NOTES
Daily Note     Today's date: 2025  Patient name: Nely Starr  : 1980  MRN: [id number]  Referring provider: Richardson Saez PA-C  Dx:   Encounter Diagnosis     ICD-10-CM    1. Left shoulder pain, unspecified chronicity  M25.512           Start Time: 1045  Stop Time: 1125  Total time in clinic (min): 40 minutes      Subjective: Patient reports her shoulder has been holding up well with work. States heat continues to alleviate symptoms post work.      Objective: See treatment diary below      Assessment: Tolerated treatment well today. Soft tissue restrictoins in pec minor and posterior cuff. Continue with manuals. Patient would benefit from continued PT      Plan: Continue per plan of care.      Precautions:       Manuals 3/19 3/24 3/27 03/31 4/2 4/7 4/9 4/14     Prone Thoracic Mobs CPA and UPAs Gr3-4 PRR CPA and UPAs Gr3-4 PRR CPA and UPAs Gr3-4  MM UPA - gr 4 - LMR CPA and UPAs Gr3-4 PRR CPA and UPAs Gr3-4 PRR CPA and UPAs Gr3-4 PRR CPA and UPAs Gr3-4 PRR     Prone CTJ mobs PRR Gr3-4 GR 5 PRR Gr3-4 MM Gr3-4   np         STM UT, post cuff UT, post cuff UT, post cuff, lat, med border scap Post cuff, med scap border, subscap - LMR UT, post cuff, lat, med border scap UT, post cuff, lat, med border scap UT, post cuff, lat, med border scap UT, post cuff, lat, med border scap     FOTO             Re-Assessment             Neuro Re-Ed             XS Face Pulls             XS Shdr Abd             XS Shdr Rows             XS Shdr Ext                          Supine Bicep Curls              Ther Ex             PROM             Supine Shoulder Flexion w/ Dowel             Supine SA punch             Supine SA circles             ER Iso Jan LIfts             Statue of [place] Walks             Shdr Taps             Scaption Raises             Supine horiz abd             Wall walks with T band             Open books on wall                                        Ther Activity                                        Gait Training                                       Modalities             HP     10 (end)

## 2025-04-16 ENCOUNTER — OFFICE VISIT (OUTPATIENT)
Dept: PHYSICAL THERAPY | Facility: REHABILITATION | Age: 45
End: 2025-04-16
Payer: OTHER MISCELLANEOUS

## 2025-04-16 DIAGNOSIS — M25.512 LEFT SHOULDER PAIN, UNSPECIFIED CHRONICITY: Primary | ICD-10-CM

## 2025-04-16 DIAGNOSIS — M54.42 ACUTE LEFT-SIDED LOW BACK PAIN WITH LEFT-SIDED SCIATICA: ICD-10-CM

## 2025-04-16 PROCEDURE — 97140 MANUAL THERAPY 1/> REGIONS: CPT

## 2025-04-16 NOTE — PROGRESS NOTES
Daily Note     Today's date: 2025  Patient name: Nely Starr  : 1980  MRN: [id number]  Referring provider: Richardson Saez PA-C  Dx:   Encounter Diagnosis     ICD-10-CM    1. Left shoulder pain, unspecified chronicity  M25.512       2. Acute left-sided low back pain with left-sided sciatica  M54.42           Start Time:   Stop Time:   Total time in clinic (min): 40 minutes    Subjective: Patient reports her shoulder is hurting today. States she had a really busy day at work.       Objective: See treatment diary below      Assessment: Tolerated treatment well today. Increased soft tissue sensitivity and texture density compared to last visit. Continue to focus on manuals . Patient would benefit from continued PT      Plan: Continue per plan of care.      Precautions:       Manuals 3/19 3/24 3/27 03/31 4/2 4/7 4/9 4/14 4/16    Prone Thoracic Mobs CPA and UPAs Gr3-4 PRR CPA and UPAs Gr3-4 PRR CPA and UPAs Gr3-4  MM UPA - gr 4 - LMR CPA and UPAs Gr3-4 PRR CPA and UPAs Gr3-4 PRR CPA and UPAs Gr3-4 PRR CPA and UPAs Gr3-4 PRR CPA and UPAs Gr3-4 PRR    Prone CTJ mobs PRR Gr3-4 GR 5 PRR Gr3-4 MM Gr3-4   np         STM UT, post cuff UT, post cuff UT, post cuff, lat, med border scap Post cuff, med scap border, subscap - LMR UT, post cuff, lat, med border scap UT, post cuff, lat, med border scap UT, post cuff, lat, med border scap UT, post cuff, lat, med border scap UT, post cuff, lat, med border scap    FOTO             Re-Assessment             Neuro Re-Ed             XS Face Pulls             XS Shdr Abd             XS Shdr Rows             XS Shdr Ext                          Supine Bicep Curls              Ther Ex             PROM             Supine Shoulder Flexion w/ Dowel             Supine SA punch             Supine SA circles             ER Iso Jan LIfts             Statue of [place] Walks             Shdr Taps             Scaption Raises             Supine horiz abd             Wall walks  with T band             Open books on wall                                        Ther Activity                                       Gait Training                                       Modalities             HP     10 (end)

## 2025-04-17 ENCOUNTER — HOSPITAL ENCOUNTER (OUTPATIENT)
Dept: RADIOLOGY | Age: 45
Discharge: HOME | End: 2025-04-17
Payer: COMMERCIAL

## 2025-04-17 VITALS — BODY MASS INDEX: 38.83 KG/M2 | HEIGHT: 62 IN | WEIGHT: 211 LBS

## 2025-04-17 DIAGNOSIS — Z12.31 ENCOUNTER FOR SCREENING MAMMOGRAM FOR MALIGNANT NEOPLASM OF BREAST: ICD-10-CM

## 2025-04-17 PROCEDURE — 77063 BREAST TOMOSYNTHESIS BI: CPT

## 2025-04-17 PROCEDURE — 77067 SCR MAMMO BI INCL CAD: CPT

## 2025-04-21 ENCOUNTER — PATIENT MESSAGE (OUTPATIENT)
Age: 45
End: 2025-04-21

## 2025-04-21 ENCOUNTER — OFFICE VISIT (OUTPATIENT)
Dept: PHYSICAL THERAPY | Facility: REHABILITATION | Age: 45
End: 2025-04-21
Attending: PHYSICIAN ASSISTANT
Payer: OTHER MISCELLANEOUS

## 2025-04-21 ENCOUNTER — CLINICAL SUPPORT (OUTPATIENT)
Age: 45
End: 2025-04-21
Payer: COMMERCIAL

## 2025-04-21 VITALS — WEIGHT: 211.8 LBS | BODY MASS INDEX: 38.98 KG/M2 | HEIGHT: 62 IN

## 2025-04-21 DIAGNOSIS — E66.812 CLASS 2 OBESITY DUE TO EXCESS CALORIES WITH BODY MASS INDEX (BMI) OF 38.0 TO 38.9 IN ADULT, UNSPECIFIED WHETHER SERIOUS COMORBIDITY PRESENT: Primary | ICD-10-CM

## 2025-04-21 DIAGNOSIS — E66.09 CLASS 2 OBESITY DUE TO EXCESS CALORIES WITH BODY MASS INDEX (BMI) OF 38.0 TO 38.9 IN ADULT, UNSPECIFIED WHETHER SERIOUS COMORBIDITY PRESENT: Primary | ICD-10-CM

## 2025-04-21 DIAGNOSIS — M25.512 LEFT SHOULDER PAIN, UNSPECIFIED CHRONICITY: Primary | ICD-10-CM

## 2025-04-21 PROCEDURE — 97140 MANUAL THERAPY 1/> REGIONS: CPT

## 2025-04-21 PROCEDURE — RECHECK

## 2025-04-21 PROCEDURE — S9470 NUTRITIONAL COUNSELING, DIET: HCPCS

## 2025-04-21 NOTE — PROGRESS NOTES
Daily Note     Today's date: 2025  Patient name: Nely Starr  : 1980  MRN: [id number]  Referring provider: Richardson Saez PA-C  Dx:   Encounter Diagnosis     ICD-10-CM    1. Left shoulder pain, unspecified chronicity  M25.512           Start Time: 1645  Stop Time: 1725  Total time in clinic (min): 40 minutes    Subjective: Patient reports her shoulder calmed down after last session. States it flared up a bit a Thursday but was able to settle down over the weekend.       Objective: See treatment diary below      Assessment: Tolerated treatment well today. Continue to focus on manuals for symptom reduction. Progress as tolerated. Patient would benefit from continued PT      Plan: Continue per plan of care.      Precautions:       Manuals 3/19 3/24 3/27 03/31 4/2 4/7 4/9 4/14 4/16 4/21   Prone Thoracic Mobs CPA and UPAs Gr3-4 PRR CPA and UPAs Gr3-4 PRR CPA and UPAs Gr3-4  MM UPA - gr 4 - LMR CPA and UPAs Gr3-4 PRR CPA and UPAs Gr3-4 PRR CPA and UPAs Gr3-4 PRR CPA and UPAs Gr3-4 PRR CPA and UPAs Gr3-4 PRR CPA and UPAs Gr3-4 PRR   Prone CTJ mobs PRR Gr3-4 GR 5 PRR Gr3-4 MM Gr3-4   np         STM UT, post cuff UT, post cuff UT, post cuff, lat, med border scap Post cuff, med scap border, subscap - LMR UT, post cuff, lat, med border scap UT, post cuff, lat, med border scap UT, post cuff, lat, med border scap UT, post cuff, lat, med border scap UT, post cuff, lat, med border scap    FOTO             Re-Assessment             Neuro Re-Ed             XS Face Pulls             XS Shdr Abd             XS Shdr Rows             XS Shdr Ext                          Supine Bicep Curls              Ther Ex             PROM             Supine Shoulder Flexion w/ Dowel             Supine SA punch             Supine SA circles             ER Iso Jan LIfts             Statue of [place] Walks             Shdr Taps             Scaption Raises             Supine horiz abd             Wall walks with T band              Open books on wall                                        Ther Activity                                       Gait Training                                       Modalities                  10 (end)

## 2025-04-21 NOTE — PROGRESS NOTES
"Weight Management Medical Nutrition Assessment     Nely presented for a meal planning session 3/12  Employee benefit. Today's weight is 211.8#  or loss of 1.8# since last visit or loss  11.2% TBW .Hx ot Htn, Mixed HLD and elevated fasting insulin.  On Cymblalta and on Zepbound. On 5 mg Zepbound. No adverse side affects.   Patient has been struggling with her weight for most of her life.  She was pursuing a bariatric surgery process a few years ago but would like to avoid surgery. Tries to stay consistent with her eating habits and does not snack throughout the day.    Per dietary recall patient appears to be inadequate in fiber. Patient has decreased appetite since starting Zepbound.              .  Developed and reviewed a low calorie meal plan. Open to adding more fiber to meals and making meals more well rounded.  Protein intake and Hydration appear to remain consistent. Continues to increase  protein at dinner time and a  serving of vegetables for fiber. Struggles with decreased appetite at dinner times. Reviewed various protein sources to potentially add. Discontinued  10,000 steps/day due to back pain.  Presents with consistent weight loss. Will work on established goals focusing on fluid and fiber. Keep up the great work!   Will follow up in 4 weeks.   Dislikes: pork/ turkey/ nuts/seafood    Goals: Continue current goals   1) Continue to add fiber to meals-1 fruit(breakfast)  and 1 vegetable at dinner time. Add in a fruit or extra vegetable at lunch time.   2) Get back up to 40 oz of water a day    Patient seen by Medical Provider in past 6 months:  yes 10/03/2024  Requested to schedule appointment with Medical Provider: No      Anthropometric Measurements  Start Weight (#): 238#(06/25/2019)  Current Weight (#):  211.8#   TBW % Change from start weight:11.2%  Ideal Body Weight (#):105#  Goal Weight (#):\"get healthy ideally 175#\"  Highest: 247#  Lowest: 180#    Weight Loss History  Previous weight loss " attempts: Phentermine, Gym.    Food and Nutrition Related History  Wake up: 7 am   Bed Time: 10-11 pm    Food Recall  Breakfast:Muscle Milk protein shake + 3 egg whites Mandarin orange + coffee with protein shake(1/2 muscle milk)    Snack:skip  Lunch:Side salad with hard boiled eggs or chicken quesadilla( meal from hospital cafe) or Tuna salad  or buffalo chicken wrap with vegetables  + side vegetables + side soup  Snack:coffee and remainder of muscle milk  Dinner:varies - protein with vegetables and starch or protein shake +  coffee + bag of vegetables   Snack:skip coffee with protein shake /5 times a week    Beverages: water, diet soda, coffee/tea, and muscle milk protein drinks( 2 a day)  Volume of beverage intake: 40 -60 ounces a day 20 oz a day working on increasing    Weekends: Worse, less active and states eating less  Cravings: n/a  Trouble area of day:n/a    Frequency of Eating out: everyday at the hospital cafeteria 5 days/week  Food restrictions:n/a  Cooking: self   Food Shopping: self    Physical Activity Intake  Activity:Walking  Frequency:  walking pad - hits 10k steps/every day / Started PT for shoulder 2 times a week/ Chiropractic for Sciatic for lower back/not getting on the treadmill due to lower back  Physical limitations/barriers to exercise: maybe a tear in the arm    Estimated Needs  Energy  SECA: BMR:n/a      X 1.3 -1000 =  [place] [address] [employer] Needs: BMR : 1577   1-2# loss weekly sedentary:  893-1393 kcal             1-2# loss weekly lightly active:1169--1669  Maintenance calories for sedentary activity level: 1893 kcal  Protein:61-77 grams      (1.2-1.5g/kg IBW)  Fluid: 60 ounces     (35mL/kg IBW)    Nutrition Diagnosis  Yes;    Overweight/obesity  related to Excess energy intake as evidenced by  BMI more than normative standard for age and sex (obesity-grade III 40+)       Nutrition Intervention    Nutrition Prescription  Calories:1400 kcal  Protein:70 grams  Fluid:60 ounces    Meal Plan  (Lemuel/Pro/Carb)  Breakfast: 300 kcal/30 grams  Snack: skip  Lunch: 400 kcal/25-30 grams protein  Snack:150-200 kcal/5-25 grams protein  Dinner: 400 kcal/25-30 grams protein  Snack: skip    Nutrition Education:    Calorie controlled menu  Lean protein food choices  Healthy snack options  Food journaling tips      Nutrition Counseling:  Strategies: meal planning, portion sizes, healthy snack choices, hydration, fiber intake, protein intake, exercise, food journal      Monitoring and Evaluation:  Evaluation criteria:  Energy Intake  Meet protein needs  Maintain adequate hydration  Monitor weekly weight  Meal planning/preparation  Food journal   Decreased portions at mealtimes and snacks  Physical activity     Barriers to learning:none  Readiness to change: Action:  (Changing behavior)  Comprehension: excellent  Expected Compliance: excellent

## 2025-04-22 DIAGNOSIS — M54.30 ACUTE SCIATICA: Primary | ICD-10-CM

## 2025-04-22 RX ORDER — MELOXICAM 15 MG/1
15 TABLET ORAL DAILY
Qty: 30 TABLET | Refills: 0 | Status: SHIPPED | OUTPATIENT
Start: 2025-04-22

## 2025-04-24 ENCOUNTER — OFFICE VISIT (OUTPATIENT)
Dept: PHYSICAL THERAPY | Facility: REHABILITATION | Age: 45
End: 2025-04-24
Payer: OTHER MISCELLANEOUS

## 2025-04-24 DIAGNOSIS — M54.42 ACUTE LEFT-SIDED LOW BACK PAIN WITH LEFT-SIDED SCIATICA: ICD-10-CM

## 2025-04-24 DIAGNOSIS — F32.A DEPRESSION, UNSPECIFIED DEPRESSION TYPE: ICD-10-CM

## 2025-04-24 DIAGNOSIS — M25.512 LEFT SHOULDER PAIN, UNSPECIFIED CHRONICITY: Primary | ICD-10-CM

## 2025-04-24 PROCEDURE — 97140 MANUAL THERAPY 1/> REGIONS: CPT

## 2025-04-24 NOTE — PROGRESS NOTES
Daily Note     Today's date: 2025  Patient name: Nely Starr  : 1980  MRN: [id number]  Referring provider: Richardson Saez PA-C  Dx:   Encounter Diagnosis     ICD-10-CM    1. Left shoulder pain, unspecified chronicity  M25.512       2. Acute left-sided low back pain with left-sided sciatica  M54.42           Start Time: 180  Stop Time:   Total time in clinic (min): 40 minutes    Subjective: Pt reports over the weekend into this past Monday she was experiencing increased pain at LB.  States less radicular symptoms into LE, but more intense central to lumbar spine and tenderness at L hip.  Recently starting taking a prescribed anti-inflammatory for LB which has been helping reduce symptoms but not abolish; feels like it has also been helping with her shldr.        Objective: See treatment diary below      Assessment: Tolerated treatment well. Improved soft tissue density along L shldr musculature compared to previous visits.  Some trigger points along mid and upper trap, but responded well to manual STM.  Patient would benefit from continued PT.        Plan: Continue per plan of care.      Precautions:       Manuals    Prone Thoracic Mobs CPA and UPAs Gr3-4 PRR   UPA - gr 4 - LMR CPA and UPAs Gr3-4 PRR CPA and UPAs Gr3-4 PRR CPA and UPAs Gr3-4 PRR CPA and UPAs Gr3-4 PRR CPA and UPAs Gr3-4 PRR CPA and UPAs Gr3-4 PRR   Prone CTJ mobs    np         STM UT, post cuff, lat, med border scap   Post cuff, med scap border, subscap - LMR UT, post cuff, lat, med border scap UT, post cuff, lat, med border scap UT, post cuff, lat, med border scap UT, post cuff, lat, med border scap UT, post cuff, lat, med border scap    FOTO             Re-Assessment             Neuro Re-Ed             XS Face Pulls             XS Shdr Abd             XS Shdr Rows             XS Shdr Ext                          Supine Bicep Curls              Ther Ex             PROM             Supine  Shoulder Flexion w/ Dowel             Supine SA punch             Supine SA circles             ER Iso Jan LIfts             Statue of [place] Walks             Shdr Taps             Scaption Raises             Supine horiz abd             Wall walks with T band             Open books on wall                                        Ther Activity                                       Gait Training                                       Modalities             HP     10 (end)

## 2025-04-25 RX ORDER — DULOXETIN HYDROCHLORIDE 60 MG/1
60 CAPSULE, DELAYED RELEASE ORAL DAILY
Qty: 90 CAPSULE | Refills: 1 | Status: SHIPPED | OUTPATIENT
Start: 2025-04-25

## 2025-04-28 ENCOUNTER — OFFICE VISIT (OUTPATIENT)
Dept: PHYSICAL THERAPY | Facility: REHABILITATION | Age: 45
End: 2025-04-28
Payer: OTHER MISCELLANEOUS

## 2025-04-28 DIAGNOSIS — M25.512 LEFT SHOULDER PAIN, UNSPECIFIED CHRONICITY: Primary | ICD-10-CM

## 2025-04-28 PROCEDURE — 97140 MANUAL THERAPY 1/> REGIONS: CPT

## 2025-04-28 NOTE — PROGRESS NOTES
Discharge Note     Today's date: 2025  Patient name: Nely Starr  : 1980  MRN: [id number]  Referring provider: Richardson Saez PA-C  Dx:   Encounter Diagnosis     ICD-10-CM    1. Left shoulder pain, unspecified chronicity  M25.512           Start Time: 1615  Stop Time: 1655  Total time in clinic (min): 40 minutes    Subjective: Patient reports her shoulder has been feeling good since starting up the mobic.       GROC:   >75% (depending on her work load)    Pain Levels  Current: 0/10      Objective: See treatment diary below  Left Shoulder PROM  Full in all directions    Left Shoulder Strength Testing  4+/5 with testing of flex, abd, ER, and IR    Assessment:  Patient will be discharged from [facility] at this time as her  case has been closed. Reviewed HEP today.       Goals  Short Term Goals (Week 4): met all  1. Decreased pain by 50%  2. Demonstrate full PROM <3/10  3. Improve strength by 1/2 measure in all deficient areas      Long Term Goals (8 weeks): met all  1. GROC >75%  2. Patient will exceed FOTO predicted outcome score  3. Patient will be fully independent with [facility] by discharge  4. Patient will be able to manage symptoms independently.       Plan: Discharge to [facility] at this time.      Precautions:       Manuals    Prone Thoracic Mobs CPA and UPAs Gr3-4 PRR   UPA - gr 4 - LMR CPA and UPAs Gr3-4 PRR CPA and UPAs Gr3-4 PRR CPA and UPAs Gr3-4 PRR CPA and UPAs Gr3-4 PRR CPA and UPAs Gr3-4 PRR CPA and UPAs Gr3-4 PRR   Prone CTJ mobs    np         STM UT, post cuff, lat, med border scap   Post cuff, med scap border, subscap - LMR UT, post cuff, lat, med border scap UT, post cuff, lat, med border scap UT, post cuff, lat, med border scap UT, post cuff, lat, med border scap UT, post cuff, lat, med border scap    FOTO             Re-Assessment             Neuro Re-Ed             XS Face Pulls             XS Shdr Abd             XS Shdr Rows             XS Shdr Ext                           Supine Bicep Curls              Ther Ex             PROM             Supine Shoulder Flexion w/ Dowel             Supine SA punch             Supine SA circles             ER Iso Jan LIfts             Statue of [place] Walks             Shdr Taps             Scaption Raises             Supine horiz abd             Wall walks with T band             Open books on wall                                        Ther Activity                                       Gait Training                                       Modalities             HP     10 (end)

## 2025-04-30 ENCOUNTER — EVALUATION (OUTPATIENT)
Dept: PHYSICAL THERAPY | Facility: REHABILITATION | Age: 45
End: 2025-04-30
Attending: PHYSICAL THERAPIST
Payer: COMMERCIAL

## 2025-04-30 ENCOUNTER — APPOINTMENT (OUTPATIENT)
Dept: PHYSICAL THERAPY | Facility: REHABILITATION | Age: 45
End: 2025-04-30
Payer: OTHER MISCELLANEOUS

## 2025-04-30 DIAGNOSIS — M54.42 ACUTE LEFT-SIDED LOW BACK PAIN WITH LEFT-SIDED SCIATICA: Primary | ICD-10-CM

## 2025-04-30 PROCEDURE — 97162 PT EVAL MOD COMPLEX 30 MIN: CPT

## 2025-04-30 PROCEDURE — 97110 THERAPEUTIC EXERCISES: CPT

## 2025-04-30 NOTE — PROGRESS NOTES
PT Evaluation     Today's date: 2025  Patient name: Nely Starr  : 1980  MRN: [id number]  Referring provider: Israel Contreras PT  Dx:   Encounter Diagnosis     ICD-10-CM    1. Acute left-sided low back pain with left-sided sciatica  M54.42 Ambulatory referral to PT spine          Start Time:   Stop Time:   Total time in clinic (min): 45 minutes    Assessment  Impairments: abnormal gait, abnormal muscle tone, abnormal or restricted ROM, activity intolerance, impaired balance, impaired physical strength, lacks appropriate home exercise program, pain with function and weight-bearing intolerance  Symptom irritability: high    Assessment details:   Nely Starr is a pleasant 44 y.o. female who presents for acute left-sided low back pain via the [facility]. Neuro exam unremarkable. Patient does have weakness in the left leg however this is unclear if its secondary to pain or nerve compression. High irritable symptoms limited assessment today. Possible radiculopathy versus derangement syndrome. Mild reduction in symptoms following extension based movements. MRI is scheduled for tomorrow morning, will await results. Will consider referral out if no improvements in symptoms in 2 weeks or worsening symptoms. The impairments listed above are resulting in reduced QoL, reduced functional mobility, and significant limitations with ADLs/IADLs. possible referral out to pain management may be need. Will closely monitor symptoms over the next 2-4 weeks.    Understanding of Dx/Px/POC: good     Prognosis: good    Goals  Short Term Goals (Week 4):  1. Decreased pain by 50%  2. Demonstrate normalized strength in the left leg  3. Demonstrate normalized gait  4. GROC >50%      Long Term Goals (8 weeks):  1. GROC >75%  2. Patient will exceed FOTO predicted outcome score  3. Patient will be fully independent with HEP by discharge  4. Patient will be able to manage symptoms independently.        Plan  Patient would benefit from: skilled physical therapy    Planned therapy interventions: graded exercise, functional ROM exercises, flexibility, gait training, graded activity, home exercise program, therapeutic training, therapeutic exercise, therapeutic activities, stretching, strengthening, patient education, neuromuscular re-education, nerve gliding, behavior modification, activity modification, manual therapy, IASTM and joint mobilization    Frequency: 2x week  Duration in weeks: 6  Treatment plan discussed with: patient          Subjective  Patient presents to PT through the comp spine program for acute left sided low back pain. Pain started around 1-2 months ago without a specific cause. Patient reports symptoms are primarily in the L buttock and low back region with occasional pain in the posterior left thigh. Patient reports symptoms are constant in nature but are aggravated with work, activities around the house, and basic bed mobility. Patient symptoms are worse with sitting, lifting, static movements. Patient reports symptoms are improved with activity, walking, and heat. Patient denies any n/t. Patient denies any b/b dysfunction, saddle anesthesia, and/or drop foot. Patient has no previous history of spinal injuries/traumas. Patient goals are to reduce her pain, resume work without limitations, and return to her PLOF.      Objective  Postural Observation   Sitting: kyphotic  Standing: neutral  Postural Change: no effect  Lateral Shift: nil  Shift Relevant: n/a  Functional Baselines: pain with bed mobility, with transitional movements, pain with lumbar AROM    Myotomes  Right Hip Flexion (L1-3): (5/5)  Left Hip Flexion (L1-3): (4/5)  R Knee Ext (L3-4): (5/5)  L Knee Ext (L3-4): (4/5)  R DF (L4): (5/5)  L DF (L4): (5/5)  R EDL (L5): (5/5)  L EDL (L5): (5/5)  R PF (S1): (5/5)  L PF (S1): (5/5)  R Knee Flex (S1-2): (5/5)  L Knee Flex (S1-2): (5/5)    Dermatomes  Sensation intact bilaterally  throughout L2-S2    Reflexes  R Patellar Reflex: (1+)  L Patellar Reflex: (1+)  R Achilles Reflex: (1+)  L Achilles Reflex: (1+)    Neurodynamic Testing  Slump Test: unable to test  SLR: unable to test   Femoral Nerve Traction Test: unable to test    Lumbar Active Range of Motion  Movement Loss Symptoms Brett Mod Min Nil Symptoms   Flexion x       Extension x       R SG  x      L SG  x      Other          [place] Assessment  Rep EIL: reduced, better gait and bed mobility  Rep EIS: slightly reduced, mild improvements in gait    Static Tests  Produced, slightly worse with sustained extension    Hip Special Tests:  FADIRS= (=), FABERS= (+), Scours= (+), Distraction= (+)      SIJ Special Tests:  Compression= (+), Gapping= (+), FABERS= (+), [place]'s= (+), Sacral Thrust/PA Pressure= (+), Post Thigh Thrust= (-)           Precautions: HTN, fibromyalgia      Manuals 4/30                                                                Neuro Re-Ed                                                                                                        Ther Ex             REIL HEP            CARLA HEP                                                                                          Ther Activity                                       Gait Training                                       Modalities

## 2025-05-01 ENCOUNTER — HOSPITAL ENCOUNTER (OUTPATIENT)
Dept: MRI IMAGING | Facility: HOSPITAL | Age: 45
Discharge: HOME | End: 2025-05-01
Attending: CHIROPRACTOR
Payer: COMMERCIAL

## 2025-05-01 DIAGNOSIS — M99.03 SEGMENTAL AND SOMATIC DYSFUNCTION OF LUMBAR REGION: ICD-10-CM

## 2025-05-01 DIAGNOSIS — M54.42 LUMBAGO WITH SCIATICA, LEFT SIDE: ICD-10-CM

## 2025-05-01 DIAGNOSIS — M99.04 SEGMENTAL AND SOMATIC DYSFUNCTION OF SACRAL REGION: ICD-10-CM

## 2025-05-01 PROCEDURE — 72148 MRI LUMBAR SPINE W/O DYE: CPT

## 2025-05-05 ENCOUNTER — OFFICE VISIT (OUTPATIENT)
Dept: PHYSICAL THERAPY | Facility: REHABILITATION | Age: 45
End: 2025-05-05
Attending: PHYSICAL THERAPIST
Payer: COMMERCIAL

## 2025-05-05 ENCOUNTER — OFFICE VISIT (OUTPATIENT)
Age: 45
End: 2025-05-05
Payer: COMMERCIAL

## 2025-05-05 VITALS
TEMPERATURE: 97.6 F | SYSTOLIC BLOOD PRESSURE: 121 MMHG | HEART RATE: 89 BPM | HEIGHT: 62 IN | DIASTOLIC BLOOD PRESSURE: 77 MMHG | BODY MASS INDEX: 38.35 KG/M2 | RESPIRATION RATE: 16 BRPM | WEIGHT: 208.4 LBS | OXYGEN SATURATION: 99 %

## 2025-05-05 DIAGNOSIS — M54.42 ACUTE LEFT-SIDED LOW BACK PAIN WITH LEFT-SIDED SCIATICA: Primary | ICD-10-CM

## 2025-05-05 DIAGNOSIS — G47.33 OBSTRUCTIVE SLEEP APNEA SYNDROME: ICD-10-CM

## 2025-05-05 DIAGNOSIS — E78.2 MIXED HYPERLIPIDEMIA: ICD-10-CM

## 2025-05-05 DIAGNOSIS — M25.512 LEFT SHOULDER PAIN, UNSPECIFIED CHRONICITY: ICD-10-CM

## 2025-05-05 DIAGNOSIS — F17.200 TOBACCO DEPENDENCE SYNDROME: ICD-10-CM

## 2025-05-05 DIAGNOSIS — F41.1 GAD (GENERALIZED ANXIETY DISORDER): ICD-10-CM

## 2025-05-05 DIAGNOSIS — M54.50 ACUTE BILATERAL LOW BACK PAIN WITHOUT SCIATICA: ICD-10-CM

## 2025-05-05 DIAGNOSIS — N94.9 ADNEXAL CYST: ICD-10-CM

## 2025-05-05 DIAGNOSIS — I10 ESSENTIAL HYPERTENSION: Primary | ICD-10-CM

## 2025-05-05 PROBLEM — J45.901 ACUTE ASTHMA EXACERBATION: Status: RESOLVED | Noted: 2023-12-18 | Resolved: 2025-05-05

## 2025-05-05 PROCEDURE — 99214 OFFICE O/P EST MOD 30 MIN: CPT

## 2025-05-05 PROCEDURE — 97112 NEUROMUSCULAR REEDUCATION: CPT | Performed by: PHYSICAL THERAPIST

## 2025-05-05 PROCEDURE — 97140 MANUAL THERAPY 1/> REGIONS: CPT | Performed by: PHYSICAL THERAPIST

## 2025-05-05 RX ORDER — ROSUVASTATIN CALCIUM 20 MG/1
20 TABLET, COATED ORAL DAILY
Qty: 90 TABLET | Refills: 0 | Status: SHIPPED | OUTPATIENT
Start: 2025-05-05

## 2025-05-05 RX ORDER — METHYLPREDNISOLONE 4 MG/1
TABLET ORAL
Qty: 1 EACH | Refills: 0 | Status: SHIPPED | OUTPATIENT
Start: 2025-05-05

## 2025-05-05 RX ORDER — ALPRAZOLAM 0.25 MG
0.25 TABLET ORAL
Qty: 90 TABLET | Refills: 0 | Status: SHIPPED | OUTPATIENT
Start: 2025-05-05

## 2025-05-05 RX ORDER — FENOFIBRATE 160 MG/1
160 TABLET ORAL DAILY
Qty: 90 TABLET | Refills: 0 | Status: SHIPPED | OUTPATIENT
Start: 2025-05-05

## 2025-05-05 RX ORDER — METHOCARBAMOL 500 MG/1
500 TABLET, FILM COATED ORAL 3 TIMES DAILY
Qty: 60 TABLET | Refills: 0 | Status: SHIPPED | OUTPATIENT
Start: 2025-05-05

## 2025-05-05 RX ORDER — VALSARTAN AND HYDROCHLOROTHIAZIDE 80; 12.5 MG/1; MG/1
1 TABLET, FILM COATED ORAL DAILY
Qty: 90 TABLET | Refills: 0 | Status: SHIPPED | OUTPATIENT
Start: 2025-05-05

## 2025-05-05 NOTE — PROGRESS NOTES
"Daily Note     Today's date: 2025  Patient name: Nely Starr  : 1980  MRN: [id number]  Referring provider: Israel Contreras, PT  Dx:   Encounter Diagnosis     ICD-10-CM    1. Acute left-sided low back pain with left-sided sciatica  M54.42       2. Left shoulder pain, unspecified chronicity  M25.512           Start Time: 1645  Stop Time: 1730  Total time in clinic (min): 45 minutes    Subjective: Reports she is still hurting. Has been compliant with HEP. Pain localized to lower back.     Objective: See treatment diary below    Hypomobile L5 segment  Reproduction of pain with palpation to L5 L TP  More pain L side  Pain when weight bearing on L  Tautness of psoas/iliacus    Assessment: Tolerated treatment fair. Decreased flexion tolerance. Educated to continued HEP. Initiated some stabilization exercises based off pt symptoms. Stabilization tolerated fair without increase in symptoms. Patient exhibited good technique with therapeutic exercises and would benefit from continued PT    Plan: Continue per plan of care.      Precautions: HTN, fibromyalgia      Manuals            Iliacus/psoas STM  QD           Lumbar mobs  L L5 TP 3x30 GrIV                        Assessment  QD           Neuro Re-Ed             Resisted hip ER in supine  PT resist 10x10\"           Supine clam  10x10\" YHB           TrA x                                                               Ther Ex             REIL HEP            CARLA HEP                                                                                          Ther Activity                                       Gait Training                                       Modalities                                            "

## 2025-05-05 NOTE — PROGRESS NOTES
Name: Nely Starr      : 1980      MRN: [id number]  Encounter Provider: Severo Hays MD  Encounter Date: 2025   Encounter department: [facility]  :  Assessment & Plan  Essential hypertension  Under control.  Continue valsartan with hydrochlorothiazide.  We will continue to monitor  Orders:  •  valsartan-hydrochlorothiazide (DIOVAN-HCT) 80-12.5 MG per tablet; Take 1 tablet by mouth daily    Mixed hyperlipidemia  Under control.  Continue rosuvastatin.  We will continue to monitor  Orders:  •  fenofibrate 160 MG tablet; Take 1 tablet (160 mg total) by mouth daily  •  rosuvastatin (CRESTOR) 20 MG tablet; Take 1 tablet (20 mg total) by mouth daily    Obstructive sleep apnea syndrome  Under control with CPAP which patient uses every night and patient wakes up refreshed.  Patient does not feel daytime sleepiness or fatigue.  He will continue evaluate every office visit.         Tobacco dependence syndrome  Quit smoking  Options for nicotine patch, nicotine gum or any medication discussed with patient  Potential consequences of smoking discussed with patient  Patient seems to be understood well         QIANA (generalized anxiety disorder)  Under control.  Continue duloxetine and also continue Xanax as needed.  We will continue to monitor  Orders:  •  ALPRAZolam (XANAX) 0.25 mg tablet; Take 1 tablet (0.25 mg total) by mouth daily at bedtime as needed for anxiety    Adnexal cyst  Is larger than previous exam.  We will get ultrasound.  Follow-up with GYN.  Orders:  •  US pelvis complete w transvaginal; Future    Acute bilateral low back pain without sciatica  Continue follow-up with chiropractor.  Continue physical therapy.  Take medication as prescribed.  Orders:  •  methylPREDNISolone 4 MG tablet therapy pack; Use as directed on package  •  methocarbamol (ROBAXIN) 500 mg tablet; Take 1 tablet (500 mg total) by mouth 3 (three) times a day           History of Present Illness  "  Patient here for review of chronic medical problems and  the labs and imaging if it is applicable.  Currently has no specific complaints other than mentioned in the review of systems  Denies chest pain, SOB, cough, abdominal pain, nausea, vomiting, fever, chills, lightheadedness, dizziness,headache, tingling or numbness.No bowel or bladder problem.  Complaining of lower back pain does not radiate to leg no fever or chills no injury has been seen by chiropractor and physical therapy patient just had an MRI the results reviewed had multiple bulging disc also had a cyst in the right adnexa which is larger than before      Review of Systems   Constitutional:  Negative for chills, fatigue and fever.   HENT:  Negative for congestion, ear pain, rhinorrhea, sneezing, sore throat and voice change.    Eyes:  Negative for redness, itching and visual disturbance.   Respiratory:  Negative for cough, chest tightness, shortness of breath and wheezing.    Cardiovascular:  Negative for chest pain, palpitations and leg swelling.   Gastrointestinal:  Negative for abdominal pain, blood in stool, diarrhea, nausea and vomiting.   Endocrine: Negative for cold intolerance and heat intolerance.   Genitourinary:  Negative for dysuria, frequency and urgency.   Musculoskeletal:  Positive for arthralgias (Bilateral knee pain) and back pain. Negative for myalgias.   Skin:  Negative for color change and rash.   Neurological:  Negative for dizziness, weakness, light-headedness, numbness and headaches.   Hematological:  Does not bruise/bleed easily.   Psychiatric/Behavioral:  Negative for agitation, behavioral problems and confusion.        Objective   /77 (Patient Position: Sitting, Cuff Size: Standard)   Pulse 89   Temp 97.6 °F (36.4 °C) (Temporal)   Resp 16   Ht 5' 2\" (1.575 m)   Wt 94.5 kg (208 lb 6.4 oz)   LMP 04/10/2025 (Exact Date)   SpO2 99%   BMI 38.12 kg/m²      Physical Exam  Vitals and nursing note reviewed. "   Constitutional:       General: She is not in acute distress.     Appearance: Normal appearance. She is well-developed. She is obese. She is not ill-appearing, toxic-appearing or diaphoretic.   HENT:      Head: Normocephalic and atraumatic.      Right Ear: External ear normal.      Left Ear: External ear normal.      Nose: Nose normal.      Mouth/Throat:      Mouth: Mucous membranes are moist.      Pharynx: Oropharynx is clear.   Eyes:      General: No scleral icterus.        Right eye: No discharge.         Left eye: No discharge.      Extraocular Movements: Extraocular movements intact.      Conjunctiva/sclera: Conjunctivae normal.      Pupils: Pupils are equal, round, and reactive to light.   Cardiovascular:      Rate and Rhythm: Normal rate and regular rhythm.      Pulses: Normal pulses.      Heart sounds: Normal heart sounds. No murmur heard.     No gallop.   Pulmonary:      Effort: Pulmonary effort is normal. No respiratory distress.      Breath sounds: Normal breath sounds. No wheezing, rhonchi or rales.   Abdominal:      General: Abdomen is flat. Bowel sounds are normal. There is no distension.      Palpations: Abdomen is soft.      Tenderness: There is no abdominal tenderness. There is no right CVA tenderness, left CVA tenderness or guarding.   Musculoskeletal:         General: No swelling or tenderness. Normal range of motion.      Cervical back: Normal range of motion and neck supple. No rigidity.      Right lower leg: No edema.      Left lower leg: No edema.   Lymphadenopathy:      Cervical: No cervical adenopathy.   Skin:     General: Skin is warm and dry.      Capillary Refill: Capillary refill takes 2 to 3 seconds.      Coloration: Skin is not jaundiced or pale.   Neurological:      General: No focal deficit present.      Mental Status: She is alert and oriented to person, place, and time. Mental status is at baseline.      Motor: No weakness.      Gait: Gait normal.   Psychiatric:         Mood and  Affect: Mood normal.         Behavior: Behavior normal.

## 2025-05-05 NOTE — ASSESSMENT & PLAN NOTE
Continue follow-up with chiropractor.  Continue physical therapy.  Take medication as prescribed.  Orders:  •  methylPREDNISolone 4 MG tablet therapy pack; Use as directed on package  •  methocarbamol (ROBAXIN) 500 mg tablet; Take 1 tablet (500 mg total) by mouth 3 (three) times a day

## 2025-05-05 NOTE — LETTER
May 5, 2025     Patient: Nely Starr  YOB: 1980  Date of Visit: 5/5/2025      To Whom it May Concern:    Nely Starr is under my professional care. Nely was seen in my office on 5/5/2025. Nely may return to work on 05/12/2025 .    If you have any questions or concerns, please don't hesitate to call.         Sincerely,          Severo Hays MD        CC: No Recipients

## 2025-05-08 ENCOUNTER — OFFICE VISIT (OUTPATIENT)
Dept: PHYSICAL THERAPY | Facility: REHABILITATION | Age: 45
End: 2025-05-08
Attending: PHYSICAL THERAPIST
Payer: COMMERCIAL

## 2025-05-08 DIAGNOSIS — M25.512 LEFT SHOULDER PAIN, UNSPECIFIED CHRONICITY: ICD-10-CM

## 2025-05-08 DIAGNOSIS — M54.42 ACUTE LEFT-SIDED LOW BACK PAIN WITH LEFT-SIDED SCIATICA: Primary | ICD-10-CM

## 2025-05-08 PROCEDURE — 97140 MANUAL THERAPY 1/> REGIONS: CPT

## 2025-05-08 PROCEDURE — 97112 NEUROMUSCULAR REEDUCATION: CPT

## 2025-05-08 NOTE — PROGRESS NOTES
"Daily Note     Today's date: 2025  Patient name: Nely Starr  : 1980  MRN: [id number]  Referring provider: Israel Contreras, PT  Dx:   Encounter Diagnosis     ICD-10-CM    1. Acute left-sided low back pain with left-sided sciatica  M54.42       2. Left shoulder pain, unspecified chronicity  M25.512           Start Time: 1745  Stop Time: 1825  Total time in clinic (min): 40 minutes    Subjective: Patient reports her back is still bothering her. States she has been feeling a bit better over the last few days. States she has 3 more days of her steroid taper.       Objective: See treatment diary below      Assessment: Tolerated treatment fair today. Progressed light core/hip strengthening activities today. Improved lumbar ROM today compared to last visit. Patient would benefit from continued PT      Plan: Continue per plan of care.      Precautions: HTN, fibromyalgia      Manuals           Iliacus/psoas STM  QD           Lumbar mobs  L L5 TP 3x30 GrIV  Gr 2-3 3x10                       Assessment  QD HI          Neuro Re-Ed             Resisted hip ER in supine  PT resist 10x10\"           Supine clam  10x10\" YHB 2x10 yhb          TrA x           Bridge   2x10 yhb                                                 Ther Ex             REIL HEP            CARLA HEP                                                                                          Ther Activity                                       Gait Training                                       Modalities                                              "

## 2025-05-12 ENCOUNTER — OFFICE VISIT (OUTPATIENT)
Dept: PHYSICAL THERAPY | Facility: REHABILITATION | Age: 45
End: 2025-05-12
Attending: PHYSICAL THERAPIST
Payer: COMMERCIAL

## 2025-05-12 DIAGNOSIS — M54.42 ACUTE LEFT-SIDED LOW BACK PAIN WITH LEFT-SIDED SCIATICA: Primary | ICD-10-CM

## 2025-05-12 PROCEDURE — 97112 NEUROMUSCULAR REEDUCATION: CPT

## 2025-05-12 PROCEDURE — 97110 THERAPEUTIC EXERCISES: CPT

## 2025-05-12 NOTE — PROGRESS NOTES
"Daily Note     Today's date: 2025  Patient name: Nely Starr  : 1980  MRN: [id number]  Referring provider: Israel Contreras, PT  Dx:   Encounter Diagnosis     ICD-10-CM    1. Acute left-sided low back pain with left-sided sciatica  M54.42           Start Time: 1615  Stop Time: 1653  Total time in clinic (min): 38 minutes    Subjective: Patient reports improvements in her symptoms over the last week. States better ability to get around throughout the day.       Objective: See treatment diary below      Assessment: Tolerated treatment well today. Introduced light core stabilization in supine and standing with mild symptoms. Progress slowly in anticipation of RTW next week. Patient would benefit from continued PT      Plan: Continue per plan of care.      Precautions: HTN, fibromyalgia      Manuals  5         Iliacus/psoas STM  QD           Lumbar mobs  L L5 TP 3x30 GrIV  Gr 2-3 3x10                       Assessment  QD DE          Neuro Re-Ed             Resisted hip ER in supine  PT resist 10x10\"           Supine clam  10x10\" YHB 2x10 yhb 2x10 yhb         TrA x           Bridge   2x10 yhb 2x10 yhb         Counter Pushup    X20 ea         Pball  Diagonal    2x10 alt legs         XS Pallof Press    Grn 1x10         XS Pallof Press Step Out    Grn 1x5         Ther Ex             REIL HEP            CARLA HEP                                                                                          Ther Activity                                       Gait Training                                       Modalities                                                "

## 2025-05-13 ENCOUNTER — HOSPITAL ENCOUNTER (OUTPATIENT)
Dept: RADIOLOGY | Facility: HOSPITAL | Age: 45
Discharge: HOME | End: 2025-05-13
Payer: COMMERCIAL

## 2025-05-13 DIAGNOSIS — N94.9 ADNEXAL CYST: ICD-10-CM

## 2025-05-13 PROCEDURE — 76856 US EXAM PELVIC COMPLETE: CPT

## 2025-05-13 PROCEDURE — 76830 TRANSVAGINAL US NON-OB: CPT

## 2025-05-14 ENCOUNTER — OFFICE VISIT (OUTPATIENT)
Dept: PHYSICAL THERAPY | Facility: REHABILITATION | Age: 45
End: 2025-05-14
Attending: PHYSICAL THERAPIST
Payer: COMMERCIAL

## 2025-05-14 DIAGNOSIS — M54.42 ACUTE LEFT-SIDED LOW BACK PAIN WITH LEFT-SIDED SCIATICA: Primary | ICD-10-CM

## 2025-05-14 PROCEDURE — 97530 THERAPEUTIC ACTIVITIES: CPT

## 2025-05-14 PROCEDURE — 97110 THERAPEUTIC EXERCISES: CPT

## 2025-05-14 PROCEDURE — 97112 NEUROMUSCULAR REEDUCATION: CPT

## 2025-05-14 NOTE — PROGRESS NOTES
"Daily Note     Today's date: 2025  Patient name: Nely Starr  : 1980  MRN: [id number]  Referring provider: Israel Contreras, PT  Dx:   Encounter Diagnosis     ICD-10-CM    1. Acute left-sided low back pain with left-sided sciatica  M54.42           Start Time:   Stop Time:   Total time in clinic (min): 40 minutes    Subjective: Patient reports she is holding up alright. States she has noticed a slight increase in her pain as the steroids wore off.       Objective: See treatment diary below      Assessment: Tolerated treatment well today. Progressed activities today with mild-moderate symptoms. Progress slowly. Patient would benefit from continued PT      Plan: Continue per plan of care.      Precautions: HTN, fibromyalgia      Manuals         Iliacus/psoas STM  QD           Lumbar mobs  L L5 TP 3x30 GrIV  Gr 2-3 3x10                       Assessment  QD WY          Neuro Re-Ed             Resisted hip ER in supine  PT resist 10x10\"           Supine clam  10x10\" YHB 2x10 yhb 2x10 yhb 2x10 rhb        TrA x           Bridge   2x10 yhb 2x10 yhb 2x10 rhb        Counter Pushup    X20 ea 2x20 ea        Pball  Diagonal    2x10 alt legs 2x10 alt legs        XS Pallof Press    Grn 1x10 Grn 2x10        XS Pallof Press Step Out    Grn 1x5  Grn 1x10 ea        VG     DL L7 2x10        Ther Ex             REIL HEP            CARLA HEP                                                                                          Ther Activity                                       Gait Training                                       Modalities                                                  "

## 2025-05-15 ENCOUNTER — HOSPITAL ENCOUNTER (OUTPATIENT)
Dept: MAMMOGRAPHY | Facility: CLINIC | Age: 45
Discharge: HOME | End: 2025-05-15
Attending: OBSTETRICS & GYNECOLOGY
Payer: COMMERCIAL

## 2025-05-15 ENCOUNTER — NURSE TRIAGE (OUTPATIENT)
Age: 45
End: 2025-05-15

## 2025-05-15 ENCOUNTER — HOSPITAL ENCOUNTER (OUTPATIENT)
Dept: ULTRASOUND IMAGING | Facility: CLINIC | Age: 45
Discharge: HOME | End: 2025-05-15
Attending: OBSTETRICS & GYNECOLOGY
Payer: COMMERCIAL

## 2025-05-15 VITALS — HEIGHT: 62 IN | WEIGHT: 208 LBS | BODY MASS INDEX: 38.28 KG/M2

## 2025-05-15 DIAGNOSIS — R92.8 ABNORMAL MAMMOGRAM: ICD-10-CM

## 2025-05-15 DIAGNOSIS — R93.89 ABNORMAL ULTRASOUND OF PELVIS: Primary | ICD-10-CM

## 2025-05-15 PROCEDURE — 76642 ULTRASOUND BREAST LIMITED: CPT

## 2025-05-15 PROCEDURE — G0279 TOMOSYNTHESIS, MAMMO: HCPCS

## 2025-05-15 PROCEDURE — 77065 DX MAMMO INCL CAD UNI: CPT

## 2025-05-15 NOTE — TELEPHONE ENCOUNTER
"FOLLOW UP: Appointment scheduled in soonest opening on 6/23 for pelvic ultrasound follow-up. Placed on wait list. Patient agreeable as she states she likely will be able to obtain pelvic MRI as ordered by PCP prior to appointment and can discuss with provider at appointment. Routing to Dr. Bush for review.     REASON FOR CONVERSATION: Results    SYMPTOMS: Pelvic fullness and heaviness, urinary frequency. Symptoms present for past couple months.     OTHER: Patient looking to schedule appointment with only Dr. Bush for abnormal pelvic ultrasound results as ordered by PCP. Recommendation was for pelvic MRI, which was ordered by PCP. Patient states for past couple months, she has been experiencing pelvic fullness and heaviness, urinary frequency. Denies painful urination, fevers, or chills. Patient wondering if pelvic ultrasound results explain why she's had difficult periods which she is on TXA for.   DISPOSITION: See Physician With Next Available Appointment (overriding See Today or Tomorrow in Office)    Reason for Disposition   Patient wants to be seen    Answer Assessment - Initial Assessment Questions  1. SYMPTOM: \"What's the main symptom you're concerned about?\" (e.g., frequency, incontinence)      Urinary frequency  2. ONSET: \"When did the  s/s  start?\"      Couple months  3. PAIN: \"Is there any pain?\" If Yes, ask: \"How bad is it?\" (Scale: 1-10; mild, moderate, severe)      Denies pain. Feeling heaviness or fullness in pelvis.   4. CAUSE: \"What do you think is causing the symptoms?\"      Patient believes could be due to abnormal pelvic ultrasound results  5. OTHER SYMPTOMS: \"Do you have any other symptoms?\" (e.g., blood in urine, fever, flank pain, pain with urination)      Denies urinary urgency, fevers, or chills.   6. PREGNANCY: \"Is there any chance you are pregnant?\" \"When was your last menstrual period?\"      5/4/25    Protocols used: Urinary Symptoms-Adult-[place]    "

## 2025-05-19 ENCOUNTER — OFFICE VISIT (OUTPATIENT)
Dept: PHYSICAL THERAPY | Facility: REHABILITATION | Age: 45
End: 2025-05-19
Attending: PHYSICAL THERAPIST
Payer: COMMERCIAL

## 2025-05-19 DIAGNOSIS — M54.42 ACUTE LEFT-SIDED LOW BACK PAIN WITH LEFT-SIDED SCIATICA: Primary | ICD-10-CM

## 2025-05-19 PROCEDURE — 97530 THERAPEUTIC ACTIVITIES: CPT

## 2025-05-19 PROCEDURE — 97112 NEUROMUSCULAR REEDUCATION: CPT

## 2025-05-19 PROCEDURE — 97110 THERAPEUTIC EXERCISES: CPT

## 2025-05-19 NOTE — PROGRESS NOTES
"Daily Note     Today's date: 2025  Patient name: Nely Starr  : 1980  MRN: [id number]  Referring provider: Israel Contreras, PT  Dx:   Encounter Diagnosis     ICD-10-CM    1. Acute left-sided low back pain with left-sided sciatica  M54.42           Start Time: 1150  Stop Time: 1230  Total time in clinic (min): 40 minutes    Subjective: Patient reports her back is doing well today. States she is going back to work tomorrow.       Objective: See treatment diary below      Assessment: Tolerated treatment well today. Progressed activities without issue. Graded exposure to work related movements today, no issues. Will see how patient tolerates RTW the next 2 days. Progress as toelrated. Patient would benefit from continued PT      Plan: Continue per plan of care.      Precautions: HTN, fibromyalgia      Manuals        Iliacus/psoas STM  QD           Lumbar mobs  L L5 TP 3x30 GrIV  Gr 2-3 3x10                       Assessment  QD AL          Neuro Re-Ed             Resisted hip ER in supine  PT resist 10x10\"           Supine clam  10x10\" YHB 2x10 yhb 2x10 yhb 2x10 rhb 3x10 rhb       TrA march  12x           Bridge   2x10 yhb 2x10 yhb 2x10 rhb 2x10 rhb       Counter Pushup    X20 ea 2x20 ea Np resume       Pball  Diagonal    2x10 alt legs 2x10 alt legs 2x20 alt legs       XS Pallof Press    Grn 1x10 Grn 2x10 Grn 2x10       XS Pallof Press Step Out    Grn 1x5  Grn 1x10 ea Grn 2x10 ea       Birddogs      UE only 2x10 alt       VG     DL L7 2x10        Ther Ex             REIL HEP            CARLA HEP            Suitcase Carry      10# kb 3 laps 100 ft       [place] Sled Pull      15# 2x10                                                            Ther Activity                                       Gait Training                                       Modalities                                                    "

## 2025-05-21 ENCOUNTER — OFFICE VISIT (OUTPATIENT)
Dept: PHYSICAL THERAPY | Facility: REHABILITATION | Age: 45
End: 2025-05-21
Attending: PHYSICAL THERAPIST
Payer: COMMERCIAL

## 2025-05-21 DIAGNOSIS — M54.42 ACUTE LEFT-SIDED LOW BACK PAIN WITH LEFT-SIDED SCIATICA: Primary | ICD-10-CM

## 2025-05-21 PROCEDURE — 97110 THERAPEUTIC EXERCISES: CPT

## 2025-05-21 NOTE — LETTER
May 21, 2025     Patient: Nely Starr  YOB: 1980  Date of Visit: 5/21/2025      To Whom it May Concern:    Nely Starr is under my professional care. Nely was seen in my office on 5/21/2025. Nely will be placed on light-duty until 6/13/25. Additionally, Nely will minimize her use of the lead apron to <2 hours during this period. Nely will be re-evaluated after that period to facilitate an appropriate return to work.     If you have any questions or concerns, please don't hesitate to call.          Sincerely,          Gabriel Oviedo, PT        CC: No Recipients

## 2025-05-21 NOTE — PROGRESS NOTES
"Progress / Daily Note     Today's date: 2025  Patient name: eNly Starr  : 1980  MRN: [id number]  Referring provider: Israel Contreras, RONAK  Dx:   Encounter Diagnosis     ICD-10-CM    1. Acute left-sided low back pain with left-sided sciatica  M54.42                      Subjective: Patient reports she was unable to return to work without flaring up her low back pain yesterday and today. Patient states she was feeling very good following last session and felt she could return to work, unfortunately she was unable.        Objective: See treatment diary below      Assessment:   Patient was doing very well with low back pain and symptoms following last session and felt comfortable with returning to work full duty, unfortunately this was not the case. Due to patient's inability to return to work this week without aggravating her symptoms. I recommend patient be placed on light duty with <2 hours of lead apron use until 25. At that time, I will re-evaluate her and determine an appropriate return to work plan. Patient would benefit from continued PT to reduce the flare up she experienced this week, will consider referral to pain management if she is not able to return to where she was on Monday this week.      Plan: Continue per plan of care.      Precautions: HTN, fibromyalgia      Manuals       Iliacus/psoas STM  QD           Lumbar mobs  L L5 TP 3x30 GrIV  Gr 2-3 3x10                       Assessment  QD NJ          Neuro Re-Ed             Resisted hip ER in supine  PT resist 10x10\"           Supine clam  10x10\" YHB 2x10 yhb 2x10 yhb 2x10 rhb 3x10 rhb       TrA x           Bridge   2x10 yhb 2x10 yhb 2x10 rhb 2x10 rhb       Counter Pushup    X20 ea 2x20 ea Np resume       Pball  Diagonal    2x10 alt legs 2x10 alt legs 2x20 alt legs       XS Pallof Press    Grn 1x10 Grn 2x10 Grn 2x10       XS Pallof Press Step Out    Grn 1x5  Grn 1x10 ea Grn 2x10 " kailey Garibay      UE only 2x10 alt       VG     DL L7 2x10        Ther Ex             REIL HEP            CARLA HEP            Suitcase Carry      10# kb 3 laps 100 ft       [place] Sled Pull      15# 2x10        Pt Edu, POC       40'                                             Ther Activity                                       Gait Training                                       Modalities

## 2025-05-22 DIAGNOSIS — F41.1 GAD (GENERALIZED ANXIETY DISORDER): ICD-10-CM

## 2025-05-23 ENCOUNTER — TELEPHONE (OUTPATIENT)
Age: 45
End: 2025-05-23

## 2025-05-23 RX ORDER — ALPRAZOLAM 0.25 MG
0.25 TABLET ORAL
Qty: 90 TABLET | Refills: 0 | Status: SHIPPED | OUTPATIENT
Start: 2025-05-23

## 2025-05-23 NOTE — TELEPHONE ENCOUNTER
LVM to reschedule 2 appt with RD on 6/19 at [facility] due to the provider will be out of the office

## 2025-05-28 ENCOUNTER — OFFICE VISIT (OUTPATIENT)
Dept: PHYSICAL THERAPY | Facility: REHABILITATION | Age: 45
End: 2025-05-28
Attending: PHYSICAL THERAPIST
Payer: COMMERCIAL

## 2025-05-28 DIAGNOSIS — M54.42 ACUTE LEFT-SIDED LOW BACK PAIN WITH LEFT-SIDED SCIATICA: Primary | ICD-10-CM

## 2025-05-28 DIAGNOSIS — M25.512 LEFT SHOULDER PAIN, UNSPECIFIED CHRONICITY: ICD-10-CM

## 2025-05-28 PROCEDURE — 97530 THERAPEUTIC ACTIVITIES: CPT

## 2025-05-28 PROCEDURE — 97112 NEUROMUSCULAR REEDUCATION: CPT

## 2025-05-28 PROCEDURE — 97110 THERAPEUTIC EXERCISES: CPT

## 2025-05-28 NOTE — PROGRESS NOTES
"Daily Note     Today's date: 2025  Patient name: Nely Starr  : 1980  MRN: [id number]  Referring provider: Israel Contreras, PT  Dx:   Encounter Diagnosis     ICD-10-CM    1. Acute left-sided low back pain with left-sided sciatica  M54.42       2. Left shoulder pain, unspecified chronicity  M25.512           Start Time: 1800  Stop Time:   Total time in clinic (min): 45 minutes    Subjective: Patient reports her back is doing than it was last week. States she has been on light duty last few days.       Objective: See treatment diary below      Assessment: Tolerated treatment well today. Will build lifting and load tolerance in the lower back with goal of RTW. Patient would benefit from continued PT      Plan: Continue per plan of care.      Precautions: HTN, fibromyalgia      Manuals      Iliacus/psoas STM  QD           Lumbar mobs  L L5 TP 3x30 GrIV  Gr 2-3 3x10                       Assessment  QD VT          Neuro Re-Ed             Resisted hip ER in supine  PT resist 10x10\"           Supine clam  10x10\" YHB 2x10 yhb 2x10 yhb 2x10 rhb 3x10 rhb 2x10 yhb 3x10 yhb     TrA march  12x           Bridge   2x10 yhb 2x10 yhb 2x10 rhb 2x10 rhb 3x10 yhb 3x10 yhb     Counter Pushup    X20 ea 2x20 ea Np resume       Pball  Diagonal    2x10 alt legs 2x10 alt legs 2x20 alt legs 2x20 alt legs 2x20 alt legs     XS Pallof Press    Grn 1x10 Grn 2x10 Grn 2x10 Grn 2x10 Grn 2x10     XS Pallof Press Step Out    Grn 1x5  Grn 1x10 ea Grn 2x10 ea Grn 2x10 Grn 2x10     Birddogs      UE only 2x10 alt       VG     DL L7 2x10        Ther Ex             REIL HEP            ACRLA HEP            Suitcase Carry      10# kb 3 laps 100 ft  10#15# kb 3 laps 100 ft     [place] Sled Pull      15# 2x10  15# 2x10 15# 1x10      Pt Edu, POC       40'                                             Ther Activity                                       Gait Training                               "         Modalities

## 2025-06-04 ENCOUNTER — OFFICE VISIT (OUTPATIENT)
Dept: PHYSICAL THERAPY | Facility: REHABILITATION | Age: 45
End: 2025-06-04
Attending: PHYSICAL THERAPIST
Payer: COMMERCIAL

## 2025-06-04 DIAGNOSIS — M54.42 ACUTE LEFT-SIDED LOW BACK PAIN WITH LEFT-SIDED SCIATICA: Primary | ICD-10-CM

## 2025-06-04 PROCEDURE — 97112 NEUROMUSCULAR REEDUCATION: CPT

## 2025-06-04 PROCEDURE — 97110 THERAPEUTIC EXERCISES: CPT

## 2025-06-04 PROCEDURE — 97140 MANUAL THERAPY 1/> REGIONS: CPT

## 2025-06-04 NOTE — PROGRESS NOTES
"Daily Note     Today's date: 2025  Patient name: Nely Starr  : 1980  MRN: [id number]  Referring provider: Israel Contreras, PT  Dx:   Encounter Diagnosis     ICD-10-CM    1. Acute left-sided low back pain with left-sided sciatica  M54.42           Start Time:   Stop Time:   Total time in clinic (min): 40 minutes    Subjective: Patient reports she re-aggravated her back kicking a box out of the way at work today. States she is moving slow and having spasms in the low back.       Objective: See treatment diary below      Assessment: Tolerated treatment fair today. Patient re-aggravated symptoms again today with light activity at work. Discussed possible referral may be necessary to reduce irritability of symptoms. Patient would benefit from continued PT      Plan: Continue per plan of care.      Precautions: HTN, fibromyalgia      Manuals     Iliacus/psoas STM  QD           Lumbar mobs  L L5 TP 3x30 GrIV  Gr 2-3 3x10          NMES         Lv 5 10'     Assessment  QD WA          Neuro Re-Ed             Resisted hip ER in supine  PT resist 10x10\"           Supine Add Iso         2x10 ball    Supine clam  10x10\" YHB 2x10 yhb 2x10 yhb 2x10 rhb 3x10 rhb 2x10 yhb 3x10 yhb 2x10 yhb    TrA march  12x           Bridge   2x10 yhb 2x10 yhb 2x10 rhb 2x10 rhb 3x10 yhb 3x10 yhb     Counter Pushup    X20 ea 2x20 ea Np resume       Pball  Diagonal    2x10 alt legs 2x10 alt legs 2x20 alt legs 2x20 alt legs 2x20 alt legs     XS Pallof Press    Grn 1x10 Grn 2x10 Grn 2x10 Grn 2x10 Grn 2x10     XS Pallof Press Step Out    Grn 1x5  Grn 1x10 ea Grn 2x10 ea Grn 2x10 Grn 2x10     Birddogs      UE only 2x10 alt       VG     DL L7 2x10        Ther Ex             REIL HEP            CARLA HEP            Suitcase Carry      10# kb 3 laps 100 ft  10#15# kb 3 laps 100 ft     [place] Sled Pull      15# 2x10  15# 2x10 15# 1x10      Pt Edu, POC       40'                      "                        Ther Activity                                       Gait Training                                       Modalities

## 2025-06-06 ENCOUNTER — APPOINTMENT (OUTPATIENT)
Dept: LAB | Facility: CLINIC | Age: 45
End: 2025-06-06
Attending: PREVENTIVE MEDICINE

## 2025-06-06 DIAGNOSIS — Z00.8 HEALTH EXAMINATION IN POPULATION SURVEY: ICD-10-CM

## 2025-06-06 LAB
CHOLEST SERPL-MCNC: 144 MG/DL (ref ?–200)
EST. AVERAGE GLUCOSE BLD GHB EST-MCNC: 97 MG/DL
HBA1C MFR BLD: 5 %
HDLC SERPL-MCNC: 52 MG/DL
LDLC SERPL CALC-MCNC: 68 MG/DL (ref 0–100)
NONHDLC SERPL-MCNC: 92 MG/DL
TRIGL SERPL-MCNC: 122 MG/DL (ref ?–150)

## 2025-06-06 PROCEDURE — 83036 HEMOGLOBIN GLYCOSYLATED A1C: CPT

## 2025-06-06 PROCEDURE — 80061 LIPID PANEL: CPT

## 2025-06-06 PROCEDURE — 36415 COLL VENOUS BLD VENIPUNCTURE: CPT

## 2025-06-11 ENCOUNTER — HOSPITAL ENCOUNTER (OUTPATIENT)
Dept: RADIOLOGY | Facility: HOSPITAL | Age: 45
Discharge: HOME | End: 2025-06-11
Payer: COMMERCIAL

## 2025-06-11 ENCOUNTER — CONSULT (OUTPATIENT)
Age: 45
End: 2025-06-11
Payer: COMMERCIAL

## 2025-06-11 VITALS — BODY MASS INDEX: 38.28 KG/M2 | HEIGHT: 62 IN | WEIGHT: 208 LBS

## 2025-06-11 DIAGNOSIS — M53.3 SACROILIAC JOINT DYSFUNCTION: Primary | ICD-10-CM

## 2025-06-11 DIAGNOSIS — R93.89 ABNORMAL ULTRASOUND OF PELVIS: ICD-10-CM

## 2025-06-11 DIAGNOSIS — M79.18 MYOFASCIAL PAIN SYNDROME: ICD-10-CM

## 2025-06-11 DIAGNOSIS — M47.816 LUMBAR SPONDYLOSIS: ICD-10-CM

## 2025-06-11 PROCEDURE — 72197 MRI PELVIS W/O & W/DYE: CPT

## 2025-06-11 PROCEDURE — 99244 OFF/OP CNSLTJ NEW/EST MOD 40: CPT | Performed by: STUDENT IN AN ORGANIZED HEALTH CARE EDUCATION/TRAINING PROGRAM

## 2025-06-11 PROCEDURE — A9585 GADOBUTROL INJECTION: HCPCS

## 2025-06-11 RX ORDER — GADOBUTROL 604.72 MG/ML
9 INJECTION INTRAVENOUS
Status: COMPLETED | OUTPATIENT
Start: 2025-06-11 | End: 2025-06-11

## 2025-06-11 RX ORDER — DICLOFENAC SODIUM 75 MG/1
75 TABLET, DELAYED RELEASE ORAL 2 TIMES DAILY PRN
Qty: 60 TABLET | Refills: 0 | Status: SHIPPED | OUTPATIENT
Start: 2025-06-11 | End: 2025-07-11

## 2025-06-11 RX ORDER — CYCLOBENZAPRINE HCL 5 MG
5 TABLET ORAL 2 TIMES DAILY PRN
Qty: 60 TABLET | Refills: 0 | Status: SHIPPED | OUTPATIENT
Start: 2025-06-11

## 2025-06-11 RX ADMIN — GADOBUTROL 9 ML: 604.72 INJECTION INTRAVENOUS at 07:37

## 2025-06-11 NOTE — H&P (VIEW-ONLY)
Assessment:  1. Sacroiliac joint dysfunction    2. Myofascial pain syndrome    3. Lumbar spondylosis        Plan:    Nely Starr is a 44 y.o. female who presents for evaluation of left low back/buttock pain consistent with SI joint dysfunction and lumbar spondylosis. We discussed imaging, rehabilitation, optimization of medications and potential interventions. The following plan was formulated with the patient:    - Schedule for left SI joint injection. Complete risks and benefits including bleeding, infection, tissue reaction, nerve injury and allergic reaction were discussed. The approach was demonstrated using models and literature was provided. Verbal and written consent was obtained.  - Diclofenac 75mg BID PRN. Advised to take with food. Discontinue other NSAIDS.  - Flexeril 5mg BID PRN. Advised to watch for sedaiton. Counseled not to take medication while driving or operating heavy machinery. Discontinue Robaxin.  - Follow up for left SI Joint injection.    No orders of the defined types were placed in this encounter.      New Medications Ordered This Visit   Medications    diclofenac (VOLTAREN) 75 mg EC tablet     Sig: Take 1 tablet (75 mg total) by mouth 2 (two) times a day as needed (moderate pain)     Dispense:  60 tablet     Refill:  0    cyclobenzaprine (FLEXERIL) 5 mg tablet     Sig: Take 1 tablet (5 mg total) by mouth 2 (two) times a day as needed for muscle spasms     Dispense:  60 tablet     Refill:  0       My impressions and treatment recommendations were discussed in detail with the patient, who verbalized understanding and had no further questions.    History of Present Illness:    Referred by: No ref. provider found     Nely Starr is a 44 y.o. female who presents for initial evaluation of left low back/buttock pain. Pain started 4 months ago and is not associated with any inciting event or trauma. She describes the pain as sharp, burning, dull/aching of moderate severity. Pain is rated 4/10  and interferes with daily activities. This pain is constant. Exacerbating factors include standing, bending.     She has tried Meloxicam, ibuprofen, robaxin, steroid pack with no relief in early May.  PT no relie     Diagnostic studies include MRI Lumbar Spine w/o contrast L4-5 minimal disc bulge and mild left foraminal protrusion, L5-S1 minimal disc bulge.  Multilevel facet arthopathyI have personally reviewed pertinent films in PACS.    Pain is causing significant functional limitation resulting in diminished quality of life and impaired age appropriate ADLs.  Denies fever, chills, numbness/tingling, bowel/bladder dysfunction, motor weakness.    I have personally reviewed and/or updated the patient's past medical history, past surgical history, family history, social history, current medications, allergies, and vital signs today.     Review of Systems:    Review of Systems   Constitutional:  Negative for chills, fatigue and unexpected weight change.   HENT:  Negative for ear pain, mouth sores, nosebleeds, sinus pain and sore throat.    Eyes:  Negative for pain and visual disturbance.   Respiratory:  Negative for choking and wheezing.    Cardiovascular:  Negative for chest pain and palpitations.   Gastrointestinal:  Negative for abdominal pain and nausea.   Endocrine: Negative for cold intolerance and heat intolerance.   Genitourinary:  Negative for decreased urine volume, enuresis and hematuria.   Musculoskeletal:  Positive for back pain and gait problem. Negative for arthralgias, joint swelling and myalgias.   Skin:  Negative for rash.   Allergic/Immunologic: Negative for environmental allergies.   Neurological:  Negative for dizziness, weakness and numbness.   Hematological:  Does not bruise/bleed easily.   Psychiatric/Behavioral:  Negative for behavioral problems and self-injury. The patient is not hyperactive.        Past Medical History[1]    Past Surgical History[2]    Family History[3]    Social History  "    Occupational History    Occupation: radiology, IR tech   Tobacco Use    Smoking status: Every Day     Current packs/day: 0.50     Average packs/day: 0.5 packs/day for 18.0 years (9.0 ttl pk-yrs)     Types: Cigarettes     Passive exposure: Past    Smokeless tobacco: Never    Tobacco comments:     Pt states vapes daily. Vape has nicotine   Vaping Use    Vaping status: Every Day    Substances: Nicotine, Flavoring   Substance and Sexual Activity    Alcohol use: Yes     Comment: occasional drink here and there    Drug use: No    Sexual activity: Yes     Partners: Male     Birth control/protection: None       Current Medications[4]    Allergies[5]    Physical Exam:  Ht 5' 2\" (1.575 m)   Wt 94.3 kg (208 lb)   BMI 38.04 kg/m²     Constitutional: normal, well developed, well nourished, alert, in no distress and non-toxic and no overt pain behavior.  HEENT: grossly intact  Neck: supple, symmetric, trachea midline and no masses   Pulmonary:even and unlabored  Cardiovascular:No edema or pitting edema present  Skin:Normal without rashes or lesions and well hydrated  Psychiatric:Mood and affect appropriate  Neurologic:Cranial Nerves II-XII grossly intact    Lumbar Musculoskeletal and Neurologic Exam:    Inspection: no atrophy of the LE musculature    Gait: non-antalgic gait    ROM: limited AROM of the lumbar spine in all planes; FROM at bilateral hips    Sensation: SILT bilateral L2-S1 dermatomes    Strength:       Right Left  Hip Flexion  5 5  Knee Extension  5 5  Ankle Dorsiflexion 5 5  Great Toe Extension 5 5  Ankle Plantar Flexion 5 5      Reflexes: 2+ in bilateral knees and ankles.    Palpation: -TTP over bilateral greater trochanters and bilateral SI Joint. Diffusely tender over lumbar paraspinals    Provocative tests:     Seated Slump negative bilaterally    Femoral Nerve Stretch Test: negative bilaterally     Martines's: negative bilaterally     Sacral Compression/thrust test, TOVA, and Gaenslen's tests: pos on " left   FADIR: negative bilaterally       Imaging  MRI Lumbar Spine w/o contrast 5/1/25    VERTEBRAL BODIES:  There are 5 lumbar type vertebral bodies.  Normal alignment of the lumbar spine.  No spondylolysis or spondylolisthesis. No scoliosis.  No compression fracture.    Normal marrow signal is identified within the visualized bony   structures.  No discrete marrow lesion.     SACRUM:  Normal signal within the sacrum. No evidence of insufficiency or stress fracture.     DISTAL CORD AND CONUS:  Normal size and signal within the distal cord and conus. Conus terminates at L1.     PARASPINAL SOFT TISSUES:  Paraspinal soft tissues are unremarkable.     LOWER THORACIC DISC SPACES:  Normal disc height and signal.  No disc herniation, canal stenosis or foraminal narrowing.     LUMBAR DISC SPACES:     L1-L2:  Normal.     L2-L3: Minimal disc bulge. Otherwise normal.     L3-L4: Minimal disc bulge. Mild facet arthropathy. Otherwise normal.     L4-L5: Mild diffuse disc bulge with superimposed small left foraminal disc protrusion with annular fissure. Mild bilateral facet arthropathy. No canal stenosis noted. Mild left foraminal stenosis.     L5-S1: Minimal disc bulge. Mild bilateral facet arthropathy. No canal or foraminal stenosis identified.     OTHER FINDINGS: Right adnexal cyst, incompletely included in the field-of-view and incomplete evaluated on this examination, seen on the localizer and coronal images, measuring approximately 4.7 cm in maximal craniocaudal dimensions.     IMPRESSION:     Mild multilevel lumbar spondylosis, as described above, without canal stenosis, but there is mild left foraminal stenosis at L4-L5.     Right adnexal cyst, incompletely included in the field-of-view and incompletely evaluated on this examination, measuring up to 4.7 cm in maximal dimensions. This likely presents a functional cyst, and consider further evaluation with pelvic ultrasound particularly if the patient is symptomatic.          [1]   Past Medical History:  Diagnosis Date    Anemia 2023    Anxiety     Asthma     Depression     History of Clostridium difficile colitis 2018    x 2 episodes, after antibiotics    Hypercholesterolemia     Hyperlipemia     Hypertension 2021    Morbid obesity (HCC)     Obesity     Pulmonary nodule     Sleep apnea     c pap   [2]   Past Surgical History:  Procedure Laterality Date    ABCESS DRAINAGE  2024    Abscess of left axilla    IR ASPIRATION ONLY  2023    SC CONIZATION CERVIX W/WO D&C RPR ELTRD EXC N/A 2018    Procedure: BIOPSY LEEP CERVIX;  Surgeon: Radha Bush DO;  Location: [facility];  Service: Gynecology    REDUCTION MAMMAPLASTY      TONSILLECTOMY     [3]   Family History  Problem Relation Name Age of Onset    BRCA2 Negative Mother Brianna Starr     BRCA1 Negative Mother Brianna Starr     Breast cancer Mother Brianna Starr 68    Asthma Mother Brianna Starr     Obesity Mother Brianna Starr     Lung cancer Father Abhishek Starr 48    Hyperlipidemia Father Abhishek Starr     Bone cancer Father Abhishek Starr         mets from lung cancer    Cancer Father Abhishek Starr         Lung/bone ca-     Anxiety disorder Father Abhishek Starr     No Known Problems Sister      Arthritis Maternal Grandmother Caridad Stefanie     COPD Maternal Grandmother Caridad Stefanie     Cancer Maternal Grandfather Wiley Watts         Lung ca-    Lung cancer Maternal Grandfather Wiley Watts 80    No Known Problems Paternal Grandmother      Liver cancer Paternal Grandfather Santiago Starr 80    Cancer Paternal Grandfather Santiago Starr         Liver ca-    No Known Problems Brother      No Known Problems Maternal Aunt      No Known Problems Maternal Aunt      No Known Problems Maternal Aunt      No Known Problems Paternal Aunt     [4]   Current Outpatient Medications:     ALPRAZolam (XANAX) 0.25 mg tablet, Take 1 tablet (0.25 mg total) by mouth daily at bedtime  as needed for anxiety, Disp: 90 tablet, Rfl: 0    cholecalciferol (VITAMIN D3) 1,000 units tablet, Take by mouth in the morning., Disp: , Rfl:     clotrimazole-betamethasone (LOTRISONE) 1-0.05 % cream, Apply topically 2 (two) times a day, Disp: 45 g, Rfl: 1    cyclobenzaprine (FLEXERIL) 5 mg tablet, Take 1 tablet (5 mg total) by mouth 2 (two) times a day as needed for muscle spasms, Disp: 60 tablet, Rfl: 0    diclofenac (VOLTAREN) 75 mg EC tablet, Take 1 tablet (75 mg total) by mouth 2 (two) times a day as needed (moderate pain), Disp: 60 tablet, Rfl: 0    DULoxetine (CYMBALTA) 60 mg delayed release capsule, Take 1 capsule (60 mg total) by mouth daily, Disp: 90 capsule, Rfl: 1    fenofibrate 160 MG tablet, Take 1 tablet (160 mg total) by mouth daily, Disp: 90 tablet, Rfl: 0    methocarbamol (ROBAXIN) 500 mg tablet, Take 1 tablet (500 mg total) by mouth 3 (three) times a day, Disp: 60 tablet, Rfl: 0    rosuvastatin (CRESTOR) 20 MG tablet, Take 1 tablet (20 mg total) by mouth daily, Disp: 90 tablet, Rfl: 0    tirzepatide (Zepbound) 5 mg/0.5 mL auto-injector, Inject 0.5 mL (5 mg total) under the skin once a week, Disp: 2 mL, Rfl: 2    valsartan-hydrochlorothiazide (DIOVAN-HCT) 80-12.5 MG per tablet, Take 1 tablet by mouth daily, Disp: 90 tablet, Rfl: 0    methylPREDNISolone 4 MG tablet therapy pack, Use as directed on package, Disp: 1 each, Rfl: 0  No current facility-administered medications for this visit.  [5] No Known Allergies

## 2025-06-11 NOTE — PROGRESS NOTES
Assessment:  1. Sacroiliac joint dysfunction    2. Myofascial pain syndrome    3. Lumbar spondylosis        Plan:    Nely Starr is a 44 y.o. female who presents for evaluation of left low back/buttock pain consistent with SI joint dysfunction and lumbar spondylosis. We discussed imaging, rehabilitation, optimization of medications and potential interventions. The following plan was formulated with the patient:    - Schedule for left SI joint injection. Complete risks and benefits including bleeding, infection, tissue reaction, nerve injury and allergic reaction were discussed. The approach was demonstrated using models and literature was provided. Verbal and written consent was obtained.  - Diclofenac 75mg BID PRN. Advised to take with food. Discontinue other NSAIDS.  - Flexeril 5mg BID PRN. Advised to watch for sedaiton. Counseled not to take medication while driving or operating heavy machinery. Discontinue Robaxin.  - Follow up for left SI Joint injection.    No orders of the defined types were placed in this encounter.      New Medications Ordered This Visit   Medications    diclofenac (VOLTAREN) 75 mg EC tablet     Sig: Take 1 tablet (75 mg total) by mouth 2 (two) times a day as needed (moderate pain)     Dispense:  60 tablet     Refill:  0    cyclobenzaprine (FLEXERIL) 5 mg tablet     Sig: Take 1 tablet (5 mg total) by mouth 2 (two) times a day as needed for muscle spasms     Dispense:  60 tablet     Refill:  0       My impressions and treatment recommendations were discussed in detail with the patient, who verbalized understanding and had no further questions.    History of Present Illness:    Referred by: No ref. provider found     Nely Starr is a 44 y.o. female who presents for initial evaluation of left low back/buttock pain. Pain started 4 months ago and is not associated with any inciting event or trauma. She describes the pain as sharp, burning, dull/aching of moderate severity. Pain is rated 4/10  and interferes with daily activities. This pain is constant. Exacerbating factors include standing, bending.     She has tried Meloxicam, ibuprofen, robaxin, steroid pack with no relief in early May.  PT no relie     Diagnostic studies include MRI Lumbar Spine w/o contrast L4-5 minimal disc bulge and mild left foraminal protrusion, L5-S1 minimal disc bulge.  Multilevel facet arthopathyI have personally reviewed pertinent films in PACS.    Pain is causing significant functional limitation resulting in diminished quality of life and impaired age appropriate ADLs.  Denies fever, chills, numbness/tingling, bowel/bladder dysfunction, motor weakness.    I have personally reviewed and/or updated the patient's past medical history, past surgical history, family history, social history, current medications, allergies, and vital signs today.     Review of Systems:    Review of Systems   Constitutional:  Negative for chills, fatigue and unexpected weight change.   HENT:  Negative for ear pain, mouth sores, nosebleeds, sinus pain and sore throat.    Eyes:  Negative for pain and visual disturbance.   Respiratory:  Negative for choking and wheezing.    Cardiovascular:  Negative for chest pain and palpitations.   Gastrointestinal:  Negative for abdominal pain and nausea.   Endocrine: Negative for cold intolerance and heat intolerance.   Genitourinary:  Negative for decreased urine volume, enuresis and hematuria.   Musculoskeletal:  Positive for back pain and gait problem. Negative for arthralgias, joint swelling and myalgias.   Skin:  Negative for rash.   Allergic/Immunologic: Negative for environmental allergies.   Neurological:  Negative for dizziness, weakness and numbness.   Hematological:  Does not bruise/bleed easily.   Psychiatric/Behavioral:  Negative for behavioral problems and self-injury. The patient is not hyperactive.        Past Medical History[1]    Past Surgical History[2]    Family History[3]    Social History  "    Occupational History    Occupation: radiology, IR tech   Tobacco Use    Smoking status: Every Day     Current packs/day: 0.50     Average packs/day: 0.5 packs/day for 18.0 years (9.0 ttl pk-yrs)     Types: Cigarettes     Passive exposure: Past    Smokeless tobacco: Never    Tobacco comments:     Pt states vapes daily. Vape has nicotine   Vaping Use    Vaping status: Every Day    Substances: Nicotine, Flavoring   Substance and Sexual Activity    Alcohol use: Yes     Comment: occasional drink here and there    Drug use: No    Sexual activity: Yes     Partners: Male     Birth control/protection: None       Current Medications[4]    Allergies[5]    Physical Exam:  Ht 5' 2\" (1.575 m)   Wt 94.3 kg (208 lb)   BMI 38.04 kg/m²     Constitutional: normal, well developed, well nourished, alert, in no distress and non-toxic and no overt pain behavior.  HEENT: grossly intact  Neck: supple, symmetric, trachea midline and no masses   Pulmonary:even and unlabored  Cardiovascular:No edema or pitting edema present  Skin:Normal without rashes or lesions and well hydrated  Psychiatric:Mood and affect appropriate  Neurologic:Cranial Nerves II-XII grossly intact    Lumbar Musculoskeletal and Neurologic Exam:    Inspection: no atrophy of the LE musculature    Gait: non-antalgic gait    ROM: limited AROM of the lumbar spine in all planes; FROM at bilateral hips    Sensation: SILT bilateral L2-S1 dermatomes    Strength:       Right Left  Hip Flexion  5 5  Knee Extension  5 5  Ankle Dorsiflexion 5 5  Great Toe Extension 5 5  Ankle Plantar Flexion 5 5      Reflexes: 2+ in bilateral knees and ankles.    Palpation: -TTP over bilateral greater trochanters and bilateral SI Joint. Diffusely tender over lumbar paraspinals    Provocative tests:     Seated Slump negative bilaterally    Femoral Nerve Stretch Test: negative bilaterally     Martines's: negative bilaterally     Sacral Compression/thrust test, TOVA, and Gaenslen's tests: pos on " left   FADIR: negative bilaterally       Imaging  MRI Lumbar Spine w/o contrast 5/1/25    VERTEBRAL BODIES:  There are 5 lumbar type vertebral bodies.  Normal alignment of the lumbar spine.  No spondylolysis or spondylolisthesis. No scoliosis.  No compression fracture.    Normal marrow signal is identified within the visualized bony   structures.  No discrete marrow lesion.     SACRUM:  Normal signal within the sacrum. No evidence of insufficiency or stress fracture.     DISTAL CORD AND CONUS:  Normal size and signal within the distal cord and conus. Conus terminates at L1.     PARASPINAL SOFT TISSUES:  Paraspinal soft tissues are unremarkable.     LOWER THORACIC DISC SPACES:  Normal disc height and signal.  No disc herniation, canal stenosis or foraminal narrowing.     LUMBAR DISC SPACES:     L1-L2:  Normal.     L2-L3: Minimal disc bulge. Otherwise normal.     L3-L4: Minimal disc bulge. Mild facet arthropathy. Otherwise normal.     L4-L5: Mild diffuse disc bulge with superimposed small left foraminal disc protrusion with annular fissure. Mild bilateral facet arthropathy. No canal stenosis noted. Mild left foraminal stenosis.     L5-S1: Minimal disc bulge. Mild bilateral facet arthropathy. No canal or foraminal stenosis identified.     OTHER FINDINGS: Right adnexal cyst, incompletely included in the field-of-view and incomplete evaluated on this examination, seen on the localizer and coronal images, measuring approximately 4.7 cm in maximal craniocaudal dimensions.     IMPRESSION:     Mild multilevel lumbar spondylosis, as described above, without canal stenosis, but there is mild left foraminal stenosis at L4-L5.     Right adnexal cyst, incompletely included in the field-of-view and incompletely evaluated on this examination, measuring up to 4.7 cm in maximal dimensions. This likely presents a functional cyst, and consider further evaluation with pelvic ultrasound particularly if the patient is symptomatic.          [1]   Past Medical History:  Diagnosis Date    Anemia 2023    Anxiety     Asthma     Depression     History of Clostridium difficile colitis 2018    x 2 episodes, after antibiotics    Hypercholesterolemia     Hyperlipemia     Hypertension 2021    Morbid obesity (HCC)     Obesity     Pulmonary nodule     Sleep apnea     c pap   [2]   Past Surgical History:  Procedure Laterality Date    ABCESS DRAINAGE  2024    Abscess of left axilla    IR ASPIRATION ONLY  2023    WI CONIZATION CERVIX W/WO D&C RPR ELTRD EXC N/A 2018    Procedure: BIOPSY LEEP CERVIX;  Surgeon: Radha Bush DO;  Location: [facility];  Service: Gynecology    REDUCTION MAMMAPLASTY      TONSILLECTOMY     [3]   Family History  Problem Relation Name Age of Onset    BRCA2 Negative Mother Brianna Starr     BRCA1 Negative Mother Brianna Starr     Breast cancer Mother Brianna Starr 68    Asthma Mother Brianna Starr     Obesity Mother Brianna Starr     Lung cancer Father Abhishek Starr 48    Hyperlipidemia Father Abhishek Starr     Bone cancer Father Abhishek Starr         mets from lung cancer    Cancer Father Abhishek Starr         Lung/bone ca-     Anxiety disorder Father Abhishek Starr     No Known Problems Sister      Arthritis Maternal Grandmother Caridad Stefanie     COPD Maternal Grandmother Caridad Stefanie     Cancer Maternal Grandfather Wiley Watts         Lung ca-    Lung cancer Maternal Grandfather Wiley Watts 80    No Known Problems Paternal Grandmother      Liver cancer Paternal Grandfather Santiago Starr 80    Cancer Paternal Grandfather Santiago Starr         Liver ca-    No Known Problems Brother      No Known Problems Maternal Aunt      No Known Problems Maternal Aunt      No Known Problems Maternal Aunt      No Known Problems Paternal Aunt     [4]   Current Outpatient Medications:     ALPRAZolam (XANAX) 0.25 mg tablet, Take 1 tablet (0.25 mg total) by mouth daily at bedtime  as needed for anxiety, Disp: 90 tablet, Rfl: 0    cholecalciferol (VITAMIN D3) 1,000 units tablet, Take by mouth in the morning., Disp: , Rfl:     clotrimazole-betamethasone (LOTRISONE) 1-0.05 % cream, Apply topically 2 (two) times a day, Disp: 45 g, Rfl: 1    cyclobenzaprine (FLEXERIL) 5 mg tablet, Take 1 tablet (5 mg total) by mouth 2 (two) times a day as needed for muscle spasms, Disp: 60 tablet, Rfl: 0    diclofenac (VOLTAREN) 75 mg EC tablet, Take 1 tablet (75 mg total) by mouth 2 (two) times a day as needed (moderate pain), Disp: 60 tablet, Rfl: 0    DULoxetine (CYMBALTA) 60 mg delayed release capsule, Take 1 capsule (60 mg total) by mouth daily, Disp: 90 capsule, Rfl: 1    fenofibrate 160 MG tablet, Take 1 tablet (160 mg total) by mouth daily, Disp: 90 tablet, Rfl: 0    methocarbamol (ROBAXIN) 500 mg tablet, Take 1 tablet (500 mg total) by mouth 3 (three) times a day, Disp: 60 tablet, Rfl: 0    rosuvastatin (CRESTOR) 20 MG tablet, Take 1 tablet (20 mg total) by mouth daily, Disp: 90 tablet, Rfl: 0    tirzepatide (Zepbound) 5 mg/0.5 mL auto-injector, Inject 0.5 mL (5 mg total) under the skin once a week, Disp: 2 mL, Rfl: 2    valsartan-hydrochlorothiazide (DIOVAN-HCT) 80-12.5 MG per tablet, Take 1 tablet by mouth daily, Disp: 90 tablet, Rfl: 0    methylPREDNISolone 4 MG tablet therapy pack, Use as directed on package, Disp: 1 each, Rfl: 0  No current facility-administered medications for this visit.  [5] No Known Allergies

## 2025-06-12 ENCOUNTER — OFFICE VISIT (OUTPATIENT)
Dept: PHYSICAL THERAPY | Facility: REHABILITATION | Age: 45
End: 2025-06-12
Attending: PHYSICAL THERAPIST
Payer: COMMERCIAL

## 2025-06-12 DIAGNOSIS — M54.42 ACUTE LEFT-SIDED LOW BACK PAIN WITH LEFT-SIDED SCIATICA: Primary | ICD-10-CM

## 2025-06-12 PROCEDURE — 97112 NEUROMUSCULAR REEDUCATION: CPT

## 2025-06-12 PROCEDURE — 97110 THERAPEUTIC EXERCISES: CPT

## 2025-06-12 PROCEDURE — 97140 MANUAL THERAPY 1/> REGIONS: CPT

## 2025-06-12 NOTE — PROGRESS NOTES
"Daily Note     Today's date: 2025  Patient name: Nely Starr  : 1980  MRN: [id number]  Referring provider: Israel Contreras, PT  Dx:   Encounter Diagnosis     ICD-10-CM    1. Acute left-sided low back pain with left-sided sciatica  M54.42           Start Time: 1405  Stop Time: 1445  Total time in clinic (min): 40 minutes    Subjective: Patient reports her back is doing ok. Saw pain management and was given a new medication for NSAIDs and muscle relaxers. Patient will going on vacation the end of this week.       Objective: See treatment diary below      Assessment: Tolerated treatment well today. Resumed all previous activities without issue. Will see patient back in 12 days. Patient would benefit from continued PT      Plan: Continue per plan of care.      Precautions: HTN, fibromyalgia      Manuals    Iliacus/psoas STM  QD           Lumbar mobs  L L5 TP 3x30 GrIV  Gr 2-3 3x10          NMES         Lv 5 10'     Assessment  QD CT          Neuro Re-Ed             Resisted hip ER in supine  PT resist 10x10\"           Supine Add Iso         2x10 ball    Supine clam  10x10\" YHB 2x10 yhb 2x10 yhb 2x10 rhb 3x10 rhb 2x10 yhb 3x10 yhb 2x10 yhb 2x10 bhb   TrA march  12x           Bridge   2x10 yhb 2x10 yhb 2x10 rhb 2x10 rhb 3x10 yhb 3x10 yhb  2x10 bhb   Counter Pushup    X20 ea 2x20 ea Np resume       Pball  Diagonal    2x10 alt legs 2x10 alt legs 2x20 alt legs 2x20 alt legs 2x20 alt legs  2x20 alt legs   XS Pallof Press    Grn 1x10 Grn 2x10 Grn 2x10 Grn 2x10 Grn 2x10  Grn 2x10   XS Pallof Press Step Out    Grn 1x5  Grn 1x10 ea Grn 2x10 ea Grn 2x10 Grn 2x10  Grn 2x10   Birddogs      UE only 2x10 alt       VG     DL L7 2x10        Ther Ex             REIL HEP            CARLA HEP            Suitcase Carry      10# kb 3 laps 100 ft  10#15# kb 3 laps 100 ft  10#15# kb 3 laps 100 ft   P[place]enix Sled Pull      15# 2x10  15# 2x10 15# 1x10      Pt Edu, POC     "   40'                                             Ther Activity                                       Gait Training                                       Modalities

## 2025-06-13 ENCOUNTER — TELEPHONE (OUTPATIENT)
Age: 45
End: 2025-06-13

## 2025-06-17 ENCOUNTER — RESULTS FOLLOW-UP (OUTPATIENT)
Age: 45
End: 2025-06-17

## 2025-06-17 NOTE — TELEPHONE ENCOUNTER
----- Message from Severo Hays MD sent at 6/17/2025  9:03 AM EDT -----  Right ovarian dermoid cyst and left ovarian simple cyst should have follow-up with gynecologist  ----- Message -----  From: Interface, Radiology Results In  Sent: 6/16/2025  11:49 AM EDT  To: Severo Hays MD

## 2025-06-23 ENCOUNTER — OFFICE VISIT (OUTPATIENT)
Dept: PHYSICAL THERAPY | Facility: REHABILITATION | Age: 45
End: 2025-06-23
Attending: PHYSICAL THERAPIST
Payer: COMMERCIAL

## 2025-06-23 ENCOUNTER — OFFICE VISIT (OUTPATIENT)
Dept: OBGYN CLINIC | Facility: CLINIC | Age: 45
End: 2025-06-23
Payer: COMMERCIAL

## 2025-06-23 VITALS
WEIGHT: 208.6 LBS | DIASTOLIC BLOOD PRESSURE: 72 MMHG | SYSTOLIC BLOOD PRESSURE: 116 MMHG | HEIGHT: 62 IN | BODY MASS INDEX: 38.39 KG/M2

## 2025-06-23 DIAGNOSIS — M54.42 ACUTE LEFT-SIDED LOW BACK PAIN WITH LEFT-SIDED SCIATICA: Primary | ICD-10-CM

## 2025-06-23 DIAGNOSIS — D27.0 DERMOID CYST OF RIGHT OVARY: Primary | ICD-10-CM

## 2025-06-23 DIAGNOSIS — N83.209 SIMPLE OVARIAN CYST: ICD-10-CM

## 2025-06-23 DIAGNOSIS — N83.202 LEFT OVARIAN CYST: ICD-10-CM

## 2025-06-23 PROCEDURE — 97110 THERAPEUTIC EXERCISES: CPT

## 2025-06-23 PROCEDURE — 99214 OFFICE O/P EST MOD 30 MIN: CPT | Performed by: OBSTETRICS & GYNECOLOGY

## 2025-06-23 PROCEDURE — 97140 MANUAL THERAPY 1/> REGIONS: CPT

## 2025-06-23 NOTE — PROGRESS NOTES
Daily Note     Today's date: 2025  Patient name: Nely Starr  : 1980  MRN: [id number]  Referring provider: Israel Contreras, PT  Dx:   Encounter Diagnosis     ICD-10-CM    1. Acute left-sided low back pain with left-sided sciatica  M54.42           Start Time: 1100  Stop Time: 1145  Total time in clinic (min): 45 minutes    Subjective: Patient reports she will be having her injection tomorrow. Patient states she has noticed a burning sensation in the left buttock region, it doesn't radiate down the leg at all. This started up last Wednesday, she is unsure if it is related to her driving down to [place].       Objective: See treatment diary below      Assessment: Tolerated treatment well today. Improved symptoms end of session with manuals and TE. Updated HEP see below. Neuro exam unremarkable start of session for any radicular symptoms. Patient would benefit from continued PT      Plan: Continue per plan of care.      Precautions: HTN, fibromyalgia      Manuals    Iliacus/psoas STM             Prone Lumbar UPAs L3-5 Gr 3-4 PRR L side            Prone SIJ Mobs PRR Gr3-4 L side            NMES         Lv 5 10'     Assessment             Neuro Re-Ed             Resisted hip ER in supine             Supine Add Iso         2x10 ball    Supine clam    2x10 yhb 2x10 rhb 3x10 rhb 2x10 yhb 3x10 yhb 2x10 yhb 2x10 bhb   TrA march             Bridge    2x10 yhb 2x10 rhb 2x10 rhb 3x10 yhb 3x10 yhb  2x10 bhb   Counter Pushup    X20 ea 2x20 ea Np resume       Pball  Diagonal    2x10 alt legs 2x10 alt legs 2x20 alt legs 2x20 alt legs 2x20 alt legs  2x20 alt legs   XS Pallof Press    Grn 1x10 Grn 2x10 Grn 2x10 Grn 2x10 Grn 2x10  Grn 2x10   XS Pallof Press Step Out    Grn 1x5  Grn 1x10 ea Grn 2x10 ea Grn 2x10 Grn 2x10  Grn 2x10   Birddogs      UE only 2x10 alt       VG     DL L7 2x10        Ther Ex             REIL             CARLA             Suitcase Carry      10# kb  3 laps 100 ft  10#15# kb 3 laps 100 ft  10#15# kb 3 laps 100 ft   [place]enix Sled Pull      15# 2x10  15# 2x10 15# 1x10      Pt Edu, POC       40'      L SG at Wall 2x10 HEP                                      Ther Activity                                       Gait Training                                       Modalities

## 2025-06-23 NOTE — PROGRESS NOTES
Name: Nely Starr      : 1980      MRN: [id number]  Encounter Provider: Radha Bush DO  Encounter Date: 2025   Encounter department: [facility]  :  Assessment & Plan  Dermoid cyst of right ovary         Left ovarian cyst         Simple ovarian cyst       Reviewed history and physical findings, discussed pelvic MRI and pelvic ultrasound consistent with right dermoid cysts greater than 6 cm, left simple ovarian cyst greater than 9 cm.  Implications reviewed.  Patient reassured regarding benign process.  Discussed patient is symptomatic with bladder, bloating, pelvic pressure.  Discussed conservative versus surgical intervention.  At this point she is leaning towards surgical intervention.  Patient will be referred to surgical gynecologist, names provided today.  All questions answered.  Patient will keep me updated.      History of Present Illness   HPI  Nely Starr is a 44 y.o. female who presents     This is a pleasant 44-year-old female G0 who presents for discussion.  She has been having low back pain radiating down left leg and was diagnosed with sciatica.  She underwent an MRI of the lumbar revealing pelvic cysts.  Further workup included pelvic ultrasound followed by pelvic MRI ordered by PCP with below diagnosis.  Patient states in the last 2 to 3 months she has had increasing pelvic pressure, increase in frequency/urgency/nocturia.  She denies any dysuria.  Her cycles are regular every 4 weeks lasting 4 days.  She is using TXA on a monthly basis with much improvement.      2025 pelvic US, right ovary 4.8 cm dermoid cyst, left simple cyst 9 cm    2025 pelvic MRI 6.5 cm dermoid cyst right, simple left ovarian cyst 9.4 cm      History obtained from: patient    Review of Systems   Constitutional:  Negative for fatigue, fever and unexpected weight change.   Respiratory:  Negative for cough, chest tightness, shortness of breath and wheezing.    Cardiovascular:  "Negative.  Negative for chest pain and palpitations.   Gastrointestinal: Negative.  Negative for abdominal distention, abdominal pain, blood in stool, constipation, diarrhea, nausea and vomiting.   Genitourinary:  Positive for frequency, menstrual problem and urgency. Negative for difficulty urinating, dyspareunia, dysuria, flank pain, genital sores, hematuria, pelvic pain, vaginal bleeding, vaginal discharge and vaginal pain.   Musculoskeletal:  Positive for back pain.   Skin:  Negative for rash.     Medications Ordered Prior to Encounter[1]      Objective   /72   Ht 5' 2\" (1.575 m)   Wt 94.6 kg (208 lb 9.6 oz)   LMP 06/20/2025 (Exact Date)   BMI 38.15 kg/m²      Physical Exam  Constitutional:       Appearance: Normal appearance.   Pulmonary:      Effort: Pulmonary effort is normal.   Abdominal:      General: Abdomen is flat. There is no distension.      Palpations: Abdomen is soft.      Tenderness: There is no abdominal tenderness. There is no guarding.   Genitourinary:     Labia:         Right: No rash, tenderness or lesion.         Left: No rash, tenderness or lesion.       Vagina: No signs of injury. No vaginal discharge or tenderness.      Cervix: No cervical motion tenderness, discharge, friability, lesion, erythema or cervical bleeding.      Uterus: Not enlarged and not tender.       Adnexa:         Right: Mass and tenderness present. No fullness.          Left: Mass present. No tenderness or fullness.       Neurological:      Mental Status: She is alert.     External genitalia is within normal limits.  The vagina is well estrogenized.  Bimanual exam midline pelvic mass appreciated, deeper palpation right adnexal mass separate, tender on deep palpation.    Administrative Statements   I have spent a total time of 30 minutes in caring for this patient on the day of the visit/encounter including Diagnostic results, Prognosis, Risks and benefits of tx options, Instructions for management, Impressions, " Counseling / Coordination of care, Documenting in the medical record, Reviewing/placing orders in the medical record (including tests, medications, and/or procedures), and Obtaining or reviewing history  .       [1]   Current Outpatient Medications on File Prior to Visit   Medication Sig Dispense Refill    ALPRAZolam (XANAX) 0.25 mg tablet Take 1 tablet (0.25 mg total) by mouth daily at bedtime as needed for anxiety 90 tablet 0    cholecalciferol (VITAMIN D3) 1,000 units tablet Take by mouth in the morning.      clotrimazole-betamethasone (LOTRISONE) 1-0.05 % cream Apply topically 2 (two) times a day 45 g 1    cyclobenzaprine (FLEXERIL) 5 mg tablet Take 1 tablet (5 mg total) by mouth 2 (two) times a day as needed for muscle spasms 60 tablet 0    diclofenac (VOLTAREN) 75 mg EC tablet Take 1 tablet (75 mg total) by mouth 2 (two) times a day as needed (moderate pain) 60 tablet 0    DULoxetine (CYMBALTA) 60 mg delayed release capsule Take 1 capsule (60 mg total) by mouth daily 90 capsule 1    fenofibrate 160 MG tablet Take 1 tablet (160 mg total) by mouth daily 90 tablet 0    methocarbamol (ROBAXIN) 500 mg tablet Take 1 tablet (500 mg total) by mouth 3 (three) times a day 60 tablet 0    rosuvastatin (CRESTOR) 20 MG tablet Take 1 tablet (20 mg total) by mouth daily 90 tablet 0    tirzepatide (Zepbound) 5 mg/0.5 mL auto-injector Inject 0.5 mL (5 mg total) under the skin once a week 2 mL 2    valsartan-hydrochlorothiazide (DIOVAN-HCT) 80-12.5 MG per tablet Take 1 tablet by mouth daily 90 tablet 0    methylPREDNISolone 4 MG tablet therapy pack Use as directed on package 1 each 0    [DISCONTINUED] hydrocortisone 2.5 % cream Apply topically 2 (two) times a day for 14 days Apply 1-2x a day topically to eyebrows for 14 days 30 g 0     No current facility-administered medications on file prior to visit.

## 2025-06-24 ENCOUNTER — HOSPITAL ENCOUNTER (OUTPATIENT)
Facility: HOSPITAL | Age: 45
Setting detail: OUTPATIENT SURGERY
Discharge: HOME | End: 2025-06-24
Attending: STUDENT IN AN ORGANIZED HEALTH CARE EDUCATION/TRAINING PROGRAM | Admitting: STUDENT IN AN ORGANIZED HEALTH CARE EDUCATION/TRAINING PROGRAM
Payer: COMMERCIAL

## 2025-06-24 ENCOUNTER — APPOINTMENT (OUTPATIENT)
Dept: RADIOLOGY | Facility: HOSPITAL | Age: 45
End: 2025-06-24
Payer: COMMERCIAL

## 2025-06-24 VITALS
HEIGHT: 62 IN | RESPIRATION RATE: 16 BRPM | OXYGEN SATURATION: 97 % | WEIGHT: 208 LBS | DIASTOLIC BLOOD PRESSURE: 70 MMHG | HEART RATE: 73 BPM | SYSTOLIC BLOOD PRESSURE: 108 MMHG | BODY MASS INDEX: 38.28 KG/M2 | TEMPERATURE: 97 F

## 2025-06-24 PROCEDURE — 27096 INJECT SACROILIAC JOINT: CPT | Performed by: STUDENT IN AN ORGANIZED HEALTH CARE EDUCATION/TRAINING PROGRAM

## 2025-06-24 RX ORDER — INDOMETHACIN 25 MG/1
CAPSULE ORAL AS NEEDED
Status: DISCONTINUED | OUTPATIENT
Start: 2025-06-24 | End: 2025-06-24 | Stop reason: HOSPADM

## 2025-06-24 RX ORDER — METHYLPREDNISOLONE ACETATE 40 MG/ML
INJECTION, SUSPENSION INTRA-ARTICULAR; INTRALESIONAL; INTRAMUSCULAR; SOFT TISSUE AS NEEDED
Status: DISCONTINUED | OUTPATIENT
Start: 2025-06-24 | End: 2025-06-24 | Stop reason: HOSPADM

## 2025-06-24 RX ORDER — ROPIVACAINE HYDROCHLORIDE 2 MG/ML
INJECTION, SOLUTION EPIDURAL; INFILTRATION; PERINEURAL AS NEEDED
Status: DISCONTINUED | OUTPATIENT
Start: 2025-06-24 | End: 2025-06-24 | Stop reason: HOSPADM

## 2025-06-24 NOTE — INTERVAL H&P NOTE
H&P reviewed. After examining the patient I find no changes in the patients condition since the H&P had been written.    Vitals:    06/24/25 0659   BP: 114/72   Pulse: 81   Resp: 18   Temp: 97.6 °F (36.4 °C)   SpO2: 97%

## 2025-06-24 NOTE — DISCHARGE INSTR - AVS FIRST PAGE

## 2025-06-24 NOTE — OP NOTE
OPERATIVE REPORT  PATIENT NAME: Nely Starr    :  1980  MRN: [id number]  Pt Location:  OR ROOM 13    SURGERY DATE: 2025    Surgeons and Role:     * Griselda Ramachandran MD - Primary    Preop Diagnosis:  Sacroiliac joint dysfunction [M53.3]    Post-Op Diagnosis Codes:     * Sacroiliac joint dysfunction [M53.3]    Procedure(s):  Left - Left SI joint injection    Indication: Low back/buttock pain  Preoperative Diagnosis: 1. Sacroiliac Joint Pain  2. Sacroiliitis  Postoperative Diagnosis: 1. Sacroiliac Joint Pain  2. Sacroiliitis  Procedure: Fluoroscopically-guided injection of the left sacroiliac joint    After discussing the risks, benefits, and alternatives to the procedure, the patient expressed understanding and wished to proceed. The patient was brought to the fluoroscopy suite and placed in the prone position. Procedural pause conducted to verify: correct patient identity, procedure to be performed and as applicable, correct side and site, correct patient position, and availability of implants, special equipment and special requirements. Using fluoroscopy, the inferior portion of the sacroiliac joint was identified. The skin was sterilely prepped and draped in the usual fashion using Chloraprep skin prep. Using fluoroscopic guidance, a 3.5 inch 22 gauge spinal needle was advanced into the sacroiliac joint. Proper needle positioning was confirmed using multiple fluoroscopic views. After negative aspiration, Omnipaque 300 contrast was injected, showing intraarticular spread of contrast without any evidence of intravascular uptake. A 2.5 mL solution consisting of 40 mg of Depo-Medrol in 0.2% ropivacaine was injected slowly and incrementally into the joint. Following the injection, the needle was withdrawn and fully extracted. The patient tolerated the procedure well and there were no apparent complications. After appropriate observation, the patient was dismissed from the clinic in good condition under  their own power.    SIGNATURE: Griselda Ramachandran MD  DATE: June 24, 2025  TIME: 9:01 AM

## 2025-06-27 DIAGNOSIS — E66.813 CLASS 3 SEVERE OBESITY DUE TO EXCESS CALORIES WITH SERIOUS COMORBIDITY AND BODY MASS INDEX (BMI) OF 40.0 TO 44.9 IN ADULT: ICD-10-CM

## 2025-06-27 RX ORDER — TIRZEPATIDE 5 MG/.5ML
5 INJECTION, SOLUTION SUBCUTANEOUS WEEKLY
Qty: 2 ML | Refills: 0 | Status: SHIPPED | OUTPATIENT
Start: 2025-06-27 | End: 2025-09-25

## 2025-06-30 ENCOUNTER — OFFICE VISIT (OUTPATIENT)
Dept: PHYSICAL THERAPY | Facility: REHABILITATION | Age: 45
End: 2025-06-30
Attending: PHYSICAL THERAPIST
Payer: COMMERCIAL

## 2025-06-30 DIAGNOSIS — M54.42 ACUTE LEFT-SIDED LOW BACK PAIN WITH LEFT-SIDED SCIATICA: Primary | ICD-10-CM

## 2025-06-30 PROCEDURE — 97110 THERAPEUTIC EXERCISES: CPT

## 2025-06-30 PROCEDURE — 97140 MANUAL THERAPY 1/> REGIONS: CPT

## 2025-06-30 NOTE — PROGRESS NOTES
Daily Note     Today's date: 2025  Patient name: Nely Starr  : 1980  MRN: [id number]  Referring provider: Israel Contreras, PT  Dx:   Encounter Diagnosis     ICD-10-CM    1. Acute left-sided low back pain with left-sided sciatica  M54.42           Start Time: 1700  Stop Time: 1740  Total time in clinic (min): 40 minutes    Subjective: Patient reports she had the MAGUI in her SIJ last Tuesday, states she doesn't know if it took yet. States her low back is overall better than when it started but it is still limiting her at work.       Objective: See treatment diary below      Assessment: Tolerated treatment well today. Focused on manuals again today as they had a positive response. Progress as tolerated. Patient would benefit from continued PT      Plan: Continue per plan of care.      Precautions: HTN, fibromyalgia      Manuals    Iliacus/psoas STM             Prone Lumbar UPAs L3-5 Gr 3-4 PRR L side L3-5 Gr 3-4 PRR L side           Prone SIJ Mobs PRR Gr3-4 L side PRR Gr3-4 L side           NMES         Lv 5 10'     Assessment             Neuro Re-Ed             Resisted hip ER in supine             Supine Add Iso         2x10 ball    Supine clam    2x10 yhb 2x10 rhb 3x10 rhb 2x10 yhb 3x10 yhb 2x10 yhb 2x10 bhb   TrA march             Bridge    2x10 yhb 2x10 rhb 2x10 rhb 3x10 yhb 3x10 yhb  2x10 bhb   Counter Pushup    X20 ea 2x20 ea Np resume       Pball  Diagonal    2x10 alt legs 2x10 alt legs 2x20 alt legs 2x20 alt legs 2x20 alt legs  2x20 alt legs   XS Pallof Press    Grn 1x10 Grn 2x10 Grn 2x10 Grn 2x10 Grn 2x10  Grn 2x10   XS Pallof Press Step Out    Grn 1x5  Grn 1x10 ea Grn 2x10 ea Grn 2x10 Grn 2x10  Grn 2x10   Birddogs      UE only 2x10 alt       VG     DL L7 2x10        Ther Ex             REIL             CARLA             Suitcase Carry      10# kb 3 laps 100 ft  10#15# kb 3 laps 100 ft  10#15# kb 3 laps 100 ft   [place] Sled Pull      15# 2x10   15# 2x10 15# 1x10      Pt Edu, POC       40'      L SG at Wall 2x10 HEP                                      Ther Activity                                       Gait Training                                       Modalities

## 2025-07-07 ENCOUNTER — OFFICE VISIT (OUTPATIENT)
Dept: PHYSICAL THERAPY | Facility: REHABILITATION | Age: 45
End: 2025-07-07
Attending: PHYSICAL THERAPIST
Payer: COMMERCIAL

## 2025-07-07 DIAGNOSIS — M54.42 ACUTE LEFT-SIDED LOW BACK PAIN WITH LEFT-SIDED SCIATICA: Primary | ICD-10-CM

## 2025-07-07 PROCEDURE — 97140 MANUAL THERAPY 1/> REGIONS: CPT

## 2025-07-07 NOTE — PROGRESS NOTES
Daily Note     Today's date: 2025  Patient name: Nely Starr  : 1980  MRN: [id number]  Referring provider: Israel Contreras, PT  Dx:   Encounter Diagnosis     ICD-10-CM    1. Acute left-sided low back pain with left-sided sciatica  M54.42           Start Time: 1445  Stop Time: 1530  Total time in clinic (min): 45 minutes    Subjective: Patient reports she is making progress. States she still gets the soreness in the left SIJ/sacral region.      Objective: See treatment diary below      Assessment: Tolerated treatment well today. Progress as tolerated. Patient would benefit from continued PT      Plan: Continue per plan of care.      Precautions: HTN, fibromyalgia      Manuals    Iliacus/psoas STM   STM deep gluteals, left paraspinals          Prone Lumbar UPAs L3-5 Gr 3-4 PRR L side L3-5 Gr 3-4 PRR L side L3-5 Gr 3-4 PRR L side          Prone SIJ Mobs PRR Gr3-4 L side PRR Gr3-4 L side PRR Gr3-4 L side          NMES         Lv 5 10'     Assessment             Neuro Re-Ed             Resisted hip ER in supine             Supine Add Iso         2x10 ball    Supine clam    2x10 yhb 2x10 rhb 3x10 rhb 2x10 yhb 3x10 yhb 2x10 yhb 2x10 bhb   TrA march             Bridge    2x10 yhb 2x10 rhb 2x10 rhb 3x10 yhb 3x10 yhb  2x10 bhb   Counter Pushup    X20 ea 2x20 ea Np resume       Pball  Diagonal    2x10 alt legs 2x10 alt legs 2x20 alt legs 2x20 alt legs 2x20 alt legs  2x20 alt legs   XS Pallof Press    Grn 1x10 Grn 2x10 Grn 2x10 Grn 2x10 Grn 2x10  Grn 2x10   XS Pallof Press Step Out    Grn 1x5  Grn 1x10 ea Grn 2x10 ea Grn 2x10 Grn 2x10  Grn 2x10   Birddogs      UE only 2x10 alt       VG     DL L7 2x10        Ther Ex             REIL             CARLA             Suitcase Carry      10# kb 3 laps 100 ft  10#15# kb 3 laps 100 ft  10#15# kb 3 laps 100 ft   [place] Sled Pull      15# 2x10  15# 2x10 15# 1x10      Pt Edu, POC       40'      L SG at Wall 2x10 HEP                                       Ther Activity                                       Gait Training                                       Modalities

## 2025-07-08 ENCOUNTER — TELEPHONE (OUTPATIENT)
Dept: PAIN MEDICINE | Facility: CLINIC | Age: 45
End: 2025-07-08

## 2025-07-09 NOTE — TELEPHONE ENCOUNTER
Caller: Patient  Doctor/office: Dr Ramachandran  CB#: [phone]    % of improvement:0%  Pain Scale (1-10): 6/10

## 2025-07-14 ENCOUNTER — OFFICE VISIT (OUTPATIENT)
Dept: PHYSICAL THERAPY | Facility: REHABILITATION | Age: 45
End: 2025-07-14
Attending: PHYSICAL THERAPIST
Payer: COMMERCIAL

## 2025-07-14 DIAGNOSIS — M54.42 ACUTE LEFT-SIDED LOW BACK PAIN WITH LEFT-SIDED SCIATICA: Primary | ICD-10-CM

## 2025-07-14 PROCEDURE — 97140 MANUAL THERAPY 1/> REGIONS: CPT

## 2025-07-14 NOTE — PROGRESS NOTES
Daily Note     Today's date: 2025  Patient name: Nely Starr  : 1980  MRN: [id number]  Referring provider: Israel Contreras, PT  Dx:   Encounter Diagnosis     ICD-10-CM    1. Acute left-sided low back pain with left-sided sciatica  M54.42           Start Time: 1615  Stop Time: 1700  Total time in clinic (min): 45 minutes    Subjective: Patient reports she has not noticed a huge change in her symptoms since the injection. Reports symptoms continue to be localized to the left low back and radiate into the left buttock.      Objective: See treatment diary below      Assessment: Tolerated treatment well today. Progress as tolerated. Patient would benefit from continued PT      Plan: Continue per plan of care.      Precautions: HTN, fibromyalgia      Manuals    Iliacus/psoas STM   STM deep gluteals, left paraspinals STM deep gluteals         Prone Lumbar UPAs L3-5 Gr 3-4 PRR L side L3-5 Gr 3-4 PRR L side L3-5 Gr 3-4 PRR L side          Prone SIJ Mobs PRR Gr3-4 L side PRR Gr3-4 L side PRR Gr3-4 L side          NMES         Lv 5 10'     Assessment             Neuro Re-Ed             Resisted hip ER in supine             Supine Add Iso         2x10 ball    Supine clam       2x10 yhb 3x10 yhb 2x10 yhb 2x10 bhb   TrA march             Bridge       3x10 yhb 3x10 yhb  2x10 bhb   Counter Pushup             Pball  Diagonal       2x20 alt legs 2x20 alt legs  2x20 alt legs   XS Pallof Press       Grn 2x10 Grn 2x10  Grn 2x10   XS Pallof Press Step Out       Grn 2x10 Grn 2x10  Grn 2x10   Birddogs             VG             Ther Ex             REIL             CARLA             Suitcase Carry        10#15# kb 3 laps 100 ft  10#15# kb 3 laps 100 ft   [place] Sled Pull       15# 2x10 15# 1x10      Pt Edu, POC       40'      L SG at Wall 2x10 HEP                                      Ther Activity                                       Gait Training                                        Modalities

## 2025-07-16 DIAGNOSIS — F32.A DEPRESSION, UNSPECIFIED DEPRESSION TYPE: ICD-10-CM

## 2025-07-17 RX ORDER — DULOXETIN HYDROCHLORIDE 60 MG/1
60 CAPSULE, DELAYED RELEASE ORAL DAILY
Qty: 90 CAPSULE | Refills: 1 | Status: SHIPPED | OUTPATIENT
Start: 2025-07-17

## 2025-07-21 ENCOUNTER — OFFICE VISIT (OUTPATIENT)
Age: 45
End: 2025-07-21
Payer: COMMERCIAL

## 2025-07-21 ENCOUNTER — OFFICE VISIT (OUTPATIENT)
Dept: OBGYN CLINIC | Facility: CLINIC | Age: 45
End: 2025-07-21
Payer: COMMERCIAL

## 2025-07-21 ENCOUNTER — OFFICE VISIT (OUTPATIENT)
Dept: PHYSICAL THERAPY | Facility: REHABILITATION | Age: 45
End: 2025-07-21
Attending: PHYSICAL THERAPIST
Payer: COMMERCIAL

## 2025-07-21 VITALS
DIASTOLIC BLOOD PRESSURE: 74 MMHG | BODY MASS INDEX: 38.09 KG/M2 | WEIGHT: 207 LBS | HEIGHT: 62 IN | SYSTOLIC BLOOD PRESSURE: 122 MMHG

## 2025-07-21 VITALS — HEIGHT: 62 IN | BODY MASS INDEX: 38.26 KG/M2 | WEIGHT: 207.89 LBS

## 2025-07-21 DIAGNOSIS — M47.816 LUMBAR SPONDYLOSIS: Primary | ICD-10-CM

## 2025-07-21 DIAGNOSIS — M54.42 ACUTE LEFT-SIDED LOW BACK PAIN WITH LEFT-SIDED SCIATICA: Primary | ICD-10-CM

## 2025-07-21 DIAGNOSIS — N83.202 BILATERAL OVARIAN CYSTS: Primary | ICD-10-CM

## 2025-07-21 DIAGNOSIS — M54.50 CHRONIC LEFT-SIDED LOW BACK PAIN, UNSPECIFIED WHETHER SCIATICA PRESENT: ICD-10-CM

## 2025-07-21 DIAGNOSIS — N83.201 BILATERAL OVARIAN CYSTS: Primary | ICD-10-CM

## 2025-07-21 DIAGNOSIS — G89.29 CHRONIC LEFT-SIDED LOW BACK PAIN, UNSPECIFIED WHETHER SCIATICA PRESENT: ICD-10-CM

## 2025-07-21 DIAGNOSIS — M79.18 MYOFASCIAL PAIN SYNDROME: ICD-10-CM

## 2025-07-21 PROCEDURE — 99214 OFFICE O/P EST MOD 30 MIN: CPT | Performed by: STUDENT IN AN ORGANIZED HEALTH CARE EDUCATION/TRAINING PROGRAM

## 2025-07-21 PROCEDURE — 97140 MANUAL THERAPY 1/> REGIONS: CPT

## 2025-07-21 PROCEDURE — 99214 OFFICE O/P EST MOD 30 MIN: CPT | Performed by: OBSTETRICS & GYNECOLOGY

## 2025-07-21 RX ORDER — SODIUM CHLORIDE, SODIUM LACTATE, POTASSIUM CHLORIDE, CALCIUM CHLORIDE 600; 310; 30; 20 MG/100ML; MG/100ML; MG/100ML; MG/100ML
20 INJECTION, SOLUTION INTRAVENOUS CONTINUOUS
OUTPATIENT
Start: 2025-07-21

## 2025-07-21 NOTE — PROGRESS NOTES
Daily Note     Today's date: 2025  Patient name: Nely Starr  : 1980  MRN: [id number]  Referring provider: Israel Contreras, PT  Dx:   Encounter Diagnosis     ICD-10-CM    1. Acute left-sided low back pain with left-sided sciatica  M54.42           Start Time: 1615  Stop Time: 1700  Total time in clinic (min): 45 minutes  Patient 1 on 1 with PT from 430-500p.    Subjective: Patient reports her symptoms have remained about the same since last visit. States location and intensity of symptoms have been consistent.       Objective: See treatment diary below      Assessment: Tolerated treatment well today. Minimal TTP throughout lumbar paraspinals and gluteals today. Will continue to follow patient until her MBB she has scheduled in 2 weeks. Patient would benefit from continued PT      Plan: Continue per plan of care.      Precautions: HTN, fibromyalgia      Manuals    Iliacus/psoas STM   STM deep gluteals, left paraspinals STM deep gluteals STM deep gluteals        Prone Lumbar UPAs L3-5 Gr 3-4 PRR L side L3-5 Gr 3-4 PRR L side L3-5 Gr 3-4 PRR L side  L3-5 Gr 3-4 PRR L side        Prone SIJ Mobs PRR Gr3-4 L side PRR Gr3-4 L side PRR Gr3-4 L side  PRR Gr3-4 L side        NMES         Lv 5 10'     Assessment             Neuro Re-Ed             Resisted hip ER in supine             Supine Add Iso         2x10 ball    Supine clam       2x10 yhb 3x10 yhb 2x10 yhb 2x10 bhb   TrA march             Bridge       3x10 yhb 3x10 yhb  2x10 bhb   Counter Pushup             Pball  Diagonal       2x20 alt legs 2x20 alt legs  2x20 alt legs   XS Pallof Press       Grn 2x10 Grn 2x10  Grn 2x10   XS Pallof Press Step Out       Grn 2x10 Grn 2x10  Grn 2x10   Birddogs             VG             Ther Ex             REIL             CARLA             Suitcase Carry        10#15# kb 3 laps 100 ft  10#15# kb 3 laps 100 ft   [place] Sled Pull       15# 2x10 15# 1x10      Pt Edu, POC        40'      L SG at Wall 2x10 HEP                                      Ther Activity                                       Gait Training                                       Modalities

## 2025-07-21 NOTE — PROGRESS NOTES
"Subjective    Patient is a 45 yo G0 who presents today to discuss surgical management of ovarian cysts.    A recent ultrasound and MRI demonstrated a 6 cm right dermoid cyst, and 9 cm left simple ovarian cyst. She reports symptoms of bloating and pelvic pressure. She desires surgical management of these cysts.    She also reports that her menses have become more irregular and heavy over the past few years. No fibroids or polyps were detected on ultrasound. She has been taking TXA which has been helpful.    OB History          0    Para   0    Term   0       0    AB   0    Living   0         SAB   0    IAB   0    Ectopic   0    Multiple   0    Live Births   0                        Objective    Vitals:    25 1416   BP: 122/74   BP Location: Right arm   Patient Position: Sitting   Cuff Size: Standard   Weight: 93.9 kg (207 lb)   Height: 5' 2\" (1.575 m)        Physical Exam  Constitutional:       Appearance: She is well-developed.     Cardiovascular:      Rate and Rhythm: Normal rate.   Pulmonary:      Effort: Pulmonary effort is normal. No respiratory distress.   Abdominal:      Palpations: Abdomen is soft.      Tenderness: There is no abdominal tenderness.     Skin:     General: Skin is warm and dry.          Assessment    Problem List[1]     Plan  - Stop Zebound 1 week prior to surgery  - Last pap smear ASCUS, HPV neg; hx LEEP   - Discussed options to help with bleeding, including D&C hysteroscopy / ablation at the time of the procedure; patient will consider  - Reviewed risks of the procedure, including injury to surrounding structures, conversion to open procedure, possible oophorectomy, early menopause, infertility, uterine perforation, and inability to perform ablation  - consent signed today for laparoscopic bilateral ovarian cystectomy, possible oophorectomy, exam under anesthesia, possible D&C hysteroscopy, possible ablation  - Return for pre-operative appointment       [1]   Patient " Active Problem List  Diagnosis    Class 3 severe obesity due to excess calories with serious comorbidity and body mass index (BMI) of 40.0 to 44.9 in adult    Depression    Dysplasia of cervix, low grade (CORNELIUS 1)    Status post LEEP (loop electrosurgical excision procedure) of cervix    History of Clostridium difficile colitis    Obstructive sleep apnea syndrome    Edema of extremities    Essential hypertension    Peroneal tendinitis of left lower extremity    Fibromyalgia    Tobacco dependence syndrome    Vitamin D deficiency    Mixed hyperlipidemia    Immunization due    Axillary lymphadenitis    Abscess of right axilla    Absolute anemia    QIANA (generalized anxiety disorder)    Pulmonary nodule    Abscess of left axilla    Sprain of anterior talofibular ligament of left ankle, initial encounter    Internal derangement of left shoulder    Contusion of left shoulder    Acute bilateral low back pain without sciatica

## 2025-07-21 NOTE — PROGRESS NOTES
Assessment & Plan  Lumbar spondylosis  Chronic left-sided low back pain, unspecified whether sciatica present  Myofascial pain syndrome    Orders:    Case request operating room: left L3-5 MBB #1; Standing    Plan:    Nely Starr is a 44 y.o. female who presents for follow up office visit in regards to left low back/buttock pain consistent with lumbar spondylosis . We discussed imaging, rehabilitation, optimization of medications and potential interventions. The following plan was formulated with the patient:    - Schedule for left L3-5 MBB #1. Complete risks and benefits including bleeding, infection, tissue reaction, nerve injury and allergic reaction were discussed. The approach was demonstrated using models and literature was provided. Verbal and written consent was obtained.  - Diclofenac 75mg BID PRN. Advised to take with food. Discontinue other NSAIDS.  - Flexeril 5mg BID PRN. Advised to watch for sedaiton. Counseled not to take medication while driving or operating heavy machinery.   - Follow up for lumbar MBB     The patient has been experiencing moderate to severe axial spine pain that is causing functional deficit.  The pain has been present for at least 3 months and is not improving with conservative care including one or more of the following: home exercise regimen, physician directed home exercise program, physical therapy, or chiropractic care.  Currently the patient is not experiencing any radicular features nor neurogenic claudication.  Non-facet pathology has been ruled out on clinical evaluation.      We will schedule the patient for left L3-5 medial branch nerve blocks with intention of moving forward towards radiofrequency ablation if there is an appropriate diagnostic response. The initial blocks will be performed with 2% lidocaine and if an appropriate response is obtained upon review of the patient's pain diary, a confirmatory block will be scheduled with 0.5% bupivacaine at least 2 weeks  after the initial block.     In the office today, we reviewed the nature of facet joint pathology in depth using a spine model. We discussed the approach we would use for the injections and provided literature for home review. The patient understands the risks associated with the procedure including bleeding, infection, tissue injury, and allergic reaction and provided verbal informed consent in the office today.      No orders of the defined types were placed in this encounter.      No orders of the defined types were placed in this encounter.      My impressions and treatment recommendations were discussed in detail with the patient, who verbalized understanding and had no further questions.    History of Present Illness:  Nely Starr is a 44 y.o. female who presents for a follow up office visit in regards to left low back/buttock pain consistent with lumbar spondylosis . Since last visit underwent SI joint injection with minimal relief    She describes the pain as sharp, burning, dull/aching of moderate severity. Pain is rated 4/10 and interferes with daily activities. This pain is constant. Exacerbating factors include standing, bending.      She has tried Meloxicam, ibuprofen, robaxin, steroid pack with no relief in early May.PT no relief     Diagnostic studies include MRI Lumbar Spine w/o contrast L4-5 minimal disc bulge and mild left foraminal protrusion, L5-S1 minimal disc bulge.  Multilevel facet arthopathyI have personally reviewed pertinent films in PACS.    I have personally reviewed and/or updated the patient's past medical history, past surgical history, family history, social history, current medications, allergies, and vital signs today.     Review of Systems   Constitutional:  Negative for chills and fever.   HENT:  Negative for ear pain and sore throat.    Eyes:  Negative for pain and visual disturbance.   Respiratory:  Negative for cough and shortness of breath.    Cardiovascular:  Negative for  chest pain and palpitations.   Gastrointestinal:  Negative for abdominal pain and vomiting.   Genitourinary:  Negative for dysuria and hematuria.   Musculoskeletal:  Positive for arthralgias, back pain and myalgias.   Skin:  Negative for color change and rash.   Neurological:  Negative for seizures and syncope.   All other systems reviewed and are negative.      Problem List[1]    Past Medical History[2]    Past Surgical History[3]    Family History[4]    Social History     Occupational History    Occupation: [employer], IR tech   Tobacco Use    Smoking status: Every Day     Current packs/day: 0.50     Average packs/day: 0.5 packs/day for 18.0 years (9.0 ttl pk-yrs)     Types: Cigarettes     Passive exposure: Past    Smokeless tobacco: Never    Tobacco comments:     Pt states vapes daily. Vape has nicotine   Vaping Use    Vaping status: Every Day    Substances: Nicotine, Flavoring   Substance and Sexual Activity    Alcohol use: Yes     Comment: occasional drink here and there    Drug use: No    Sexual activity: Yes     Partners: Male     Birth control/protection: None       Current Outpatient Medications on File Prior to Visit   Medication Sig    ALPRAZolam (XANAX) 0.25 mg tablet Take 1 tablet (0.25 mg total) by mouth daily at bedtime as needed for anxiety    cholecalciferol (VITAMIN D3) 1,000 units tablet Take by mouth in the morning.    clotrimazole-betamethasone (LOTRISONE) 1-0.05 % cream Apply topically 2 (two) times a day    cyclobenzaprine (FLEXERIL) 5 mg tablet Take 1 tablet (5 mg total) by mouth 2 (two) times a day as needed for muscle spasms    DULoxetine (CYMBALTA) 60 mg delayed release capsule Take 1 capsule (60 mg total) by mouth daily    fenofibrate 160 MG tablet Take 1 tablet (160 mg total) by mouth daily    methocarbamol (ROBAXIN) 500 mg tablet Take 1 tablet (500 mg total) by mouth 3 (three) times a day    rosuvastatin (CRESTOR) 20 MG tablet Take 1 tablet (20 mg total) by mouth daily    tirzepatide  "(Zepbound) 5 mg/0.5 mL auto-injector Inject 0.5 mL (5 mg total) under the skin once a week    valsartan-hydrochlorothiazide (DIOVAN-HCT) 80-12.5 MG per tablet Take 1 tablet by mouth daily    diclofenac (VOLTAREN) 75 mg EC tablet Take 1 tablet (75 mg total) by mouth 2 (two) times a day as needed (moderate pain)    methylPREDNISolone 4 MG tablet therapy pack Use as directed on package    [DISCONTINUED] hydrocortisone 2.5 % cream Apply topically 2 (two) times a day for 14 days Apply 1-2x a day topically to eyebrows for 14 days     No current facility-administered medications on file prior to visit.       Allergies[5]    Physical Exam:    Ht 5' 2\" (1.575 m)   Wt 94.3 kg (207 lb 14.3 oz)   LMP 06/20/2025 (Exact Date)   BMI 38.02 kg/m²     Constitutional: normal, well developed, well nourished, alert, in no distress and non-toxic and no overt pain behavior.  HEENT: grossly intact  Neck: supple, symmetric, trachea midline and no masses   Pulmonary:even and unlabored  Cardiovascular:No edema or pitting edema present  Skin:Normal without rashes or lesions and well hydrated  Psychiatric:Mood and affect appropriate  Neurologic:Cranial Nerves II-XII grossly intact         Lumbar Musculoskeletal and Neurologic Exam:                         Inspection: no atrophy of the LE musculature                      Gait: non-antalgic gait                   ROM: limited AROM of the lumbar spine in all planes; FROM at bilateral hips                       Sensation: SILT bilateral L2-S1 dermatomes                      Strength:                                                                  Right     Left  Hip Flexion                     5           5  Knee Extension              5           5  Ankle [id number]  Great Toe [id number]  Ankle Plantar [id number]                              Reflexes: 2+ in bilateral knees and ankles.                          Palpation: -TTP over bilateral greater trochanters and bilateral SI Joint. " Diffusely tender over lumbar paraspinals                       Provocative tests:                                       Seated Slump negative bilaterally                Femoral Nerve Stretch Test: negative bilaterally                 Martines's: negative bilaterally                        Sacral Compression/thrust test, TOVA, and Gaenslen's tests: pos on left               FADIR: negative bilaterally                            Imaging  MRI Lumbar Spine w/o contrast 5/1/25     VERTEBRAL BODIES:  There are 5 lumbar type vertebral bodies.  Normal alignment of the lumbar spine.  No spondylolysis or spondylolisthesis. No scoliosis.  No compression fracture.    Normal marrow signal is identified within the visualized bony   structures.  No discrete marrow lesion.     SACRUM:  Normal signal within the sacrum. No evidence of insufficiency or stress fracture.     DISTAL CORD AND CONUS:  Normal size and signal within the distal cord and conus. Conus terminates at L1.     PARASPINAL SOFT TISSUES:  Paraspinal soft tissues are unremarkable.     LOWER THORACIC DISC SPACES:  Normal disc height and signal.  No disc herniation, canal stenosis or foraminal narrowing.     LUMBAR DISC SPACES:     L1-L2:  Normal.     L2-L3: Minimal disc bulge. Otherwise normal.     L3-L4: Minimal disc bulge. Mild facet arthropathy. Otherwise normal.     L4-L5: Mild diffuse disc bulge with superimposed small left foraminal disc protrusion with annular fissure. Mild bilateral facet arthropathy. No canal stenosis noted. Mild left foraminal stenosis.     L5-S1: Minimal disc bulge. Mild bilateral facet arthropathy. No canal or foraminal stenosis identified.     OTHER FINDINGS: Right adnexal cyst, incompletely included in the field-of-view and incomplete evaluated on this examination, seen on the localizer and coronal images, measuring approximately 4.7 cm in maximal craniocaudal dimensions.     IMPRESSION:     Mild multilevel lumbar spondylosis, as described  above, without canal stenosis, but there is mild left foraminal stenosis at L4-L5.     Right adnexal cyst, incompletely included in the field-of-view and incompletely evaluated on this examination, measuring up to 4.7 cm in maximal dimensions. This likely presents a functional cyst, and consider further evaluation with pelvic ultrasound particularly if the patient is symptomatic.         [1]   Patient Active Problem List  Diagnosis    Class 3 severe obesity due to excess calories with serious comorbidity and body mass index (BMI) of 40.0 to 44.9 in adult    Depression    Dysplasia of cervix, low grade (CORNELIUS 1)    Status post LEEP (loop electrosurgical excision procedure) of cervix    History of Clostridium difficile colitis    Obstructive sleep apnea syndrome    Edema of extremities    Essential hypertension    Peroneal tendinitis of left lower extremity    Fibromyalgia    Tobacco dependence syndrome    Vitamin D deficiency    Mixed hyperlipidemia    Immunization due    Axillary lymphadenitis    Abscess of right axilla    Absolute anemia    QIANA (generalized anxiety disorder)    Pulmonary nodule    Abscess of left axilla    Sprain of anterior talofibular ligament of left ankle, initial encounter    Internal derangement of left shoulder    Contusion of left shoulder    Acute bilateral low back pain without sciatica   [2]   Past Medical History:  Diagnosis Date    Anemia 2/2023    Anxiety     Asthma     Depression     History of Clostridium difficile colitis 2018    x 2 episodes, after antibiotics    Hypercholesterolemia     Hyperlipemia     Hypertension 1/2021    Morbid obesity (HCC)     Obesity     Pulmonary nodule     Sleep apnea     c pap   [3]   Past Surgical History:  Procedure Laterality Date    ABCESS DRAINAGE  05/21/2024    Abscess of left axilla    IR ASPIRATION ONLY  01/09/2023    RI CONIZATION CERVIX W/WO D&C RPR ELTRD EXC N/A 09/27/2018    Procedure: BIOPSY LEEP CERVIX;  Surgeon: Radha Bush DO;   Location: [facility];  Service: Gynecology    KS INJECT SI JOINT ARTHRGRPHY&/ANES/STEROID W/ANN-MARIE Left 2025    Procedure: Left SI joint injection;  Surgeon: Griselda Ramachandran MD;  Location:  MAIN OR;  Service: Pain Management     REDUCTION MAMMAPLASTY      TONSILLECTOMY     [4]   Family History  Problem Relation Name Age of Onset    BRCA2 Negative Mother Brianna Starr     BRCA1 Negative Mother Brianna Starr     Breast cancer Mother Brianna Starr 68    Asthma Mother Brianna Starr     Obesity Mother Brianna Starr     Lung cancer Father Abhishek Starr 48    Hyperlipidemia Father Abhishek Starr     Bone cancer Father Abhishek Starr         mets from lung cancer    Cancer Father Abhishek Starr         Lung/bone ca-     Anxiety disorder Father Abhishek Starr     No Known Problems Sister      Arthritis Maternal Grandmother Caridad Stefanie     COPD Maternal Grandmother Caridad Stefanie     Cancer Maternal Grandfather Wiley Watts         Lung ca-    Lung cancer Maternal Grandfather Wiley Watts 80    No Known Problems Paternal Grandmother      Liver cancer Paternal Grandfather Santiago Starr 80    Cancer Paternal Grandfather Santiago Starr         Liver ca-    No Known Problems Brother      No Known Problems Maternal Aunt      No Known Problems Maternal Aunt      No Known Problems Maternal Aunt      No Known Problems Paternal Aunt     [5] No Known Allergies

## 2025-07-25 ENCOUNTER — OFFICE VISIT (OUTPATIENT)
Age: 45
End: 2025-07-25
Payer: COMMERCIAL

## 2025-07-25 ENCOUNTER — TELEPHONE (OUTPATIENT)
Age: 45
End: 2025-07-25

## 2025-07-25 VITALS
DIASTOLIC BLOOD PRESSURE: 70 MMHG | WEIGHT: 207.2 LBS | SYSTOLIC BLOOD PRESSURE: 122 MMHG | BODY MASS INDEX: 38.13 KG/M2 | HEIGHT: 62 IN

## 2025-07-25 DIAGNOSIS — Z32.01 POSITIVE PREGNANCY TEST: Primary | ICD-10-CM

## 2025-07-25 PROCEDURE — 99213 OFFICE O/P EST LOW 20 MIN: CPT | Performed by: OBSTETRICS & GYNECOLOGY

## 2025-07-25 NOTE — TELEPHONE ENCOUNTER
Patient called in and asked to cancel procedure for now. Stated she would call to reschedule when ready.

## 2025-07-25 NOTE — TELEPHONE ENCOUNTER
Spoke with the patient. She is scheduled to see OB today and will touch base after that appointment. She is aware MBB will need to be canceled if she is pregnant.

## 2025-07-25 NOTE — TELEPHONE ENCOUNTER
Pt is scheduled 7/29 for MBB #1  Please advise if procedure should be canceled due to pregnancy result

## 2025-07-25 NOTE — PROGRESS NOTES
"Subjective    Patient is a 43 yo G0 who presents today with a positive pregnancy test. She reports her LMP as 25. Her cycles are somewhat irregular, q30-36 days, sometimes longer. She is asymptomatic. She is not sure whether she desires to continue this pregnancy.    OB History          0    Para   0    Term   0       0    AB   0    Living   0         SAB   0    IAB   0    Ectopic   0    Multiple   0    Live Births   0                        Objective    Vitals:    25 1254   BP: 122/70   BP Location: Left arm   Patient Position: Sitting   Cuff Size: Large   Weight: 94 kg (207 lb 3.2 oz)   Height: 5' 2\" (1.575 m)        Physical Exam  Constitutional:       Appearance: She is well-developed.     Cardiovascular:      Rate and Rhythm: Normal rate.   Pulmonary:      Effort: Pulmonary effort is normal. No respiratory distress.     Skin:     General: Skin is warm and dry.          Assessment    Problem List[1]     Plan  - will trend hCG and obtain ultrasound as appropriate  - Advised patient to stop Xanax, Voltaren, fenofibrate, robaxin, crestor, zepbound, and Valsartan/HCTZ if she plans to continue the pregnancy; discussed switching to an alternative BP medication; also discussed starting a prenatal vitamin  - Information for [facility] and [facility] provided       [1]   Patient Active Problem List  Diagnosis    Class 3 severe obesity due to excess calories with serious comorbidity and body mass index (BMI) of 40.0 to 44.9 in adult    Depression    Dysplasia of cervix, low grade (CORNELIUS 1)    Status post LEEP (loop electrosurgical excision procedure) of cervix    History of Clostridium difficile colitis    Obstructive sleep apnea syndrome    Edema of extremities    Essential hypertension    Peroneal tendinitis of left lower extremity    Fibromyalgia    Tobacco dependence syndrome    Vitamin D deficiency    Mixed hyperlipidemia    Immunization due    Axillary lymphadenitis    " Abscess of right axilla    Absolute anemia    QIANA (generalized anxiety disorder)    Pulmonary nodule    Abscess of left axilla    Sprain of anterior talofibular ligament of left ankle, initial encounter    Internal derangement of left shoulder    Contusion of left shoulder    Acute bilateral low back pain without sciatica

## 2025-07-25 NOTE — TELEPHONE ENCOUNTER
Called and left a message for patient to call the office back. Requesting more information regarding appointment that is scheduled for today 7/25/2025. Patient was just seen on 7/21/2025 for surgical discussion. Office contact information provided.

## 2025-07-25 NOTE — TELEPHONE ENCOUNTER
Pt states that she got a positive pregnancy test and would like to know if she is still able to get her procedure     Pt can be reached at [phone]

## 2025-07-26 ENCOUNTER — APPOINTMENT (OUTPATIENT)
Dept: LAB | Facility: CLINIC | Age: 45
End: 2025-07-26
Payer: COMMERCIAL

## 2025-07-26 DIAGNOSIS — Z32.01 POSITIVE PREGNANCY TEST: ICD-10-CM

## 2025-07-26 LAB — B-HCG SERPL-ACNC: 1237.3 MIU/ML (ref 0–5)

## 2025-07-26 PROCEDURE — 36415 COLL VENOUS BLD VENIPUNCTURE: CPT

## 2025-07-26 PROCEDURE — 84702 CHORIONIC GONADOTROPIN TEST: CPT

## 2025-07-28 ENCOUNTER — APPOINTMENT (OUTPATIENT)
Dept: LAB | Facility: CLINIC | Age: 45
End: 2025-07-28
Payer: COMMERCIAL

## 2025-07-28 ENCOUNTER — OFFICE VISIT (OUTPATIENT)
Dept: PHYSICAL THERAPY | Facility: REHABILITATION | Age: 45
End: 2025-07-28
Attending: PHYSICAL THERAPIST
Payer: COMMERCIAL

## 2025-07-28 ENCOUNTER — TELEPHONE (OUTPATIENT)
Dept: OBGYN CLINIC | Facility: CLINIC | Age: 45
End: 2025-07-28

## 2025-07-28 ENCOUNTER — OFFICE VISIT (OUTPATIENT)
Dept: BARIATRICS | Facility: CLINIC | Age: 45
End: 2025-07-28
Payer: COMMERCIAL

## 2025-07-28 VITALS
SYSTOLIC BLOOD PRESSURE: 126 MMHG | OXYGEN SATURATION: 98 % | HEIGHT: 63 IN | HEART RATE: 81 BPM | WEIGHT: 205.6 LBS | BODY MASS INDEX: 36.43 KG/M2 | DIASTOLIC BLOOD PRESSURE: 70 MMHG

## 2025-07-28 DIAGNOSIS — Z32.01 POSITIVE PREGNANCY TEST: ICD-10-CM

## 2025-07-28 DIAGNOSIS — M54.42 ACUTE LEFT-SIDED LOW BACK PAIN WITH LEFT-SIDED SCIATICA: Primary | ICD-10-CM

## 2025-07-28 DIAGNOSIS — E66.813 CLASS 3 SEVERE OBESITY DUE TO EXCESS CALORIES WITH SERIOUS COMORBIDITY AND BODY MASS INDEX (BMI) OF 40.0 TO 44.9 IN ADULT: ICD-10-CM

## 2025-07-28 DIAGNOSIS — G47.33 OBSTRUCTIVE SLEEP APNEA SYNDROME: ICD-10-CM

## 2025-07-28 DIAGNOSIS — I10 ESSENTIAL HYPERTENSION: Primary | ICD-10-CM

## 2025-07-28 LAB — B-HCG SERPL-ACNC: 1568 MIU/ML (ref 0–5)

## 2025-07-28 PROCEDURE — 36415 COLL VENOUS BLD VENIPUNCTURE: CPT

## 2025-07-28 PROCEDURE — 97110 THERAPEUTIC EXERCISES: CPT

## 2025-07-28 PROCEDURE — 99214 OFFICE O/P EST MOD 30 MIN: CPT | Performed by: NURSE PRACTITIONER

## 2025-07-28 PROCEDURE — 97140 MANUAL THERAPY 1/> REGIONS: CPT

## 2025-07-28 PROCEDURE — 97112 NEUROMUSCULAR REEDUCATION: CPT

## 2025-07-28 PROCEDURE — 84702 CHORIONIC GONADOTROPIN TEST: CPT

## 2025-07-28 RX ORDER — TIRZEPATIDE 5 MG/.5ML
5 INJECTION, SOLUTION SUBCUTANEOUS WEEKLY
Qty: 2 ML | Refills: 2 | Status: SHIPPED | OUTPATIENT
Start: 2025-07-28 | End: 2025-10-26

## 2025-07-29 ENCOUNTER — TELEPHONE (OUTPATIENT)
Age: 45
End: 2025-07-29

## 2025-07-29 ENCOUNTER — HOSPITAL ENCOUNTER (OUTPATIENT)
Dept: RADIOLOGY | Facility: HOSPITAL | Age: 45
Discharge: HOME | End: 2025-07-29
Attending: OBSTETRICS & GYNECOLOGY
Payer: COMMERCIAL

## 2025-07-29 DIAGNOSIS — O36.80X0 PREGNANCY OF UNKNOWN ANATOMIC LOCATION: ICD-10-CM

## 2025-07-29 PROCEDURE — 76815 OB US LIMITED FETUS(S): CPT

## 2025-07-30 ENCOUNTER — APPOINTMENT (OUTPATIENT)
Dept: LAB | Facility: CLINIC | Age: 45
End: 2025-07-30
Payer: COMMERCIAL

## 2025-07-30 ENCOUNTER — TELEPHONE (OUTPATIENT)
Dept: OBGYN CLINIC | Facility: CLINIC | Age: 45
End: 2025-07-30

## 2025-07-30 DIAGNOSIS — Z32.01 POSITIVE PREGNANCY TEST: ICD-10-CM

## 2025-07-30 LAB — B-HCG SERPL-ACNC: 1651 MIU/ML (ref 0–5)

## 2025-07-30 PROCEDURE — 36415 COLL VENOUS BLD VENIPUNCTURE: CPT

## 2025-07-30 PROCEDURE — 84702 CHORIONIC GONADOTROPIN TEST: CPT

## 2025-08-01 ENCOUNTER — RESULTS FOLLOW-UP (OUTPATIENT)
Age: 45
End: 2025-08-01

## 2025-08-01 ENCOUNTER — APPOINTMENT (OUTPATIENT)
Dept: LAB | Facility: CLINIC | Age: 45
End: 2025-08-01
Payer: COMMERCIAL

## 2025-08-01 DIAGNOSIS — O36.80X0 PREGNANCY OF UNKNOWN ANATOMIC LOCATION: Primary | ICD-10-CM

## 2025-08-01 DIAGNOSIS — Z32.01 POSITIVE PREGNANCY TEST: ICD-10-CM

## 2025-08-01 LAB — B-HCG SERPL-ACNC: 1750 MIU/ML (ref 0–5)

## 2025-08-01 PROCEDURE — 84702 CHORIONIC GONADOTROPIN TEST: CPT

## 2025-08-01 PROCEDURE — 36415 COLL VENOUS BLD VENIPUNCTURE: CPT

## 2025-08-03 ENCOUNTER — APPOINTMENT (OUTPATIENT)
Dept: LAB | Facility: CLINIC | Age: 45
End: 2025-08-03
Payer: COMMERCIAL

## 2025-08-03 DIAGNOSIS — O36.80X0 PREGNANCY OF UNKNOWN ANATOMIC LOCATION: ICD-10-CM

## 2025-08-04 ENCOUNTER — OFFICE VISIT (OUTPATIENT)
Dept: PHYSICAL THERAPY | Facility: REHABILITATION | Age: 45
End: 2025-08-04
Attending: PHYSICAL THERAPIST
Payer: COMMERCIAL

## 2025-08-04 ENCOUNTER — OFFICE VISIT (OUTPATIENT)
Age: 45
End: 2025-08-04
Payer: COMMERCIAL

## 2025-08-04 VITALS
DIASTOLIC BLOOD PRESSURE: 66 MMHG | RESPIRATION RATE: 16 BRPM | HEIGHT: 63 IN | BODY MASS INDEX: 37.07 KG/M2 | HEART RATE: 90 BPM | SYSTOLIC BLOOD PRESSURE: 100 MMHG | OXYGEN SATURATION: 99 % | WEIGHT: 209.2 LBS | TEMPERATURE: 98.7 F

## 2025-08-04 DIAGNOSIS — I10 ESSENTIAL HYPERTENSION: Primary | ICD-10-CM

## 2025-08-04 DIAGNOSIS — M54.42 ACUTE LEFT-SIDED LOW BACK PAIN WITH LEFT-SIDED SCIATICA: Primary | ICD-10-CM

## 2025-08-04 DIAGNOSIS — F17.200 TOBACCO DEPENDENCE SYNDROME: ICD-10-CM

## 2025-08-04 DIAGNOSIS — E66.813 CLASS 3 SEVERE OBESITY DUE TO EXCESS CALORIES WITH SERIOUS COMORBIDITY AND BODY MASS INDEX (BMI) OF 40.0 TO 44.9 IN ADULT: ICD-10-CM

## 2025-08-04 DIAGNOSIS — G47.33 OBSTRUCTIVE SLEEP APNEA SYNDROME: ICD-10-CM

## 2025-08-04 DIAGNOSIS — F41.1 GAD (GENERALIZED ANXIETY DISORDER): ICD-10-CM

## 2025-08-04 DIAGNOSIS — E78.2 MIXED HYPERLIPIDEMIA: ICD-10-CM

## 2025-08-04 PROCEDURE — 99214 OFFICE O/P EST MOD 30 MIN: CPT

## 2025-08-04 PROCEDURE — 97110 THERAPEUTIC EXERCISES: CPT

## 2025-08-04 PROCEDURE — 97112 NEUROMUSCULAR REEDUCATION: CPT

## 2025-08-04 RX ORDER — ALPRAZOLAM 0.25 MG
0.25 TABLET ORAL
Qty: 90 TABLET | Refills: 0 | Status: SHIPPED | OUTPATIENT
Start: 2025-08-04

## 2025-08-04 RX ORDER — ROSUVASTATIN CALCIUM 20 MG/1
20 TABLET, COATED ORAL DAILY
Qty: 90 TABLET | Refills: 0 | Status: SHIPPED | OUTPATIENT
Start: 2025-08-04

## 2025-08-04 RX ORDER — FENOFIBRATE 160 MG/1
160 TABLET ORAL DAILY
Qty: 90 TABLET | Refills: 0 | Status: SHIPPED | OUTPATIENT
Start: 2025-08-04

## 2025-08-04 RX ORDER — VALSARTAN AND HYDROCHLOROTHIAZIDE 80; 12.5 MG/1; MG/1
1 TABLET, FILM COATED ORAL DAILY
Qty: 90 TABLET | Refills: 0 | Status: SHIPPED | OUTPATIENT
Start: 2025-08-04

## 2025-08-05 ENCOUNTER — APPOINTMENT (OUTPATIENT)
Dept: LAB | Facility: CLINIC | Age: 45
End: 2025-08-05
Attending: OBSTETRICS & GYNECOLOGY
Payer: COMMERCIAL

## 2025-08-05 DIAGNOSIS — O36.80X0 PREGNANCY OF UNKNOWN ANATOMIC LOCATION: ICD-10-CM

## 2025-08-05 LAB — B-HCG SERPL-ACNC: 1371.5 MIU/ML (ref 0–5)

## 2025-08-05 PROCEDURE — 84702 CHORIONIC GONADOTROPIN TEST: CPT

## 2025-08-05 PROCEDURE — 36415 COLL VENOUS BLD VENIPUNCTURE: CPT

## 2025-08-07 ENCOUNTER — APPOINTMENT (OUTPATIENT)
Dept: LAB | Facility: CLINIC | Age: 45
End: 2025-08-07
Payer: COMMERCIAL

## 2025-08-07 ENCOUNTER — RESULTS FOLLOW-UP (OUTPATIENT)
Dept: OBGYN CLINIC | Facility: CLINIC | Age: 45
End: 2025-08-07

## 2025-08-09 ENCOUNTER — APPOINTMENT (OUTPATIENT)
Dept: LAB | Facility: CLINIC | Age: 45
End: 2025-08-09
Payer: COMMERCIAL

## 2025-08-11 ENCOUNTER — APPOINTMENT (OUTPATIENT)
Dept: LAB | Facility: CLINIC | Age: 45
End: 2025-08-11
Attending: OBSTETRICS & GYNECOLOGY
Payer: COMMERCIAL

## 2025-08-13 ENCOUNTER — NURSE TRIAGE (OUTPATIENT)
Age: 45
End: 2025-08-13

## 2025-08-18 ENCOUNTER — APPOINTMENT (OUTPATIENT)
Dept: LAB | Facility: CLINIC | Age: 45
End: 2025-08-18
Attending: OBSTETRICS & GYNECOLOGY
Payer: COMMERCIAL

## 2025-08-18 ENCOUNTER — CONSULT (OUTPATIENT)
Dept: OBGYN CLINIC | Facility: CLINIC | Age: 45
End: 2025-08-18
Payer: COMMERCIAL

## 2025-08-18 VITALS
DIASTOLIC BLOOD PRESSURE: 72 MMHG | WEIGHT: 210 LBS | HEIGHT: 63 IN | SYSTOLIC BLOOD PRESSURE: 120 MMHG | BODY MASS INDEX: 37.21 KG/M2

## 2025-08-18 DIAGNOSIS — N83.201 BILATERAL OVARIAN CYSTS: Primary | ICD-10-CM

## 2025-08-18 DIAGNOSIS — O36.80X0 PREGNANCY OF UNKNOWN ANATOMIC LOCATION: ICD-10-CM

## 2025-08-18 DIAGNOSIS — N83.202 BILATERAL OVARIAN CYSTS: Primary | ICD-10-CM

## 2025-08-18 LAB — B-HCG SERPL-ACNC: 12.4 MIU/ML (ref 0–5)

## 2025-08-18 PROCEDURE — 36415 COLL VENOUS BLD VENIPUNCTURE: CPT

## 2025-08-18 PROCEDURE — 99214 OFFICE O/P EST MOD 30 MIN: CPT | Performed by: OBSTETRICS & GYNECOLOGY

## 2025-08-18 PROCEDURE — 84702 CHORIONIC GONADOTROPIN TEST: CPT

## (undated) DEVICE — STERILE 8 INCH PROCTO SWAB: Brand: CARDINAL HEALTH

## (undated) DEVICE — GLOVE PI ULTRA TOUCH SZ.6.5

## (undated) DEVICE — USED IN CONJUNCTION WITH A SYRINGE AS AN ADDITIVE DEVICE FOR ASPIRATION FROM MULTI-DOSE MEDICINE VIALS OR INJECTION INTO I.V. SYSTEMS AND PRE-SLIT SEPTUMS COVERING INJECTION SITES.: Brand: SOL-M™ BLUNT FILL NEEDLE

## (undated) DEVICE — BUTTON SWITCH PENCIL HOLSTER: Brand: VALLEYLAB

## (undated) DEVICE — STANDARD SURGICAL GOWN, L: Brand: CONVERTORS

## (undated) DEVICE — Device

## (undated) DEVICE — SKIN MARKER DUAL TIP WITH RULER CAP, FLEXIBLE RULER AND LABELS: Brand: DEVON

## (undated) DEVICE — NEEDLE SPINAL 22G X 3.5IN  QUINCKE

## (undated) DEVICE — CONMED ACCESSORY ELECTRODE, 3/16" (5 MM) BALL: Brand: CONMED

## (undated) DEVICE — GLOVE PI ULTRA TOUCH SZ.7.0

## (undated) DEVICE — PLASTIC ADHESIVE BANDAGE: Brand: CURITY

## (undated) DEVICE — SUT VICRYL 0 CT-1 27 IN J260H

## (undated) DEVICE — TRAY PAIN SUPPORT

## (undated) DEVICE — 1820 FOAM BLOCK NEEDLE COUNTER: Brand: DEVON

## (undated) DEVICE — 3000CC GUARDIAN II: Brand: GUARDIAN

## (undated) DEVICE — LEEP LOOP ELECTRODE MEDIUM 15 X 15